# Patient Record
Sex: MALE | Race: BLACK OR AFRICAN AMERICAN | NOT HISPANIC OR LATINO | Employment: FULL TIME | URBAN - METROPOLITAN AREA
[De-identification: names, ages, dates, MRNs, and addresses within clinical notes are randomized per-mention and may not be internally consistent; named-entity substitution may affect disease eponyms.]

---

## 2017-01-30 RX ORDER — GLIPIZIDE 5 MG/1
TABLET ORAL
Qty: 30 TABLET | Refills: 6 | Status: SHIPPED | OUTPATIENT
Start: 2017-01-30 | End: 2017-02-06 | Stop reason: SDUPTHER

## 2017-02-03 ENCOUNTER — OFFICE VISIT (OUTPATIENT)
Dept: INTERNAL MEDICINE | Facility: CLINIC | Age: 47
End: 2017-02-03
Payer: COMMERCIAL

## 2017-02-03 VITALS
HEIGHT: 74 IN | BODY MASS INDEX: 32.25 KG/M2 | DIASTOLIC BLOOD PRESSURE: 76 MMHG | SYSTOLIC BLOOD PRESSURE: 120 MMHG | TEMPERATURE: 98 F | WEIGHT: 251.31 LBS | OXYGEN SATURATION: 97 % | HEART RATE: 92 BPM

## 2017-02-03 DIAGNOSIS — I83.812 VARICOSE VEINS OF LEFT LOWER EXTREMITY WITH PAIN: ICD-10-CM

## 2017-02-03 DIAGNOSIS — R80.9 DIABETES MELLITUS WITH MICROALBUMINURIA: ICD-10-CM

## 2017-02-03 DIAGNOSIS — L03.116 CELLULITIS OF LEFT LOWER EXTREMITY: Primary | ICD-10-CM

## 2017-02-03 DIAGNOSIS — E11.29 DIABETES MELLITUS WITH MICROALBUMINURIA: ICD-10-CM

## 2017-02-03 PROCEDURE — 99213 OFFICE O/P EST LOW 20 MIN: CPT | Mod: S$GLB,,, | Performed by: INTERNAL MEDICINE

## 2017-02-03 PROCEDURE — 99999 PR PBB SHADOW E&M-EST. PATIENT-LVL III: CPT | Mod: PBBFAC,,, | Performed by: INTERNAL MEDICINE

## 2017-02-03 PROCEDURE — 3044F HG A1C LEVEL LT 7.0%: CPT | Mod: S$GLB,,, | Performed by: INTERNAL MEDICINE

## 2017-02-03 PROCEDURE — 3078F DIAST BP <80 MM HG: CPT | Mod: S$GLB,,, | Performed by: INTERNAL MEDICINE

## 2017-02-03 PROCEDURE — 4010F ACE/ARB THERAPY RXD/TAKEN: CPT | Mod: S$GLB,,, | Performed by: INTERNAL MEDICINE

## 2017-02-03 PROCEDURE — 2022F DILAT RTA XM EVC RTNOPTHY: CPT | Mod: S$GLB,,, | Performed by: INTERNAL MEDICINE

## 2017-02-03 PROCEDURE — 3074F SYST BP LT 130 MM HG: CPT | Mod: S$GLB,,, | Performed by: INTERNAL MEDICINE

## 2017-02-03 RX ORDER — SULFAMETHOXAZOLE AND TRIMETHOPRIM 800; 160 MG/1; MG/1
1 TABLET ORAL 2 TIMES DAILY
Qty: 20 TABLET | Refills: 0 | Status: SHIPPED | OUTPATIENT
Start: 2017-02-03 | End: 2017-02-15

## 2017-02-03 NOTE — LETTER
February 3, 2017                 [facility]  Internal Medicine  [address] [place] [address]  Phone: [phone]  Fax: [phone]   February 3, 2017     Patient: Laron Kotahri Jr.   YOB: 1970   Date of Visit: 2/3/2017       To Whom it May Concern:    Laron Kothari was seen in my clinic on 2/3/2017. He may return to work on 2/10/2017.    If you have any questions or concerns, please don't hesitate to call.    Sincerely,         Kristen Adler, DO

## 2017-02-03 NOTE — MR AVS SNAPSHOT
[facility]  [address] [facility] [address]  Phone: [phone]  Fax: [phone]                  Laron Kothari Jr.   2/3/2017 3:00 PM   Office Visit    Description:  Male : 1970   Provider:  Kristen Adler DO   Department:  [facility]           Reason for Visit     Leg Pain           Diagnoses this Visit        Comments    Cellulitis of left lower extremity    -  Primary     Varicose veins of left lower extremity with pain                To Do List           Future Appointments        Provider Department Dept Phone    3/20/2017 9:50 AM LABORATORY, [address] [facility] [phone]    3/31/2017 9:40 AM Christine Arteaga DPM [facility] Podiatry [phone]    2017 8:00 AM LABORATORY, [address] [facility] [phone]    2017 9:40 AM Kristen Adler DO [facility] Internal Medicine [phone]      Goals (5 Years of Data)     None       These Medications        Disp Refills Start End    sulfamethoxazole-trimethoprim 800-160mg (BACTRIM DS) 800-160 mg Tab 20 tablet 0 2/3/2017     Take 1 tablet by mouth 2 (two) times daily. - Oral    Pharmacy: [facility] [address] [place] [address] Ph #: [phone]         [facility]     [facility] Care [facility] -  Assistance  Registered nurses in the [facility] provide clinical advisement, health education, appointment booking, and other advisory services.  Call for this free service at [phone].             Medications           Message regarding Medications     Verify the changes and/or additions to your medication regime listed below are the same as discussed with your clinician today.  If any of these changes or additions are incorrect, please notify your healthcare provider.        START taking these NEW medications        Refills    sulfamethoxazole-trimethoprim 800-160mg (BACTRIM DS) 800-160 mg Tab 0    Sig: Take  "1 tablet by mouth 2 (two) times daily.    Class: Normal    Route: Oral      STOP taking these medications     tavaborole 5 % [place] Apply 1 application topically once daily.    aspirin (ECOTRIN) 81 MG EC tablet Take 81 mg by mouth once daily.           Verify that the below list of medications is an accurate representation of the medications you are currently taking.  If none reported, the list may be blank. If incorrect, please contact your healthcare provider. Carry this list with you in case of emergency.           Current Medications     blood sugar diagnostic Strp Once daily glucose testing.    blood-glucose meter Misc Use as directed.    gabapentin (NEURONTIN) 300 MG capsule Take 1 capsule (300 mg total) by mouth 2 (two) times daily.    glipiZIDE (GLUCOTROL) 5 MG tablet TAKE ONE TABLET BY MOUTH ONCE DAILY WITH BREAKFAST    indomethacin (INDOCIN) 50 MG capsule TAKE ONE CAPSULE BY MOUTH THREE TIMES DAILY    lancets (LANCETS,THIN) Misc Once daily glucose testing.    lisinopril (PRINIVIL,ZESTRIL) 40 MG tablet Take 1 tablet (40 mg total) by mouth once daily.    metformin (GLUCOPHAGE) 1000 MG tablet Take 1 tablet (1,000 mg total) by mouth 2 (two) times daily with meals.    pravastatin (PRAVACHOL) 80 MG tablet Take 1 tablet (80 mg total) by mouth once daily.    sulfamethoxazole-trimethoprim 800-160mg (BACTRIM DS) 800-160 mg Tab Take 1 tablet by mouth 2 (two) times daily.           Clinical Reference Information           Your Vitals Were     BP Pulse Temp Height Weight SpO2    120/76 (BP Location: Left arm, Patient Position: Sitting) 92 97.5 °F (36.4 °C) (Tympanic) 6' 2" (1.88 m) 114 kg (251 lb 5.2 oz) 97%    BMI                32.27 kg/m2          Blood Pressure          Most Recent Value    BP  120/76      Allergies as of 2/3/2017     No Known Allergies      Immunizations Administered on Date of Encounter - 2/3/2017     None      Smoking Cessation     If you would like to quit smoking:   You may be eligible for free " services if you are a [place] resident and started smoking cigarettes before September 1, 1988.  Call the Smoking Cessation Trust (SCT) toll free at (716) 929-8518 or (473) 905-1169.   Call 1-800-QUIT-NOW if you do not meet the above criteria.            Language Assistance Services     ATTENTION: Language assistance services are available, free of charge. Please call [phone].      ATENCIÓN: Si habla modestoañol, tiene a aranda disposición servicios gratuitos de asistencia lingüística. Llame al [phone].     CHÚ Ý: N?u b?n nói Ti?ng Vi?t, có các d?ch v? h? tr? ngôn ng? mi?n phí dành cho b?n. G?i s? [phone].         [facility] complies with applicable Federal civil rights laws and does not discriminate on the basis of race, color, national origin, age, disability, or sex.

## 2017-02-06 RX ORDER — GLIPIZIDE 5 MG/1
TABLET ORAL
Qty: 30 TABLET | Refills: 6 | Status: SHIPPED | OUTPATIENT
Start: 2017-02-06 | End: 2017-05-26 | Stop reason: SDUPTHER

## 2017-02-06 NOTE — PROGRESS NOTES
Laron Kothari .  46 y.o.  Black or  male    Chief Complaint   Patient presents with    Leg Pain       HPI:   Presents to the clinic with his wife with complaint of pain in his left lower leg. He has had this for several weeks but it is getting worse. He noticed redness a couple days ago. The area is tender. He reports having a varicose vein that is now swollen and tender to touch. He denies trauma. He denies a fever.   He has a history of controlled diabetes with microalbuminuria. He needs a refill on glipizide.     PMH: Reviewed    MEDS: Reviewed med card    ALLERGIES: Reviewed allergy card    PE: Reviewed vitals  GENERAL: Alert and oriented, no acute distress  HEART: Regular rate  LUNGS: Unlabored respirations  LEG: Left leg below knee tender, swollen varicose vein with erythema lateral to it; no open lesions, no drainage      ASSESSMENT/PLAN:    Laron was seen today for leg pain.    Diagnoses and all orders for this visit:    Cellulitis of left lower extremity  -     sulfamethoxazole-trimethoprim 800-160mg (BACTRIM DS) 800-160 mg Tab; Take 1 tablet by mouth 2 (two) times daily.    Varicose veins of left lower extremity with pain  -     Discussed leg elevation and applying cool compresses     Diabetes mellitus with microalbuminuria  -     glipiZIDE (GLUCOTROL) 5 MG tablet; TAKE ONE TABLET BY MOUTH ONCE DAILY WITH BREAKFAST    RTC: As needed

## 2017-02-15 ENCOUNTER — OFFICE VISIT (OUTPATIENT)
Dept: INTERNAL MEDICINE | Facility: CLINIC | Age: 47
End: 2017-02-15
Payer: COMMERCIAL

## 2017-02-15 VITALS
BODY MASS INDEX: 32.71 KG/M2 | DIASTOLIC BLOOD PRESSURE: 78 MMHG | TEMPERATURE: 98 F | HEIGHT: 74 IN | SYSTOLIC BLOOD PRESSURE: 138 MMHG | OXYGEN SATURATION: 97 % | HEART RATE: 99 BPM | WEIGHT: 254.88 LBS

## 2017-02-15 DIAGNOSIS — E11.29 DIABETES MELLITUS WITH MICROALBUMINURIA: Chronic | ICD-10-CM

## 2017-02-15 DIAGNOSIS — I83.812 VARICOSE VEINS OF LEFT LOWER EXTREMITIES WITH PAIN: ICD-10-CM

## 2017-02-15 DIAGNOSIS — R80.9 DIABETES MELLITUS WITH MICROALBUMINURIA: Chronic | ICD-10-CM

## 2017-02-15 DIAGNOSIS — M79.89 LEFT LEG SWELLING: Primary | ICD-10-CM

## 2017-02-15 PROCEDURE — 3078F DIAST BP <80 MM HG: CPT | Mod: S$GLB,,, | Performed by: INTERNAL MEDICINE

## 2017-02-15 PROCEDURE — 99999 PR PBB SHADOW E&M-EST. PATIENT-LVL III: CPT | Mod: PBBFAC,,, | Performed by: INTERNAL MEDICINE

## 2017-02-15 PROCEDURE — 99213 OFFICE O/P EST LOW 20 MIN: CPT | Mod: S$GLB,,, | Performed by: INTERNAL MEDICINE

## 2017-02-15 PROCEDURE — 3044F HG A1C LEVEL LT 7.0%: CPT | Mod: S$GLB,,, | Performed by: INTERNAL MEDICINE

## 2017-02-15 PROCEDURE — 3075F SYST BP GE 130 - 139MM HG: CPT | Mod: S$GLB,,, | Performed by: INTERNAL MEDICINE

## 2017-02-15 PROCEDURE — 2022F DILAT RTA XM EVC RTNOPTHY: CPT | Mod: S$GLB,,, | Performed by: INTERNAL MEDICINE

## 2017-02-15 PROCEDURE — 4010F ACE/ARB THERAPY RXD/TAKEN: CPT | Mod: S$GLB,,, | Performed by: INTERNAL MEDICINE

## 2017-02-15 NOTE — MR AVS SNAPSHOT
[facility]  [address] [facility] [address]  Phone: [phone]  Fax: [phone]                  Laron Kothari Jr.   2/15/2017 2:40 PM   Office Visit    Description:  Male : 1970   Provider:  Kristen Adler DO   Department:  [facility]           Reason for Visit     Leg Pain           Diagnoses this Visit        Comments    Left leg swelling    -  Primary     Varicose veins of left lower extremities with pain                To Do List           Future Appointments        Provider Department Dept Phone    2017 11:00 AM [facility] [address] [phone]    3/20/2017 9:50 AM LABORATORY, [address] [facility] [phone]    3/31/2017 9:40 AM Christine Arteaga DPM [facility] Podiatry [phone]    2017 8:00 AM [facility] [address] [facility] [phone]    2017 9:40 AM Kristen Adler DO [facility] Internal Medicine [phone]      Goals (5 Years of Data)     None      [facility]     [facility] Nurse Care [facility] -  Assistance  Registered nurses in the [facility] provide clinical advisement, health education, appointment booking, and other advisory services.  Call for this free service at [phone].             Medications           Message regarding Medications     Verify the changes and/or additions to your medication regime listed below are the same as discussed with your clinician today.  If any of these changes or additions are incorrect, please notify your healthcare provider.        STOP taking these medications     sulfamethoxazole-trimethoprim 800-160mg (BACTRIM DS) 800-160 mg Tab Take 1 tablet by mouth 2 (two) times daily.           Verify that the below list of medications is an accurate representation of the medications you are currently taking.  If none reported, the list may be blank. If incorrect, please contact your healthcare provider.  "Carry this list with you in case of emergency.           Current Medications     blood sugar diagnostic Strp Once daily glucose testing.    blood-glucose meter Misc Use as directed.    gabapentin (NEURONTIN) 300 MG capsule Take 1 capsule (300 mg total) by mouth 2 (two) times daily.    glipiZIDE (GLUCOTROL) 5 MG tablet TAKE ONE TABLET BY MOUTH ONCE DAILY WITH BREAKFAST    indomethacin (INDOCIN) 50 MG capsule TAKE ONE CAPSULE BY MOUTH THREE TIMES DAILY    lancets (LANCETS,THIN) Misc Once daily glucose testing.    lisinopril (PRINIVIL,ZESTRIL) 40 MG tablet Take 1 tablet (40 mg total) by mouth once daily.    metformin (GLUCOPHAGE) 1000 MG tablet Take 1 tablet (1,000 mg total) by mouth 2 (two) times daily with meals.    pravastatin (PRAVACHOL) 80 MG tablet Take 1 tablet (80 mg total) by mouth once daily.           Clinical Reference Information           Your Vitals Were     BP Pulse Temp Height    138/78 (BP Location: Right arm, Patient Position: Sitting, BP Method: Manual) 99 97.6 °F (36.4 °C) (Tympanic) 6' 2" (1.88 m)    Weight SpO2 BMI    115.6 kg (254 lb 13.6 oz) 97% 32.72 kg/m2      Blood Pressure          Most Recent Value    BP  138/78      Allergies as of 2/15/2017     No Known Allergies      Immunizations Administered on Date of Encounter - 2/15/2017     None      Orders Placed During Today's Visit     Future Labs/Procedures Expected by Expires    US Lower Extremity Veins Left  2/15/2017 5/16/2017      Smoking Cessation     If you would like to quit smoking:   You may be eligible for free services if you are a [place] resident and started smoking cigarettes before September 1, 1988.  Call the Smoking Cessation Trust (SCT) toll free at (150) 057-6854 or (446) 243-8904.   Call 2-966-QUIT-NOW if you do not meet the above criteria.            Language Assistance Services     ATTENTION: Language assistance services are available, free of charge. Please call [phone].      ATENCIÓN: awilda Lincoln " a aranda disposición servicios gratuitos de asistencia lingüística. Llleandro al [phone].     LOAN Ý: N?u b?n nói Ti?ng Vi?t, có các d?ch v? h? tr? ngôn ng? mi?n phí dành cho b?n. G?i s? [phone].         [facility] complies with applicable Federal civil rights laws and does not discriminate on the basis of race, color, national origin, age, disability, or sex.

## 2017-02-16 ENCOUNTER — TELEPHONE (OUTPATIENT)
Dept: CARDIOLOGY | Facility: CLINIC | Age: 47
End: 2017-02-16

## 2017-02-16 ENCOUNTER — HOSPITAL ENCOUNTER (OUTPATIENT)
Dept: RADIOLOGY | Facility: HOSPITAL | Age: 47
Discharge: HOME | End: 2017-02-16
Attending: INTERNAL MEDICINE
Payer: COMMERCIAL

## 2017-02-16 DIAGNOSIS — I83.812 VARICOSE VEINS OF LEFT LOWER EXTREMITIES WITH PAIN: ICD-10-CM

## 2017-02-16 DIAGNOSIS — M79.89 LEFT LEG SWELLING: ICD-10-CM

## 2017-02-16 DIAGNOSIS — I82.812 ACUTE SUPERFICIAL VENOUS THROMBOSIS OF LOWER EXTREMITY, LEFT: Primary | ICD-10-CM

## 2017-02-16 PROBLEM — I82.402 LEG DVT (DEEP VENOUS THROMBOEMBOLISM), ACUTE, LEFT: Status: ACTIVE | Noted: 2017-02-16

## 2017-02-16 PROCEDURE — 93971 EXTREMITY STUDY: CPT | Mod: TC

## 2017-02-16 PROCEDURE — 93971 EXTREMITY STUDY: CPT | Mod: 26,,, | Performed by: RADIOLOGY

## 2017-02-16 RX ORDER — ENOXAPARIN SODIUM 100 MG/ML
1 INJECTION SUBCUTANEOUS 2 TIMES DAILY
Qty: 10 SYRINGE | Refills: 0 | Status: SHIPPED | OUTPATIENT
Start: 2017-02-16 | End: 2017-02-21 | Stop reason: SDUPTHER

## 2017-02-16 RX ORDER — WARFARIN SODIUM 5 MG/1
5 TABLET ORAL DAILY
Qty: 30 TABLET | Refills: 0 | Status: SHIPPED | OUTPATIENT
Start: 2017-02-16 | End: 2017-02-28 | Stop reason: DRUGHIGH

## 2017-02-16 NOTE — PROGRESS NOTES
Laron Kothari .  46 y.o.  Black or  male    Chief Complaint   Patient presents with    Leg Pain       HPI: Presents to the clinic with continued pain and swelling in his left leg. He has completed a course of antibiotics for presumed cellulitis. Now the swelling has extended to above his knee on the medial-posterior lower thigh. The area is very tender when he touches it. He denies trauma. He denies having a fever.     He has controlled diabetes with microalbuminuria.      PMH: Reviewed    MEDS: Reviewed med card    ALLERGIES: Reviewed allergy card    PE: Reviewed vitals  GENERAL: Alert and oriented, no acute distress  HEART: Regular rate  LUNGS: Unlabored respirations  EXTREMITIES: Left medial, posterior lower thigh erythema, warmth and tenderness; left anterior, below knee inflamed, tender varicose vein      ASSESSMENT/PLAN:    Laron was seen today for leg pain.    Diagnoses and all orders for this visit:    Left leg swelling  -     US Lower Extremity Veins Left; Future    Varicose veins of left lower extremities with pain  -     US Lower Extremity Veins Left; Future    Diabetes mellitus with microalbuminuria    Further recommendations following ultrasound.

## 2017-02-16 NOTE — PATIENT INSTRUCTIONS
Deep Vein Thrombosis (DVT)    Deep vein thrombosis (DVT) occurs when a blood clot (thrombus) forms in a deep vein. This happens most often in the leg. It can also happen in the arms or other parts of the body. A part of the clot called an embolus can break off and travel to the lungs. When this happens, its called a pulmonary embolism (PE). PE is a medical emergency. It can cut off blood flow and lead to death. Both DVT and PE are closely related. Together, they are often referred to by the term venous thromboembolism (VTE).  Risk factors for DVT  Anything that slows blood flow, injures the lining of a vein, or increases blood clotting can make you more prone to having DVT. This includes the following:  · Long periods without movement (such as when sitting for many hours at a time or when recovering from major surgery or illness)  · Estrogen (female hormone) therapy, such as hormone replacement therapy (HRT) or oral contraceptives  · Fractured hip or leg  · Major surgery or joint replacement  · Major trauma or spinal cord injury  · Cancer  · Family history  · Excess weight or obesity  · Smoking  · Older age  Symptoms  DVT does not always cause symptoms. When symptoms do occur, they may appear around the site of the DVT, such as in the leg. Possible symptoms include:  · Swelling  · Pain  · Warmth  · Redness  · Tenderness  Home care  · You were likely prescribed blood thinners (anticoagulants). They may be given as pills (oral) or shots (injections). Follow all instructions when using these medicines. Note: Do not take blood thinners with other medicines, herbal remedies, or supplements without talking to your provider first. Certain medicines or products can affect how blood thinners work.  · Follow your providers instructions about activity and rest.  · If support or compression stockings are prescribed, wear them as directed. These may help improve blood flow in the legs.  · When sitting or lying down, move  your ankles, toes and knees often. This may also help improve blood flow in the legs.  Follow-up care  Follow up with your healthcare provider, or as advised. If imaging tests were done, they may need further review by a doctor. You will be told of any new findings that may affect your care.  When to seek medical advice  Call your healthcare provider right away if any of these occur:  · New or increased swelling, pain, tenderness, warmth, or redness, in the leg, arm, or other area  · Blood in the urine  · Bleeding with bowel movements  Call 911  Call 911 right away if any of these occur:  · Bleeding from the nose, gums, a cut, or vagina  · Heavy or uncontrolled bleeding  · Trouble breathing  · Chest pain or discomfort that worsens with deep breathing or coughing  · Coughing (may cough up blood)  · Fast heartbeat  · Sweating  · Anxiety  · Lightheadedness, dizziness, or fainting  Date Last Reviewed: 9/21/2015  © 8719-3888 [employer]. [address] [place] [address], [place] [address]. All rights reserved. This information is not intended as a substitute for professional medical care. Always follow your healthcare professional's instructions.

## 2017-02-17 ENCOUNTER — TELEPHONE (OUTPATIENT)
Dept: INTERNAL MEDICINE | Facility: CLINIC | Age: 47
End: 2017-02-17

## 2017-02-17 NOTE — TELEPHONE ENCOUNTER
Pt was informed pt work is still pending completion and he will be notified upon completion pt verbalized understanding.

## 2017-02-17 NOTE — TELEPHONE ENCOUNTER
----- Message from Zeny Griffiths sent at 2/16/2017  4:43 PM CST -----  Contact: pt  Pt is requesting nurse give him a call regarding his leave of absence. Pls call pt back at [phone]

## 2017-02-21 ENCOUNTER — ANTI-COAG VISIT (OUTPATIENT)
Dept: CARDIOLOGY | Facility: CLINIC | Age: 47
End: 2017-02-21
Payer: COMMERCIAL

## 2017-02-21 ENCOUNTER — TELEPHONE (OUTPATIENT)
Dept: INTERNAL MEDICINE | Facility: CLINIC | Age: 47
End: 2017-02-21

## 2017-02-21 DIAGNOSIS — Z79.01 LONG TERM (CURRENT) USE OF ANTICOAGULANTS: Primary | ICD-10-CM

## 2017-02-21 DIAGNOSIS — I82.812 ACUTE SUPERFICIAL VENOUS THROMBOSIS OF LOWER EXTREMITY, LEFT: ICD-10-CM

## 2017-02-21 DIAGNOSIS — I82.402 LEG DVT (DEEP VENOUS THROMBOEMBOLISM), ACUTE, LEFT: ICD-10-CM

## 2017-02-21 LAB — INR PPP: 1 (ref 2–3)

## 2017-02-21 PROCEDURE — 85610 PROTHROMBIN TIME: CPT | Mod: QW,S$GLB,,

## 2017-02-21 PROCEDURE — 99211 OFF/OP EST MAY X REQ PHY/QHP: CPT | Mod: 25,S$GLB,,

## 2017-02-21 RX ORDER — ENOXAPARIN SODIUM 100 MG/ML
INJECTION SUBCUTANEOUS
Qty: 10 SYRINGE | Refills: 0 | Status: SHIPPED | OUTPATIENT
Start: 2017-02-21 | End: 2017-02-24 | Stop reason: SDUPTHER

## 2017-02-21 NOTE — MR AVS SNAPSHOT
[facility]  [address]  [place] [address]  Phone: [phone]  Fax: [phone]                  Laron Kothari Jr.   2017 11:30 AM   Anti-coag visit    Description:  Male : 1970   Provider:  Ilana Crow PharmD   Department:  [facility]           Diagnoses this Visit        Comments    Long term (current) use of anticoagulants    -  Primary     Leg DVT (deep venous thromboembolism), acute, left                To Do List           Future Appointments        Provider Department Dept Phone    2017 2:45 PM Ilana Crow, PharmD [facility] [phone]    3/8/2017 8:20 AM Eric Rios MD [facility] V[facility] Surgery [phone]    3/10/2017 9:20 AM Kristen Adler DO [facility] Internal Medicine [phone]    3/20/2017 9:50 AM [place], [address] [facility] [phone]    3/31/2017 9:40 AM Christine Arteaga DPM [facility] Podiatry [phone]      Goals (5 Years of Data)     None      [facility]     [facility] Nurse Care [facility] -  Assistance  Registered nurses in the [facility] provide clinical advisement, health education, appointment booking, and other advisory services.  Call for this free service at [phone].             Medications           Message regarding Medications     Verify the changes and/or additions to your medication regime listed below are the same as discussed with your clinician today.  If any of these changes or additions are incorrect, please notify your healthcare provider.             Verify that the below list of medications is an accurate representation of the medications you are currently taking.  If none reported, the list may be blank. If incorrect, please contact your healthcare provider. Carry this list with you in case of emergency.           Current Medications     blood sugar diagnostic Strp Once daily glucose testing.    blood-glucose meter Misc Use as  directed.    enoxaparin (LOVENOX) 100 mg/mL Syrg Inject 1.2 mLs (120 mg total) into the skin 2 (two) times daily.    gabapentin (NEURONTIN) 300 MG capsule Take 1 capsule (300 mg total) by mouth 2 (two) times daily.    glipiZIDE (GLUCOTROL) 5 MG tablet TAKE ONE TABLET BY MOUTH ONCE DAILY WITH BREAKFAST    indomethacin (INDOCIN) 50 MG capsule TAKE ONE CAPSULE BY MOUTH THREE TIMES DAILY    lancets (LANCETS,THIN) Misc Once daily glucose testing.    lisinopril (PRINIVIL,ZESTRIL) 40 MG tablet Take 1 tablet (40 mg total) by mouth once daily.    metformin (GLUCOPHAGE) 1000 MG tablet Take 1 tablet (1,000 mg total) by mouth 2 (two) times daily with meals.    pravastatin (PRAVACHOL) 80 MG tablet Take 1 tablet (80 mg total) by mouth once daily.    warfarin (COUMADIN) 5 MG tablet Take 1 tablet (5 mg total) by mouth Daily.           Clinical Reference Information           Allergies as of 2/21/2017     No Known Allergies      Immunizations Administered on Date of Encounter - 2/21/2017     None      Orders Placed During Today's Visit      Normal Orders This Visit    POCT INR          2/21/2017 11:56 AM - Zainab BarnettD      Component Results     Component Value Flag Ref Range Units Status    INR 1.0 (A) 2.0 - 3.0  Final      January 2017 Details    Sun Mon Tue Wed Thu Fri Sat     1               2               3               4               5               6               7                 8               9               10               11               12               13               14                 15               16               17               18               19               20               21                 22               23               24               25               26               27               28                 29               30               31                    Date Details   No additional details              February 2017 Details    Sun Mon Tue Wed Thu Fri Sat        1                2               3               4                 5               6               7               8               9               10               11                 12               13               14               15               16   1.0      See details      17               18                 19               20               21   1.0   10 mg   See details      22      10 mg         23      5 mg         24            25                 26               27               28                    Date Details   02/16 Last INR check   INR: 1.0   Coumadin Therapy: 2.0 - 3.0 for INR for all indicators except mechanical heart valves and antiphospholipid syndromes which should use 2.5 - 3.5.       02/21 This INR check   INR: 1.0       Date of next INR:  2/24/2017               How to take your warfarin dose     To take:  5 mg Take 1 of the 5 mg tablets.    To take:  10 mg Take 2 of the 5 mg tablets.           Anticoagulation Summary as of 2/21/2017     Maintenance plan 5 mg (5 mg x 1) every day    Full instructions 2/21: 10 mg; 2/22: 10 mg; Otherwise 5 mg every day    Next INR check 2/24/2017      Anticoagulation Episode Summary     Comments WARFARIN + LOVENOX beginning 2/16      Smoking Cessation     If you would like to quit smoking:   You may be eligible for free services if you are a [place] resident and started smoking cigarettes before September 1, 1988.  Call the Smoking Cessation Trust ([facility]) toll free at (556) [phone] or (560) 527-3563.   Call 1-800-QUIT-NOW if you do not meet the above criteria.            Language Assistance Services     ATTENTION: Language assistance services are available, free of charge. Please call [phone].      ATENCIÓN: Si habla español, tiene a aranda disposición servicios gratuitos de asistencia lingüística. Llame al [phone].     CHÚ Ý: N?u b?n nói Ti?ng Vi?t, có các d?ch v? h? tr? ngôn ng? mi?n phí dành cho b?n. G?i s? [phone].         [facility]  complies with applicable Federal civil rights laws and does not discriminate on the basis of race, color, national origin, age, disability, or sex.

## 2017-02-21 NOTE — PROGRESS NOTES
"45 yo M PMH: recent h/o DVT, DM, HTN, HLD. Patient is here with his wife. Warfarin 5mg began on 2/16 with lovenox bridge therapy after DVT diagnosis. INR today is 1.0. Patient confirms dose and compliance. He reports eating cabbage this week. Take 10mg tonight and tomorrow then resume 5mg daily. Continue lovenox as prescribed. Repeat INR on Friday. Patient has been Coumadin counseled. Counseling included indication for use, importance of strict monitoring with clinic, importance of compliance, what to do if any missed doses, side effects associated with warfarin, drug interactions and drug-food interactions. All parties verbalized understanding. All questions/concerns were addressed.         New Patient Questionnaire  1. Why are you on Coumadin (warfarin)? "blood clot"  2. What is your target INR range? blank  3. Which one of these medications is recommended if you are taking Coumadin (warfarin) and want relief from a headache or fever? (d)  4. Which of the following food items would interfere with your Coumadin (warfarin) medication if its not incorporated consistently? (a)  5. You just remembered that you forgot to take your evening Coumadin (warfarin) medication dose last night. You would-- (a)  6. The best time of the day for me to take my Coumadin (warfarin) is (d)  7. While on Coumadin (warfarin) you need to be routinely monitored for which of the following: (a)    "

## 2017-02-21 NOTE — TELEPHONE ENCOUNTER
Pt was informed additional paper work was sent from his employer which requires completion pt verbalized understanding and states paperwork requires completion and submission by 02/24/17 pt was informed Dr. Adler will be informed of deadline for additional paperwork pt verbalized understanding.

## 2017-02-21 NOTE — TELEPHONE ENCOUNTER
----- Message from Luana Ward sent at 2/21/2017 12:07 PM CST -----  Wants to talk to you about some forms that need to go to his job/he says he has 2 more days. Please give him a call this afternoon

## 2017-02-22 NOTE — TELEPHONE ENCOUNTER
Tova pt spouse states pt has been coughing up blood and is concerned  . Tova was informed pt should go to ER for evaluation and verbalized understanding.

## 2017-02-24 ENCOUNTER — ANTI-COAG VISIT (OUTPATIENT)
Dept: CARDIOLOGY | Facility: CLINIC | Age: 47
End: 2017-02-24
Payer: COMMERCIAL

## 2017-02-24 DIAGNOSIS — I82.402 LEG DVT (DEEP VENOUS THROMBOEMBOLISM), ACUTE, LEFT: ICD-10-CM

## 2017-02-24 DIAGNOSIS — Z79.01 LONG TERM (CURRENT) USE OF ANTICOAGULANTS: Primary | ICD-10-CM

## 2017-02-24 LAB — INR PPP: 1 (ref 2–3)

## 2017-02-24 PROCEDURE — 85610 PROTHROMBIN TIME: CPT | Mod: QW,S$GLB,,

## 2017-02-24 PROCEDURE — 99211 OFF/OP EST MAY X REQ PHY/QHP: CPT | Mod: 25,S$GLB,,

## 2017-02-24 RX ORDER — ENOXAPARIN SODIUM 100 MG/ML
INJECTION SUBCUTANEOUS
Qty: 10 SYRINGE | Refills: 1 | Status: SHIPPED | OUTPATIENT
Start: 2017-02-24 | End: 2017-03-10

## 2017-02-24 NOTE — PROGRESS NOTES
INR remains at 1.0. Patient confirms dose and compliance. Reports a small salad two days ago but no other changes in diet. Reports coughing and seeing blood in tissue two days ago. No other issues since and did not seek any medical attention. Knows to go to ER if any signs/symptoms of bleeding occur. Will begin 10mg daily. Continue lovenox as prescribed. Patient and wife verbalized understanding.

## 2017-02-24 NOTE — MR AVS SNAPSHOT
[facility]  [address] [facility]  [place] [address]  Phone: [phone]  Fax: [phone]                  Laron Kothari Jr.   2017 2:45 PM   Anti-coag visit    Description:  Male : 1970   Provider:  Ilana Crow PharmD   Department:  [facility]           Diagnoses this Visit        Comments    Long term (current) use of anticoagulants    -  Primary     Leg DVT (deep venous thromboembolism), acute, left                To Do List           Future Appointments        Provider Department Dept Phone    2017 3:15 PM Ilana Crow, PharmD [facility] [phone]    3/8/2017 8:20 AM Eric Rios MD [facility] Vascular Surgery [phone]    3/10/2017 9:20 AM Kristen Adler DO [facility] Internal Medicine [phone]    3/20/2017 9:50 AM AMANDA, [address] [facility] [phone]    3/31/2017 9:40 AM Christine Arteaga DPM [facility] Podiatry [phone]      Goals (5 Years of Data)     None      [facility]     [facility] -  Assistance  Registered nurses in the [facility] provide clinical advisement, health education, appointment booking, and other advisory services.  Call for this free service at [phone].             Medications           Message regarding Medications     Verify the changes and/or additions to your medication regime listed below are the same as discussed with your clinician today.  If any of these changes or additions are incorrect, please notify your healthcare provider.             Verify that the below list of medications is an accurate representation of the medications you are currently taking.  If none reported, the list may be blank. If incorrect, please contact your healthcare provider. Carry this list with you in case of emergency.           Current Medications     blood sugar diagnostic Strp Once daily glucose testing.    blood-glucose meter Misc Use as  directed.    enoxaparin (LOVENOX) 100 mg/mL Syrg Inject 120mg twice daily into skin as directed by the coumadin clinic.    gabapentin (NEURONTIN) 300 MG capsule Take 1 capsule (300 mg total) by mouth 2 (two) times daily.    glipiZIDE (GLUCOTROL) 5 MG tablet TAKE ONE TABLET BY MOUTH ONCE DAILY WITH BREAKFAST    indomethacin (INDOCIN) 50 MG capsule TAKE ONE CAPSULE BY MOUTH THREE TIMES DAILY    lancets (LANCETS,THIN) Misc Once daily glucose testing.    lisinopril (PRINIVIL,ZESTRIL) 40 MG tablet Take 1 tablet (40 mg total) by mouth once daily.    metformin (GLUCOPHAGE) 1000 MG tablet Take 1 tablet (1,000 mg total) by mouth 2 (two) times daily with meals.    pravastatin (PRAVACHOL) 80 MG tablet Take 1 tablet (80 mg total) by mouth once daily.    warfarin (COUMADIN) 5 MG tablet Take 1 tablet (5 mg total) by mouth Daily.           Clinical Reference Information           Allergies as of 2/24/2017     No Known Allergies      Immunizations Administered on Date of Encounter - 2/24/2017     None      Orders Placed During Today's Visit      Normal Orders This Visit    POCT INR          2/24/2017  3:29 PM - Zainab BarnettD      Component Results     Component Value Flag Ref Range Units Status    INR 1.0 (A) 2.0 - 3.0  Final      January 2017 Details    Sun Mon Tue Wed Thu Fri Sat     1               2               3               4               5               6               7                 8               9               10               11               12               13               14                 15               16               17               18               19               20               21                 22               23               24               25               26               27               28                 29               30               31                    Date Details   No additional details              February 2017 Details    Sun Mon Tue Wed Thu Fri Sat        1                2               3               4                 5               6               7               8               9               10               11                 12               13               14               15               16   1.0      See details      17               18                 19               20               21   1.0   10 mg   See details      22      10 mg         23      5 mg         24   1.0   10 mg   See details      25      10 mg           26      10 mg         27      10 mg         28                 Date Details   02/16 INR: 1.0   Coumadin Therapy: 2.0 - 3.0 for INR for all indicators except mechanical heart valves and antiphospholipid syndromes which should use 2.5 - 3.5.       02/21 Last INR check   INR: 1.0      02/24 This INR check   INR: 1.0       Date of next INR:  2/28/2017               How to take your warfarin dose     To take:  10 mg Take 2 of the 5 mg tablets.           Anticoagulation Summary as of 2/24/2017     Maintenance plan 10 mg (5 mg x 2) every day    Full instructions 10 mg every day    Next INR check 2/28/2017      Anticoagulation Episode Summary     Comments F/U Survey 5/2017      Smoking Cessation     If you would like to quit smoking:   You may be eligible for free services if you are a [place] resident and started smoking cigarettes before September 1, 1988.  Call the Smoking Cessation Trust ([facility]) toll free at ([phone] [phone] or ([phone] 030-9292.   Call 0-968-QUIT-NOW if you do not meet the above criteria.            Language Assistance Services     ATTENTION: Language assistance services are available, free of charge. Please call [phone].      ATENCIÓN: Si habla español, tiene a aranda disposición servicios gratuitos de asistencia lingüística. Llame al [phone].     CHÚ Ý: N?u b?n nói Ti?ng Vi?t, có các d?ch v? h? tr? ngôn ng? mi?n phí dành cho b?n. G?i s? [phone].         [facility] complies with applicable Federal civil  rights laws and does not discriminate on the basis of race, color, national origin, age, disability, or sex.

## 2017-02-28 ENCOUNTER — ANTI-COAG VISIT (OUTPATIENT)
Dept: CARDIOLOGY | Facility: CLINIC | Age: 47
End: 2017-02-28
Payer: COMMERCIAL

## 2017-02-28 DIAGNOSIS — N18.2 DIABETES MELLITUS WITH STAGE 2 CHRONIC KIDNEY DISEASE: Chronic | ICD-10-CM

## 2017-02-28 DIAGNOSIS — I82.402 LEG DVT (DEEP VENOUS THROMBOEMBOLISM), ACUTE, LEFT: ICD-10-CM

## 2017-02-28 DIAGNOSIS — E78.1 HYPERTRIGLYCERIDEMIA: ICD-10-CM

## 2017-02-28 DIAGNOSIS — E11.22 DIABETES MELLITUS WITH STAGE 2 CHRONIC KIDNEY DISEASE: Chronic | ICD-10-CM

## 2017-02-28 DIAGNOSIS — I10 ESSENTIAL HYPERTENSION: ICD-10-CM

## 2017-02-28 DIAGNOSIS — Z79.01 LONG TERM (CURRENT) USE OF ANTICOAGULANTS: Primary | ICD-10-CM

## 2017-02-28 DIAGNOSIS — E78.5 HYPERLIPIDEMIA: Chronic | ICD-10-CM

## 2017-02-28 LAB — INR PPP: 1 (ref 2–3)

## 2017-02-28 PROCEDURE — 85610 PROTHROMBIN TIME: CPT | Mod: QW,S$GLB,,

## 2017-02-28 PROCEDURE — 99211 OFF/OP EST MAY X REQ PHY/QHP: CPT | Mod: 25,S$GLB,,

## 2017-02-28 RX ORDER — WARFARIN 10 MG/1
TABLET ORAL
Qty: 56 TABLET | Refills: 3 | Status: SHIPPED | OUTPATIENT
Start: 2017-02-28 | End: 2017-03-31

## 2017-02-28 NOTE — PROGRESS NOTES
INR remains at 1.0. Patient and wife confirmed dose and compliance. Will begin 20mg daily, continue lovenox as prescribed. Repeat INR on Friday. Verbalized understanding.

## 2017-02-28 NOTE — MR AVS SNAPSHOT
[facility]  [address] [facility]  [place] [address]  Phone: [phone]  Fax: [phone]                  Laron Kothari Jr.   2017 3:15 PM   Anti-coag visit    Description:  Male : 1970   Provider:  Ilana Crow PharmD   Department:  [facility]           Diagnoses this Visit        Comments    Long term (current) use of anticoagulants    -  Primary     Leg DVT (deep venous thromboembolism), acute, left                To Do List           Future Appointments        Provider Department Dept Phone    3/3/2017 3:15 PM Ilana Crow, PharmD [facility] [phone]    3/8/2017 8:20 AM Eric Rios MD [facility] Vascular Surgery [phone]    3/10/2017 9:20 AM Kristen Adler DO [facility] Internal Medicine [phone]    3/20/2017 9:50 AM AMANDA, [address] [facility] [phone]    3/31/2017 9:40 AM Christine Arteaga DPM [facility] Podiatry [phone]      Goals (5 Years of Data)     None      [facility]     [facility] Care [facility] - 24/7 Assistance  Registered nurses in the [facility] provide clinical advisement, health education, appointment booking, and other advisory services.  Call for this free service at [phone].             Medications           Message regarding Medications     Verify the changes and/or additions to your medication regime listed below are the same as discussed with your clinician today.  If any of these changes or additions are incorrect, please notify your healthcare provider.             Verify that the below list of medications is an accurate representation of the medications you are currently taking.  If none reported, the list may be blank. If incorrect, please contact your healthcare provider. Carry this list with you in case of emergency.           Current Medications     blood sugar diagnostic Strp Once daily glucose testing.    blood-glucose meter Misc Use as  directed.    enoxaparin (LOVENOX) 100 mg/mL Syrg Inject 120mg twice daily into skin as directed by the coumadin clinic.    gabapentin (NEURONTIN) 300 MG capsule Take 1 capsule (300 mg total) by mouth 2 (two) times daily.    glipiZIDE (GLUCOTROL) 5 MG tablet TAKE ONE TABLET BY MOUTH ONCE DAILY WITH BREAKFAST    indomethacin (INDOCIN) 50 MG capsule TAKE ONE CAPSULE BY MOUTH THREE TIMES DAILY    lancets (LANCETS,THIN) Misc Once daily glucose testing.    lisinopril (PRINIVIL,ZESTRIL) 40 MG tablet Take 1 tablet (40 mg total) by mouth once daily.    metformin (GLUCOPHAGE) 1000 MG tablet Take 1 tablet (1,000 mg total) by mouth 2 (two) times daily with meals.    pravastatin (PRAVACHOL) 80 MG tablet Take 1 tablet (80 mg total) by mouth once daily.    warfarin (COUMADIN) 5 MG tablet Take 1 tablet (5 mg total) by mouth Daily.           Clinical Reference Information           Allergies as of 2/28/2017     No Known Allergies      Immunizations Administered on Date of Encounter - 2/28/2017     None      Orders Placed During Today's Visit      Normal Orders This Visit    POCT INR          2/28/2017  3:28 PM - Zainab BarnettD      Component Results     Component Value Flag Ref Range Units Status    INR 1.0 (A) 2.0 - 3.0  Final      January 2017 Details    Sun Mon Tue Wed Thu Fri Sat     1               2               3               4               5               6               7                 8               9               10               11               12               13               14                 15               16               17               18               19               20               21                 22               23               24               25               26               27               28                 29               30               31                    Date Details   No additional details              February 2017 Details    Sun Mon Tue Wed Thu Fri Sat        1                2               3               4                 5               6               7               8               9               10               11                 12               13               14               15               16   1.0      See details      17               18                 19               20               21   1.0   10 mg   See details      22      10 mg         23      5 mg         24   1.0   10 mg   See details      25      10 mg           26      10 mg         27      10 mg         28   1.0   20 mg   See details           Date Details   02/16 INR: 1.0   Coumadin Therapy: 2.0 - 3.0 for INR for all indicators except mechanical heart valves and antiphospholipid syndromes which should use 2.5 - 3.5.       02/21 INR: 1.0      02/24 Last INR check   INR: 1.0      02/28 This INR check   INR: 1.0                     How to take your warfarin dose     To take:  20 mg Take 2 of the 10 mg tablets.           March 2017 Details    Sun Mon Tue Wed Thu Fri Sat        1      20 mg         2      20 mg         3            4                 5               6               7               8               9               10               11                 12               13               14               15               16               17               18                 19               20               21               22               23               24               25                 26               27               28               29               30               31                 Date Details   No additional details    Date of next INR:  3/3/2017         How to take your warfarin dose     To take:  20 mg Take 2 of the 10 mg tablets.           Anticoagulation Summary as of 2/28/2017     Maintenance plan 20 mg (10 mg x 2) every day    Full instructions 20 mg every day    Next INR check 3/3/2017      Anticoagulation Episode Summary     Comments F/U Survey 5/2017      Smoking  Cessation     If you would like to quit smoking:   You may be eligible for free services if you are a [place] resident and started smoking cigarettes before September 1, 1988.  Call the Smoking Cessation Trust ([employer]) toll free at (739) 688-1741 or (240) 649-8735.   Call 5-228-QUIT-NOW if you do not meet the above criteria.            Language Assistance Services     ATTENTION: Language assistance services are available, free of charge. Please call [phone].      ATENCIÓN: Si habla lionel, tiene a aranda disposición servicios gratuitos de asistencia lingüística. Llame al [phone].     CHÚ Ý: N?u b?n nói Ti?ng Vi?t, có các d?ch v? h? tr? ngôn ng? mi?n phí dành cho b?n. G?i s? [phone].         [facility] complies with applicable Federal civil rights laws and does not discriminate on the basis of race, color, national origin, age, disability, or sex.

## 2017-03-02 RX ORDER — PRAVASTATIN SODIUM 80 MG/1
TABLET ORAL
Qty: 30 TABLET | Refills: 6 | Status: SHIPPED | OUTPATIENT
Start: 2017-03-02 | End: 2017-12-08 | Stop reason: SDUPTHER

## 2017-03-02 RX ORDER — LISINOPRIL 40 MG/1
40 TABLET ORAL DAILY
Qty: 30 TABLET | Refills: 6 | Status: SHIPPED | OUTPATIENT
Start: 2017-03-02 | End: 2017-12-08 | Stop reason: SDUPTHER

## 2017-03-03 ENCOUNTER — ANTI-COAG VISIT (OUTPATIENT)
Dept: CARDIOLOGY | Facility: CLINIC | Age: 47
End: 2017-03-03
Payer: COMMERCIAL

## 2017-03-03 DIAGNOSIS — Z79.01 LONG TERM (CURRENT) USE OF ANTICOAGULANTS: Primary | ICD-10-CM

## 2017-03-03 DIAGNOSIS — I82.402 LEG DVT (DEEP VENOUS THROMBOEMBOLISM), ACUTE, LEFT: ICD-10-CM

## 2017-03-03 LAB — INR PPP: 3.4 (ref 2–3)

## 2017-03-03 PROCEDURE — 85610 PROTHROMBIN TIME: CPT | Mod: QW,S$GLB,,

## 2017-03-03 PROCEDURE — 99211 OFF/OP EST MAY X REQ PHY/QHP: CPT | Mod: 25,S$GLB,,

## 2017-03-03 NOTE — MR AVS SNAPSHOT
[facility]  [address] [facility]  [place] [address]  Phone: [phone]  Fax: [phone]                  Laron Kothari Jr.   3/3/2017 3:15 PM   Anti-coag visit    Description:  Male : 1970   Provider:  Ilana Crow, PharmD   [facility]           Diagnoses this Visit        Comments    Long term (current) use of anticoagulants    -  Primary     Leg DVT (deep venous thromboembolism), acute, left                To Do List           Future Appointments        Provider Department Dept Phone    3/8/2017 8:20 AM Eric Rios MD [facility] Vascular Surgery [phone]    3/10/2017 9:20 AM Kristen Adler DO [facility] - Internal Medicine [phone]    3/10/2017 10:15 AM Ilana Crow PharmD [facility] Coumadin [phone]    3/20/2017 9:50 AM AMANDA, [address] [facility] [phone]    3/31/2017 9:40 AM Christine Arteaga DPM [facility] Podiatry [phone]      Goals (5 Years of Data)     None      [facility]     [facility] Care [facility] - 24/7 Assistance  Registered nurses in the [facility] provide clinical advisement, health education, appointment booking, and other advisory services.  Call for this free service at [phone].             Medications           Message regarding Medications     Verify the changes and/or additions to your medication regime listed below are the same as discussed with your clinician today.  If any of these changes or additions are incorrect, please notify your healthcare provider.             Verify that the below list of medications is an accurate representation of the medications you are currently taking.  If none reported, the list may be blank. If incorrect, please contact your healthcare provider. Carry this list with you in case of emergency.           Current Medications     blood sugar diagnostic Strp Once daily glucose testing.    blood-glucose meter Misc Use as  directed.    enoxaparin (LOVENOX) 100 mg/mL Syrg Inject 120mg twice daily into skin as directed by the coumadin clinic.    gabapentin (NEURONTIN) 300 MG capsule Take 1 capsule (300 mg total) by mouth 2 (two) times daily.    glipiZIDE (GLUCOTROL) 5 MG tablet TAKE ONE TABLET BY MOUTH ONCE DAILY WITH BREAKFAST    indomethacin (INDOCIN) 50 MG capsule TAKE ONE CAPSULE BY MOUTH THREE TIMES DAILY    lancets (LANCETS,THIN) Misc Once daily glucose testing.    lisinopril (PRINIVIL,ZESTRIL) 40 MG tablet Take 1 tablet (40 mg total) by mouth once daily.    metformin (GLUCOPHAGE) 1000 MG tablet Take 1 tablet (1,000 mg total) by mouth 2 (two) times daily with meals.    pravastatin (PRAVACHOL) 80 MG tablet TAKE ONE TABLET BY MOUTH ONCE DAILY    warfarin (COUMADIN) 10 MG tablet Take 2 tablets every evening as directed by the coumadin clinic. Discontinue 5mg tablet.           Clinical Reference Information           Allergies as of 3/3/2017     No Known Allergies      Immunizations Administered on Date of Encounter - 3/3/2017     None      Orders Placed During Today's Visit      Normal Orders This Visit    POCT INR          3/3/2017  3:42 PM - Ilana Crow, PharmD      Component Results     Component Value Flag Ref Range Units Status    INR 3.4 (A) 2.0 - 3.0  Final      February 2017 Details    Sun Mon Tue Wed Thu Fri Sat        1               2               3               4                 5               6               7               8               9               10               11                 12               13               14               15               16   1.0      See details      17               18                 19               20               21   1.0   10 mg   See details      22      10 mg         23      5 mg         24   1.0   10 mg   See details      25      10 mg           26      10 mg         27      10 mg         28   1.0   20 mg   See details           Date Details   02/16 INR: 1.0    Coumadin Therapy: 2.0 - 3.0 for INR for all indicators except mechanical heart valves and antiphospholipid syndromes which should use 2.5 - 3.5.       02/21 INR: 1.0      02/24 INR: 1.0      02/28 Last INR check   INR: 1.0                 March 2017 Details    Sun Mon Tue Wed Thu Fri Sat        1      20 mg         2      20 mg         3   3.4   10 mg   See details      4      10 mg           5      10 mg         6      20 mg         7      10 mg         8      20 mg         9      10 mg         10            11                 12               13               14               15               16               17               18                 19               20               21               22               23               24               25                 26               27               28               29               30               31                 Date Details   03/03 This INR check   INR: 3.4       Date of next INR:  3/10/2017               How to take your warfarin dose     To take:  10 mg Take 1 of the 10 mg tablets.    To take:  20 mg Take 2 of the 10 mg tablets.           Anticoagulation Summary as of 3/3/2017     Maintenance plan 20 mg (10 mg x 2) on Mon, Wed, Fri; 10 mg (10 mg x 1) all other days    Full instructions 3/3: 10 mg; Otherwise 20 mg on Mon, Wed, Fri; 10 mg all other days    Next INR check 3/10/2017      Anticoagulation Episode Summary     Comments F/U Survey 5/2017      Smoking Cessation     If you would like to quit smoking:   You may be eligible for free services if you are a [place] resident and started smoking cigarettes before September 1, 1988.  Call the Smoking Cessation Trust ([facility]) toll free at ([phone] 740-8849 or (586) 220-9593.   Call 2-835-QUIT-NOW if you do not meet the above criteria.            Language Assistance Services     ATTENTION: Language assistance services are available, free of charge. Please call [phone].      ATENCIÓN: Blas flowers  tiene a aranda disposición servicios gratuitos de asistencia lingüística. Fredo al [phone].     LOAN Ý: N?u b?n nói Ti?ng Vi?t, có các d?ch v? h? tr? ngôn ng? mi?n phí dành cho b?n. G?i s? [phone].         [facility] complies with applicable Federal civil rights laws and does not discriminate on the basis of race, color, national origin, age, disability, or sex.

## 2017-03-03 NOTE — PROGRESS NOTES
INR is now 3.4. Discontinue lovenox and begin warfarin 20mg on Mondays and Wednesdays and 10mg all other days. Repeat INR in 1 week. Patient and wife verbalized understanding.

## 2017-03-09 ENCOUNTER — TELEPHONE (OUTPATIENT)
Dept: INTERNAL MEDICINE | Facility: CLINIC | Age: 47
End: 2017-03-09

## 2017-03-09 NOTE — TELEPHONE ENCOUNTER
----- Message from Phillip Fields sent at 3/9/2017 12:08 PM CST -----  Contact: Patient  Pt needs a return call regarding a refill on his blood pressure medication; Lisinopril @ and pravastatin  Please call back @..[phone] (home)    ..  [facility] [address] [facility] [address] [address] [address]  Phone: [phone] Fax: [phone]          [facility] [address] [facility] [place] [facility] [address] [facility]  [address] [facility] [address]  Phone: [phone] Fax: [phone]

## 2017-03-09 NOTE — TELEPHONE ENCOUNTER
Pt was informed prescription was sent to [facility] at the [facility] location on 03/02/17 and [facility] and pt verbalized understanding.

## 2017-03-10 ENCOUNTER — ANTI-COAG VISIT (OUTPATIENT)
Dept: CARDIOLOGY | Facility: CLINIC | Age: 47
End: 2017-03-10

## 2017-03-10 ENCOUNTER — OFFICE VISIT (OUTPATIENT)
Dept: INTERNAL MEDICINE | Facility: CLINIC | Age: 47
End: 2017-03-10
Payer: COMMERCIAL

## 2017-03-10 VITALS
SYSTOLIC BLOOD PRESSURE: 120 MMHG | DIASTOLIC BLOOD PRESSURE: 80 MMHG | OXYGEN SATURATION: 97 % | HEIGHT: 74 IN | HEART RATE: 102 BPM | BODY MASS INDEX: 33.33 KG/M2 | TEMPERATURE: 98 F | WEIGHT: 259.69 LBS

## 2017-03-10 DIAGNOSIS — I82.402 LEG DVT (DEEP VENOUS THROMBOEMBOLISM), ACUTE, LEFT: ICD-10-CM

## 2017-03-10 DIAGNOSIS — I82.812 LEG VEIN THROMBOEMBOLISM, SUPERFICIAL, LEFT: Primary | ICD-10-CM

## 2017-03-10 DIAGNOSIS — I83.812 VARICOSE VEINS OF LEFT LOWER EXTREMITY WITH PAIN: ICD-10-CM

## 2017-03-10 PROCEDURE — 3074F SYST BP LT 130 MM HG: CPT | Mod: S$GLB,,, | Performed by: INTERNAL MEDICINE

## 2017-03-10 PROCEDURE — 99213 OFFICE O/P EST LOW 20 MIN: CPT | Mod: S$GLB,,, | Performed by: INTERNAL MEDICINE

## 2017-03-10 PROCEDURE — 1160F RVW MEDS BY RX/DR IN RCRD: CPT | Mod: S$GLB,,, | Performed by: INTERNAL MEDICINE

## 2017-03-10 PROCEDURE — 3079F DIAST BP 80-89 MM HG: CPT | Mod: S$GLB,,, | Performed by: INTERNAL MEDICINE

## 2017-03-10 PROCEDURE — 99999 PR PBB SHADOW E&M-EST. PATIENT-LVL III: CPT | Mod: PBBFAC,,, | Performed by: INTERNAL MEDICINE

## 2017-03-10 NOTE — MR AVS SNAPSHOT
[facility] Medicine  [address] [address]  [place] [address]  Phone: [phone]  Fax: [phone]                  Laron Kothari Jr.   3/10/2017 9:20 AM   Office Visit    Description:  Male : 1970   Provider:  Kristen Adler DO   Department:  [facility]           Reason for Visit     Follow-up           Diagnoses this Visit        Comments    Varicose veins of left lower extremity with pain    -  Primary     Leg vein thromboembolism, superficial, left                To Do List           Future Appointments        Provider Department Dept Phone    3/20/2017 9:50 AM LABORATORY, [address] [facility], [facility] [phone]    3/31/2017 9:40 AM Christine Arteaga DPM [facility] Podiatry [phone]    2017 8:00 AM LABORATORY, [address] [facility], [facility] [phone]    2017 9:40 AM Kristen Adler DO [facility] Internal Medicine [phone]      Goals (5 Years of Data)     None      [facility] On Call     [facility] On Call Nurse Care Line -  Assistance  Registered nurses in the [facility] On Call Center provide clinical advisement, health education, appointment booking, and other advisory services.  Call for this free service at [phone].             Medications           Message regarding Medications     Verify the changes and/or additions to your medication regime listed below are the same as discussed with your clinician today.  If any of these changes or additions are incorrect, please notify your healthcare provider.        STOP taking these medications     blood-glucose meter Misc Use as directed.    enoxaparin (LOVENOX) 100 mg/mL Syrg Inject 120mg twice daily into skin as directed by the coumadin clinic.           Verify that the below list of medications is an accurate representation of the medications you are currently taking.  If none reported, the list may be blank. If incorrect, please contact your healthcare provider. Carry this  "list with you in case of emergency.           Current Medications     blood sugar diagnostic Strp Once daily glucose testing.    gabapentin (NEURONTIN) 300 MG capsule Take 1 capsule (300 mg total) by mouth 2 (two) times daily.    glipiZIDE (GLUCOTROL) 5 MG tablet TAKE ONE TABLET BY MOUTH ONCE DAILY WITH BREAKFAST    indomethacin (INDOCIN) 50 MG capsule TAKE ONE CAPSULE BY MOUTH THREE TIMES DAILY    lancets (LANCETS,THIN) Misc Once daily glucose testing.    lisinopril (PRINIVIL,ZESTRIL) 40 MG tablet Take 1 tablet (40 mg total) by mouth once daily.    metformin (GLUCOPHAGE) 1000 MG tablet Take 1 tablet (1,000 mg total) by mouth 2 (two) times daily with meals.    pravastatin (PRAVACHOL) 80 MG tablet TAKE ONE TABLET BY MOUTH ONCE DAILY    warfarin (COUMADIN) 10 MG tablet Take 2 tablets every evening as directed by the coumadin clinic. Discontinue 5mg tablet.           Clinical Reference Information           Your Vitals Were     BP Pulse Temp Height Weight SpO2    120/80 (BP Location: Left arm, Patient Position: Sitting) 102 97.8 °F (36.6 °C) (Tympanic) 6' 2" (1.88 m) 117.8 kg (259 lb 11.2 oz) 97%    BMI                33.34 kg/m2          Blood Pressure          Most Recent Value    BP  120/80      Allergies as of 3/10/2017     No Known Allergies      Immunizations Administered on Date of Encounter - 3/10/2017     None      Smoking Cessation     If you would like to quit smoking:   You may be eligible for free services if you are a [place] resident and started smoking cigarettes before September 1, 1988.  Call the Smoking Cessation Trust ([facility]) toll free at ([phone] or (787) 292-4039.   Call 7-229-QUIT-NOW if you do not meet the above criteria.            Language Assistance Services     ATTENTION: Language assistance services are available, free of charge. Please call [phone].      ATENCIÓN: Si habla español, tiene a aranda disposición servicios gratuitos de asistencia lingüística. Llame al " [phone].     LOAN Ý: N?u b?n nói Ti?ng Vi?t, có các d?ch v? h? tr? ngôn ng? mi?n phí dành cho b?n. G?i s? [phone].         [facility] complies with applicable Federal civil rights laws and does not discriminate on the basis of race, color, national origin, age, disability, or sex.

## 2017-03-10 NOTE — PROGRESS NOTES
Patient reports Warfarin has been discontinued as of 3/8 by Dr. Rios. Message sent to Dr. Rios to confirm. No documentation present in chart to confirm medication discontinued.

## 2017-03-10 NOTE — LETTER
March 10, 2017               [facility]  Internal Medicine  [address] [place] [address]  Phone: [phone]  Fax: [phone]   March 10, 2017     Patient: Laron Kothari Jr.   YOB: 1970   Date of Visit: 3/10/2017       To Whom it May Concern:    Laron Kothari was seen in my clinic on 3/10/2017. He may return to work on 3/24/2017.    If you have any questions or concerns, please don't hesitate to call.    Sincerely,         Kristen Adler, DO

## 2017-03-13 NOTE — PROGRESS NOTES
Laron Kothari .  46 y.o.  Black or  male    Chief Complaint   Patient presents with    Follow-up       HPI:   Presents to the clinic for a 4 week f/u on left leg superficial clots. He has been doing better. He has mild to moderate pain in his left leg, where he has known varicosities. He recently saw vascular surgery. He was taken off anticoagulation therapy. He states he was advised to wear compression stockings and to return to clinic in 3 months for consideration for laser surgery.   He has not returned to work yet and feels he needs more time to recover due to the nature of his employment. He is an  and has to stand for prolonged periods of time (10-12 hours).     PMH: Reviewed    MEDS: Reviewed med card    ALLERGIES: Reviewed allergy card    PE: Reviewed vitals  GENERAL: Alert and oriented, no acute distress  HEART: Regular rate  LUNGS: Unlabored respirations  EXTREMITIES: No erythema on left leg; varicose veins noted on left leg      ASSESSMENT/PLAN:    Laron was seen today for follow-up.    Diagnoses and all orders for this visit:    Leg vein thromboembolism, superficial, left    Varicose veins of left lower extremity with pain    F/U with vascular surgery as recommended.     RTW in 2 weeks.     RTC as previously scheduled and as needed.

## 2017-03-14 DIAGNOSIS — N18.2 DIABETES MELLITUS WITH STAGE 2 CHRONIC KIDNEY DISEASE: Chronic | ICD-10-CM

## 2017-03-14 DIAGNOSIS — E11.22 DIABETES MELLITUS WITH STAGE 2 CHRONIC KIDNEY DISEASE: Chronic | ICD-10-CM

## 2017-03-14 DIAGNOSIS — I10 ESSENTIAL HYPERTENSION: ICD-10-CM

## 2017-03-14 NOTE — TELEPHONE ENCOUNTER
----- Message from Sarita Ruiz sent at 3/14/2017  3:35 PM CDT -----  He wants to know if you have completed his paperwork and faxed it back to his employer?   Call him at [phone].                                                             burrell

## 2017-03-14 NOTE — TELEPHONE ENCOUNTER
Pt was informed [facility]  Paperwork has been received and upon completion paperwork will be faxed and he will be notified pt verbalized understanding.

## 2017-03-16 ENCOUNTER — TELEPHONE (OUTPATIENT)
Dept: INTERNAL MEDICINE | Facility: CLINIC | Age: 47
End: 2017-03-16

## 2017-03-16 NOTE — TELEPHONE ENCOUNTER
----- Message from Solange Polanco sent at 3/16/2017  1:04 PM CDT -----  Call pt at [phone]-regarding papers work that need to be fax to my employer//kellie kilgore

## 2017-03-17 ENCOUNTER — TELEPHONE (OUTPATIENT)
Dept: INTERNAL MEDICINE | Facility: CLINIC | Age: 47
End: 2017-03-17

## 2017-03-17 NOTE — TELEPHONE ENCOUNTER
Pt was informed requested information has been sent to [employer] as requested pt verbalized understanding.

## 2017-03-17 NOTE — TELEPHONE ENCOUNTER
----- Message from Netta Lemons sent at 3/16/2017  4:35 PM CDT -----  Contact: patient  Patient called to speak with the nurse regarding some paperwork for his employer. He can be contacted at [phone].    Thanks,  Netta

## 2017-03-21 RX ORDER — LISINOPRIL 20 MG/1
TABLET ORAL
Qty: 30 TABLET | Refills: 6 | OUTPATIENT
Start: 2017-03-21

## 2017-03-30 ENCOUNTER — OFFICE VISIT (OUTPATIENT)
Dept: OPHTHALMOLOGY | Facility: CLINIC | Age: 47
End: 2017-03-30
Payer: COMMERCIAL

## 2017-03-30 DIAGNOSIS — E11.9 DIABETES MELLITUS TYPE 2 WITHOUT RETINOPATHY: Primary | ICD-10-CM

## 2017-03-30 DIAGNOSIS — H52.4 BILATERAL PRESBYOPIA: ICD-10-CM

## 2017-03-30 DIAGNOSIS — H52.03 HYPEROPIA, BILATERAL: ICD-10-CM

## 2017-03-30 PROCEDURE — 92014 COMPRE OPH EXAM EST PT 1/>: CPT | Mod: S$GLB,,, | Performed by: OPTOMETRIST

## 2017-03-30 PROCEDURE — 99999 PR PBB SHADOW E&M-EST. PATIENT-LVL I: CPT | Mod: PBBFAC,,, | Performed by: OPTOMETRIST

## 2017-03-30 PROCEDURE — 92015 DETERMINE REFRACTIVE STATE: CPT | Mod: S$GLB,,, | Performed by: OPTOMETRIST

## 2017-03-30 NOTE — PROGRESS NOTES
HPI     NIDDM exam. Decrease near visual acuity. Hard to focus in the morning x   2-3 months. Last eye exam 02/06/2015 TR[facility]. Update glasses RX.       Last edited by Therese Reyes on 3/30/2017  3:56 PM.         Assessment /Plan     For exam results, see Encounter Report.    Diabetes mellitus type 2 without retinopathy    Hyperopia, bilateral    Bilateral presbyopia      No Background Diabetic Retinopathy    Dispense Final Rx for glasses. Or +1.50 OTC  RTC 1 year

## 2017-03-31 ENCOUNTER — TELEPHONE (OUTPATIENT)
Dept: PODIATRY | Facility: CLINIC | Age: 47
End: 2017-03-31

## 2017-03-31 ENCOUNTER — OFFICE VISIT (OUTPATIENT)
Dept: PODIATRY | Facility: CLINIC | Age: 47
End: 2017-03-31
Payer: COMMERCIAL

## 2017-03-31 VITALS
HEIGHT: 74 IN | HEART RATE: 102 BPM | DIASTOLIC BLOOD PRESSURE: 91 MMHG | SYSTOLIC BLOOD PRESSURE: 154 MMHG | WEIGHT: 259.69 LBS | BODY MASS INDEX: 33.33 KG/M2

## 2017-03-31 DIAGNOSIS — E11.42 DM TYPE 2 WITH DIABETIC PERIPHERAL NEUROPATHY: ICD-10-CM

## 2017-03-31 DIAGNOSIS — B35.1 DERMATOPHYTOSIS OF NAIL: Primary | ICD-10-CM

## 2017-03-31 DIAGNOSIS — L84 CORN OR CALLUS: ICD-10-CM

## 2017-03-31 PROCEDURE — 11721 DEBRIDE NAIL 6 OR MORE: CPT | Mod: 59,S$GLB,, | Performed by: PODIATRIST

## 2017-03-31 PROCEDURE — 3077F SYST BP >= 140 MM HG: CPT | Mod: S$GLB,,, | Performed by: PODIATRIST

## 2017-03-31 PROCEDURE — 4010F ACE/ARB THERAPY RXD/TAKEN: CPT | Mod: S$GLB,,, | Performed by: PODIATRIST

## 2017-03-31 PROCEDURE — 3044F HG A1C LEVEL LT 7.0%: CPT | Mod: S$GLB,,, | Performed by: PODIATRIST

## 2017-03-31 PROCEDURE — 1160F RVW MEDS BY RX/DR IN RCRD: CPT | Mod: S$GLB,,, | Performed by: PODIATRIST

## 2017-03-31 PROCEDURE — 99999 PR PBB SHADOW E&M-EST. PATIENT-LVL III: CPT | Mod: PBBFAC,,, | Performed by: PODIATRIST

## 2017-03-31 PROCEDURE — 99213 OFFICE O/P EST LOW 20 MIN: CPT | Mod: 25,S$GLB,, | Performed by: PODIATRIST

## 2017-03-31 PROCEDURE — 11055 PARING/CUTG B9 HYPRKER LES 1: CPT | Mod: S$GLB,,, | Performed by: PODIATRIST

## 2017-03-31 PROCEDURE — 3080F DIAST BP >= 90 MM HG: CPT | Mod: S$GLB,,, | Performed by: PODIATRIST

## 2017-03-31 RX ORDER — CICLOPIROX 80 MG/ML
SOLUTION TOPICAL
Qty: 1 BOTTLE | Refills: 10 | Status: SHIPPED | OUTPATIENT
Start: 2017-03-31 | End: 2019-11-29

## 2017-03-31 NOTE — TELEPHONE ENCOUNTER
Spoke to patient and informed him on antifungal not being covered by insurance and that new prescription was sent to his preferred pharmacy for medication, with refills, that is covered by insurance. Verbalized understanding and ended call pleasantly.

## 2017-03-31 NOTE — TELEPHONE ENCOUNTER
Attempted to contact patient in reference to prescription for topical antifungal no longer being covered by insurance and to inform him of new prescription being sent to his preferred pharmacy. Left message to return call to clinic.

## 2017-03-31 NOTE — MR AVS SNAPSHOT
[facility]  [address]  [place] [address]  Phone: [phone]  Fax: [phone]                  Laron Kothari Jr.   3/31/2017 9:40 AM   Office Visit    Description:  Male : 1970   Provider:  Christine Arteaga DPM   Department:  [facility]           Reason for Visit     Routine Foot Care                To Do List           Future Appointments        Provider Department Dept Phone    2017 8:00 AM LABORATORY, [address] [facility] [phone]    2017 9:40 AM Kristen Adler DO [facility] [phone]    2017 9:40 AM Christine Arteaga DPM [facility] [phone]    2018 3:00 PM Pepe Hinson OD [facility] [phone]      Goals (5 Years of Data)     None      [facility]     [facility] Nurse Care Line -  Assistance  Unless otherwise directed by your provider, please contact [facility], our nurse care line that is available for  assistance.     Registered nurses in the [facility] Center provide: appointment scheduling, clinical advisement, health education, and other advisory services.  Call: [phone] (toll free)               Medications           Message regarding Medications     Verify the changes and/or additions to your medication regime listed below are the same as discussed with your clinician today.  If any of these changes or additions are incorrect, please notify your healthcare provider.        STOP taking these medications     warfarin (COUMADIN) 10 MG tablet Take 2 tablets every evening as directed by the coumadin clinic. Discontinue 5mg tablet.           Verify that the below list of medications is an accurate representation of the medications you are currently taking.  If none reported, the list may be blank. If incorrect, please contact your healthcare provider. Carry this list with you in case of emergency.           Current Medications     blood sugar  "diagnostic Strp Once daily glucose testing.    gabapentin (NEURONTIN) 300 MG capsule Take 1 capsule (300 mg total) by mouth 2 (two) times daily.    glipiZIDE (GLUCOTROL) 5 MG tablet TAKE ONE TABLET BY MOUTH ONCE DAILY WITH BREAKFAST    indomethacin (INDOCIN) 50 MG capsule TAKE ONE CAPSULE BY MOUTH THREE TIMES DAILY    lancets (LANCETS,THIN) Misc Once daily glucose testing.    lisinopril (PRINIVIL,ZESTRIL) 40 MG tablet Take 1 tablet (40 mg total) by mouth once daily.    metformin (GLUCOPHAGE) 1000 MG tablet Take 1 tablet (1,000 mg total) by mouth 2 (two) times daily with meals.    pravastatin (PRAVACHOL) 80 MG tablet TAKE ONE TABLET BY MOUTH ONCE DAILY           Clinical Reference Information           Your Vitals Were     BP Pulse Height Weight BMI    154/91 (BP Location: Right arm, Patient Position: Sitting, BP Method: Automatic) 102 6' 2" (1.88 m) 117.8 kg (259 lb 11.2 oz) 33.34 kg/m2      Blood Pressure          Most Recent Value    BP  (!)  154/91      Allergies as of 3/31/2017     No Known Allergies      Immunizations Administered on Date of Encounter - 3/31/2017     None      Smoking Cessation     If you would like to quit smoking:   You may be eligible for free services if you are a [place] resident and started smoking cigarettes before September 1, 1988.  Call the Smoking Cessation Trust ([facility]) toll free at (552) 358-3580 or (875) 270-0238.   Call 1-800-QUIT-NOW if you do not meet the above criteria.   Contact us via email: [email or web address]   View our website for more information: [email or web address]        Language Assistance Services     ATTENTION: Language assistance services are available, free of charge. Please call [phone].      ATENCIÓN: Si habla español, tiene a aranda disposición servicios gratuitos de asistencia lingüística. Llame al [phone].     CHÚ Ý: N?u b?n nói Ti?ng Vi?t, có các d?ch v? h? tr? ngôn ng? mi?n phí dành cho b?n. G?i s? [phone].         [facility] complies with applicable Federal civil rights laws and does not discriminate on the basis of race, color, national origin, age, disability, or sex.

## 2017-03-31 NOTE — PROGRESS NOTES
PODIATRY NOTE    CHIEF COMPLAINT  Chief Complaint   Patient presents with    Routine Foot Care     at risk foot/callous care PCP Dr. Adler 3/10/17         HPI  SUBJECTIVE: Laron Kothari Jr. is a 46 y.o. male who  has a past medical history of Diabetes mellitus, type 2 (2013); Gout; Hyperlipidemia; Hypertension; and Neuropathy. Laronpresents to clinic for high risk diabetic foot exam and care.  Laron admits numbness, burning, and/or tingling sensations in their feet. Patient admits to painful toenails and calluses aggravated by increased weight bearing, shoe gear, and pressure. States pain is relieved with routine debridements. Patient has no other pedal complaints at this time.    This patient has documented high risk feet requiring routine maintenance secondary to diabetes mellitis and those secondary complications of diabetes, as mentioned.      HgA1c:   Hemoglobin A1C   Date Value Ref Range Status   02/16/2017 6.0 4.5 - 6.2 % Final     Comment:     According to ADA guidelines, hemoglobin A1C <7.0% represents  optimal control in non-pregnant diabetic patients.  Different  metrics may apply to specific populations.   Standards of Medical Care in Diabetes - 2016.  For the purpose of screening for the presence of diabetes:  <5.7%     Consistent with the absence of diabetes  5.7-6.4%  Consistent with increasing risk for diabetes   (prediabetes)  >or=6.5%  Consistent with diabetes  Currently no consensus exists for use of hemoglobin A1C  for diagnosis of diabetes for children.     12/05/2016 5.9 4.5 - 6.2 % Final     Comment:     According to ADA guidelines, hemoglobin A1C <7.0% represents  optimal control in non-pregnant diabetic patients.  Different  metrics may apply to specific populations.   Standards of Medical Care in Diabetes - 2016.  For the purpose of screening for the presence of diabetes:  <5.7%     Consistent with the absence of diabetes  5.7-6.4%  Consistent with increasing risk for diabetes  "  (prediabetes)  >or=6.5%  Consistent with diabetes  Currently no consensus exists for use of hemoglobin A1C  for diagnosis of diabetes for children.     04/14/2016 7.3 (H) 4.5 - 6.2 % Final       REVIEW OF SYSTEMS  General: Denies any fever or chills  Chest: Denies shortness of breath, wheezing, coughing, or sputum production  Heart: Denies chest pain.  As noted above and per history of current illness above, otherwise negative in the remainder of the 14 systems.     PHYSICAL EXAM  Vitals:    03/31/17 0946   BP: (!) 154/91   Pulse: 102   Weight: 117.8 kg (259 lb 11.2 oz)   Height: 6' 2" (1.88 m)       GEN:  This patient is well-developed, well-nourished and appears stated age, well-oriented to person, place and time, and cooperative and pleasant on today's visit.      LOWER EXTREMITY  Vascular:   · DP pedal pulse 2/4 b/l, PT pedal pulse 2/4 b/l  · Skin temperature warm to warm from prox to distally  · CFT <5 secs b/l  · There is no  edema noted b/l.     Dermatologic:   · Thickened, dystrophic, elongated toenails with subungal debris 1-5 b/l.   · No open skin lesions noted  · No erythema or drainage noted b/l.  · Webspaces are C/D/I B/L.  · There is hyperkeratotic tissue noted sub 1st metatarsal head left foot  · Skin texture and turgor dry  · There is no pedal hair growth noted    Neurologic:  · Vibratory sensation diminished at level of Hallux IPJ b/l    · Protective sensation absent at 0/10 sites upon examination with Clymer Weinsten 5.07 g monofilament.   · Propioception intact at 1st MTPJ b/l.   · Achilles and patellar deep tendon reflexes intact  · Babinski reflex absent b/l. Light touch and sharp/dull sensation intact b/l.    Musculoskeletal/Orthopedic:  · No symptomatic structural abnormalities noted.   · Muscle strength is 5/5 for foot inverters, everters, plantarflexors, and dorsiflexors. Muscle tone is normal.  · Pain free range of motion in all four quadrants with stiffness and limitation " b/l      ASSESSMENT  1. Dermatophytosis of nail  2. DM2 with diabetic peripheral neuropathy   3. Callus     Plan:  -Initial patient evaluation with H&P was performed  -Discuss presenting problems, etiology, pathologic processes and management options with patient today.   -I counseled the patient on their conditions, their implications and medical management. An in depth discussion on diabetic management, risk prevention, amputation prevention verbally and provided educational literature in written format.  -With patient's permission, nails were aggressively reduced and debrided x 10 to their soft tissue attachment mechanically and with electric , removing all offending nail and debris. Patient relates relief following the procedure. Patient will continue to monitor the areas daily, inspect feet, wear protective shoe gear when ambulatory, moisturizer to maintain skin integrity.-sharp excisional debridement of hyperkeratotic lesions deep to epidermal layer x 1 was performed with a #15 blade without incident. Offloading pads dispensed  - Shoe inspection. Diabetic Foot Education. Patient reminded of the importance of good nutrition and blood sugar control to help prevent podiatric complications of diabetes. Patient instructed on proper foot hygeine. We discussed wearing proper shoe gear, daily foot inspections, never walking without protective shoe gear, never putting sharp instruments to feet, routine podiatric nail visits every 2-3 months.        Future Appointments  Date Time Provider Department Center   6/5/2017 8:00 AM LABORATORY, [facility]   6/9/2017 9:40 AM Kristen Adler DO [facility]   7/7/2017 9:40 AM Christine Arteaga DPM ON POD [facility]   4/5/2018 3:00 PM Pepe Hinson OD ON OPHTHAL [facility]         Report Electronically Signed By:  Christine Arteaga DPM   Podiatric Medicine & Surgery  [facility]  3/31/2017

## 2017-03-31 NOTE — TELEPHONE ENCOUNTER
----- Message from Zuri Baires sent at 3/31/2017 12:42 PM CDT -----  Contact: pt  States he's returning Debby's call. Please call pt at [phone]. Thank you

## 2017-04-23 ENCOUNTER — HOSPITAL ENCOUNTER (EMERGENCY)
Facility: HOSPITAL | Age: 47
Discharge: HOME | End: 2017-04-23
Attending: EMERGENCY MEDICINE
Payer: COMMERCIAL

## 2017-04-23 VITALS
BODY MASS INDEX: 33.37 KG/M2 | TEMPERATURE: 99 F | HEIGHT: 74 IN | RESPIRATION RATE: 18 BRPM | WEIGHT: 260 LBS | OXYGEN SATURATION: 98 % | HEART RATE: 112 BPM | SYSTOLIC BLOOD PRESSURE: 141 MMHG | DIASTOLIC BLOOD PRESSURE: 81 MMHG

## 2017-04-23 DIAGNOSIS — J01.00 ACUTE MAXILLARY SINUSITIS, RECURRENCE NOT SPECIFIED: Primary | ICD-10-CM

## 2017-04-23 PROCEDURE — 99283 EMERGENCY DEPT VISIT LOW MDM: CPT

## 2017-04-23 RX ORDER — PROMETHAZINE HYDROCHLORIDE AND DEXTROMETHORPHAN HYDROBROMIDE 6.25; 15 MG/5ML; MG/5ML
SYRUP ORAL
Qty: 180 ML | Refills: 0 | Status: SHIPPED | OUTPATIENT
Start: 2017-04-23 | End: 2018-04-18

## 2017-04-23 RX ORDER — LEVOFLOXACIN 500 MG/1
500 TABLET, FILM COATED ORAL DAILY
Qty: 10 TABLET | Refills: 0 | Status: SHIPPED | OUTPATIENT
Start: 2017-04-23 | End: 2017-05-03

## 2017-04-23 NOTE — ED PROVIDER NOTES
Encounter Date: 4/23/2017       History     Chief Complaint   Patient presents with    Cough     cough x2 weeks with body aches      Review of patient's allergies indicates:  No Known Allergies  HPI Comments: 46 year old male with complaint of cough and congestion X 2 weeks.  Reports moderate congestion and body aches.  No difficulty breathing.  No diarrhea.  Reports mild facial pain.  No worsening or alleviating factors.     Past Medical History:   Diagnosis Date    Diabetes mellitus, type 2 2013    BS didn't check today 03/30/2017    Gout     Hyperlipidemia     Hypertension     Neuropathy      Past Surgical History:   Procedure Laterality Date    APPENDECTOMY      TONSILLECTOMY, ADENOIDECTOMY       Family History   Problem Relation Age of Onset    Diabetes Father     Prostate cancer Father     Cataracts Father      Social History   Substance Use Topics    Smoking status: Current Every Day Smoker     Packs/day: 0.50    Smokeless tobacco: Never Used    Alcohol use 0.0 oz/week     0 Standard drinks or equivalent per week      Comment: socially     Review of Systems   Constitutional: Negative for fever.   HENT: Positive for congestion. Negative for sore throat.    Respiratory: Positive for cough. Negative for shortness of breath.    Cardiovascular: Negative for chest pain.   Gastrointestinal: Negative for nausea.   Genitourinary: Negative for dysuria.   Musculoskeletal: Negative for back pain.   Skin: Negative for rash.   Neurological: Negative for weakness.   Hematological: Does not bruise/bleed easily.       Physical Exam   Initial Vitals   BP Pulse Resp Temp SpO2   04/23/17 0829 04/23/17 0829 04/23/17 0829 04/23/17 0829 04/23/17 0829   141/81 112 18 99.2 °F (37.3 °C) 98 %     Physical Exam    Nursing note and vitals reviewed.  Constitutional: He appears well-developed and well-nourished.   HENT:   Head: Normocephalic and atraumatic.   + maxillary and frontal tenderness   Eyes: Conjunctivae are normal.  Pupils are equal, round, and reactive to light.   Neck: Normal range of motion. Neck supple.   Cardiovascular: Normal rate, regular rhythm, normal heart sounds and intact distal pulses.   Pulmonary/Chest: Breath sounds normal.   Abdominal: Soft. There is no rebound and no guarding.   Musculoskeletal: Normal range of motion.   Neurological: He is alert and oriented to person, place, and time.   Skin: Skin is warm and dry.   Psychiatric: He has a normal mood and affect. His behavior is normal. Thought content normal.         ED Course   Procedures  Labs Reviewed - No data to display                            ED Course     Clinical Impression:   The encounter diagnosis was Acute maxillary sinusitis, recurrence not specified.          Stephen Mann NP  04/23/17 0846

## 2017-04-23 NOTE — DISCHARGE INSTRUCTIONS
Acute Sinusitis    Acute sinusitis is irritation and swelling of the sinuses. It is usually caused by a viral infection after a common cold. Your doctor can help you find relief.  What is acute sinusitis?  Sinuses are air-filled spaces in the skull behind the face. They are kept moist and clean by a lining of mucosa. Things such as pollen, smoke, and chemical fumes can irritate the mucosa. It can then swell up. As a response to irritation, the mucosa makes more mucus and other fluids. Tiny hairlike cilia cover the mucosa. Cilia help carry mucus toward the opening of the sinus. Too much mucus may cause the cilia to stop working. This blocks the sinus opening. A buildup of fluid in the sinuses then causes pain and pressure. It can also encourage bacteria to grow in the sinuses.  Common symptoms of acute sinusitis  You may have:  · Facial soreness pain  · Headache  · Fever  · Fluid draining in the back of the throat (postnasal drip)  · Congestion  · Drainage that is thick and colored, instead of clear  · Cough  Diagnosing acute sinusitis  Your doctor will ask about your symptoms and health history. He or she will look at your ear, nose, and throat. You usually won't need to have X-rays taken.    The doctor may take a sample of mucus to check for bacteria. If you have sinusitis that keeps coming back, you may need imaging tests such as X-rays or CAT scans. This will help your doctor check for a structural problem that may be causing the infection.  Treating acute sinusitis  Treatment is aimed at unblocking the sinus opening and helping the cilia work again. You may need to take antihistamine and decongestant medicine. These can reduce inflammation and decrease the amount of fluid your sinuses make. If you have a bacterial infection, you will need to take antibiotic medicine for 10 to 14 days. Take this medicine until it is gone, even if you feel better.  Date Last Reviewed: 10/1/2016  © 8478-0401 [employer] [address] [place] [address]. All rights reserved. This information is not intended as a substitute for professional medical care. Always follow your healthcare professional's instructions.

## 2017-04-23 NOTE — ED NOTES
Pt examined by BANDAR Mann np without RN, educated on prescriptions, given discharge instructions and discharged to [facility].  See provider notes for exam

## 2017-05-22 DIAGNOSIS — R80.9 DIABETES MELLITUS WITH MICROALBUMINURIA: ICD-10-CM

## 2017-05-22 DIAGNOSIS — E11.29 DIABETES MELLITUS WITH MICROALBUMINURIA: ICD-10-CM

## 2017-05-22 RX ORDER — METFORMIN HYDROCHLORIDE 1000 MG/1
1000 TABLET ORAL 2 TIMES DAILY WITH MEALS
Qty: 60 TABLET | Refills: 0 | Status: SHIPPED | OUTPATIENT
Start: 2017-05-22 | End: 2017-05-26 | Stop reason: SDUPTHER

## 2017-05-26 RX ORDER — GLIPIZIDE 5 MG/1
TABLET ORAL
Qty: 30 TABLET | Refills: 6 | Status: SHIPPED | OUTPATIENT
Start: 2017-05-26 | End: 2017-12-08 | Stop reason: SDUPTHER

## 2017-05-26 RX ORDER — METFORMIN HYDROCHLORIDE 1000 MG/1
1000 TABLET ORAL 2 TIMES DAILY WITH MEALS
Qty: 60 TABLET | Refills: 6 | Status: SHIPPED | OUTPATIENT
Start: 2017-05-26 | End: 2017-12-08 | Stop reason: SDUPTHER

## 2017-12-08 ENCOUNTER — PATIENT MESSAGE (OUTPATIENT)
Dept: INTERNAL MEDICINE | Facility: CLINIC | Age: 47
End: 2017-12-08

## 2017-12-08 ENCOUNTER — OFFICE VISIT (OUTPATIENT)
Dept: INTERNAL MEDICINE | Facility: CLINIC | Age: 47
End: 2017-12-08
Payer: MEDICAID

## 2017-12-08 ENCOUNTER — LAB VISIT (OUTPATIENT)
Dept: LAB | Facility: HOSPITAL | Age: 47
End: 2017-12-08
Attending: INTERNAL MEDICINE
Payer: MEDICAID

## 2017-12-08 VITALS
DIASTOLIC BLOOD PRESSURE: 80 MMHG | SYSTOLIC BLOOD PRESSURE: 126 MMHG | HEIGHT: 74 IN | BODY MASS INDEX: 32.06 KG/M2 | OXYGEN SATURATION: 99 % | HEART RATE: 93 BPM | WEIGHT: 249.81 LBS | TEMPERATURE: 97 F

## 2017-12-08 DIAGNOSIS — E11.29 DIABETES MELLITUS WITH MICROALBUMINURIA: ICD-10-CM

## 2017-12-08 DIAGNOSIS — R80.9 DIABETES MELLITUS WITH MICROALBUMINURIA: ICD-10-CM

## 2017-12-08 DIAGNOSIS — E78.5 HYPERLIPIDEMIA LDL GOAL <100: ICD-10-CM

## 2017-12-08 DIAGNOSIS — I10 HYPERTENSION GOAL BP (BLOOD PRESSURE) < 130/80: ICD-10-CM

## 2017-12-08 DIAGNOSIS — E78.5 HYPERLIPIDEMIA LDL GOAL <70: ICD-10-CM

## 2017-12-08 DIAGNOSIS — M79.641 RIGHT HAND PAIN: ICD-10-CM

## 2017-12-08 DIAGNOSIS — E11.40 TYPE 2 DIABETES MELLITUS WITH DIABETIC NEUROPATHY, WITHOUT LONG-TERM CURRENT USE OF INSULIN: Primary | ICD-10-CM

## 2017-12-08 DIAGNOSIS — E11.40 TYPE 2 DIABETES MELLITUS WITH DIABETIC NEUROPATHY: ICD-10-CM

## 2017-12-08 LAB
ALBUMIN SERPL BCP-MCNC: 3.6 G/DL
ALP SERPL-CCNC: 87 U/L
ALT SERPL W/O P-5'-P-CCNC: 21 U/L
ANION GAP SERPL CALC-SCNC: 6 MMOL/L
AST SERPL-CCNC: 14 U/L
BILIRUB SERPL-MCNC: 0.8 MG/DL
BUN SERPL-MCNC: 10 MG/DL
CALCIUM SERPL-MCNC: 10 MG/DL
CHLORIDE SERPL-SCNC: 101 MMOL/L
CHOLEST SERPL-MCNC: 205 MG/DL
CHOLEST/HDLC SERPL: 6.8 {RATIO}
CO2 SERPL-SCNC: 29 MMOL/L
CREAT SERPL-MCNC: 1.4 MG/DL
CREAT UR-MCNC: 55 MG/DL
EST. GFR  (AFRICAN AMERICAN): >60 ML/MIN/1.73 M^2
EST. GFR  (NON AFRICAN AMERICAN): 59.8 ML/MIN/1.73 M^2
ESTIMATED AVG GLUCOSE: 214 MG/DL
GLUCOSE SERPL-MCNC: 332 MG/DL (ref 70–110)
GLUCOSE SERPL-MCNC: 417 MG/DL
HBA1C MFR BLD HPLC: 9.1 %
HDLC SERPL-MCNC: 30 MG/DL
HDLC SERPL: 14.6 %
LDLC SERPL CALC-MCNC: 129.8 MG/DL
MICROALBUMIN UR DL<=1MG/L-MCNC: 169 UG/ML
MICROALBUMIN/CREATININE RATIO: 307.3 UG/MG
NONHDLC SERPL-MCNC: 175 MG/DL
POTASSIUM SERPL-SCNC: 4.3 MMOL/L
PROT SERPL-MCNC: 8.1 G/DL
SODIUM SERPL-SCNC: 136 MMOL/L
TRIGL SERPL-MCNC: 226 MG/DL

## 2017-12-08 PROCEDURE — 99214 OFFICE O/P EST MOD 30 MIN: CPT | Mod: PBBFAC,25 | Performed by: INTERNAL MEDICINE

## 2017-12-08 PROCEDURE — 99214 OFFICE O/P EST MOD 30 MIN: CPT | Mod: S$PBB,,, | Performed by: INTERNAL MEDICINE

## 2017-12-08 PROCEDURE — 99999 PR PBB SHADOW E&M-EST. PATIENT-LVL IV: CPT | Mod: PBBFAC,,, | Performed by: INTERNAL MEDICINE

## 2017-12-08 PROCEDURE — 82948 REAGENT STRIP/BLOOD GLUCOSE: CPT | Mod: PBBFAC | Performed by: INTERNAL MEDICINE

## 2017-12-08 PROCEDURE — 82570 ASSAY OF URINE CREATININE: CPT

## 2017-12-08 PROCEDURE — 83036 HEMOGLOBIN GLYCOSYLATED A1C: CPT

## 2017-12-08 PROCEDURE — 80053 COMPREHEN METABOLIC PANEL: CPT

## 2017-12-08 PROCEDURE — 90686 IIV4 VACC NO PRSV 0.5 ML IM: CPT | Mod: PBBFAC

## 2017-12-08 PROCEDURE — 80061 LIPID PANEL: CPT

## 2017-12-08 PROCEDURE — 36415 COLL VENOUS BLD VENIPUNCTURE: CPT

## 2017-12-08 RX ORDER — GABAPENTIN 600 MG/1
600 TABLET ORAL 2 TIMES DAILY
Qty: 60 TABLET | Refills: 1 | Status: SHIPPED | OUTPATIENT
Start: 2017-12-08 | End: 2018-03-22

## 2017-12-08 RX ORDER — LISINOPRIL 40 MG/1
40 TABLET ORAL DAILY
Qty: 30 TABLET | Refills: 6 | Status: SHIPPED | OUTPATIENT
Start: 2017-12-08 | End: 2018-07-14 | Stop reason: SDUPTHER

## 2017-12-08 RX ORDER — METFORMIN HYDROCHLORIDE 1000 MG/1
1000 TABLET ORAL 2 TIMES DAILY WITH MEALS
Qty: 60 TABLET | Refills: 6 | Status: SHIPPED | OUTPATIENT
Start: 2017-12-08 | End: 2018-07-19 | Stop reason: SDUPTHER

## 2017-12-08 RX ORDER — GLIPIZIDE 5 MG/1
TABLET ORAL
Qty: 30 TABLET | Refills: 6 | Status: SHIPPED | OUTPATIENT
Start: 2017-12-08 | End: 2018-07-14 | Stop reason: SDUPTHER

## 2017-12-08 RX ORDER — PRAVASTATIN SODIUM 80 MG/1
80 TABLET ORAL DAILY
Qty: 30 TABLET | Refills: 6 | Status: SHIPPED | OUTPATIENT
Start: 2017-12-08 | End: 2018-03-12 | Stop reason: SDUPTHER

## 2017-12-08 NOTE — PROGRESS NOTES
Subjective:       Patient ID: Laron Kothari Jr. is a 47 y.o. male.    Chief Complaint: Follow-up    Laron Kothari Jr.  47 y.o. Black or  male    Patient presents with:  Follow-up    HPI: Here today to follow up on chronic conditions.  Diabetes--he has been out of medication for 3 months. He is having symptoms of hyperglycemia. He is fasting today for labs.   Neuropathy--gabapentin 300 mg BID has been ineffective.   Microalbuminuria--he denies taking NSAID's. He has been off lisinopril.   HTN--stable despite being off lisinopril.   HLD--he needs a refill on pravastatin.   He has been having right hand pain for the past few days. He denies trauma. His hand near his thumb is tender and slightly swollen. He has not taken any medication for his symptoms.     Past Medical History:  2013: Diabetes mellitus, type 2  Gout  Hyperlipidemia  Hypertension  Neuropathy    Current Outpatient Prescriptions on File Prior to Visit:  gabapentin (NEURONTIN) 300 MG capsule, Take 1 capsule (300 mg total) by mouth 2 (two) times daily., Disp: 60 capsule, Rfl: 6  glipiZIDE (GLUCOTROL) 5 MG tablet, TAKE ONE TABLET BY MOUTH ONCE DAILY WITH BREAKFAST, Disp: 30 tablet, Rfl: 6  lisinopril (PRINIVIL,ZESTRIL) 40 MG tablet, Take 1 tablet (40 mg total) by mouth once daily., Disp: 30 tablet, Rfl: 6  metformin (GLUCOPHAGE) 1000 MG tablet, Take 1 tablet (1,000 mg total) by mouth 2 (two) times daily with meals., Disp: 60 tablet, Rfl: 6  pravastatin (PRAVACHOL) 80 MG tablet, TAKE ONE TABLET BY MOUTH ONCE DAILY, Disp: 30 tablet, Rfl: 6  blood sugar diagnostic Strp, Once daily glucose testing., Disp: 50 each, Rfl: 6  ciclopirox (PENLAC) 8 % Soln, Apply to affected toenails at night time DAILY. On 7th day, file nails down, clean all nails with alcohol and restart application process., Disp: 1 Bottle, Rfl: 10  indomethacin (INDOCIN) 50 MG capsule, TAKE ONE CAPSULE BY MOUTH THREE TIMES DAILY, Disp: 40 capsule, Rfl: 0  lancets (LANCETS,THIN) Misc,  Once daily glucose testing., Disp: 50 each, Rfl: 6    Allergies:  Review of patient's allergies indicates:  No Known Allergies          Diabetes   He has type 2 diabetes mellitus. No MedicAlert identification noted. The initial diagnosis of diabetes was made 3 years ago. Hypoglycemia symptoms include headaches, hunger, mood changes, nervousness/anxiousness, sleepiness, sweats and tremors. Pertinent negatives for hypoglycemia include no confusion, dizziness, pallor, seizures or speech difficulty. Associated symptoms include blurred vision, chest pain, fatigue, foot paresthesias, polydipsia, polyphagia, polyuria, visual change, weakness and weight loss. Pertinent negatives for diabetes include no foot ulcerations. Pertinent negatives for hypoglycemia complications include no blackouts, no hospitalization, no nocturnal hypoglycemia, no required assistance and no required glucagon injection. Symptoms are stable. Diabetic complications include autonomic neuropathy and PVD. Pertinent negatives for diabetic complications include no CVA, heart disease, impotence, nephropathy, peripheral neuropathy or retinopathy. Risk factors for coronary artery disease include dyslipidemia, hypertension, obesity, stress, tobacco exposure and diabetes mellitus. Current diabetic treatment includes oral agent (dual therapy). He is compliant with treatment most of the time. He is currently taking insulin pre-lunch. Insulin injections are given by patient. His weight is fluctuating minimally. He is following a high fat/cholesterol diet. Meal planning includes avoidance of concentrated sweets. He has not had a previous visit with a dietitian. He participates in exercise intermittently. He monitors blood glucose at home 1-2 x per week. He monitors urine at home <1 x per month. Blood glucose monitoring compliance is inadequate. His home blood glucose trend is increasing steadily. He sees a podiatrist.Eye exam is not current.     Review of Systems    Constitutional: Positive for fatigue and weight loss. Negative for fever and unexpected weight change.   Eyes: Positive for blurred vision and visual disturbance.   Respiratory: Negative for shortness of breath.    Cardiovascular: Positive for chest pain.   Gastrointestinal: Negative for abdominal pain.   Endocrine: Positive for polydipsia, polyphagia and polyuria.   Genitourinary: Positive for frequency. Negative for difficulty urinating and impotence.   Musculoskeletal: Positive for arthralgias.   Skin: Negative for pallor.   Neurological: Positive for tremors, weakness and headaches. Negative for dizziness, seizures and speech difficulty.   Psychiatric/Behavioral: Negative for confusion. The patient is nervous/anxious.        Objective:      Physical Exam   Constitutional: He is oriented to person, place, and time. He appears well-developed and well-nourished. No distress.   Eyes: No scleral icterus.   Cardiovascular: Normal rate, regular rhythm, normal heart sounds and intact distal pulses.    Pulses:       Dorsalis pedis pulses are 2+ on the right side, and 2+ on the left side.   Pulmonary/Chest: Effort normal and breath sounds normal. No respiratory distress.   Abdominal: Soft. Bowel sounds are normal.   Musculoskeletal: He exhibits no edema.   Feet:   Right Foot:   Protective Sensation: 4 sites tested. 4 sites sensed.   Skin Integrity: Positive for callus. Negative for ulcer.   Left Foot:   Protective Sensation: 4 sites tested. 4 sites sensed.   Skin Integrity: Positive for callus. Negative for ulcer.   Neurological: He is alert and oriented to person, place, and time.   Skin: Skin is warm and dry.   Psychiatric: He has a normal mood and affect.   Vitals reviewed.      Assessment:       1. Type 2 diabetes mellitus with diabetic neuropathy, without long-term current use of insulin    2. Diabetes mellitus with microalbuminuria    3. Hypertension goal BP (blood pressure) < 130/80    4. Hyperlipidemia LDL goal  <70    5. Right hand pain        Plan:       Laron was seen today for follow-up.    Diagnoses and all orders for this visit:    Type 2 diabetes mellitus with diabetic neuropathy, without long-term current use of insulin  -     gabapentin (NEURONTIN) 600 MG tablet; Take 1 tablet (600 mg total) by mouth 2 (two) times daily.  -     lisinopril (PRINIVIL,ZESTRIL) 40 MG tablet; Take 1 tablet (40 mg total) by mouth once daily.  -     metFORMIN (GLUCOPHAGE) 1000 MG tablet; Take 1 tablet (1,000 mg total) by mouth 2 (two) times daily with meals.  -     POCT glucose    Diabetes mellitus with microalbuminuria  -     glipiZIDE (GLUCOTROL) 5 MG tablet; TAKE ONE TABLET BY MOUTH ONCE DAILY WITH BREAKFAST  -     metFORMIN (GLUCOPHAGE) 1000 MG tablet; Take 1 tablet (1,000 mg total) by mouth 2 (two) times daily with meals.  -     POCT glucose    Hypertension goal BP (blood pressure) < 130/80  -     lisinopril (PRINIVIL,ZESTRIL) 40 MG tablet; Take 1 tablet (40 mg total) by mouth once daily.    Hyperlipidemia LDL goal <70  -     pravastatin (PRAVACHOL) 80 MG tablet; Take 1 tablet (80 mg total) by mouth once daily.    Right hand pain  -     Recommend rest, ice and compression    Labs today.

## 2017-12-08 NOTE — LETTER
December 8, 2017      [facility]  [id number] [place] [address]  Phone: [phone]  Fax: [phone]       Patient: Laron Kothari   YOB: 1970  Date of Visit: 12/08/2017    To Whom It May Concern:    Manuel Kothari  was at [facility] on 12/08/2017.  He may return to work with no restrictions. If you have any questions or concerns, or if I can be of further assistance, please do not hesitate to contact me.    Sincerely,    Dr.Angela Adler,DO  Zeny Nicholson, JACOBO

## 2017-12-08 NOTE — PATIENT INSTRUCTIONS
Tendonitis  A tendon is the thick fibrous cord that joins muscle to bone and allows joints to move. When a tendon becomes inflamed, it is called tendonitis. This can occur from overuse, injury, or infection. This usually involves the shoulders, forearm, wrist, hands and foot. Symptoms include pain, swelling and tenderness to the touch. Moving the joint increases the pain.  It takes 4 to 6 weeks for tendonitis to heal. It is treated by preventing motion of the tendon with a splint or brace and the use of anti-inflammatory medicine.  Home care  · Some people find relief with ice packs. These can be crushed or cubed ice in a plastic bag or a bag of frozen vegetables wrapped in a thin towel. Other people get better relief with heat. This can include a hot shower, hot bath, or a moist towel warmed in a microwave. Try each and use the method that feels best, for 15 to 20 minutes several times a day.  · Rest the inflamed joint and protect it from movement.  · You may use over-the-counter ibuprofen or naproxen to treat pain and inflammation, unless another medicine was prescribed. If you can't take these medicines, acetaminophen may help with the pain, but does not treat inflammation. If you have chronic liver or kidney disease or ever had a stomach ulcer or gastrointestinal bleeding, talk with your doctor before using these medicines.  · As your symptoms improve, begin gradual motion at the involved joint.  Follow-up care  Follow up with your healthcare provider if you are not improving after 5 days of treatment.  When to seek medical advice  Call your healthcare provider right away if any of these occur:  · Redness over the painful area  · Increasing pain or swelling at the joint  · Fever (1 degree above your normal temperature) lasting 24 to 48 hours Or, whatever your healthcare provider told you to report based on your condition  Date Last Reviewed: 11/21/2015  © 3689-6038 [employer]. [address] [place] [address]  Road, [place] [address]. All rights reserved. This information is not intended as a substitute for professional medical care. Always follow your healthcare professional's instructions.

## 2017-12-19 ENCOUNTER — TELEPHONE (OUTPATIENT)
Dept: INTERNAL MEDICINE | Facility: CLINIC | Age: 47
End: 2017-12-19

## 2018-03-01 ENCOUNTER — TELEPHONE (OUTPATIENT)
Dept: INTERNAL MEDICINE | Facility: CLINIC | Age: 48
End: 2018-03-01

## 2018-03-01 NOTE — TELEPHONE ENCOUNTER
I called patient  Offered appt tomorrow with Jameson Little    Patient declined acute appt.  He said he would wait for his appt on 3.22. To see Dr. Adler.  Advised we can get him in same day with Care Team if he Changes his mind.    Dr. Head

## 2018-03-01 NOTE — TELEPHONE ENCOUNTER
----- Message from Sky Martinez sent at 3/1/2018 10:23 AM CST -----  Contact: Pt-wife   Pt-wife requested be worked in this week pt is complaining of severe stomach pains callback number is...[phone]

## 2018-03-09 DIAGNOSIS — E78.5 HYPERLIPIDEMIA: Chronic | ICD-10-CM

## 2018-03-09 DIAGNOSIS — E78.1 HYPERTRIGLYCERIDEMIA: ICD-10-CM

## 2018-03-12 RX ORDER — PRAVASTATIN SODIUM 80 MG/1
TABLET ORAL
Qty: 30 TABLET | Refills: 6 | Status: SHIPPED | OUTPATIENT
Start: 2018-03-12 | End: 2018-07-31 | Stop reason: SDUPTHER

## 2018-03-19 ENCOUNTER — LAB VISIT (OUTPATIENT)
Dept: LAB | Facility: HOSPITAL | Age: 48
End: 2018-03-19
Attending: INTERNAL MEDICINE
Payer: MEDICAID

## 2018-03-19 LAB
ALBUMIN SERPL BCP-MCNC: 3.8 G/DL
ALP SERPL-CCNC: 64 U/L
ALT SERPL W/O P-5'-P-CCNC: 21 U/L
ANION GAP SERPL CALC-SCNC: 10 MMOL/L
AST SERPL-CCNC: 19 U/L
BILIRUB SERPL-MCNC: 0.6 MG/DL
BUN SERPL-MCNC: 13 MG/DL
CALCIUM SERPL-MCNC: 10 MG/DL
CHLORIDE SERPL-SCNC: 103 MMOL/L
CHOLEST SERPL-MCNC: 153 MG/DL
CHOLEST/HDLC SERPL: 4.1 {RATIO}
CO2 SERPL-SCNC: 26 MMOL/L
CREAT SERPL-MCNC: 1 MG/DL
EST. GFR  (AFRICAN AMERICAN): >60 ML/MIN/1.73 M^2
EST. GFR  (NON AFRICAN AMERICAN): >60 ML/MIN/1.73 M^2
ESTIMATED AVG GLUCOSE: 117 MG/DL
GLUCOSE SERPL-MCNC: 162 MG/DL
HBA1C MFR BLD HPLC: 5.7 %
HDLC SERPL-MCNC: 37 MG/DL
HDLC SERPL: 24.2 %
LDLC SERPL CALC-MCNC: 90.4 MG/DL
NONHDLC SERPL-MCNC: 116 MG/DL
POTASSIUM SERPL-SCNC: 4.5 MMOL/L
PROT SERPL-MCNC: 8 G/DL
SODIUM SERPL-SCNC: 139 MMOL/L
TRIGL SERPL-MCNC: 128 MG/DL

## 2018-03-19 PROCEDURE — 36415 COLL VENOUS BLD VENIPUNCTURE: CPT

## 2018-03-19 PROCEDURE — 83036 HEMOGLOBIN GLYCOSYLATED A1C: CPT

## 2018-03-19 PROCEDURE — 80053 COMPREHEN METABOLIC PANEL: CPT

## 2018-03-19 PROCEDURE — 80061 LIPID PANEL: CPT

## 2018-03-22 ENCOUNTER — OFFICE VISIT (OUTPATIENT)
Dept: INTERNAL MEDICINE | Facility: CLINIC | Age: 48
End: 2018-03-22
Payer: MEDICAID

## 2018-03-22 VITALS
HEART RATE: 85 BPM | SYSTOLIC BLOOD PRESSURE: 120 MMHG | WEIGHT: 254.88 LBS | DIASTOLIC BLOOD PRESSURE: 82 MMHG | OXYGEN SATURATION: 97 % | HEIGHT: 74 IN | RESPIRATION RATE: 14 BRPM | BODY MASS INDEX: 32.71 KG/M2

## 2018-03-22 DIAGNOSIS — E11.59 HYPERTENSION ASSOCIATED WITH DIABETES: ICD-10-CM

## 2018-03-22 DIAGNOSIS — E11.40 TYPE 2 DIABETES, CONTROLLED, WITH NEUROPATHY: Primary | ICD-10-CM

## 2018-03-22 DIAGNOSIS — E11.29 DIABETES MELLITUS WITH MICROALBUMINURIA: ICD-10-CM

## 2018-03-22 DIAGNOSIS — E78.2 COMBINED HYPERLIPIDEMIA ASSOCIATED WITH TYPE 2 DIABETES MELLITUS: ICD-10-CM

## 2018-03-22 DIAGNOSIS — R80.9 DIABETES MELLITUS WITH MICROALBUMINURIA: ICD-10-CM

## 2018-03-22 DIAGNOSIS — K92.1 BLOOD IN STOOL: ICD-10-CM

## 2018-03-22 DIAGNOSIS — I15.2 HYPERTENSION ASSOCIATED WITH DIABETES: ICD-10-CM

## 2018-03-22 DIAGNOSIS — E11.69 COMBINED HYPERLIPIDEMIA ASSOCIATED WITH TYPE 2 DIABETES MELLITUS: ICD-10-CM

## 2018-03-22 PROCEDURE — 99214 OFFICE O/P EST MOD 30 MIN: CPT | Mod: S$PBB,,, | Performed by: INTERNAL MEDICINE

## 2018-03-22 PROCEDURE — 99213 OFFICE O/P EST LOW 20 MIN: CPT | Mod: PBBFAC | Performed by: INTERNAL MEDICINE

## 2018-03-22 PROCEDURE — 99999 PR PBB SHADOW E&M-EST. PATIENT-LVL III: CPT | Mod: PBBFAC,,, | Performed by: INTERNAL MEDICINE

## 2018-03-22 RX ORDER — SODIUM, POTASSIUM,MAG SULFATES 17.5-3.13G
SOLUTION, RECONSTITUTED, ORAL ORAL
Qty: 354 ML | Refills: 0 | Status: SHIPPED | OUTPATIENT
Start: 2018-03-22 | End: 2018-07-02 | Stop reason: SDUPTHER

## 2018-03-22 RX ORDER — PREGABALIN 100 MG/1
100 CAPSULE ORAL 2 TIMES DAILY
Qty: 60 CAPSULE | Refills: 6 | Status: SHIPPED | OUTPATIENT
Start: 2018-03-22 | End: 2018-04-13 | Stop reason: SDUPTHER

## 2018-03-22 NOTE — LETTER
March 22, 2018               [facility]  Internal Medicine  [address] [place] [address]  Phone: [phone]  Fax: [phone]   March 22, 2018     Patient: Laron Kothari Jr.   YOB: 1970   Date of Visit: 3/22/2018       To Whom it May Concern:    Laron Kothari was seen in my clinic on 3/22/2018. He may return to work on 3/22/2018.    If you have any questions or concerns, please don't hesitate to call.    Sincerely,         Kristen Adler, DO

## 2018-03-22 NOTE — PATIENT INSTRUCTIONS
Pregabalin capsules  What is this medicine?  PREGABALIN (pre SUE a maikel) is used to treat nerve pain from diabetes, shingles, spinal cord injury, and fibromyalgia. It is also used to control seizures in epilepsy.  How should I use this medicine?  Take this medicine by mouth with a glass of water. Follow the directions on the prescription label. You can take this medicine with or without food. Take your doses at regular intervals. Do not take your medicine more often than directed. Do not stop taking except on your doctor's advice.  A special MedGuide will be given to you by the pharmacist with each prescription and refill. Be sure to read this information carefully each time.  Talk to your pediatrician regarding the use of this medicine in children. Special care may be needed.  What side effects may I notice from receiving this medicine?  Side effects that you should report to your doctor or health care professional as soon as possible:  · allergic reactions like skin rash, itching or hives, swelling of the face, lips, or tongue  · breathing problems  · changes in vision  · chest pain  · confusion  · jerking or unusual movements of any part of your body  · loss of memory  · muscle pain, tenderness, or weakness  · suicidal thoughts or other mood changes  · swelling of the ankles, feet, hands  · unusual bruising or bleeding  Side effects that usually do not require medical attention (Report these to your doctor or health care professional if they continue or are bothersome.):  · dizziness  · drowsiness  · dry mouth  · headache  · nausea  · tremors  · trouble sleeping  · weight gain  What may interact with this medicine?  · alcohol  · certain medicines for blood pressure like captopril, enalapril, or lisinopril  · certain medicines for diabetes, like pioglitazone or rosiglitazone  · certain medicines for anxiety or sleep  · narcotic medicines for pain  What if I miss a dose?  If you miss a dose, take it as soon as you  can. If it is almost time for your next dose, take only that dose. Do not take double or extra doses.  Where should I keep my medicine?  Keep out of the reach of children. This medicine can be abused. Keep your medicine in a safe place to protect it from theft. Do not share this medicine with anyone. Selling or giving away this medicine is dangerous and against the law.  This medicine may cause accidental overdose and death if it taken by other adults, children, or pets. Mix any unused medicine with a substance like cat litter or coffee grounds. Then throw the medicine away in a sealed container like a sealed bag or a coffee can with a lid. Do not use the medicine after the expiration date.  Store at room temperature between 15 and 30 degrees C (59 and 86 degrees F).  What should I tell my health care provider before I take this medicine?  They need to know if you have any of these conditions:  · bleeding problems  · heart disease, including heart failure  · history of alcohol or drug abuse  · kidney disease  · suicidal thoughts, plans, or attempt; a previous suicide attempt by you or a family member  · an unusual or allergic reaction to pregabalin, gabapentin, other medicines, foods, dyes, or preservatives  · pregnant or trying to get pregnant or trying to conceive with your partner  · breast-feeding  What should I watch for while using this medicine?  Tell your doctor or healthcare professional if your symptoms do not start to get better or if they get worse. Visit your doctor or health care professional for regular checks on your progress. Do not stop taking except on your doctor's advice. You may develop a severe reaction. Your doctor will tell you how much medicine to take.  Wear a medical identification bracelet or chain if you are taking this medicine for seizures, and carry a card that describes your disease and details of your medicine and dosage times.  You may get drowsy or dizzy. Do not drive, use  machinery, or do anything that needs mental alertness until you know how this medicine affects you. Do not stand or sit up quickly, especially if you are an older patient. This reduces the risk of dizzy or fainting spells. Alcohol may interfere with the effect of this medicine. Avoid alcoholic drinks.  If you have a heart condition, like congestive heart failure, and notice that you are retaining water and have swelling in your hands or feet, contact your health care provider immediately.  The use of this medicine may increase the chance of suicidal thoughts or actions. Pay special attention to how you are responding while on this medicine. Any worsening of mood, or thoughts of suicide or dying should be reported to your health care professional right away.  This medicine has caused reduced sperm counts in some men. This may interfere with the ability to father a child. You should talk to your doctor or health care professional if you are concerned about your fertility.  Women who become pregnant while using this medicine for seizures may enroll in the [place] American Antiepileptic Drug Pregnancy Registry by calling 1-668.624.5907. This registry collects information about the safety of antiepileptic drug use during pregnancy.  NOTE:This sheet is a summary. It may not cover all possible information. If you have questions about this medicine, talk to your doctor, pharmacist, or health care provider. Copyright© 2017 [employer]

## 2018-03-22 NOTE — PROGRESS NOTES
Subjective:       Patient ID: Laron Kothari Jr. is a 47 y.o. male.    Chief Complaint: Follow-up    Laron Kothari Jr.  47 y.o. Black or  male    Patient presents with:  Follow-up    HPI: Here today to follow up on chronic conditions.  Diabetes--improved. Compliant with metformin and glipizide.                      HGBA1C                   5.7 (H)             03/19/2018            Neuropathy--not controlled on gabapentin. The dose has been adjusted without any change in symptoms. He has been on gabapentin for a while now. He has never been on anything else for neuropathy.    Microalbuminuria--improved. Compliant with lisinopril. He denies taking any NSAID's.   HTN--stable on lisinopril. He denies symptoms.   HLD--compliant with pravastatin. Lipids have improved.                     CHOL                     153                 03/19/2018                 HDL                      37 (L)              03/19/2018                 LDLCALC                  90.4                03/19/2018                 TRIG                     128                 03/19/2018            He has had two episodes of blood in his stool. Recently it lasted for several days. He did not have abdominal pain, rectal pain, constipation or diarrhea. His symptoms have resolved.        Past Medical History:  2013: Diabetes mellitus, type 2  Gout  Hyperlipidemia  Hypertension  Neuropathy    Current Outpatient Prescriptions on File Prior to Visit:  blood sugar diagnostic Strp, Once daily glucose testing., Disp: 50 each, Rfl: 6  ciclopirox (PENLAC) 8 % Soln, Apply to affected toenails at night time DAILY. On 7th day, file nails down, clean all nails with alcohol and restart application process., Disp: 1 Bottle, Rfl: 10  glipiZIDE (GLUCOTROL) 5 MG tablet, TAKE ONE TABLET BY MOUTH ONCE DAILY WITH BREAKFAST, Disp: 30 tablet, Rfl: 6  indomethacin (INDOCIN) 50 MG capsule, TAKE ONE CAPSULE BY MOUTH THREE TIMES DAILY, Disp: 40 capsule, Rfl: 0  lancets  (LANCETS,THIN) Misc, Once daily glucose testing., Disp: 50 each, Rfl: 6  lisinopril (PRINIVIL,ZESTRIL) 40 MG tablet, Take 1 tablet (40 mg total) by mouth once daily., Disp: 30 tablet, Rfl: 6  metFORMIN (GLUCOPHAGE) 1000 MG tablet, Take 1 tablet (1,000 mg total) by mouth 2 (two) times daily with meals., Disp: 60 tablet, Rfl: 6  pravastatin (PRAVACHOL) 80 MG tablet, TAKE ONE TABLET BY MOUTH ONCE DAILY, Disp: 30 tablet, Rfl: 6  gabapentin (NEURONTIN) 600 MG tablet, Take 1 tablet (600 mg total) by mouth 2 (two) times daily., Disp: 60 tablet, Rfl: 1  promethazine-dextromethorphan (PROMETHAZINE-DM) 6.25-15 mg/5 mL Syrp, 1-2 tsp PO every night prn cough, Disp: 180 mL, Rfl: 0    Allergies:  Review of patient's allergies indicates:  No Known Allergies          Review of Systems   Constitutional: Negative for fever and unexpected weight change.   Eyes: Positive for visual disturbance.   Respiratory: Negative for shortness of breath.    Cardiovascular: Negative for chest pain and leg swelling.   Gastrointestinal: Positive for blood in stool. Negative for abdominal pain, constipation, diarrhea and rectal pain.   Genitourinary: Positive for frequency. Negative for difficulty urinating.   Musculoskeletal: Negative for gait problem.   Neurological: Positive for numbness. Negative for dizziness and headaches.       Objective:      Physical Exam   Constitutional: He is oriented to person, place, and time. He appears well-developed and well-nourished. No distress.   Eyes: No scleral icterus.   Cardiovascular: Normal rate, regular rhythm and normal heart sounds.    Pulmonary/Chest: Effort normal and breath sounds normal. No respiratory distress.   Abdominal: Soft. Bowel sounds are normal.   Genitourinary:   Genitourinary Comments: No visible hemorrhoids.   Stool brown.    Musculoskeletal: He exhibits no edema.   Neurological: He is alert and oriented to person, place, and time.   Skin: Skin is warm and dry.   Psychiatric: He has a  normal mood and affect.   Vitals reviewed.      Assessment:       1. Type 2 diabetes, controlled, with neuropathy    2. Diabetes mellitus with microalbuminuria    3. Hypertension associated with diabetes    4. Combined hyperlipidemia associated with type 2 diabetes mellitus    5. Blood in stool        Plan:       Laron was seen today for follow-up.    Diagnoses and all orders for this visit:    Type 2 diabetes, controlled, with neuropathy  -     Change to pregabalin (LYRICA) 100 MG capsule; Take 1 capsule (100 mg total) by mouth 2 (two) times daily. Discussed medication risks and benefits.     Diabetes mellitus with microalbuminuria  -     Continue current management  -     Advised to avoid NSAID's    Hypertension associated with diabetes  -     Continue current management    Combined hyperlipidemia associated with type 2 diabetes mellitus  -     Continue current management    Blood in stool  -     Case request GI: COLONOSCOPY  -     sodium,potassium,mag sulfates (SUPREP BOWEL PREP KIT) 17.5-3.13-1.6 gram SolR; Take as directed.    Recommend diabetic eye exam.     F/U in 4 weeks for neuropathy.

## 2018-03-23 ENCOUNTER — DOCUMENTATION ONLY (OUTPATIENT)
Dept: ENDOSCOPY | Facility: HOSPITAL | Age: 48
End: 2018-03-23

## 2018-03-23 NOTE — PROGRESS NOTES
Left pt a message to call the office in regards to appt scheduled on 5/31/18. Also sent message via portal.

## 2018-03-26 ENCOUNTER — TELEPHONE (OUTPATIENT)
Dept: INTERNAL MEDICINE | Facility: CLINIC | Age: 48
End: 2018-03-26

## 2018-03-26 NOTE — TELEPHONE ENCOUNTER
Returned pt phone call. Pt states he is waiting for a PA for Lyrica. Advised pt I would call pharmacy for form. Pt vocalized understanding.

## 2018-03-26 NOTE — TELEPHONE ENCOUNTER
----- Message from Rona Head sent at 3/26/2018 11:30 AM CDT -----  Contact: Patient   Patient would like a call back at [phone], Regards to the status of Rx (he is not sure of the name of medication) but he said it was for his diabetic foot pain.    [facility] [address] [facility] [address] [id number] [facility] [address]  Phone: [phone] Fax: [phone]    Thanks  td

## 2018-04-04 ENCOUNTER — PATIENT OUTREACH (OUTPATIENT)
Dept: ADMINISTRATIVE | Facility: HOSPITAL | Age: 48
End: 2018-04-04

## 2018-04-09 ENCOUNTER — TELEPHONE (OUTPATIENT)
Dept: INTERNAL MEDICINE | Facility: CLINIC | Age: 48
End: 2018-04-09

## 2018-04-09 NOTE — TELEPHONE ENCOUNTER
----- Message from Melissa Helton sent at 4/6/2018  4:34 PM CDT -----  Contact: Pt  Pt called and stated he needed to speak to the nurse. He stated that his diabetic pain medication for his feet is not at his pharmacy.     [facility] [address] [place], [place] - [id number] [facility] [address]  Phone: [phone] Fax: [phone]    He can be reached at [phone].  Thanks,  TF

## 2018-04-10 ENCOUNTER — TELEPHONE (OUTPATIENT)
Dept: INTERNAL MEDICINE | Facility: CLINIC | Age: 48
End: 2018-04-10

## 2018-04-10 NOTE — TELEPHONE ENCOUNTER
----- Message from Netta Lemons sent at 4/10/2018  4:39 PM CDT -----  Contact: Patient  Patient called to speak with the nurse; he stated a prescription for his Diabetic Foot Pain was supposed to be called in a few weeks ago and nothing has been called in. He stated he has called but no one has called him back.     [facility] [address] [place] [address]  Phone: [phone] Fax: [phone]    He can be contacted at [phone].    Thanks,  Netta

## 2018-04-12 DIAGNOSIS — E11.40 TYPE 2 DIABETES, CONTROLLED, WITH NEUROPATHY: ICD-10-CM

## 2018-04-13 DIAGNOSIS — E11.40 TYPE 2 DIABETES, CONTROLLED, WITH NEUROPATHY: ICD-10-CM

## 2018-04-13 RX ORDER — PREGABALIN 100 MG/1
100 CAPSULE ORAL 2 TIMES DAILY
Qty: 60 CAPSULE | Refills: 6 | Status: SHIPPED | OUTPATIENT
Start: 2018-04-13 | End: 2018-04-18 | Stop reason: SDUPTHER

## 2018-04-13 RX ORDER — PREGABALIN 100 MG/1
100 CAPSULE ORAL 2 TIMES DAILY
Qty: 60 CAPSULE | Refills: 6 | Status: CANCELLED | OUTPATIENT
Start: 2018-04-13

## 2018-04-13 NOTE — TELEPHONE ENCOUNTER
----- Message from Zuri Baires sent at 4/13/2018  8:55 AM CDT -----  Contact: [facility] in [place]  States he's calling to get some clarification on lyrica. Please call [place] at [phone]. Thank you

## 2018-04-13 NOTE — TELEPHONE ENCOUNTER
Returned call to Beulah at [facility] in [place] to clarify Lyrica order, for PA per Dr. Adler request. Beulah stated that he needs an RX for the Lyrica. They do not have a current order on file.

## 2018-04-13 NOTE — TELEPHONE ENCOUNTER
Called Beulah at [facility] in [place], to initiate PA for pt to receive Lyrica. Faxed order to him. Waiting for response.

## 2018-04-18 ENCOUNTER — OFFICE VISIT (OUTPATIENT)
Dept: INTERNAL MEDICINE | Facility: CLINIC | Age: 48
End: 2018-04-18
Payer: MEDICAID

## 2018-04-18 ENCOUNTER — TELEPHONE (OUTPATIENT)
Dept: INTERNAL MEDICINE | Facility: CLINIC | Age: 48
End: 2018-04-18

## 2018-04-18 VITALS
BODY MASS INDEX: 33.07 KG/M2 | DIASTOLIC BLOOD PRESSURE: 80 MMHG | OXYGEN SATURATION: 97 % | HEART RATE: 90 BPM | SYSTOLIC BLOOD PRESSURE: 112 MMHG | TEMPERATURE: 97 F | WEIGHT: 257.69 LBS | HEIGHT: 74 IN

## 2018-04-18 DIAGNOSIS — E11.40 TYPE 2 DIABETES, CONTROLLED, WITH NEUROPATHY: ICD-10-CM

## 2018-04-18 PROCEDURE — 99213 OFFICE O/P EST LOW 20 MIN: CPT | Mod: S$PBB,,, | Performed by: INTERNAL MEDICINE

## 2018-04-18 PROCEDURE — 99999 PR PBB SHADOW E&M-EST. PATIENT-LVL III: CPT | Mod: PBBFAC,,, | Performed by: INTERNAL MEDICINE

## 2018-04-18 PROCEDURE — 99213 OFFICE O/P EST LOW 20 MIN: CPT | Mod: PBBFAC | Performed by: INTERNAL MEDICINE

## 2018-04-18 RX ORDER — PREGABALIN 100 MG/1
100 CAPSULE ORAL 2 TIMES DAILY
Qty: 60 CAPSULE | Refills: 5 | Status: SHIPPED | OUTPATIENT
Start: 2018-04-18 | End: 2019-04-09 | Stop reason: SDUPTHER

## 2018-04-18 NOTE — PATIENT INSTRUCTIONS
Pregabalin capsules  What is this medicine?  PREGABALIN (pre SUE a maikel) is used to treat nerve pain from diabetes, shingles, spinal cord injury, and fibromyalgia. It is also used to control seizures in epilepsy.  How should I use this medicine?  Take this medicine by mouth with a glass of water. Follow the directions on the prescription label. You can take this medicine with or without food. Take your doses at regular intervals. Do not take your medicine more often than directed. Do not stop taking except on your doctor's advice.  A special MedGuide will be given to you by the pharmacist with each prescription and refill. Be sure to read this information carefully each time.  Talk to your pediatrician regarding the use of this medicine in children. Special care may be needed.  What side effects may I notice from receiving this medicine?  Side effects that you should report to your doctor or health care professional as soon as possible:  · allergic reactions like skin rash, itching or hives, swelling of the face, lips, or tongue  · breathing problems  · changes in vision  · chest pain  · confusion  · jerking or unusual movements of any part of your body  · loss of memory  · muscle pain, tenderness, or weakness  · suicidal thoughts or other mood changes  · swelling of the ankles, feet, hands  · unusual bruising or bleeding  Side effects that usually do not require medical attention (Report these to your doctor or health care professional if they continue or are bothersome.):  · dizziness  · drowsiness  · dry mouth  · headache  · nausea  · tremors  · trouble sleeping  · weight gain  What may interact with this medicine?  · alcohol  · certain medicines for blood pressure like captopril, enalapril, or lisinopril  · certain medicines for diabetes, like pioglitazone or rosiglitazone  · certain medicines for anxiety or sleep  · narcotic medicines for pain  What if I miss a dose?  If you miss a dose, take it as soon as you  can. If it is almost time for your next dose, take only that dose. Do not take double or extra doses.  Where should I keep my medicine?  Keep out of the reach of children. This medicine can be abused. Keep your medicine in a safe place to protect it from theft. Do not share this medicine with anyone. Selling or giving away this medicine is dangerous and against the law.  This medicine may cause accidental overdose and death if it taken by other adults, children, or pets. Mix any unused medicine with a substance like cat litter or coffee grounds. Then throw the medicine away in a sealed container like a sealed bag or a coffee can with a lid. Do not use the medicine after the expiration date.  Store at room temperature between 15 and 30 degrees C (59 and 86 degrees F).  What should I tell my health care provider before I take this medicine?  They need to know if you have any of these conditions:  · bleeding problems  · heart disease, including heart failure  · history of alcohol or drug abuse  · kidney disease  · suicidal thoughts, plans, or attempt; a previous suicide attempt by you or a family member  · an unusual or allergic reaction to pregabalin, gabapentin, other medicines, foods, dyes, or preservatives  · pregnant or trying to get pregnant or trying to conceive with your partner  · breast-feeding  What should I watch for while using this medicine?  Tell your doctor or healthcare professional if your symptoms do not start to get better or if they get worse. Visit your doctor or health care professional for regular checks on your progress. Do not stop taking except on your doctor's advice. You may develop a severe reaction. Your doctor will tell you how much medicine to take.  Wear a medical identification bracelet or chain if you are taking this medicine for seizures, and carry a card that describes your disease and details of your medicine and dosage times.  You may get drowsy or dizzy. Do not drive, use  machinery, or do anything that needs mental alertness until you know how this medicine affects you. Do not stand or sit up quickly, especially if you are an older patient. This reduces the risk of dizzy or fainting spells. Alcohol may interfere with the effect of this medicine. Avoid alcoholic drinks.  If you have a heart condition, like congestive heart failure, and notice that you are retaining water and have swelling in your hands or feet, contact your health care provider immediately.  The use of this medicine may increase the chance of suicidal thoughts or actions. Pay special attention to how you are responding while on this medicine. Any worsening of mood, or thoughts of suicide or dying should be reported to your health care professional right away.  This medicine has caused reduced sperm counts in some men. This may interfere with the ability to father a child. You should talk to your doctor or health care professional if you are concerned about your fertility.  Women who become pregnant while using this medicine for seizures may enroll in the [place] [employer] Antiepileptic Drug Pregnancy Registry by calling 1-677.229.4779. This registry collects information about the safety of antiepileptic drug use during pregnancy.  NOTE:This sheet is a summary. It may not cover all possible information. If you have questions about this medicine, talk to your doctor, pharmacist, or health care provider. Copyright© 2017 [employer]

## 2018-04-18 NOTE — PROGRESS NOTES
Laron Kothari .  47 y.o.  Black or  male    Chief Complaint   Patient presents with    Follow-up       HPI:  Presents to the clinic for a 4 week f/u on diabetic neuropathy. Lyrica was prescribed but his insurance will not cover the medication. He has tried gabapentin, with doses adjusted, and no relief.     PMH: Reviewed    MEDS: Reviewed med card    ALLERGIES: Reviewed allergy card    PE: Reviewed vitals  GENERAL: Alert and oriented, no acute distress  HEART: Regular rate  LUNGS: Unlabored respirations     ASSESSMENT/PLAN:    Laron was seen today for follow-up.    Diagnoses and all orders for this visit:    Type 2 diabetes, controlled, with neuropathy  -     pregabalin (LYRICA) 100 MG capsule; Take 1 capsule (100 mg total) by mouth 2 (two) times daily. (will send prescription to [facility])    F/U in 4 weeks once medication started.         RTC: As needed

## 2018-04-27 ENCOUNTER — OFFICE VISIT (OUTPATIENT)
Dept: OPHTHALMOLOGY | Facility: CLINIC | Age: 48
End: 2018-04-27
Payer: MEDICAID

## 2018-04-27 DIAGNOSIS — E11.9 DIABETES MELLITUS TYPE 2 WITHOUT RETINOPATHY: Primary | ICD-10-CM

## 2018-04-27 DIAGNOSIS — H52.03 HYPEROPIA, BILATERAL: ICD-10-CM

## 2018-04-27 DIAGNOSIS — H52.4 BILATERAL PRESBYOPIA: ICD-10-CM

## 2018-04-27 PROCEDURE — 99212 OFFICE O/P EST SF 10 MIN: CPT | Mod: PBBFAC | Performed by: OPTOMETRIST

## 2018-04-27 PROCEDURE — 92015 DETERMINE REFRACTIVE STATE: CPT | Mod: ,,, | Performed by: OPTOMETRIST

## 2018-04-27 PROCEDURE — 99999 PR PBB SHADOW E&M-EST. PATIENT-LVL II: CPT | Mod: PBBFAC,,, | Performed by: OPTOMETRIST

## 2018-04-27 PROCEDURE — 92014 COMPRE OPH EXAM EST PT 1/>: CPT | Mod: S$PBB,,, | Performed by: OPTOMETRIST

## 2018-04-27 NOTE — PATIENT INSTRUCTIONS
Diabetes mellitus type 2 without retinopathy     Hyperopia, bilateral     Bilateral presbyopia        No Background Diabetic Retinopathy     No Rx for glasses or may use OTC glasses.  RTC 1 year  Discussed above and answered questions.

## 2018-04-27 NOTE — PROGRESS NOTES
HPI     Diabetic Eye Exam    Additional comments: Yearly           Comments   Last seen by TRF on 3/30/17 for yearly DM exam. Patient here today for   yearly DM exam.  1. DM  HPI    Any vision changes since last exam: Yes Hard to focus in the morning and   having trouble when reading  Eye pain: No  Other ocular symptoms: No  Lab Results       Component                Value               Date                       HGBA1C                   5.7 (H)             03/19/2018                   Last edited by Pepe Hinson, OD on 4/27/2018  9:19 AM. (History)            Assessment /Plan     For exam results, see Encounter Report.    Diabetes mellitus type 2 without retinopathy    Hyperopia, bilateral    Bilateral presbyopia      No Background Diabetic Retinopathy    No Rx for glasses or may use OTC glasses.  RTC 1 year  Discussed above and answered questions.

## 2018-07-02 ENCOUNTER — DOCUMENTATION ONLY (OUTPATIENT)
Dept: ENDOSCOPY | Facility: HOSPITAL | Age: 48
End: 2018-07-02

## 2018-07-02 ENCOUNTER — TELEPHONE (OUTPATIENT)
Dept: INTERNAL MEDICINE | Facility: CLINIC | Age: 48
End: 2018-07-02

## 2018-07-02 DIAGNOSIS — Z12.11 COLON CANCER SCREENING: Primary | ICD-10-CM

## 2018-07-02 RX ORDER — POLYETHYLENE GLYCOL 3350, SODIUM CHLORIDE, SODIUM BICARBONATE, POTASSIUM CHLORIDE 420; 11.2; 5.72; 1.48 G/4L; G/4L; G/4L; G/4L
POWDER, FOR SOLUTION ORAL
Qty: 4000 ML | Refills: 0 | Status: ON HOLD | OUTPATIENT
Start: 2018-07-02 | End: 2018-08-07 | Stop reason: HOSPADM

## 2018-07-02 RX ORDER — POLYETHYLENE GLYCOL 3350, SODIUM SULFATE ANHYDROUS, SODIUM BICARBONATE, SODIUM CHLORIDE, POTASSIUM CHLORIDE 236; 22.74; 6.74; 5.86; 2.97 G/4L; G/4L; G/4L; G/4L; G/4L
POWDER, FOR SOLUTION ORAL
Qty: 4000 ML | Refills: 0 | Status: ON HOLD | OUTPATIENT
Start: 2018-07-02 | End: 2018-08-07 | Stop reason: HOSPADM

## 2018-07-02 NOTE — TELEPHONE ENCOUNTER
I spoke with patient stated he spoke with a nurse at the office where he will get his colonoscopy and she said he needs another prescription sent over for the prep and his insurance won't cover it.

## 2018-07-02 NOTE — TELEPHONE ENCOUNTER
----- Message from Steven Robles sent at 7/2/2018  7:57 AM CDT -----  Contact: self [phone]  Would like to consult with nurse regarding prep for colonoscopy; insurance will not cover.  Please call back at [phone].  Md Dona

## 2018-07-02 NOTE — PROGRESS NOTES
Pt called in and rescheduled procedure to 8/7/18 due to the cost of suprep. Changed prep to nulytely.     New instructions mailed to the pt address on file.

## 2018-07-14 DIAGNOSIS — R80.9 DIABETES MELLITUS WITH MICROALBUMINURIA: ICD-10-CM

## 2018-07-14 DIAGNOSIS — E11.40 TYPE 2 DIABETES MELLITUS WITH DIABETIC NEUROPATHY, WITHOUT LONG-TERM CURRENT USE OF INSULIN: ICD-10-CM

## 2018-07-14 DIAGNOSIS — E11.29 DIABETES MELLITUS WITH MICROALBUMINURIA: ICD-10-CM

## 2018-07-14 DIAGNOSIS — I10 HYPERTENSION GOAL BP (BLOOD PRESSURE) < 130/80: ICD-10-CM

## 2018-07-16 RX ORDER — LISINOPRIL 40 MG/1
TABLET ORAL
Qty: 30 TABLET | Refills: 6 | Status: SHIPPED | OUTPATIENT
Start: 2018-07-16 | End: 2018-07-31 | Stop reason: SDUPTHER

## 2018-07-16 RX ORDER — GLIPIZIDE 5 MG/1
TABLET ORAL
Qty: 30 TABLET | Refills: 6 | Status: SHIPPED | OUTPATIENT
Start: 2018-07-16 | End: 2018-07-31 | Stop reason: SDUPTHER

## 2018-07-19 ENCOUNTER — TELEPHONE (OUTPATIENT)
Dept: INTERNAL MEDICINE | Facility: CLINIC | Age: 48
End: 2018-07-19

## 2018-07-19 DIAGNOSIS — R80.9 DIABETES MELLITUS WITH MICROALBUMINURIA: ICD-10-CM

## 2018-07-19 DIAGNOSIS — I10 HYPERTENSION GOAL BP (BLOOD PRESSURE) < 130/80: ICD-10-CM

## 2018-07-19 DIAGNOSIS — E11.29 DIABETES MELLITUS WITH MICROALBUMINURIA: ICD-10-CM

## 2018-07-19 DIAGNOSIS — E11.40 TYPE 2 DIABETES MELLITUS WITH DIABETIC NEUROPATHY, WITHOUT LONG-TERM CURRENT USE OF INSULIN: ICD-10-CM

## 2018-07-19 RX ORDER — GLIPIZIDE 5 MG/1
TABLET ORAL
Qty: 30 TABLET | Refills: 6 | Status: SHIPPED | OUTPATIENT
Start: 2018-07-19 | End: 2018-07-31 | Stop reason: SDUPTHER

## 2018-07-19 RX ORDER — METFORMIN HYDROCHLORIDE 1000 MG/1
TABLET ORAL
Qty: 60 TABLET | Refills: 6 | Status: SHIPPED | OUTPATIENT
Start: 2018-07-19 | End: 2018-07-31 | Stop reason: SDUPTHER

## 2018-07-19 RX ORDER — LISINOPRIL 40 MG/1
TABLET ORAL
Qty: 30 TABLET | Refills: 6 | Status: SHIPPED | OUTPATIENT
Start: 2018-07-19 | End: 2018-07-31 | Stop reason: SDUPTHER

## 2018-07-19 NOTE — TELEPHONE ENCOUNTER
----- Message from Sara Horta sent at 7/19/2018  8:01 AM CDT -----  Contact: Patient  Patient state she needs all his medications called in, blood pressure, diabetic meds, cholesterol-[facility] in [place]. Please call patient back if needed at [phone]. Thank ivonne

## 2018-07-19 NOTE — TELEPHONE ENCOUNTER
I spoke with [facility], I asked him can he fax over a refill list for the above so he can get his medications and he stated yes I will get that fax over to you now.

## 2018-07-30 ENCOUNTER — TELEPHONE (OUTPATIENT)
Dept: INTERNAL MEDICINE | Facility: CLINIC | Age: 48
End: 2018-07-30

## 2018-07-30 DIAGNOSIS — I10 HYPERTENSION GOAL BP (BLOOD PRESSURE) < 130/80: ICD-10-CM

## 2018-07-30 DIAGNOSIS — E11.29 DIABETES MELLITUS WITH MICROALBUMINURIA: ICD-10-CM

## 2018-07-30 DIAGNOSIS — R80.9 DIABETES MELLITUS WITH MICROALBUMINURIA: ICD-10-CM

## 2018-07-30 DIAGNOSIS — E78.1 HYPERTRIGLYCERIDEMIA: ICD-10-CM

## 2018-07-30 DIAGNOSIS — E11.40 TYPE 2 DIABETES MELLITUS WITH DIABETIC NEUROPATHY, WITHOUT LONG-TERM CURRENT USE OF INSULIN: ICD-10-CM

## 2018-07-30 NOTE — TELEPHONE ENCOUNTER
----- Message from Ave Reyes sent at 7/30/2018 10:54 AM CDT -----  Patient state he is calling to check the status of Rx refill for his blood pressure, cholesterol, and diabetes. Please adv/call [phone].    Pt use..  [facility] [address] [place] [address] [id number] [place] [id number]  Phone: [phone] Fax: [phone]

## 2018-07-31 RX ORDER — METFORMIN HYDROCHLORIDE 1000 MG/1
1000 TABLET ORAL 2 TIMES DAILY WITH MEALS
Qty: 60 TABLET | Refills: 6 | Status: SHIPPED | OUTPATIENT
Start: 2018-07-31 | End: 2019-02-01 | Stop reason: SDUPTHER

## 2018-07-31 RX ORDER — LISINOPRIL 40 MG/1
40 TABLET ORAL DAILY
Qty: 30 TABLET | Refills: 6 | Status: SHIPPED | OUTPATIENT
Start: 2018-07-31 | End: 2019-02-01 | Stop reason: SDUPTHER

## 2018-07-31 RX ORDER — PRAVASTATIN SODIUM 80 MG/1
80 TABLET ORAL DAILY
Qty: 30 TABLET | Refills: 6 | Status: SHIPPED | OUTPATIENT
Start: 2018-07-31 | End: 2019-04-09 | Stop reason: SDUPTHER

## 2018-07-31 RX ORDER — GLIPIZIDE 5 MG/1
TABLET ORAL
Qty: 30 TABLET | Refills: 6 | Status: SHIPPED | OUTPATIENT
Start: 2018-07-31 | End: 2019-02-01 | Stop reason: SDUPTHER

## 2018-08-07 ENCOUNTER — HOSPITAL ENCOUNTER (OUTPATIENT)
Facility: HOSPITAL | Age: 48
Discharge: HOME | End: 2018-08-07
Attending: FAMILY MEDICINE | Admitting: FAMILY MEDICINE
Payer: MEDICAID

## 2018-08-07 ENCOUNTER — ANESTHESIA EVENT (OUTPATIENT)
Dept: ENDOSCOPY | Facility: HOSPITAL | Age: 48
End: 2018-08-07
Payer: MEDICAID

## 2018-08-07 ENCOUNTER — ANESTHESIA (OUTPATIENT)
Dept: ENDOSCOPY | Facility: HOSPITAL | Age: 48
End: 2018-08-07
Payer: MEDICAID

## 2018-08-07 ENCOUNTER — SURGERY (OUTPATIENT)
Age: 48
End: 2018-08-07

## 2018-08-07 DIAGNOSIS — K63.5 POLYP OF COLON, UNSPECIFIED PART OF COLON, UNSPECIFIED TYPE: ICD-10-CM

## 2018-08-07 DIAGNOSIS — K92.1 BLOOD IN STOOL: Primary | ICD-10-CM

## 2018-08-07 LAB — POCT GLUCOSE: 115 MG/DL (ref 70–110)

## 2018-08-07 PROCEDURE — 37000008 HC ANESTHESIA 1ST 15 MINUTES: Performed by: FAMILY MEDICINE

## 2018-08-07 PROCEDURE — 27201089 HC SNARE, DISP (ANY): Performed by: FAMILY MEDICINE

## 2018-08-07 PROCEDURE — 63600175 PHARM REV CODE 636 W HCPCS: Performed by: NURSE ANESTHETIST, CERTIFIED REGISTERED

## 2018-08-07 PROCEDURE — 45385 COLONOSCOPY W/LESION REMOVAL: CPT | Mod: ,,, | Performed by: FAMILY MEDICINE

## 2018-08-07 PROCEDURE — 00811 ANES LWR INTST NDSC NOS: CPT | Performed by: FAMILY MEDICINE

## 2018-08-07 PROCEDURE — 45385 COLONOSCOPY W/LESION REMOVAL: CPT | Performed by: FAMILY MEDICINE

## 2018-08-07 PROCEDURE — 25000003 PHARM REV CODE 250: Performed by: NURSE ANESTHETIST, CERTIFIED REGISTERED

## 2018-08-07 PROCEDURE — 25000003 PHARM REV CODE 250: Performed by: FAMILY MEDICINE

## 2018-08-07 PROCEDURE — 88305 TISSUE EXAM BY PATHOLOGIST: CPT | Mod: 26,,, | Performed by: PATHOLOGY

## 2018-08-07 PROCEDURE — 88305 TISSUE EXAM BY PATHOLOGIST: CPT | Performed by: PATHOLOGY

## 2018-08-07 PROCEDURE — 37000009 HC ANESTHESIA EA ADD 15 MINS: Performed by: FAMILY MEDICINE

## 2018-08-07 PROCEDURE — 82962 GLUCOSE BLOOD TEST: CPT | Performed by: FAMILY MEDICINE

## 2018-08-07 RX ORDER — SODIUM CHLORIDE, SODIUM LACTATE, POTASSIUM CHLORIDE, CALCIUM CHLORIDE 600; 310; 30; 20 MG/100ML; MG/100ML; MG/100ML; MG/100ML
INJECTION, SOLUTION INTRAVENOUS CONTINUOUS
Status: DISCONTINUED | OUTPATIENT
Start: 2018-08-07 | End: 2018-08-07 | Stop reason: HOSPADM

## 2018-08-07 RX ORDER — SODIUM CHLORIDE 0.9 % (FLUSH) 0.9 %
3 SYRINGE (ML) INJECTION
Status: CANCELLED | OUTPATIENT
Start: 2018-08-07

## 2018-08-07 RX ORDER — LIDOCAINE HYDROCHLORIDE 10 MG/ML
INJECTION INFILTRATION; PERINEURAL
Status: DISCONTINUED | OUTPATIENT
Start: 2018-08-07 | End: 2018-08-07

## 2018-08-07 RX ORDER — PROPOFOL 10 MG/ML
VIAL (ML) INTRAVENOUS
Status: DISCONTINUED | OUTPATIENT
Start: 2018-08-07 | End: 2018-08-07

## 2018-08-07 RX ADMIN — PROPOFOL 25 MG: 10 INJECTION, EMULSION INTRAVENOUS at 03:08

## 2018-08-07 RX ADMIN — LIDOCAINE HYDROCHLORIDE 40 MG: 10 INJECTION, SOLUTION INFILTRATION; PERINEURAL at 03:08

## 2018-08-07 RX ADMIN — PROPOFOL 100 MG: 10 INJECTION, EMULSION INTRAVENOUS at 03:08

## 2018-08-07 RX ADMIN — PROPOFOL 50 MG: 10 INJECTION, EMULSION INTRAVENOUS at 03:08

## 2018-08-07 RX ADMIN — SODIUM CHLORIDE, SODIUM LACTATE, POTASSIUM CHLORIDE, AND CALCIUM CHLORIDE: .6; .31; .03; .02 INJECTION, SOLUTION INTRAVENOUS at 02:08

## 2018-08-07 NOTE — H&P
Short Stay Endoscopy History and Physical    PCP - Kristen Adler, DO    Procedure - Colonoscopy  ASA - 2  Mallampati - per anesthesia  History of Anesthesia problems - no  Family history Anesthesia problems -  no     HPI:  This is a 48 y.o. male here for evaluation of :   Active Hospital Problems    Diagnosis  POA    *Blood in stool [K92.1]  Unknown      Resolved Hospital Problems    Diagnosis Date Resolved POA   No resolved problems to display.         Health Maintenance       Date Due Completion Date    Influenza Vaccine 08/01/2018 12/8/2017    Override on 9/1/2014: Done    Hemoglobin A1c 09/19/2018 3/19/2018    Foot Exam 12/08/2018 12/8/2017    Override on 5/4/2015: Done    Lipid Panel 03/19/2019 3/19/2018    Urine Microalbumin 03/19/2019 3/19/2018    Eye Exam 04/27/2019 4/27/2018    Override on 4/27/2018: Done    Low Dose Statin 08/07/2019 8/7/2018    Pneumococcal PPSV23 (Medium Risk) (1) 01/29/2020 1/29/2015    TETANUS VACCINE 08/09/2026 8/9/2016          Screening - no  History of polyps - no  Diarrhea - no  Anemia - no  Blood in stools - yes  Abdominal pain - no  Other - no    ROS:  CONSTITUTIONAL: Denies weight change,  fatigue, fevers, chills, night sweats.  CARDIOVASCULAR: Denies chest pain, shortness of breath, orthopnea and edema.  RESPIRATORY: Denies cough, hemoptysis, dyspnea, and wheezing.  GI: See HPI.    Medical History:   Past Medical History:   Diagnosis Date    Blood in stool 8/7/2018    Diabetes mellitus, type 2 2013    Gout     Hyperlipidemia     Hypertension     Neuropathy        Surgical History:   Past Surgical History:   Procedure Laterality Date    APPENDECTOMY      TONSILLECTOMY, ADENOIDECTOMY         Family History:   Family History   Problem Relation Age of Onset    Diabetes Father     Prostate cancer Father     Cataracts Father        Social History:   Social History   Substance Use Topics    Smoking status: Current Every Day Smoker     Packs/day: 0.50    Smokeless  tobacco: Never Used    Alcohol use 0.0 oz/week      Comment: socially       Allergies:   Review of patient's allergies indicates:  No Known Allergies    Medications:   No current facility-administered medications on file prior to encounter.      Current Outpatient Prescriptions on File Prior to Encounter   Medication Sig Dispense Refill    blood sugar diagnostic Strp Once daily glucose testing. 50 each 6    indomethacin (INDOCIN) 50 MG capsule TAKE ONE CAPSULE BY MOUTH THREE TIMES DAILY 40 capsule 0    lancets (LANCETS,THIN) Misc Once daily glucose testing. 50 each 6    ciclopirox (PENLAC) 8 % Soln Apply to affected toenails at night time DAILY. On 7th day, file nails down, clean all nails with alcohol and restart application process. 1 Bottle 10       Physical Exam:  Vital Signs:   Vitals:    08/07/18 1407   BP: 124/82   Pulse: 84   Resp: 18   Temp: 97.7 °F (36.5 °C)     General Appearance: Well appearing in no acute distress  ENT: OP clear  Chest: CTA B  CV: RRR, no m/r/g  Abd: s/nt/nd/nabs  Ext: no edema    Labs:Reviewed    IMP:  Active Hospital Problems    Diagnosis  POA    *Blood in stool [K92.1]  Unknown      Resolved Hospital Problems    Diagnosis Date Resolved POA   No resolved problems to display.         Plan:   I have explained the risks and benefits of colonoscopy to the patient including but not limited to bleeding, perforation, infection, and death. The patient wishes to proceed.

## 2018-08-07 NOTE — DISCHARGE INSTRUCTIONS

## 2018-08-07 NOTE — DISCHARGE SUMMARY
Endoscopy Discharge Summary      Admit Date: 8/7/2018    Discharge Date and Time:  8/7/2018 3:39 PM    Attending Physician: Ten Valentine MD     Discharge Physician: Ten Valentine MD     Principal Admitting Diagnoses: Blood in stool         Discharge Diagnosis: The primary encounter diagnosis was Blood in stool. A diagnosis of Polyp of colon, unspecified part of colon, unspecified type was also pertinent to this visit.     Discharged Condition: Good    Indication for Admission: Blood in stool     Hospital Course: Patient was admitted for an inpatient procedure and tolerated the procedure well with no complications.    Significant Diagnostic Studies: Colonoscopy with hot snare polypectomy    Pathology (if any):  Specimen (12h ago through future)    Start     Ordered    08/07/18 1520  Specimen to Pathology - Surgery  Once     Comments:  1. 3mm rectal sigmoid polyps      08/07/18 1534          Estimated Blood Loss: 0 ml.    Discussed with: patient and family.    Disposition: Home.    Follow Up/Patient Instructions:   Current Discharge Medication List      CONTINUE these medications which have NOT CHANGED    Details   blood sugar diagnostic Strp Once daily glucose testing.  Qty: 50 each, Refills: 6    Associated Diagnoses: Type 2 diabetes mellitus with microalbuminuria, without long-term current use of insulin; Type 2 diabetes mellitus with diabetic neuropathy, without long-term current use of insulin      glipiZIDE (GLUCOTROL) 5 MG tablet TAKE ONE TABLET BY MOUTH ONCE DAILY WITH BREAKFAST  Qty: 30 tablet, Refills: 6    Comments: Please consider 90 day supplies to promote better adherence  Associated Diagnoses: Diabetes mellitus with microalbuminuria      indomethacin (INDOCIN) 50 MG capsule TAKE ONE CAPSULE BY MOUTH THREE TIMES DAILY  Qty: 40 capsule, Refills: 0      lancets (LANCETS,THIN) Misc Once daily glucose testing.  Qty: 50 each, Refills: 6    Associated Diagnoses: Type 2 diabetes mellitus with  microalbuminuria, without long-term current use of insulin; Type 2 diabetes mellitus with diabetic neuropathy, without long-term current use of insulin      lisinopril (PRINIVIL,ZESTRIL) 40 MG tablet Take 1 tablet (40 mg total) by mouth once daily.  Qty: 30 tablet, Refills: 6    Comments: Please consider 90 day supplies to promote better adherence  Associated Diagnoses: Hypertension goal BP (blood pressure) < 130/80; Type 2 diabetes mellitus with diabetic neuropathy, without long-term current use of insulin      metFORMIN (GLUCOPHAGE) 1000 MG tablet Take 1 tablet (1,000 mg total) by mouth 2 (two) times daily with meals.  Qty: 60 tablet, Refills: 6    Comments: Please consider 90 day supplies to promote better adherence  Associated Diagnoses: Diabetes mellitus with microalbuminuria; Type 2 diabetes mellitus with diabetic neuropathy, without long-term current use of insulin      pravastatin (PRAVACHOL) 80 MG tablet Take 1 tablet (80 mg total) by mouth once daily.  Qty: 30 tablet, Refills: 6    Comments: Please consider 90 day supplies to promote better adherence  Associated Diagnoses: Hypertriglyceridemia      pregabalin (LYRICA) 100 MG capsule Take 1 capsule (100 mg total) by mouth 2 (two) times daily.  Qty: 60 capsule, Refills: 5    Associated Diagnoses: Type 2 diabetes, controlled, with neuropathy      ciclopirox (PENLAC) 8 % Soln Apply to affected toenails at night time DAILY. On 7th day, file nails down, clean all nails with alcohol and restart application process.  Qty: 1 Bottle, Refills: 10         STOP taking these medications       peg-electrolyte soln 420 gram SolR Comments:   Reason for Stopping:         polyethylene glycol (GOLYTELY,NULYTELY) 236-22.74-6.74 -5.86 gram suspension Comments:   Reason for Stopping:                 Discharge Procedure Orders  Diet general     Call MD for:  temperature >100.4     Call MD for:  persistent nausea and vomiting     Call MD for:  severe uncontrolled pain     Call MD  for:  difficulty breathing, headache or visual disturbances     Call MD for:  redness, tenderness, or signs of infection (pain, swelling, redness, odor or green/yellow discharge around incision site)     Call MD for:  hives     Call MD for:  persistent dizziness or light-headedness     No dressing needed         Follow-up Information     Ten Valentine MD. Call in 1 week.    Specialty:  Family Medicine  Why:  To receive pathology results.  Contact information:  [address] [facility] [address] [phone]                   [email or web address]

## 2018-08-07 NOTE — TRANSFER OF CARE
"Anesthesia Transfer of Care Note    Patient: Laron Kothari Jr.    Procedure(s) Performed: Procedure(s) (LRB):  COLONOSCOPY (N/A)    Patient location: Other: GI PACU    Anesthesia Type: MAC    Transport from OR: Transported from OR on room air with adequate spontaneous ventilation    Post pain: adequate analgesia    Post assessment: no apparent anesthetic complications    Post vital signs: stable    Level of consciousness: awake    Nausea/Vomiting: no nausea/vomiting    Complications: none    Transfer of care protocol was followed      Last vitals:   Visit Vitals  /82 (BP Location: Left arm, Patient Position: Lying)   Pulse 84   Temp 36.5 °C (97.7 °F) (Oral)   Resp 18   Ht 6' 2" (1.88 m)   Wt 115.2 kg (254 lb)   SpO2 98%   BMI 32.61 kg/m²     "

## 2018-08-07 NOTE — [employer] PATIENT INSTRUCTIONS
Discharge Summary/Instructions after an Endoscopic Procedure  Patient Name: Laron Kothari  Patient MRN: [id number]  Patient YOB: 1970  Tuesday, August 07, 2018 Ten Valentine MD  RESTRICTIONS:  During your procedure today, you received medications for sedation.  These   medications may affect your judgment, balance and coordination.  Therefore,   for 24 hours, you have the following restrictions:   - DO NOT drive a car, operate machinery, make legal/financial decisions,   sign important papers or drink alcohol.    ACTIVITY:  Today: no heavy lifting, straining or running due to procedural   sedation/anesthesia.  The following day: return to full activity including work.  DIET:  Eat and drink normally unless instructed otherwise.     TREATMENT FOR COMMON SIDE EFFECTS:  - Mild abdominal pain, nausea, belching, bloating or excessive gas:  rest,   eat lightly and use a heating pad.  - Sore Throat: treat with throat lozenges and/or gargle with warm salt   water.  - Because air was used during the procedure, expelling large amounts of air   from your rectum or belching is normal.  - If a bowel prep was taken, you may not have a bowel movement for 1-3 days.    This is normal.  SYMPTOMS TO WATCH FOR AND REPORT TO YOUR PHYSICIAN:  1. Abdominal pain or bloating, other than gas cramps.  2. Chest pain.  3. Back pain.  4. Signs of infection such as: chills or fever occurring within 24 hours   after the procedure.  5. Rectal bleeding, which would show as bright red, maroon, or black stools.   (A tablespoon of blood from the rectum is not serious, especially if   hemorrhoids are present.)  6. Vomiting.  7. Weakness or dizziness.  GO DIRECTLY TO THE NEAREST EMERGENCY ROOM IF YOU HAVE ANY OF THE FOLLOWING:      Difficulty breathing              Chills and/or fever over 101 F   Persistent vomiting and/or vomiting blood   Severe abdominal pain   Severe chest pain   Black, tarry stools   Bleeding- more than one  tablespoon   Any other symptom or condition that you feel may need urgent attention  Your doctor recommends these additional instructions:  If any biopsies were taken, your doctors clinic will contact you in 1 to 2   weeks with any results.  - Patient has a contact number available for emergencies.  The signs and   symptoms of potential delayed complications were discussed with the   patient.  Return to normal activities tomorrow.  Written discharge   instructions were provided to the patient.   - Resume previous diet.   - Continue present medications.   - Await pathology results.   - Repeat colonoscopy in 3 years for surveillance.   - Telephone my office for pathology results in 1 week.   - Discharge patient to home (via wheelchair).  For questions, problems or results please call your physician Ten Valentine MD at Work:  [phone]  If you have any questions about the above instructions, call the GI   department at [phone] or call the endoscopy unit at [phone]   from 7am until 3 pm.  [facility], EMERGENCY ROOM PHONE NUMBER:   [phone]  IF A COMPLICATION OR EMERGENCY SITUATION ARISES AND YOU ARE UNABLE TO REACH   YOUR PHYSICIAN - GO DIRECTLY TO THE EMERGENCY ROOM.  I have read or have had read to me these discharge instructions for my   procedure and have received a written copy.  I understand these   instructions and will follow-up with my physician if I have any questions.     __________________________________       _____________________________________  Nurse Signature                                          Patient/Designated   Responsible Party Signature  Ten Valentine MD  8/7/2018 3:38:09 PM  This report has been verified and signed electronically.  [employer]

## 2018-08-07 NOTE — PLAN OF CARE
Dr Valentine came to bedside and discussed findings. NO N/V,  no abdominal pain, no GI bleeding, and vitals stable.  Pt discharged from unit.

## 2018-08-07 NOTE — ANESTHESIA PREPROCEDURE EVALUATION
08/07/2018  Laron Kothari Jr. is a 48 y.o., male.    Pre-op Assessment    I have reviewed the Patient Summary Reports.     I have reviewed the Nursing Notes.   I have reviewed the Medications.     Review of Systems  Anesthesia Hx:  No problems with previous Anesthesia  History of prior surgery of interest to airway management or planning: Denies Family Hx of Anesthesia complications.   Denies Personal Hx of Anesthesia complications.   Hematology/Oncology:     Oncology Normal     EENT/Dental:EENT/Dental Normal   Cardiovascular:   Hypertension    Pulmonary:  Pulmonary Normal    Renal/:  Renal/ Normal     Hepatic/GI:   Bowel Prep.    Endocrine:   Diabetes, type 2    Psych:  Psychiatric Normal           Physical Exam  General:  Well nourished    Airway/Jaw/Neck:  Airway Findings: Mouth Opening: Normal Tongue: Normal  General Airway Assessment: Adult  Mallampati: II  Improves to II with phonation.  TM Distance: Normal, at least 6 cm       Chest/Lungs:  Chest/Lungs Findings: Normal Respiratory Rate     Heart/Vascular:  Heart Findings: Rate: Normal        Mental Status:  Mental Status Findings:  Cooperative, Alert and Oriented         Anesthesia Plan  Type of Anesthesia, risks & benefits discussed:  Anesthesia Type:  MAC  Patient's Preference:   Intra-op Monitoring Plan: standard ASA monitors  Intra-op Monitoring Plan Comments:   Post Op Pain Control Plan:   Post Op Pain Control Plan Comments:   Induction:   IV  Beta Blocker:  Patient is not currently on a Beta-Blocker (No further documentation required).       Informed Consent: Patient understands risks and agrees with Anesthesia plan.  Questions answered. Anesthesia consent signed with patient.  ASA Score: 2     Day of Surgery Review of History & Physical:  There are no significant changes.

## 2018-08-07 NOTE — ANESTHESIA POSTPROCEDURE EVALUATION
"Anesthesia Post Evaluation    Patient: Laron Kothari Jr.    Procedure(s) Performed: Procedure(s) (LRB):  COLONOSCOPY (N/A)    Final Anesthesia Type: MAC  Patient location during evaluation: GI PACU  Patient participation: Yes- Able to Participate  Level of consciousness: awake and alert and oriented  Post-procedure vital signs: reviewed and stable  Pain management: adequate  Airway patency: patent  PONV status at discharge: No PONV  Anesthetic complications: no      Cardiovascular status: hemodynamically stable and blood pressure returned to baseline  Respiratory status: unassisted, spontaneous ventilation and room air  Hydration status: euvolemic  Follow-up not needed.        Visit Vitals  /82 (BP Location: Left arm, Patient Position: Lying)   Pulse 84   Temp 36.5 °C (97.7 °F) (Oral)   Resp 18   Ht 6' 2" (1.88 m)   Wt 115.2 kg (254 lb)   SpO2 98%   BMI 32.61 kg/m²       Pain/Ely Score: No Data Recorded      "

## 2018-08-07 NOTE — ANESTHESIA RELEASE NOTE
"Anesthesia Release from PACU Note    Patient: Laron Kothari Jr.    Procedure(s) Performed: Procedure(s) (LRB):  COLONOSCOPY (N/A)    Anesthesia type: MAC    Post pain: Adequate analgesia    Post assessment: no apparent anesthetic complications, tolerated procedure well and no evidence of recall    Last Vitals:   Visit Vitals  /82 (BP Location: Left arm, Patient Position: Lying)   Pulse 84   Temp 36.5 °C (97.7 °F) (Oral)   Resp 18   Ht 6' 2" (1.88 m)   Wt 115.2 kg (254 lb)   SpO2 98%   BMI 32.61 kg/m²       Post vital signs: stable    Level of consciousness: awake, alert  and oriented    Nausea/Vomiting: no nausea/no vomiting    Complications: none    Airway Patency: patent    Respiratory: unassisted, spontaneous ventilation, room air    Cardiovascular: stable and blood pressure at baseline    Hydration: euvolemic  "

## 2018-08-08 VITALS
HEIGHT: 74 IN | HEART RATE: 76 BPM | OXYGEN SATURATION: 99 % | DIASTOLIC BLOOD PRESSURE: 77 MMHG | WEIGHT: 254 LBS | TEMPERATURE: 99 F | SYSTOLIC BLOOD PRESSURE: 123 MMHG | BODY MASS INDEX: 32.6 KG/M2 | RESPIRATION RATE: 18 BRPM

## 2018-10-10 NOTE — TELEPHONE ENCOUNTER
Pharmacy tech states patient needs new prescriptions on the following medications: glipiZIDE 5 mg tablet, lisinopril 40 mg tablet, metFORMIN 1,000 MG tablet and pravastatin 80 mg tablet   Medical Necessity Information: It is in your best interest to select a reason for this procedure from the list below. All of these items fulfill various CMS LCD requirements except the new and changing color options. Render Post-Care Instructions In Note?: no Anesthesia Volume In Cc: 2 Medical Necessity Clause: This procedure was medically necessary, Consent: The patient's consent was obtained including but not limited to risks of crusting, scabbing, blistering, scarring, darker or lighter pigmentary change, recurrence, incomplete removal and infection. Detail Level: Simple Post-Care Instructions: I reviewed with the patient in detail post-care instructions. Patient is to wear sunprotection, and avoid picking at any of the treated lesions. Pt may apply Vaseline to crusted or scabbing areas.wash off in two hours Total Number Of Lesions Treated: 3

## 2019-02-01 DIAGNOSIS — R80.9 DIABETES MELLITUS WITH MICROALBUMINURIA: ICD-10-CM

## 2019-02-01 DIAGNOSIS — E11.29 DIABETES MELLITUS WITH MICROALBUMINURIA: ICD-10-CM

## 2019-02-01 DIAGNOSIS — E11.40 TYPE 2 DIABETES MELLITUS WITH DIABETIC NEUROPATHY, WITHOUT LONG-TERM CURRENT USE OF INSULIN: ICD-10-CM

## 2019-02-01 DIAGNOSIS — I10 HYPERTENSION GOAL BP (BLOOD PRESSURE) < 130/80: ICD-10-CM

## 2019-02-01 RX ORDER — METFORMIN HYDROCHLORIDE 1000 MG/1
1000 TABLET ORAL 2 TIMES DAILY WITH MEALS
Qty: 60 TABLET | Refills: 1 | Status: SHIPPED | OUTPATIENT
Start: 2019-02-01 | End: 2019-04-09 | Stop reason: SDUPTHER

## 2019-02-01 RX ORDER — GLIPIZIDE 5 MG/1
TABLET ORAL
Qty: 30 TABLET | Refills: 1 | Status: SHIPPED | OUTPATIENT
Start: 2019-02-01 | End: 2019-04-09 | Stop reason: SDUPTHER

## 2019-02-01 RX ORDER — LISINOPRIL 40 MG/1
40 TABLET ORAL DAILY
Qty: 30 TABLET | Refills: 1 | Status: SHIPPED | OUTPATIENT
Start: 2019-02-01 | End: 2019-04-09 | Stop reason: SDUPTHER

## 2019-02-01 NOTE — TELEPHONE ENCOUNTER
----- Message from Samia Bean sent at 2/1/2019 10:18 AM CST -----  1. What is the name of the medication you are requesting? Metformin  2. What is the dose? Not sure  3. How do you take the medication? Orally, topically, etc? orally  4. How often do you take this medication? Takes twice daily  5. Do you need a 30 day or 90 day supply? 30  6. How many refills are you requesting? 1  7. What is your preferred pharmacy and location of the pharmacy? [facility] in [place], [place]  8. Who can we contact with further questions? [phone]

## 2019-04-09 DIAGNOSIS — E11.40 TYPE 2 DIABETES, CONTROLLED, WITH NEUROPATHY: ICD-10-CM

## 2019-04-09 DIAGNOSIS — R80.9 TYPE 2 DIABETES MELLITUS WITH MICROALBUMINURIA, WITHOUT LONG-TERM CURRENT USE OF INSULIN: ICD-10-CM

## 2019-04-09 DIAGNOSIS — R80.9 DIABETES MELLITUS WITH MICROALBUMINURIA: ICD-10-CM

## 2019-04-09 DIAGNOSIS — E11.40 TYPE 2 DIABETES MELLITUS WITH DIABETIC NEUROPATHY, WITHOUT LONG-TERM CURRENT USE OF INSULIN: Chronic | ICD-10-CM

## 2019-04-09 DIAGNOSIS — I10 HYPERTENSION GOAL BP (BLOOD PRESSURE) < 130/80: ICD-10-CM

## 2019-04-09 DIAGNOSIS — E78.1 HYPERTRIGLYCERIDEMIA: ICD-10-CM

## 2019-04-09 DIAGNOSIS — E11.29 TYPE 2 DIABETES MELLITUS WITH MICROALBUMINURIA, WITHOUT LONG-TERM CURRENT USE OF INSULIN: ICD-10-CM

## 2019-04-09 DIAGNOSIS — E11.29 DIABETES MELLITUS WITH MICROALBUMINURIA: ICD-10-CM

## 2019-04-09 RX ORDER — PRAVASTATIN SODIUM 80 MG/1
80 TABLET ORAL DAILY
Qty: 30 TABLET | Refills: 0 | Status: SHIPPED | OUTPATIENT
Start: 2019-04-09 | End: 2019-11-22 | Stop reason: SDUPTHER

## 2019-04-09 RX ORDER — METFORMIN HYDROCHLORIDE 1000 MG/1
1000 TABLET ORAL 2 TIMES DAILY WITH MEALS
Qty: 60 TABLET | Refills: 0 | Status: SHIPPED | OUTPATIENT
Start: 2019-04-09 | End: 2019-11-22 | Stop reason: SDUPTHER

## 2019-04-09 RX ORDER — GLIPIZIDE 5 MG/1
TABLET ORAL
Qty: 30 TABLET | Refills: 0 | Status: SHIPPED | OUTPATIENT
Start: 2019-04-09 | End: 2019-11-22 | Stop reason: SDUPTHER

## 2019-04-09 RX ORDER — LISINOPRIL 40 MG/1
40 TABLET ORAL DAILY
Qty: 30 TABLET | Refills: 0 | Status: SHIPPED | OUTPATIENT
Start: 2019-04-09 | End: 2019-11-22 | Stop reason: SDUPTHER

## 2019-04-09 RX ORDER — PREGABALIN 100 MG/1
100 CAPSULE ORAL 2 TIMES DAILY
Qty: 60 CAPSULE | Refills: 0 | Status: SHIPPED | OUTPATIENT
Start: 2019-04-09 | End: 2019-11-29

## 2019-09-24 ENCOUNTER — PATIENT OUTREACH (OUTPATIENT)
Dept: ADMINISTRATIVE | Facility: HOSPITAL | Age: 49
End: 2019-09-24

## 2019-11-22 DIAGNOSIS — R80.9 DIABETES MELLITUS WITH MICROALBUMINURIA: ICD-10-CM

## 2019-11-22 DIAGNOSIS — E11.29 DIABETES MELLITUS WITH MICROALBUMINURIA: ICD-10-CM

## 2019-11-22 DIAGNOSIS — I10 HYPERTENSION GOAL BP (BLOOD PRESSURE) < 130/80: ICD-10-CM

## 2019-11-22 DIAGNOSIS — E11.40 TYPE 2 DIABETES MELLITUS WITH DIABETIC NEUROPATHY, WITHOUT LONG-TERM CURRENT USE OF INSULIN: Chronic | ICD-10-CM

## 2019-11-22 DIAGNOSIS — E78.1 HYPERTRIGLYCERIDEMIA: ICD-10-CM

## 2019-11-22 NOTE — TELEPHONE ENCOUNTER
----- Message from Jose Hurtado sent at 11/22/2019 10:41 AM CST -----  Contact: pt   .Type:  RX Refill Request    Who Called: pt   Refill or New Rx: refill   RX Name and Strength: glipiZIDE (GLUCOTROL) pravastatin (PRAVACHOL) 80 MG tablet,,,5 MG tablet,,,lisinopril (PRINIVIL,ZESTRIL) 40 MG tablet,,,metFORMIN (GLUCOPHAGE) 1000 MG tablet  How is the patient currently taking it? (ex. 1XDay): 1 x day   Is this a 30 day or 90 day RX: 30 day 60 day for metformin   [email or web address]   Local or Mail Order: local   Ordering Provider: jerrell   Would the patient rather a call back or a response via [facility]?  Callback   Best Call Back Number: . .[phone] (home)    Additional Information:          .  [facility] [address] [place] [id number]  Phone: [phone] Fax: [phone]

## 2019-11-25 RX ORDER — METFORMIN HYDROCHLORIDE 1000 MG/1
1000 TABLET ORAL 2 TIMES DAILY WITH MEALS
Qty: 60 TABLET | Refills: 0 | Status: SHIPPED | OUTPATIENT
Start: 2019-11-25 | End: 2020-02-07

## 2019-11-25 RX ORDER — PRAVASTATIN SODIUM 80 MG/1
80 TABLET ORAL DAILY
Qty: 30 TABLET | Refills: 0 | Status: SHIPPED | OUTPATIENT
Start: 2019-11-25 | End: 2020-02-03

## 2019-11-25 RX ORDER — GLIPIZIDE 5 MG/1
TABLET ORAL
Qty: 30 TABLET | Refills: 0 | Status: SHIPPED | OUTPATIENT
Start: 2019-11-25 | End: 2020-03-09

## 2019-11-25 RX ORDER — LISINOPRIL 40 MG/1
40 TABLET ORAL DAILY
Qty: 30 TABLET | Refills: 0 | Status: SHIPPED | OUTPATIENT
Start: 2019-11-25 | End: 2020-03-09

## 2019-11-29 ENCOUNTER — LAB VISIT (OUTPATIENT)
Dept: LAB | Facility: HOSPITAL | Age: 49
End: 2019-11-29
Attending: INTERNAL MEDICINE
Payer: COMMERCIAL

## 2019-11-29 ENCOUNTER — OFFICE VISIT (OUTPATIENT)
Dept: INTERNAL MEDICINE | Facility: CLINIC | Age: 49
End: 2019-11-29
Payer: COMMERCIAL

## 2019-11-29 VITALS
HEART RATE: 97 BPM | TEMPERATURE: 97 F | BODY MASS INDEX: 30.79 KG/M2 | SYSTOLIC BLOOD PRESSURE: 134 MMHG | DIASTOLIC BLOOD PRESSURE: 80 MMHG | WEIGHT: 239.88 LBS | OXYGEN SATURATION: 96 % | HEIGHT: 74 IN

## 2019-11-29 DIAGNOSIS — E11.40 TYPE 2 DIABETES MELLITUS WITH DIABETIC NEUROPATHY, WITHOUT LONG-TERM CURRENT USE OF INSULIN: Chronic | ICD-10-CM

## 2019-11-29 DIAGNOSIS — Z23 IMMUNIZATION DUE: ICD-10-CM

## 2019-11-29 DIAGNOSIS — I10 HYPERTENSION GOAL BP (BLOOD PRESSURE) < 130/80: Chronic | ICD-10-CM

## 2019-11-29 DIAGNOSIS — E78.5 HYPERLIPIDEMIA LDL GOAL <70: Chronic | ICD-10-CM

## 2019-11-29 DIAGNOSIS — F17.200 TOBACCO USE DISORDER: ICD-10-CM

## 2019-11-29 DIAGNOSIS — Z00.00 ANNUAL PHYSICAL EXAM: Primary | ICD-10-CM

## 2019-11-29 DIAGNOSIS — E11.29 DIABETES MELLITUS WITH MICROALBUMINURIA: Chronic | ICD-10-CM

## 2019-11-29 DIAGNOSIS — R80.9 DIABETES MELLITUS WITH MICROALBUMINURIA: Chronic | ICD-10-CM

## 2019-11-29 LAB
ALBUMIN SERPL BCP-MCNC: 3.6 G/DL (ref 3.5–5.2)
ALP SERPL-CCNC: 180 U/L (ref 55–135)
ALT SERPL W/O P-5'-P-CCNC: 127 U/L (ref 10–44)
ANION GAP SERPL CALC-SCNC: 8 MMOL/L (ref 8–16)
AST SERPL-CCNC: 131 U/L (ref 10–40)
BILIRUB SERPL-MCNC: 1.8 MG/DL (ref 0.1–1)
BUN SERPL-MCNC: 13 MG/DL (ref 6–20)
CALCIUM SERPL-MCNC: 10.4 MG/DL (ref 8.7–10.5)
CHLORIDE SERPL-SCNC: 103 MMOL/L (ref 95–110)
CHOLEST SERPL-MCNC: 226 MG/DL (ref 120–199)
CHOLEST/HDLC SERPL: 6.8 {RATIO} (ref 2–5)
CO2 SERPL-SCNC: 27 MMOL/L (ref 23–29)
CREAT SERPL-MCNC: 1.2 MG/DL (ref 0.5–1.4)
EST. GFR  (AFRICAN AMERICAN): >60 ML/MIN/1.73 M^2
EST. GFR  (NON AFRICAN AMERICAN): >60 ML/MIN/1.73 M^2
ESTIMATED AVG GLUCOSE: 212 MG/DL (ref 68–131)
GLUCOSE SERPL-MCNC: 202 MG/DL (ref 70–110)
HBA1C MFR BLD HPLC: 9 % (ref 4–5.6)
HDLC SERPL-MCNC: 33 MG/DL (ref 40–75)
HDLC SERPL: 14.6 % (ref 20–50)
LDLC SERPL CALC-MCNC: 121.8 MG/DL (ref 63–159)
NONHDLC SERPL-MCNC: 193 MG/DL
POTASSIUM SERPL-SCNC: 4.2 MMOL/L (ref 3.5–5.1)
PROT SERPL-MCNC: 7.7 G/DL (ref 6–8.4)
SODIUM SERPL-SCNC: 138 MMOL/L (ref 136–145)
TRIGL SERPL-MCNC: 356 MG/DL (ref 30–150)

## 2019-11-29 PROCEDURE — 99396 PR PREVENTIVE VISIT,EST,40-64: ICD-10-PCS | Mod: 25,S$GLB,, | Performed by: INTERNAL MEDICINE

## 2019-11-29 PROCEDURE — 90471 IMMUNIZATION ADMIN: CPT | Mod: S$GLB,,, | Performed by: INTERNAL MEDICINE

## 2019-11-29 PROCEDURE — 90686 IIV4 VACC NO PRSV 0.5 ML IM: CPT | Mod: S$GLB,,, | Performed by: INTERNAL MEDICINE

## 2019-11-29 PROCEDURE — 3079F DIAST BP 80-89 MM HG: CPT | Mod: CPTII,S$GLB,, | Performed by: INTERNAL MEDICINE

## 2019-11-29 PROCEDURE — 3075F PR MOST RECENT SYSTOLIC BLOOD PRESS GE 130-139MM HG: ICD-10-PCS | Mod: CPTII,S$GLB,, | Performed by: INTERNAL MEDICINE

## 2019-11-29 PROCEDURE — 80061 LIPID PANEL: CPT

## 2019-11-29 PROCEDURE — 99999 PR PBB SHADOW E&M-EST. PATIENT-LVL III: ICD-10-PCS | Mod: PBBFAC,,, | Performed by: INTERNAL MEDICINE

## 2019-11-29 PROCEDURE — 3079F PR MOST RECENT DIASTOLIC BLOOD PRESSURE 80-89 MM HG: ICD-10-PCS | Mod: CPTII,S$GLB,, | Performed by: INTERNAL MEDICINE

## 2019-11-29 PROCEDURE — 83036 HEMOGLOBIN GLYCOSYLATED A1C: CPT

## 2019-11-29 PROCEDURE — 90471 FLU VACCINE (QUAD) GREATER THAN OR EQUAL TO 3YO PRESERVATIVE FREE IM: ICD-10-PCS | Mod: S$GLB,,, | Performed by: INTERNAL MEDICINE

## 2019-11-29 PROCEDURE — 80053 COMPREHEN METABOLIC PANEL: CPT

## 2019-11-29 PROCEDURE — 90686 FLU VACCINE (QUAD) GREATER THAN OR EQUAL TO 3YO PRESERVATIVE FREE IM: ICD-10-PCS | Mod: S$GLB,,, | Performed by: INTERNAL MEDICINE

## 2019-11-29 PROCEDURE — 3075F SYST BP GE 130 - 139MM HG: CPT | Mod: CPTII,S$GLB,, | Performed by: INTERNAL MEDICINE

## 2019-11-29 PROCEDURE — 36415 COLL VENOUS BLD VENIPUNCTURE: CPT

## 2019-11-29 PROCEDURE — 99396 PREV VISIT EST AGE 40-64: CPT | Mod: 25,S$GLB,, | Performed by: INTERNAL MEDICINE

## 2019-11-29 PROCEDURE — 99999 PR PBB SHADOW E&M-EST. PATIENT-LVL III: CPT | Mod: PBBFAC,,, | Performed by: INTERNAL MEDICINE

## 2019-11-29 RX ORDER — AMITRIPTYLINE HYDROCHLORIDE 25 MG/1
25 TABLET, FILM COATED ORAL NIGHTLY PRN
Qty: 30 TABLET | Refills: 2 | Status: SHIPPED | OUTPATIENT
Start: 2019-11-29 | End: 2020-02-04

## 2019-11-29 NOTE — PROGRESS NOTES
Subjective:       Patient ID: Laron Kothari Jr. is a 49 y.o. male.    Chief Complaint: Annual Exam    Laron Kothari Jr.  49 y.o. Black or  male     Patient presents with:  Annual Exam    HPI: Here today for an annual physical.  HTN--he recently started back on his b/p medication after being out for a while. He denies symptoms.   HLD--compliant with pravastatin. He is due for labs.                     CHOL                     153                 03/19/2018                 HDL                      37 (L)              03/19/2018                 LDLCALC                  90.4                03/19/2018                 TRIG                     128                 03/19/2018            Diabetes--he does not check his glucoses. He is taking metformin and glipizide although he had been out for a while.                     HGBA1C                   5.7 (H)             03/19/2018            Microalbuminuria--compliant with lisinopril. He denies taking NSAID's.  Neuropathy--he has tried gabapentin and Lyrica without success. He has symptoms in his feet.   He continues to smoke. He is not ready to quit.     Hemoglobin A1c due on 09/19/2018  Foot Exam due on 12/08/2018  Lipid Panel due on 03/19/2019  Urine Microalbumin due on 03/19/2019  Eye Exam due on 04/27/2019  Low Dose Statin due on 11/29/2020  Colonoscopy due on 08/07/2023  TETANUS VACCINE due on 08/09/2026  Pneumococcal Vaccine (Medium Risk) Completed    Past Medical History:  8/7/2018: Blood in stool  2013: Diabetes mellitus, type 2  Gout  Hyperlipidemia  Hypertension  Neuropathy    Past Surgical History:  APPENDECTOMY  8/7/2018: COLONOSCOPY; N/A      Comment:  Procedure: COLONOSCOPY;  Surgeon: Ten Valentine MD;                 Location: [facility];  Service: Endoscopy;  Laterality:                N/A;  TONSILLECTOMY, ADENOIDECTOMY    Review of patient's family history indicates:  Problem: Diabetes      Relation: Father          Age of Onset: (Not  Specified)  Problem: Prostate cancer      Relation: Father          Age of Onset: (Not Specified)  Problem: Cataracts      Relation: Father          Age of Onset: (Not Specified)      Current Outpatient Medications on File Prior to Visit:  glipiZIDE (GLUCOTROL) 5 MG tablet, TAKE ONE TABLET BY MOUTH ONCE DAILY WITH BREAKFAST, Disp: 30 tablet, Rfl: 0  lisinopril (PRINIVIL,ZESTRIL) 40 MG tablet, Take 1 tablet (40 mg total) by mouth once daily., Disp: 30 tablet, Rfl: 0  metFORMIN (GLUCOPHAGE) 1000 MG tablet, Take 1 tablet (1,000 mg total) by mouth 2 (two) times daily with meals., Disp: 60 tablet, Rfl: 0  pravastatin (PRAVACHOL) 80 MG tablet, Take 1 tablet (80 mg total) by mouth once daily., Disp: 30 tablet, Rfl: 0  blood sugar diagnostic Strp, Once daily glucose testing. (Patient not taking: Reported on 11/29/2019), Disp: 50 each, Rfl: 0  lancets (LANCETS,THIN) Misc, Once daily glucose testing. (Patient not taking: Reported on 11/29/2019), Disp: 50 each, Rfl: 6    Allergies:  Review of patient's allergies indicates:  No Known Allergies              Review of Systems   Constitutional: Negative for fever and unexpected weight change.   Eyes: Positive for visual disturbance.   Respiratory: Negative for cough and shortness of breath.    Cardiovascular: Negative for chest pain and leg swelling.   Gastrointestinal: Negative for abdominal pain.   Genitourinary: Negative for difficulty urinating.   Musculoskeletal: Positive for back pain. Negative for gait problem.   Neurological: Positive for numbness. Negative for dizziness and headaches.   Psychiatric/Behavioral: Positive for sleep disturbance.       Objective:      Physical Exam   Constitutional: He is oriented to person, place, and time. He appears well-developed and well-nourished. No distress.   Eyes: No scleral icterus.   Cardiovascular: Normal rate, regular rhythm and normal heart sounds.   Pulses:       Dorsalis pedis pulses are 2+ on the right side, and 2+ on the  left side.   Pulmonary/Chest: Effort normal and breath sounds normal. No respiratory distress. He has no wheezes. He has no rales.   Abdominal: Soft. Bowel sounds are normal.   Musculoskeletal: He exhibits no edema.   Feet:   Right Foot:   Protective Sensation: 5 sites tested. 5 sites sensed.   Skin Integrity: Positive for callus. Negative for ulcer.   Left Foot:   Protective Sensation: 5 sites tested. 5 sites sensed.   Skin Integrity: Positive for callus. Negative for ulcer.   Neurological: He is alert and oriented to person, place, and time.   Skin: Skin is warm and dry.   Psychiatric: He has a normal mood and affect.   Vitals reviewed.      Assessment:       1. Annual physical exam    2. Hypertension goal BP (blood pressure) < 130/80    3. Hyperlipidemia LDL goal <70    4. Diabetes mellitus with microalbuminuria    5. Type 2 diabetes mellitus with diabetic neuropathy, without long-term current use of insulin    6. Tobacco use disorder    7. Immunization due        Plan:       Laron was seen today for annual exam.    Diagnoses and all orders for this visit:    Annual physical exam  -     Counseled regarding age appropriate screenings and immunizations  -     Counseled regarding lifestyle modifications    Hypertension goal BP (blood pressure) < 130/80  -     Continue lisinopril    Hyperlipidemia LDL goal <70  -     Check lipid panel    Diabetes mellitus with microalbuminuria  -     CMP, A1C and Microalbumin/creatinine urine ratio; Standing    Type 2 diabetes mellitus with diabetic neuropathy, without long-term current use of insulin  -     Check CMP, A1C and lipid panel  -     amitriptyline (ELAVIL) 25 MG tablet; Take 1 tablet (25 mg total) by mouth nightly as needed for Pain. Discussed medication risks and benefits.     Tobacco use disorder  -     Recommend cessation    Immunization due  -     Influenza - Quadrivalent (PF)    Labs today.

## 2019-12-06 ENCOUNTER — TELEPHONE (OUTPATIENT)
Dept: INTERNAL MEDICINE | Facility: CLINIC | Age: 49
End: 2019-12-06

## 2019-12-06 DIAGNOSIS — R79.89 ELEVATED LFTS: Primary | ICD-10-CM

## 2019-12-19 ENCOUNTER — TELEPHONE (OUTPATIENT)
Dept: RADIOLOGY | Facility: HOSPITAL | Age: 49
End: 2019-12-19

## 2019-12-19 ENCOUNTER — PATIENT OUTREACH (OUTPATIENT)
Dept: ADMINISTRATIVE | Facility: HOSPITAL | Age: 49
End: 2019-12-19

## 2019-12-19 NOTE — PROGRESS NOTES
Diabetes uncontrolled report. Pt compliant with labs and dr visit. Seen end of Nov. A1C uncontrolled.

## 2019-12-20 ENCOUNTER — HOSPITAL ENCOUNTER (OUTPATIENT)
Dept: RADIOLOGY | Facility: HOSPITAL | Age: 49
Discharge: HOME | End: 2019-12-20
Attending: INTERNAL MEDICINE
Payer: COMMERCIAL

## 2019-12-20 DIAGNOSIS — R79.89 ELEVATED LFTS: ICD-10-CM

## 2019-12-20 PROCEDURE — 76700 US EXAM ABDOM COMPLETE: CPT | Mod: 26,,, | Performed by: RADIOLOGY

## 2019-12-20 PROCEDURE — 76700 US EXAM ABDOM COMPLETE: CPT | Mod: TC

## 2019-12-20 PROCEDURE — 76700 US ABDOMEN COMPLETE: ICD-10-PCS | Mod: 26,,, | Performed by: RADIOLOGY

## 2019-12-24 ENCOUNTER — PATIENT MESSAGE (OUTPATIENT)
Dept: INTERNAL MEDICINE | Facility: CLINIC | Age: 49
End: 2019-12-24

## 2019-12-27 ENCOUNTER — TELEPHONE (OUTPATIENT)
Dept: INTERNAL MEDICINE | Facility: CLINIC | Age: 49
End: 2019-12-27

## 2019-12-27 DIAGNOSIS — R93.5 ABNORMAL ABDOMINAL ULTRASOUND: ICD-10-CM

## 2019-12-27 DIAGNOSIS — R79.89 ELEVATED LFTS: Primary | ICD-10-CM

## 2019-12-31 ENCOUNTER — TELEPHONE (OUTPATIENT)
Dept: INTERNAL MEDICINE | Facility: CLINIC | Age: 49
End: 2019-12-31

## 2019-12-31 NOTE — TELEPHONE ENCOUNTER
----- Message from Nena Mendez sent at 12/31/2019  8:42 AM CST -----  Contact: Self  Patient requesting a call back regarding appointment with GI. Patient is unaware of what's going on. Please call patient back at [phone]

## 2020-01-03 ENCOUNTER — OFFICE VISIT (OUTPATIENT)
Dept: OPHTHALMOLOGY | Facility: CLINIC | Age: 50
End: 2020-01-03
Payer: COMMERCIAL

## 2020-01-03 DIAGNOSIS — E11.9 DIABETES MELLITUS TYPE 2 WITHOUT RETINOPATHY: Primary | ICD-10-CM

## 2020-01-03 DIAGNOSIS — I10 HYPERTENSION GOAL BP (BLOOD PRESSURE) < 130/80: Chronic | ICD-10-CM

## 2020-01-03 DIAGNOSIS — H52.4 BILATERAL PRESBYOPIA: ICD-10-CM

## 2020-01-03 PROCEDURE — 99999 PR PBB SHADOW E&M-EST. PATIENT-LVL I: ICD-10-PCS | Mod: PBBFAC,,, | Performed by: OPTOMETRIST

## 2020-01-03 PROCEDURE — 92015 DETERMINE REFRACTIVE STATE: CPT | Mod: S$GLB,,, | Performed by: OPTOMETRIST

## 2020-01-03 PROCEDURE — 92014 COMPRE OPH EXAM EST PT 1/>: CPT | Mod: S$GLB,,, | Performed by: OPTOMETRIST

## 2020-01-03 PROCEDURE — 92015 PR REFRACTION: ICD-10-PCS | Mod: S$GLB,,, | Performed by: OPTOMETRIST

## 2020-01-03 PROCEDURE — 92014 PR EYE EXAM, EST PATIENT,COMPREHESV: ICD-10-PCS | Mod: S$GLB,,, | Performed by: OPTOMETRIST

## 2020-01-03 PROCEDURE — 99999 PR PBB SHADOW E&M-EST. PATIENT-LVL I: CPT | Mod: PBBFAC,,, | Performed by: OPTOMETRIST

## 2020-01-03 NOTE — PROGRESS NOTES
HPI     NIDDM exam.  Decrease night visual acuity.  Hard to read small print. Patient wears OTC readers.  Last eye exam 04/247/2018 TRF.  Last A1c 9.0 11/29/2019.    Last edited by Therese Reyes on 1/3/2020  3:45 PM. (History)            Assessment /Plan     For exam results, see Encounter Report.    Diabetes mellitus type 2 without retinopathy    Hypertension goal BP (blood pressure) < 130/80    Bilateral presbyopia      No Background Diabetic Retinopathy    No HTN Retinopathy    Dispense Final Rx for glasses or may use OTC glasses.  RTC 1 year  Discussed above and answered questions.

## 2020-01-06 ENCOUNTER — TELEPHONE (OUTPATIENT)
Dept: INTERNAL MEDICINE | Facility: CLINIC | Age: 50
End: 2020-01-06

## 2020-01-06 ENCOUNTER — PATIENT MESSAGE (OUTPATIENT)
Dept: GASTROENTEROLOGY | Facility: CLINIC | Age: 50
End: 2020-01-06

## 2020-01-06 ENCOUNTER — OFFICE VISIT (OUTPATIENT)
Dept: GASTROENTEROLOGY | Facility: CLINIC | Age: 50
End: 2020-01-06
Payer: COMMERCIAL

## 2020-01-06 ENCOUNTER — LAB VISIT (OUTPATIENT)
Dept: LAB | Facility: HOSPITAL | Age: 50
End: 2020-01-06
Attending: INTERNAL MEDICINE
Payer: COMMERCIAL

## 2020-01-06 VITALS
BODY MASS INDEX: 31.66 KG/M2 | DIASTOLIC BLOOD PRESSURE: 90 MMHG | HEIGHT: 74 IN | HEART RATE: 104 BPM | SYSTOLIC BLOOD PRESSURE: 158 MMHG | WEIGHT: 246.69 LBS

## 2020-01-06 DIAGNOSIS — R79.89 LIVER FUNCTION TEST ABNORMALITY: ICD-10-CM

## 2020-01-06 DIAGNOSIS — R74.01 NONSPECIFIC ELEVATION OF LEVELS OF TRANSAMINASE OR LACTIC ACID DEHYDROGENASE (LDH): ICD-10-CM

## 2020-01-06 DIAGNOSIS — K75.81 NASH (NONALCOHOLIC STEATOHEPATITIS): Primary | ICD-10-CM

## 2020-01-06 DIAGNOSIS — R74.02 NONSPECIFIC ELEVATION OF LEVELS OF TRANSAMINASE OR LACTIC ACID DEHYDROGENASE (LDH): ICD-10-CM

## 2020-01-06 DIAGNOSIS — I99.8 POORLY CONTROLLED BLOOD PRESSURE: ICD-10-CM

## 2020-01-06 DIAGNOSIS — K81.9 CHOLECYSTITIS: ICD-10-CM

## 2020-01-06 DIAGNOSIS — K70.9 ALCOHOLIC LIVER DAMAGE: ICD-10-CM

## 2020-01-06 DIAGNOSIS — I10 HYPERTENSION GOAL BP (BLOOD PRESSURE) < 130/80: Primary | ICD-10-CM

## 2020-01-06 DIAGNOSIS — K75.81 NASH (NONALCOHOLIC STEATOHEPATITIS): ICD-10-CM

## 2020-01-06 DIAGNOSIS — R10.11 RIGHT UPPER QUADRANT ABDOMINAL PAIN: ICD-10-CM

## 2020-01-06 LAB
ALBUMIN SERPL BCP-MCNC: 3.7 G/DL (ref 3.5–5.2)
ALP SERPL-CCNC: 85 U/L (ref 55–135)
ALT SERPL W/O P-5'-P-CCNC: 25 U/L (ref 10–44)
ANION GAP SERPL CALC-SCNC: 9 MMOL/L (ref 8–16)
AST SERPL-CCNC: 17 U/L (ref 10–40)
BASOPHILS # BLD AUTO: 0.11 K/UL (ref 0–0.2)
BASOPHILS NFR BLD: 0.9 % (ref 0–1.9)
BILIRUB DIRECT SERPL-MCNC: 0.3 MG/DL (ref 0.1–0.3)
BILIRUB SERPL-MCNC: 0.5 MG/DL (ref 0.1–1)
BUN SERPL-MCNC: 12 MG/DL (ref 6–20)
CALCIUM SERPL-MCNC: 10 MG/DL (ref 8.7–10.5)
CHLORIDE SERPL-SCNC: 104 MMOL/L (ref 95–110)
CO2 SERPL-SCNC: 26 MMOL/L (ref 23–29)
CREAT SERPL-MCNC: 1 MG/DL (ref 0.5–1.4)
DIFFERENTIAL METHOD: ABNORMAL
EOSINOPHIL # BLD AUTO: 0.7 K/UL (ref 0–0.5)
EOSINOPHIL NFR BLD: 5.4 % (ref 0–8)
ERYTHROCYTE [DISTWIDTH] IN BLOOD BY AUTOMATED COUNT: 12.5 % (ref 11.5–14.5)
EST. GFR  (AFRICAN AMERICAN): >60 ML/MIN/1.73 M^2
EST. GFR  (NON AFRICAN AMERICAN): >60 ML/MIN/1.73 M^2
GLUCOSE SERPL-MCNC: 257 MG/DL (ref 70–110)
HCT VFR BLD AUTO: 48.4 % (ref 40–54)
HGB BLD-MCNC: 15.7 G/DL (ref 14–18)
IMM GRANULOCYTES # BLD AUTO: 0.09 K/UL (ref 0–0.04)
IMM GRANULOCYTES NFR BLD AUTO: 0.7 % (ref 0–0.5)
INR PPP: 0.9 (ref 0.8–1.2)
LYMPHOCYTES # BLD AUTO: 4.4 K/UL (ref 1–4.8)
LYMPHOCYTES NFR BLD: 34.7 % (ref 18–48)
MCH RBC QN AUTO: 32.4 PG (ref 27–31)
MCHC RBC AUTO-ENTMCNC: 32.4 G/DL (ref 32–36)
MCV RBC AUTO: 100 FL (ref 82–98)
MONOCYTES # BLD AUTO: 0.7 K/UL (ref 0.3–1)
MONOCYTES NFR BLD: 5.3 % (ref 4–15)
NEUTROPHILS # BLD AUTO: 6.8 K/UL (ref 1.8–7.7)
NEUTROPHILS NFR BLD: 53 % (ref 38–73)
NRBC BLD-RTO: 0 /100 WBC
PLATELET # BLD AUTO: 273 K/UL (ref 150–350)
PMV BLD AUTO: 11.2 FL (ref 9.2–12.9)
POTASSIUM SERPL-SCNC: 4.3 MMOL/L (ref 3.5–5.1)
PROT SERPL-MCNC: 7.9 G/DL (ref 6–8.4)
PROTHROMBIN TIME: 9.7 SEC (ref 9–12.5)
RBC # BLD AUTO: 4.84 M/UL (ref 4.6–6.2)
SODIUM SERPL-SCNC: 139 MMOL/L (ref 136–145)
WBC # BLD AUTO: 12.8 K/UL (ref 3.9–12.7)

## 2020-01-06 PROCEDURE — 99244 PR OFFICE CONSULTATION,LEVEL IV: ICD-10-PCS | Mod: S$GLB,,, | Performed by: INTERNAL MEDICINE

## 2020-01-06 PROCEDURE — 99999 PR PBB SHADOW E&M-EST. PATIENT-LVL III: ICD-10-PCS | Mod: PBBFAC,,, | Performed by: INTERNAL MEDICINE

## 2020-01-06 PROCEDURE — 99244 OFF/OP CNSLTJ NEW/EST MOD 40: CPT | Mod: S$GLB,,, | Performed by: INTERNAL MEDICINE

## 2020-01-06 PROCEDURE — 86256 FLUORESCENT ANTIBODY TITER: CPT

## 2020-01-06 PROCEDURE — 85025 COMPLETE CBC W/AUTO DIFF WBC: CPT

## 2020-01-06 PROCEDURE — 36415 COLL VENOUS BLD VENIPUNCTURE: CPT

## 2020-01-06 PROCEDURE — 85610 PROTHROMBIN TIME: CPT

## 2020-01-06 PROCEDURE — 80048 BASIC METABOLIC PNL TOTAL CA: CPT

## 2020-01-06 PROCEDURE — 99999 PR PBB SHADOW E&M-EST. PATIENT-LVL III: CPT | Mod: PBBFAC,,, | Performed by: INTERNAL MEDICINE

## 2020-01-06 PROCEDURE — 86235 NUCLEAR ANTIGEN ANTIBODY: CPT | Mod: 91

## 2020-01-06 PROCEDURE — 80076 HEPATIC FUNCTION PANEL: CPT

## 2020-01-06 NOTE — PROGRESS NOTES
[facility]  Gastroenterology Note    Patient referred at the request of:  Kristen Adler,   [address] [place] [address]    Subjective:       Patient ID: Laron Kothari Jr. is a 49 y.o. male.    Chief Complaint: Elevated Hepatic Enzymes (abnormal US)    Liver Dysfunction: Patient complains of an abnormal AST and ALT that has been associated with no obvious evidence for illness. The problem was first noted  several months ago. A positive history was noted for: diabetes and hyperlipidemia, and exposure to ethanol. Patient denies blood transfusion, use of illicit drugs and unprotected  sex, alpha 1 antitrypsin deficiency, autoimmune liver disease, CMV, congenital disorders, cystic fibrosis, EBV (J Carlos Barr Virus), hemolytic anemia, hepatitis A, hepatitis B, hepatitis C,  jaundice, Loki's disease, or exposure to carbamazepine, phenytoin, valproate, street drugs.    Lab Results       Component                Value               Date                       ALT                      85 (H)              2019                 AST                      34                  2019                 ALKPHOS                  177 (H)             2019                 BILITOT                  1.1 (H)             2019                  Alcohol Problem   Pertinent negatives include no agitation or confusion. This is a chronic problem. The current episode started more than 1 year ago. Suspected agents include alcohol. Pertinent negatives include no bladder incontinence, bowel incontinence, fever, injury, nausea or vomiting. Past treatments include nothing. There is no history of addiction treatment, a chronic illness, a mental illness, a recent illness, a recent infection or a withdrawal syndrome.   Abdominal Pain   This is a chronic problem. The current episode started more than 1 month ago. The onset quality is gradual. The problem occurs intermittently. The problem has been waxing  and waning. The pain is located in the RUQ. The pain is at a severity of 6/10. The pain is moderate. The quality of the pain is aching and dull. The abdominal pain does not radiate. Pertinent negatives include no anorexia, arthralgias, belching, constipation, diarrhea, dysuria, fever, flatus, frequency, headaches, hematochezia, hematuria, melena, myalgias, nausea, vomiting or weight loss. The pain is aggravated by eating. The pain is relieved by nothing. He has tried nothing for the symptoms. Prior diagnostic workup includes ultrasound. There is no history of colon cancer or irritable bowel syndrome.     Past Medical History:   Diagnosis Date    Blood in stool 8/7/2018    Diabetes mellitus, type 2 2013    DM (diabetes mellitus) 2013    BS didn't check 01/03/2020    Gout     Hyperlipidemia     Hypertension     Neuropathy      Past Surgical History:   Procedure Laterality Date    APPENDECTOMY      COLONOSCOPY N/A 8/7/2018    Procedure: COLONOSCOPY;  Surgeon: Ten Valentine MD;  Location: [facility];  Service: Endoscopy;  Laterality: N/A;    TONSILLECTOMY, ADENOIDECTOMY       Current Outpatient Medications on File Prior to Visit   Medication Sig Dispense Refill    amitriptyline (ELAVIL) 25 MG tablet Take 1 tablet (25 mg total) by mouth nightly as needed for Pain. 30 tablet 2    glipiZIDE (GLUCOTROL) 5 MG tablet TAKE ONE TABLET BY MOUTH ONCE DAILY WITH BREAKFAST 30 tablet 0    lisinopril (PRINIVIL,ZESTRIL) 40 MG tablet Take 1 tablet (40 mg total) by mouth once daily. 30 tablet 0    metFORMIN (GLUCOPHAGE) 1000 MG tablet Take 1 tablet (1,000 mg total) by mouth 2 (two) times daily with meals. 60 tablet 0    pravastatin (PRAVACHOL) 80 MG tablet Take 1 tablet (80 mg total) by mouth once daily. 30 tablet 0    blood sugar diagnostic Strp Once daily glucose testing. (Patient not taking: Reported on 11/29/2019) 50 each 0    lancets (LANCETS,THIN) Misc Once daily glucose testing. (Patient not taking: Reported on  11/29/2019) 50 each 6     No current facility-administered medications on file prior to visit.      Review of patient's allergies indicates:  No Known Allergies  Social History     Socioeconomic History    Marital status:      Spouse name: Not on file    Number of children: Not on file    Years of education: Not on file    Highest education level: Not on file   Occupational History    Not on file   Social Needs    Financial resource strain: Not on file    Food insecurity:     Worry: Not on file     Inability: Not on file    Transportation needs:     Medical: Not on file     Non-medical: Not on file   Tobacco Use    Smoking status: Current Every Day Smoker     Packs/day: 0.50    Smokeless tobacco: Never Used   Substance and Sexual Activity    Alcohol use: Yes     Alcohol/week: 24.0 standard drinks     Types: 24 Cans of beer per week     Comment: daily    Drug use: No    Sexual activity: Yes     Partners: Female   Lifestyle    Physical activity:     Days per week: Not on file     Minutes per session: Not on file    Stress: Not on file   Relationships    Social connections:     Talks on phone: Not on file     Gets together: Not on file     Attends Mosque service: Not on file     Active member of club or organization: Not on file     Attends meetings of clubs or organizations: Not on file     Relationship status: Not on file   Other Topics Concern    Not on file   Social History Narrative    Not on file     Family History   Problem Relation Age of Onset    Diabetes Father     Prostate cancer Father     Cataracts Father     Hypertension Mother     Hypertension Brother        Review of Systems   Constitutional: Negative for activity change, appetite change, fatigue, fever, unexpected weight change and weight loss.   HENT: Negative for congestion, ear pain, hearing loss, mouth sores, trouble swallowing and voice change.    Eyes: Negative for pain, redness and itching.   Respiratory:  Negative for apnea, cough, choking, chest tightness, shortness of breath and wheezing.    Cardiovascular: Negative for chest pain, palpitations and leg swelling.   Gastrointestinal: Positive for abdominal pain. Negative for abdominal distention, anal bleeding, anorexia, blood in stool, bowel incontinence, constipation, diarrhea, flatus, hematochezia, melena, nausea and vomiting.   Endocrine: Negative for cold intolerance, heat intolerance and polyphagia.   Genitourinary: Negative for bladder incontinence, dysuria, frequency, hematuria and urgency.   Musculoskeletal: Negative for arthralgias, joint swelling, myalgias and neck pain.   Skin: Negative for pallor and rash.   Allergic/Immunologic: Negative for environmental allergies and food allergies.   Neurological: Negative for dizziness, facial asymmetry, light-headedness and headaches.   Hematological: Negative for adenopathy. Does not bruise/bleed easily.   Psychiatric/Behavioral: Negative for agitation, behavioral problems, confusion and decreased concentration. The patient is nervous/anxious.        Objective:      Physical Exam   Constitutional: He is oriented to person, place, and time. He appears well-developed and well-nourished.   HENT:   Head: Normocephalic and atraumatic.   Eyes: Pupils are equal, round, and reactive to light. Conjunctivae are normal.   Neck: Normal range of motion. Neck supple. No tracheal deviation present. No thyromegaly present.   Cardiovascular: Normal rate and regular rhythm.   Pulmonary/Chest: Effort normal and breath sounds normal. He has no wheezes. He has no rales. He exhibits no tenderness.   Abdominal: Soft. Bowel sounds are normal. He exhibits no distension and no mass. There is no tenderness. There is no guarding.   Musculoskeletal: Normal range of motion.   Lymphadenopathy:     He has no cervical adenopathy.   Neurological: He is alert and oriented to person, place, and time. No cranial nerve deficit.   Skin: Skin is warm  and dry.   Psychiatric: He has a normal mood and affect. His behavior is normal.   Nursing note and vitals reviewed.      Assessment:       1. CALVO (nonalcoholic steatohepatitis)    2. Nonspecific elevation of levels of transaminase or lactic acid dehydrogenase (LDH)    3. Cholecystitis    4. Liver function test abnormality    5. Right upper quadrant abdominal pain    6. Alcoholic liver damage    7. Poorly controlled blood pressure        Plan:       CALVO (nonalcoholic steatohepatitis)  -     Protime-INR; Future; Expected date: 01/06/2020  -     Basic metabolic panel; Future; Expected date: 01/06/2020  -     Hepatic function panel; Future; Expected date: 01/06/2020  -     CBC auto differential; Future; Expected date: 01/06/2020    Nonspecific elevation of levels of transaminase or lactic acid dehydrogenase (LDH)  -     Anti-smooth muscle antibody; Future; Expected date: 01/06/2020  -     Antimitochondrial antibody; Future; Expected date: 01/06/2020  -     MRI MRCP Without Contrast; Future; Expected date: 01/06/2020    Cholecystitis  -     Ambulatory consult to General Surgery  -     MRI MRCP Without Contrast; Future; Expected date: 01/06/2020    Liver function test abnormality    Right upper quadrant abdominal pain    Alcoholic liver damage    Poorly controlled blood pressure -- Patient will benefit from digital hypertension program.    Patients with Fatty Liver disease - NAFLD - are at increased risk for cardiovascular disease and often have multiple cardiovascular disease risk factors. Management of patients with NAFLD includes optimization of blood glucose control in patients with diabetes and treatment of hyperlipidemia. Statin therapy has been shown to be safe in patients with NAFLD.  In general, we do not suggest using pharmacologic agents, like Vitamin E or Pioglitazone, solely for the treatment of NAFLD. However, we do suggest vitamin E at a dose of 400 international units/day for the subset of patients with  advanced fibrosis on biopsy who do not have diabetes or coronary artery disease.    The management of Fatty Liver consists of:  -- Gradual Weight loss. A 20% weight reduction translates to improved symptoms.  -- Exercise 4 times a week for 30 minutes will mobilize some of the fat away from your liver and make your insulin more effective.  -- A low carb high protein diet, rich in vegetables and increased water consumption, staying away from carbonated, sugary drinks.   -- Avoid processed foods.   These are the only effective measures to manage fatty liver. If after a year of adhering to these measures and effectively reducing your Body Mass Index plus decreasing the insulin resistance, you continue to have symptoms, we may need to start medications to treat Fatty liver. These medications are used to treat diabetes and work well for insulin resistance. Unfortunately, these medications can have side effects.     We had a lengthy discussion about quitting alcohol and he understands he needs to stop completely. We offered help but he wants to try on his own.     Thanks for letting us participate in the care of your patient.      Stanley Wright M.D.

## 2020-01-06 NOTE — LETTER
January 6, 2020      Kristen Adler DO  [address] [place] [address] [address] [place] [id number]           [facility]  [address] [place] [address] [address] [place] [address]  Phone: [phone]  Fax: [phone]          Patient: Laron Kothari Jr.   MR Number: [id number]   YOB: 1970   Date of Visit: 1/6/2020       Dear Dr. Kristen Adler:    Thank you for referring Laron Kothari to me for evaluation. Attached you will find relevant portions of my assessment and plan of care.    If you have questions, please do not hesitate to call me. I look forward to following Laron Kothari along with you.    Sincerely,    Stanley Wright MD    Enclosure  CC:  No Recipients    If you would like to receive this communication electronically, please contact [email or web address] or [phone] to request more information on [employer] access.    For providers and/or their staff who would like to refer a patient to [facility], please contact us through our one-stop-shop provider referral line, [facility], at [phone].    If you feel you have received this communication in error or would no longer like to receive these types of communications, please e-mail [email or web address]

## 2020-01-06 NOTE — PATIENT INSTRUCTIONS
CALVO (nonalcoholic steatohepatitis)    Nonspecific elevation of levels of transaminase or lactic acid dehydrogenase (LDH)  -     Anti-smooth muscle antibody; Future; Expected date: 01/06/2020  -     Antimitochondrial antibody; Future; Expected date: 01/06/2020  -     MRI MRCP Without Contrast; Future; Expected date: 01/06/2020    Cholecystitis  -     Ambulatory consult to General Surgery  -     MRI MRCP Without Contrast; Future; Expected date: 01/06/2020    Liver function test abnormality    Right upper quadrant abdominal pain    Alcoholic liver damage    Poorly controlled blood pressure      Thanks for trusting us with your healthcare needs and using [facility]. If you want to ask us a question, you can do so by replying to this message or by calling [phone].    Sincerely,    Stanley Wright M.D.      To rate your experience with Dr. Wright please click on the link below:    [email or web address]          Cholecystitis (Presumed)    Your abdominal pain may be due to an inflammation and possible infection in the gallbladder. This is called cholecystitis. The gallbladder is a small sac under the liver, which stores and releases bile. Bile is a fluid that aids in the digestion of fat. Eating fatty food stimulates the gallbladder to contract, and release the bile. A gallstone may form in this sac. Although most people do not have symptoms, when the stone moves and blocks the passage of bile out of the bladder, it can cause pain and even an infection. Bile sludge without a stone can also cause cholecystitis.  To be more certain of the diagnosis, you may need to have an ultrasound, CT-scan or other special test.  A number of things increase the risk of developing gallstones:  · Women are more likely to have the problem  · Obesity  · Increasing age  · Losing or gaining weight quickly  · High calorie diet  · Pregnancy  · Hormone therapy  · Diabetes  The most common symptoms  are:  · Abdominal pain, cramping, aching  · Nausea, vomiting  · Fever  Many illnesses can cause these symptoms. This pain usually starts in the upper right side of your abdomen. Sometimes it can radiate to your right shoulder, back and arm. It usually starts suddenly, becomes more intense quickly, and then gradually decreases and disappears over a couple of hours. Elderly people and diabetics may have difficulty showing where the pain is exactly.  Home care  · Rest in bed and follow a clear liquid diet until the pain, nausea and vomiting go away.  · Antibiotics and other medicine may be prescribed. Take these exactly as directed.  · You can take acetaminophen or ibuprofen for pain, unless you were given a different pain medicine to use. Note: If you have chronic liver or kidney disease or ever had a stomach ulcer or GI bleeding or are taking blood thinner medications, talk with your doctor before using these medicines.  · Fat in your diet makes the gallbladder contract and may cause increased pain. Therefore, avoid fat in your diet over the next two days and follow a low-fat diet thereafter. If you are overweight, a low fat diet will help you lose weight.  Follow-up care  An infection in the gallbladder is a serious problem and must be watched carefully. Keep any appointments made for you to have further testing. See your doctor for another exam in the next 1-2 days, or as directed by our staff. Once cholecystitis has occurred, removal of the gallbladder is usually required to prevent a recurrence. You can discuss this at your follow-up visit.  When to seek medical advice  Call your healthcare provider if any of the following occur:  · Repeated vomiting  · Swelling of the abdomen  · Pain lasts over 6 hours  · Fever of 100.4°F (38ºC) or higher, or as directed by your healthcare provider  · Weakness, dizziness or fainting  · Dark urine or light colored stools  · Yellow color of the skin or eyes  · Chest, arm, back,  neck or jaw pain  Date Last Reviewed: 8/23/2015  © 1908-0703 [employer], [place]. [address] [place] [address], [place] [address]. All rights reserved. This information is not intended as a substitute for professional medical care. Always follow your healthcare professional's instructions.        Nonalcoholic Fatty Liver Disease (NAFLD)  Nonalcoholic fatty liver disease (NAFLD) is a common disease of the liver. It occurs when you have too much fat in the liver. If NAFLD is severe, it can cause liver damage that seems like the damage caused by drinking too much alcohol. But NAFLD is not caused by drinking alcohol. This sheet tells you more about NAFLD and how it can be managed.    How the liver works   The liver is an organ in the upper right side of the belly (abdomen). It has many important jobs. These include:  · Breaking down (metabolizing) proteins, carbohydrates, and fats  · Making a substance called bile that helps break down fats  · Storing and releasing sugar (glucose) into the blood to give the body energy  · Removing toxins from the blood  · Helping with blood clotting  Understanding NAFLD  A healthy liver may contain some fat. But if too much fat builds up in the liver, this causes NAFLD. NAFLD can be mild, causing fatty liver. Or it can be more severe and show inflammation, as well as the fat. This can cause non-alcoholic steatohepatitis (CALVO).  · Fatty liver. With fatty liver, the liver simply has more fat than normal. This extra fat usually does not harm the liver.  · CALVO. With CALVO, the fatty liver becomes inflamed over time. CALVO is serious because it can lead to scarring of the liver (fibrosis). Over time, the scarring may lead to cirrhosis of the liver. This can eventually cause liver failure or liver cancer.  Causes and risk factors of NAFLD  Doctors don't know what causes NAFLD. But certain things make the problem more likely to happen. These include:  · Obesity  · Prediabetes or  diabetes  · High levels of fat found in the blood (cholesterol and triglycerides)  · Being exposed to certain medicines   Symptoms of NAFLD  Most people with NAFLD have no symptoms. If symptoms do occur, they can include:  · Tiredness  · Weakness  · Weight loss  · Loss of appetite  · Nausea and vomiting  · Belly pain and cramping  · Yellowing of the skin and eyes (jaundice), as well as dark urine, or light-colored stools  · Swelling in the belly or legs  Diagnosing NAFLD  Your healthcare provider may think you have NAFLD if routine blood tests show high levels of liver enzymes. This may mean you have a liver problem. You may need one or more imaging tests, such as an ultrasound, CT, or MRI. You may need more blood tests to look for other causes of liver disease. You may also need a liver biopsy. During this test, a hollow needle is used to remove a tiny tissue sample from your liver. This tissue is then checked in a lab. This test can find signs of damage to liver tissue. It can also help figure out the cause of the damage and tell the difference between fatty liver and CALVO.  Treating NAFLD  Treatment for NAFLD varies for each person. The best early treatment is to treat any underlying conditions causing metabolic syndrome. This is the name for a group of conditions that includes:  · High blood pressure  · High levels of cholesterol and triglycerides  · Being overweight or obese  · Diabetes  Your healthcare provider will monitor your health and treat any symptoms or underlying health problems you have. Your provider will also work with you to control your risk factors. This will make liver damage less likely. In fact, treating those underlying conditions can often improve liver disease. You may need to take certain medicines, but no medicine will cure NAFLD. This is why treating the underlying conditions is most important. Your plan may include:  · Losing extra weight  · Getting regular exercise  · Controlling  diabetes and high cholesterol or triglyceride levels  · Taking medicines and vitamins as prescribed by your provider  · Quitting smoking  · Not drinking alcohol  · Eating a healthy and balanced diet  Living with NAFLD  If NAFLD is caught early, it can be managed with treatment. Your healthcare provider will discuss further treatment choices with you as needed.  Be sure to ask your provider about recommended vaccines. These include vaccines for viruses that can cause liver disease.  Date Last Reviewed: 12/1/2016  © 9791-2662 [employer], [place]. [address] [place] [address], [place] [address]. All rights reserved. This information is not intended as a substitute for professional medical care. Always follow your healthcare professional's instructions.

## 2020-01-07 LAB
MITOCHONDRIA AB TITR SER IF: NORMAL {TITER}
SMOOTH MUSCLE AB TITR SER IF: ABNORMAL {TITER}

## 2020-01-07 NOTE — PROGRESS NOTES
Labs have been reviewed. Results are normal/stable. Please let the patient know.    MELD-Na score: 6 at 1/6/2020 10:19 AM  MELD score: 6 at 1/6/2020 10:19 AM  Calculated from:  Serum Creatinine: 1.0 mg/dL at 1/6/2020 10:19 AM  Serum Sodium: 139 mmol/L (Rounded to 137 mmol/L) at 1/6/2020 10:19 AM  Total Bilirubin: 0.5 mg/dL (Rounded to 1 mg/dL) at 1/6/2020 10:19 AM  INR(ratio): 0.9 (Rounded to 1) at 1/6/2020 10:19 AM  Age: 49 years      Stanley Wright M.D.

## 2020-01-07 NOTE — PROGRESS NOTES
Labs have been reviewed. Results are normal/stable. Please let the patient know    Stanley Wright M.D.

## 2020-01-08 ENCOUNTER — PATIENT MESSAGE (OUTPATIENT)
Dept: GASTROENTEROLOGY | Facility: CLINIC | Age: 50
End: 2020-01-08

## 2020-01-08 ENCOUNTER — PATIENT MESSAGE (OUTPATIENT)
Dept: ADMINISTRATIVE | Facility: OTHER | Age: 50
End: 2020-01-08

## 2020-01-10 ENCOUNTER — HOSPITAL ENCOUNTER (OUTPATIENT)
Dept: RADIOLOGY | Facility: HOSPITAL | Age: 50
Discharge: HOME | End: 2020-01-10
Attending: INTERNAL MEDICINE
Payer: COMMERCIAL

## 2020-01-10 DIAGNOSIS — R74.01 NONSPECIFIC ELEVATION OF LEVELS OF TRANSAMINASE OR LACTIC ACID DEHYDROGENASE (LDH): ICD-10-CM

## 2020-01-10 DIAGNOSIS — R74.02 NONSPECIFIC ELEVATION OF LEVELS OF TRANSAMINASE OR LACTIC ACID DEHYDROGENASE (LDH): ICD-10-CM

## 2020-01-10 DIAGNOSIS — K81.9 CHOLECYSTITIS: ICD-10-CM

## 2020-01-10 PROCEDURE — 74181 MRI ABDOMEN W/O CONTRAST: CPT | Mod: TC

## 2020-01-10 PROCEDURE — 76376 3D RENDER W/INTRP POSTPROCES: CPT | Mod: TC

## 2020-01-13 ENCOUNTER — CLINICAL SUPPORT (OUTPATIENT)
Dept: CARDIOLOGY | Facility: CLINIC | Age: 50
End: 2020-01-13
Payer: COMMERCIAL

## 2020-01-13 ENCOUNTER — OFFICE VISIT (OUTPATIENT)
Dept: SURGERY | Facility: CLINIC | Age: 50
End: 2020-01-13
Payer: COMMERCIAL

## 2020-01-13 VITALS
DIASTOLIC BLOOD PRESSURE: 94 MMHG | TEMPERATURE: 99 F | WEIGHT: 249.13 LBS | BODY MASS INDEX: 31.97 KG/M2 | HEIGHT: 74 IN | SYSTOLIC BLOOD PRESSURE: 142 MMHG | HEART RATE: 93 BPM

## 2020-01-13 DIAGNOSIS — F10.10 ALCOHOL ABUSE: ICD-10-CM

## 2020-01-13 DIAGNOSIS — F17.200 TOBACCO USE DISORDER: ICD-10-CM

## 2020-01-13 DIAGNOSIS — I10 ESSENTIAL HYPERTENSION: ICD-10-CM

## 2020-01-13 DIAGNOSIS — I10 ESSENTIAL HYPERTENSION: Primary | ICD-10-CM

## 2020-01-13 DIAGNOSIS — K80.21 CALCULUS OF GALLBLADDER WITH BILIARY OBSTRUCTION BUT WITHOUT CHOLECYSTITIS: ICD-10-CM

## 2020-01-13 PROCEDURE — 93010 EKG 12-LEAD: ICD-10-PCS | Mod: S$GLB,,, | Performed by: INTERNAL MEDICINE

## 2020-01-13 PROCEDURE — 99244 PR OFFICE CONSULTATION,LEVEL IV: ICD-10-PCS | Mod: S$GLB,,, | Performed by: SURGERY

## 2020-01-13 PROCEDURE — 93010 ELECTROCARDIOGRAM REPORT: CPT | Mod: S$GLB,,, | Performed by: INTERNAL MEDICINE

## 2020-01-13 PROCEDURE — 99999 PR PBB SHADOW E&M-EST. PATIENT-LVL IV: CPT | Mod: PBBFAC,,, | Performed by: SURGERY

## 2020-01-13 PROCEDURE — 99244 OFF/OP CNSLTJ NEW/EST MOD 40: CPT | Mod: S$GLB,,, | Performed by: SURGERY

## 2020-01-13 PROCEDURE — 99999 PR PBB SHADOW E&M-EST. PATIENT-LVL IV: ICD-10-PCS | Mod: PBBFAC,,, | Performed by: SURGERY

## 2020-01-13 PROCEDURE — 93005 ELECTROCARDIOGRAM TRACING: CPT | Mod: S$GLB,,, | Performed by: SURGERY

## 2020-01-13 PROCEDURE — 93005 EKG 12-LEAD: ICD-10-PCS | Mod: S$GLB,,, | Performed by: SURGERY

## 2020-01-13 RX ORDER — INDOCYANINE GREEN AND WATER 25 MG
2.5 KIT INJECTION
Status: CANCELLED | OUTPATIENT
Start: 2020-01-21

## 2020-01-13 RX ORDER — LIDOCAINE HYDROCHLORIDE 10 MG/ML
1 INJECTION, SOLUTION EPIDURAL; INFILTRATION; INTRACAUDAL; PERINEURAL ONCE
Status: DISCONTINUED | OUTPATIENT
Start: 2020-01-13 | End: 2020-01-21

## 2020-01-13 NOTE — H&P (VIEW-ONLY)
Patient ID: Laron Kothari Jr. is a 49 y.o. male.      Symptomatic cholelithiasis    Chief Complaint: Consult      HPI:  49-year-old male who was seen by Gastroenterology for abdominal pain.  The abdominal pain it is across the upper abdomen on the right side.  It radiates somewhat to the flank.  He can be a cramping to sharp pain.  It can occur after eating.  A concur spontaneously.  It has woken him from his sleep.    The patient denies any darkening of his urine or changes of his stool.    He underwent a ultrasound that showed cholelithiasis and dilated bowel ducts.  An MRCP did not show any choledocholithiasis.    The patient drinks the equivalent of 48 cans of beer a week.  We discussed the fact that the alcohol in the be areas toxic to his liver in the amounts that he drinks it and that the carbohydrates in the beer are making his diabetes worse.  The patient states he drinks make a Mikal[employer]  ultra light.  I explained to him that the amount of beer that he drinks outweighs the benefits of drinking a lower carbohydrate beer.  He drinks 24 oz cans or bottles of beer as opposed to 12 oz bottles      Review of Systems   Constitutional: Positive for unexpected weight change. Negative for activity change.   HENT: Negative for hearing loss, rhinorrhea and trouble swallowing.    Eyes: Positive for visual disturbance. Negative for discharge.   Respiratory: Positive for chest tightness. Negative for wheezing.    Cardiovascular: Positive for chest pain and palpitations.   Gastrointestinal: Positive for blood in stool and diarrhea. Negative for constipation and vomiting.   Endocrine: Positive for polydipsia and polyuria.   Genitourinary: Positive for urgency. Negative for difficulty urinating and hematuria.   Musculoskeletal: Positive for arthralgias. Negative for joint swelling and neck pain.   Neurological: Positive for weakness and headaches.   Psychiatric/Behavioral: Positive for dysphoric mood. Negative for confusion.        Current Outpatient Medications   Medication Sig Dispense Refill    amitriptyline (ELAVIL) 25 MG tablet Take 1 tablet (25 mg total) by mouth nightly as needed for Pain. 30 tablet 2    blood sugar diagnostic Strp Once daily glucose testing. 50 each 0    glipiZIDE (GLUCOTROL) 5 MG tablet TAKE ONE TABLET BY MOUTH ONCE DAILY WITH BREAKFAST 30 tablet 0    lancets (LANCETS,THIN) Misc Once daily glucose testing. 50 each 6    lisinopril (PRINIVIL,ZESTRIL) 40 MG tablet Take 1 tablet (40 mg total) by mouth once daily. 30 tablet 0    metFORMIN (GLUCOPHAGE) 1000 MG tablet Take 1 tablet (1,000 mg total) by mouth 2 (two) times daily with meals. 60 tablet 0    pravastatin (PRAVACHOL) 80 MG tablet Take 1 tablet (80 mg total) by mouth once daily. 30 tablet 0     Current Facility-Administered Medications   Medication Dose Route Frequency Provider Last Rate Last Dose    lidocaine (PF) 10 mg/ml (1%) injection 10 mg  1 mL Intradermal Once Sebastien Rivera MD           Review of patient's allergies indicates:  No Known Allergies    Past Medical History:   Diagnosis Date    Blood in stool 8/7/2018    Diabetes mellitus, type 2 2013    DM (diabetes mellitus) 2013    BS didn't check 01/03/2020    Gout     Hyperlipidemia     Hypertension     Neuropathy        Past Surgical History:   Procedure Laterality Date    APPENDECTOMY      COLONOSCOPY N/A 8/7/2018    Procedure: COLONOSCOPY;  Surgeon: Ten Valentine MD;  Location: [facility];  Service: Endoscopy;  Laterality: N/A;    TONSILLECTOMY, ADENOIDECTOMY         Family History   Problem Relation Age of Onset    Diabetes Father     Prostate cancer Father     Cataracts Father     Hypertension Mother     Hypertension Brother        Social History     Socioeconomic History    Marital status:      Spouse name: Not on file    Number of children: Not on file    Years of education: Not on file    Highest education level: Not on file   Occupational History    Not  on file   Social Needs    Financial resource strain: Not on file    Food insecurity:     Worry: Not on file     Inability: Not on file    Transportation needs:     Medical: Not on file     Non-medical: Not on file   Tobacco Use    Smoking status: Current Every Day Smoker     Packs/day: 0.50    Smokeless tobacco: Never Used   Substance and Sexual Activity    Alcohol use: Yes     Alcohol/week: 46.0 standard drinks     Types: 46 Cans of beer per week     Frequency: 4 or more times a week     Drinks per session: 1 or 2     Comment: daily    Drug use: No    Sexual activity: Yes     Partners: Female   Lifestyle    Physical activity:     Days per week: Not on file     Minutes per session: Not on file    Stress: Not on file   Relationships    Social connections:     Talks on phone: Not on file     Gets together: Not on file     Attends Religion service: Not on file     Active member of club or organization: Not on file     Attends meetings of clubs or organizations: Not on file     Relationship status: Not on file   Other Topics Concern    Not on file   Social History Narrative    Not on file       Vitals:    01/13/20 1117   BP: (!) 142/94   Pulse: 93   Temp: 98.7 °F (37.1 °C)       Physical Exam   Constitutional: He is oriented to person, place, and time. He appears well-developed and well-nourished. No distress.   HENT:   Head: Normocephalic and atraumatic.   Eyes: Pupils are equal, round, and reactive to light. No scleral icterus.   Neck: Normal range of motion. Neck supple. No JVD present. No tracheal deviation present. No thyromegaly present.   Cardiovascular: Normal rate, regular rhythm and normal heart sounds.   Pulmonary/Chest: Effort normal and breath sounds normal.   Abdominal: Soft. Bowel sounds are normal. He exhibits no distension and no mass. There is tenderness (Mild right upper quadrant.  No Carson sign). There is no rebound and no guarding.   Well-healed appendectomy scar   Musculoskeletal:  Normal range of motion.   Lymphadenopathy:     He has no cervical adenopathy.   Neurological: He is alert and oriented to person, place, and time.   Skin: Skin is warm and dry.   Psychiatric: He has a normal mood and affect. His behavior is normal. Judgment and thought content normal.     CMP was reviewed  Hepatic function was reviewed  CBC was reviewed  Ultrasound was reviewed  MR BAER was reviewed    Assessment & Plan:        Essential hypertension  -     EKG 12-lead; Future  If his EKG shows any abnormality a Cardiology consult will be obtained    Calculus of gallbladder with biliary obstruction but without cholecystitis  -     Case Request Operating Room: XI ROBOTIC CHOLECYSTECTOMY  -     Insert peripheral IV; Standing  -     Full code; Standing    Robotic assisted cholecystectomy    The risks, benefits and complications of robotic/laparoscopic cholecystectomy were discussed.  These included infection, bleeding, abscess and bile leak.  The need for open conversion was dicussed.  The rare possibility of a common bile duct injury and the need for additional procedures and/or surgery was reviewed.  All question were answered.  The patient has agreed to proceed.  Consent was obtained.    Diabetes  We will hold the metformin and glyburide the day before surgery and use an insulin sliding scale in the postoperative time.        Tobacco use disorder    Patient's overall health condition would be benefitted by stopping tobacco however I feel he needs to stop drinking alcohol 1st    Alcohol abuse  The patient drinks the equivalent of 48 12 oz beer a week.  I explained that the alcohol in this amount of beer is toxic to his liver and that he needs to either stop drinking alcohol completely are significantly cut down.  I explained that drinking a 6 pack of 12 oz bottles a week is a much more reasonable goal to obtain at this point.    The patient did express an understanding.  His willingness to cut down on alcohol needs to  be monitored.      Other orders  -     Progressive Mobility Protocol (mobilize patient to their highest level of functioning at least twice daily); Standing  -     lidocaine (PF) 10 mg/ml (1%) injection 10 mg

## 2020-01-13 NOTE — PATIENT INSTRUCTIONS
"Your surgery scheduled for January 21st at approximately 11:00 a.m..  The hospital call you with the time of arrival the day before.    No solid food after midnight on the day before surgery    Please do not take your metformin our clips are I had on the morning of surgery.    You may have clear liquids up to 3 hr before your arrival time.    If your EKG shows any abnormalities we will let you know      The most important thing you can do fever overall health is to stop drinking alcohol or cut back significantly.  I would recommend no more than 6 12 oz bottles of beer a week.  I would also recommend drinking no more than 1 bottle of beer a day    The alcohol in the beers is toxic to your liver.  And the carbohydrates in the beer can make you're diabetes worse.        I highly recommend asking her primary care doctor for assistance for cutting back on your alcohol use      [facility]  Instructions for Patients and Families    You are invited to share your experience with me and my staff.  If you receive a survey in the mail, please return it at your convenience, or complete a brief survey on [employer].  We value your opinion!        Did you know that [facility] can be used to make appointments?  Just select "Schedule Appointment" under the "Visits" menu.    Notify you if any of your preop laboratories show abnormalities.  I    Before surgery:  The pre-op nurse from the hospital will call you before the day of your surgery to confirm your arrival time.  The time of your surgery may change due to emergencies or other unforeseen events.  Do not eat or drink anything after midnight the night before your procedure, except for clear liquids up to three hours before your surgery time, and sips of water with medication.  If you are not diabetic, it is recommended that you drink a glass of clear fruit juice (apple, grape, cranberry, not orange) three hours before your surgery time, but nothing " after that.  If you are diabetic, you may have water or sugar-free clear liquids such as Crystal Light up to three hours before your surgery time.    Day of Surgery:  · You will go to a pre-op area where an IV will be started and you will speak to the anesthesiologist and surgeon.  · Your family will be updated throughout the operation.  After surgery, your family member may be taken to a private room for consultation with the surgeon.  This is for the privacy of your medical information and does not necessarily mean there is anything wrong.    If your incisions have:  · Glue:  You may take a bath or shower immediately and wash your skin as you normally do.  The glue will eventually crumble or peel off. Do not let your incisions soak under water.  · Strips: Leave them on, but it is OK if they fall off on their own. It is OK to get them wet 48 hours after surgery.  · Bandage: You may remove it 2 days after your surgery, and then you may leave the incision open and take a shower or bath, unless otherwise instructed. If your bandage has clear plastic, you may shower with it on and then remove it 2 days after surgery.    Activities  · Walking is recommended after surgery; bed rest is not recommended unless specifically ordered.  · If you have had abdominal surgery, do not lift over 20 pounds for 4 weeks after surgery.  · If you have had hernia surgery, do not lift over 20 pounds for 6 weeks after surgery.  · You may drive when you are off your post-operative pain medication.  · Do not smoke after surgery, it decreases your ability to heal and increases the risk of infection and pneumonia.    Diet:  Drink lots of fluids after surgery.  You might not have much of an appetite at first, you may eat regular food when you feel ready, unless you are given special diet instructions.    Post-operative symptoms and medications  · It is safe to take over-the-counter medications for constipation, heartburn, sleep, or itching if  needed.  Prescription pain medication may contain acetaminophen (Tylenol), so you should not take additional acetaminophen (Tylenol) at the same time as your pain medication.  · You may experience nausea, low fever/chills, and clear drainage from your incision, sometimes up to a month after surgery.  Notify our office if you have fever over 101 degrees, worsening redness around your incision, thick cloudy drainage, or inability to drink any liquids.  · You will experience some level of pain after surgery.  Your pain medication should help with the pain, but may not be able to eliminate it entirely.  Pain will decrease with time, and most pain will be gone by 4 to 6 weeks after surgery.  · We are not able to call in prescriptions for pain medication after hours or on weekends.  If your pain medication is ineffective or you will run out soon and need a refill, please call our office at [phone].  We are not able to replace pain medication that has been lost or stolen.    After surgery, you will either be discharged home or admitted to the hospital.  If you are admitted to the hospital, one of the surgeons or a physician assistant will see you once a day.  Due to scheduled surgery, we may see you in the afternoon or at night; however, your nurse is able to page us at any time.  If you feel there is a situation that is not being addressed properly, please dial [phone] from the phone in your room.    Follow-up appointment  · You will see your surgeon or a physician assistant in clinic for a follow-up appointment at either our [facility] (off [facility]) or [facility] (off [facility]) locations, usually between one and four weeks after your surgery.  · The hospital nurses can make your follow up appointment, or you can make it online at [email or web address] or call [phone].  · If you have a smartphone with the [employer] franklyn, please let us know if you would like to do a phone visit instead of a post-op office  "visit.    If you are signed up for [facility], install the "[employer]" franklyn to access your test results, send messages to your doctors, and schedule appointments from your smartphone!            Alcohol Addiction  Are you addicted to alcohol?    You may be addicted to alcohol if your drinking harms yourself or others or leads to other problems with your daily life.  The medical term for this is alcohol use disorder. Your healthcare provider may diagnose you with this disorder if you have at least two of the following problems within the span of a year:  · You drink alcohol in larger amounts or for a longer period than you intended.  · You frequently want to cut down or control alcohol use, or have frequently failed efforts to do so.  · You spend a lot of time getting alcohol, using alcohol, or recovering from alcohol use.  · You crave or have a strong desire or urge to drink alcohol.  · Ongoing alcohol use makes it hard for you to be responsible at work, school, or home.  · You continue to use alcohol even though you have had problems in relationships or social settings because of it.  · You give up or miss important social, work, or recreational activities because of alcohol use.  · You drink alcohol in situations in which it is physically unsafe, such as drinking then driving while intoxicated.  · You continue to drink alcohol despite knowing it has caused physical or emotional problems.  · You need more and more alcohol to get the same effects.  · You hide how much alcohol you drink from family and friends.  · You have withdrawal symptoms or use alcohol to avoid withdrawal symptoms.  Date Last Reviewed: 2/1/2017  © 3550-6538 [employer]. [address] [place] [address], [place] [address]. All rights reserved. This information is not intended as a substitute for professional medical care. Always follow your healthcare professional's instructions.        Signs of Addiction: Social Use  Not everyone who takes a " drink or tries a drug has a substance abuse problem. But for some people, what starts as social use can lead to problem use (abuse) and then addiction. Some people can have a drink or use another substance and then stop. They dont think about drinking or using again for a while. For some this will never lead to heavier use. For others, it may.    You can take it or leave it  · You have a drink at a party or a glass of wine with dinner.  · You tried a street drug a few times, but have no interest in using it repeatedly.   · You take medicines only when needed and only as directed.  · You dont think about using or plan the next time youll use.  Using is still a choice  A social user can choose to say yes or no any time. This person is in control of his or her substance use. Think about these statements:  · I can put down a drink without finishing it.  · I can enjoy myself at a social event without using.  · I can go without using or thinking about using.  If this describes you, youre most likely a social user. But ask yourself, can you really take it or leave it? You might try for a few weeks or even a month. If you find it hard or you just cant do it, maybe you need to think about your use again.  Date Last Reviewed: 2/1/2017  © 2382-8639 [employer]. [address] [place] [address]. All rights reserved. This information is not intended as a substitute for professional medical care. Always follow your healthcare professional's instructions.        Addiction: Ask Yourself These Questions  Ask yourself the following questions. The answers can help you see where you might have problems caused by substance abuse. Then you can decide whether youre ready to do something about your use.  Yes No    ? ? Do you ever have trouble remembering things or concentrating on what youre doing because youre thinking about using?   ? ? Have you ever broken promises because of your substance use?   ? ? Have  you ever done things when you were using that you wouldnt normally do?   ? ? Have you ever lost a job or had money troubles because of your use?   ? ? Do you ever make excuses for your use or lie to hide it?   ? ? Does someone make excuses for you when youre hung over?   ? ? Do others ever seem embarrassed about your use?   ? ? Have your children ever asked you to stop using or been afraid of you when you are using?   ? ? Has your spouse or a girlfriend or boyfriend ever left you because of your use?   ? ? Have you ever had sexual problems that might be caused by your use?   ? ? Have you ever had severe shaking, heard voices, or thought you were seeing things after youve been using?   ? ? Have you noticed that youve gotten sick more often as you use more?   ? ? Have you tried to quit but found you just couldnt?   If you answer Yes to one or more of these questions, you may need to change or stop your substance use.   Date Last Reviewed: 6/1/2016  © 0243-5146 [employer]. [address] [place] [address]. All rights reserved. This information is not intended as a substitute for professional medical care. Always follow your healthcare professional's instructions.        Alcohol Withdrawal: What to Expect  What is withdrawal?  Withdrawal is what happens to your body if youre a heavy drinker and stop drinking alcohol. Withdrawal symptoms can be mild to severe. How severe they are depends on the amount of alcohol you drink, how long youve been abusing alcohol, and whether you have organ damage.  Withdrawal can start 6 to 24 hours after your last drink and usually eases after a few days. Although withdrawal is uncomfortable and unpleasant, most people dont have serious or life-threatening problems. Long-standing heavy drinkers however, can have severe withdrawal symptoms. This can lead to seizures and may be fatal if not aggressively treated. These people need to be under medical  supervision during withdrawal.  What symptoms will I have?    Withdrawal symptoms can start if you stop drinking or cut back a lot on your drinking. Most people have mild symptoms.  Mild symptoms may include:  · Trouble sleeping  · Vivid dreams  · Irritability  · Anxiety  · Mild stomach problems  · Tremors or the shakes  · Sweating  · Heart palpitations  · Increased blood pressure  More severe symptoms may include:  · Fever  · Hallucinations  · Delusions  · Confusion  · Agitation  · Seizures  Can I do this at home?  You may be able to stay at home while you go through withdrawal, but you need professional input before making this decision. The first step is to work closely with an addiction specialist who has a medical background or with your private doctor. Based on your drinking history and previous withdrawal symptoms, medical professionals may be able to predict how severe your withdrawal symptoms will be.  In some cases, if you are predicted to have mild withdrawal, you may be able stay at home. But youll need a caregiver to help you and daily visits and telephone calls from a healthcare provider such as a drug and alcohol nurse.  Your doctor may prescribe a tranquilizing type of medicine progressively smaller daily doses to help keep withdrawal symptoms in check. Your doctor may give you other medicines to help with headaches or nausea.  What happens if I am in a hospital or rehabilitation center?  You may need to stay in the hospital or at the treatment center if your doctor thinks you may have more severe withdrawal symptoms or you have another illness that can complicate withdrawal. Medical staff can more closely watch you when you are an inpatient and tranquilizing medicines can be given in higher and more frequent dosing.  Date Last Reviewed: 2/1/2017 © 2000-2017 [employer]. [address] [place] [address], [place] [address]. All rights reserved. This information is not intended as a substitute  "for professional medical care. Always follow your healthcare professional's instructions.        Addiction: Getting Help     "It was hard to admit that I had a problem. But when I did, I had to get help."   Admitting that you have a substance abuse problem isnt easy. It takes courage and honesty to admit youre abusing alcohol or other drugs. But once youre ready to look at your use, youve taken a big step toward overcoming the problem. When you face your problem, you also accept that youre accountable for your actions and for changing them. There are many programs and people who can help you overcome your problem. And remember, its OK to get help. Its also the first step to getting your life back together.  Getting help and support  Recovery doesnt happen overnight. Its a lifelong process with many steps along the way. During those steps, youll work on changing the things that were part of your substance abuse. A counselor or other health care provider can give you support. So can a , , or rabbi who is trained in substance abuse counseling. Friends and family may also help once you are connected with professionals. Together you can decide on lifestyle changes necessary to success, enabling you to have a positive and rewarding life.  Date Last Reviewed: 2/1/2017  © 4425-0778 [employer]. [address] [place] [address], [place] [address]. All rights reserved. This information is not intended as a substitute for professional medical care. Always follow your healthcare professional's instructions.        "

## 2020-01-13 NOTE — LETTER
January 13, 2020      Stanley Wright MD  [address]  [place] [address]           [facility]  [address] [place] [id number]  Phone: [phone]  Fax: [phone]          Patient: Laron Kothari Jr.   MR Number: [id number]   YOB: 1970   Date of Visit: 1/13/2020       Dear Dr. Stanley Wright:    Thank you for referring Laron Kothari to me for evaluation. Attached you will find relevant portions of my assessment and plan of care.    If you have questions, please do not hesitate to call me. I look forward to following Laron Kothari along with you.    Sincerely,    Sebastien Rivera MD    Enclosure  CC:  No Recipients    If you would like to receive this communication electronically, please contact [email or web address] or [phone] to request more information on [employer] access.    For providers and/or their staff who would like to refer a patient to [facility], please contact us through our one-stop-shop provider referral line, [facility] , at [phone].    If you feel you have received this communication in error or would no longer like to receive these types of communications, please e-mail [email or web address]

## 2020-01-13 NOTE — PROGRESS NOTES
Patient ID: Laron Kothari Jr. is a 49 y.o. male.      Symptomatic cholelithiasis    Chief Complaint: Consult      HPI:  49-year-old male who was seen by Gastroenterology for abdominal pain.  The abdominal pain it is across the upper abdomen on the right side.  It radiates somewhat to the flank.  He can be a cramping to sharp pain.  It can occur after eating.  A concur spontaneously.  It has woken him from his sleep.    The patient denies any darkening of his urine or changes of his stool.    He underwent a ultrasound that showed cholelithiasis and dilated bowel ducts.  An MRCP did not show any choledocholithiasis.    The patient drinks the equivalent of 48 cans of beer a week.  We discussed the fact that the alcohol in the be areas toxic to his liver in the amounts that he drinks it and that the carbohydrates in the beer are making his diabetes worse.  The patient states he drinks make a Mikalob  ultra light.  I explained to him that the amount of beer that he drinks outweighs the benefits of drinking a lower carbohydrate beer.  He drinks 24 oz cans or bottles of beer as opposed to 12 oz bottles      Review of Systems   Constitutional: Positive for unexpected weight change. Negative for activity change.   HENT: Negative for hearing loss, rhinorrhea and trouble swallowing.    Eyes: Positive for visual disturbance. Negative for discharge.   Respiratory: Positive for chest tightness. Negative for wheezing.    Cardiovascular: Positive for chest pain and palpitations.   Gastrointestinal: Positive for blood in stool and diarrhea. Negative for constipation and vomiting.   Endocrine: Positive for polydipsia and polyuria.   Genitourinary: Positive for urgency. Negative for difficulty urinating and hematuria.   Musculoskeletal: Positive for arthralgias. Negative for joint swelling and neck pain.   Neurological: Positive for weakness and headaches.   Psychiatric/Behavioral: Positive for dysphoric mood. Negative for confusion.        Current Outpatient Medications   Medication Sig Dispense Refill    amitriptyline (ELAVIL) 25 MG tablet Take 1 tablet (25 mg total) by mouth nightly as needed for Pain. 30 tablet 2    blood sugar diagnostic Strp Once daily glucose testing. 50 each 0    glipiZIDE (GLUCOTROL) 5 MG tablet TAKE ONE TABLET BY MOUTH ONCE DAILY WITH BREAKFAST 30 tablet 0    lancets (LANCETS,THIN) Misc Once daily glucose testing. 50 each 6    lisinopril (PRINIVIL,ZESTRIL) 40 MG tablet Take 1 tablet (40 mg total) by mouth once daily. 30 tablet 0    metFORMIN (GLUCOPHAGE) 1000 MG tablet Take 1 tablet (1,000 mg total) by mouth 2 (two) times daily with meals. 60 tablet 0    pravastatin (PRAVACHOL) 80 MG tablet Take 1 tablet (80 mg total) by mouth once daily. 30 tablet 0     Current Facility-Administered Medications   Medication Dose Route Frequency Provider Last Rate Last Dose    lidocaine (PF) 10 mg/ml (1%) injection 10 mg  1 mL Intradermal Once Sebastien Rivera MD           Review of patient's allergies indicates:  No Known Allergies    Past Medical History:   Diagnosis Date    Blood in stool 8/7/2018    Diabetes mellitus, type 2 2013    DM (diabetes mellitus) 2013    BS didn't check 01/03/2020    Gout     Hyperlipidemia     Hypertension     Neuropathy        Past Surgical History:   Procedure Laterality Date    APPENDECTOMY      COLONOSCOPY N/A 8/7/2018    Procedure: COLONOSCOPY;  Surgeon: Ten Valentine MD;  Location: [facility];  Service: Endoscopy;  Laterality: N/A;    TONSILLECTOMY, ADENOIDECTOMY         Family History   Problem Relation Age of Onset    Diabetes Father     Prostate cancer Father     Cataracts Father     Hypertension Mother     Hypertension Brother        Social History     Socioeconomic History    Marital status:      Spouse name: Not on file    Number of children: Not on file    Years of education: Not on file    Highest education level: Not on file   Occupational History    Not  on file   Social Needs    Financial resource strain: Not on file    Food insecurity:     Worry: Not on file     Inability: Not on file    Transportation needs:     Medical: Not on file     Non-medical: Not on file   Tobacco Use    Smoking status: Current Every Day Smoker     Packs/day: 0.50    Smokeless tobacco: Never Used   Substance and Sexual Activity    Alcohol use: Yes     Alcohol/week: 46.0 standard drinks     Types: 46 Cans of beer per week     Frequency: 4 or more times a week     Drinks per session: 1 or 2     Comment: daily    Drug use: No    Sexual activity: Yes     Partners: Female   Lifestyle    Physical activity:     Days per week: Not on file     Minutes per session: Not on file    Stress: Not on file   Relationships    Social connections:     Talks on phone: Not on file     Gets together: Not on file     Attends Caodaism service: Not on file     Active member of club or organization: Not on file     Attends meetings of clubs or organizations: Not on file     Relationship status: Not on file   Other Topics Concern    Not on file   Social History Narrative    Not on file       Vitals:    01/13/20 1117   BP: (!) 142/94   Pulse: 93   Temp: 98.7 °F (37.1 °C)       Physical Exam   Constitutional: He is oriented to person, place, and time. He appears well-developed and well-nourished. No distress.   HENT:   Head: Normocephalic and atraumatic.   Eyes: Pupils are equal, round, and reactive to light. No scleral icterus.   Neck: Normal range of motion. Neck supple. No JVD present. No tracheal deviation present. No thyromegaly present.   Cardiovascular: Normal rate, regular rhythm and normal heart sounds.   Pulmonary/Chest: Effort normal and breath sounds normal.   Abdominal: Soft. Bowel sounds are normal. He exhibits no distension and no mass. There is tenderness (Mild right upper quadrant.  No Carson sign). There is no rebound and no guarding.   Well-healed appendectomy scar   Musculoskeletal:  Normal range of motion.   Lymphadenopathy:     He has no cervical adenopathy.   Neurological: He is alert and oriented to person, place, and time.   Skin: Skin is warm and dry.   Psychiatric: He has a normal mood and affect. His behavior is normal. Judgment and thought content normal.     CMP was reviewed  Hepatic function was reviewed  CBC was reviewed  Ultrasound was reviewed  MR BAER was reviewed    Assessment & Plan:        Essential hypertension  -     EKG 12-lead; Future  If his EKG shows any abnormality a Cardiology consult will be obtained    Calculus of gallbladder with biliary obstruction but without cholecystitis  -     Case Request Operating Room: XI ROBOTIC CHOLECYSTECTOMY  -     Insert peripheral IV; Standing  -     Full code; Standing    Robotic assisted cholecystectomy    The risks, benefits and complications of robotic/laparoscopic cholecystectomy were discussed.  These included infection, bleeding, abscess and bile leak.  The need for open conversion was dicussed.  The rare possibility of a common bile duct injury and the need for additional procedures and/or surgery was reviewed.  All question were answered.  The patient has agreed to proceed.  Consent was obtained.    Diabetes  We will hold the metformin and glyburide the day before surgery and use an insulin sliding scale in the postoperative time.        Tobacco use disorder    Patient's overall health condition would be benefitted by stopping tobacco however I feel he needs to stop drinking alcohol 1st    Alcohol abuse  The patient drinks the equivalent of 48 12 oz beer a week.  I explained that the alcohol in this amount of beer is toxic to his liver and that he needs to either stop drinking alcohol completely are significantly cut down.  I explained that drinking a 6 pack of 12 oz bottles a week is a much more reasonable goal to obtain at this point.    The patient did express an understanding.  His willingness to cut down on alcohol needs to  be monitored.      Other orders  -     Progressive Mobility Protocol (mobilize patient to their highest level of functioning at least twice daily); Standing  -     lidocaine (PF) 10 mg/ml (1%) injection 10 mg

## 2020-01-20 NOTE — PRE ADMISSION SCREENING
Pre op instructions reviewed with patient per phone:    To confirm, Your surgeon has instructed you:  Surgery is scheduled 01/21/20 at 1145.      Please report to [facility] 1st floor main lobby by 1015.  Pre admit office to call later today only if arrival time changes.      INSTRUCTIONS IMPORTANT!!!  ¨ No smoking after 12 midnight, the night before surgery.  ¨ No solid food after 12 midnight, but you may have clear liquids up until 3 hours prior to surgery.  This includes: grape, cranberry, and apple juice (not orange, and no coffee.)   ¨ OK to brush teeth, but no gum, candy or mints!    ¨ Take only these medicines with a small swallow of water-morning of surgery.  Pravastatin        ____  Do not wear makeup, including mascara.  ____  No powder, lotions or creams to surgical area.  ____  Please remove all jewelry, including piercings and leave at home.  ____  No money or valuables needed. Please leave at home.  ____  Please bring identification and insurance information to hospital.  ____  If going home the same day, arrange for a ride home. You will not be able to   drive if Anesthesia was used.  ____  Children, under 12 years old, must remain in the waiting room with an adult.  They are not allowed in patient areas.  ____  Wear loose fitting clothing. Allow for dressings, bandages.  ____  Stop Aspirin, Ibuprofen, Motrin and Aleve at least 5-7 days before surgery, unless otherwise instructed by your doctor, or the nurse.   You MAY use Tylenol/acetaminophen until day of surgery.  ____  If you take diabetic medication, do not take am of surgery unless instructed by   Doctor.  ____ Stop taking any Fish Oil supplement or any Vitamins that contain Vitamin E at least 5 days prior to surgery.          Bathing Instructions-- The night before surgery and the morning prior to coming to the hospital:   -Do not shave the surgical area.   -Shower and wash your hair and body as usual with your regular  soap and shampoo.   -Rinse your hair and body completely.   -Use one packet of hibiclens to wash the surgical site (using your hand) gently for 5 minutes.  Do not scrub you skin too hard.   -Do not use hibiclens on your head, face, or genitals.   -Do not wash with regular soap after you use the hibiclens.   -Rinse your body thoroughly.   -Dry with clean, soft towel.  Do not use lotion, cream, deodorant, or powders on   the surgical site.    Use antibacterial soap in place of hibiclens if your surgery is on the head, face or genitals.         Surgical Site Infection    Prevention of surgical site infections:     -Keep incisions clean and dry.   -Do not soak/submerge incisions in water until completely healed.   -Do not apply lotions, powders, creams, or deodorants to site.   -Always make sure hands are cleaned with antibacterial soap/ alcohol-based   prior to touching the surgical site.  (This includes doctors, nurses, staff, and yourself.)    Signs and symptoms:   -Redness and pain around the area where you had surgery   -Drainage of cloudy fluid from your surgical wound   -Fever over 100.4  I have read or had read and explained to me, and understand the above information.

## 2020-01-21 ENCOUNTER — HOSPITAL ENCOUNTER (OUTPATIENT)
Facility: HOSPITAL | Age: 50
Discharge: HOME | End: 2020-01-22
Attending: SURGERY | Admitting: SURGERY
Payer: COMMERCIAL

## 2020-01-21 ENCOUNTER — ANESTHESIA EVENT (OUTPATIENT)
Dept: SURGERY | Facility: HOSPITAL | Age: 50
End: 2020-01-21
Payer: COMMERCIAL

## 2020-01-21 ENCOUNTER — ANESTHESIA (OUTPATIENT)
Dept: SURGERY | Facility: HOSPITAL | Age: 50
End: 2020-01-21
Payer: COMMERCIAL

## 2020-01-21 DIAGNOSIS — F17.200 TOBACCO USE DISORDER: ICD-10-CM

## 2020-01-21 DIAGNOSIS — I10 HYPERTENSION GOAL BP (BLOOD PRESSURE) < 130/80: Chronic | ICD-10-CM

## 2020-01-21 DIAGNOSIS — E78.5 HYPERLIPIDEMIA LDL GOAL <70: Primary | Chronic | ICD-10-CM

## 2020-01-21 DIAGNOSIS — K80.20 CHOLELITHIASES: ICD-10-CM

## 2020-01-21 DIAGNOSIS — K80.21 CALCULUS OF GALLBLADDER WITH BILIARY OBSTRUCTION BUT WITHOUT CHOLECYSTITIS: ICD-10-CM

## 2020-01-21 LAB
POCT GLUCOSE: 164 MG/DL (ref 70–110)
POCT GLUCOSE: 223 MG/DL (ref 70–110)

## 2020-01-21 PROCEDURE — 71000039 HC RECOVERY, EACH ADD'L HOUR: Performed by: SURGERY

## 2020-01-21 PROCEDURE — 25000003 PHARM REV CODE 250: Performed by: NURSE ANESTHETIST, CERTIFIED REGISTERED

## 2020-01-21 PROCEDURE — 25000003 PHARM REV CODE 250: Performed by: SURGERY

## 2020-01-21 PROCEDURE — 88304 TISSUE EXAM BY PATHOLOGIST: CPT | Performed by: PATHOLOGY

## 2020-01-21 PROCEDURE — 71000033 HC RECOVERY, INTIAL HOUR: Performed by: SURGERY

## 2020-01-21 PROCEDURE — 37000008 HC ANESTHESIA 1ST 15 MINUTES: Performed by: SURGERY

## 2020-01-21 PROCEDURE — 36000711: Performed by: SURGERY

## 2020-01-21 PROCEDURE — 37000009 HC ANESTHESIA EA ADD 15 MINS: Performed by: SURGERY

## 2020-01-21 PROCEDURE — 63600175 PHARM REV CODE 636 W HCPCS: Performed by: NURSE ANESTHETIST, CERTIFIED REGISTERED

## 2020-01-21 PROCEDURE — 63600175 PHARM REV CODE 636 W HCPCS: Performed by: SURGERY

## 2020-01-21 PROCEDURE — 47562 LAPAROSCOPIC CHOLECYSTECTOMY: CPT | Mod: ,,, | Performed by: SURGERY

## 2020-01-21 PROCEDURE — 63600175 PHARM REV CODE 636 W HCPCS: Performed by: ANESTHESIOLOGY

## 2020-01-21 PROCEDURE — 36000710: Performed by: SURGERY

## 2020-01-21 PROCEDURE — 88304 PR  SURG PATH,LEVEL III: ICD-10-PCS | Mod: 26,,, | Performed by: PATHOLOGY

## 2020-01-21 PROCEDURE — 88304 TISSUE EXAM BY PATHOLOGIST: CPT | Mod: 26,,, | Performed by: PATHOLOGY

## 2020-01-21 PROCEDURE — 47562 PR LAP,CHOLECYSTECTOMY: ICD-10-PCS | Mod: ,,, | Performed by: SURGERY

## 2020-01-21 PROCEDURE — 27201423 OPTIME MED/SURG SUP & DEVICES STERILE SUPPLY: Performed by: SURGERY

## 2020-01-21 RX ORDER — HYDROCODONE BITARTRATE AND ACETAMINOPHEN 7.5; 325 MG/1; MG/1
1 TABLET ORAL EVERY 4 HOURS PRN
Status: DISCONTINUED | OUTPATIENT
Start: 2020-01-21 | End: 2020-01-22 | Stop reason: HOSPADM

## 2020-01-21 RX ORDER — PROPOFOL 10 MG/ML
VIAL (ML) INTRAVENOUS
Status: DISCONTINUED | OUTPATIENT
Start: 2020-01-21 | End: 2020-01-21

## 2020-01-21 RX ORDER — GLUCAGON 1 MG
1 KIT INJECTION
Status: DISCONTINUED | OUTPATIENT
Start: 2020-01-21 | End: 2020-01-22 | Stop reason: HOSPADM

## 2020-01-21 RX ORDER — HYDROCODONE BITARTRATE AND ACETAMINOPHEN 10; 325 MG/1; MG/1
1 TABLET ORAL EVERY 6 HOURS PRN
Status: DISCONTINUED | OUTPATIENT
Start: 2020-01-21 | End: 2020-01-22 | Stop reason: HOSPADM

## 2020-01-21 RX ORDER — DEXAMETHASONE SODIUM PHOSPHATE 4 MG/ML
INJECTION, SOLUTION INTRA-ARTICULAR; INTRALESIONAL; INTRAMUSCULAR; INTRAVENOUS; SOFT TISSUE
Status: DISCONTINUED | OUTPATIENT
Start: 2020-01-21 | End: 2020-01-21

## 2020-01-21 RX ORDER — SUCCINYLCHOLINE CHLORIDE 20 MG/ML
INJECTION INTRAMUSCULAR; INTRAVENOUS
Status: DISCONTINUED | OUTPATIENT
Start: 2020-01-21 | End: 2020-01-21

## 2020-01-21 RX ORDER — MIDAZOLAM HYDROCHLORIDE 1 MG/ML
INJECTION, SOLUTION INTRAMUSCULAR; INTRAVENOUS
Status: DISCONTINUED | OUTPATIENT
Start: 2020-01-21 | End: 2020-01-21

## 2020-01-21 RX ORDER — ROCURONIUM BROMIDE 10 MG/ML
INJECTION, SOLUTION INTRAVENOUS
Status: DISCONTINUED | OUTPATIENT
Start: 2020-01-21 | End: 2020-01-21

## 2020-01-21 RX ORDER — FENTANYL CITRATE 50 UG/ML
INJECTION, SOLUTION INTRAMUSCULAR; INTRAVENOUS
Status: DISCONTINUED | OUTPATIENT
Start: 2020-01-21 | End: 2020-01-21

## 2020-01-21 RX ORDER — LIDOCAINE HYDROCHLORIDE 10 MG/ML
INJECTION, SOLUTION EPIDURAL; INFILTRATION; INTRACAUDAL; PERINEURAL
Status: DISCONTINUED | OUTPATIENT
Start: 2020-01-21 | End: 2020-01-21

## 2020-01-21 RX ORDER — NEOSTIGMINE METHYLSULFATE 1 MG/ML
INJECTION, SOLUTION INTRAVENOUS
Status: DISCONTINUED | OUTPATIENT
Start: 2020-01-21 | End: 2020-01-21

## 2020-01-21 RX ORDER — ONDANSETRON 2 MG/ML
4 INJECTION INTRAMUSCULAR; INTRAVENOUS DAILY PRN
Status: DISCONTINUED | OUTPATIENT
Start: 2020-01-21 | End: 2020-01-21 | Stop reason: HOSPADM

## 2020-01-21 RX ORDER — CEFAZOLIN SODIUM 2 G/50ML
2 SOLUTION INTRAVENOUS
Status: COMPLETED | OUTPATIENT
Start: 2020-01-21 | End: 2020-01-21

## 2020-01-21 RX ORDER — SODIUM CHLORIDE, SODIUM LACTATE, POTASSIUM CHLORIDE, CALCIUM CHLORIDE 600; 310; 30; 20 MG/100ML; MG/100ML; MG/100ML; MG/100ML
INJECTION, SOLUTION INTRAVENOUS CONTINUOUS
Status: DISCONTINUED | OUTPATIENT
Start: 2020-01-21 | End: 2020-01-22 | Stop reason: HOSPADM

## 2020-01-21 RX ORDER — GLYCOPYRROLATE 0.2 MG/ML
INJECTION INTRAMUSCULAR; INTRAVENOUS
Status: DISCONTINUED | OUTPATIENT
Start: 2020-01-21 | End: 2020-01-21

## 2020-01-21 RX ORDER — PRAVASTATIN SODIUM 20 MG/1
80 TABLET ORAL DAILY
Status: DISCONTINUED | OUTPATIENT
Start: 2020-01-22 | End: 2020-01-22 | Stop reason: HOSPADM

## 2020-01-21 RX ORDER — FENTANYL CITRATE 50 UG/ML
25 INJECTION, SOLUTION INTRAMUSCULAR; INTRAVENOUS EVERY 5 MIN PRN
Status: COMPLETED | OUTPATIENT
Start: 2020-01-21 | End: 2020-01-21

## 2020-01-21 RX ORDER — IBUPROFEN 200 MG
16 TABLET ORAL
Status: DISCONTINUED | OUTPATIENT
Start: 2020-01-21 | End: 2020-01-22 | Stop reason: HOSPADM

## 2020-01-21 RX ORDER — ONDANSETRON 2 MG/ML
4 INJECTION INTRAMUSCULAR; INTRAVENOUS EVERY 12 HOURS PRN
Status: DISCONTINUED | OUTPATIENT
Start: 2020-01-21 | End: 2020-01-22 | Stop reason: HOSPADM

## 2020-01-21 RX ORDER — PHENYLEPHRINE HYDROCHLORIDE 10 MG/ML
INJECTION INTRAVENOUS
Status: DISCONTINUED | OUTPATIENT
Start: 2020-01-21 | End: 2020-01-21

## 2020-01-21 RX ORDER — DIPHENHYDRAMINE HCL 25 MG
25 CAPSULE ORAL EVERY 6 HOURS PRN
Status: DISCONTINUED | OUTPATIENT
Start: 2020-01-21 | End: 2020-01-22 | Stop reason: HOSPADM

## 2020-01-21 RX ORDER — HYDROMORPHONE HYDROCHLORIDE 2 MG/ML
0.2 INJECTION, SOLUTION INTRAMUSCULAR; INTRAVENOUS; SUBCUTANEOUS EVERY 5 MIN PRN
Status: DISCONTINUED | OUTPATIENT
Start: 2020-01-21 | End: 2020-01-21 | Stop reason: HOSPADM

## 2020-01-21 RX ORDER — ONDANSETRON 2 MG/ML
INJECTION INTRAMUSCULAR; INTRAVENOUS
Status: DISCONTINUED | OUTPATIENT
Start: 2020-01-21 | End: 2020-01-21

## 2020-01-21 RX ORDER — BUPIVACAINE HYDROCHLORIDE 2.5 MG/ML
INJECTION, SOLUTION EPIDURAL; INFILTRATION; INTRACAUDAL
Status: DISCONTINUED | OUTPATIENT
Start: 2020-01-21 | End: 2020-01-21 | Stop reason: HOSPADM

## 2020-01-21 RX ORDER — ENOXAPARIN SODIUM 100 MG/ML
40 INJECTION SUBCUTANEOUS EVERY 24 HOURS
Status: DISCONTINUED | OUTPATIENT
Start: 2020-01-22 | End: 2020-01-22 | Stop reason: HOSPADM

## 2020-01-21 RX ORDER — SODIUM CHLORIDE, SODIUM LACTATE, POTASSIUM CHLORIDE, CALCIUM CHLORIDE 600; 310; 30; 20 MG/100ML; MG/100ML; MG/100ML; MG/100ML
INJECTION, SOLUTION INTRAVENOUS CONTINUOUS PRN
Status: DISCONTINUED | OUTPATIENT
Start: 2020-01-21 | End: 2020-01-21

## 2020-01-21 RX ORDER — IBUPROFEN 200 MG
24 TABLET ORAL
Status: DISCONTINUED | OUTPATIENT
Start: 2020-01-21 | End: 2020-01-22 | Stop reason: HOSPADM

## 2020-01-21 RX ORDER — AMITRIPTYLINE HYDROCHLORIDE 25 MG/1
25 TABLET, FILM COATED ORAL NIGHTLY PRN
Status: DISCONTINUED | OUTPATIENT
Start: 2020-01-21 | End: 2020-01-22 | Stop reason: HOSPADM

## 2020-01-21 RX ORDER — INSULIN ASPART 100 [IU]/ML
1-10 INJECTION, SOLUTION INTRAVENOUS; SUBCUTANEOUS
Status: DISCONTINUED | OUTPATIENT
Start: 2020-01-21 | End: 2020-01-22 | Stop reason: HOSPADM

## 2020-01-21 RX ORDER — INDOCYANINE GREEN AND WATER 25 MG
2.5 KIT INJECTION
Status: COMPLETED | OUTPATIENT
Start: 2020-01-21 | End: 2020-01-21

## 2020-01-21 RX ORDER — HYDROMORPHONE HYDROCHLORIDE 1 MG/ML
1 INJECTION, SOLUTION INTRAMUSCULAR; INTRAVENOUS; SUBCUTANEOUS EVERY 4 HOURS PRN
Status: DISCONTINUED | OUTPATIENT
Start: 2020-01-21 | End: 2020-01-22 | Stop reason: HOSPADM

## 2020-01-21 RX ADMIN — FENTANYL CITRATE 25 MCG: 50 INJECTION, SOLUTION INTRAMUSCULAR; INTRAVENOUS at 02:01

## 2020-01-21 RX ADMIN — ROCURONIUM BROMIDE 35 MG: 10 INJECTION, SOLUTION INTRAVENOUS at 11:01

## 2020-01-21 RX ADMIN — HYDROMORPHONE HYDROCHLORIDE 0.2 MG: 2 INJECTION INTRAMUSCULAR; INTRAVENOUS; SUBCUTANEOUS at 03:01

## 2020-01-21 RX ADMIN — CEFAZOLIN SODIUM 2 G: 2 SOLUTION INTRAVENOUS at 11:01

## 2020-01-21 RX ADMIN — FENTANYL CITRATE 25 MCG: 50 INJECTION, SOLUTION INTRAMUSCULAR; INTRAVENOUS at 01:01

## 2020-01-21 RX ADMIN — ROCURONIUM BROMIDE 5 MG: 10 INJECTION, SOLUTION INTRAVENOUS at 11:01

## 2020-01-21 RX ADMIN — LIDOCAINE HYDROCHLORIDE 50 MG: 10 INJECTION, SOLUTION EPIDURAL; INFILTRATION; INTRACAUDAL; PERINEURAL at 11:01

## 2020-01-21 RX ADMIN — ONDANSETRON 4 MG: 2 INJECTION, SOLUTION INTRAMUSCULAR; INTRAVENOUS at 11:01

## 2020-01-21 RX ADMIN — PROPOFOL 200 MG: 10 INJECTION, EMULSION INTRAVENOUS at 11:01

## 2020-01-21 RX ADMIN — ROCURONIUM BROMIDE 10 MG: 10 INJECTION, SOLUTION INTRAVENOUS at 12:01

## 2020-01-21 RX ADMIN — FENTANYL CITRATE 50 MCG: 50 INJECTION, SOLUTION INTRAMUSCULAR; INTRAVENOUS at 11:01

## 2020-01-21 RX ADMIN — DEXAMETHASONE SODIUM PHOSPHATE 4 MG: 4 INJECTION, SOLUTION INTRA-ARTICULAR; INTRALESIONAL; INTRAMUSCULAR; INTRAVENOUS; SOFT TISSUE at 11:01

## 2020-01-21 RX ADMIN — HYDROMORPHONE HYDROCHLORIDE 0.2 MG: 2 INJECTION INTRAMUSCULAR; INTRAVENOUS; SUBCUTANEOUS at 02:01

## 2020-01-21 RX ADMIN — ROBINUL 0.8 MG: 0.2 INJECTION INTRAMUSCULAR; INTRAVENOUS at 01:01

## 2020-01-21 RX ADMIN — FENTANYL CITRATE 50 MCG: 50 INJECTION, SOLUTION INTRAMUSCULAR; INTRAVENOUS at 12:01

## 2020-01-21 RX ADMIN — SODIUM CHLORIDE, SODIUM LACTATE, POTASSIUM CHLORIDE, AND CALCIUM CHLORIDE: .6; .31; .03; .02 INJECTION, SOLUTION INTRAVENOUS at 05:01

## 2020-01-21 RX ADMIN — HYDROMORPHONE HYDROCHLORIDE 1 MG: 1 INJECTION, SOLUTION INTRAMUSCULAR; INTRAVENOUS; SUBCUTANEOUS at 04:01

## 2020-01-21 RX ADMIN — PHENYLEPHRINE HYDROCHLORIDE 100 MCG: 10 INJECTION INTRAVENOUS at 12:01

## 2020-01-21 RX ADMIN — SODIUM CHLORIDE, SODIUM LACTATE, POTASSIUM CHLORIDE, AND CALCIUM CHLORIDE: 600; 310; 30; 20 INJECTION, SOLUTION INTRAVENOUS at 01:01

## 2020-01-21 RX ADMIN — INDOCYANINE GREEN 2.5 MG: KIT INTRAVENOUS at 11:01

## 2020-01-21 RX ADMIN — HYDROCODONE BITARTRATE AND ACETAMINOPHEN 1 TABLET: 10; 325 TABLET ORAL at 09:01

## 2020-01-21 RX ADMIN — SODIUM CHLORIDE, SODIUM LACTATE, POTASSIUM CHLORIDE, AND CALCIUM CHLORIDE: 600; 310; 30; 20 INJECTION, SOLUTION INTRAVENOUS at 11:01

## 2020-01-21 RX ADMIN — INSULIN ASPART 3 UNITS: 100 INJECTION, SOLUTION INTRAVENOUS; SUBCUTANEOUS at 09:01

## 2020-01-21 RX ADMIN — SUCCINYLCHOLINE CHLORIDE 120 MG: 20 INJECTION, SOLUTION INTRAMUSCULAR; INTRAVENOUS at 11:01

## 2020-01-21 RX ADMIN — NEOSTIGMINE METHYLSULFATE 5 MG: 1 INJECTION INTRAVENOUS at 01:01

## 2020-01-21 RX ADMIN — MIDAZOLAM 2 MG: 1 INJECTION INTRAMUSCULAR; INTRAVENOUS at 11:01

## 2020-01-21 NOTE — ANESTHESIA PREPROCEDURE EVALUATION
01/21/2020  Laron Kothari Jr. is a 49 y.o., male.    Anesthesia Evaluation    I have reviewed the Patient Summary Reports.    I have reviewed the Nursing Notes.   I have reviewed the Medications.     Review of Systems  Anesthesia Hx:  No problems with previous Anesthesia Denies Hx of Anesthetic complications  Denies Family Hx of Anesthesia complications.   Denies Personal Hx of Anesthesia complications.   Social:  Smoker, Alcohol Use  Alcohol Use:   Alcohol Abuse:   Cardiovascular:   Hypertension ECG has been reviewed.    Pulmonary:  Pulmonary Normal    Renal/:  Renal/ Normal     Hepatic/GI:  Hepatic/GI Normal    Neurological:  Neurology Normal    Endocrine:   Diabetes, poorly controlled, type 2    Psych:   Psychiatric History        Patient Active Problem List   Diagnosis    Hyperlipidemia LDL goal <70    Tobacco use disorder    Hypertension goal BP (blood pressure) < 130/80    Diabetes mellitus with microalbuminuria    Type 2 diabetes mellitus with diabetic neuropathy    Leg DVT (deep venous thromboembolism), acute, left    Blood in stool    Colon polyps    Alcohol abuse    Cholelithiases         Physical Exam  General:  Well nourished    Airway/Jaw/Neck:  Airway Findings: Mouth Opening: Normal Tongue: Normal  General Airway Assessment: Adult  Mallampati: II  TM Distance: 4 - 6 cm  Jaw/Neck Findings:  Neck ROM: Normal ROM      Dental:  Dental Findings: In tact, Upper front caps, Periodontal disease, Severe   Chest/Lungs:  Chest/Lungs Findings: Clear to auscultation, Normal Respiratory Rate     Heart/Vascular:  Heart Findings: Rate: Normal  Rhythm: Regular Rhythm  Sounds: Normal        Mental Status:  Mental Status Findings:  Cooperative, Alert and Oriented       Lab Results   Component Value Date    WBC 12.80 (H) 01/06/2020    HGB 15.7 01/06/2020    HCT 48.4 01/06/2020     (H) 01/06/2020      01/06/2020         Chemistry        Component Value Date/Time     01/06/2020 1019    K 4.3 01/06/2020 1019     01/06/2020 1019    CO2 26 01/06/2020 1019    BUN 12 01/06/2020 1019    CREATININE 1.0 01/06/2020 1019     (H) 01/06/2020 1019        Component Value Date/Time    CALCIUM 10.0 01/06/2020 1019    ALKPHOS 85 01/06/2020 1019    AST 17 01/06/2020 1019    ALT 25 01/06/2020 1019    BILITOT 0.5 01/06/2020 1019    ESTGFRAFRICA >60.0 01/06/2020 1019    EGFRNONAA >60.0 01/06/2020 1019            Anesthesia Plan  Type of Anesthesia, risks & benefits discussed:  Anesthesia Type:  general  Patient's Preference:   Intra-op Monitoring Plan: standard ASA monitors  Intra-op Monitoring Plan Comments:   Post Op Pain Control Plan: per primary service following discharge from PACU  Post Op Pain Control Plan Comments:   Induction:   IV  Beta Blocker:  Patient is not currently on a Beta-Blocker (No further documentation required).       Informed Consent: Patient understands risks and agrees with Anesthesia plan.  Questions answered. Anesthesia consent signed with patient.  ASA Score: 2     Day of Surgery Review of History & Physical: I have interviewed and examined the patient. I have reviewed the patient's H&P dated:  There are no significant changes.  H&P update referred to the surgeon.         Ready For Surgery From Anesthesia Perspective.

## 2020-01-21 NOTE — PLAN OF CARE
Pt resting on stretcher s/p xander hercules performed under general anesthesia by Dr. Rivera. Respirations even and unlabored on 98% face tent with O2 sats of 99%. Abd sites remain c/d/i. VSS. Will cont to monitor. See flow sheet for detailed assessment.

## 2020-01-21 NOTE — OR NURSING
A few minutes after extubation pt coughed out front right tooth. Extubation was complete and tooth was intact. Pt stable and sent to PACU.

## 2020-01-21 NOTE — OR NURSING
Pt top, front, right tooth placed in a specimen bag with patient label. Was handed off to PACU nurse Azul during report.

## 2020-01-21 NOTE — ANESTHESIA RELEASE NOTE
"Anesthesia Release from PACU Note    Patient: Laron Kothari Jr.    Procedure(s) Performed: Procedure(s) (LRB):  XI ROBOTIC CHOLECYSTECTOMY (N/A)    Anesthesia type: general    Post pain: Adequate analgesia    Post assessment: no apparent anesthetic complications, tolerated procedure well and no evidence of recall    Last Vitals:   Visit Vitals  BP (!) 164/92   Pulse 106   Temp 36.7 °C (98.1 °F) (Temporal)   Resp (!) 28   Ht 6' 2" (1.88 m)   Wt 110 kg (242 lb 8.1 oz)   SpO2 (!) 94%   BMI 31.14 kg/m²       Post vital signs: stable    Level of consciousness: responds to stimulation    Nausea/Vomiting: no nausea/no vomiting    Complications: none    Airway Patency: patent    Respiratory: unassisted    Cardiovascular: stable and blood pressure at baseline    Hydration: euvolemic  "

## 2020-01-21 NOTE — TRANSFER OF CARE
"Anesthesia Transfer of Care Note    Patient: Laron Kothari Jr.    Procedure(s) Performed: Procedure(s) (LRB):  XI ROBOTIC CHOLECYSTECTOMY (N/A)    Patient location: PACU    Anesthesia Type: general    Transport from OR: Transported from OR on room air with adequate spontaneous ventilation    Post pain: adequate analgesia    Post assessment: no apparent anesthetic complications    Post vital signs: stable    Level of consciousness: responds to stimulation    Nausea/Vomiting: no nausea/vomiting    Complications: none    Transfer of care protocol was followed      Last vitals:   Visit Vitals  BP (!) 164/92   Pulse 106   Temp 36.7 °C (98.1 °F) (Temporal)   Resp (!) 28   Ht 6' 2" (1.88 m)   Wt 110 kg (242 lb 8.1 oz)   SpO2 (!) 94%   BMI 31.14 kg/m²     "

## 2020-01-21 NOTE — PLAN OF CARE
AAO x 4. Ambulatory. Clear liquid diet. Pain controlled. Free from injury. Aseptic technique maintained. Glucose control. Call light in reach. Chart check completed. Family at bedside.

## 2020-01-21 NOTE — INTERVAL H&P NOTE
The patient has been examined and the H&P has been reviewed:    I concur with the findings and no changes have occurred since H&P was written.    Anesthesia/Surgery risks, benefits and alternative options discussed and understood by patient/family.          Active Hospital Problems    Diagnosis  POA    Cholelithiases [K80.20]  Yes      Resolved Hospital Problems   No resolved problems to display.

## 2020-01-21 NOTE — OP NOTE
[facility]  Surgery Department  Operative Note    SUMMARY     Date of Procedure: 1/21/2020     Procedure: Procedure(s) (LRB):  XI ROBOTIC CHOLECYSTECTOMY (N/A)     Surgeon(s) and Role:     * Sebastien Rivera MD - Primary    Assisting Surgeon: None    Pre-Operative Diagnosis: Calculus of gallbladder with biliary obstruction but without cholecystitis [K80.21]    Post-Operative Diagnosis: Post-Op Diagnosis Codes:     * Calculus of gallbladder with biliary obstruction but without cholecystitis [K80.21]    Anesthesia: General    Technical Procedures Used:     Findings:  A distended mildly inflamed gallbladder    Description of the Findings of the Procedure:     The patient was brought into the operating room and placed on the operating table in the supine position.  General endotracheal anesthesia was induced.  IV antibiotics were administered.  Pneumatic compression devices were placed in the lower extremities.  Arms were tucked at the side.  The abdomen was clipped, prepped and draped in the standard manner.   Indocyanine green was administered    A timeout was performed.    A small transverse incision was made just above the umbilicus.  The fascia was identified and elevated with Barnard clamps.  A varies needle was inserted and its position confirmed by saline drop test.  Pneumoperitoneum was established to 15 mmHg.  A 8 mm robotic trocar was inserted.  The laparoscope was inserted.  There was no evidence of a vascular or enteric injury.    Additional trochars were placed as follows an 8 mm robotic trocar was placed in the midclavicular line in the left midabdomen.  An 8 mm trocar was placed in the midclavicular line of the right midabdomen.  An 8 mm trocar was placed in the anterior axillary line of the right midabdomen.  The patient was placed in reverse Trendelenburg position and rolled to the left.  The patient was docked to the da Stan robot.    The fundus of the gallbladder was retracted  cephalad.  The gallbladder was noted to be very elongated and dilated.  To facilitate retraction visualization a small opening was made in the gallbladder and the suction  was used to remove the bile from the gallbladder.  No stones were spilled.    The gallbladder was then elevated above the liver edge to expose the infundibulum.  The infundibulum was retracted laterally.  The fire fly technology of the robot was activated in the course of the common bile duct was elucidated.      Peritoneum over the neck of the gallbladder was taken down with the hook cautery and the cystic duct was dissected circumferentially.  The cystic artery was then dissected circumferentially and the neck of the gallbladder was then dissected off the gallbladder bed.  This cleared Clark triangle of all loose areolar tissue and the critical view was obtained.    Firefly technology was used during this process and the proximal cystic duct could be seen but no dye was seen entering the gallbladder.  The course of the common bile duct was visualized    The cystic duct was clipped with 2 clips proximally 1 clip distally and transected.  The cystic artery was clipped with 2 clips proximally 1 clip distally and transected.  The hook cautery was then used to remove the gallbladder from the gallbladder bed.  A posterior cystic artery was clipped proximally and divided with electrocautery distally.    The gallbladder was nearly excised from the gallbladder bed, the gallbladder bed was inspected and hemostasis was ensured using electrocautery.  Removal of the gallbladder was then completed    The gallbladder was initially placed into a small 8 mm bag left however did not fit.  The 8 mm trocar was removed.  A small skin incision was made. A 12 mm trocar was placed and an extra-large bag was placed.  The gallbladder was placed into this and extracted through the left lower quadrant port site.    The right lateral operating arm of the robot was  then removed and an Endo Catch bag was inserted.  The gallbladder was placed in Endo Catch bag.  The patient was then undocked from the da Stan operating robot.    Once the gallbladder was extracted the perineum was closed with a suture Passer.  The fascia was closed with 0 Vicryl suture and a figure-of-eight interrupted fashion.     .  The trochars were removed under direct vision and no bleeding was noted.  The abdomen was then desufflated.  Marcaine was infiltrated.  All incisions were closed with 4-0 Monocryl in a subcuticular manner.  Dermabond was placed      Significant Surgical Tasks Conducted by the Assistant(s), if Applicable:  None    Complications: No    Estimated Blood Loss (EBL):  20 mL  Marcaine 0.25% 30 mL  IV fluids 1500 mL           Implants: * No implants in log *    Specimens:   Specimen (12h ago, onward)    None                  Condition: Stable    Disposition: PACU - hemodynamically stable.    Attestation: I performed the procedure.

## 2020-01-22 VITALS
WEIGHT: 251.31 LBS | HEART RATE: 79 BPM | DIASTOLIC BLOOD PRESSURE: 94 MMHG | SYSTOLIC BLOOD PRESSURE: 138 MMHG | OXYGEN SATURATION: 94 % | TEMPERATURE: 97 F | BODY MASS INDEX: 32.25 KG/M2 | RESPIRATION RATE: 16 BRPM | HEIGHT: 74 IN

## 2020-01-22 LAB
POCT GLUCOSE: 141 MG/DL (ref 70–110)
POCT GLUCOSE: 276 MG/DL (ref 70–110)

## 2020-01-22 PROCEDURE — 25000003 PHARM REV CODE 250: Performed by: SURGERY

## 2020-01-22 PROCEDURE — 94760 N-INVAS EAR/PLS OXIMETRY 1: CPT

## 2020-01-22 PROCEDURE — 63600175 PHARM REV CODE 636 W HCPCS: Performed by: SURGERY

## 2020-01-22 RX ORDER — BISACODYL 5 MG
10 TABLET, DELAYED RELEASE (ENTERIC COATED) ORAL ONCE
Status: COMPLETED | OUTPATIENT
Start: 2020-01-22 | End: 2020-01-22

## 2020-01-22 RX ORDER — HYDROCODONE BITARTRATE AND ACETAMINOPHEN 7.5; 325 MG/1; MG/1
1 TABLET ORAL EVERY 6 HOURS PRN
Qty: 20 TABLET | Refills: 0 | Status: SHIPPED | OUTPATIENT
Start: 2020-01-22 | End: 2020-09-09

## 2020-01-22 RX ADMIN — HYDROMORPHONE HYDROCHLORIDE 1 MG: 1 INJECTION, SOLUTION INTRAMUSCULAR; INTRAVENOUS; SUBCUTANEOUS at 10:01

## 2020-01-22 RX ADMIN — HYDROCODONE BITARTRATE AND ACETAMINOPHEN 1 TABLET: 10; 325 TABLET ORAL at 07:01

## 2020-01-22 RX ADMIN — BISACODYL 10 MG: 5 TABLET, COATED ORAL at 10:01

## 2020-01-22 RX ADMIN — PRAVASTATIN SODIUM 80 MG: 20 TABLET ORAL at 07:01

## 2020-01-22 RX ADMIN — AMITRIPTYLINE HYDROCHLORIDE 25 MG: 25 TABLET, FILM COATED ORAL at 12:01

## 2020-01-22 RX ADMIN — ENOXAPARIN SODIUM 40 MG: 100 INJECTION SUBCUTANEOUS at 10:01

## 2020-01-22 NOTE — HOSPITAL COURSE
01/22/2020.  Pain is mostly in the right lateral incision.  Tolerated wound diet.  No reported nausea vomiting

## 2020-01-22 NOTE — PLAN OF CARE
Patient remained free from injury. PRN pain meds managed pain. Blood glucose monitored. No s/s of acute distress. 12hr chart check complete.

## 2020-01-22 NOTE — PROGRESS NOTES
[facility] -   General Surgery  Progress Note    Subjective:     History of Present Illness:  49-year-old male who was seen by Gastroenterology for abdominal pain.  The abdominal pain it is across the upper abdomen on the right side.  It radiates somewhat to the flank.  He can be a cramping to sharp pain.  It can occur after eating.  A concur spontaneously.  It has woken him from his sleep.     The patient denies any darkening of his urine or changes of his stool.     He underwent a ultrasound that showed cholelithiasis and dilated bowel ducts.  An MRCP did not show any choledocholithiasis.     The patient drinks the equivalent of 48 cans of beer a week.  We discussed the fact that the alcohol in the be areas toxic to his liver in the amounts that he drinks it and that the carbohydrates in the beer are making his diabetes worse.  The patient states he drinks make a Mikalob  ultra light.  I explained to him that the amount of beer that he drinks outweighs the benefits of drinking a lower carbohydrate beer.  He drinks 24 oz cans or bottles of beer as opposed to 12 oz bottles          Post-Op Info:  Procedure(s) (LRB):  XI ROBOTIC CHOLECYSTECTOMY (N/A)   1 Day Post-Op     Interval History:  Patient is tolerated a clear liquid diet.  Who room for regular diet.  His diet is tolerated removed discharge home    Medications:  Continuous Infusions:   lactated ringers 100 mL/hr at 01/21/20 1700     Scheduled Meds:   bisacodyl  10 mg Oral Once    enoxaparin  40 mg Subcutaneous Daily    nozaseptin   Each Nostril BID    pravastatin  80 mg Oral Daily     PRN Meds:amitriptyline, Dextrose 10% Bolus, Dextrose 10% Bolus, diphenhydrAMINE, glucagon (human recombinant), glucose, glucose, HYDROcodone-acetaminophen, HYDROcodone-acetaminophen, HYDROmorphone, insulin aspart U-100, ondansetron     Review of patient's allergies indicates:  No Known Allergies  Objective:     Vital Signs (Most Recent):  Temp: 97.4 °F (36.3  °C) (01/22/20 0730)  Pulse: 79 (01/22/20 0730)  Resp: 16 (01/22/20 0730)  BP: (!) 138/94 (01/22/20 0730)  SpO2: (!) 94 % (01/22/20 0803) Vital Signs (24h Range):  Temp:  [97.4 °F (36.3 °C)-98.6 °F (37 °C)] 97.4 °F (36.3 °C)  Pulse:  [] 79  Resp:  [11-31] 16  SpO2:  [93 %-99 %] 94 %  BP: (122-164)/(73-94) 138/94     Weight: 114 kg (251 lb 5.2 oz)  Body mass index is 32.27 kg/m².    Intake/Output - Last 3 Shifts       01/20 0700 - 01/21 0659 01/21 0700 - 01/22 0659 01/22 0700 - 01/23 0659    P.O.   360    I.V. (mL/kg)  1550 (13.6)     Total Intake(mL/kg)  1550 (13.6) 360 (3.2)    Net  +1550 +360                 Physical Exam   Constitutional: He is oriented to person, place, and time. He appears well-developed and well-nourished.   HENT:   Head: Normocephalic and atraumatic.   Eyes: No scleral icterus.   Neck: Normal range of motion. Neck supple.   Cardiovascular: Normal rate.   Pulmonary/Chest: Effort normal and breath sounds normal.   Abdominal: Soft. Bowel sounds are normal.   Musculoskeletal: He exhibits no edema.   Neurological: He is alert and oriented to person, place, and time.   Skin: Skin is warm and dry. Capillary refill takes less than 2 seconds.   Psychiatric: He has a normal mood and affect. His behavior is normal. Judgment and thought content normal.       Significant Labs:  CBC: No results for input(s): WBC, RBC, HGB, HCT, PLT, MCV, MCH, MCHC in the last 168 hours.  BMP: No results for input(s): GLU, NA, K, CL, CO2, BUN, CREATININE, CALCIUM, MG in the last 168 hours.  CMP: No results for input(s): GLU, CALCIUM, ALBUMIN, PROT, NA, K, CO2, CL, BUN, CREATININE, ALKPHOS, ALT, AST, BILITOT in the last 168 hours.    Significant Diagnostics:  none    Assessment/Plan:     * Calculus of gallbladder with biliary obstruction but without cholecystitis  Postop day 1 from a robotic cholecystectomy, doing well  Dulcolax  Regular diet, diabetic  Discharge home a diet is tolerated    Type 2 diabetes mellitus with  diabetic neuropathy  Insulin sliding scale    Diabetes mellitus with microalbuminuria  Insulin sliding scale    Hypertension goal BP (blood pressure) < 130/80  Patient will resume his home medication    Tobacco use disorder  Patient should discuss the smoking habit with his primary care doctor    Hyperlipidemia LDL goal <70  Patient will resume his home medication        Sebastien Rivera MD  General Surgery  [facility]

## 2020-01-22 NOTE — NURSING
Patient assessed for diabetes educational needs following chart review  He reports was diagnosed over 6 years ago and has attended diabetes educational class in the past  He has a home glucose monitor and was checking once weekly.  Have recommended he test daily for he next few weeks due to possible elevated glucose related to surgery.  He is consistent with taking his med's  He does not identify any diabetes educational needs at this time

## 2020-01-22 NOTE — HPI
49-year-old male who was seen by Gastroenterology for abdominal pain.  The abdominal pain it is across the upper abdomen on the right side.  It radiates somewhat to the flank.  He can be a cramping to sharp pain.  It can occur after eating.  A concur spontaneously.  It has woken him from his sleep.     The patient denies any darkening of his urine or changes of his stool.     He underwent a ultrasound that showed cholelithiasis and dilated bowel ducts.  An MRCP did not show any choledocholithiasis.     The patient drinks the equivalent of 48 cans of beer a week.  We discussed the fact that the alcohol in the be areas toxic to his liver in the amounts that he drinks it and that the carbohydrates in the beer are making his diabetes worse.  The patient states he drinks make a Mikalob  ultra light.  I explained to him that the amount of beer that he drinks outweighs the benefits of drinking a lower carbohydrate beer.  He drinks 24 oz cans or bottles of beer as opposed to 12 oz bottles

## 2020-01-22 NOTE — SUBJECTIVE & OBJECTIVE
Interval History:  Patient is tolerated a clear liquid diet.  Who room for regular diet.  His diet is tolerated removed discharge home    Medications:  Continuous Infusions:   lactated ringers 100 mL/hr at 01/21/20 1700     Scheduled Meds:   bisacodyl  10 mg Oral Once    enoxaparin  40 mg Subcutaneous Daily    nozaseptin   Each Nostril BID    pravastatin  80 mg Oral Daily     PRN Meds:amitriptyline, Dextrose 10% Bolus, Dextrose 10% Bolus, diphenhydrAMINE, glucagon (human recombinant), glucose, glucose, HYDROcodone-acetaminophen, HYDROcodone-acetaminophen, HYDROmorphone, insulin aspart U-100, ondansetron     Review of patient's allergies indicates:  No Known Allergies  Objective:     Vital Signs (Most Recent):  Temp: 97.4 °F (36.3 °C) (01/22/20 0730)  Pulse: 79 (01/22/20 0730)  Resp: 16 (01/22/20 0730)  BP: (!) 138/94 (01/22/20 0730)  SpO2: (!) 94 % (01/22/20 0803) Vital Signs (24h Range):  Temp:  [97.4 °F (36.3 °C)-98.6 °F (37 °C)] 97.4 °F (36.3 °C)  Pulse:  [] 79  Resp:  [11-31] 16  SpO2:  [93 %-99 %] 94 %  BP: (122-164)/(73-94) 138/94     Weight: 114 kg (251 lb 5.2 oz)  Body mass index is 32.27 kg/m².    Intake/Output - Last 3 Shifts       01/20 0700 - 01/21 0659 01/21 0700 - 01/22 0659 01/22 0700 - 01/23 0659    P.O.   360    I.V. (mL/kg)  1550 (13.6)     Total Intake(mL/kg)  1550 (13.6) 360 (3.2)    Net  +1550 +360                 Physical Exam   Constitutional: He is oriented to person, place, and time. He appears well-developed and well-nourished.   HENT:   Head: Normocephalic and atraumatic.   Eyes: No scleral icterus.   Neck: Normal range of motion. Neck supple.   Cardiovascular: Normal rate.   Pulmonary/Chest: Effort normal and breath sounds normal.   Abdominal: Soft. Bowel sounds are normal.   Musculoskeletal: He exhibits no edema.   Neurological: He is alert and oriented to person, place, and time.   Skin: Skin is warm and dry. Capillary refill takes less than 2 seconds.   Psychiatric: He has a  normal mood and affect. His behavior is normal. Judgment and thought content normal.       Significant Labs:  CBC: No results for input(s): WBC, RBC, HGB, HCT, PLT, MCV, MCH, MCHC in the last 168 hours.  BMP: No results for input(s): GLU, NA, K, CL, CO2, BUN, CREATININE, CALCIUM, MG in the last 168 hours.  CMP: No results for input(s): GLU, CALCIUM, ALBUMIN, PROT, NA, K, CO2, CL, BUN, CREATININE, ALKPHOS, ALT, AST, BILITOT in the last 168 hours.    Significant Diagnostics:  none

## 2020-01-22 NOTE — DISCHARGE SUMMARY
[facility] -   General Surgery  Discharge Summary      Patient Name: Laron Kothari Jr.  MRN: [id number]  Admission Date: 1/21/2020  Hospital Length of Stay: 0 days  Discharge Date and Time:  01/22/2020 2:48 PM  Attending Physician: No att. providers found   Discharging Provider: Sebastien Rivera MD  Primary Care Provider: Kristen Adler DO    HPI:   49-year-old male who was seen by Gastroenterology for abdominal pain.  The abdominal pain it is across the upper abdomen on the right side.  It radiates somewhat to the flank.  He can be a cramping to sharp pain.  It can occur after eating.  A concur spontaneously.  It has woken him from his sleep.     The patient denies any darkening of his urine or changes of his stool.     He underwent a ultrasound that showed cholelithiasis and dilated bowel ducts.  An MRCP did not show any choledocholithiasis.     The patient drinks the equivalent of 48 cans of beer a week.  We discussed the fact that the alcohol in the be areas toxic to his liver in the amounts that he drinks it and that the carbohydrates in the beer are making his diabetes worse.  The patient states he drinks make a Mikalob  ultra light.  I explained to him that the amount of beer that he drinks outweighs the benefits of drinking a lower carbohydrate beer.  He drinks 24 oz cans or bottles of beer as opposed to 12 oz bottles          Procedure(s) (LRB):  XI ROBOTIC CHOLECYSTECTOMY (N/A)      Indwelling Lines/Drains at time of discharge:   Lines/Drains/Airways     None               Hospital Course: 01/22/2020.  Pain is mostly in the right lateral incision.  Tolerated wound diet.  No reported nausea vomiting    Consults:     Significant Diagnostic Studies: Labs: BMP: No results for input(s): GLU, NA, K, CL, CO2, BUN, CREATININE, CALCIUM, MG in the last 48 hours. and CBC No results for input(s): WBC, HGB, HCT, PLT in the last 48 hours.    Pending Diagnostic Studies:     Procedure Component Value Units  Date/Time    Specimen to Pathology, Surgery General Surgery [[id number]] Collected:  01/21/20 1309    Order Status:  Sent Lab Status:  In process Updated:  01/21/20 1445        Final Active Diagnoses:    Diagnosis Date Noted POA    PRINCIPAL PROBLEM:  Calculus of gallbladder with biliary obstruction but without cholecystitis [K80.21] 01/21/2020 Yes    Cholelithiases [K80.20] 01/21/2020 Yes    Hypertension goal BP (blood pressure) < 130/80 [I10] 04/14/2016 Yes     Chronic    Diabetes mellitus with microalbuminuria [E11.29, R80.9] 04/14/2016 Yes     Chronic    Type 2 diabetes mellitus with diabetic neuropathy [E11.40] 04/14/2016 Yes     Chronic    Tobacco use disorder [F17.200] 01/20/2015 Yes    Hyperlipidemia LDL goal <70 [E78.5]  Yes     Chronic      Problems Resolved During this Admission:      Discharged Condition: stable    Disposition: Home or Self Care    Follow Up:  Follow-up Information     Sebastien Rivera MD.    Specialty:  General Surgery  Why:  post op appt, or as scheduled  Contact information:  [id number]  [place] [address]  [phone]                 Patient Instructions:      Diet diabetic     Lifting restrictions   Order Comments: No lifting more than 30 pounds for 2 weeks     Notify your health care provider if you experience any of the following:  temperature >100.4     Notify your health care provider if you experience any of the following:  persistent nausea and vomiting or diarrhea     Notify your health care provider if you experience any of the following:  severe uncontrolled pain     Notify your health care provider if you experience any of the following:  redness, tenderness, or signs of infection (pain, swelling, redness, odor or green/yellow discharge around incision site)     No dressing needed     Medications:  Reconciled Home Medications:      Medication List      START taking these medications    HYDROcodone-acetaminophen 7.5-325 mg per tablet  Commonly known as:   NORCO  Take 1 tablet by mouth every 6 (six) hours as needed.     nozaseptin  nasal   Commonly known as:  NOZIN  1 each by Each Nostril route 2 (two) times daily. for 10 days        CHANGE how you take these medications    glipiZIDE 5 MG tablet  Commonly known as:  GLUCOTROL  TAKE ONE TABLET BY MOUTH ONCE DAILY WITH BREAKFAST  What changed:    · how much to take  · how to take this  · when to take this        CONTINUE taking these medications    amitriptyline 25 MG tablet  Commonly known as:  ELAVIL  Take 1 tablet (25 mg total) by mouth nightly as needed for Pain.     blood sugar diagnostic Strp  Once daily glucose testing.     lancets Misc  Commonly known as:  Lancets,Thin  Once daily glucose testing.     lisinopril 40 MG tablet  Commonly known as:  PRINIVIL,ZESTRIL  Take 1 tablet (40 mg total) by mouth once daily.     metFORMIN 1000 MG tablet  Commonly known as:  GLUCOPHAGE  Take 1 tablet (1,000 mg total) by mouth 2 (two) times daily with meals.     pravastatin 80 MG tablet  Commonly known as:  PRAVACHOL  Take 1 tablet (80 mg total) by mouth once daily.          Time spent on the discharge of patient: 14 minutes    Sebastien Rivera MD  General Surgery  [facility]

## 2020-01-22 NOTE — ASSESSMENT & PLAN NOTE
Postop day 1 from a robotic cholecystectomy, doing well  Dulcolax  Regular diet, diabetic  Discharge home a diet is tolerated

## 2020-01-22 NOTE — DISCHARGE INSTRUCTIONS
Please call for any fever, increase in pain, nausea or vomiting or redness or drainage from incision(s).    No lifting more than 30 pounds for 2 weeks    May shower     No driving if taking the pain medicine on a regular basis      If you become constipated from the pain medication you can use over the counter laxatives,  Miralax or Glycolax, or Magnesium Citrate for severe constipation.

## 2020-01-22 NOTE — PLAN OF CARE
Patient aaox3, amb, contx2, family at bedside, pain med around the clock, educated to use IS, discharge home  soon will print pankaj soon.

## 2020-01-23 LAB
FINAL PATHOLOGIC DIAGNOSIS: NORMAL
GROSS: NORMAL

## 2020-01-23 NOTE — ANESTHESIA POSTPROCEDURE EVALUATION
Anesthesia Post Evaluation    Patient: Laron Kothari Jr.    Procedure(s) Performed: Procedure(s) (LRB):  XI ROBOTIC CHOLECYSTECTOMY (N/A)    Final Anesthesia Type: general    Patient location during evaluation: PACU  Patient participation: Yes- Able to Participate  Level of consciousness: awake and alert  Post-procedure vital signs: reviewed and stable  Pain management: adequate  Airway patency: patent    PONV status at discharge: No PONV  Anesthetic complications: no      Cardiovascular status: hemodynamically stable  Respiratory status: spontaneous ventilation  Hydration status: euvolemic  Follow-up not needed.          Vitals Value Taken Time   /94 1/22/2020  7:30 AM   Temp 36.3 °C (97.4 °F) 1/22/2020  7:30 AM   Pulse 79 1/22/2020  7:30 AM   Resp 16 1/22/2020  7:30 AM   SpO2 94 % 1/22/2020  8:03 AM         Event Time     Out of Recovery 15:48:47          Pain/Ely Score: Pain Rating Prior to Med Admin: 8 (1/22/2020 10:20 AM)  Pain Rating Post Med Admin: 6 (1/22/2020 10:50 AM)  Ely Score: 10 (1/22/2020  7:01 AM)

## 2020-01-24 ENCOUNTER — PATIENT MESSAGE (OUTPATIENT)
Dept: SURGERY | Facility: CLINIC | Age: 50
End: 2020-01-24

## 2020-01-31 ENCOUNTER — TELEPHONE (OUTPATIENT)
Dept: INTERNAL MEDICINE | Facility: CLINIC | Age: 50
End: 2020-01-31

## 2020-01-31 NOTE — TELEPHONE ENCOUNTER
----- Message from Amber Logan sent at 1/31/2020  3:07 PM CST -----  Contact: SELF  WOULD LIKE CALL BACK REGARDING BEING WORKED IN CLOSER TO 1:40 APPT..[phone] (home)

## 2020-02-01 DIAGNOSIS — E78.1 HYPERTRIGLYCERIDEMIA: ICD-10-CM

## 2020-02-03 ENCOUNTER — CLINICAL SUPPORT (OUTPATIENT)
Dept: INTERNAL MEDICINE | Facility: CLINIC | Age: 50
End: 2020-02-03
Payer: COMMERCIAL

## 2020-02-03 ENCOUNTER — OFFICE VISIT (OUTPATIENT)
Dept: SURGERY | Facility: CLINIC | Age: 50
End: 2020-02-03
Payer: COMMERCIAL

## 2020-02-03 VITALS
HEART RATE: 97 BPM | TEMPERATURE: 99 F | BODY MASS INDEX: 31.25 KG/M2 | DIASTOLIC BLOOD PRESSURE: 77 MMHG | SYSTOLIC BLOOD PRESSURE: 120 MMHG | WEIGHT: 243.38 LBS

## 2020-02-03 VITALS — SYSTOLIC BLOOD PRESSURE: 112 MMHG | OXYGEN SATURATION: 98 % | DIASTOLIC BLOOD PRESSURE: 78 MMHG | HEART RATE: 103 BPM

## 2020-02-03 DIAGNOSIS — E11.40 TYPE 2 DIABETES MELLITUS WITH DIABETIC NEUROPATHY, WITHOUT LONG-TERM CURRENT USE OF INSULIN: Chronic | ICD-10-CM

## 2020-02-03 DIAGNOSIS — E78.5 HYPERLIPIDEMIA LDL GOAL <70: ICD-10-CM

## 2020-02-03 DIAGNOSIS — I10 HYPERTENSION, ESSENTIAL: Primary | ICD-10-CM

## 2020-02-03 DIAGNOSIS — Z90.49 STATUS POST LAPAROSCOPIC CHOLECYSTECTOMY: Primary | ICD-10-CM

## 2020-02-03 PROBLEM — K80.20 CHOLELITHIASES: Status: RESOLVED | Noted: 2020-01-21 | Resolved: 2020-02-03

## 2020-02-03 PROCEDURE — 99024 POSTOP FOLLOW-UP VISIT: CPT | Mod: S$GLB,,, | Performed by: SURGERY

## 2020-02-03 PROCEDURE — 99024 PR POST-OP FOLLOW-UP VISIT: ICD-10-PCS | Mod: S$GLB,,, | Performed by: SURGERY

## 2020-02-03 PROCEDURE — 99999 PR PBB SHADOW E&M-EST. PATIENT-LVL III: CPT | Mod: PBBFAC,,, | Performed by: SURGERY

## 2020-02-03 PROCEDURE — 99999 PR PBB SHADOW E&M-EST. PATIENT-LVL III: ICD-10-PCS | Mod: PBBFAC,,, | Performed by: SURGERY

## 2020-02-03 PROCEDURE — 99999 PR PBB SHADOW E&M-EST. PATIENT-LVL II: ICD-10-PCS | Mod: PBBFAC,,,

## 2020-02-03 PROCEDURE — 99999 PR PBB SHADOW E&M-EST. PATIENT-LVL II: CPT | Mod: PBBFAC,,,

## 2020-02-03 RX ORDER — PRAVASTATIN SODIUM 80 MG/1
TABLET ORAL
Qty: 30 TABLET | Refills: 6 | Status: SHIPPED | OUTPATIENT
Start: 2020-02-03 | End: 2020-02-04 | Stop reason: SDUPTHER

## 2020-02-03 NOTE — TELEPHONE ENCOUNTER
Patient came into the office for blood pressure check. Patient states that the Elavil 25 mg is not helping him with the neuropathy pain, he taking up to 2 at night with no relief. He would like to know if there is anything else for the neuropathy pain?    Patient would like to know when should he follow up?

## 2020-02-03 NOTE — PROGRESS NOTES
Patient came in for a blood pressure check. Patient denies any headaches, dizziness or other issues. Blood pressure was taken in patients right arm after sitting for about 3 min. Patient advised of the reading and was discharged.

## 2020-02-04 RX ORDER — AMITRIPTYLINE HYDROCHLORIDE 75 MG/1
75 TABLET ORAL NIGHTLY PRN
Qty: 30 TABLET | Refills: 0 | Status: SHIPPED | OUTPATIENT
Start: 2020-02-04 | End: 2020-04-06 | Stop reason: SDUPTHER

## 2020-02-04 RX ORDER — PRAVASTATIN SODIUM 80 MG/1
80 TABLET ORAL DAILY
Qty: 30 TABLET | Refills: 3 | Status: SHIPPED | OUTPATIENT
Start: 2020-02-04 | End: 2020-04-01 | Stop reason: SDUPTHER

## 2020-02-05 NOTE — PROGRESS NOTES
Subjective:       Patient ID: Laron Kothari Jr. is a 49 y.o. male.    Patient returns after cholecystectomy.      Chief Complaint: Post-op Evaluation    Patient is doing well after his cholecystectomy.  No abdominal pain no nausea or vomiting.  No loose stool    Review of Systems   Gastrointestinal: Negative for abdominal distention and abdominal pain.       Objective:      Physical Exam   Constitutional: He appears well-developed and well-nourished. No distress.   Eyes: No scleral icterus.   Cardiovascular: Normal rate, regular rhythm and normal heart sounds.   Pulmonary/Chest: Effort normal and breath sounds normal.   Abdominal: Soft. Bowel sounds are normal.   All incisions are healing well   Vitals reviewed.      Patient pathology was reviewed.  Final Pathologic Diagnosis Gallbladder (cholecystectomy):   - Chronic cholecystitis   - Cholelithiasis       Assessment:         doing well after robotic assisted cholecystectomy  Plan:       Activity as tolerated.    I once again did discuss with the patient his wife that he should limit his alcohol consumption    Follow up with surgery as needed

## 2020-02-06 DIAGNOSIS — R80.9 DIABETES MELLITUS WITH MICROALBUMINURIA: ICD-10-CM

## 2020-02-06 DIAGNOSIS — E11.40 TYPE 2 DIABETES MELLITUS WITH DIABETIC NEUROPATHY, WITHOUT LONG-TERM CURRENT USE OF INSULIN: Chronic | ICD-10-CM

## 2020-02-06 DIAGNOSIS — E11.29 DIABETES MELLITUS WITH MICROALBUMINURIA: ICD-10-CM

## 2020-02-07 RX ORDER — METFORMIN HYDROCHLORIDE 1000 MG/1
TABLET ORAL
Qty: 60 TABLET | Refills: 6 | Status: SHIPPED | OUTPATIENT
Start: 2020-02-07 | End: 2020-09-09 | Stop reason: SDUPTHER

## 2020-02-28 ENCOUNTER — PATIENT OUTREACH (OUTPATIENT)
Dept: ADMINISTRATIVE | Facility: HOSPITAL | Age: 50
End: 2020-02-28

## 2020-02-28 DIAGNOSIS — E11.40 TYPE 2 DIABETES MELLITUS WITH DIABETIC NEUROPATHY, WITHOUT LONG-TERM CURRENT USE OF INSULIN: Primary | Chronic | ICD-10-CM

## 2020-03-07 DIAGNOSIS — I10 HYPERTENSION GOAL BP (BLOOD PRESSURE) < 130/80: ICD-10-CM

## 2020-03-07 DIAGNOSIS — E11.40 TYPE 2 DIABETES MELLITUS WITH DIABETIC NEUROPATHY, WITHOUT LONG-TERM CURRENT USE OF INSULIN: Chronic | ICD-10-CM

## 2020-03-07 DIAGNOSIS — R80.9 DIABETES MELLITUS WITH MICROALBUMINURIA: ICD-10-CM

## 2020-03-07 DIAGNOSIS — E11.29 DIABETES MELLITUS WITH MICROALBUMINURIA: ICD-10-CM

## 2020-03-09 RX ORDER — LISINOPRIL 40 MG/1
TABLET ORAL
Qty: 30 TABLET | Refills: 3 | Status: SHIPPED | OUTPATIENT
Start: 2020-03-09 | End: 2020-07-23

## 2020-03-09 RX ORDER — GLIPIZIDE 5 MG/1
TABLET ORAL
Qty: 30 TABLET | Refills: 3 | Status: SHIPPED | OUTPATIENT
Start: 2020-03-09 | End: 2020-07-23

## 2020-03-31 ENCOUNTER — NURSE TRIAGE (OUTPATIENT)
Dept: ADMINISTRATIVE | Facility: CLINIC | Age: 50
End: 2020-03-31

## 2020-03-31 NOTE — TELEPHONE ENCOUNTER
Spoke with pt:s/s today. Went to work with no problems yesterday. Had sweating last night and woke with malaise. Was sent home from work. Works at [employer]; Hx DM.HTN DVT     S/s: Coughing up clear or yellow sputum. Intermittent mild short lived SOB. 3 days ago had diarrhea- 1 day and resolved.   anywhere care info given and pt verbalizes understanding. Unsure if to use- will do mychart first.    Message sent to PES agent to make mychart appointment eval of s/s including intermittent mild SOB. Started new job.    protocol instructions given, s/s to look for and to check temperature and blood glucose at least 2 times daily. Pt verbalizes understanding.         Reason for Disposition   MILD difficulty breathing (e.g., minimal/no SOB at rest, SOB with walking, pulse <100)   HIGH RISK patient (e.g., age > 64 years, diabetes, heart or lung disease, weak immune system)    Additional Information   Negative: SEVERE difficulty breathing (e.g., struggling for each breath, speaks in single words)   Negative: Difficult to awaken or acting confused (e.g., disoriented, slurred speech)   Negative: Bluish (or gray) lips or face now   Negative: Shock suspected (e.g., cold/pale/clammy skin, too weak to stand, low BP, rapid pulse)   Negative: Sounds like a life-threatening emergency to the triager   Negative: SEVERE or constant chest pain (Exception: mild central chest pain, present only when coughing)   Negative: MODERATE difficulty breathing (e.g., speaks in phrases, SOB even at rest, pulse 100-120)   Negative: Patient sounds very sick or weak to the triager   Negative: Chest pain   Negative: Fever > 103 F (39.4 C)   Negative: [1] Fever > 101 F (38.3 C) AND [2] age > 60   Negative: [1] Fever > 100.0 F (37.8 C) AND [2] bedridden (e.g., nursing home patient, CVA, chronic illness, recovering from surgery)    Protocols used: CORONAVIRUS (COVID-19) DIAGNOSED OR [id number]

## 2020-04-01 ENCOUNTER — PATIENT MESSAGE (OUTPATIENT)
Dept: INTERNAL MEDICINE | Facility: CLINIC | Age: 50
End: 2020-04-01

## 2020-04-01 ENCOUNTER — OFFICE VISIT (OUTPATIENT)
Dept: INTERNAL MEDICINE | Facility: CLINIC | Age: 50
End: 2020-04-01

## 2020-04-01 DIAGNOSIS — L81.9 DISCOLORATION OF SKIN OF TOE: ICD-10-CM

## 2020-04-01 DIAGNOSIS — I10 HYPERTENSION GOAL BP (BLOOD PRESSURE) < 130/80: ICD-10-CM

## 2020-04-01 DIAGNOSIS — J06.9 VIRAL URI WITH COUGH: Primary | ICD-10-CM

## 2020-04-01 DIAGNOSIS — E78.5 HYPERLIPIDEMIA LDL GOAL <70: ICD-10-CM

## 2020-04-01 PROCEDURE — 99214 OFFICE O/P EST MOD 30 MIN: CPT | Mod: 95,,, | Performed by: INTERNAL MEDICINE

## 2020-04-01 PROCEDURE — 99214 PR OFFICE/OUTPT VISIT, EST, LEVL IV, 30-39 MIN: ICD-10-PCS | Mod: 95,,, | Performed by: INTERNAL MEDICINE

## 2020-04-01 RX ORDER — PRAVASTATIN SODIUM 80 MG/1
80 TABLET ORAL DAILY
Qty: 30 TABLET | Refills: 3 | Status: SHIPPED | OUTPATIENT
Start: 2020-04-01 | End: 2020-09-09 | Stop reason: SDUPTHER

## 2020-04-01 NOTE — PROGRESS NOTES
Subjective:       Patient ID: Laron Kohtari Jr. is a 49 y.o. male.    Chief Complaint: Cough    The patient location is: home  The chief complaint leading to consultation is: cough  Visit type: Virtual visit with synchronous audio and video  Total time spent with patient: [id number]  Each patient to whom he or she provides medical services by telemedicine is:  (1) informed of the relationship between the physician and patient and the respective role of any other health care provider with respect to management of the patient; and (2) notified that he or she may decline to receive medical services by telemedicine and may withdraw from such care at any time.    Cough   This is a new problem. The current episode started yesterday. The problem has been unchanged. The cough is productive of sputum. Associated symptoms include rhinorrhea and a sore throat. Pertinent negatives include no chest pain, fever, myalgias or shortness of breath. Nothing aggravates the symptoms. He has tried nothing for the symptoms. There is no history of asthma or COPD.     He is also concerned about his 4th and 5th toes on the right foot being dark. He states it comes and goes. Pain comes and goes. He denies trauma.   He has uncontrolled diabetes. He has not been checking his glucoses but reports compliance with his medication.   Lab Results   Component Value Date    HGBA1C 9.0 (H) 11/29/2019     He has hypertension and hyperlipidemia. He needs a refill on pravastatin.   He is overdue for labs.     Review of Systems   Constitutional: Negative for fever.   HENT: Positive for rhinorrhea and sore throat.    Respiratory: Positive for cough. Negative for shortness of breath.    Cardiovascular: Negative for chest pain.   Musculoskeletal: Negative for gait problem and myalgias.   Skin: Positive for color change (right 4th and 5th toes).       Objective:      Physical Exam   Constitutional: He is oriented to person, place, and time. He appears  well-developed and well-nourished. No distress.   Cardiovascular:   Instructed patient on checking his right DP pulse and monitored for accuracy. He felt his pulse and states his foot temp is the same as his leg (warm to touch).    Pulmonary/Chest: Effort normal. No respiratory distress.   Musculoskeletal: He exhibits no edema.   Neurological: He is alert and oriented to person, place, and time.   Skin: No erythema.   Mild hyperpigmentation of 4th and 5th toes on the right. No erythema or visible open lesions.    Psychiatric: He has a normal mood and affect. His behavior is normal. Judgment and thought content normal.       Assessment:       1. Viral URI with cough    2. Discoloration of skin of toe    3. Uncontrolled type 2 diabetes mellitus with microalbuminuria, without long-term current use of insulin    4. Hypertension goal BP (blood pressure) < 130/80    5. Hyperlipidemia LDL goal <70        Plan:       Laron was seen today for cough.    Diagnoses and all orders for this visit:    Viral URI with cough  -     Covid 19 testing not recommended--discussed with patient  -     Recommend Coricidin HBP due to hypertension  -     Recommend increased fluids and rest  -     Handout provided     Discoloration of skin of toe  -     No sign of infection or vascular compromise  -     Monitor    Uncontrolled type 2 diabetes mellitus with microalbuminuria, without long-term current use of insulin  -     Comprehensive metabolic panel; Standing  -     Lipid panel; Standing    Hypertension goal BP (blood pressure) < 130/80  -     Comprehensive metabolic panel; Standing  -     Lipid panel; Standing    Hyperlipidemia LDL goal <70  -     pravastatin (PRAVACHOL) 80 MG tablet; Take 1 tablet (80 mg total) by mouth once daily.  -     Comprehensive metabolic panel; Standing  -     Lipid panel; Standing    Labs and f/u in 2 months and as needed.

## 2020-04-01 NOTE — LETTER
[address] ? [place], [address] ? Phone [phone] ? Fax [phone] ? [email or web address]          Return to Work/School    Patient: Laron Kothari Jr.  YOB: 1970   Date: 04/01/2020      To Whom It May Concern:     Laron Kothari Jr. was in contact with/seen in my office on 04/01/2020. COVID-19 is present in our communities across the state. There is limited testing for COVID at this time, so not all patients can be tested. In this situation, your employee meets the following criteria:     Laron Kothari Jr. has expressed a desire/need to be tested for COVID-19 virus and does have some symptoms (upper respiratory) but does not meet all the criteria for testing as defined by the Centers for Disease Control/Office of Public Health at this time. The employee can return to work once they are asymptomatic for 72 hours without the use of fever reducing medications (Tylenol, Motrin, etc) and 7 days from the initial onset of symptoms. Infection control policies of the employer should be followed, as well as good hand hygiene.     If you have any questions or concerns, or if I can be of further assistance, please do not hesitate to contact me.     Sincerely,        Kristen Adler, DO

## 2020-04-01 NOTE — PATIENT INSTRUCTIONS
Viral Upper Respiratory Illness (Adult)  You have a viral upper respiratory illness (URI), which is another term for the common cold. This illness is contagious during the first few days. It is spread through the air by coughing and sneezing. It may also be spread by direct contact (touching the sick person and then touching your own eyes, nose, or mouth). Frequent handwashing will decrease risk of spread. Most viral illnesses go away within 7 to 10 days with rest and simple home remedies. Sometimes the illness may last for several weeks. Antibiotics will not kill a virus, and they are generally not prescribed for this condition.    Home care  · If symptoms are severe, rest at home for the first 2 to 3 days. When you resume activity, don't let yourself get too tired.  · Avoid being exposed to cigarette smoke (yours or others).  · You may use acetaminophen or ibuprofen to control pain and fever, unless another medicine was prescribed. (Note: If you have chronic liver or kidney disease, have ever had a stomach ulcer or gastrointestinal bleeding, or are taking blood-thinning medicines, talk with your healthcare provider before using these medicines.) Aspirin should never be given to anyone under 18 years of age who is ill with a viral infection or fever. It may cause severe liver or brain damage.  · Your appetite may be poor, so a light diet is fine. Avoid dehydration by drinking 6 to 8 glasses of fluids per day (water, soft drinks, juices, tea, or soup). Extra fluids will help loosen secretions in the nose and lungs.  · Over-the-counter cold medicines will not shorten the length of time youre sick, but they may be helpful for the following symptoms: cough, sore throat, and nasal and sinus congestion. (Note: Do not use decongestants if you have high blood pressure.)  Follow-up care  Follow up with your healthcare provider, or as advised.  When to seek medical advice  Call your healthcare provider right away if any  of these occur:  · Cough with lots of colored sputum (mucus)  · Severe headache; face, neck, or ear pain  · Difficulty swallowing due to throat pain  · Fever of 100.4°F (38°C)  Call 911, or get immediate medical care  Call emergency services right away if any of these occur:  · Chest pain, shortness of breath, wheezing, or difficulty breathing  · Coughing up blood  · Inability to swallow due to throat pain  Date Last Reviewed: 9/13/2015  © 2588-0251 [employer]. [address] [place] [address]. All rights reserved. This information is not intended as a substitute for professional medical care. Always follow your healthcare professional's instructions.

## 2020-04-06 ENCOUNTER — TELEPHONE (OUTPATIENT)
Dept: INTERNAL MEDICINE | Facility: CLINIC | Age: 50
End: 2020-04-06

## 2020-04-06 DIAGNOSIS — E11.40 TYPE 2 DIABETES MELLITUS WITH DIABETIC NEUROPATHY, WITHOUT LONG-TERM CURRENT USE OF INSULIN: Chronic | ICD-10-CM

## 2020-04-06 DIAGNOSIS — R05.9 COUGH: Primary | ICD-10-CM

## 2020-04-06 RX ORDER — AMITRIPTYLINE HYDROCHLORIDE 75 MG/1
75 TABLET ORAL NIGHTLY PRN
Qty: 30 TABLET | Refills: 0 | Status: SHIPPED | OUTPATIENT
Start: 2020-04-06 | End: 2020-09-09 | Stop reason: SDUPTHER

## 2020-04-06 NOTE — TELEPHONE ENCOUNTER
----- Message from Sanjiv Dhaliwal sent at 4/6/2020  1:15 PM CDT -----  ..Type:  Needs Medical Advice    Who Called:  Pt   Symptoms (please be specific):  Cough   How long has patient had these symptoms:   7 days   Pharmacy name and phone #: .  [id number] - [place], [place] - [address] [address]  [address] [facility] [address]  Phone: [phone] Fax: [phone]    [facility] [address] [facility]  [address] [facility] [address]  Phone: [phone] Fax: [phone]    [facility] [address] [facility] [address] [facility]  [address] [facility] [address]  Phone: [phone] Fax: [phone]    Would the patient rather a call back or a response via [facility]? Call back   Best Call Back Number: [phone]  Additional Information: Pt is requesting a call pt get a prescription for a cough

## 2020-04-06 NOTE — TELEPHONE ENCOUNTER
Patient states he continues to have a cough with chest congestion and a stuffy nose since last week. Patient states he has tried OTC Theraflu and Vitamins and this is not helping. Patient denies fever due to not having a thermometer. Please advise.

## 2020-04-07 RX ORDER — PROMETHAZINE HYDROCHLORIDE AND DEXTROMETHORPHAN HYDROBROMIDE 6.25; 15 MG/5ML; MG/5ML
5 SYRUP ORAL EVERY 8 HOURS PRN
Qty: 118 ML | Refills: 0 | Status: SHIPPED | OUTPATIENT
Start: 2020-04-07 | End: 2020-04-17

## 2020-04-07 NOTE — TELEPHONE ENCOUNTER
Patient notified of prescription that was sent to the pharmacy. Advised to call the office as needed, patient verbalized understanding.

## 2020-09-03 ENCOUNTER — PATIENT OUTREACH (OUTPATIENT)
Dept: ADMINISTRATIVE | Facility: HOSPITAL | Age: 50
End: 2020-09-03

## 2020-09-03 NOTE — PROGRESS NOTES
Working uncontrolled HGA1C report.  attempted to call pt to schedule labs. Someone answered and hung up. Sent portal message.

## 2020-09-09 ENCOUNTER — OFFICE VISIT (OUTPATIENT)
Dept: INTERNAL MEDICINE | Facility: CLINIC | Age: 50
End: 2020-09-09
Payer: COMMERCIAL

## 2020-09-09 ENCOUNTER — HOSPITAL ENCOUNTER (OUTPATIENT)
Dept: RADIOLOGY | Facility: HOSPITAL | Age: 50
Discharge: HOME | End: 2020-09-09
Attending: INTERNAL MEDICINE
Payer: COMMERCIAL

## 2020-09-09 VITALS
WEIGHT: 242.06 LBS | HEIGHT: 74 IN | OXYGEN SATURATION: 95 % | TEMPERATURE: 98 F | DIASTOLIC BLOOD PRESSURE: 88 MMHG | SYSTOLIC BLOOD PRESSURE: 132 MMHG | HEART RATE: 88 BPM | BODY MASS INDEX: 31.07 KG/M2

## 2020-09-09 DIAGNOSIS — E78.5 HYPERLIPIDEMIA LDL GOAL <70: ICD-10-CM

## 2020-09-09 DIAGNOSIS — M25.561 ACUTE PAIN OF RIGHT KNEE: ICD-10-CM

## 2020-09-09 DIAGNOSIS — M25.561 ACUTE PAIN OF RIGHT KNEE: Primary | ICD-10-CM

## 2020-09-09 DIAGNOSIS — I10 HYPERTENSION GOAL BP (BLOOD PRESSURE) < 130/80: ICD-10-CM

## 2020-09-09 PROCEDURE — 99999 PR PBB SHADOW E&M-EST. PATIENT-LVL IV: CPT | Mod: PBBFAC,,, | Performed by: INTERNAL MEDICINE

## 2020-09-09 PROCEDURE — 99999 PR PBB SHADOW E&M-EST. PATIENT-LVL IV: ICD-10-PCS | Mod: PBBFAC,,, | Performed by: INTERNAL MEDICINE

## 2020-09-09 PROCEDURE — 99214 PR OFFICE/OUTPT VISIT, EST, LEVL IV, 30-39 MIN: ICD-10-PCS | Mod: S$PBB,,, | Performed by: INTERNAL MEDICINE

## 2020-09-09 PROCEDURE — 73560 X-RAY EXAM OF KNEE 1 OR 2: CPT | Mod: TC,LT

## 2020-09-09 PROCEDURE — 99214 OFFICE O/P EST MOD 30 MIN: CPT | Mod: PBBFAC,25 | Performed by: INTERNAL MEDICINE

## 2020-09-09 PROCEDURE — 73560 X-RAY EXAM OF KNEE 1 OR 2: CPT | Mod: 26,59,LT, | Performed by: RADIOLOGY

## 2020-09-09 PROCEDURE — 99214 OFFICE O/P EST MOD 30 MIN: CPT | Mod: S$PBB,,, | Performed by: INTERNAL MEDICINE

## 2020-09-09 PROCEDURE — 73562 X-RAY EXAM OF KNEE 3: CPT | Mod: TC,RT

## 2020-09-09 PROCEDURE — 73562 XR KNEE ORTHO RIGHT: ICD-10-PCS | Mod: 26,RT,, | Performed by: RADIOLOGY

## 2020-09-09 PROCEDURE — 73560 XR KNEE ORTHO RIGHT: ICD-10-PCS | Mod: 26,59,LT, | Performed by: RADIOLOGY

## 2020-09-09 PROCEDURE — 73562 X-RAY EXAM OF KNEE 3: CPT | Mod: 26,RT,, | Performed by: RADIOLOGY

## 2020-09-09 RX ORDER — AMITRIPTYLINE HYDROCHLORIDE 75 MG/1
75 TABLET ORAL NIGHTLY PRN
Qty: 30 TABLET | Refills: 3 | Status: SHIPPED | OUTPATIENT
Start: 2020-09-09 | End: 2022-01-18

## 2020-09-09 RX ORDER — LISINOPRIL 40 MG/1
40 TABLET ORAL DAILY
Qty: 30 TABLET | Refills: 6 | Status: SHIPPED | OUTPATIENT
Start: 2020-09-09 | End: 2021-08-03

## 2020-09-09 RX ORDER — PRAVASTATIN SODIUM 80 MG/1
80 TABLET ORAL DAILY
Qty: 30 TABLET | Refills: 6 | Status: SHIPPED | OUTPATIENT
Start: 2020-09-09 | End: 2021-03-11 | Stop reason: SDUPTHER

## 2020-09-09 RX ORDER — GLIPIZIDE 5 MG/1
5 TABLET ORAL DAILY
Qty: 30 TABLET | Refills: 6 | Status: SHIPPED | OUTPATIENT
Start: 2020-09-09 | End: 2021-08-03

## 2020-09-09 RX ORDER — METFORMIN HYDROCHLORIDE 1000 MG/1
1000 TABLET ORAL 2 TIMES DAILY WITH MEALS
Qty: 60 TABLET | Refills: 6 | Status: SHIPPED | OUTPATIENT
Start: 2020-09-09 | End: 2021-09-07 | Stop reason: SDUPTHER

## 2020-09-09 NOTE — PROGRESS NOTES
Subjective:       Patient ID: Laron Kothari Jr. is a 50 y.o. male.    Chief Complaint: Knee Pain    Knee Pain   There was no injury mechanism. The pain is present in the right knee. The quality of the pain is described as aching. The pain is moderate. The pain has been intermittent since onset. Pertinent negatives include no inability to bear weight, loss of motion or loss of sensation. He reports no foreign bodies present. The symptoms are aggravated by movement and palpation. He has tried nothing for the symptoms.     He is due for labs and needs refills.     Diabetes--uncontrolled. He is compliant with metformin and glipizide.     Neuropathy--stable on amitriptyline.     Microalbuminuria--compliant with lisinopril. He denies taking NSAID's.     HTN--stable on lisinopril.     HLD--compliant with pravastatin.     Review of Systems   Constitutional: Negative for fever.   Respiratory: Negative for shortness of breath.    Cardiovascular: Negative for chest pain.   Musculoskeletal: Positive for arthralgias and joint swelling.   Psychiatric/Behavioral: Negative for sleep disturbance.         Objective:      Physical Exam  Vitals signs reviewed.   Constitutional:       General: He is not in acute distress.     Appearance: He is well-developed. He is not ill-appearing.   Cardiovascular:      Rate and Rhythm: Normal rate.   Pulmonary:      Effort: Pulmonary effort is normal. No respiratory distress.   Musculoskeletal:      Right knee: He exhibits decreased range of motion and swelling. He exhibits no effusion, no ecchymosis, no deformity, no laceration, no erythema and no bony tenderness. Tenderness found. Lateral joint line tenderness noted.   Neurological:      Mental Status: He is alert and oriented to person, place, and time.   Psychiatric:         Behavior: Behavior normal.         Thought Content: Thought content normal.         Judgment: Judgment normal.         Assessment:       1. Acute pain of right knee    2.  Uncontrolled type 2 diabetes mellitus with diabetic neuropathy, without long-term current use of insulin    3. Uncontrolled type 2 diabetes mellitus with microalbuminuria, without long-term current use of insulin    4. Hypertension goal BP (blood pressure) < 130/80    5. Hyperlipidemia LDL goal <70        Plan:       Laron was seen today for knee pain.    Diagnoses and all orders for this visit:    Acute pain of right knee  -     X-ray Knee Ortho Right; Future  -     Sedimentation rate; Future  -     CBC auto differential; Future    Uncontrolled type 2 diabetes mellitus with diabetic neuropathy, without long-term current use of insulin  -     Hemoglobin A1C; Standing  -     Microalbumin/creatinine urine ratio; Future  -     amitriptyline (ELAVIL) 75 MG tablet; Take 1 tablet (75 mg total) by mouth nightly as needed for Pain.  -     metFORMIN (GLUCOPHAGE) 1000 MG tablet; Take 1 tablet (1,000 mg total) by mouth 2 (two) times daily with meals.  -     glipiZIDE (GLUCOTROL) 5 MG tablet; Take 1 tablet (5 mg total) by mouth once daily.    Uncontrolled type 2 diabetes mellitus with microalbuminuria, without long-term current use of insulin  -     Hemoglobin A1C; Standing  -     Microalbumin/creatinine urine ratio; Future  -     metFORMIN (GLUCOPHAGE) 1000 MG tablet; Take 1 tablet (1,000 mg total) by mouth 2 (two) times daily with meals.  -     lisinopriL (PRINIVIL,ZESTRIL) 40 MG tablet; Take 1 tablet (40 mg total) by mouth once daily.  -     glipiZIDE (GLUCOTROL) 5 MG tablet; Take 1 tablet (5 mg total) by mouth once daily.  -     Microalbumin/creatinine urine ratio    Hypertension goal BP (blood pressure) < 130/80  -     lisinopriL (PRINIVIL,ZESTRIL) 40 MG tablet; Take 1 tablet (40 mg total) by mouth once daily.    Hyperlipidemia LDL goal <70  -     pravastatin (PRAVACHOL) 80 MG tablet; Take 1 tablet (80 mg total) by mouth once daily.    Labs and x-ray today.

## 2020-09-09 NOTE — PATIENT INSTRUCTIONS
Knee Pain  Knee pain is very common. Its especially common in active people who put a lot of pressure on their knees, like runners. It affects women more often than men.  Your kneecap (patella) is a thick, round bone. It covers and protects the front portion of your knee joint. It moves along a groove in your thighbone (femur) as part of the patellofemoral joint. A layer of cartilage surrounds the underside of your kneecap. This layer protects it from grinding against your femur.  When this cartilage softens and breaks down, it can cause knee pain. This is partly because of repetitive stress. The stress irritates the lining of the joint. This causes pain in the underlying bone.  What causes knee pain?  Many things can cause knee pain. You may have more than one cause. Some of these include:  · Overuse of the knee joint  · The kneecap doesnt line up with the tissue around it  · Damage to small nerves in the area  · Damage to the ligament-like structure that holds the kneecap in place (retinaculum)  · Breakdown of the bone under the cartilage  · Swelling in the soft tissues around the kneecap  · Injury  You might be more likely to have knee pain if you:  · Exercise a lot  · Recently increased the intensity of your workouts  · Have a body mass index (BMI) greater than 25  · Have poor alignment of your kneecap  · Walk with your feet turned overly outward or inward  · Have weakness in surrounding muscle groups (inner quad or hip adductor muscles)  · Have too much tightness in surrounding muscle groups (hamstrings or iliotibial band)  · Have a recent history of injury to the area  · Are female  Symptoms of knee pain  This type of knee pain is a dull, aching pain in the front of the knee in the area under and around the kneecap. This pain may start quickly or slowly. Your pain might be worse when you squat, run, or sit for a long time. You might also sometimes feel like your knee is giving out. You may have symptoms in  one or both of your knees.  Diagnosing knee pain  Your healthcare provider will ask about your medical history and your symptoms. Be sure to describe any activities that make your knee pain worse. He or she will look at your knee. This will include tests of your range of motion, strength, and areas of pain of your knee. Your knee alignment will be checked.  Your healthcare provider will need to rule out other causes of your knee pain, such as arthritis. You may need an imaging test, such as an X-ray or MRI.  Treatment for knee pain  Treatments that can help ease your symptoms may include:  · Avoiding activities for a while that make your pain worse, returning to activity over time  · Icing the outside of your knee when it causes you pain  · Taking over-the-counter pain medicine  · Wearing a knee brace or taping your knee to support it  · Wearing special shoe inserts to help keep your feet in the proper alignment  · Doing special exercises to stretch and strengthen the muscles around your hip and your knee  These steps help most people manage knee pain. But some cases of knee pain need to be treated with surgery. You may need surgery right away. Or you may need it later if other treatments dont work. Your healthcare provider may refer you to an orthopedic surgeon. He or she will talk with you about your choices.  Preventing knee pain  Losing weight and correcting excess muscle tightness or muscle weakness may help lower your risk.  In some cases, you can prevent knee pain. To help prevent a flare-up of knee pain, you do these things:  · Regularly do all the exercises your doctor or physical therapist advises  · Support your knee as advised by your doctor or physical therapist  · Increase training gradually, and ease up on training when needed  · Have an expert check your gait for running or other sporting activities  · Stretch properly before and after exercise  · Replace your running shoes regularly  · Lose excess  weight     When to call your healthcare provider  Call your healthcare provider right away if:  · Your symptoms dont get better after a few weeks of treatment  · You have any new symptoms   Date Last Reviewed: 4/1/2017  © 2303-4543 [employer]. [address] [place] [address], [place] [address]. All rights reserved. This information is not intended as a substitute for professional medical care. Always follow your healthcare professional's instructions.

## 2020-09-10 ENCOUNTER — TELEPHONE (OUTPATIENT)
Dept: INTERNAL MEDICINE | Facility: CLINIC | Age: 50
End: 2020-09-10

## 2020-09-10 RX ORDER — PREDNISONE 20 MG/1
TABLET ORAL
Qty: 11 TABLET | Refills: 0 | Status: SHIPPED | OUTPATIENT
Start: 2020-09-10 | End: 2020-10-20 | Stop reason: ALTCHOICE

## 2020-09-17 ENCOUNTER — OFFICE VISIT (OUTPATIENT)
Dept: INTERNAL MEDICINE | Facility: CLINIC | Age: 50
End: 2020-09-17
Payer: COMMERCIAL

## 2020-09-17 VITALS
OXYGEN SATURATION: 97 % | HEIGHT: 74 IN | TEMPERATURE: 98 F | BODY MASS INDEX: 30.92 KG/M2 | SYSTOLIC BLOOD PRESSURE: 100 MMHG | WEIGHT: 240.94 LBS | DIASTOLIC BLOOD PRESSURE: 74 MMHG | HEART RATE: 93 BPM

## 2020-09-17 DIAGNOSIS — E11.29 DIABETES MELLITUS WITH MICROALBUMINURIA: ICD-10-CM

## 2020-09-17 DIAGNOSIS — M25.561 ACUTE PAIN OF RIGHT KNEE: Primary | ICD-10-CM

## 2020-09-17 DIAGNOSIS — R80.9 DIABETES MELLITUS WITH MICROALBUMINURIA: ICD-10-CM

## 2020-09-17 PROCEDURE — 3044F HG A1C LEVEL LT 7.0%: CPT | Mod: CPTII,S$GLB,, | Performed by: INTERNAL MEDICINE

## 2020-09-17 PROCEDURE — 3008F BODY MASS INDEX DOCD: CPT | Mod: CPTII,S$GLB,, | Performed by: INTERNAL MEDICINE

## 2020-09-17 PROCEDURE — 3008F PR BODY MASS INDEX (BMI) DOCUMENTED: ICD-10-PCS | Mod: CPTII,S$GLB,, | Performed by: INTERNAL MEDICINE

## 2020-09-17 PROCEDURE — 99213 PR OFFICE/OUTPT VISIT, EST, LEVL III, 20-29 MIN: ICD-10-PCS | Mod: S$GLB,,, | Performed by: INTERNAL MEDICINE

## 2020-09-17 PROCEDURE — 3074F PR MOST RECENT SYSTOLIC BLOOD PRESSURE < 130 MM HG: ICD-10-PCS | Mod: CPTII,S$GLB,, | Performed by: INTERNAL MEDICINE

## 2020-09-17 PROCEDURE — 3078F PR MOST RECENT DIASTOLIC BLOOD PRESSURE < 80 MM HG: ICD-10-PCS | Mod: CPTII,S$GLB,, | Performed by: INTERNAL MEDICINE

## 2020-09-17 PROCEDURE — 99999 PR PBB SHADOW E&M-EST. PATIENT-LVL IV: CPT | Mod: PBBFAC,,, | Performed by: INTERNAL MEDICINE

## 2020-09-17 PROCEDURE — 3078F DIAST BP <80 MM HG: CPT | Mod: CPTII,S$GLB,, | Performed by: INTERNAL MEDICINE

## 2020-09-17 PROCEDURE — 99999 PR PBB SHADOW E&M-EST. PATIENT-LVL IV: ICD-10-PCS | Mod: PBBFAC,,, | Performed by: INTERNAL MEDICINE

## 2020-09-17 PROCEDURE — 3044F PR MOST RECENT HEMOGLOBIN A1C LEVEL <7.0%: ICD-10-PCS | Mod: CPTII,S$GLB,, | Performed by: INTERNAL MEDICINE

## 2020-09-17 PROCEDURE — 99213 OFFICE O/P EST LOW 20 MIN: CPT | Mod: S$GLB,,, | Performed by: INTERNAL MEDICINE

## 2020-09-17 PROCEDURE — 3074F SYST BP LT 130 MM HG: CPT | Mod: CPTII,S$GLB,, | Performed by: INTERNAL MEDICINE

## 2020-09-17 RX ORDER — NAPROXEN 500 MG/1
500 TABLET ORAL 2 TIMES DAILY PRN
COMMUNITY
Start: 2020-09-16 | End: 2020-09-17 | Stop reason: ALTCHOICE

## 2020-09-17 RX ORDER — ACETAMINOPHEN AND CODEINE PHOSPHATE 300; 30 MG/1; MG/1
1 TABLET ORAL EVERY 6 HOURS PRN
COMMUNITY
Start: 2020-09-16 | End: 2020-10-20 | Stop reason: SDUPTHER

## 2020-09-17 NOTE — PROGRESS NOTES
Subjective:       Patient ID: Laron Kothari Jr. is a 50 y.o. male.    Chief Complaint: Knee Pain    Laron Kothari Jr.  50 y.o. Black or  male    Patient presents with:  Knee Pain    HPI: Presents to the clinic with complaint of worsening right knee pain. He was seen a week ago and prescribed prednisone. He is nearly finished and feel it has not helped. He went to urgent care yesterday and had another knee x-ray. It did not show any acute findings. He was prescribed naproxen and Tylenol #3. He has not taken either yet.   He has diabetes with microalbuminuria.     Past Medical History:  8/7/2018: Blood in stool  2017: Deep vein thrombosis (DVT)      Comment:  Left leg  2013: Diabetes mellitus, type 2  Gout  Hyperlipidemia  Hypertension  Neuropathy    Current Outpatient Medications on File Prior to Visit:  acetaminophen-codeine 300-30mg (TYLENOL #3) 300-30 mg Tab, Take 1 tablet by mouth every 6 (six) hours as needed., Disp: , Rfl:   amitriptyline (ELAVIL) 75 MG tablet, Take 1 tablet (75 mg total) by mouth nightly as needed for Pain., Disp: 30 tablet, Rfl: 3  glipiZIDE (GLUCOTROL) 5 MG tablet, Take 1 tablet (5 mg total) by mouth once daily., Disp: 30 tablet, Rfl: 6  lisinopriL (PRINIVIL,ZESTRIL) 40 MG tablet, Take 1 tablet (40 mg total) by mouth once daily., Disp: 30 tablet, Rfl: 6  metFORMIN (GLUCOPHAGE) 1000 MG tablet, Take 1 tablet (1,000 mg total) by mouth 2 (two) times daily with meals., Disp: 60 tablet, Rfl: 6  pravastatin (PRAVACHOL) 80 MG tablet, Take 1 tablet (80 mg total) by mouth once daily., Disp: 30 tablet, Rfl: 6  predniSONE (DELTASONE) 20 MG tablet, One tablet BID x 3 days, one tablet daily x 3 days then 1/2 tablet daily x 4 days., Disp: 11 tablet, Rfl: 0  naproxen (NAPROSYN) 500 MG tablet, Take 500 mg by mouth 2 (two) times daily as needed., Disp: , Rfl:     Allergies:  Review of patient's allergies indicates:  No Known Allergies        Review of Systems   Constitutional: Negative for  fever.   Musculoskeletal: Positive for arthralgias and joint swelling.         Objective:      Physical Exam  Constitutional:       General: He is not in acute distress.     Appearance: He is well-developed.   Pulmonary:      Effort: Pulmonary effort is normal. No respiratory distress.   Musculoskeletal:      Right knee: He exhibits no swelling, no ecchymosis, no laceration and no erythema.   Neurological:      Mental Status: He is alert and oriented to person, place, and time.   Psychiatric:         Behavior: Behavior normal.         Thought Content: Thought content normal.         Judgment: Judgment normal.         Assessment:       1. Acute pain of right knee    2. Diabetes mellitus with microalbuminuria        Plan:       Laron was seen today for knee pain.    Diagnoses and all orders for this visit:    Acute pain of right knee  -     Continue Tylenol # 3  -     Recommend ice and compression   -     Ambulatory referral/consult to Orthopedics; Future    Diabetes mellitus with microalbuminuria  -     Advised to avoid NSAID's    F/U as needed.

## 2020-09-18 ENCOUNTER — HOSPITAL ENCOUNTER (OUTPATIENT)
Dept: RADIOLOGY | Facility: HOSPITAL | Age: 50
Discharge: HOME | End: 2020-09-18
Attending: PHYSICIAN ASSISTANT
Payer: COMMERCIAL

## 2020-09-18 ENCOUNTER — OFFICE VISIT (OUTPATIENT)
Dept: ORTHOPEDICS | Facility: CLINIC | Age: 50
End: 2020-09-18
Payer: COMMERCIAL

## 2020-09-18 ENCOUNTER — PATIENT OUTREACH (OUTPATIENT)
Dept: ADMINISTRATIVE | Facility: OTHER | Age: 50
End: 2020-09-18

## 2020-09-18 ENCOUNTER — PATIENT MESSAGE (OUTPATIENT)
Dept: ORTHOPEDICS | Facility: CLINIC | Age: 50
End: 2020-09-18

## 2020-09-18 VITALS
HEART RATE: 93 BPM | WEIGHT: 240.94 LBS | HEIGHT: 74 IN | SYSTOLIC BLOOD PRESSURE: 133 MMHG | DIASTOLIC BLOOD PRESSURE: 85 MMHG | BODY MASS INDEX: 30.92 KG/M2

## 2020-09-18 DIAGNOSIS — M25.561 RIGHT KNEE PAIN, UNSPECIFIED CHRONICITY: ICD-10-CM

## 2020-09-18 DIAGNOSIS — M25.561 RIGHT KNEE PAIN, UNSPECIFIED CHRONICITY: Primary | ICD-10-CM

## 2020-09-18 DIAGNOSIS — M25.461 EFFUSION OF RIGHT KNEE: Primary | ICD-10-CM

## 2020-09-18 DIAGNOSIS — M25.561 ACUTE PAIN OF RIGHT KNEE: ICD-10-CM

## 2020-09-18 PROCEDURE — 3075F PR MOST RECENT SYSTOLIC BLOOD PRESS GE 130-139MM HG: ICD-10-PCS | Mod: CPTII,S$GLB,, | Performed by: PHYSICIAN ASSISTANT

## 2020-09-18 PROCEDURE — 73565 X-RAY EXAM OF KNEES: CPT | Mod: 26,,, | Performed by: RADIOLOGY

## 2020-09-18 PROCEDURE — 3008F BODY MASS INDEX DOCD: CPT | Mod: CPTII,S$GLB,, | Performed by: PHYSICIAN ASSISTANT

## 2020-09-18 PROCEDURE — 73565 XR KNEE AP STANDING BILATERAL: ICD-10-PCS | Mod: 26,,, | Performed by: RADIOLOGY

## 2020-09-18 PROCEDURE — 99204 PR OFFICE/OUTPT VISIT, NEW, LEVL IV, 45-59 MIN: ICD-10-PCS | Mod: 25,S$GLB,, | Performed by: PHYSICIAN ASSISTANT

## 2020-09-18 PROCEDURE — 3079F PR MOST RECENT DIASTOLIC BLOOD PRESSURE 80-89 MM HG: ICD-10-PCS | Mod: CPTII,S$GLB,, | Performed by: PHYSICIAN ASSISTANT

## 2020-09-18 PROCEDURE — 99204 OFFICE O/P NEW MOD 45 MIN: CPT | Mod: 25,S$GLB,, | Performed by: PHYSICIAN ASSISTANT

## 2020-09-18 PROCEDURE — 3075F SYST BP GE 130 - 139MM HG: CPT | Mod: CPTII,S$GLB,, | Performed by: PHYSICIAN ASSISTANT

## 2020-09-18 PROCEDURE — 3079F DIAST BP 80-89 MM HG: CPT | Mod: CPTII,S$GLB,, | Performed by: PHYSICIAN ASSISTANT

## 2020-09-18 PROCEDURE — 73565 X-RAY EXAM OF KNEES: CPT | Mod: TC

## 2020-09-18 PROCEDURE — 3008F PR BODY MASS INDEX (BMI) DOCUMENTED: ICD-10-PCS | Mod: CPTII,S$GLB,, | Performed by: PHYSICIAN ASSISTANT

## 2020-09-18 PROCEDURE — 99999 PR PBB SHADOW E&M-EST. PATIENT-LVL IV: CPT | Mod: PBBFAC,,, | Performed by: PHYSICIAN ASSISTANT

## 2020-09-18 PROCEDURE — 20610 DRAIN/INJ JOINT/BURSA W/O US: CPT | Mod: RT,S$GLB,, | Performed by: PHYSICIAN ASSISTANT

## 2020-09-18 PROCEDURE — 99999 PR PBB SHADOW E&M-EST. PATIENT-LVL IV: ICD-10-PCS | Mod: PBBFAC,,, | Performed by: PHYSICIAN ASSISTANT

## 2020-09-18 PROCEDURE — 20610 LARGE JOINT ASPIRATION/INJECTION: R KNEE: ICD-10-PCS | Mod: RT,S$GLB,, | Performed by: PHYSICIAN ASSISTANT

## 2020-09-18 RX ORDER — MELOXICAM 15 MG/1
15 TABLET ORAL DAILY
Qty: 30 TABLET | Refills: 1 | Status: SHIPPED | OUTPATIENT
Start: 2020-09-18 | End: 2021-03-11

## 2020-09-18 RX ORDER — METHYLPREDNISOLONE ACETATE 80 MG/ML
80 INJECTION, SUSPENSION INTRA-ARTICULAR; INTRALESIONAL; INTRAMUSCULAR; SOFT TISSUE
Status: DISCONTINUED | OUTPATIENT
Start: 2020-09-18 | End: 2020-09-18 | Stop reason: HOSPADM

## 2020-09-18 RX ADMIN — METHYLPREDNISOLONE ACETATE 80 MG: 80 INJECTION, SUSPENSION INTRA-ARTICULAR; INTRALESIONAL; INTRAMUSCULAR; SOFT TISSUE at 10:09

## 2020-09-18 NOTE — TELEPHONE ENCOUNTER
[facility].  The x-ray you took today is relatively stable to prior x-ray.  You do have minimal arthritis noted on the inside of the right knee.  Certainly, it is not bad.  Left plan to follow up in 1 month as scheduled.  Hopefully that injection will really help over the weekend.  Blanca Nix PA-C  [facility]  [facility]

## 2020-09-18 NOTE — PROGRESS NOTES
Health Maintenance Due   Topic Date Due    HIV Screening  06/10/1985    Influenza Vaccine (1) 08/01/2020     Updates were requested from care everywhere.  Chart was reviewed for overdue Proactive [facility] Encounters (YVETTE) topics (CRS, Breast Cancer Screening, Eye exam)  Health Maintenance has been updated.  LINKS immunization registry triggered.  Immunizations were reconciled.

## 2020-09-18 NOTE — PROGRESS NOTES
Subjective:      Patient ID: Laron Kothari Jr. is a 50 y.o. male.    Chief Complaint: Pain of the Right Knee      HPI: Laron Kothari Jr.  is a 50 y.o. male who c/o Pain of the Right Knee   for duration of 3 weeks.  He denies any inciting injury.  Pain level is 8/10.  He complains of associated swelling and stiffness.  He feels like the knee wants to give out on him as well.  Quality is aching and sharp.  He has pain from the foot radiating up the leg to his right buttock.  No history of gout to his knowledge.  Improved at rest.  Worsened with weight-bearing and flexion.  Also worsened with pivoting and twisting.    Past Medical History:   Diagnosis Date    Blood in stool 8/7/2018    Deep vein thrombosis (DVT) 2017    Left leg    Diabetes mellitus, type 2 2013    DM (diabetes mellitus) 2013    BS didn't check 01/03/2020    Gout     Hyperlipidemia     Hypertension     Neuropathy      Past Surgical History:   Procedure Laterality Date    APPENDECTOMY      CHOLECYSTECTOMY  01/2020    Gal bladder removal    COLONOSCOPY N/A 8/7/2018    Procedure: COLONOSCOPY;  Surgeon: Ten Valentine MD;  Location: [facility] ENDO;  Service: Endoscopy;  Laterality: N/A;    ROBOT-ASSISTED CHOLECYSTECTOMY USING DA TRIPP XI N/A 1/21/2020    Procedure: XI ROBOTIC CHOLECYSTECTOMY;  Surgeon: Sebastien Rivera MD;  Location: [facility] OR;  Service: General;  Laterality: N/A;    TONSILLECTOMY, ADENOIDECTOMY       Family History   Problem Relation Age of Onset    Diabetes Father     Prostate cancer Father     Cataracts Father     Hypertension Mother     Hypertension Brother      Social History     Socioeconomic History    Marital status:      Spouse name: Not on file    Number of children: Not on file    Years of education: Not on file    Highest education level: Not on file   Occupational History    Not on file   Social Needs    Financial resource strain: Not on file    Food insecurity     Worry: Not on file     Inability:  Not on file    Transportation needs     Medical: Not on file     Non-medical: Not on file   Tobacco Use    Smoking status: Current Every Day Smoker     Packs/day: 0.50    Smokeless tobacco: Never Used    Tobacco comment: no smoking after m.n prior to sx   Substance and Sexual Activity    Alcohol use: Yes     Alcohol/week: 46.0 standard drinks     Types: 46 Cans of beer per week     Frequency: 4 or more times a week     Drinks per session: 1 or 2     Comment: daily  No alcohol prior to surgery    Drug use: No    Sexual activity: Yes     Partners: Female   Lifestyle    Physical activity     Days per week: Not on file     Minutes per session: Not on file    Stress: Not at all   Relationships    Social connections     Talks on phone: Not on file     Gets together: Not on file     Attends Spiritism service: Not on file     Active member of club or organization: Not on file     Attends meetings of clubs or organizations: Not on file     Relationship status: Not on file   Other Topics Concern    Not on file   Social History Narrative    Not on file     Medication List with Changes/Refills   New Medications    MELOXICAM (MOBIC) 15 MG TABLET    Take 1 tablet (15 mg total) by mouth once daily. Take with food.  Discontinue if you develop GI side effects.   Current Medications    ACETAMINOPHEN-CODEINE 300-30MG (TYLENOL #3) 300-30 MG TAB    Take 1 tablet by mouth every 6 (six) hours as needed.    AMITRIPTYLINE (ELAVIL) 75 MG TABLET    Take 1 tablet (75 mg total) by mouth nightly as needed for Pain.    GLIPIZIDE (GLUCOTROL) 5 MG TABLET    Take 1 tablet (5 mg total) by mouth once daily.    LISINOPRIL (PRINIVIL,ZESTRIL) 40 MG TABLET    Take 1 tablet (40 mg total) by mouth once daily.    METFORMIN (GLUCOPHAGE) 1000 MG TABLET    Take 1 tablet (1,000 mg total) by mouth 2 (two) times daily with meals.    PRAVASTATIN (PRAVACHOL) 80 MG TABLET    Take 1 tablet (80 mg total) by mouth once daily.    PREDNISONE (DELTASONE) 20 MG  TABLET    One tablet BID x 3 days, one tablet daily x 3 days then 1/2 tablet daily x 4 days.     Review of patient's allergies indicates:  No Known Allergies    Review of Systems   Constitution: Negative for fever.   Cardiovascular: Negative for chest pain.   Respiratory: Negative for cough and shortness of breath.    Skin: Negative for rash.   Musculoskeletal: Positive for joint pain, joint swelling and stiffness. Negative for falls.   Gastrointestinal: Negative for heartburn.   Neurological: Negative for headaches and numbness.         Objective:        General    Nursing note and vitals reviewed.  Constitutional: He is oriented to person, place, and time. He appears well-developed and well-nourished.   HENT:   Head: Normocephalic and atraumatic.   Eyes: EOM are normal.   Cardiovascular: Normal rate and regular rhythm.    Pulmonary/Chest: Effort normal.   Abdominal: Soft.   Neurological: He is alert and oriented to person, place, and time.   Psychiatric: He has a normal mood and affect. His behavior is normal.     General Musculoskeletal Exam   Gait: antalgic       Right Knee Exam     Inspection   Erythema: absent  Swelling: absent  Effusion: present (3+)  Deformity: absent  Bruising: absent    Range of Motion   Extension: normal   Flexion:  100 abnormal     Tests   Meniscus   Jeannette:  Medial - positive Lateral - positive  Ligament Examination Lachman: normal (-1 to 2mm) PCL-Posterior Drawer: normal (0 to 2mm)     MCL - Valgus: normal (0 to 2mm)  LCL - Varus: normal  Patella   Patellar apprehension: negative  Patellar Tracking: normal  Patellar Grind: negative    Other   Sensation: normal    Comments:  Comp soft, cap refill < 2 sec.  Diffuse TTP  Guarding makes ligamentous exam difficulty    Left Knee Exam     Range of Motion   Extension: normal   Flexion: normal     Tests   Stability Lachman: normal (-1 to 2mm) PCL-Posterior Drawer: normal (0 to 2mm)  MCL - Valgus: normal (0 to 2mm)  LCL - Varus: normal (0 to  2mm)    Other   Sensation: normal    Muscle Strength   Right Lower Extremity   Quadriceps:  5/5   Hamstrin/5   Left Lower Extremity   Quadriceps:  5/5   Hamstrin/5     Vascular Exam       Edema  Right Lower Leg: absent  Left Lower Leg: absent              Xray images and report were reviewed today.  I agree with the radiologist's interpretation.    X-ray Knee Ortho Right  Narrative: EXAMINATION:  XR KNEE ORTHO RIGHT    CLINICAL HISTORY:  Pain in right knee    TECHNIQUE:  AP standing of both knees, Merchant views of both knees as well as a lateral view of the right knee were performed.    COMPARISON:  None    FINDINGS:  Joint effusion.  No high-grade joint space narrowing.  No fracture or dislocation or patellar tilt.  Impression: As above    Electronically signed by: Juan Jose Song MD  Date:    2020  Time:    10:54    Labs 20  Hgb A1c 6.3% on 20 indicates good glycemic control.  This is much improved over his hemoglobin A1c of 9% last year  WBC 10.35 WNL  ESR 6 WNL     Assessment:       Encounter Diagnoses   Name Primary?    Acute pain of right knee     Effusion of right knee Yes          Plan:       Laron was seen today for pain.    Diagnoses and all orders for this visit:    Effusion of right knee  -     meloxicam (MOBIC) 15 MG tablet; Take 1 tablet (15 mg total) by mouth once daily. Take with food.  Discontinue if you develop GI side effects.  -     Large Joint Aspiration/Injection: R knee    Acute pain of right knee  -     Ambulatory referral/consult to Orthopedics  -     meloxicam (MOBIC) 15 MG tablet; Take 1 tablet (15 mg total) by mouth once daily. Take with food.  Discontinue if you develop GI side effects.  -     Large Joint Aspiration/Injection: R knee        Laron Kothari  is a new pt who comes in today for the above problems.  He has a large effusion which is causing him a lot of pain in the right knee.  We have discussed risks and benefits of aspiration and injection.  He  wishes to proceed.  He also understands that if the knee aspirate is cloudy, I would want to submitted to the lab for further evaluation prior to injection.  He has no history of gout.  I have put him on a prescription for meloxicam.  I would like him to get a standing PA flexion view on his way out today.  I will notify him via [facility] of those results.  We will get him into a neoprene hinged knee brace as well as put him on a home exercise program.  I would like to see him back in the office in 1 month.  If he is not doing any better, he may need further diagnostic evaluation.  If the shooting leg pain has not improved, he may need further workup of his back.  He verbalized understanding and agreed.    Given that the WBC and ESR are within normal limits and the knee aspirate was clear serous fluid, have low suspicion for infection at this time.  I do want to see him back in the office in a month to make sure he is still improving.    Follow up in about 1 month (around 10/18/2020) for no xray.          The patient understands, chooses and consents to this plan and accepts all   the risks which include but are not limited to the risks mentioned above.     Disclaimer: This note was prepared using a voice recognition system and is likely to have sound alike errors within the text.

## 2020-09-18 NOTE — LETTER
September 18, 2020      Kristen Adler DO  [address] [place] [address] [address] [place] [id number]           [facility]  [address] [place] [address] [address] [place] [address]  Phone: [phone]  Fax: [phone]          Patient: Laron Kothari Jr.   MR Number: [id number]   YOB: 1970   Date of Visit: 9/18/2020       Dear Dr. Kristen Adler:    Thank you for referring Laron Kothari to me for evaluation. Attached you will find relevant portions of my assessment and plan of care.    If you have questions, please do not hesitate to call me. I look forward to following Laron Kothari along with you.    Sincerely,    Blanca Nix PA-C    Enclosure  CC:  No Recipients    If you would like to receive this communication electronically, please contact [email or web address] or [phone] to request more information on [employer] Link access.    For providers and/or their staff who would like to refer a patient to [facility], please contact us through our one-stop-shop provider referral line, [facility], at [phone].    If you feel you have received this communication in error or would no longer like to receive these types of communications, please e-mail [email or web address]

## 2020-09-18 NOTE — PROCEDURES
Large Joint Aspiration/Injection: R knee    Date/Time: 9/18/2020 10:00 AM  Performed by: Blanca Nix PA-C  Authorized by: Blanca Nix PA-C     Consent Done?:  Yes (Verbal)  Indications:  Pain  Site marked: the procedure site was marked    Timeout: prior to procedure the correct patient, procedure, and site was verified    Prep: patient was prepped and draped in usual sterile fashion      Local anesthesia used?: Yes    Local anesthetic:  Lidocaine 1% without epinephrine  Anesthetic total (ml):  2      Details:  Needle Size:  22 G and 18 G  Ultrasonic Guidance for needle placement?: No    Approach: Superolateral.  Location:  Knee  Site:  R knee  Medications:  80 mg methylPREDNISolone acetate 80 mg/mL  Aspirate amount (mL):  60  Aspirate:  Clear  Patient tolerance:  Patient tolerated the procedure well with no immediate complications     Right knee aspiration and injection report  Treatment options were discussed.  After verbal consent was obtained and the sight was identified the right knee was prepped in sterile fashion. Under sterile conditions the right knee was injected with 20 mg of lidocaine plain utilizing the superolateral approach. The right knee was reprepped sterilely and aspirated using the same anterolateral portal with return of 60 cc's of clear serous fluid.  The patient was then given an injection of 80 mg depomedrol.  After injection a sterile Band-Aid was applied.  The patient tolerated the procedure well and was sent home in stable condition.  The patient was instructed to apply an ice pack for approximately 10 minutes once arriving at home and not to do anything strenuous for the next 48 hours.  The patient was instructed to call if there were any problems at the aspiration sight.

## 2020-10-20 ENCOUNTER — HOSPITAL ENCOUNTER (OUTPATIENT)
Dept: RADIOLOGY | Facility: HOSPITAL | Age: 50
Discharge: HOME | End: 2020-10-20
Attending: PHYSICIAN ASSISTANT
Payer: COMMERCIAL

## 2020-10-20 ENCOUNTER — OFFICE VISIT (OUTPATIENT)
Dept: ORTHOPEDICS | Facility: CLINIC | Age: 50
End: 2020-10-20
Payer: COMMERCIAL

## 2020-10-20 ENCOUNTER — TELEPHONE (OUTPATIENT)
Dept: ORTHOPEDICS | Facility: CLINIC | Age: 50
End: 2020-10-20

## 2020-10-20 VITALS
SYSTOLIC BLOOD PRESSURE: 133 MMHG | DIASTOLIC BLOOD PRESSURE: 82 MMHG | WEIGHT: 240 LBS | HEIGHT: 74 IN | BODY MASS INDEX: 30.8 KG/M2 | HEART RATE: 94 BPM

## 2020-10-20 DIAGNOSIS — M25.461 EFFUSION OF RIGHT KNEE: Primary | ICD-10-CM

## 2020-10-20 DIAGNOSIS — M23.91 INTERNAL DERANGEMENT OF MULTIPLE SITES OF RIGHT KNEE: ICD-10-CM

## 2020-10-20 DIAGNOSIS — M25.561 CHRONIC PAIN OF RIGHT KNEE: ICD-10-CM

## 2020-10-20 DIAGNOSIS — M25.561 RIGHT KNEE PAIN, UNSPECIFIED CHRONICITY: Primary | ICD-10-CM

## 2020-10-20 DIAGNOSIS — G89.29 CHRONIC PAIN OF RIGHT KNEE: ICD-10-CM

## 2020-10-20 DIAGNOSIS — M25.561 RIGHT KNEE PAIN, UNSPECIFIED CHRONICITY: ICD-10-CM

## 2020-10-20 PROCEDURE — 3008F BODY MASS INDEX DOCD: CPT | Mod: CPTII,S$GLB,, | Performed by: PHYSICIAN ASSISTANT

## 2020-10-20 PROCEDURE — 73562 XR KNEE ORTHO RIGHT WITH FLEXION: ICD-10-PCS | Mod: 26,LT,, | Performed by: RADIOLOGY

## 2020-10-20 PROCEDURE — 99999 PR PBB SHADOW E&M-EST. PATIENT-LVL III: CPT | Mod: PBBFAC,,, | Performed by: PHYSICIAN ASSISTANT

## 2020-10-20 PROCEDURE — 73564 X-RAY EXAM KNEE 4 OR MORE: CPT | Mod: 26,RT,, | Performed by: RADIOLOGY

## 2020-10-20 PROCEDURE — 3075F SYST BP GE 130 - 139MM HG: CPT | Mod: CPTII,S$GLB,, | Performed by: PHYSICIAN ASSISTANT

## 2020-10-20 PROCEDURE — 73562 X-RAY EXAM OF KNEE 3: CPT | Mod: 26,LT,, | Performed by: RADIOLOGY

## 2020-10-20 PROCEDURE — 99213 OFFICE O/P EST LOW 20 MIN: CPT | Mod: S$GLB,,, | Performed by: PHYSICIAN ASSISTANT

## 2020-10-20 PROCEDURE — 3008F PR BODY MASS INDEX (BMI) DOCUMENTED: ICD-10-PCS | Mod: CPTII,S$GLB,, | Performed by: PHYSICIAN ASSISTANT

## 2020-10-20 PROCEDURE — 73564 XR KNEE ORTHO RIGHT WITH FLEXION: ICD-10-PCS | Mod: 26,RT,, | Performed by: RADIOLOGY

## 2020-10-20 PROCEDURE — 99999 PR PBB SHADOW E&M-EST. PATIENT-LVL III: ICD-10-PCS | Mod: PBBFAC,,, | Performed by: PHYSICIAN ASSISTANT

## 2020-10-20 PROCEDURE — 73562 X-RAY EXAM OF KNEE 3: CPT | Mod: TC,59,LT

## 2020-10-20 PROCEDURE — 3075F PR MOST RECENT SYSTOLIC BLOOD PRESS GE 130-139MM HG: ICD-10-PCS | Mod: CPTII,S$GLB,, | Performed by: PHYSICIAN ASSISTANT

## 2020-10-20 PROCEDURE — 99213 PR OFFICE/OUTPT VISIT, EST, LEVL III, 20-29 MIN: ICD-10-PCS | Mod: S$GLB,,, | Performed by: PHYSICIAN ASSISTANT

## 2020-10-20 PROCEDURE — 3079F PR MOST RECENT DIASTOLIC BLOOD PRESSURE 80-89 MM HG: ICD-10-PCS | Mod: CPTII,S$GLB,, | Performed by: PHYSICIAN ASSISTANT

## 2020-10-20 PROCEDURE — 3079F DIAST BP 80-89 MM HG: CPT | Mod: CPTII,S$GLB,, | Performed by: PHYSICIAN ASSISTANT

## 2020-10-20 RX ORDER — ACETAMINOPHEN AND CODEINE PHOSPHATE 300; 30 MG/1; MG/1
1 TABLET ORAL NIGHTLY PRN
Qty: 10 TABLET | Refills: 0 | Status: SHIPPED | OUTPATIENT
Start: 2020-10-20 | End: 2022-01-18

## 2020-10-20 NOTE — TELEPHONE ENCOUNTER
----- Message from Dayana Sanchez sent at 10/20/2020  8:39 AM CDT -----  Pt is running 10-15 min late//KD

## 2020-10-20 NOTE — PROGRESS NOTES
Patient ID: Laron Kothari Jr. is a 50 y.o. male.    Chief Complaint: Pain and Swelling of the Right Knee      HPI: Laron Kothari Jr.  is a 50 y.o. male who c/o Pain and Swelling of the Right Knee   for duration of about 2 months now.  I did an aspiration injection for him 1 month ago.  He says the swelling improved up until 3 days ago.  He began swelling in the right knee about 3 days ago.  He denies a new inciting injury.  He complains of sharp pain medially as well as aching and throbbing pain.  He complains of associated stiffness due to significant swelling in the knee.  Pain level is 9/10.  He has been on meloxicam as well as using a neoprene hinged knee brace.  Alleviating factors include nothing.  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  He is a  for [employer].    Past Medical History:   Diagnosis Date    Blood in stool 8/7/2018    Deep vein thrombosis (DVT) 2017    Left leg    Diabetes mellitus, type 2 2013    DM (diabetes mellitus) 2013    BS didn't check 01/03/2020    Gout     Hyperlipidemia     Hypertension     Neuropathy      Past Surgical History:   Procedure Laterality Date    APPENDECTOMY      CHOLECYSTECTOMY  01/2020    Gal bladder removal    COLONOSCOPY N/A 8/7/2018    Procedure: COLONOSCOPY;  Surgeon: Ten Valentine MD;  Location: [facility] ENDO;  Service: Endoscopy;  Laterality: N/A;    ROBOT-ASSISTED CHOLECYSTECTOMY USING DA TRIPP XI N/A 1/21/2020    Procedure: XI ROBOTIC CHOLECYSTECTOMY;  Surgeon: Sebastien Rivera MD;  Location: [facility] OR;  Service: General;  Laterality: N/A;    TONSILLECTOMY, ADENOIDECTOMY       Family History   Problem Relation Age of Onset    Diabetes Father     Prostate cancer Father     Cataracts Father     Hypertension Mother     Hypertension Brother      Social History     Socioeconomic History    Marital status:      Spouse name: Not on file    Number of children: Not on file    Years of  education: Not on file    Highest education level: Not on file   Occupational History    Not on file   Social Needs    Financial resource strain: Not on file    Food insecurity     Worry: Not on file     Inability: Not on file    Transportation needs     Medical: Not on file     Non-medical: Not on file   Tobacco Use    Smoking status: Current Every Day Smoker     Packs/day: 0.50    Smokeless tobacco: Never Used    Tobacco comment: no smoking after m.n prior to sx   Substance and Sexual Activity    Alcohol use: Yes     Alcohol/week: 46.0 standard drinks     Types: 46 Cans of beer per week     Frequency: 4 or more times a week     Drinks per session: 1 or 2     Comment: daily  No alcohol prior to surgery    Drug use: No    Sexual activity: Yes     Partners: Female   Lifestyle    Physical activity     Days per week: Not on file     Minutes per session: Not on file    Stress: Not at all   Relationships    Social connections     Talks on phone: Not on file     Gets together: Not on file     Attends Hoahaoism service: Not on file     Active member of club or organization: Not on file     Attends meetings of clubs or organizations: Not on file     Relationship status: Not on file   Other Topics Concern    Not on file   Social History Narrative    Not on file     Medication List with Changes/Refills   Current Medications    AMITRIPTYLINE (ELAVIL) 75 MG TABLET    Take 1 tablet (75 mg total) by mouth nightly as needed for Pain.    GLIPIZIDE (GLUCOTROL) 5 MG TABLET    Take 1 tablet (5 mg total) by mouth once daily.    LISINOPRIL (PRINIVIL,ZESTRIL) 40 MG TABLET    Take 1 tablet (40 mg total) by mouth once daily.    MELOXICAM (MOBIC) 15 MG TABLET    Take 1 tablet (15 mg total) by mouth once daily. Take with food.  Discontinue if you develop GI side effects.    METFORMIN (GLUCOPHAGE) 1000 MG TABLET    Take 1 tablet (1,000 mg total) by mouth 2 (two) times daily with meals.    PRAVASTATIN (PRAVACHOL) 80 MG TABLET     Take 1 tablet (80 mg total) by mouth once daily.   Changed and/or Refilled Medications    Modified Medication Previous Medication    ACETAMINOPHEN-CODEINE 300-30MG (TYLENOL #3) 300-30 MG TAB acetaminophen-codeine 300-30mg (TYLENOL #3) 300-30 mg Tab       Take 1 tablet by mouth nightly as needed.    Take 1 tablet by mouth every 6 (six) hours as needed.   Discontinued Medications    PREDNISONE (DELTASONE) 20 MG TABLET    One tablet BID x 3 days, one tablet daily x 3 days then 1/2 tablet daily x 4 days.     Review of patient's allergies indicates:  No Known Allergies    Objective:        General    Nursing note and vitals reviewed.  Constitutional: He is oriented to person, place, and time. He appears well-developed and well-nourished.   HENT:   Head: Normocephalic and atraumatic.   Eyes: EOM are normal.   Cardiovascular: Normal rate and regular rhythm.    Pulmonary/Chest: Effort normal.   Abdominal: Soft.   Neurological: He is alert and oriented to person, place, and time.   Psychiatric: He has a normal mood and affect. His behavior is normal.     General Musculoskeletal Exam   Gait: antalgic       Right Knee Exam     Inspection   Erythema: absent  Swelling: absent  Effusion: present (3+)  Deformity: absent  Bruising: absent    Tenderness   The patient is tender to palpation of the medial joint line.    Range of Motion   Extension: normal   Flexion:  100 abnormal     Tests   Meniscus   Jeannette:  Medial - positive Lateral - positive  Ligament Examination Lachman: normal (-1 to 2mm) PCL-Posterior Drawer: normal (0 to 2mm)     MCL - Valgus: normal (0 to 2mm)  LCL - Varus: normal  Patella   Patellar apprehension: negative  Patellar Tracking: normal  Patellar Grind: negative    Other   Sensation: normal    Comments:  Comp soft, cap refill < 2 sec.  Diffuse TTP  Guarding makes ligamentous exam difficulty    Left Knee Exam     Range of Motion   Extension: normal   Flexion: normal     Tests   Stability Lachman: normal (-1  to 2mm) PCL-Posterior Drawer: normal (0 to 2mm)  MCL - Valgus: normal (0 to 2mm)  LCL - Varus: normal (0 to 2mm)    Other   Sensation: normal    Muscle Strength   Right Lower Extremity   Quadriceps:  5/5   Hamstrin/5   Left Lower Extremity   Quadriceps:  5/5   Hamstrin/5     Vascular Exam       Edema  Right Lower Leg: absent  Left Lower Leg: absent      Assessment:       Encounter Diagnoses   Name Primary?    Chronic pain of right knee     Effusion of right knee Yes    Internal derangement of multiple sites of right knee           Plan:       Laron was seen today for pain and swelling.    Diagnoses and all orders for this visit:    Effusion of right knee  -     MRI Knee Without Contrast Right; Future  -     acetaminophen-codeine 300-30mg (TYLENOL #3) 300-30 mg Tab; Take 1 tablet by mouth nightly as needed.    Chronic pain of right knee  -     MRI Knee Without Contrast Right; Future  -     acetaminophen-codeine 300-30mg (TYLENOL #3) 300-30 mg Tab; Take 1 tablet by mouth nightly as needed.    Internal derangement of multiple sites of right knee  -     MRI Knee Without Contrast Right; Future  -     acetaminophen-codeine 300-30mg (TYLENOL #3) 300-30 mg Tab; Take 1 tablet by mouth nightly as needed.        Laron Kothari JrLuigi is an established pt here for follow-up on the above.  He has failed conservative management in the form of Mobic, corticosteroid injection after aspiration, as well as neoprene hinged knee brace.  At this point, I am concerned for medial meniscus tear.  He has relatively well-maintained joint spaces on x-ray.  He is experiencing medial-sided joint pain with positive meniscal for symptoms.  I would like to get an MRI to evaluate internal derangement.  I will plan to do a virtual visit following his MRI.  He verbalizes understanding and agrees.    Follow up for VV for MRI results.    The patient understands, chooses and consents to this plan and accepts all   the risks which include but  are not limited to the risks mentioned above.     Disclaimer: This note was prepared using a voice recognition system and is likely to have sound alike errors within the text.

## 2020-10-26 ENCOUNTER — HOSPITAL ENCOUNTER (OUTPATIENT)
Dept: RADIOLOGY | Facility: HOSPITAL | Age: 50
Discharge: HOME | End: 2020-10-26
Attending: PHYSICIAN ASSISTANT
Payer: COMMERCIAL

## 2020-10-26 DIAGNOSIS — M25.461 EFFUSION OF RIGHT KNEE: ICD-10-CM

## 2020-10-26 DIAGNOSIS — M23.91 INTERNAL DERANGEMENT OF MULTIPLE SITES OF RIGHT KNEE: ICD-10-CM

## 2020-10-26 DIAGNOSIS — G89.29 CHRONIC PAIN OF RIGHT KNEE: ICD-10-CM

## 2020-10-26 DIAGNOSIS — M25.561 CHRONIC PAIN OF RIGHT KNEE: ICD-10-CM

## 2020-10-26 PROCEDURE — 73721 MRI JNT OF LWR EXTRE W/O DYE: CPT | Mod: 26,RT,, | Performed by: RADIOLOGY

## 2020-10-26 PROCEDURE — 73721 MRI KNEE WITHOUT CONTRAST RIGHT: ICD-10-PCS | Mod: 26,RT,, | Performed by: RADIOLOGY

## 2020-10-26 PROCEDURE — 73721 MRI JNT OF LWR EXTRE W/O DYE: CPT | Mod: TC,RT

## 2020-10-27 ENCOUNTER — PATIENT OUTREACH (OUTPATIENT)
Dept: ADMINISTRATIVE | Facility: OTHER | Age: 50
End: 2020-10-27

## 2020-10-28 ENCOUNTER — PATIENT MESSAGE (OUTPATIENT)
Dept: ORTHOPEDICS | Facility: CLINIC | Age: 50
End: 2020-10-28

## 2020-10-28 ENCOUNTER — OFFICE VISIT (OUTPATIENT)
Dept: ORTHOPEDICS | Facility: CLINIC | Age: 50
End: 2020-10-28
Payer: COMMERCIAL

## 2020-10-28 DIAGNOSIS — M25.461 EFFUSION OF RIGHT KNEE: Primary | ICD-10-CM

## 2020-10-28 DIAGNOSIS — M25.561 ACUTE PAIN OF RIGHT KNEE: ICD-10-CM

## 2020-10-28 DIAGNOSIS — S83.241A TEAR OF MEDIAL MENISCUS OF RIGHT KNEE, CURRENT, UNSPECIFIED TEAR TYPE, INITIAL ENCOUNTER: ICD-10-CM

## 2020-10-28 PROCEDURE — 99213 OFFICE O/P EST LOW 20 MIN: CPT | Mod: 95,,, | Performed by: PHYSICIAN ASSISTANT

## 2020-10-28 PROCEDURE — 99213 PR OFFICE/OUTPT VISIT, EST, LEVL III, 20-29 MIN: ICD-10-PCS | Mod: 95,,, | Performed by: PHYSICIAN ASSISTANT

## 2020-10-28 NOTE — PROGRESS NOTES
Health Maintenance Due   Topic Date Due    HIV Screening  06/10/1985    Influenza Vaccine (1) 08/01/2020    Foot Exam  11/29/2020     Updates were requested from care everywhere.  Chart was reviewed for overdue Proactive [facility] Encounters ([facility]ETTE) topics (CRS, Breast Cancer Screening, Eye exam)  Health Maintenance has been updated.  LINKS immunization registry triggered.  Immunizations were reconciled.

## 2020-10-28 NOTE — PROGRESS NOTES
The patient location is: home  The chief complaint leading to consultation is: right knee    Visit type: audiovisual    Face to Face time with patient: 6min  10 minutes of total time spent on the encounter, which includes face to face time and non-face to face time preparing to see the patient (eg, review of tests), Obtaining and/or reviewing separately obtained history, Documenting clinical information in the electronic or other health record, Independently interpreting results (not separately reported) and communicating results to the patient/family/caregiver, or Care coordination (not separately reported).       Each patient to whom he or she provides medical services by telemedicine is:  (1) informed of the relationship between the physician and patient and the respective role of any other health care provider with respect to management of the patient; and (2) notified that he or she may decline to receive medical services by telemedicine and may withdraw from such care at any time.     Patient ID: Laron Kothari Jr. is a 50 y.o. male.    Chief Complaint: right knee    HPI: Laron Kothari Jr.  is a 50 y.o. male who c/o No chief complaint on file.   for duration of 3months.  There is no inciting injury.  I aspirated and injected the knee for him back in mid September.  Unfortunately, symptoms recurred about a week ago.  Went ahead and ordered an MRI at his last office visit.  We have a virtual visit today to discuss those results.  Pain is 8/10.  It is sharp and aching in quality.  He points medially is where the majority of pain is located.  He is having difficulty working as result of the pain.  He still complaining of associated swelling.  Worsened with pivoting, twisting, deep squatting, at nighttime and with will prolonged weight-bearing.  Occasionally than even wants to give out on him.  It is improved with nothing.    Past Medical History:   Diagnosis Date    Blood in stool 8/7/2018    Deep vein thrombosis  (DVT) 2017    Left leg    Diabetes mellitus, type 2 2013    DM (diabetes mellitus) 2013    BS didn't check 01/03/2020    Gout     Hyperlipidemia     Hypertension     Neuropathy      Past Surgical History:   Procedure Laterality Date    APPENDECTOMY      CHOLECYSTECTOMY  01/2020    Gal bladder removal    COLONOSCOPY N/A 8/7/2018    Procedure: COLONOSCOPY;  Surgeon: Ten Valentine MD;  Location: [facility] ENDO;  Service: Endoscopy;  Laterality: N/A;    ROBOT-ASSISTED CHOLECYSTECTOMY USING DA TRIPP XI N/A 1/21/2020    Procedure: XI ROBOTIC CHOLECYSTECTOMY;  Surgeon: Sebastien Rivera MD;  Location: [facility] OR;  Service: General;  Laterality: N/A;    TONSILLECTOMY, ADENOIDECTOMY       Family History   Problem Relation Age of Onset    Diabetes Father     Prostate cancer Father     Cataracts Father     Hypertension Mother     Hypertension Brother      Social History     Socioeconomic History    Marital status:      Spouse name: Not on file    Number of children: Not on file    Years of education: Not on file    Highest education level: Not on file   Occupational History    Not on file   Social Needs    Financial resource strain: Not on file    Food insecurity     Worry: Not on file     Inability: Not on file    Transportation needs     Medical: Not on file     Non-medical: Not on file   Tobacco Use    Smoking status: Current Every Day Smoker     Packs/day: 0.50    Smokeless tobacco: Never Used    Tobacco comment: no smoking after m.n prior to sx   Substance and Sexual Activity    Alcohol use: Yes     Alcohol/week: 46.0 standard drinks     Types: 46 Cans of beer per week     Frequency: 4 or more times a week     Drinks per session: 1 or 2     Comment: daily  No alcohol prior to surgery    Drug use: No    Sexual activity: Yes     Partners: Female   Lifestyle    Physical activity     Days per week: Not on file     Minutes per session: Not on file    Stress: Not at all   Relationships     Social connections     Talks on phone: Not on file     Gets together: Not on file     Attends [employer] service: Not on file     Active member of club or organization: Not on file     Attends meetings of clubs or organizations: Not on file     Relationship status: Not on file   Other Topics Concern    Not on file   Social History Narrative    Not on file     Medication List with Changes/Refills   Current Medications    ACETAMINOPHEN-CODEINE 300-30MG (TYLENOL #3) 300-30 MG TAB    Take 1 tablet by mouth nightly as needed.    AMITRIPTYLINE (ELAVIL) 75 MG TABLET    Take 1 tablet (75 mg total) by mouth nightly as needed for Pain.    GLIPIZIDE (GLUCOTROL) 5 MG TABLET    Take 1 tablet (5 mg total) by mouth once daily.    LISINOPRIL (PRINIVIL,ZESTRIL) 40 MG TABLET    Take 1 tablet (40 mg total) by mouth once daily.    MELOXICAM (MOBIC) 15 MG TABLET    Take 1 tablet (15 mg total) by mouth once daily. Take with food.  Discontinue if you develop GI side effects.    METFORMIN (GLUCOPHAGE) 1000 MG TABLET    Take 1 tablet (1,000 mg total) by mouth 2 (two) times daily with meals.    PRAVASTATIN (PRAVACHOL) 80 MG TABLET    Take 1 tablet (80 mg total) by mouth once daily.     Review of patient's allergies indicates:  No Known Allergies    Objective:        General    Constitutional: He is oriented to person, place, and time. He appears well-developed and well-nourished.   HENT:   Head: Normocephalic and atraumatic.   Eyes: EOM are normal.   Pulmonary/Chest: Effort normal.   Neurological: He is alert and oriented to person, place, and time.   Psychiatric: He has a normal mood and affect. His behavior is normal.           Right Knee Exam     Comments:  Exam from 10/20/20  KAREN  ROM 0-100  Effusion  TTP MJL  (+) [facility]        MRI Images and report were reviewed today.  I agree with the radiologist's interpretation.    MRI Knee Without Contrast Right  Narrative: EXAMINATION:  MRI KNEE WITHOUT CONTRAST RIGHT    CLINICAL HISTORY:  Knee  pain, positive xray;Pain in right knee    TECHNIQUE:  Multiplanar, multisequence images were preformed of the right knee.    COMPARISON:  None    FINDINGS:  Medial meniscus: Partial extrusion of the meniscal body.  Intrasubstance mucoid degeneration of the posterior horn.  Complex macerative tearing of the posterior horn free edge.    Lateral meniscus: No meniscal tear.    Ligaments:  ACL, PCL, MCL, and LCL complex are intact.    Tendons:  Extensor mechanism is maintained.    Cartilage:    Patellofemoral: Thinning of the medial facet retropatellar cartilage.  Heterogeneity and slight surface irregularity of the lateral facet cartilage.    Medial tibiofemoral: Diffuse chondral thinning and irregularity along the weight-bearing surface of the medial tibiofemoral joint.    Lateral tibiofemoral: Mild chondral surface irregularity central weight-bearing surface of the lateral tibiofemoral joint.    Bone: Mild marrow edema in the medial femoral condyle and beneath the medial tibial plateau.  No fracture or marrow replacing process.    Miscellaneous: Anterior subcutaneous swelling and edema.  Moderate size suprapatellar joint effusion.  Frondlike synovial thickening versus intra-articular debris in the suprapatellar space.  Impression: 1. Posterior horn medial meniscus tear.  2. Tricompartmental chondromalacia.  3. Moderate knee joint effusion.    Electronically signed by: LUZ Kee MD  Date:    10/27/2020  Time:    08:30        Assessment:       Encounter Diagnoses   Name Primary?    Effusion of right knee Yes    Acute pain of right knee     Tear of medial meniscus of right knee, current, unspecified tear type, initial encounter           Plan:       Diagnoses and all orders for this visit:    Effusion of right knee    Acute pain of right knee    Tear of medial meniscus of right knee, current, unspecified tear type, initial encounter        Laron Kothari Jr. is an established pt who was seen through virtual  visit to discuss MRI results of the right knee.  He has medial meniscus tear.  He has degenerative changes along the articular surfaces of all 3 compartments.  However on review of x-rays he shows relatively well-maintained medial and lateral joint spaces.  His main complaint is sharp pain associated with intermittent swelling.  He is having difficulty working.  I would like to refer him over to Dr. Rose for consideration of further treatment options.  I last after reach out to him to get that appointment scheduled.  He will follow up with me p.r.n..  Follow up for Consutl with Dr. Rose.    The patient understands, chooses and consents to this plan and accepts all   the risks which include but are not limited to the risks mentioned above.     Disclaimer: This note was prepared using a voice recognition system and is likely to have sound alike errors within the text.

## 2020-10-30 ENCOUNTER — TELEPHONE (OUTPATIENT)
Dept: ORTHOPEDICS | Facility: CLINIC | Age: 50
End: 2020-10-30

## 2020-10-30 NOTE — TELEPHONE ENCOUNTER
----- Message from Layla Pack sent at 10/30/2020 12:00 PM CDT -----  Contact: Pt  .Type:  Sooner Apoointment Request    Caller is requesting a sooner appointment.  Caller declined first available appointment listed below.  Caller will not accept being placed on the waitlist and is requesting a message be sent to doctor.  Name of Caller: Laron  When is the first available appointment? 12/07/2020  Symptoms: knee pain  Would the patient rather a call back or a response via [facility]? Call back  Best Call Back Number:  [phone] (home)       Additional Information:

## 2020-11-03 ENCOUNTER — TELEPHONE (OUTPATIENT)
Dept: ORTHOPEDICS | Facility: CLINIC | Age: 50
End: 2020-11-03

## 2020-11-03 NOTE — TELEPHONE ENCOUNTER
Called and spoke with patient - appt set up for 11/06 - patient verbalized understanding and thankful for call

## 2020-11-03 NOTE — TELEPHONE ENCOUNTER
----- Message from Rona Head sent at 11/3/2020  3:23 PM CST -----  Contact: Laron Larry called regarding a call he received on 10/30 about getting an earlier appointment can you please give him a call back at [phone].    Thanks  KT

## 2020-11-06 ENCOUNTER — OFFICE VISIT (OUTPATIENT)
Dept: ORTHOPEDICS | Facility: CLINIC | Age: 50
End: 2020-11-06
Payer: COMMERCIAL

## 2020-11-06 ENCOUNTER — LAB VISIT (OUTPATIENT)
Dept: LAB | Facility: HOSPITAL | Age: 50
End: 2020-11-06
Attending: ORTHOPAEDIC SURGERY
Payer: COMMERCIAL

## 2020-11-06 VITALS
HEART RATE: 87 BPM | DIASTOLIC BLOOD PRESSURE: 93 MMHG | WEIGHT: 240 LBS | BODY MASS INDEX: 30.8 KG/M2 | HEIGHT: 74 IN | SYSTOLIC BLOOD PRESSURE: 161 MMHG

## 2020-11-06 DIAGNOSIS — Z01.818 PREOP TESTING: ICD-10-CM

## 2020-11-06 DIAGNOSIS — M25.461 EFFUSION OF RIGHT KNEE: ICD-10-CM

## 2020-11-06 DIAGNOSIS — S83.241A TEAR OF MEDIAL MENISCUS OF RIGHT KNEE, CURRENT, UNSPECIFIED TEAR TYPE, INITIAL ENCOUNTER: Primary | ICD-10-CM

## 2020-11-06 DIAGNOSIS — M94.261 CHONDROMALACIA, RIGHT KNEE: ICD-10-CM

## 2020-11-06 LAB
ALBUMIN SERPL BCP-MCNC: 3.6 G/DL (ref 3.5–5.2)
ALP SERPL-CCNC: 78 U/L (ref 55–135)
ALT SERPL W/O P-5'-P-CCNC: 19 U/L (ref 10–44)
ANION GAP SERPL CALC-SCNC: 9 MMOL/L (ref 8–16)
AST SERPL-CCNC: 18 U/L (ref 10–40)
BASOPHILS # BLD AUTO: 0.1 K/UL (ref 0–0.2)
BASOPHILS NFR BLD: 0.9 % (ref 0–1.9)
BILIRUB SERPL-MCNC: 0.3 MG/DL (ref 0.1–1)
BUN SERPL-MCNC: 11 MG/DL (ref 6–20)
CALCIUM SERPL-MCNC: 9.2 MG/DL (ref 8.7–10.5)
CHLORIDE SERPL-SCNC: 104 MMOL/L (ref 95–110)
CO2 SERPL-SCNC: 27 MMOL/L (ref 23–29)
CREAT SERPL-MCNC: 1 MG/DL (ref 0.5–1.4)
DIFFERENTIAL METHOD: ABNORMAL
EOSINOPHIL # BLD AUTO: 0.6 K/UL (ref 0–0.5)
EOSINOPHIL NFR BLD: 4.7 % (ref 0–8)
ERYTHROCYTE [DISTWIDTH] IN BLOOD BY AUTOMATED COUNT: 12.5 % (ref 11.5–14.5)
EST. GFR  (AFRICAN AMERICAN): >60 ML/MIN/1.73 M^2
EST. GFR  (NON AFRICAN AMERICAN): >60 ML/MIN/1.73 M^2
GLUCOSE SERPL-MCNC: 206 MG/DL (ref 70–110)
HCT VFR BLD AUTO: 46.4 % (ref 40–54)
HGB BLD-MCNC: 14.3 G/DL (ref 14–18)
IMM GRANULOCYTES # BLD AUTO: 0.03 K/UL (ref 0–0.04)
IMM GRANULOCYTES NFR BLD AUTO: 0.3 % (ref 0–0.5)
LYMPHOCYTES # BLD AUTO: 4.2 K/UL (ref 1–4.8)
LYMPHOCYTES NFR BLD: 36.1 % (ref 18–48)
MCH RBC QN AUTO: 31.6 PG (ref 27–31)
MCHC RBC AUTO-ENTMCNC: 30.8 G/DL (ref 32–36)
MCV RBC AUTO: 102 FL (ref 82–98)
MONOCYTES # BLD AUTO: 0.6 K/UL (ref 0.3–1)
MONOCYTES NFR BLD: 5.4 % (ref 4–15)
NEUTROPHILS # BLD AUTO: 6.2 K/UL (ref 1.8–7.7)
NEUTROPHILS NFR BLD: 52.6 % (ref 38–73)
NRBC BLD-RTO: 0 /100 WBC
PLATELET # BLD AUTO: 283 K/UL (ref 150–350)
PMV BLD AUTO: 10.5 FL (ref 9.2–12.9)
POTASSIUM SERPL-SCNC: 4.3 MMOL/L (ref 3.5–5.1)
PROT SERPL-MCNC: 7.3 G/DL (ref 6–8.4)
RBC # BLD AUTO: 4.53 M/UL (ref 4.6–6.2)
SODIUM SERPL-SCNC: 140 MMOL/L (ref 136–145)
WBC # BLD AUTO: 11.75 K/UL (ref 3.9–12.7)

## 2020-11-06 PROCEDURE — 80053 COMPREHEN METABOLIC PANEL: CPT

## 2020-11-06 PROCEDURE — 3080F DIAST BP >= 90 MM HG: CPT | Mod: CPTII,S$GLB,, | Performed by: ORTHOPAEDIC SURGERY

## 2020-11-06 PROCEDURE — 99214 PR OFFICE/OUTPT VISIT, EST, LEVL IV, 30-39 MIN: ICD-10-PCS | Mod: S$GLB,,, | Performed by: ORTHOPAEDIC SURGERY

## 2020-11-06 PROCEDURE — 36415 COLL VENOUS BLD VENIPUNCTURE: CPT

## 2020-11-06 PROCEDURE — 3077F SYST BP >= 140 MM HG: CPT | Mod: CPTII,S$GLB,, | Performed by: ORTHOPAEDIC SURGERY

## 2020-11-06 PROCEDURE — 3008F BODY MASS INDEX DOCD: CPT | Mod: CPTII,S$GLB,, | Performed by: ORTHOPAEDIC SURGERY

## 2020-11-06 PROCEDURE — 99999 PR PBB SHADOW E&M-EST. PATIENT-LVL III: ICD-10-PCS | Mod: PBBFAC,,, | Performed by: ORTHOPAEDIC SURGERY

## 2020-11-06 PROCEDURE — 99214 OFFICE O/P EST MOD 30 MIN: CPT | Mod: S$GLB,,, | Performed by: ORTHOPAEDIC SURGERY

## 2020-11-06 PROCEDURE — 99999 PR PBB SHADOW E&M-EST. PATIENT-LVL III: CPT | Mod: PBBFAC,,, | Performed by: ORTHOPAEDIC SURGERY

## 2020-11-06 PROCEDURE — 3077F PR MOST RECENT SYSTOLIC BLOOD PRESSURE >= 140 MM HG: ICD-10-PCS | Mod: CPTII,S$GLB,, | Performed by: ORTHOPAEDIC SURGERY

## 2020-11-06 PROCEDURE — 3080F PR MOST RECENT DIASTOLIC BLOOD PRESSURE >= 90 MM HG: ICD-10-PCS | Mod: CPTII,S$GLB,, | Performed by: ORTHOPAEDIC SURGERY

## 2020-11-06 PROCEDURE — 3008F PR BODY MASS INDEX (BMI) DOCUMENTED: ICD-10-PCS | Mod: CPTII,S$GLB,, | Performed by: ORTHOPAEDIC SURGERY

## 2020-11-06 PROCEDURE — 85025 COMPLETE CBC W/AUTO DIFF WBC: CPT

## 2020-11-06 NOTE — LETTER
November 6, 2020      Blanca Nix PA-C  [address]  [place] [address]           [facility]  [address] [place] [id number]  Phone: [phone]  Fax: [phone]          Patient: Laron Kothari Jr.   MR Number: [id number]   YOB: 1970   Date of Visit: 11/6/2020       Dear Blanca Nix:    Thank you for referring Laron Kothari to me for evaluation. Attached you will find relevant portions of my assessment and plan of care.    If you have questions, please do not hesitate to call me. I look forward to following Laron Kothari along with you.    Sincerely,    Nahum Rose MD    Enclosure  CC:  No Recipients    If you would like to receive this communication electronically, please contact [email or web address] or [phone] to request more information on [facility] access.    For providers and/or their staff who would like to refer a patient to [facility], please contact us through our one-stop-shop provider referral line, [facility], at [phone].    If you feel you have received this communication in error or would no longer like to receive these types of communications, please e-mail [email or web address]

## 2020-11-06 NOTE — PROGRESS NOTES
Subjective:     Patient ID: Laron Kothari Jr. is a 50 y.o. male.    Chief Complaint: Pain of the Right Knee    HPI:  11/06/2020  50-year-old  was been having pain in his knee for 3 months.  Does not recall any injury.  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  Treatment included meloxicam exercise program Tylenol and recently had an MRI and is here for evaluation.  He has popping locking catching feeling he said even sleeping at night is very painful he tosses around.  Cannot squat getting in and out of the truck is a little bit difficult.  He feels he lost motion.  Pain is 7/10 that is constant aggravated with ambulation and squatting or climbing stairs.  Denies fever or chills or shortness of breath difficulty with chewing or swallowing loss of bowel bladder control blurry vision double vision loss of sense of smell or taste    Past Medical History:   Diagnosis Date    Blood in stool 8/7/2018    Deep vein thrombosis (DVT) 2017    Left leg    Diabetes mellitus, type 2 2013    DM (diabetes mellitus) 2013    BS didn't check 01/03/2020    Gout     Hyperlipidemia     Hypertension     Neuropathy      Past Surgical History:   Procedure Laterality Date    APPENDECTOMY      CHOLECYSTECTOMY  01/2020    Gal bladder removal    COLONOSCOPY N/A 8/7/2018    Procedure: COLONOSCOPY;  Surgeon: Ten Valentine MD;  Location: [facility] ENDO;  Service: Endoscopy;  Laterality: N/A;    ROBOT-ASSISTED CHOLECYSTECTOMY USING DA TRIPP XI N/A 1/21/2020    Procedure: XI ROBOTIC CHOLECYSTECTOMY;  Surgeon: Sebastien Rivera MD;  Location: [facility] OR;  Service: General;  Laterality: N/A;    TONSILLECTOMY, ADENOIDECTOMY       Family History   Problem Relation Age of Onset    Diabetes Father     Prostate cancer Father     Cataracts Father     Hypertension Mother     Hypertension Brother      Social History     Socioeconomic History    Marital status:      Spouse name: Not on file     Number of children: Not on file    Years of education: Not on file    Highest education level: Not on file   Occupational History    Not on file   Social Needs    Financial resource strain: Not on file    Food insecurity     Worry: Not on file     Inability: Not on file    Transportation needs     Medical: Not on file     Non-medical: Not on file   Tobacco Use    Smoking status: Current Every Day Smoker     Packs/day: 0.50    Smokeless tobacco: Never Used    Tobacco comment: no smoking after m.n prior to sx   Substance and Sexual Activity    Alcohol use: Yes     Alcohol/week: 46.0 standard drinks     Types: 46 Cans of beer per week     Frequency: 4 or more times a week     Drinks per session: 1 or 2     Comment: daily  No alcohol prior to surgery    Drug use: No    Sexual activity: Yes     Partners: Female   Lifestyle    Physical activity     Days per week: Not on file     Minutes per session: Not on file    Stress: Not at all   Relationships    Social connections     Talks on phone: Not on file     Gets together: Not on file     Attends Congregation service: Not on file     Active member of club or organization: Not on file     Attends meetings of clubs or organizations: Not on file     Relationship status: Not on file   Other Topics Concern    Not on file   Social History Narrative    Not on file     Medication List with Changes/Refills   Current Medications    ACETAMINOPHEN-CODEINE 300-30MG (TYLENOL #3) 300-30 MG TAB    Take 1 tablet by mouth nightly as needed.    AMITRIPTYLINE (ELAVIL) 75 MG TABLET    Take 1 tablet (75 mg total) by mouth nightly as needed for Pain.    GLIPIZIDE (GLUCOTROL) 5 MG TABLET    Take 1 tablet (5 mg total) by mouth once daily.    LISINOPRIL (PRINIVIL,ZESTRIL) 40 MG TABLET    Take 1 tablet (40 mg total) by mouth once daily.    MELOXICAM (MOBIC) 15 MG TABLET    Take 1 tablet (15 mg total) by mouth once daily. Take with food.  Discontinue if you develop GI side effects.     METFORMIN (GLUCOPHAGE) 1000 MG TABLET    Take 1 tablet (1,000 mg total) by mouth 2 (two) times daily with meals.    PRAVASTATIN (PRAVACHOL) 80 MG TABLET    Take 1 tablet (80 mg total) by mouth once daily.     Review of patient's allergies indicates:  No Known Allergies  Review of Systems   Constitution: Negative for decreased appetite.   HENT: Negative for tinnitus.    Eyes: Negative for double vision.   Cardiovascular: Negative for chest pain.   Respiratory: Negative for wheezing.    Hematologic/Lymphatic: Negative for bleeding problem.   Skin: Negative for dry skin.   Musculoskeletal: Positive for joint pain, joint swelling and stiffness. Negative for arthritis, back pain, gout and neck pain.   Gastrointestinal: Negative for abdominal pain.   Genitourinary: Negative for bladder incontinence.   Neurological: Negative for numbness, paresthesias and sensory change.   Psychiatric/Behavioral: Negative for altered mental status.       Objective:   Body mass index is 30.81 kg/m².  Vitals:    11/06/20 0752   BP: (!) 161/93   Pulse: 87          General    Constitutional: He is oriented to person, place, and time. He appears well-developed.   HENT:   Head: Atraumatic.   Eyes: EOM are normal.   Cardiovascular: Normal rate.    Pulmonary/Chest: Effort normal.   Neurological: He is alert and oriented to person, place, and time.   Psychiatric: Judgment normal.            Cervical rotation is very functional  Bilateral upper extremity neurovascularly intact.  Radial and ulnar pulses 2+.  Strength is 5/5 throughout motor groups.  Very muscular.  Lumbar without tenderness  Pelvis is level  Bilateral straight leg raising is negative  Bilateral hips full motion no pain  Strength hip flexors, abductors, adductors, quads, hamstrings, ankle extensors and flexors are 5/5 in even bilaterally  Left knee with full motion no pain collaterals and cruciate stable.  Negative Jeannette sign.  Mild crepitus to compression on the patella but not  tender.  Negative Jeannette sign  Right knee with moderate swelling.  Range of motion 10-80 degrees with pain.  Positive Jeannette sign.  Quads, patella tendons intact without defect.  Severe medial joint pain mild crepitus to compression on the patella.  Collaterals are stable.  Could not assess ACL or PCL  Calves are soft nontender slight varicosities  No pitting edema  Ankle motion intact  EHL 5/5  PT 1+  Skin is warm to touch no obvious lesions    Relevant imaging results reviewed and interpreted by me, discussed with the patient and / or family today   X-ray and electronic record of his knee showing good joint space.  No fracture seen  MRI in electronic record showing medial meniscus tear of the right knee, effusion, chondromalacia    left  MRI Knee Without Contrast Right  Narrative: EXAMINATION:  MRI KNEE WITHOUT CONTRAST RIGHT    CLINICAL HISTORY:  Knee pain, positive xray;Pain in right knee    TECHNIQUE:  Multiplanar, multisequence images were preformed of the right knee.    COMPARISON:  None    FINDINGS:  Medial meniscus: Partial extrusion of the meniscal body.  Intrasubstance mucoid degeneration of the posterior horn.  Complex macerative tearing of the posterior horn free edge.    Lateral meniscus: No meniscal tear.    Ligaments:  ACL, PCL, MCL, and LCL complex are intact.    Tendons:  Extensor mechanism is maintained.    Cartilage:    Patellofemoral: Thinning of the medial facet retropatellar cartilage.  Heterogeneity and slight surface irregularity of the lateral facet cartilage.    Medial tibiofemoral: Diffuse chondral thinning and irregularity along the weight-bearing surface of the medial tibiofemoral joint.    Lateral tibiofemoral: Mild chondral surface irregularity central weight-bearing surface of the lateral tibiofemoral joint.    Bone: Mild marrow edema in the medial femoral condyle and beneath the medial tibial plateau.  No fracture or marrow replacing process.    Miscellaneous: Anterior  subcutaneous swelling and edema.  Moderate size suprapatellar joint effusion.  Frondlike synovial thickening versus intra-articular debris in the suprapatellar space.  Impression: 1. Posterior horn medial meniscus tear.  2. Tricompartmental chondromalacia.  3. Moderate knee joint effusion.    Electronically signed by: LUZ Kee MD  Date:    10/27/2020  Time:    08:30      Assessment:     Encounter Diagnoses   Name Primary?    Tear of medial meniscus of right knee, current, unspecified tear type, initial encounter Yes    Effusion of right knee     Chondromalacia, right knee         Plan:   Tear of medial meniscus of right knee, current, unspecified tear type, initial encounter    Effusion of right knee    Chondromalacia, right knee         Patient Instructions   RIGHT KNEE MEDIAL MENISCUS TEAR WITH SWELLING AND LIMITED MOTION.  TRIED MELOXICAM AND TYLENOL WITHOUT THE HELP.  HIS JOB REQUIRES DRIVING AND DELIVERY AND LIFTING.  VERY DIFFICULT WITH HIS JOB.  PAIN AT NIGHT.  POSITIVE CATCHING LOCKING FEELING.    Recommendations  Right knee arthroscopic surgery.  MRI positive for complex macerated medial meniscus tear.  This type does not heal.  Arthroscopic surgery is approximately 20 min done under general anesthesia.  Will make cool cut sometimes 3 we trimmed the torn cartilage/meniscus out.  It is outpatient surgery go home right after surgery.  You will be asleep for the surgery.  You will be allowed to weight bear as tolerated without crutches even though you might limp.  He will keep the dressing dry for 3 days then may take the dressing off get in the shower and let the wound wet.  No soaking in the tub allowed for 2 weeks or swimming.  He will be out 6-8 weeks  Most of the time no need for physical therapy you will be doing her own exercise.  Slight risk of nerve damage vascular damage infection blood clot fat clot.  We will give you antibiotics around surgery time to decrease risk of infection.  You  will be given stockings to wear for a couple weeks.  You may want try to take a baby aspirin afterwards to prevent blood clots          Disclaimer: This note was prepared using a voice recognition system and is likely to have sound alike errors within the text.

## 2020-11-06 NOTE — PATIENT INSTRUCTIONS
RIGHT KNEE MEDIAL MENISCUS TEAR WITH SWELLING AND LIMITED MOTION.  TRIED MELOXICAM AND TYLENOL WITHOUT THE HELP.  HIS JOB REQUIRES DRIVING AND DELIVERY AND LIFTING.  VERY DIFFICULT WITH HIS JOB.  PAIN AT NIGHT.  POSITIVE CATCHING LOCKING FEELING.    Recommendations  Right knee arthroscopic surgery.  MRI positive for complex macerated medial meniscus tear.  This type does not heal.  Arthroscopic surgery is approximately 20 min done under general anesthesia.  Will make cool cut sometimes 3 we trimmed the torn cartilage/meniscus out.  It is outpatient surgery go home right after surgery.  You will be asleep for the surgery.  You will be allowed to weight bear as tolerated without crutches even though you might limp.  He will keep the dressing dry for 3 days then may take the dressing off get in the shower and let the wound wet.  No soaking in the tub allowed for 2 weeks or swimming.  He will be out 6-8 weeks  Most of the time no need for physical therapy you will be doing her own exercise.  Slight risk of nerve damage vascular damage infection blood clot fat clot.  We will give you antibiotics around surgery time to decrease risk of infection.  You will be given stockings to wear for a couple weeks.  You may want try to take a baby aspirin afterwards to prevent blood clots

## 2020-11-06 NOTE — H&P (VIEW-ONLY)
Subjective:     Patient ID: Laron Kothari Jr. is a 50 y.o. male.    Chief Complaint: Pain of the Right Knee    HPI:  11/06/2020  50-year-old  was been having pain in his knee for 3 months.  Does not recall any injury.  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  Treatment included meloxicam exercise program Tylenol and recently had an MRI and is here for evaluation.  He has popping locking catching feeling he said even sleeping at night is very painful he tosses around.  Cannot squat getting in and out of the truck is a little bit difficult.  He feels he lost motion.  Pain is 7/10 that is constant aggravated with ambulation and squatting or climbing stairs.  Denies fever or chills or shortness of breath difficulty with chewing or swallowing loss of bowel bladder control blurry vision double vision loss of sense of smell or taste    Past Medical History:   Diagnosis Date    Blood in stool 8/7/2018    Deep vein thrombosis (DVT) 2017    Left leg    Diabetes mellitus, type 2 2013    DM (diabetes mellitus) 2013    BS didn't check 01/03/2020    Gout     Hyperlipidemia     Hypertension     Neuropathy      Past Surgical History:   Procedure Laterality Date    APPENDECTOMY      CHOLECYSTECTOMY  01/2020    Gal bladder removal    COLONOSCOPY N/A 8/7/2018    Procedure: COLONOSCOPY;  Surgeon: Ten Valentine MD;  Location: [facility] ENDO;  Service: Endoscopy;  Laterality: N/A;    ROBOT-ASSISTED CHOLECYSTECTOMY USING DA TRIPP XI N/A 1/21/2020    Procedure: XI ROBOTIC CHOLECYSTECTOMY;  Surgeon: Sebastien Rivera MD;  Location: [facility] OR;  Service: General;  Laterality: N/A;    TONSILLECTOMY, ADENOIDECTOMY       Family History   Problem Relation Age of Onset    Diabetes Father     Prostate cancer Father     Cataracts Father     Hypertension Mother     Hypertension Brother      Social History     Socioeconomic History    Marital status:      Spouse name: Not on file     Number of children: Not on file    Years of education: Not on file    Highest education level: Not on file   Occupational History    Not on file   Social Needs    Financial resource strain: Not on file    Food insecurity     Worry: Not on file     Inability: Not on file    Transportation needs     Medical: Not on file     Non-medical: Not on file   Tobacco Use    Smoking status: Current Every Day Smoker     Packs/day: 0.50    Smokeless tobacco: Never Used    Tobacco comment: no smoking after m.n prior to sx   Substance and Sexual Activity    Alcohol use: Yes     Alcohol/week: 46.0 standard drinks     Types: 46 Cans of beer per week     Frequency: 4 or more times a week     Drinks per session: 1 or 2     Comment: daily  No alcohol prior to surgery    Drug use: No    Sexual activity: Yes     Partners: Female   Lifestyle    Physical activity     Days per week: Not on file     Minutes per session: Not on file    Stress: Not at all   Relationships    Social connections     Talks on phone: Not on file     Gets together: Not on file     Attends [employer] service: Not on file     Active member of club or organization: Not on file     Attends meetings of clubs or organizations: Not on file     Relationship status: Not on file   Other Topics Concern    Not on file   Social History Narrative    Not on file     Medication List with Changes/Refills   Current Medications    ACETAMINOPHEN-CODEINE 300-30MG (TYLENOL #3) 300-30 MG TAB    Take 1 tablet by mouth nightly as needed.    AMITRIPTYLINE (ELAVIL) 75 MG TABLET    Take 1 tablet (75 mg total) by mouth nightly as needed for Pain.    GLIPIZIDE (GLUCOTROL) 5 MG TABLET    Take 1 tablet (5 mg total) by mouth once daily.    LISINOPRIL (PRINIVIL,ZESTRIL) 40 MG TABLET    Take 1 tablet (40 mg total) by mouth once daily.    MELOXICAM (MOBIC) 15 MG TABLET    Take 1 tablet (15 mg total) by mouth once daily. Take with food.  Discontinue if you develop GI side effects.     METFORMIN (GLUCOPHAGE) 1000 MG TABLET    Take 1 tablet (1,000 mg total) by mouth 2 (two) times daily with meals.    PRAVASTATIN (PRAVACHOL) 80 MG TABLET    Take 1 tablet (80 mg total) by mouth once daily.     Review of patient's allergies indicates:  No Known Allergies  Review of Systems   Constitution: Negative for decreased appetite.   HENT: Negative for tinnitus.    Eyes: Negative for double vision.   Cardiovascular: Negative for chest pain.   Respiratory: Negative for wheezing.    Hematologic/Lymphatic: Negative for bleeding problem.   Skin: Negative for dry skin.   Musculoskeletal: Positive for joint pain, joint swelling and stiffness. Negative for arthritis, back pain, gout and neck pain.   Gastrointestinal: Negative for abdominal pain.   Genitourinary: Negative for bladder incontinence.   Neurological: Negative for numbness, paresthesias and sensory change.   Psychiatric/Behavioral: Negative for altered mental status.       Objective:   Body mass index is 30.81 kg/m².  Vitals:    11/06/20 0752   BP: (!) 161/93   Pulse: 87          General    Constitutional: He is oriented to person, place, and time. He appears well-developed.   HENT:   Head: Atraumatic.   Eyes: EOM are normal.   Cardiovascular: Normal rate.    Pulmonary/Chest: Effort normal.   Neurological: He is alert and oriented to person, place, and time.   Psychiatric: Judgment normal.            Cervical rotation is very functional  Bilateral upper extremity neurovascularly intact.  Radial and ulnar pulses 2+.  Strength is 5/5 throughout motor groups.  Very muscular.  Lumbar without tenderness  Pelvis is level  Bilateral straight leg raising is negative  Bilateral hips full motion no pain  Strength hip flexors, abductors, adductors, quads, hamstrings, ankle extensors and flexors are 5/5 in even bilaterally  Left knee with full motion no pain collaterals and cruciate stable.  Negative Jeannette sign.  Mild crepitus to compression on the patella but not  tender.  Negative Jeannette sign  Right knee with moderate swelling.  Range of motion 10-80 degrees with pain.  Positive Jeannette sign.  Quads, patella tendons intact without defect.  Severe medial joint pain mild crepitus to compression on the patella.  Collaterals are stable.  Could not assess ACL or PCL  Calves are soft nontender slight varicosities  No pitting edema  Ankle motion intact  EHL 5/5  PT 1+  Skin is warm to touch no obvious lesions    Relevant imaging results reviewed and interpreted by me, discussed with the patient and / or family today   X-ray and electronic record of his knee showing good joint space.  No fracture seen  MRI in electronic record showing medial meniscus tear of the right knee, effusion, chondromalacia    left  MRI Knee Without Contrast Right  Narrative: EXAMINATION:  MRI KNEE WITHOUT CONTRAST RIGHT    CLINICAL HISTORY:  Knee pain, positive xray;Pain in right knee    TECHNIQUE:  Multiplanar, multisequence images were preformed of the right knee.    COMPARISON:  None    FINDINGS:  Medial meniscus: Partial extrusion of the meniscal body.  Intrasubstance mucoid degeneration of the posterior horn.  Complex macerative tearing of the posterior horn free edge.    Lateral meniscus: No meniscal tear.    Ligaments:  ACL, PCL, MCL, and LCL complex are intact.    Tendons:  Extensor mechanism is maintained.    Cartilage:    Patellofemoral: Thinning of the medial facet retropatellar cartilage.  Heterogeneity and slight surface irregularity of the lateral facet cartilage.    Medial tibiofemoral: Diffuse chondral thinning and irregularity along the weight-bearing surface of the medial tibiofemoral joint.    Lateral tibiofemoral: Mild chondral surface irregularity central weight-bearing surface of the lateral tibiofemoral joint.    Bone: Mild marrow edema in the medial femoral condyle and beneath the medial tibial plateau.  No fracture or marrow replacing process.    Miscellaneous: Anterior  subcutaneous swelling and edema.  Moderate size suprapatellar joint effusion.  Frondlike synovial thickening versus intra-articular debris in the suprapatellar space.  Impression: 1. Posterior horn medial meniscus tear.  2. Tricompartmental chondromalacia.  3. Moderate knee joint effusion.    Electronically signed by: LUZ Kee MD  Date:    10/27/2020  Time:    08:30      Assessment:     Encounter Diagnoses   Name Primary?    Tear of medial meniscus of right knee, current, unspecified tear type, initial encounter Yes    Effusion of right knee     Chondromalacia, right knee         Plan:   Tear of medial meniscus of right knee, current, unspecified tear type, initial encounter    Effusion of right knee    Chondromalacia, right knee         Patient Instructions   RIGHT KNEE MEDIAL MENISCUS TEAR WITH SWELLING AND LIMITED MOTION.  TRIED MELOXICAM AND TYLENOL WITHOUT THE HELP.  HIS JOB REQUIRES DRIVING AND DELIVERY AND LIFTING.  VERY DIFFICULT WITH HIS JOB.  PAIN AT NIGHT.  POSITIVE CATCHING LOCKING FEELING.    Recommendations  Right knee arthroscopic surgery.  MRI positive for complex macerated medial meniscus tear.  This type does not heal.  Arthroscopic surgery is approximately 20 min done under general anesthesia.  Will make cool cut sometimes 3 we trimmed the torn cartilage/meniscus out.  It is outpatient surgery go home right after surgery.  You will be asleep for the surgery.  You will be allowed to weight bear as tolerated without crutches even though you might limp.  He will keep the dressing dry for 3 days then may take the dressing off get in the shower and let the wound wet.  No soaking in the tub allowed for 2 weeks or swimming.  He will be out 6-8 weeks  Most of the time no need for physical therapy you will be doing her own exercise.  Slight risk of nerve damage vascular damage infection blood clot fat clot.  We will give you antibiotics around surgery time to decrease risk of infection.  You  will be given stockings to wear for a couple weeks.  You may want try to take a baby aspirin afterwards to prevent blood clots          Disclaimer: This note was prepared using a voice recognition system and is likely to have sound alike errors within the text.

## 2020-11-11 ENCOUNTER — HOSPITAL ENCOUNTER (OUTPATIENT)
Dept: CARDIOLOGY | Facility: HOSPITAL | Age: 50
Discharge: HOME | End: 2020-11-11
Attending: ORTHOPAEDIC SURGERY
Payer: COMMERCIAL

## 2020-11-11 ENCOUNTER — HOSPITAL ENCOUNTER (OUTPATIENT)
Dept: RADIOLOGY | Facility: HOSPITAL | Age: 50
Discharge: HOME | End: 2020-11-11
Attending: ORTHOPAEDIC SURGERY
Payer: COMMERCIAL

## 2020-11-11 DIAGNOSIS — Z01.818 PREOP TESTING: ICD-10-CM

## 2020-11-11 PROCEDURE — 93010 ELECTROCARDIOGRAM REPORT: CPT | Mod: ,,, | Performed by: INTERNAL MEDICINE

## 2020-11-11 PROCEDURE — 93010 EKG 12-LEAD: ICD-10-PCS | Mod: ,,, | Performed by: INTERNAL MEDICINE

## 2020-11-11 PROCEDURE — 71046 X-RAY EXAM CHEST 2 VIEWS: CPT | Mod: 26,,, | Performed by: RADIOLOGY

## 2020-11-11 PROCEDURE — 93005 ELECTROCARDIOGRAM TRACING: CPT

## 2020-11-11 PROCEDURE — 71046 X-RAY EXAM CHEST 2 VIEWS: CPT | Mod: TC

## 2020-11-11 PROCEDURE — 71046 XR CHEST PA AND LATERAL PRE-OP: ICD-10-PCS | Mod: 26,,, | Performed by: RADIOLOGY

## 2020-11-20 NOTE — PRE ADMISSION SCREENING
Pre op instructions reviewed with patient per phone:    To confirm, Your surgeon has instructed you:  Surgery is scheduled 11/24/20 at 0755.      Please report to [facility] 1st floor main lobby by 0630.   Pre admit office to call afternoon prior to surgery with final arrival time    Covid 19 testing is scheduled for 11/21/20 at [facility]  @ 0906.  Please self quarantine after Covid testing, prior to surgery      INSTRUCTIONS IMPORTANT!!!  ¨ Do not eat, drink, or smoke after 12 midnight prior to surgery, including water. OK to brush teeth, no gum, candy or mints!    ¨ Take only these medicines with a small swallow of water-morning of surgery.  N/A          ____   Due to COVID 19 concerns, 1 visitor will be allowed in the pre operative area, and must adhere to social distancing guidelines.  One visitor/family member is currently allowed to visit in-patient rooms from 08:00 a.m - 6:00 p.m    ____   Family/caregivers will be updated re pt status via text/cell phone      ____  Do not wear makeup, including mascara.  ____  No powder, lotions or creams to surgical area.  ____  Please remove all jewelry, including piercings and leave at home.  ____  No money or valuables needed. Please leave at home.  ____  Please bring identification and insurance information to hospital.  ____  If going home the same day, arrange for a ride home. You will not be able to   drive if Anesthesia was used.  ____  Children, under 12 years old, must remain in the waiting room with an adult.  They are not allowed in patient areas.  ____  Wear loose fitting clothing. Allow for dressings, bandages.  ____  Stop Aspirin, Ibuprofen, Motrin and Aleve at least 5-7 days before surgery, unless otherwise instructed by your doctor, or the nurse.   You MAY use Tylenol/acetaminophen until day of surgery.  ____  If you take diabetic medication, do not take am of surgery unless instructed by   Doctor.  ____ Stop taking any Fish Oil  supplement or any Vitamins that contain Vitamin E at least 5 days prior to surgery.          Bathing Instructions-- The night before surgery and the morning prior to coming to the hospital:   -Do not shave the surgical area.   -Shower and wash your hair and body as usual with anti-bacterial  soap and shampoo.   -Rinse your hair and body completely.   -Use one packet of hibiclens to wash the surgical site (using your hand) gently for 5 minutes.  Do not scrub you skin too hard.   -Do not use hibiclens on your head, face, or genitals.   -Do not wash with anti-bacterial soap after you use the hibiclens.   -Rinse your body thoroughly.   -Dry with clean, soft towel.  Do not use lotion, cream, deodorant, or powders on   the surgical site.    Use antibacterial soap in place of hibiclens if your surgery is on the head, face or genitals.         Surgical Site Infection    Prevention of surgical site infections:     -Keep incisions clean and dry.   -Do not soak/submerge incisions in water until completely healed.   -Do not apply lotions, powders, creams, or deodorants to site.   -Always make sure hands are cleaned with antibacterial soap/ alcohol-based   prior to touching the surgical site.  (This includes doctors, nurses, staff, and yourself.)    Signs and symptoms:   -Redness and pain around the area where you had surgery   -Drainage of cloudy fluid from your surgical wound   -Fever over 100.4  I have read or had read and explained to me, and understand the above information.

## 2020-11-21 ENCOUNTER — LAB VISIT (OUTPATIENT)
Dept: URGENT CARE | Facility: CLINIC | Age: 50
End: 2020-11-21
Payer: COMMERCIAL

## 2020-11-21 VITALS — OXYGEN SATURATION: 98 % | HEART RATE: 95 BPM | TEMPERATURE: 98 F

## 2020-11-21 DIAGNOSIS — Z01.818 PREOP TESTING: ICD-10-CM

## 2020-11-21 PROCEDURE — U0003 INFECTIOUS AGENT DETECTION BY NUCLEIC ACID (DNA OR RNA); SEVERE ACUTE RESPIRATORY SYNDROME CORONAVIRUS 2 (SARS-COV-2) (CORONAVIRUS DISEASE [COVID-19]), AMPLIFIED PROBE TECHNIQUE, MAKING USE OF HIGH THROUGHPUT TECHNOLOGIES AS DESCRIBED BY CMS-2020-01-R: HCPCS

## 2020-11-22 LAB — SARS-COV-2 RNA RESP QL NAA+PROBE: NOT DETECTED

## 2020-11-23 ENCOUNTER — OFFICE VISIT (OUTPATIENT)
Dept: CARDIOLOGY | Facility: CLINIC | Age: 50
End: 2020-11-23
Payer: COMMERCIAL

## 2020-11-23 ENCOUNTER — ANESTHESIA EVENT (OUTPATIENT)
Dept: SURGERY | Facility: HOSPITAL | Age: 50
End: 2020-11-23
Payer: COMMERCIAL

## 2020-11-23 ENCOUNTER — TELEPHONE (OUTPATIENT)
Dept: CARDIOLOGY | Facility: CLINIC | Age: 50
End: 2020-11-23

## 2020-11-23 ENCOUNTER — TELEPHONE (OUTPATIENT)
Dept: PREADMISSION TESTING | Facility: HOSPITAL | Age: 50
End: 2020-11-23

## 2020-11-23 VITALS
HEART RATE: 87 BPM | OXYGEN SATURATION: 97 % | SYSTOLIC BLOOD PRESSURE: 122 MMHG | BODY MASS INDEX: 31.14 KG/M2 | DIASTOLIC BLOOD PRESSURE: 90 MMHG | WEIGHT: 242.5 LBS

## 2020-11-23 DIAGNOSIS — Z01.818 PREOP TESTING: Primary | ICD-10-CM

## 2020-11-23 DIAGNOSIS — E78.5 HYPERLIPIDEMIA, UNSPECIFIED HYPERLIPIDEMIA TYPE: ICD-10-CM

## 2020-11-23 DIAGNOSIS — I10 ESSENTIAL HYPERTENSION: ICD-10-CM

## 2020-11-23 PROCEDURE — 3074F SYST BP LT 130 MM HG: CPT | Mod: CPTII,S$GLB,, | Performed by: INTERNAL MEDICINE

## 2020-11-23 PROCEDURE — 3008F PR BODY MASS INDEX (BMI) DOCUMENTED: ICD-10-PCS | Mod: CPTII,S$GLB,, | Performed by: INTERNAL MEDICINE

## 2020-11-23 PROCEDURE — 3080F PR MOST RECENT DIASTOLIC BLOOD PRESSURE >= 90 MM HG: ICD-10-PCS | Mod: CPTII,S$GLB,, | Performed by: INTERNAL MEDICINE

## 2020-11-23 PROCEDURE — 1125F PR PAIN SEVERITY QUANTIFIED, PAIN PRESENT: ICD-10-PCS | Mod: S$GLB,,, | Performed by: INTERNAL MEDICINE

## 2020-11-23 PROCEDURE — 3080F DIAST BP >= 90 MM HG: CPT | Mod: CPTII,S$GLB,, | Performed by: INTERNAL MEDICINE

## 2020-11-23 PROCEDURE — 99204 PR OFFICE/OUTPT VISIT, NEW, LEVL IV, 45-59 MIN: ICD-10-PCS | Mod: S$GLB,,, | Performed by: INTERNAL MEDICINE

## 2020-11-23 PROCEDURE — 1125F AMNT PAIN NOTED PAIN PRSNT: CPT | Mod: S$GLB,,, | Performed by: INTERNAL MEDICINE

## 2020-11-23 PROCEDURE — 99999 PR PBB SHADOW E&M-EST. PATIENT-LVL III: CPT | Mod: PBBFAC,,, | Performed by: INTERNAL MEDICINE

## 2020-11-23 PROCEDURE — 99999 PR PBB SHADOW E&M-EST. PATIENT-LVL III: ICD-10-PCS | Mod: PBBFAC,,, | Performed by: INTERNAL MEDICINE

## 2020-11-23 PROCEDURE — 3074F PR MOST RECENT SYSTOLIC BLOOD PRESSURE < 130 MM HG: ICD-10-PCS | Mod: CPTII,S$GLB,, | Performed by: INTERNAL MEDICINE

## 2020-11-23 PROCEDURE — 3008F BODY MASS INDEX DOCD: CPT | Mod: CPTII,S$GLB,, | Performed by: INTERNAL MEDICINE

## 2020-11-23 PROCEDURE — 99204 OFFICE O/P NEW MOD 45 MIN: CPT | Mod: S$GLB,,, | Performed by: INTERNAL MEDICINE

## 2020-11-23 NOTE — TELEPHONE ENCOUNTER
11/23/2020 at 1230    Reviewed the patient's EKG result from 11/11/2020, which showed:    Normal sinus rhythm   Possible Left atrial enlargement   Septal infarct ,age undetermined   Abnormal ECG   When compared with ECG of 13-JAN-2020 12:35,   T wave amplitude has increased in Anterior leads   Confirmed by MD HUGO, SIRIA ([id number]) on 11/12/2020 10:19:42 PM     Contacted Joanie Hyde NP with cardiology to discuss EKG findings. She recommended that the patient be seen by cardiology today for cardiac clearance prior to the patient's surgery (right knee arthroscopy) with Dr. Rose on 11/24/2020.   Appointment made for the patient today, 11/23/2020 at 1400.  Will follow-up with cardiology assessment today.

## 2020-11-23 NOTE — PROGRESS NOTES
Subjective:   Patient ID:  Laron Kothari Jr. is a 50 y.o. male who presents for evaluation of No chief complaint on file.      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   ekg normal sinus rhythm   He had a cholecystectomy in 1/2020 without issues       Past Medical History:   Diagnosis Date    Blood in stool 8/7/2018    Deep vein thrombosis (DVT) 2017    Left leg    Diabetes mellitus, type 2 2013    DM (diabetes mellitus) 2013    BS didn't check 01/03/2020    Gout     Hyperlipidemia     Hypertension     Neuropathy        Past Surgical History:   Procedure Laterality Date    APPENDECTOMY      COLONOSCOPY N/A 8/7/2018    Procedure: COLONOSCOPY;  Surgeon: Ten Valentine MD;  Location: [facility] ENDO;  Service: Endoscopy;  Laterality: N/A;    ROBOT-ASSISTED CHOLECYSTECTOMY USING DA TRIPP XI N/A 1/21/2020    Procedure: XI ROBOTIC CHOLECYSTECTOMY;  Surgeon: Sebastien Rivera MD;  Location: [facility] OR;  Service: General;  Laterality: N/A;    TONSILLECTOMY, ADENOIDECTOMY         Social History     Tobacco Use    Smoking status: Current Every Day Smoker     Packs/day: 0.50    Smokeless tobacco: Never Used    Tobacco comment: no smoking after m.n prior to sx   Substance Use Topics    Alcohol use: Yes     Alcohol/week: 46.0 standard drinks     Types: 46 Cans of beer per week     Frequency: 4 or more times a week     Drinks per session: 1 or 2     Comment: daily  No alcohol today prior to surgery    Drug use: No       Family History   Problem Relation Age of Onset    Diabetes Father     Prostate cancer Father     Cataracts Father     Hypertension Mother     Hypertension Brother         Review of Systems   Constitution: Negative for fever and malaise/fatigue.   HENT: Negative for sore throat.    Eyes: Negative for blurred vision.   Cardiovascular: Negative for chest pain, claudication, cyanosis, dyspnea on exertion, irregular heartbeat, leg swelling, near-syncope, orthopnea, palpitations, paroxysmal nocturnal dyspnea and syncope.   Respiratory: Negative for cough, hemoptysis and shortness of breath.    Hematologic/Lymphatic: Negative for bleeding problem.   Skin: Negative for poor wound healing and rash.   Musculoskeletal: Positive for arthritis. Negative for back pain and falls.   Gastrointestinal: Negative for abdominal pain.   Genitourinary: Negative for nocturia.   Neurological: Negative for dizziness, focal weakness, headaches, light-headedness and loss of balance.   Psychiatric/Behavioral: Negative for altered mental status and substance abuse.       Current Outpatient Medications on File Prior to Visit   Medication Sig    amitriptyline (ELAVIL) 75 MG tablet Take 1 tablet (75 mg total) by mouth nightly as needed for Pain.    glipiZIDE (GLUCOTROL) 5 MG tablet Take 1 tablet (5 mg total) by mouth once daily.    lisinopriL (PRINIVIL,ZESTRIL) 40 MG tablet Take 1 tablet (40 mg total) by mouth once daily.    metFORMIN (GLUCOPHAGE) 1000 MG tablet Take 1 tablet (1,000 mg total) by mouth 2 (two) times daily with meals.    pravastatin (PRAVACHOL) 80 MG tablet Take 1 tablet (80 mg total) by mouth once daily. (Patient taking differently: Take 80 mg by mouth every evening. )    acetaminophen-codeine 300-30mg (TYLENOL #3) 300-30 mg Tab Take 1 tablet by mouth nightly as needed. (Patient not taking: Reported on 11/23/2020)    meloxicam (MOBIC) 15 MG tablet Take 1 tablet (15 mg total) by mouth once daily. Take with food.  Discontinue if you develop GI side effects. (Patient not taking: Reported on 11/23/2020)     No current facility-administered medications on file prior to visit.         Objective:   Objective:  Wt Readings from Last 3 Encounters:   11/23/20 110 kg (242 lb 8.1 oz)   11/06/20 108.9 kg (240 lb)   10/20/20 108.9 kg (240 lb)     Temp Readings from Last 3 Encounters:   11/21/20 98.2 °F (36.8 °C) (Temporal)   09/17/20 97.7 °F (36.5 °C) (Tympanic)   09/09/20 97.8 °F (36.6 °C) (Tympanic)     BP Readings from Last 3 Encounters:   11/23/20 (!) 122/90   11/06/20 (!) 161/93   10/20/20 133/82     Pulse Readings from Last 3 Encounters:   11/23/20 87   11/21/20 95   11/06/20 87       Physical Exam   Constitutional: He is oriented to person, place, and time. He appears well-developed and well-nourished.   HENT:   Head: Normocephalic and atraumatic.   Eyes: Conjunctivae are normal. No scleral icterus.   Neck: Normal range of motion. Neck supple.   Cardiovascular: Normal rate, regular rhythm, normal heart sounds and intact distal pulses.   No murmur heard.  Pulmonary/Chest: No respiratory distress. He has no wheezes. He has no rales. He exhibits no tenderness.   Abdominal: Soft. Bowel sounds are normal. He exhibits no distension. There is no guarding.   Musculoskeletal: Normal range of motion.   Neurological: He is alert and oriented to person, place, and time.   Skin: Skin is warm.   Psychiatric: He has a normal mood and affect.   Vitals reviewed.      Lab Results   Component Value Date    CHOL 137 09/09/2020    CHOL 226 (H) 11/29/2019    CHOL 153 03/19/2018     Lab Results   Component Value Date    HDL 40 09/09/2020    HDL 33 (L) 11/29/2019    HDL 37 (L) 03/19/2018     Lab Results   Component Value Date    LDLCALC 81.8 09/09/2020    LDLCALC 121.8 11/29/2019    LDLCALC 90.4 03/19/2018     Lab Results   Component Value Date    TRIG 76 09/09/2020    TRIG 356 (H) 11/29/2019    TRIG 128 03/19/2018     Lab Results   Component Value Date    CHOLHDL 29.2 09/09/2020    CHOLHDL 14.6 (L) 11/29/2019    CHOLHDL 24.2 03/19/2018       Chemistry        Component Value Date/Time     11/06/2020 0903    K  4.3 11/06/2020 0903     11/06/2020 0903    CO2 27 11/06/2020 0903    BUN 11 11/06/2020 0903    CREATININE 1.0 11/06/2020 0903     (H) 11/06/2020 0903        Component Value Date/Time    CALCIUM 9.2 11/06/2020 0903    ALKPHOS 78 11/06/2020 0903    AST 18 11/06/2020 0903    ALT 19 11/06/2020 0903    BILITOT 0.3 11/06/2020 0903    ESTGFRAFRICA >60.0 11/06/2020 0903    EGFRNONAA >60.0 11/06/2020 0903          No results found for: TSH  Lab Results   Component Value Date    INR 0.9 01/06/2020    INR 3.4 (A) 03/03/2017    INR 1.0 (A) 02/28/2017     Lab Results   Component Value Date    WBC 11.75 11/06/2020    HGB 14.3 11/06/2020    HCT 46.4 11/06/2020     (H) 11/06/2020     11/06/2020     BNP  @LABRCNTIP(BNP,BNPTRIAGEBLO)@  CrCl cannot be calculated (Patient's most recent lab result is older than the maximum 7 days allowed.).     Imaging:  ======  No results found for this or any previous visit.  No results found for this or any previous visit.  No results found for this or any previous visit.  No results found for this or any previous visit.  No results found for this or any previous visit.  No procedure found.    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 no further cardiac testing is necessary, ok to proceed with knee arthroplasty from cardiac standpoint   Low risk for intermediate risk surgery     Hyperlipidemia, unspecified  hyperlipidemia type  Comments:  cw pravachol.  LDL 80 , weight loss and heart healthy diet    Essential hypertension  Comments:  did not take his pill yet today and anxious about his appt   Low salt diet, weight loss        Follow up in 1 year

## 2020-11-23 NOTE — LETTER
November 23, 2020      Joanie Hyde, [facility]  [address]  [facility] [address]           [facility]  [address] [facility] [address]  Phone: [phone]  Fax: [phone]          Patient: Laron Kothari Jr.   MR Number: [id number]   YOB: 1970   Date of Visit: 11/23/2020       Dear Joanie Hyde:    Thank you for referring Laron Kothari to me for evaluation. Attached you will find relevant portions of my assessment and plan of care.    If you have questions, please do not hesitate to call me. I look forward to following Laron Kothari along with you.    Sincerely,    Yuki Sher MD    Enclosure  CC:  No Recipients    If you would like to receive this communication electronically, please contact [email or web address] or [phone] to request more information on [facility] access.    For providers and/or their staff who would like to refer a patient to [facility], please contact us through our one-stop-shop provider referral line, [facility], at [phone].    If you feel you have received this communication in error or would no longer like to receive these types of communications, please e-mail [email or web address]

## 2020-11-23 NOTE — TELEPHONE ENCOUNTER
----- Message from MIRA Alvarado-NADEEN sent at 11/23/2020 12:37 PM CST -----  Patient needs preop clearance appt today prior to surgery tomorrow    Please arrange ASAP    Thanks

## 2020-11-24 ENCOUNTER — ANESTHESIA (OUTPATIENT)
Dept: SURGERY | Facility: HOSPITAL | Age: 50
End: 2020-11-24
Payer: COMMERCIAL

## 2020-11-24 ENCOUNTER — HOSPITAL ENCOUNTER (OUTPATIENT)
Facility: HOSPITAL | Age: 50
Discharge: HOME | End: 2020-11-24
Attending: ORTHOPAEDIC SURGERY | Admitting: ORTHOPAEDIC SURGERY
Payer: COMMERCIAL

## 2020-11-24 DIAGNOSIS — S83.241D ACUTE MEDIAL MENISCUS TEAR OF RIGHT KNEE, SUBSEQUENT ENCOUNTER: Primary | ICD-10-CM

## 2020-11-24 PROBLEM — S83.241A ACUTE MEDIAL MENISCUS TEAR OF RIGHT KNEE: Status: ACTIVE | Noted: 2020-11-24

## 2020-11-24 LAB
POCT GLUCOSE: 228 MG/DL (ref 70–110)
POCT GLUCOSE: 251 MG/DL (ref 70–110)
POCT GLUCOSE: 266 MG/DL (ref 70–110)

## 2020-11-24 PROCEDURE — 25000003 PHARM REV CODE 250: Performed by: ANESTHESIOLOGY

## 2020-11-24 PROCEDURE — 29880 ARTHRS KNE SRG MNISECTMY M&L: CPT | Mod: RT,,, | Performed by: ORTHOPAEDIC SURGERY

## 2020-11-24 PROCEDURE — 63600175 PHARM REV CODE 636 W HCPCS: Performed by: ANESTHESIOLOGY

## 2020-11-24 PROCEDURE — 82962 GLUCOSE BLOOD TEST: CPT | Performed by: ORTHOPAEDIC SURGERY

## 2020-11-24 PROCEDURE — 63600175 PHARM REV CODE 636 W HCPCS: Performed by: NURSE ANESTHETIST, CERTIFIED REGISTERED

## 2020-11-24 PROCEDURE — 63600175 PHARM REV CODE 636 W HCPCS: Performed by: ORTHOPAEDIC SURGERY

## 2020-11-24 PROCEDURE — 37000009 HC ANESTHESIA EA ADD 15 MINS: Performed by: ORTHOPAEDIC SURGERY

## 2020-11-24 PROCEDURE — 71000015 HC POSTOP RECOV 1ST HR: Performed by: ORTHOPAEDIC SURGERY

## 2020-11-24 PROCEDURE — 36000711: Performed by: ORTHOPAEDIC SURGERY

## 2020-11-24 PROCEDURE — 36000710: Performed by: ORTHOPAEDIC SURGERY

## 2020-11-24 PROCEDURE — 29880 PR KNEE SCOPE MED/LAT MENISCECTOMY: ICD-10-PCS | Mod: RT,,, | Performed by: ORTHOPAEDIC SURGERY

## 2020-11-24 PROCEDURE — 71000033 HC RECOVERY, INTIAL HOUR: Performed by: ORTHOPAEDIC SURGERY

## 2020-11-24 PROCEDURE — 25000003 PHARM REV CODE 250: Performed by: NURSE ANESTHETIST, CERTIFIED REGISTERED

## 2020-11-24 PROCEDURE — 25000003 PHARM REV CODE 250: Performed by: ORTHOPAEDIC SURGERY

## 2020-11-24 PROCEDURE — 27201423 OPTIME MED/SURG SUP & DEVICES STERILE SUPPLY: Performed by: ORTHOPAEDIC SURGERY

## 2020-11-24 PROCEDURE — 37000008 HC ANESTHESIA 1ST 15 MINUTES: Performed by: ORTHOPAEDIC SURGERY

## 2020-11-24 RX ORDER — SODIUM CHLORIDE 9 MG/ML
INJECTION, SOLUTION INTRAVENOUS CONTINUOUS
Status: DISCONTINUED | OUTPATIENT
Start: 2020-11-24 | End: 2023-03-03

## 2020-11-24 RX ORDER — SODIUM CHLORIDE, SODIUM LACTATE, POTASSIUM CHLORIDE, CALCIUM CHLORIDE 600; 310; 30; 20 MG/100ML; MG/100ML; MG/100ML; MG/100ML
INJECTION, SOLUTION INTRAVENOUS CONTINUOUS PRN
Status: DISCONTINUED | OUTPATIENT
Start: 2020-11-24 | End: 2020-11-24

## 2020-11-24 RX ORDER — HYDROMORPHONE HYDROCHLORIDE 2 MG/ML
0.2 INJECTION, SOLUTION INTRAMUSCULAR; INTRAVENOUS; SUBCUTANEOUS EVERY 5 MIN PRN
Status: DISCONTINUED | OUTPATIENT
Start: 2020-11-24 | End: 2020-11-24 | Stop reason: HOSPADM

## 2020-11-24 RX ORDER — ONDANSETRON 2 MG/ML
4 INJECTION INTRAMUSCULAR; INTRAVENOUS DAILY PRN
Status: DISCONTINUED | OUTPATIENT
Start: 2020-11-24 | End: 2020-11-24 | Stop reason: HOSPADM

## 2020-11-24 RX ORDER — HYDROCODONE BITARTRATE AND ACETAMINOPHEN 5; 325 MG/1; MG/1
1 TABLET ORAL EVERY 8 HOURS PRN
Qty: 21 TABLET | Refills: 0 | Status: SHIPPED | OUTPATIENT
Start: 2020-11-24 | End: 2020-12-10 | Stop reason: SDUPTHER

## 2020-11-24 RX ORDER — OXYCODONE AND ACETAMINOPHEN 5; 325 MG/1; MG/1
1 TABLET ORAL
Status: DISCONTINUED | OUTPATIENT
Start: 2020-11-24 | End: 2020-11-24 | Stop reason: HOSPADM

## 2020-11-24 RX ORDER — PHENYLEPHRINE HYDROCHLORIDE 10 MG/ML
INJECTION INTRAVENOUS
Status: DISCONTINUED | OUTPATIENT
Start: 2020-11-24 | End: 2020-11-24

## 2020-11-24 RX ORDER — ONDANSETRON 2 MG/ML
INJECTION INTRAMUSCULAR; INTRAVENOUS
Status: DISCONTINUED | OUTPATIENT
Start: 2020-11-24 | End: 2020-11-24

## 2020-11-24 RX ORDER — FENTANYL CITRATE 50 UG/ML
INJECTION, SOLUTION INTRAMUSCULAR; INTRAVENOUS
Status: DISCONTINUED | OUTPATIENT
Start: 2020-11-24 | End: 2020-11-24

## 2020-11-24 RX ORDER — HYDROCODONE BITARTRATE AND ACETAMINOPHEN 5; 325 MG/1; MG/1
1 TABLET ORAL EVERY 4 HOURS PRN
Status: DISCONTINUED | OUTPATIENT
Start: 2020-11-24 | End: 2020-11-24 | Stop reason: HOSPADM

## 2020-11-24 RX ORDER — CEFAZOLIN SODIUM 2 G/50ML
2 SOLUTION INTRAVENOUS
Status: COMPLETED | OUTPATIENT
Start: 2020-11-24 | End: 2020-11-24

## 2020-11-24 RX ORDER — MIDAZOLAM HYDROCHLORIDE 1 MG/ML
INJECTION INTRAMUSCULAR; INTRAVENOUS
Status: DISCONTINUED | OUTPATIENT
Start: 2020-11-24 | End: 2020-11-24

## 2020-11-24 RX ORDER — PROPOFOL 10 MG/ML
VIAL (ML) INTRAVENOUS
Status: DISCONTINUED | OUTPATIENT
Start: 2020-11-24 | End: 2020-11-24

## 2020-11-24 RX ORDER — KETOROLAC TROMETHAMINE 30 MG/ML
15 INJECTION, SOLUTION INTRAMUSCULAR; INTRAVENOUS EVERY 8 HOURS PRN
Status: DISCONTINUED | OUTPATIENT
Start: 2020-11-24 | End: 2020-11-24 | Stop reason: HOSPADM

## 2020-11-24 RX ORDER — CHLORHEXIDINE GLUCONATE ORAL RINSE 1.2 MG/ML
10 SOLUTION DENTAL
Status: DISCONTINUED | OUTPATIENT
Start: 2020-11-24 | End: 2023-03-03

## 2020-11-24 RX ORDER — LIDOCAINE HYDROCHLORIDE 10 MG/ML
INJECTION, SOLUTION EPIDURAL; INFILTRATION; INTRACAUDAL; PERINEURAL
Status: DISCONTINUED | OUTPATIENT
Start: 2020-11-24 | End: 2020-11-24

## 2020-11-24 RX ORDER — BUPIVACAINE HYDROCHLORIDE 2.5 MG/ML
INJECTION, SOLUTION EPIDURAL; INFILTRATION; INTRACAUDAL
Status: DISCONTINUED | OUTPATIENT
Start: 2020-11-24 | End: 2020-11-24 | Stop reason: HOSPADM

## 2020-11-24 RX ORDER — DEXAMETHASONE SODIUM PHOSPHATE 4 MG/ML
INJECTION, SOLUTION INTRA-ARTICULAR; INTRALESIONAL; INTRAMUSCULAR; INTRAVENOUS; SOFT TISSUE
Status: DISCONTINUED | OUTPATIENT
Start: 2020-11-24 | End: 2020-11-24

## 2020-11-24 RX ORDER — SUCCINYLCHOLINE CHLORIDE 20 MG/ML
INJECTION INTRAMUSCULAR; INTRAVENOUS
Status: DISCONTINUED | OUTPATIENT
Start: 2020-11-24 | End: 2020-11-24

## 2020-11-24 RX ORDER — ROCURONIUM BROMIDE 10 MG/ML
INJECTION, SOLUTION INTRAVENOUS
Status: DISCONTINUED | OUTPATIENT
Start: 2020-11-24 | End: 2020-11-24

## 2020-11-24 RX ORDER — ONDANSETRON 2 MG/ML
4 INJECTION INTRAMUSCULAR; INTRAVENOUS EVERY 12 HOURS PRN
Status: DISCONTINUED | OUTPATIENT
Start: 2020-11-24 | End: 2020-11-24 | Stop reason: HOSPADM

## 2020-11-24 RX ADMIN — ROCURONIUM BROMIDE 5 MG: 10 INJECTION, SOLUTION INTRAVENOUS at 08:11

## 2020-11-24 RX ADMIN — HYDROMORPHONE HYDROCHLORIDE 0.2 MG: 2 INJECTION INTRAMUSCULAR; INTRAVENOUS; SUBCUTANEOUS at 09:11

## 2020-11-24 RX ADMIN — PHENYLEPHRINE HYDROCHLORIDE 100 MCG: 10 INJECTION INTRAVENOUS at 08:11

## 2020-11-24 RX ADMIN — OXYCODONE HYDROCHLORIDE AND ACETAMINOPHEN 1 TABLET: 5; 325 TABLET ORAL at 09:11

## 2020-11-24 RX ADMIN — LIDOCAINE HYDROCHLORIDE 80 MG: 10 INJECTION, SOLUTION EPIDURAL; INFILTRATION; INTRACAUDAL; PERINEURAL at 08:11

## 2020-11-24 RX ADMIN — SODIUM CHLORIDE, SODIUM LACTATE, POTASSIUM CHLORIDE, AND CALCIUM CHLORIDE: 600; 310; 30; 20 INJECTION, SOLUTION INTRAVENOUS at 08:11

## 2020-11-24 RX ADMIN — ONDANSETRON 4 MG: 2 INJECTION, SOLUTION INTRAMUSCULAR; INTRAVENOUS at 08:11

## 2020-11-24 RX ADMIN — PROPOFOL 200 MG: 10 INJECTION, EMULSION INTRAVENOUS at 08:11

## 2020-11-24 RX ADMIN — MIDAZOLAM HYDROCHLORIDE 2 MG: 1 INJECTION, SOLUTION INTRAMUSCULAR; INTRAVENOUS at 08:11

## 2020-11-24 RX ADMIN — LIDOCAINE HYDROCHLORIDE 30 MG: 10 INJECTION, SOLUTION EPIDURAL; INFILTRATION; INTRACAUDAL; PERINEURAL at 09:11

## 2020-11-24 RX ADMIN — INSULIN HUMAN 4 UNITS: 100 INJECTION, SOLUTION PARENTERAL at 09:11

## 2020-11-24 RX ADMIN — DEXAMETHASONE SODIUM PHOSPHATE 4 MG: 4 INJECTION, SOLUTION INTRAMUSCULAR; INTRAVENOUS at 08:11

## 2020-11-24 RX ADMIN — PHENYLEPHRINE HYDROCHLORIDE 200 MCG: 10 INJECTION INTRAVENOUS at 08:11

## 2020-11-24 RX ADMIN — CHLORHEXIDINE GLUCONATE 0.12% ORAL RINSE 10 ML: 1.2 LIQUID ORAL at 07:11

## 2020-11-24 RX ADMIN — SUCCINYLCHOLINE CHLORIDE 160 MG: 20 INJECTION, SOLUTION INTRAMUSCULAR; INTRAVENOUS at 08:11

## 2020-11-24 RX ADMIN — CEFAZOLIN SODIUM 2 G: 2 SOLUTION INTRAVENOUS at 08:11

## 2020-11-24 RX ADMIN — KETOROLAC TROMETHAMINE 15 MG: 30 INJECTION, SOLUTION INTRAMUSCULAR; INTRAVENOUS at 09:11

## 2020-11-24 RX ADMIN — INSULIN HUMAN 4 UNITS: 100 INJECTION, SOLUTION PARENTERAL at 07:11

## 2020-11-24 RX ADMIN — PROPOFOL 70 MG: 10 INJECTION, EMULSION INTRAVENOUS at 08:11

## 2020-11-24 RX ADMIN — FENTANYL CITRATE 100 MCG: 50 INJECTION, SOLUTION INTRAMUSCULAR; INTRAVENOUS at 08:11

## 2020-11-24 NOTE — DISCHARGE SUMMARY
[facility]  Discharge Note  Short Stay    Procedure(s) (LRB):  ARTHROSCOPY, KNEE, WITH MENISCECTOMY (Right)    OUTCOME: Patient tolerated treatment/procedure well without complication and is now ready for discharge.    DISPOSITION: Home or Self Care    FINAL DIAGNOSIS:  <principal problem not specified>    FOLLOWUP: In clinic    DISCHARGE INSTRUCTIONS:  No discharge procedures on file.

## 2020-11-24 NOTE — ANESTHESIA PREPROCEDURE EVALUATION
11/24/2020  Laron Kothari Jr. is a 50 y.o., male.    Patient Active Problem List   Diagnosis    Hyperlipidemia LDL goal <70    Tobacco use disorder    Hypertension goal BP (blood pressure) < 130/80    Diabetes mellitus with microalbuminuria    Type 2 diabetes mellitus with diabetic neuropathy    Leg DVT (deep venous thromboembolism), acute, left    Blood in stool    Colon polyps    Alcohol abuse    Acute medial meniscus tear of right knee       Past Surgical History:   Procedure Laterality Date    APPENDECTOMY      COLONOSCOPY N/A 8/7/2018    Procedure: COLONOSCOPY;  Surgeon: Ten Valentine MD;  Location: [facility] ENDO;  Service: Endoscopy;  Laterality: N/A;    ROBOT-ASSISTED CHOLECYSTECTOMY USING DA TRIPP XI N/A 1/21/2020    Procedure: XI ROBOTIC CHOLECYSTECTOMY;  Surgeon: Sebastien Rivera MD;  Location: [facility] OR;  Service: General;  Laterality: N/A;    TONSILLECTOMY, ADENOIDECTOMY           Pre-op Assessment    I have reviewed the Patient Summary Reports.     I have reviewed the Nursing Notes.    I have reviewed the Medications.     Review of Systems  Anesthesia Hx:  No problems with previous Anesthesia Denies Hx of Anesthetic complications  Denies Family Hx of Anesthesia complications.   Denies Personal Hx of Anesthesia complications.   Social:  Smoker, Alcohol Use  Alcohol Use:   Alcohol Abuse:   Hematology/Oncology:        Hematology Comments: Hx of Leg DVT   Cardiovascular:   Hypertension ECG has been reviewed.    Pulmonary:  Pulmonary Normal    Renal/:  Renal/ Normal     Hepatic/GI:  Hepatic/GI Normal    Neurological:  Neurology Normal    Endocrine:   Diabetes, poorly controlled, type 2    Psych:   Psychiatric History        Patient Active Problem List   Diagnosis    Hyperlipidemia LDL goal <70    Tobacco use disorder    Hypertension goal BP (blood pressure) < 130/80    Diabetes  mellitus with microalbuminuria    Type 2 diabetes mellitus with diabetic neuropathy    Leg DVT (deep venous thromboembolism), acute, left    Blood in stool    Colon polyps    Alcohol abuse    Acute medial meniscus tear of right knee         Physical Exam  General:  Well nourished    Airway/Jaw/Neck:  Airway Findings: Mouth Opening: Normal Tongue: Normal  General Airway Assessment: Adult  Mallampati: II  TM Distance: 4 - 6 cm  Jaw/Neck Findings:  Neck ROM: Normal ROM      Dental:  Dental Findings: In tact, Upper front caps, Periodontal disease, SevereMultiple chipped and broken top teeth. Mostly missing, top   Chest/Lungs:  Chest/Lungs Findings: Clear to auscultation, Normal Respiratory Rate     Heart/Vascular:  Heart Findings: Rate: Normal  Rhythm: Regular Rhythm  Sounds: Normal        Mental Status:  Mental Status Findings:  Cooperative, Alert and Oriented       Lab Results   Component Value Date    WBC 11.75 11/06/2020    HGB 14.3 11/06/2020    HCT 46.4 11/06/2020     (H) 11/06/2020     11/06/2020             Chemistry        Component Value Date/Time     11/06/2020 0903    K 4.3 11/06/2020 0903     11/06/2020 0903    CO2 27 11/06/2020 0903    BUN 11 11/06/2020 0903    CREATININE 1.0 11/06/2020 0903     (H) 11/06/2020 0903        Component Value Date/Time    CALCIUM 9.2 11/06/2020 0903    ALKPHOS 78 11/06/2020 0903    AST 18 11/06/2020 0903    ALT 19 11/06/2020 0903    BILITOT 0.3 11/06/2020 0903    ESTGFRAFRICA >60.0 11/06/2020 0903    EGFRNONAA >60.0 11/06/2020 0903            Anesthesia Plan  Type of Anesthesia, risks & benefits discussed:  Anesthesia Type:  general  Patient's Preference:   Intra-op Monitoring Plan: standard ASA monitors  Intra-op Monitoring Plan Comments:   Post Op Pain Control Plan: per primary service following discharge from PACU  Post Op Pain Control Plan Comments:   Induction:   IV  Beta Blocker:  Patient is not currently on a Beta-Blocker (No  further documentation required).       Informed Consent: Patient understands risks and agrees with Anesthesia plan.  Questions answered. Anesthesia consent signed with patient.  ASA Score: 2     Day of Surgery Review of History & Physical: I have interviewed and examined the patient. I have reviewed the patient's H&P dated:  There are no significant changes.  H&P update referred to the surgeon.         Ready For Surgery From Anesthesia Perspective.

## 2020-11-24 NOTE — TRANSFER OF CARE
"Anesthesia Transfer of Care Note    Patient: Laron Kothari Jr.    Procedure(s) Performed: Procedure(s) (LRB):  ARTHROSCOPY, KNEE, WITH MENISCECTOMY (Right)    Patient location: PACU    Anesthesia Type: general    Transport from OR: Transported from OR on room air with adequate spontaneous ventilation    Post pain: adequate analgesia    Post assessment: no apparent anesthetic complications    Post vital signs: stable    Level of consciousness: awake    Nausea/Vomiting: no nausea/vomiting    Complications: none    Transfer of care protocol was followed      Last vitals:   Visit Vitals  BP (!) 145/92 (BP Location: Right arm, Patient Position: Sitting)   Pulse 93   Temp 36.6 °C (97.9 °F) (Temporal)   Resp 20   Ht 6' 2" (1.88 m)   Wt 109.2 kg (240 lb 11.9 oz)   SpO2 98%   BMI 30.91 kg/m²     "

## 2020-11-24 NOTE — OP NOTE
[facility] -   Orthopedic Surgery  Operative Note    SUMMARY     Date of Procedure: 11/24/2020     Procedure: Procedure(s) (LRB):  ARTHROSCOPY, KNEE, WITH MENISCECTOMY (Right)     Partial medial and lateral meniscectomy, chondroplasty medial condyle and anteriorly  Surgeon(s) and Role:     * Nahum Rose MD - Primary    Assisting Roxane Urban    Pre-Operative Diagnosis: Tear of medial meniscus of right knee, current, unspecified tear type, initial encounter [S83.241A]  Effusion of right knee [M25.461]    Post-Operative Diagnosis: Post-Op Diagnosis Codes:     * Tear of medial meniscus of right knee, current, unspecified tear type, initial encounter [S83.241A]     * Effusion of right knee [M25.461]  Medial complex meniscus tear, degenerative tear lateral meniscus, 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    Attestation: I performed the procedure.  Tourniquet time 16 min  Description:   Patient has positive medial meniscus tear by MRI and chondromalacia.  He is having catching locking feeling.  Discussed arthroscopic surgery with the patient.  Pros and cons discussed except the findings.  I did tell him that arthroscopic surgery might given temporary relief from arthritis.  What if meniscus is torn it will give him a little bit more relief and decreases the risk of catching.  After administering general anesthesia patient right leg was washed with soaking alcohol ChloraPrep draped usual sterile fashion.  Esmarch used thigh tourniquet inflated to 300 mm of mercury.  Enter 0 lateral portal was identified small stab incision was made and patient had a large effusion after we inserted a blunt  trocar.  We inspected the knee joint had chondromalacia type 3 of the anterior condyle type 2 of the patella also had complex medial meniscus tear posteriorly and had chondromalacia type 3 with chondral flap coming down of the medial femoral condyle.  Went to the lateral compartment and has degenerative tear of the lateral meniscus.  Also had hypertrophy of the synovium.  We identified the anteromedial portal with the spinal needles.  Small stab incision made blunt trocar used to enter the joint.  Proceeded with the shaver to do partial synovectomy and removal of the hypertrophied synovial lining that was impinging on the meniscus anteriorly.  And then with a biter the partial posterior medial meniscectomy followed by a shaver to balance the meniscus.  There was a large fragment of chondral lesion on the medial femoral condyle a was peeling off which was debrided with the shaver.     Entered the lateral compartment and did partial lateral menisectomy .drained the knee and closed portals with nylon. Injected 15cc of marcaine solit 5 cc in each portal and 5 cc in the knee joint. Sterile dressing applied. Tourniquet deflated

## 2020-11-25 ENCOUNTER — PATIENT MESSAGE (OUTPATIENT)
Dept: ORTHOPEDICS | Facility: CLINIC | Age: 50
End: 2020-11-25

## 2020-11-27 ENCOUNTER — PATIENT MESSAGE (OUTPATIENT)
Dept: ORTHOPEDICS | Facility: CLINIC | Age: 50
End: 2020-11-27

## 2020-11-30 ENCOUNTER — PATIENT MESSAGE (OUTPATIENT)
Dept: ORTHOPEDICS | Facility: CLINIC | Age: 50
End: 2020-11-30

## 2020-12-03 VITALS
TEMPERATURE: 98 F | OXYGEN SATURATION: 94 % | RESPIRATION RATE: 12 BRPM | WEIGHT: 240.75 LBS | BODY MASS INDEX: 30.9 KG/M2 | HEIGHT: 74 IN | DIASTOLIC BLOOD PRESSURE: 93 MMHG | HEART RATE: 77 BPM | SYSTOLIC BLOOD PRESSURE: 155 MMHG

## 2020-12-04 ENCOUNTER — PATIENT MESSAGE (OUTPATIENT)
Dept: ORTHOPEDICS | Facility: CLINIC | Age: 50
End: 2020-12-04

## 2020-12-07 ENCOUNTER — PATIENT MESSAGE (OUTPATIENT)
Dept: ORTHOPEDICS | Facility: CLINIC | Age: 50
End: 2020-12-07

## 2020-12-08 ENCOUNTER — TELEPHONE (OUTPATIENT)
Dept: ORTHOPEDICS | Facility: CLINIC | Age: 50
End: 2020-12-08

## 2020-12-08 ENCOUNTER — PATIENT MESSAGE (OUTPATIENT)
Dept: ORTHOPEDICS | Facility: CLINIC | Age: 50
End: 2020-12-08

## 2020-12-08 NOTE — TELEPHONE ENCOUNTER
Returned the patient's phone call. Patient states that he needs his op note faxed to his insurance. Informed the patient that their op note has been faxed to [phone]. Patient verbalized understanding.FP                ----- Message from Jennyfer Waters sent at 12/8/2020  2:20 PM CST -----  Regarding: surgery  Contact: pt  Requesting a karine back to discuss surgery paperwork, need confirmation of surgery to insurance, Please call back at [phone].

## 2020-12-10 ENCOUNTER — PATIENT MESSAGE (OUTPATIENT)
Dept: ORTHOPEDICS | Facility: CLINIC | Age: 50
End: 2020-12-10

## 2020-12-10 ENCOUNTER — PATIENT MESSAGE (OUTPATIENT)
Dept: CARDIOLOGY | Facility: CLINIC | Age: 50
End: 2020-12-10

## 2020-12-11 ENCOUNTER — PATIENT MESSAGE (OUTPATIENT)
Dept: OTHER | Facility: OTHER | Age: 50
End: 2020-12-11

## 2020-12-11 ENCOUNTER — OFFICE VISIT (OUTPATIENT)
Dept: ORTHOPEDICS | Facility: CLINIC | Age: 50
End: 2020-12-11
Payer: COMMERCIAL

## 2020-12-11 VITALS
HEIGHT: 74 IN | BODY MASS INDEX: 30.8 KG/M2 | WEIGHT: 240 LBS | HEART RATE: 137 BPM | DIASTOLIC BLOOD PRESSURE: 85 MMHG | SYSTOLIC BLOOD PRESSURE: 133 MMHG

## 2020-12-11 DIAGNOSIS — M25.561 ACUTE PAIN OF RIGHT KNEE: ICD-10-CM

## 2020-12-11 DIAGNOSIS — S83.241A TEAR OF MEDIAL MENISCUS OF RIGHT KNEE, CURRENT, UNSPECIFIED TEAR TYPE, INITIAL ENCOUNTER: Primary | ICD-10-CM

## 2020-12-11 DIAGNOSIS — M25.461 EFFUSION OF RIGHT KNEE: ICD-10-CM

## 2020-12-11 DIAGNOSIS — M94.261 CHONDROMALACIA, RIGHT KNEE: ICD-10-CM

## 2020-12-11 PROCEDURE — 3008F PR BODY MASS INDEX (BMI) DOCUMENTED: ICD-10-PCS | Mod: CPTII,S$GLB,, | Performed by: PHYSICIAN ASSISTANT

## 2020-12-11 PROCEDURE — 99999 PR PBB SHADOW E&M-EST. PATIENT-LVL IV: CPT | Mod: PBBFAC,,, | Performed by: PHYSICIAN ASSISTANT

## 2020-12-11 PROCEDURE — 99999 PR PBB SHADOW E&M-EST. PATIENT-LVL IV: ICD-10-PCS | Mod: PBBFAC,,, | Performed by: PHYSICIAN ASSISTANT

## 2020-12-11 PROCEDURE — 3008F BODY MASS INDEX DOCD: CPT | Mod: CPTII,S$GLB,, | Performed by: PHYSICIAN ASSISTANT

## 2020-12-11 PROCEDURE — 99024 PR POST-OP FOLLOW-UP VISIT: ICD-10-PCS | Mod: S$GLB,,, | Performed by: PHYSICIAN ASSISTANT

## 2020-12-11 PROCEDURE — 99024 POSTOP FOLLOW-UP VISIT: CPT | Mod: S$GLB,,, | Performed by: PHYSICIAN ASSISTANT

## 2020-12-11 PROCEDURE — 1125F PR PAIN SEVERITY QUANTIFIED, PAIN PRESENT: ICD-10-PCS | Mod: S$GLB,,, | Performed by: PHYSICIAN ASSISTANT

## 2020-12-11 PROCEDURE — 1125F AMNT PAIN NOTED PAIN PRSNT: CPT | Mod: S$GLB,,, | Performed by: PHYSICIAN ASSISTANT

## 2020-12-11 RX ORDER — HYDROCODONE BITARTRATE AND ACETAMINOPHEN 5; 325 MG/1; MG/1
1 TABLET ORAL EVERY 8 HOURS PRN
Qty: 21 TABLET | Refills: 0 | Status: SHIPPED | OUTPATIENT
Start: 2020-12-11 | End: 2020-12-11 | Stop reason: SDUPTHER

## 2020-12-11 RX ORDER — HYDROCODONE BITARTRATE AND ACETAMINOPHEN 5; 325 MG/1; MG/1
1 TABLET ORAL EVERY 8 HOURS PRN
Qty: 28 TABLET | Refills: 0 | Status: SHIPPED | OUTPATIENT
Start: 2020-12-11 | End: 2020-12-28 | Stop reason: SDUPTHER

## 2020-12-11 NOTE — PROGRESS NOTES
Patient ID: Laron Kothari Jr. is a 50 y.o. male.    Chief Complaint: Pain of the Right Knee      HPI: Laron Kothari Jr.  is a 50 y.o. male who c/o Pain of the Right Knee   patient is roughly 2 weeks status post right knee scope  Patient notes over the past 2 days increased swelling to the point of needing crutches as well as the pain increase currently at worst a level 10/10.  He notes no real relief noted with current regimen.  As well as loss of ROM. Patient states he was doing fine had increased activity as well as increased quality of life and activity of daily living until 2 days ago.  He denies any trauma.  Pain is described as an overall ache to the top of the anterior thigh as well as hypersensitivity, patient is guarding area medially greater than laterally.    Patient is presently denying any shortness of breath, chest pain, fever/chills, nausea/vomiting, loss of taste or smell, numbness/tingling or sensation changes, loss of bladder or bowel function, loss of taste/smell.       Surgery: Right knee scope    Surgery Date:  11/24/2020    Past Medical History:   Diagnosis Date    Blood in stool 8/7/2018    Deep vein thrombosis (DVT) 2017    Left leg    Diabetes mellitus, type 2 2013    DM (diabetes mellitus) 2013    BS didn't check 01/03/2020    Gout     Hyperlipidemia     Hypertension     Neuropathy      Past Surgical History:   Procedure Laterality Date    APPENDECTOMY      ARTHROSCOPIC CHONDROPLASTY OF KNEE JOINT Right 11/24/2020    Procedure: ARTHROSCOPY, KNEE, WITH CHONDROPLASTY;  Surgeon: Nahum Rose MD;  Location: [facility] OR;  Service: Orthopedics;  Laterality: Right;  chondroplasty medial condyle and anteriorly    COLONOSCOPY N/A 8/7/2018    Procedure: COLONOSCOPY;  Surgeon: Ten Valentine MD;  Location: [facility] ENDO;  Service: Endoscopy;  Laterality: N/A;    KNEE ARTHROSCOPY W/ MENISCECTOMY Right 11/24/2020    Procedure: ARTHROSCOPY, KNEE, WITH MENISCECTOMY;  Surgeon: Nahum BEARDEN  MD Rose;  Location: [facility] OR;  Service: Orthopedics;  Laterality: Right;  Partial medial and lateral meniscectomy    ROBOT-ASSISTED CHOLECYSTECTOMY USING DA TRIPP XI N/A 1/21/2020    Procedure: XI ROBOTIC CHOLECYSTECTOMY;  Surgeon: Sebastien Rivera MD;  Location: [facility] OR;  Service: General;  Laterality: N/A;    TONSILLECTOMY, ADENOIDECTOMY       Family History   Problem Relation Age of Onset    Diabetes Father     Prostate cancer Father     Cataracts Father     Hypertension Mother     Hypertension Brother      Social History     Socioeconomic History    Marital status:      Spouse name: Not on file    Number of children: Not on file    Years of education: Not on file    Highest education level: Not on file   Occupational History    Not on file   Social Needs    Financial resource strain: Not on file    Food insecurity     Worry: Not on file     Inability: Not on file    Transportation needs     Medical: Not on file     Non-medical: Not on file   Tobacco Use    Smoking status: Current Every Day Smoker     Packs/day: 0.50    Smokeless tobacco: Never Used    Tobacco comment: no smoking after m.n prior to sx   Substance and Sexual Activity    Alcohol use: Yes     Alcohol/week: 46.0 standard drinks     Types: 46 Cans of beer per week     Frequency: 4 or more times a week     Drinks per session: 1 or 2     Comment: daily  No alcohol today prior to surgery    Drug use: No    Sexual activity: Yes     Partners: Female   Lifestyle    Physical activity     Days per week: Not on file     Minutes per session: Not on file    Stress: Not at all   Relationships    Social connections     Talks on phone: Not on file     Gets together: Not on file     Attends [employer] service: Not on file     Active member of club or organization: Not on file     Attends meetings of clubs or organizations: Not on file     Relationship status: Not on file   Other Topics Concern    Not on file   Social History  Narrative    Not on file     Medication List with Changes/Refills   Current Medications    ACETAMINOPHEN-CODEINE 300-30MG (TYLENOL #3) 300-30 MG TAB    Take 1 tablet by mouth nightly as needed.    AMITRIPTYLINE (ELAVIL) 75 MG TABLET    Take 1 tablet (75 mg total) by mouth nightly as needed for Pain.    GLIPIZIDE (GLUCOTROL) 5 MG TABLET    Take 1 tablet (5 mg total) by mouth once daily.    LISINOPRIL (PRINIVIL,ZESTRIL) 40 MG TABLET    Take 1 tablet (40 mg total) by mouth once daily.    MELOXICAM (MOBIC) 15 MG TABLET    Take 1 tablet (15 mg total) by mouth once daily. Take with food.  Discontinue if you develop GI side effects.    METFORMIN (GLUCOPHAGE) 1000 MG TABLET    Take 1 tablet (1,000 mg total) by mouth 2 (two) times daily with meals.    PRAVASTATIN (PRAVACHOL) 80 MG TABLET    Take 1 tablet (80 mg total) by mouth once daily.   Changed and/or Refilled Medications    Modified Medication Previous Medication    HYDROCODONE-ACETAMINOPHEN (NORCO) 5-325 MG PER TABLET HYDROcodone-acetaminophen (NORCO) 5-325 mg per tablet       Take 1 tablet by mouth every 8 (eight) hours as needed for Pain.    Take 1 tablet by mouth every 8 (eight) hours as needed for Pain.   Discontinued Medications    HYDROCODONE-ACETAMINOPHEN (NORCO) 5-325 MG PER TABLET    Take 1 tablet by mouth every 8 (eight) hours as needed for Pain.     Review of patient's allergies indicates:  No Known Allergies    Objective:     Right Lower Extremity  NVI  WWP foot  Comp soft  Cap refill < 2 sec  Calf NT  (-) Mellissa sign  KAREN  ROM patient is hesitant on passive range of motion X specially for extension.  Muscle tone is noted and patient is able to lift extremity off the ground to obtain full extension.  This is done with pain elicited as well as body manipulation.  Patient is more eager to flex extremity which he does and is able to obtain roughly 75° on passive range of motion.  Wiggles toes  DF/PF intact  Sensation intact  Inc C/D/I  No SOI, there is no  warmth erythema or drainage noted  There is moderate swelling to the anterior thigh that is hypersensitive to palpation, patient noted to be guarding this area medially greater than laterally    Assessment:       Encounter Diagnoses   Name Primary?    Tear of medial meniscus of right knee, current, unspecified tear type, initial encounter Yes    Effusion of right knee     Chondromalacia, right knee     Acute pain of right knee           Plan:       Laron was seen today for pain.    Diagnoses and all orders for this visit:    Tear of medial meniscus of right knee, current, unspecified tear type, initial encounter  -     HYDROcodone-acetaminophen (NORCO) 5-325 mg per tablet; Take 1 tablet by mouth every 8 (eight) hours as needed for Pain.    Effusion of right knee  -     HYDROcodone-acetaminophen (NORCO) 5-325 mg per tablet; Take 1 tablet by mouth every 8 (eight) hours as needed for Pain.    Chondromalacia, right knee  -     HYDROcodone-acetaminophen (NORCO) 5-325 mg per tablet; Take 1 tablet by mouth every 8 (eight) hours as needed for Pain.    Acute pain of right knee  -     HYDROcodone-acetaminophen (NORCO) 5-325 mg per tablet; Take 1 tablet by mouth every 8 (eight) hours as needed for Pain.        Laron Kothari  is an established pt here for postop follow-up after right knee scope by Dr. Rose.  Pain medication was refilled appropriately.   The incision was cleaned with hydrogen peroxide.  All staples were removed, and suture strips were applied across the incision.  There is no warmth erythema or drainage noted upon expression and removal. They should remain in place until they fall off in approximately 2 weeks. The patient was instructed not to soak the incision in standing water but may clean the incision with clean running water and antibacterial soap.  Patient should notify the office of any signs or symptoms of infection including fevers, erythema, purulent drainage, increasing pain.  Patient will  continue with aspirin 81 mg bid for DVT prophylaxis.  Patient was placed into a knee immobilizer for which he would use with increased activity.  follow-up with Dr. Rose in 1 week for further evaluation s.  Patient verbalized understanding of all instructions and agreed with the above plan.    No follow-ups on file.    The patient understands, chooses and consents to this plan and accepts all   the risks which include but are not limited to the risks mentioned above.     Disclaimer: This note was prepared using a voice recognition system and is likely to have sound alike errors within the text.

## 2020-12-14 ENCOUNTER — LAB VISIT (OUTPATIENT)
Dept: LAB | Facility: HOSPITAL | Age: 50
End: 2020-12-14
Attending: INTERNAL MEDICINE
Payer: COMMERCIAL

## 2020-12-14 DIAGNOSIS — E11.29 DIABETES MELLITUS WITH MICROALBUMINURIA: Chronic | ICD-10-CM

## 2020-12-14 DIAGNOSIS — R80.9 DIABETES MELLITUS WITH MICROALBUMINURIA: Chronic | ICD-10-CM

## 2020-12-14 LAB
ALBUMIN/CREAT UR: 55.2 UG/MG (ref 0–30)
CREAT UR-MCNC: 344 MG/DL (ref 23–375)
MICROALBUMIN UR DL<=1MG/L-MCNC: 190 UG/ML

## 2020-12-14 PROCEDURE — 82043 UR ALBUMIN QUANTITATIVE: CPT

## 2020-12-18 ENCOUNTER — HOSPITAL ENCOUNTER (OUTPATIENT)
Dept: RADIOLOGY | Facility: HOSPITAL | Age: 50
Discharge: HOME | End: 2020-12-18
Attending: PHYSICIAN ASSISTANT
Payer: COMMERCIAL

## 2020-12-18 ENCOUNTER — HOSPITAL ENCOUNTER (OUTPATIENT)
Dept: RADIOLOGY | Facility: HOSPITAL | Age: 50
Discharge: HOME | End: 2020-12-18
Attending: ORTHOPAEDIC SURGERY
Payer: COMMERCIAL

## 2020-12-18 ENCOUNTER — OFFICE VISIT (OUTPATIENT)
Dept: ORTHOPEDICS | Facility: CLINIC | Age: 50
End: 2020-12-18
Payer: COMMERCIAL

## 2020-12-18 VITALS — WEIGHT: 240 LBS | BODY MASS INDEX: 30.8 KG/M2 | HEIGHT: 74 IN

## 2020-12-18 DIAGNOSIS — M25.461 EFFUSION OF RIGHT KNEE: ICD-10-CM

## 2020-12-18 DIAGNOSIS — M25.561 RIGHT KNEE PAIN, UNSPECIFIED CHRONICITY: Primary | ICD-10-CM

## 2020-12-18 DIAGNOSIS — M25.461 EFFUSION OF RIGHT KNEE: Primary | ICD-10-CM

## 2020-12-18 DIAGNOSIS — M25.561 RIGHT KNEE PAIN, UNSPECIFIED CHRONICITY: ICD-10-CM

## 2020-12-18 PROCEDURE — 99999 PR PBB SHADOW E&M-EST. PATIENT-LVL III: ICD-10-PCS | Mod: PBBFAC,,, | Performed by: PHYSICIAN ASSISTANT

## 2020-12-18 PROCEDURE — 73564 X-RAY EXAM KNEE 4 OR MORE: CPT | Mod: 26,RT,, | Performed by: RADIOLOGY

## 2020-12-18 PROCEDURE — 99024 POSTOP FOLLOW-UP VISIT: CPT | Mod: S$GLB,,, | Performed by: PHYSICIAN ASSISTANT

## 2020-12-18 PROCEDURE — 73564 XR KNEE ORTHO RIGHT WITH FLEXION: ICD-10-PCS | Mod: 26,RT,, | Performed by: RADIOLOGY

## 2020-12-18 PROCEDURE — 3008F PR BODY MASS INDEX (BMI) DOCUMENTED: ICD-10-PCS | Mod: CPTII,S$GLB,, | Performed by: PHYSICIAN ASSISTANT

## 2020-12-18 PROCEDURE — 1125F AMNT PAIN NOTED PAIN PRSNT: CPT | Mod: S$GLB,,, | Performed by: PHYSICIAN ASSISTANT

## 2020-12-18 PROCEDURE — 3008F BODY MASS INDEX DOCD: CPT | Mod: CPTII,S$GLB,, | Performed by: PHYSICIAN ASSISTANT

## 2020-12-18 PROCEDURE — 1125F PR PAIN SEVERITY QUANTIFIED, PAIN PRESENT: ICD-10-PCS | Mod: S$GLB,,, | Performed by: PHYSICIAN ASSISTANT

## 2020-12-18 PROCEDURE — 93971 EXTREMITY STUDY: CPT | Mod: TC,RT

## 2020-12-18 PROCEDURE — 73562 XR KNEE ORTHO RIGHT WITH FLEXION: ICD-10-PCS | Mod: 26,LT,, | Performed by: RADIOLOGY

## 2020-12-18 PROCEDURE — 73562 X-RAY EXAM OF KNEE 3: CPT | Mod: 26,LT,, | Performed by: RADIOLOGY

## 2020-12-18 PROCEDURE — 99999 PR PBB SHADOW E&M-EST. PATIENT-LVL III: CPT | Mod: PBBFAC,,, | Performed by: PHYSICIAN ASSISTANT

## 2020-12-18 PROCEDURE — 73562 X-RAY EXAM OF KNEE 3: CPT | Mod: TC,LT

## 2020-12-18 PROCEDURE — 99024 PR POST-OP FOLLOW-UP VISIT: ICD-10-PCS | Mod: S$GLB,,, | Performed by: PHYSICIAN ASSISTANT

## 2020-12-18 RX ORDER — PREDNISONE 10 MG/1
10 TABLET ORAL DAILY
Qty: 30 TABLET | Refills: 0 | Status: SHIPPED | OUTPATIENT
Start: 2020-12-18 | End: 2021-03-11

## 2020-12-18 NOTE — PROGRESS NOTES
Patient ID: Laron Kothari Jr. is a 50 y.o. male.    Chief Complaint: Post-op Evaluation of the Right Knee      HPI: Laron Kothari Jr.  is a 50 y.o. male who c/o Post-op Evaluation of the Right Knee   Status post right knee scope  Patient notes that he has had minimal improvement since his previous visit at worst pain is 7/10.  Relieved with over-the-counter pain medication as well as narcotic pain medication  Increased with activity as well as decreasing his quality of life.  Patient is using crutches he was noncompliant with the knee immobilizer using it as he felt needed has not used it for 2 days  Patient states he has a decreased range of motion as well as ability to passively lift the leg progressive for the past roughly 10 days  He is distraught and discourage hoping that this arthroscopy would improve quality of life and activity of daily living requesting more answered    Patient is presently denying any shortness of breath, chest pain, fever/chills, nausea/vomiting, loss of taste or smell, numbness/tingling or sensation changes, loss of bladder or bowel function, loss of taste/smell.       Surgery: Right knee scope    Surgery Date:  11/24/2020    Past Medical History:   Diagnosis Date    Blood in stool 8/7/2018    Deep vein thrombosis (DVT) 2017    Left leg    Diabetes mellitus, type 2 2013    DM (diabetes mellitus) 2013    BS didn't check 01/03/2020    Gout     Hyperlipidemia     Hypertension     Neuropathy      Past Surgical History:   Procedure Laterality Date    APPENDECTOMY      ARTHROSCOPIC CHONDROPLASTY OF KNEE JOINT Right 11/24/2020    Procedure: ARTHROSCOPY, KNEE, WITH CHONDROPLASTY;  Surgeon: Nahum Rose MD;  Location: [facility] OR;  Service: Orthopedics;  Laterality: Right;  chondroplasty medial condyle and anteriorly    COLONOSCOPY N/A 8/7/2018    Procedure: COLONOSCOPY;  Surgeon: Ten Valentine MD;  Location: [facility] ENDO;  Service: Endoscopy;  Laterality: N/A;    KNEE ARTHROSCOPY  W/ MENISCECTOMY Right 11/24/2020    Procedure: ARTHROSCOPY, KNEE, WITH MENISCECTOMY;  Surgeon: Nahum Rose MD;  Location: [facility] OR;  Service: Orthopedics;  Laterality: Right;  Partial medial and lateral meniscectomy    ROBOT-ASSISTED CHOLECYSTECTOMY USING DA TRIPP XI N/A 1/21/2020    Procedure: XI ROBOTIC CHOLECYSTECTOMY;  Surgeon: Sebastien Rivera MD;  Location: [facility] OR;  Service: General;  Laterality: N/A;    TONSILLECTOMY, ADENOIDECTOMY       Family History   Problem Relation Age of Onset    Diabetes Father     Prostate cancer Father     Cataracts Father     Hypertension Mother     Hypertension Brother      Social History     Socioeconomic History    Marital status:      Spouse name: Not on file    Number of children: Not on file    Years of education: Not on file    Highest education level: Not on file   Occupational History    Not on file   Social Needs    Financial resource strain: Not on file    Food insecurity     Worry: Not on file     Inability: Not on file    Transportation needs     Medical: Not on file     Non-medical: Not on file   Tobacco Use    Smoking status: Current Every Day Smoker     Packs/day: 0.50    Smokeless tobacco: Never Used    Tobacco comment: no smoking after m.n prior to sx   Substance and Sexual Activity    Alcohol use: Yes     Alcohol/week: 46.0 standard drinks     Types: 46 Cans of beer per week     Frequency: 4 or more times a week     Drinks per session: 1 or 2     Comment: daily  No alcohol today prior to surgery    Drug use: No    Sexual activity: Yes     Partners: Female   Lifestyle    Physical activity     Days per week: Not on file     Minutes per session: Not on file    Stress: Not at all   Relationships    Social connections     Talks on phone: Not on file     Gets together: Not on file     Attends [employer] service: Not on file     Active member of club or organization: Not on file     Attends meetings of clubs or organizations: Not  on file     Relationship status: Not on file   Other Topics Concern    Not on file   Social History Narrative    Not on file     Medication List with Changes/Refills   New Medications    PREDNISONE (DELTASONE) 10 MG TABLET    Take 1 tablet (10 mg total) by mouth once daily.   Current Medications    ACETAMINOPHEN-CODEINE 300-30MG (TYLENOL #3) 300-30 MG TAB    Take 1 tablet by mouth nightly as needed.    AMITRIPTYLINE (ELAVIL) 75 MG TABLET    Take 1 tablet (75 mg total) by mouth nightly as needed for Pain.    GLIPIZIDE (GLUCOTROL) 5 MG TABLET    Take 1 tablet (5 mg total) by mouth once daily.    HYDROCODONE-ACETAMINOPHEN (NORCO) 5-325 MG PER TABLET    Take 1 tablet by mouth every 8 (eight) hours as needed for Pain.    LISINOPRIL (PRINIVIL,ZESTRIL) 40 MG TABLET    Take 1 tablet (40 mg total) by mouth once daily.    MELOXICAM (MOBIC) 15 MG TABLET    Take 1 tablet (15 mg total) by mouth once daily. Take with food.  Discontinue if you develop GI side effects.    METFORMIN (GLUCOPHAGE) 1000 MG TABLET    Take 1 tablet (1,000 mg total) by mouth 2 (two) times daily with meals.    PRAVASTATIN (PRAVACHOL) 80 MG TABLET    Take 1 tablet (80 mg total) by mouth once daily.     Review of patient's allergies indicates:  No Known Allergies    Objective:     Right Lower Extremity  NVI  WWP foot  Comp soft  Cap refill < 2 sec  Calf NT  (-) Mellissa sign  KAREN  ROM :  On passive range of motion patient is able to exhibit Delacruz and plantar flexion although he is unable to lift the leg against gravity.  On active range of motion patient allows antigravity although he requires body manipulation and pain is elicited on movement  Muscle contraction is noted with induced pain on movement  Wiggles toes  DF/PF intact  Sensation intact  Inc C/D/I  No SOI  Patient exhibits hypersensitivity to the anterior thigh although there is no bogginess or demarcated fluid collection appreciated, this is more so medially than laterally  He is noted to have  mild to moderate swelling, this is diffuse    Ultrasound of right lower extremity obtained today is negative for DVT    X-ray obtained today does show infusion which is increased in size from previous film.  This was discussed in detail with Dr. Rose as well as patient's presentation and chart review.  Recs follow below    Assessment:       Encounter Diagnosis   Name Primary?    Effusion of right knee Yes          Plan:       Laron was seen today for post-op evaluation.    Diagnoses and all orders for this visit:    Effusion of right knee  -     Ambulatory referral/consult to Physical/Occupational Therapy; Future    Other orders  -     predniSONE (DELTASONE) 10 MG tablet; Take 1 tablet (10 mg total) by mouth once daily.        Laron Kothari JrLuigi is an established pt here for postop follow-up after right knee scope by Dr. Rose.  At length discussion with patient on Dr. luis fernando Celaya as well as my own input.  Patient will be started on steroids and therapy.  He is to refrain from using the knee immobilizer and continue using crutches as needed. Patient should notify the office of any signs or symptoms of infection including fevers, erythema, purulent drainage, increasing pain.  Patient will continue with aspirin 81 mg bid for DVT prophylaxis.  Follow up with Dr. Rose as scheduled  Patient verbalized understanding of all instructions and agreed with the above plan.    No follow-ups on file.    The patient understands, chooses and consents to this plan and accepts all   the risks which include but are not limited to the risks mentioned above.     Disclaimer: This note was prepared using a voice recognition system and is likely to have sound alike errors within the text.

## 2020-12-28 ENCOUNTER — OFFICE VISIT (OUTPATIENT)
Dept: ORTHOPEDICS | Facility: CLINIC | Age: 50
End: 2020-12-28
Payer: COMMERCIAL

## 2020-12-28 VITALS
BODY MASS INDEX: 30.8 KG/M2 | DIASTOLIC BLOOD PRESSURE: 91 MMHG | WEIGHT: 240 LBS | HEART RATE: 103 BPM | HEIGHT: 74 IN | SYSTOLIC BLOOD PRESSURE: 149 MMHG

## 2020-12-28 DIAGNOSIS — Z98.890 S/P RIGHT KNEE ARTHROSCOPY: ICD-10-CM

## 2020-12-28 DIAGNOSIS — S83.241D ACUTE MEDIAL MENISCUS TEAR OF RIGHT KNEE, SUBSEQUENT ENCOUNTER: ICD-10-CM

## 2020-12-28 DIAGNOSIS — M94.261 CHONDROMALACIA, RIGHT KNEE: Primary | ICD-10-CM

## 2020-12-28 DIAGNOSIS — M25.461 EFFUSION OF RIGHT KNEE: ICD-10-CM

## 2020-12-28 DIAGNOSIS — S83.281D ACUTE LATERAL MENISCUS TEAR OF RIGHT KNEE, SUBSEQUENT ENCOUNTER: ICD-10-CM

## 2020-12-28 PROCEDURE — 99024 POSTOP FOLLOW-UP VISIT: CPT | Mod: S$GLB,,, | Performed by: ORTHOPAEDIC SURGERY

## 2020-12-28 PROCEDURE — 3008F BODY MASS INDEX DOCD: CPT | Mod: CPTII,S$GLB,, | Performed by: ORTHOPAEDIC SURGERY

## 2020-12-28 PROCEDURE — 99024 PR POST-OP FOLLOW-UP VISIT: ICD-10-PCS | Mod: S$GLB,,, | Performed by: ORTHOPAEDIC SURGERY

## 2020-12-28 PROCEDURE — 3008F PR BODY MASS INDEX (BMI) DOCUMENTED: ICD-10-PCS | Mod: CPTII,S$GLB,, | Performed by: ORTHOPAEDIC SURGERY

## 2020-12-28 PROCEDURE — 99999 PR PBB SHADOW E&M-EST. PATIENT-LVL III: ICD-10-PCS | Mod: PBBFAC,,, | Performed by: ORTHOPAEDIC SURGERY

## 2020-12-28 PROCEDURE — 1125F AMNT PAIN NOTED PAIN PRSNT: CPT | Mod: S$GLB,,, | Performed by: ORTHOPAEDIC SURGERY

## 2020-12-28 PROCEDURE — 99999 PR PBB SHADOW E&M-EST. PATIENT-LVL III: CPT | Mod: PBBFAC,,, | Performed by: ORTHOPAEDIC SURGERY

## 2020-12-28 PROCEDURE — 1125F PR PAIN SEVERITY QUANTIFIED, PAIN PRESENT: ICD-10-PCS | Mod: S$GLB,,, | Performed by: ORTHOPAEDIC SURGERY

## 2020-12-28 RX ORDER — HYDROCODONE BITARTRATE AND ACETAMINOPHEN 5; 325 MG/1; MG/1
1 TABLET ORAL EVERY 8 HOURS PRN
Qty: 21 TABLET | Refills: 0 | Status: SHIPPED | OUTPATIENT
Start: 2020-12-28 | End: 2021-03-11

## 2020-12-28 NOTE — PROCEDURES
Large Joint Aspiration/Injection    Date/Time: 12/28/2020 8:00 AM  Performed by: Nahum Rose MD  Authorized by: Nahum Rose MD     Consent Done?:  Yes (Verbal)  Site marked: the procedure site was marked    Timeout: prior to procedure the correct patient, procedure, and site was verified      Details:  Needle Size:  18 G  Ultrasonic Guidance for needle placement?: No    Approach:  Superior  Aspirate amount (mL):  10  Aspirate:  Bloody  Lab: fluid sent for laboratory analysis    Patient tolerance:  Patient tolerated the procedure well with no immediate complications

## 2020-12-28 NOTE — PATIENT INSTRUCTIONS
Right knee large effusion  Attempt aspiration of only 10 cc of blood of the right knee  Plan  To start physical therapy this week  To apply heat on the knee and start range of motion and ice after the exercise  Return in 2 weeks to see me  No work for 4 weeks until further evaluation  Intraoperative findings of torn meniscus on the inside and the outside of the knee as well as large chondral flap on the medial side weight-bearing area  Continue with the aspirin 81 mg daily  Do not take meloxicam due to protein in the urine

## 2020-12-28 NOTE — PROGRESS NOTES
Subjective:     Patient ID: Laron Kothari Jr. is a 50 y.o. male.    Chief Complaint: Pain of the Right Knee    HPI:  11/06/2020  50-year-old  was been having pain in his knee for 3 months.  Does not recall any injury.  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  Treatment included meloxicam exercise program Tylenol and recently had an MRI and is here for evaluation.  He has popping locking catching feeling he said even sleeping at night is very painful he tosses around.  Cannot squat getting in and out of the truck is a little bit difficult.  He feels he lost motion.  Pain is 7/10 that is constant aggravated with ambulation and squatting or climbing stairs.  Denies fever or chills or shortness of breath difficulty with chewing or swallowing loss of bowel bladder control blurry vision double vision loss of sense of smell or taste    12/28/20     date of surgery 11/24/2020 right knee arthroscopy partial medial and lateral meniscectomy also finding of large chondral lesion on the medial femoral/flap condyle and large effusion.  Last week extensive swelling in his knee ultrasound was negative for DVT.  He was referred to physical therapy which he will start today.  States he still taking aspirin Norco as needed for pain.  He does use crutch to get around on as-needed basis pain is 7/10  Denies any fever or chills or shortness of breath or difficulty with chewing or swallowing loss of bowel bladder control blurry vision double vision loss sense smell or taste    Past Medical History:   Diagnosis Date    Blood in stool 8/7/2018    Deep vein thrombosis (DVT) 2017    Left leg    Diabetes mellitus, type 2 2013    DM (diabetes mellitus) 2013    BS didn't check 01/03/2020    Gout     Hyperlipidemia     Hypertension     Neuropathy      Past Surgical History:   Procedure Laterality Date    APPENDECTOMY      ARTHROSCOPIC CHONDROPLASTY OF KNEE JOINT Right 11/24/2020     Procedure: ARTHROSCOPY, KNEE, WITH CHONDROPLASTY;  Surgeon: Nahum Rose MD;  Location: [facility] OR;  Service: Orthopedics;  Laterality: Right;  chondroplasty medial condyle and anteriorly    COLONOSCOPY N/A 8/7/2018    Procedure: COLONOSCOPY;  Surgeon: Ten Valentine MD;  Location: [facility] ENDO;  Service: Endoscopy;  Laterality: N/A;    KNEE ARTHROSCOPY W/ MENISCECTOMY Right 11/24/2020    Procedure: ARTHROSCOPY, KNEE, WITH MENISCECTOMY;  Surgeon: Nahum Rose MD;  Location: [facility] OR;  Service: Orthopedics;  Laterality: Right;  Partial medial and lateral meniscectomy    ROBOT-ASSISTED CHOLECYSTECTOMY USING DA TRIPP XI N/A 1/21/2020    Procedure: XI ROBOTIC CHOLECYSTECTOMY;  Surgeon: Sebastien Rivera MD;  Location: [facility] OR;  Service: General;  Laterality: N/A;    TONSILLECTOMY, ADENOIDECTOMY       Family History   Problem Relation Age of Onset    Diabetes Father     Prostate cancer Father     Cataracts Father     Hypertension Mother     Hypertension Brother      Social History     Socioeconomic History    Marital status:      Spouse name: Not on file    Number of children: Not on file    Years of education: Not on file    Highest education level: Not on file   Occupational History    Not on file   Social Needs    Financial resource strain: Not on file    Food insecurity     Worry: Not on file     Inability: Not on file    Transportation needs     Medical: Not on file     Non-medical: Not on file   Tobacco Use    Smoking status: Current Every Day Smoker     Packs/day: 0.50    Smokeless tobacco: Never Used    Tobacco comment: no smoking after m.n prior to sx   Substance and Sexual Activity    Alcohol use: Yes     Alcohol/week: 46.0 standard drinks     Types: 46 Cans of beer per week     Frequency: 4 or more times a week     Drinks per session: 1 or 2     Comment: daily  No alcohol today prior to surgery    Drug use: No    Sexual activity: Yes     Partners: Female   Lifestyle     Physical activity     Days per week: Not on file     Minutes per session: Not on file    Stress: Not at all   Relationships    Social connections     Talks on phone: Not on file     Gets together: Not on file     Attends Nondenominational service: Not on file     Active member of club or organization: Not on file     Attends meetings of clubs or organizations: Not on file     Relationship status: Not on file   Other Topics Concern    Not on file   Social History Narrative    Not on file     Medication List with Changes/Refills   Current Medications    ACETAMINOPHEN-CODEINE 300-30MG (TYLENOL #3) 300-30 MG TAB    Take 1 tablet by mouth nightly as needed.    AMITRIPTYLINE (ELAVIL) 75 MG TABLET    Take 1 tablet (75 mg total) by mouth nightly as needed for Pain.    GLIPIZIDE (GLUCOTROL) 5 MG TABLET    Take 1 tablet (5 mg total) by mouth once daily.    LISINOPRIL (PRINIVIL,ZESTRIL) 40 MG TABLET    Take 1 tablet (40 mg total) by mouth once daily.    MELOXICAM (MOBIC) 15 MG TABLET    Take 1 tablet (15 mg total) by mouth once daily. Take with food.  Discontinue if you develop GI side effects.    METFORMIN (GLUCOPHAGE) 1000 MG TABLET    Take 1 tablet (1,000 mg total) by mouth 2 (two) times daily with meals.    PRAVASTATIN (PRAVACHOL) 80 MG TABLET    Take 1 tablet (80 mg total) by mouth once daily.    PREDNISONE (DELTASONE) 10 MG TABLET    Take 1 tablet (10 mg total) by mouth once daily.   Changed and/or Refilled Medications    Modified Medication Previous Medication    HYDROCODONE-ACETAMINOPHEN (NORCO) 5-325 MG PER TABLET HYDROcodone-acetaminophen (NORCO) 5-325 mg per tablet       Take 1 tablet by mouth every 8 (eight) hours as needed for Pain.    Take 1 tablet by mouth every 8 (eight) hours as needed for Pain.     Review of patient's allergies indicates:  No Known Allergies  Review of Systems   Constitution: Negative for decreased appetite.   HENT: Negative for tinnitus.    Eyes: Negative for double vision.   Cardiovascular:  Negative for chest pain.   Respiratory: Negative for wheezing.    Hematologic/Lymphatic: Negative for bleeding problem.   Skin: Negative for dry skin.   Musculoskeletal: Positive for joint pain, joint swelling and stiffness. Negative for arthritis, back pain, gout and neck pain.   Gastrointestinal: Negative for abdominal pain.   Genitourinary: Negative for bladder incontinence.   Neurological: Negative for numbness, paresthesias and sensory change.   Psychiatric/Behavioral: Negative for altered mental status.       Objective:   Body mass index is 30.81 kg/m².  Vitals:    12/28/20 0823   BP: (!) 149/91   Pulse: 103          General    Constitutional: He is oriented to person, place, and time. He appears well-developed.   HENT:   Head: Atraumatic.   Eyes: EOM are normal.   Cardiovascular: Normal rate.    Pulmonary/Chest: Effort normal.   Neurological: He is alert and oriented to person, place, and time.   Psychiatric: Judgment normal.            Cervical rotation is very functional  Bilateral upper extremity neurovascularly intact.  Radial and ulnar pulses 2+.  Strength is 5/5 throughout motor groups.  Very muscular.  Lumbar without tenderness  Pelvis is level  Bilateral straight leg raising is negative  Bilateral hips full motion no pain  Strength hip flexors, abductors, adductors, quads, hamstrings, ankle extensors and flexors are 5/5 in even bilaterally  Left knee with full motion no pain collaterals and cruciate stable.  Negative Jeannette sign.  Mild crepitus to compression on the patella but not tender.  Negative Jeannette sign  Right knee with severe swelling/large effusion.  Range of motion 10-80 degrees with pain/preop.  Postop range of motion 10-60°.  .  Quads, patella tendons intact without defect.  Severe medial joint pain mild crepitus to compression on the patella.  Collaterals are stable.  Could not assess ACL or PCL  Calves are soft nontender slight varicosities  No pitting edema  Ankle motion  intact  EHL 5/5  PT 1+  Skin is warm to touch no obvious lesions    Relevant imaging results reviewed and interpreted by me, discussed with the patient and / or family today     X-ray reviewed no evidence of fracture increased effusion right knee  Ultrasound negative for DVT right leg  X-ray and electronic record of his knee showing good joint space.  No fracture seen  MRI in electronic record showing medial meniscus tear of the right knee, effusion, chondromalacia    left  US Lower Extremity Veins Right  Narrative: EXAMINATION:  US LOWER EXTREMITY VEINS RIGHT    CLINICAL HISTORY:  saphenous - rule out dvt - whole extremity; Effusion, right knee    TECHNIQUE:  Duplex and color flow Doppler evaluation and graded compression of the right lower extremity veins was performed.    COMPARISON:  Lower extremity venous ultrasound 02/16/2017    FINDINGS:  Right thigh veins: The common femoral, femoral, popliteal, upper greater saphenous, and deep femoral veins are patent and free of thrombus. The veins are normally compressible and have normal phasic flow and augmentation response.    Right calf veins: The visualized calf veins are patent.    Contralateral CFV: The contralateral (left) common femoral vein is patent and free of thrombus.    Miscellaneous: None  Impression: No evidence of deep venous thrombosis in the right lower extremity.    Electronically signed by: Eric Romeo  Date:    12/18/2020  Time:    12:51  X-ray Knee Ortho Right with Flexion  Narrative: EXAMINATION:  XR KNEE ORTHO RIGHT WITH FLEXION    CLINICAL HISTORY:  Pain in right knee    TECHNIQUE:  Standing AP, standing PA flexion, and Merchant views were obtained of the bilateral knees.  Standing lateral view of the right knee was obtained.    COMPARISON:  10/20/2020    FINDINGS:  There is a large joint effusion present on the right which appears increased in size from prior.  No acute fractures or dislocations visualized.  There is mild tricompartmental  osteoarthritis present on the right with joint space loss most pronounced in the medial compartment.Mild joint space loss also noted in the left medial compartment.  Impression: As Above    Electronically signed by: Octavio Burger MD  Date:    12/18/2020  Time:    12:09      Assessment:     Encounter Diagnoses   Name Primary?    Chondromalacia, right knee Yes    Acute medial meniscus tear of right knee, subsequent encounter     Acute lateral meniscus tear of right knee, subsequent encounter     S/P right knee arthroscopy     Effusion of right knee         Plan:   Chondromalacia, right knee    Acute medial meniscus tear of right knee, subsequent encounter    Acute lateral meniscus tear of right knee, subsequent encounter    S/P right knee arthroscopy  -     HYDROcodone-acetaminophen (NORCO) 5-325 mg per tablet; Take 1 tablet by mouth every 8 (eight) hours as needed for Pain.  Dispense: 21 tablet; Refill: 0  -     Large Joint Aspiration/Injection    Effusion of right knee         Patient Instructions   Right knee large effusion  Attempt aspiration of only 10 cc of blood of the right knee  Plan  To start physical therapy this week  To apply heat on the knee and start range of motion and ice after the exercise  Return in 2 weeks to see me  No work for 4 weeks until further evaluation  Intraoperative findings of torn meniscus on the inside and the outside of the knee as well as large chondral flap on the medial side weight-bearing area  Continue with the aspirin 81 mg daily  Do not take meloxicam due to protein in the urine            Disclaimer: This note was prepared using a voice recognition system and is likely to have sound alike errors within the text.

## 2020-12-30 ENCOUNTER — PATIENT MESSAGE (OUTPATIENT)
Dept: ORTHOPEDICS | Facility: CLINIC | Age: 50
End: 2020-12-30

## 2021-01-12 ENCOUNTER — TELEPHONE (OUTPATIENT)
Dept: ORTHOPEDICS | Facility: CLINIC | Age: 51
End: 2021-01-12

## 2021-01-15 ENCOUNTER — OFFICE VISIT (OUTPATIENT)
Dept: ORTHOPEDICS | Facility: CLINIC | Age: 51
End: 2021-01-15
Payer: COMMERCIAL

## 2021-01-15 VITALS
DIASTOLIC BLOOD PRESSURE: 86 MMHG | HEART RATE: 106 BPM | SYSTOLIC BLOOD PRESSURE: 121 MMHG | BODY MASS INDEX: 30.8 KG/M2 | HEIGHT: 74 IN | WEIGHT: 240 LBS

## 2021-01-15 DIAGNOSIS — Z98.890 S/P RIGHT KNEE ARTHROSCOPY: ICD-10-CM

## 2021-01-15 DIAGNOSIS — M25.461 EFFUSION OF RIGHT KNEE: Primary | ICD-10-CM

## 2021-01-15 PROCEDURE — 1125F AMNT PAIN NOTED PAIN PRSNT: CPT | Mod: S$GLB,,, | Performed by: PHYSICIAN ASSISTANT

## 2021-01-15 PROCEDURE — 99024 PR POST-OP FOLLOW-UP VISIT: ICD-10-PCS | Mod: S$GLB,,, | Performed by: PHYSICIAN ASSISTANT

## 2021-01-15 PROCEDURE — 3008F BODY MASS INDEX DOCD: CPT | Mod: CPTII,S$GLB,, | Performed by: PHYSICIAN ASSISTANT

## 2021-01-15 PROCEDURE — 99999 PR PBB SHADOW E&M-EST. PATIENT-LVL IV: ICD-10-PCS | Mod: PBBFAC,,, | Performed by: PHYSICIAN ASSISTANT

## 2021-01-15 PROCEDURE — 3008F PR BODY MASS INDEX (BMI) DOCUMENTED: ICD-10-PCS | Mod: CPTII,S$GLB,, | Performed by: PHYSICIAN ASSISTANT

## 2021-01-15 PROCEDURE — 99999 PR PBB SHADOW E&M-EST. PATIENT-LVL IV: CPT | Mod: PBBFAC,,, | Performed by: PHYSICIAN ASSISTANT

## 2021-01-15 PROCEDURE — 99024 POSTOP FOLLOW-UP VISIT: CPT | Mod: S$GLB,,, | Performed by: PHYSICIAN ASSISTANT

## 2021-01-15 PROCEDURE — 1125F PR PAIN SEVERITY QUANTIFIED, PAIN PRESENT: ICD-10-PCS | Mod: S$GLB,,, | Performed by: PHYSICIAN ASSISTANT

## 2021-01-19 ENCOUNTER — PATIENT MESSAGE (OUTPATIENT)
Dept: ORTHOPEDICS | Facility: CLINIC | Age: 51
End: 2021-01-19

## 2021-01-19 ENCOUNTER — PATIENT MESSAGE (OUTPATIENT)
Dept: CARDIOLOGY | Facility: CLINIC | Age: 51
End: 2021-01-19

## 2021-01-19 ENCOUNTER — TELEPHONE (OUTPATIENT)
Dept: ORTHOPEDICS | Facility: CLINIC | Age: 51
End: 2021-01-19

## 2021-01-25 ENCOUNTER — PATIENT MESSAGE (OUTPATIENT)
Dept: CARDIOLOGY | Facility: CLINIC | Age: 51
End: 2021-01-25

## 2021-01-25 ENCOUNTER — PATIENT MESSAGE (OUTPATIENT)
Dept: ORTHOPEDICS | Facility: CLINIC | Age: 51
End: 2021-01-25

## 2021-01-26 ENCOUNTER — TELEPHONE (OUTPATIENT)
Dept: ORTHOPEDICS | Facility: CLINIC | Age: 51
End: 2021-01-26

## 2021-01-27 ENCOUNTER — PATIENT MESSAGE (OUTPATIENT)
Dept: ORTHOPEDICS | Facility: CLINIC | Age: 51
End: 2021-01-27

## 2021-01-27 ENCOUNTER — TELEPHONE (OUTPATIENT)
Dept: ORTHOPEDICS | Facility: CLINIC | Age: 51
End: 2021-01-27

## 2021-01-28 ENCOUNTER — PATIENT MESSAGE (OUTPATIENT)
Dept: ORTHOPEDICS | Facility: CLINIC | Age: 51
End: 2021-01-28

## 2021-01-29 ENCOUNTER — TELEPHONE (OUTPATIENT)
Dept: ORTHOPEDICS | Facility: CLINIC | Age: 51
End: 2021-01-29

## 2021-02-01 ENCOUNTER — PATIENT MESSAGE (OUTPATIENT)
Dept: ORTHOPEDICS | Facility: CLINIC | Age: 51
End: 2021-02-01

## 2021-02-04 ENCOUNTER — PATIENT MESSAGE (OUTPATIENT)
Dept: ORTHOPEDICS | Facility: CLINIC | Age: 51
End: 2021-02-04

## 2021-02-08 ENCOUNTER — OFFICE VISIT (OUTPATIENT)
Dept: ORTHOPEDICS | Facility: CLINIC | Age: 51
End: 2021-02-08
Payer: COMMERCIAL

## 2021-02-08 VITALS
HEART RATE: 109 BPM | WEIGHT: 226.63 LBS | SYSTOLIC BLOOD PRESSURE: 130 MMHG | BODY MASS INDEX: 29.09 KG/M2 | DIASTOLIC BLOOD PRESSURE: 86 MMHG | HEIGHT: 74 IN

## 2021-02-08 DIAGNOSIS — Z98.890 S/P RIGHT KNEE ARTHROSCOPY: ICD-10-CM

## 2021-02-08 DIAGNOSIS — S83.241D ACUTE MEDIAL MENISCUS TEAR OF RIGHT KNEE, SUBSEQUENT ENCOUNTER: ICD-10-CM

## 2021-02-08 DIAGNOSIS — M17.11 PRIMARY OSTEOARTHRITIS OF RIGHT KNEE: Primary | ICD-10-CM

## 2021-02-08 DIAGNOSIS — S83.281D ACUTE LATERAL MENISCUS TEAR OF RIGHT KNEE, SUBSEQUENT ENCOUNTER: ICD-10-CM

## 2021-02-08 DIAGNOSIS — M94.261 CHONDROMALACIA, RIGHT KNEE: Primary | ICD-10-CM

## 2021-02-08 DIAGNOSIS — M25.461 EFFUSION OF RIGHT KNEE: ICD-10-CM

## 2021-02-08 PROCEDURE — 3008F PR BODY MASS INDEX (BMI) DOCUMENTED: ICD-10-PCS | Mod: CPTII,S$GLB,, | Performed by: ORTHOPAEDIC SURGERY

## 2021-02-08 PROCEDURE — 3008F BODY MASS INDEX DOCD: CPT | Mod: CPTII,S$GLB,, | Performed by: ORTHOPAEDIC SURGERY

## 2021-02-08 PROCEDURE — 1125F PR PAIN SEVERITY QUANTIFIED, PAIN PRESENT: ICD-10-PCS | Mod: S$GLB,,, | Performed by: ORTHOPAEDIC SURGERY

## 2021-02-08 PROCEDURE — 1125F AMNT PAIN NOTED PAIN PRSNT: CPT | Mod: S$GLB,,, | Performed by: ORTHOPAEDIC SURGERY

## 2021-02-08 PROCEDURE — 99999 PR PBB SHADOW E&M-EST. PATIENT-LVL III: CPT | Mod: PBBFAC,,, | Performed by: ORTHOPAEDIC SURGERY

## 2021-02-08 PROCEDURE — 99024 POSTOP FOLLOW-UP VISIT: CPT | Mod: S$GLB,,, | Performed by: ORTHOPAEDIC SURGERY

## 2021-02-08 PROCEDURE — 99999 PR PBB SHADOW E&M-EST. PATIENT-LVL III: ICD-10-PCS | Mod: PBBFAC,,, | Performed by: ORTHOPAEDIC SURGERY

## 2021-02-08 PROCEDURE — 99024 PR POST-OP FOLLOW-UP VISIT: ICD-10-PCS | Mod: S$GLB,,, | Performed by: ORTHOPAEDIC SURGERY

## 2021-02-09 ENCOUNTER — PATIENT MESSAGE (OUTPATIENT)
Dept: ORTHOPEDICS | Facility: CLINIC | Age: 51
End: 2021-02-09

## 2021-03-02 ENCOUNTER — TELEPHONE (OUTPATIENT)
Dept: INTERNAL MEDICINE | Facility: CLINIC | Age: 51
End: 2021-03-02

## 2021-03-04 ENCOUNTER — LAB VISIT (OUTPATIENT)
Dept: LAB | Facility: HOSPITAL | Age: 51
End: 2021-03-04
Attending: INTERNAL MEDICINE
Payer: COMMERCIAL

## 2021-03-04 DIAGNOSIS — E78.5 HYPERLIPIDEMIA LDL GOAL <70: ICD-10-CM

## 2021-03-04 DIAGNOSIS — I10 HYPERTENSION GOAL BP (BLOOD PRESSURE) < 130/80: ICD-10-CM

## 2021-03-04 LAB
ALBUMIN SERPL BCP-MCNC: 3.4 G/DL (ref 3.5–5.2)
ALP SERPL-CCNC: 100 U/L (ref 55–135)
ALT SERPL W/O P-5'-P-CCNC: 12 U/L (ref 10–44)
ANION GAP SERPL CALC-SCNC: 8 MMOL/L (ref 8–16)
AST SERPL-CCNC: 14 U/L (ref 10–40)
BILIRUB SERPL-MCNC: 0.7 MG/DL (ref 0.1–1)
BUN SERPL-MCNC: 10 MG/DL (ref 6–20)
CALCIUM SERPL-MCNC: 9.7 MG/DL (ref 8.7–10.5)
CHLORIDE SERPL-SCNC: 99 MMOL/L (ref 95–110)
CHOLEST SERPL-MCNC: 210 MG/DL (ref 120–199)
CHOLEST/HDLC SERPL: 7.5 {RATIO} (ref 2–5)
CO2 SERPL-SCNC: 27 MMOL/L (ref 23–29)
CREAT SERPL-MCNC: 1.1 MG/DL (ref 0.5–1.4)
EST. GFR  (AFRICAN AMERICAN): >60 ML/MIN/1.73 M^2
EST. GFR  (NON AFRICAN AMERICAN): >60 ML/MIN/1.73 M^2
ESTIMATED AVG GLUCOSE: ABNORMAL MG/DL (ref 68–131)
GLUCOSE SERPL-MCNC: 422 MG/DL (ref 70–110)
HBA1C MFR BLD: >14 % (ref 4–5.6)
HDLC SERPL-MCNC: 28 MG/DL (ref 40–75)
HDLC SERPL: 13.3 % (ref 20–50)
LDLC SERPL CALC-MCNC: ABNORMAL MG/DL (ref 63–159)
NONHDLC SERPL-MCNC: 182 MG/DL
POTASSIUM SERPL-SCNC: 4.3 MMOL/L (ref 3.5–5.1)
PROT SERPL-MCNC: 7.4 G/DL (ref 6–8.4)
SODIUM SERPL-SCNC: 134 MMOL/L (ref 136–145)
TRIGL SERPL-MCNC: 616 MG/DL (ref 30–150)

## 2021-03-04 PROCEDURE — 83036 HEMOGLOBIN GLYCOSYLATED A1C: CPT | Performed by: INTERNAL MEDICINE

## 2021-03-04 PROCEDURE — 36415 COLL VENOUS BLD VENIPUNCTURE: CPT | Performed by: INTERNAL MEDICINE

## 2021-03-04 PROCEDURE — 80053 COMPREHEN METABOLIC PANEL: CPT | Performed by: INTERNAL MEDICINE

## 2021-03-04 PROCEDURE — 80061 LIPID PANEL: CPT | Performed by: INTERNAL MEDICINE

## 2021-03-08 ENCOUNTER — PATIENT OUTREACH (OUTPATIENT)
Dept: ADMINISTRATIVE | Facility: OTHER | Age: 51
End: 2021-03-08

## 2021-03-08 ENCOUNTER — HOSPITAL ENCOUNTER (OUTPATIENT)
Dept: RADIOLOGY | Facility: HOSPITAL | Age: 51
Discharge: HOME | End: 2021-03-08
Attending: ORTHOPAEDIC SURGERY
Payer: COMMERCIAL

## 2021-03-08 ENCOUNTER — OFFICE VISIT (OUTPATIENT)
Dept: ORTHOPEDICS | Facility: CLINIC | Age: 51
End: 2021-03-08
Payer: COMMERCIAL

## 2021-03-08 VITALS
BODY MASS INDEX: 29 KG/M2 | HEIGHT: 74 IN | HEART RATE: 103 BPM | WEIGHT: 226 LBS | SYSTOLIC BLOOD PRESSURE: 138 MMHG | DIASTOLIC BLOOD PRESSURE: 86 MMHG

## 2021-03-08 DIAGNOSIS — Z98.890 S/P RIGHT KNEE ARTHROSCOPY: Primary | ICD-10-CM

## 2021-03-08 DIAGNOSIS — M24.661 ARTHROFIBROSIS OF KNEE JOINT, RIGHT: ICD-10-CM

## 2021-03-08 DIAGNOSIS — M17.11 ARTHRITIS OF KNEE, RIGHT: Primary | ICD-10-CM

## 2021-03-08 DIAGNOSIS — S83.241D ACUTE MEDIAL MENISCUS TEAR OF RIGHT KNEE, SUBSEQUENT ENCOUNTER: ICD-10-CM

## 2021-03-08 DIAGNOSIS — S83.281D ACUTE LATERAL MENISCUS TEAR OF RIGHT KNEE, SUBSEQUENT ENCOUNTER: ICD-10-CM

## 2021-03-08 DIAGNOSIS — Z98.890 S/P RIGHT KNEE ARTHROSCOPY: ICD-10-CM

## 2021-03-08 PROCEDURE — 3079F DIAST BP 80-89 MM HG: CPT | Mod: CPTII,S$GLB,, | Performed by: ORTHOPAEDIC SURGERY

## 2021-03-08 PROCEDURE — 73564 XR KNEE ORTHO RIGHT WITH FLEXION: ICD-10-PCS | Mod: 26,RT,, | Performed by: RADIOLOGY

## 2021-03-08 PROCEDURE — 1125F PR PAIN SEVERITY QUANTIFIED, PAIN PRESENT: ICD-10-PCS | Mod: S$GLB,,, | Performed by: ORTHOPAEDIC SURGERY

## 2021-03-08 PROCEDURE — 3075F PR MOST RECENT SYSTOLIC BLOOD PRESS GE 130-139MM HG: ICD-10-PCS | Mod: CPTII,S$GLB,, | Performed by: ORTHOPAEDIC SURGERY

## 2021-03-08 PROCEDURE — 99999 PR PBB SHADOW E&M-EST. PATIENT-LVL III: CPT | Mod: PBBFAC,,, | Performed by: ORTHOPAEDIC SURGERY

## 2021-03-08 PROCEDURE — 3075F SYST BP GE 130 - 139MM HG: CPT | Mod: CPTII,S$GLB,, | Performed by: ORTHOPAEDIC SURGERY

## 2021-03-08 PROCEDURE — 1125F AMNT PAIN NOTED PAIN PRSNT: CPT | Mod: S$GLB,,, | Performed by: ORTHOPAEDIC SURGERY

## 2021-03-08 PROCEDURE — 99999 PR PBB SHADOW E&M-EST. PATIENT-LVL III: ICD-10-PCS | Mod: PBBFAC,,, | Performed by: ORTHOPAEDIC SURGERY

## 2021-03-08 PROCEDURE — 3008F PR BODY MASS INDEX (BMI) DOCUMENTED: ICD-10-PCS | Mod: CPTII,S$GLB,, | Performed by: ORTHOPAEDIC SURGERY

## 2021-03-08 PROCEDURE — 73562 X-RAY EXAM OF KNEE 3: CPT | Mod: 26,LT,, | Performed by: RADIOLOGY

## 2021-03-08 PROCEDURE — 3079F PR MOST RECENT DIASTOLIC BLOOD PRESSURE 80-89 MM HG: ICD-10-PCS | Mod: CPTII,S$GLB,, | Performed by: ORTHOPAEDIC SURGERY

## 2021-03-08 PROCEDURE — 99213 OFFICE O/P EST LOW 20 MIN: CPT | Mod: S$GLB,,, | Performed by: ORTHOPAEDIC SURGERY

## 2021-03-08 PROCEDURE — 73564 X-RAY EXAM KNEE 4 OR MORE: CPT | Mod: TC,RT

## 2021-03-08 PROCEDURE — 73564 X-RAY EXAM KNEE 4 OR MORE: CPT | Mod: 26,RT,, | Performed by: RADIOLOGY

## 2021-03-08 PROCEDURE — 99213 PR OFFICE/OUTPT VISIT, EST, LEVL III, 20-29 MIN: ICD-10-PCS | Mod: S$GLB,,, | Performed by: ORTHOPAEDIC SURGERY

## 2021-03-08 PROCEDURE — 3008F BODY MASS INDEX DOCD: CPT | Mod: CPTII,S$GLB,, | Performed by: ORTHOPAEDIC SURGERY

## 2021-03-08 PROCEDURE — 73562 XR KNEE ORTHO RIGHT WITH FLEXION: ICD-10-PCS | Mod: 26,LT,, | Performed by: RADIOLOGY

## 2021-03-08 RX ORDER — TRAMADOL HYDROCHLORIDE 50 MG/1
50 TABLET ORAL EVERY 6 HOURS PRN
Qty: 28 TABLET | Refills: 0 | Status: SHIPPED | OUTPATIENT
Start: 2021-03-08 | End: 2021-04-01 | Stop reason: SDUPTHER

## 2021-03-09 ENCOUNTER — PATIENT MESSAGE (OUTPATIENT)
Dept: ORTHOPEDICS | Facility: CLINIC | Age: 51
End: 2021-03-09

## 2021-03-11 ENCOUNTER — OFFICE VISIT (OUTPATIENT)
Dept: INTERNAL MEDICINE | Facility: CLINIC | Age: 51
End: 2021-03-11
Payer: COMMERCIAL

## 2021-03-11 ENCOUNTER — LAB VISIT (OUTPATIENT)
Dept: LAB | Facility: HOSPITAL | Age: 51
End: 2021-03-11
Attending: INTERNAL MEDICINE
Payer: COMMERCIAL

## 2021-03-11 VITALS
HEIGHT: 74 IN | WEIGHT: 229.5 LBS | HEART RATE: 110 BPM | TEMPERATURE: 98 F | DIASTOLIC BLOOD PRESSURE: 74 MMHG | BODY MASS INDEX: 29.45 KG/M2 | SYSTOLIC BLOOD PRESSURE: 102 MMHG | OXYGEN SATURATION: 97 %

## 2021-03-11 DIAGNOSIS — N52.9 ERECTILE DYSFUNCTION, UNSPECIFIED ERECTILE DYSFUNCTION TYPE: ICD-10-CM

## 2021-03-11 DIAGNOSIS — I10 HYPERTENSION GOAL BP (BLOOD PRESSURE) < 130/80: Chronic | ICD-10-CM

## 2021-03-11 DIAGNOSIS — E78.5 HYPERLIPIDEMIA LDL GOAL <70: Chronic | ICD-10-CM

## 2021-03-11 DIAGNOSIS — E11.65 UNCONTROLLED TYPE 2 DIABETES MELLITUS WITH HYPERGLYCEMIA, WITHOUT LONG-TERM CURRENT USE OF INSULIN: Primary | ICD-10-CM

## 2021-03-11 PROCEDURE — 3078F PR MOST RECENT DIASTOLIC BLOOD PRESSURE < 80 MM HG: ICD-10-PCS | Mod: CPTII,S$GLB,, | Performed by: INTERNAL MEDICINE

## 2021-03-11 PROCEDURE — 3044F PR MOST RECENT HEMOGLOBIN A1C LEVEL <7.0%: ICD-10-PCS | Mod: CPTII,S$GLB,, | Performed by: INTERNAL MEDICINE

## 2021-03-11 PROCEDURE — 3078F DIAST BP <80 MM HG: CPT | Mod: CPTII,S$GLB,, | Performed by: INTERNAL MEDICINE

## 2021-03-11 PROCEDURE — 1125F PR PAIN SEVERITY QUANTIFIED, PAIN PRESENT: ICD-10-PCS | Mod: S$GLB,,, | Performed by: INTERNAL MEDICINE

## 2021-03-11 PROCEDURE — 99999 PR PBB SHADOW E&M-EST. PATIENT-LVL III: ICD-10-PCS | Mod: PBBFAC,,, | Performed by: INTERNAL MEDICINE

## 2021-03-11 PROCEDURE — 82043 UR ALBUMIN QUANTITATIVE: CPT | Performed by: INTERNAL MEDICINE

## 2021-03-11 PROCEDURE — 3074F PR MOST RECENT SYSTOLIC BLOOD PRESSURE < 130 MM HG: ICD-10-PCS | Mod: CPTII,S$GLB,, | Performed by: INTERNAL MEDICINE

## 2021-03-11 PROCEDURE — 99214 PR OFFICE/OUTPT VISIT, EST, LEVL IV, 30-39 MIN: ICD-10-PCS | Mod: S$GLB,,, | Performed by: INTERNAL MEDICINE

## 2021-03-11 PROCEDURE — 3008F BODY MASS INDEX DOCD: CPT | Mod: CPTII,S$GLB,, | Performed by: INTERNAL MEDICINE

## 2021-03-11 PROCEDURE — 84403 ASSAY OF TOTAL TESTOSTERONE: CPT | Performed by: INTERNAL MEDICINE

## 2021-03-11 PROCEDURE — 99999 PR PBB SHADOW E&M-EST. PATIENT-LVL III: CPT | Mod: PBBFAC,,, | Performed by: INTERNAL MEDICINE

## 2021-03-11 PROCEDURE — 1125F AMNT PAIN NOTED PAIN PRSNT: CPT | Mod: S$GLB,,, | Performed by: INTERNAL MEDICINE

## 2021-03-11 PROCEDURE — 3074F SYST BP LT 130 MM HG: CPT | Mod: CPTII,S$GLB,, | Performed by: INTERNAL MEDICINE

## 2021-03-11 PROCEDURE — 99214 OFFICE O/P EST MOD 30 MIN: CPT | Mod: S$GLB,,, | Performed by: INTERNAL MEDICINE

## 2021-03-11 PROCEDURE — 3044F HG A1C LEVEL LT 7.0%: CPT | Mod: CPTII,S$GLB,, | Performed by: INTERNAL MEDICINE

## 2021-03-11 PROCEDURE — 3008F PR BODY MASS INDEX (BMI) DOCUMENTED: ICD-10-PCS | Mod: CPTII,S$GLB,, | Performed by: INTERNAL MEDICINE

## 2021-03-11 PROCEDURE — 82570 ASSAY OF URINE CREATININE: CPT | Performed by: INTERNAL MEDICINE

## 2021-03-11 PROCEDURE — 36415 COLL VENOUS BLD VENIPUNCTURE: CPT | Performed by: INTERNAL MEDICINE

## 2021-03-11 RX ORDER — DEXTROSE 4 G
TABLET,CHEWABLE ORAL
Qty: 1 EACH | Refills: 0 | Status: SHIPPED | OUTPATIENT
Start: 2021-03-11 | End: 2022-01-19 | Stop reason: SDUPTHER

## 2021-03-11 RX ORDER — LANCETS
EACH MISCELLANEOUS
Qty: 50 EACH | Refills: 6 | Status: SHIPPED | OUTPATIENT
Start: 2021-03-11

## 2021-03-11 RX ORDER — PEN NEEDLE, DIABETIC 30 GX3/16"
NEEDLE, DISPOSABLE MISCELLANEOUS
Qty: 50 EACH | Refills: 3 | Status: SHIPPED | OUTPATIENT
Start: 2021-03-11

## 2021-03-11 RX ORDER — SILDENAFIL 50 MG/1
50 TABLET, FILM COATED ORAL DAILY PRN
Qty: 10 TABLET | Refills: 3 | Status: SHIPPED | OUTPATIENT
Start: 2021-03-11 | End: 2021-06-11

## 2021-03-11 RX ORDER — PRAVASTATIN SODIUM 80 MG/1
80 TABLET ORAL NIGHTLY
Qty: 30 TABLET | Refills: 6 | Status: SHIPPED | OUTPATIENT
Start: 2021-03-11 | End: 2021-10-02 | Stop reason: SDUPTHER

## 2021-03-11 RX ORDER — INSULIN GLARGINE 100 [IU]/ML
20 INJECTION, SOLUTION SUBCUTANEOUS DAILY
Qty: 1 BOX | Refills: 0 | Status: SHIPPED | OUTPATIENT
Start: 2021-03-11 | End: 2021-04-15

## 2021-03-12 ENCOUNTER — TELEPHONE (OUTPATIENT)
Dept: INTERNAL MEDICINE | Facility: CLINIC | Age: 51
End: 2021-03-12

## 2021-03-12 LAB
ALBUMIN/CREAT UR: 53.8 UG/MG (ref 0–30)
CREAT UR-MCNC: 39 MG/DL (ref 23–375)
MICROALBUMIN UR DL<=1MG/L-MCNC: 21 UG/ML

## 2021-03-15 DIAGNOSIS — E11.65 UNCONTROLLED TYPE 2 DIABETES MELLITUS WITH HYPERGLYCEMIA, WITHOUT LONG-TERM CURRENT USE OF INSULIN: ICD-10-CM

## 2021-03-17 LAB
ALBUMIN SERPL-MCNC: 4.1 G/DL (ref 3.6–5.1)
SHBG SERPL-SCNC: 17 NMOL/L (ref 10–50)
TESTOST FREE SERPL-MCNC: 54.9 PG/ML (ref 46–224)
TESTOST SERPL-MCNC: 260 NG/DL (ref 250–1100)
TESTOSTERONE.FREE+WB SERPL-MCNC: 103.4 NG/DL (ref 110–575)

## 2021-03-18 RX ORDER — LANCETS
EACH MISCELLANEOUS
Qty: 50 EACH | Refills: 6 | OUTPATIENT
Start: 2021-03-18

## 2021-03-18 RX ORDER — DEXTROSE 4 G
TABLET,CHEWABLE ORAL
Qty: 1 EACH | Refills: 0 | OUTPATIENT
Start: 2021-03-18

## 2021-03-25 ENCOUNTER — OFFICE VISIT (OUTPATIENT)
Dept: INTERNAL MEDICINE | Facility: CLINIC | Age: 51
End: 2021-03-25
Payer: COMMERCIAL

## 2021-03-25 VITALS
DIASTOLIC BLOOD PRESSURE: 78 MMHG | TEMPERATURE: 96 F | WEIGHT: 230.63 LBS | HEIGHT: 74 IN | BODY MASS INDEX: 29.6 KG/M2 | SYSTOLIC BLOOD PRESSURE: 128 MMHG | HEART RATE: 100 BPM | OXYGEN SATURATION: 96 %

## 2021-03-25 DIAGNOSIS — Z79.4 TYPE 2 DIABETES MELLITUS WITH DIABETIC NEUROPATHY, WITH LONG-TERM CURRENT USE OF INSULIN: Primary | Chronic | ICD-10-CM

## 2021-03-25 DIAGNOSIS — E11.40 TYPE 2 DIABETES MELLITUS WITH DIABETIC NEUROPATHY, WITH LONG-TERM CURRENT USE OF INSULIN: Primary | Chronic | ICD-10-CM

## 2021-03-25 DIAGNOSIS — I10 HYPERTENSION GOAL BP (BLOOD PRESSURE) < 130/80: Chronic | ICD-10-CM

## 2021-03-25 PROCEDURE — 99999 PR PBB SHADOW E&M-EST. PATIENT-LVL V: CPT | Mod: PBBFAC,,, | Performed by: PHYSICIAN ASSISTANT

## 2021-03-25 PROCEDURE — 99999 PR PBB SHADOW E&M-EST. PATIENT-LVL V: ICD-10-PCS | Mod: PBBFAC,,, | Performed by: PHYSICIAN ASSISTANT

## 2021-03-25 PROCEDURE — 1126F PR PAIN SEVERITY QUANTIFIED, NO PAIN PRESENT: ICD-10-PCS | Mod: S$GLB,,, | Performed by: PHYSICIAN ASSISTANT

## 2021-03-25 PROCEDURE — 3074F SYST BP LT 130 MM HG: CPT | Mod: CPTII,S$GLB,, | Performed by: PHYSICIAN ASSISTANT

## 2021-03-25 PROCEDURE — 3074F PR MOST RECENT SYSTOLIC BLOOD PRESSURE < 130 MM HG: ICD-10-PCS | Mod: CPTII,S$GLB,, | Performed by: PHYSICIAN ASSISTANT

## 2021-03-25 PROCEDURE — 3078F PR MOST RECENT DIASTOLIC BLOOD PRESSURE < 80 MM HG: ICD-10-PCS | Mod: CPTII,S$GLB,, | Performed by: PHYSICIAN ASSISTANT

## 2021-03-25 PROCEDURE — 99214 OFFICE O/P EST MOD 30 MIN: CPT | Mod: S$GLB,,, | Performed by: PHYSICIAN ASSISTANT

## 2021-03-25 PROCEDURE — 1126F AMNT PAIN NOTED NONE PRSNT: CPT | Mod: S$GLB,,, | Performed by: PHYSICIAN ASSISTANT

## 2021-03-25 PROCEDURE — 99214 PR OFFICE/OUTPT VISIT, EST, LEVL IV, 30-39 MIN: ICD-10-PCS | Mod: S$GLB,,, | Performed by: PHYSICIAN ASSISTANT

## 2021-03-25 PROCEDURE — 3044F HG A1C LEVEL LT 7.0%: CPT | Mod: CPTII,S$GLB,, | Performed by: PHYSICIAN ASSISTANT

## 2021-03-25 PROCEDURE — 3008F PR BODY MASS INDEX (BMI) DOCUMENTED: ICD-10-PCS | Mod: CPTII,S$GLB,, | Performed by: PHYSICIAN ASSISTANT

## 2021-03-25 PROCEDURE — 3008F BODY MASS INDEX DOCD: CPT | Mod: CPTII,S$GLB,, | Performed by: PHYSICIAN ASSISTANT

## 2021-03-25 PROCEDURE — 3044F PR MOST RECENT HEMOGLOBIN A1C LEVEL <7.0%: ICD-10-PCS | Mod: CPTII,S$GLB,, | Performed by: PHYSICIAN ASSISTANT

## 2021-03-25 PROCEDURE — 3078F DIAST BP <80 MM HG: CPT | Mod: CPTII,S$GLB,, | Performed by: PHYSICIAN ASSISTANT

## 2021-03-26 ENCOUNTER — OFFICE VISIT (OUTPATIENT)
Dept: OPHTHALMOLOGY | Facility: CLINIC | Age: 51
End: 2021-03-26
Payer: COMMERCIAL

## 2021-03-26 DIAGNOSIS — I10 HYPERTENSION GOAL BP (BLOOD PRESSURE) < 130/80: Chronic | ICD-10-CM

## 2021-03-26 DIAGNOSIS — E11.9 DIABETES MELLITUS TYPE 2 WITHOUT RETINOPATHY: Primary | ICD-10-CM

## 2021-03-26 DIAGNOSIS — H52.4 BILATERAL PRESBYOPIA: ICD-10-CM

## 2021-03-26 PROCEDURE — 92014 PR EYE EXAM, EST PATIENT,COMPREHESV: ICD-10-PCS | Mod: S$GLB,,, | Performed by: OPTOMETRIST

## 2021-03-26 PROCEDURE — 2023F PR DILATED RETINAL EXAM W/O EVID OF RETINOPATHY: ICD-10-PCS | Mod: S$GLB,,, | Performed by: OPTOMETRIST

## 2021-03-26 PROCEDURE — 99999 PR PBB SHADOW E&M-EST. PATIENT-LVL II: CPT | Mod: PBBFAC,,, | Performed by: OPTOMETRIST

## 2021-03-26 PROCEDURE — 92015 DETERMINE REFRACTIVE STATE: CPT | Mod: S$GLB,,, | Performed by: OPTOMETRIST

## 2021-03-26 PROCEDURE — 99999 PR PBB SHADOW E&M-EST. PATIENT-LVL II: ICD-10-PCS | Mod: PBBFAC,,, | Performed by: OPTOMETRIST

## 2021-03-26 PROCEDURE — 92015 PR REFRACTION: ICD-10-PCS | Mod: S$GLB,,, | Performed by: OPTOMETRIST

## 2021-03-26 PROCEDURE — 92014 COMPRE OPH EXAM EST PT 1/>: CPT | Mod: S$GLB,,, | Performed by: OPTOMETRIST

## 2021-03-26 PROCEDURE — 2023F DILAT RTA XM W/O RTNOPTHY: CPT | Mod: S$GLB,,, | Performed by: OPTOMETRIST

## 2021-03-29 ENCOUNTER — TELEPHONE (OUTPATIENT)
Dept: ORTHOPEDICS | Facility: CLINIC | Age: 51
End: 2021-03-29

## 2021-03-30 ENCOUNTER — PATIENT MESSAGE (OUTPATIENT)
Dept: ORTHOPEDICS | Facility: CLINIC | Age: 51
End: 2021-03-30

## 2021-04-01 ENCOUNTER — OFFICE VISIT (OUTPATIENT)
Dept: ORTHOPEDICS | Facility: CLINIC | Age: 51
End: 2021-04-01
Payer: COMMERCIAL

## 2021-04-01 VITALS
DIASTOLIC BLOOD PRESSURE: 86 MMHG | HEART RATE: 88 BPM | SYSTOLIC BLOOD PRESSURE: 146 MMHG | WEIGHT: 230 LBS | HEIGHT: 74 IN | BODY MASS INDEX: 29.52 KG/M2

## 2021-04-01 DIAGNOSIS — M24.661 ARTHROFIBROSIS OF KNEE JOINT, RIGHT: ICD-10-CM

## 2021-04-01 DIAGNOSIS — S83.281D ACUTE LATERAL MENISCUS TEAR OF RIGHT KNEE, SUBSEQUENT ENCOUNTER: ICD-10-CM

## 2021-04-01 DIAGNOSIS — S83.241D ACUTE MEDIAL MENISCUS TEAR OF RIGHT KNEE, SUBSEQUENT ENCOUNTER: ICD-10-CM

## 2021-04-01 DIAGNOSIS — M17.11 ARTHRITIS OF KNEE, RIGHT: Primary | ICD-10-CM

## 2021-04-01 DIAGNOSIS — Z98.890 S/P RIGHT KNEE ARTHROSCOPY: ICD-10-CM

## 2021-04-01 PROCEDURE — 1125F AMNT PAIN NOTED PAIN PRSNT: CPT | Mod: S$GLB,,, | Performed by: ORTHOPAEDIC SURGERY

## 2021-04-01 PROCEDURE — 99213 PR OFFICE/OUTPT VISIT, EST, LEVL III, 20-29 MIN: ICD-10-PCS | Mod: S$GLB,,, | Performed by: ORTHOPAEDIC SURGERY

## 2021-04-01 PROCEDURE — 99213 OFFICE O/P EST LOW 20 MIN: CPT | Mod: S$GLB,,, | Performed by: ORTHOPAEDIC SURGERY

## 2021-04-01 PROCEDURE — 3077F PR MOST RECENT SYSTOLIC BLOOD PRESSURE >= 140 MM HG: ICD-10-PCS | Mod: CPTII,S$GLB,, | Performed by: ORTHOPAEDIC SURGERY

## 2021-04-01 PROCEDURE — 3008F PR BODY MASS INDEX (BMI) DOCUMENTED: ICD-10-PCS | Mod: CPTII,S$GLB,, | Performed by: ORTHOPAEDIC SURGERY

## 2021-04-01 PROCEDURE — 99999 PR PBB SHADOW E&M-EST. PATIENT-LVL IV: ICD-10-PCS | Mod: PBBFAC,,, | Performed by: ORTHOPAEDIC SURGERY

## 2021-04-01 PROCEDURE — 3077F SYST BP >= 140 MM HG: CPT | Mod: CPTII,S$GLB,, | Performed by: ORTHOPAEDIC SURGERY

## 2021-04-01 PROCEDURE — 3079F PR MOST RECENT DIASTOLIC BLOOD PRESSURE 80-89 MM HG: ICD-10-PCS | Mod: CPTII,S$GLB,, | Performed by: ORTHOPAEDIC SURGERY

## 2021-04-01 PROCEDURE — 99999 PR PBB SHADOW E&M-EST. PATIENT-LVL IV: CPT | Mod: PBBFAC,,, | Performed by: ORTHOPAEDIC SURGERY

## 2021-04-01 PROCEDURE — 3008F BODY MASS INDEX DOCD: CPT | Mod: CPTII,S$GLB,, | Performed by: ORTHOPAEDIC SURGERY

## 2021-04-01 PROCEDURE — 1125F PR PAIN SEVERITY QUANTIFIED, PAIN PRESENT: ICD-10-PCS | Mod: S$GLB,,, | Performed by: ORTHOPAEDIC SURGERY

## 2021-04-01 PROCEDURE — 3079F DIAST BP 80-89 MM HG: CPT | Mod: CPTII,S$GLB,, | Performed by: ORTHOPAEDIC SURGERY

## 2021-04-01 RX ORDER — TRAMADOL HYDROCHLORIDE 50 MG/1
50 TABLET ORAL EVERY 6 HOURS PRN
Qty: 28 TABLET | Refills: 0 | Status: SHIPPED | OUTPATIENT
Start: 2021-04-01 | End: 2022-01-18

## 2021-04-02 ENCOUNTER — PATIENT MESSAGE (OUTPATIENT)
Dept: ORTHOPEDICS | Facility: CLINIC | Age: 51
End: 2021-04-02

## 2021-04-08 ENCOUNTER — PATIENT MESSAGE (OUTPATIENT)
Dept: ORTHOPEDICS | Facility: CLINIC | Age: 51
End: 2021-04-08

## 2021-04-12 ENCOUNTER — PATIENT MESSAGE (OUTPATIENT)
Dept: INTERNAL MEDICINE | Facility: CLINIC | Age: 51
End: 2021-04-12

## 2021-04-12 ENCOUNTER — TELEPHONE (OUTPATIENT)
Dept: INTERNAL MEDICINE | Facility: CLINIC | Age: 51
End: 2021-04-12

## 2021-04-13 ENCOUNTER — PATIENT MESSAGE (OUTPATIENT)
Dept: ORTHOPEDICS | Facility: CLINIC | Age: 51
End: 2021-04-13

## 2021-04-15 ENCOUNTER — OFFICE VISIT (OUTPATIENT)
Dept: INTERNAL MEDICINE | Facility: CLINIC | Age: 51
End: 2021-04-15
Payer: COMMERCIAL

## 2021-04-15 VITALS
HEART RATE: 107 BPM | WEIGHT: 239.88 LBS | SYSTOLIC BLOOD PRESSURE: 140 MMHG | OXYGEN SATURATION: 98 % | HEIGHT: 74 IN | TEMPERATURE: 99 F | DIASTOLIC BLOOD PRESSURE: 88 MMHG | BODY MASS INDEX: 30.79 KG/M2

## 2021-04-15 DIAGNOSIS — E11.65 UNCONTROLLED TYPE 2 DIABETES MELLITUS WITH HYPERGLYCEMIA, WITHOUT LONG-TERM CURRENT USE OF INSULIN: Primary | ICD-10-CM

## 2021-04-15 DIAGNOSIS — I10 HYPERTENSION GOAL BP (BLOOD PRESSURE) < 130/80: ICD-10-CM

## 2021-04-15 PROCEDURE — 99214 OFFICE O/P EST MOD 30 MIN: CPT | Mod: S$GLB,,, | Performed by: INTERNAL MEDICINE

## 2021-04-15 PROCEDURE — 3008F BODY MASS INDEX DOCD: CPT | Mod: CPTII,S$GLB,, | Performed by: INTERNAL MEDICINE

## 2021-04-15 PROCEDURE — 3046F HEMOGLOBIN A1C LEVEL >9.0%: CPT | Mod: CPTII,S$GLB,, | Performed by: INTERNAL MEDICINE

## 2021-04-15 PROCEDURE — 3046F PR MOST RECENT HEMOGLOBIN A1C LEVEL > 9.0%: ICD-10-PCS | Mod: CPTII,S$GLB,, | Performed by: INTERNAL MEDICINE

## 2021-04-15 PROCEDURE — 3008F PR BODY MASS INDEX (BMI) DOCUMENTED: ICD-10-PCS | Mod: CPTII,S$GLB,, | Performed by: INTERNAL MEDICINE

## 2021-04-15 PROCEDURE — 1125F AMNT PAIN NOTED PAIN PRSNT: CPT | Mod: S$GLB,,, | Performed by: INTERNAL MEDICINE

## 2021-04-15 PROCEDURE — 99999 PR PBB SHADOW E&M-EST. PATIENT-LVL IV: ICD-10-PCS | Mod: PBBFAC,,, | Performed by: INTERNAL MEDICINE

## 2021-04-15 PROCEDURE — 1125F PR PAIN SEVERITY QUANTIFIED, PAIN PRESENT: ICD-10-PCS | Mod: S$GLB,,, | Performed by: INTERNAL MEDICINE

## 2021-04-15 PROCEDURE — 99214 PR OFFICE/OUTPT VISIT, EST, LEVL IV, 30-39 MIN: ICD-10-PCS | Mod: S$GLB,,, | Performed by: INTERNAL MEDICINE

## 2021-04-15 PROCEDURE — 99999 PR PBB SHADOW E&M-EST. PATIENT-LVL IV: CPT | Mod: PBBFAC,,, | Performed by: INTERNAL MEDICINE

## 2021-04-15 RX ORDER — INSULIN GLARGINE 100 [IU]/ML
24 INJECTION, SOLUTION SUBCUTANEOUS DAILY
Qty: 1 BOX | Refills: 0
Start: 2021-04-15 | End: 2021-06-01

## 2021-04-21 ENCOUNTER — PATIENT OUTREACH (OUTPATIENT)
Dept: ADMINISTRATIVE | Facility: HOSPITAL | Age: 51
End: 2021-04-21

## 2021-05-07 ENCOUNTER — TELEPHONE (OUTPATIENT)
Dept: ADMINISTRATIVE | Facility: HOSPITAL | Age: 51
End: 2021-05-07

## 2021-05-24 ENCOUNTER — PATIENT OUTREACH (OUTPATIENT)
Dept: ADMINISTRATIVE | Facility: HOSPITAL | Age: 51
End: 2021-05-24

## 2021-05-27 DIAGNOSIS — E11.65 UNCONTROLLED TYPE 2 DIABETES MELLITUS WITH HYPERGLYCEMIA, WITHOUT LONG-TERM CURRENT USE OF INSULIN: ICD-10-CM

## 2021-05-28 ENCOUNTER — OFFICE VISIT (OUTPATIENT)
Dept: ORTHOPEDICS | Facility: CLINIC | Age: 51
End: 2021-05-28
Payer: COMMERCIAL

## 2021-05-28 VITALS
WEIGHT: 239 LBS | SYSTOLIC BLOOD PRESSURE: 149 MMHG | HEIGHT: 74 IN | HEART RATE: 93 BPM | DIASTOLIC BLOOD PRESSURE: 92 MMHG | BODY MASS INDEX: 30.67 KG/M2

## 2021-05-28 DIAGNOSIS — M24.661 ARTHROFIBROSIS OF KNEE JOINT, RIGHT: ICD-10-CM

## 2021-05-28 DIAGNOSIS — M24.661 ARTHROFIBROSIS OF KNEE JOINT, RIGHT: Primary | ICD-10-CM

## 2021-05-28 DIAGNOSIS — M17.11 ARTHRITIS OF KNEE, RIGHT: Primary | ICD-10-CM

## 2021-05-28 DIAGNOSIS — S83.241D ACUTE MEDIAL MENISCUS TEAR OF RIGHT KNEE, SUBSEQUENT ENCOUNTER: ICD-10-CM

## 2021-05-28 DIAGNOSIS — S83.281D ACUTE LATERAL MENISCUS TEAR OF RIGHT KNEE, SUBSEQUENT ENCOUNTER: ICD-10-CM

## 2021-05-28 DIAGNOSIS — M25.461 EFFUSION OF RIGHT KNEE: ICD-10-CM

## 2021-05-28 DIAGNOSIS — Z98.890 S/P RIGHT KNEE ARTHROSCOPY: ICD-10-CM

## 2021-05-28 PROCEDURE — 3008F PR BODY MASS INDEX (BMI) DOCUMENTED: ICD-10-PCS | Mod: CPTII,S$GLB,, | Performed by: ORTHOPAEDIC SURGERY

## 2021-05-28 PROCEDURE — 99999 PR PBB SHADOW E&M-EST. PATIENT-LVL IV: CPT | Mod: PBBFAC,,, | Performed by: ORTHOPAEDIC SURGERY

## 2021-05-28 PROCEDURE — 1125F AMNT PAIN NOTED PAIN PRSNT: CPT | Mod: S$GLB,,, | Performed by: ORTHOPAEDIC SURGERY

## 2021-05-28 PROCEDURE — 99213 OFFICE O/P EST LOW 20 MIN: CPT | Mod: S$GLB,,, | Performed by: ORTHOPAEDIC SURGERY

## 2021-05-28 PROCEDURE — 1125F PR PAIN SEVERITY QUANTIFIED, PAIN PRESENT: ICD-10-PCS | Mod: S$GLB,,, | Performed by: ORTHOPAEDIC SURGERY

## 2021-05-28 PROCEDURE — 99213 PR OFFICE/OUTPT VISIT, EST, LEVL III, 20-29 MIN: ICD-10-PCS | Mod: S$GLB,,, | Performed by: ORTHOPAEDIC SURGERY

## 2021-05-28 PROCEDURE — 99999 PR PBB SHADOW E&M-EST. PATIENT-LVL IV: ICD-10-PCS | Mod: PBBFAC,,, | Performed by: ORTHOPAEDIC SURGERY

## 2021-05-28 PROCEDURE — 3008F BODY MASS INDEX DOCD: CPT | Mod: CPTII,S$GLB,, | Performed by: ORTHOPAEDIC SURGERY

## 2021-05-28 RX ORDER — MELOXICAM 15 MG/1
15 TABLET ORAL DAILY
Qty: 90 TABLET | Refills: 0 | Status: ON HOLD | OUTPATIENT
Start: 2021-05-28 | End: 2023-04-13 | Stop reason: HOSPADM

## 2021-06-01 RX ORDER — INSULIN GLARGINE 100 [IU]/ML
24 INJECTION, SOLUTION SUBCUTANEOUS DAILY
Qty: 30 ML | Refills: 0 | Status: SHIPPED | OUTPATIENT
Start: 2021-06-01 | End: 2022-01-18 | Stop reason: SDUPTHER

## 2021-06-04 ENCOUNTER — TELEPHONE (OUTPATIENT)
Dept: ADMINISTRATIVE | Facility: HOSPITAL | Age: 51
End: 2021-06-04

## 2021-06-10 DIAGNOSIS — N52.9 ERECTILE DYSFUNCTION, UNSPECIFIED ERECTILE DYSFUNCTION TYPE: ICD-10-CM

## 2021-06-14 DIAGNOSIS — N52.9 ERECTILE DYSFUNCTION, UNSPECIFIED ERECTILE DYSFUNCTION TYPE: ICD-10-CM

## 2021-06-14 RX ORDER — SILDENAFIL 50 MG/1
TABLET, FILM COATED ORAL
Qty: 10 TABLET | Refills: 6 | Status: SHIPPED | OUTPATIENT
Start: 2021-06-14 | End: 2021-10-02 | Stop reason: SDUPTHER

## 2021-06-15 RX ORDER — SILDENAFIL 50 MG/1
50 TABLET, FILM COATED ORAL DAILY PRN
Qty: 10 TABLET | Refills: 3 | OUTPATIENT
Start: 2021-06-15

## 2021-06-17 ENCOUNTER — PATIENT OUTREACH (OUTPATIENT)
Dept: ADMINISTRATIVE | Facility: HOSPITAL | Age: 51
End: 2021-06-17

## 2021-06-22 ENCOUNTER — LAB VISIT (OUTPATIENT)
Dept: LAB | Facility: HOSPITAL | Age: 51
End: 2021-06-22
Attending: INTERNAL MEDICINE
Payer: COMMERCIAL

## 2021-06-22 ENCOUNTER — OFFICE VISIT (OUTPATIENT)
Dept: INTERNAL MEDICINE | Facility: CLINIC | Age: 51
End: 2021-06-22
Payer: COMMERCIAL

## 2021-06-22 VITALS
OXYGEN SATURATION: 97 % | SYSTOLIC BLOOD PRESSURE: 122 MMHG | HEART RATE: 87 BPM | BODY MASS INDEX: 30.33 KG/M2 | TEMPERATURE: 97 F | DIASTOLIC BLOOD PRESSURE: 68 MMHG | WEIGHT: 236.31 LBS | RESPIRATION RATE: 16 BRPM | HEIGHT: 74 IN

## 2021-06-22 DIAGNOSIS — I10 HYPERTENSION GOAL BP (BLOOD PRESSURE) < 130/80: ICD-10-CM

## 2021-06-22 DIAGNOSIS — E78.5 HYPERLIPIDEMIA LDL GOAL <70: ICD-10-CM

## 2021-06-22 LAB
ALBUMIN SERPL BCP-MCNC: 3.8 G/DL (ref 3.5–5.2)
ALP SERPL-CCNC: 61 U/L (ref 55–135)
ALT SERPL W/O P-5'-P-CCNC: 22 U/L (ref 10–44)
ANION GAP SERPL CALC-SCNC: 9 MMOL/L (ref 8–16)
AST SERPL-CCNC: 23 U/L (ref 10–40)
BILIRUB SERPL-MCNC: 0.6 MG/DL (ref 0.1–1)
BUN SERPL-MCNC: 13 MG/DL (ref 6–20)
CALCIUM SERPL-MCNC: 9.4 MG/DL (ref 8.7–10.5)
CHLORIDE SERPL-SCNC: 104 MMOL/L (ref 95–110)
CHOLEST SERPL-MCNC: 138 MG/DL (ref 120–199)
CHOLEST/HDLC SERPL: 3.5 {RATIO} (ref 2–5)
CO2 SERPL-SCNC: 25 MMOL/L (ref 23–29)
CREAT SERPL-MCNC: 0.9 MG/DL (ref 0.5–1.4)
EST. GFR  (AFRICAN AMERICAN): >60 ML/MIN/1.73 M^2
EST. GFR  (NON AFRICAN AMERICAN): >60 ML/MIN/1.73 M^2
ESTIMATED AVG GLUCOSE: 123 MG/DL (ref 68–131)
GLUCOSE SERPL-MCNC: 146 MG/DL (ref 70–110)
HBA1C MFR BLD: 5.9 % (ref 4–5.6)
HDLC SERPL-MCNC: 39 MG/DL (ref 40–75)
HDLC SERPL: 28.3 % (ref 20–50)
LDLC SERPL CALC-MCNC: 80.4 MG/DL (ref 63–159)
NONHDLC SERPL-MCNC: 99 MG/DL
POTASSIUM SERPL-SCNC: 4.4 MMOL/L (ref 3.5–5.1)
PROT SERPL-MCNC: 7.4 G/DL (ref 6–8.4)
SODIUM SERPL-SCNC: 138 MMOL/L (ref 136–145)
TRIGL SERPL-MCNC: 93 MG/DL (ref 30–150)

## 2021-06-22 PROCEDURE — 80061 LIPID PANEL: CPT | Performed by: INTERNAL MEDICINE

## 2021-06-22 PROCEDURE — 1125F AMNT PAIN NOTED PAIN PRSNT: CPT | Mod: S$GLB,,, | Performed by: INTERNAL MEDICINE

## 2021-06-22 PROCEDURE — 3078F DIAST BP <80 MM HG: CPT | Mod: CPTII,S$GLB,, | Performed by: INTERNAL MEDICINE

## 2021-06-22 PROCEDURE — 3046F PR MOST RECENT HEMOGLOBIN A1C LEVEL > 9.0%: ICD-10-PCS | Mod: CPTII,S$GLB,, | Performed by: INTERNAL MEDICINE

## 2021-06-22 PROCEDURE — 3008F PR BODY MASS INDEX (BMI) DOCUMENTED: ICD-10-PCS | Mod: CPTII,S$GLB,, | Performed by: INTERNAL MEDICINE

## 2021-06-22 PROCEDURE — 1125F PR PAIN SEVERITY QUANTIFIED, PAIN PRESENT: ICD-10-PCS | Mod: S$GLB,,, | Performed by: INTERNAL MEDICINE

## 2021-06-22 PROCEDURE — 3008F BODY MASS INDEX DOCD: CPT | Mod: CPTII,S$GLB,, | Performed by: INTERNAL MEDICINE

## 2021-06-22 PROCEDURE — 99214 PR OFFICE/OUTPT VISIT, EST, LEVL IV, 30-39 MIN: ICD-10-PCS | Mod: S$GLB,,, | Performed by: INTERNAL MEDICINE

## 2021-06-22 PROCEDURE — 82570 ASSAY OF URINE CREATININE: CPT | Performed by: INTERNAL MEDICINE

## 2021-06-22 PROCEDURE — 3074F PR MOST RECENT SYSTOLIC BLOOD PRESSURE < 130 MM HG: ICD-10-PCS | Mod: CPTII,S$GLB,, | Performed by: INTERNAL MEDICINE

## 2021-06-22 PROCEDURE — 82043 UR ALBUMIN QUANTITATIVE: CPT | Performed by: INTERNAL MEDICINE

## 2021-06-22 PROCEDURE — 3078F PR MOST RECENT DIASTOLIC BLOOD PRESSURE < 80 MM HG: ICD-10-PCS | Mod: CPTII,S$GLB,, | Performed by: INTERNAL MEDICINE

## 2021-06-22 PROCEDURE — 3074F SYST BP LT 130 MM HG: CPT | Mod: CPTII,S$GLB,, | Performed by: INTERNAL MEDICINE

## 2021-06-22 PROCEDURE — 83036 HEMOGLOBIN GLYCOSYLATED A1C: CPT | Performed by: INTERNAL MEDICINE

## 2021-06-22 PROCEDURE — 99999 PR PBB SHADOW E&M-EST. PATIENT-LVL IV: ICD-10-PCS | Mod: PBBFAC,,, | Performed by: INTERNAL MEDICINE

## 2021-06-22 PROCEDURE — 99214 OFFICE O/P EST MOD 30 MIN: CPT | Mod: S$GLB,,, | Performed by: INTERNAL MEDICINE

## 2021-06-22 PROCEDURE — 36415 COLL VENOUS BLD VENIPUNCTURE: CPT | Performed by: INTERNAL MEDICINE

## 2021-06-22 PROCEDURE — 99999 PR PBB SHADOW E&M-EST. PATIENT-LVL IV: CPT | Mod: PBBFAC,,, | Performed by: INTERNAL MEDICINE

## 2021-06-22 PROCEDURE — 3046F HEMOGLOBIN A1C LEVEL >9.0%: CPT | Mod: CPTII,S$GLB,, | Performed by: INTERNAL MEDICINE

## 2021-06-22 PROCEDURE — 80053 COMPREHEN METABOLIC PANEL: CPT | Performed by: INTERNAL MEDICINE

## 2021-06-23 LAB
ALBUMIN/CREAT UR: 34 UG/MG (ref 0–30)
CREAT UR-MCNC: 106 MG/DL (ref 23–375)
MICROALBUMIN UR DL<=1MG/L-MCNC: 36 UG/ML

## 2021-08-01 DIAGNOSIS — I10 HYPERTENSION GOAL BP (BLOOD PRESSURE) < 130/80: ICD-10-CM

## 2021-08-03 RX ORDER — GLIPIZIDE 5 MG/1
TABLET ORAL
Qty: 90 TABLET | Refills: 1 | Status: SHIPPED | OUTPATIENT
Start: 2021-08-03 | End: 2022-01-18 | Stop reason: SDUPTHER

## 2021-08-03 RX ORDER — LISINOPRIL 40 MG/1
TABLET ORAL
Qty: 90 TABLET | Refills: 3 | Status: SHIPPED | OUTPATIENT
Start: 2021-08-03 | End: 2022-01-18 | Stop reason: SDUPTHER

## 2021-08-04 ENCOUNTER — PATIENT MESSAGE (OUTPATIENT)
Dept: INTERNAL MEDICINE | Facility: CLINIC | Age: 51
End: 2021-08-04

## 2021-08-04 ENCOUNTER — PATIENT OUTREACH (OUTPATIENT)
Dept: ADMINISTRATIVE | Facility: OTHER | Age: 51
End: 2021-08-04

## 2021-08-05 RX ORDER — PROMETHAZINE HYDROCHLORIDE AND DEXTROMETHORPHAN HYDROBROMIDE 6.25; 15 MG/5ML; MG/5ML
5 SYRUP ORAL EVERY 8 HOURS PRN
Qty: 118 ML | Refills: 0 | Status: SHIPPED | OUTPATIENT
Start: 2021-08-05 | End: 2021-08-15

## 2021-09-09 RX ORDER — METFORMIN HYDROCHLORIDE 1000 MG/1
1000 TABLET ORAL 2 TIMES DAILY WITH MEALS
Qty: 180 TABLET | Refills: 1 | Status: SHIPPED | OUTPATIENT
Start: 2021-09-09 | End: 2022-01-18 | Stop reason: SDUPTHER

## 2021-12-07 ENCOUNTER — PATIENT MESSAGE (OUTPATIENT)
Dept: INTERNAL MEDICINE | Facility: CLINIC | Age: 51
End: 2021-12-07
Payer: COMMERCIAL

## 2021-12-08 ENCOUNTER — PATIENT MESSAGE (OUTPATIENT)
Dept: INTERNAL MEDICINE | Facility: CLINIC | Age: 51
End: 2021-12-08

## 2021-12-08 ENCOUNTER — OFFICE VISIT (OUTPATIENT)
Dept: INTERNAL MEDICINE | Facility: CLINIC | Age: 51
End: 2021-12-08
Payer: COMMERCIAL

## 2021-12-08 ENCOUNTER — LAB VISIT (OUTPATIENT)
Dept: LAB | Facility: HOSPITAL | Age: 51
End: 2021-12-08
Attending: PHYSICIAN ASSISTANT
Payer: COMMERCIAL

## 2021-12-08 VITALS
HEART RATE: 97 BPM | WEIGHT: 243.63 LBS | RESPIRATION RATE: 19 BRPM | TEMPERATURE: 97 F | SYSTOLIC BLOOD PRESSURE: 152 MMHG | DIASTOLIC BLOOD PRESSURE: 94 MMHG | BODY MASS INDEX: 31.28 KG/M2 | OXYGEN SATURATION: 96 %

## 2021-12-08 DIAGNOSIS — E78.5 HYPERLIPIDEMIA LDL GOAL <70: ICD-10-CM

## 2021-12-08 DIAGNOSIS — I10 HYPERTENSION GOAL BP (BLOOD PRESSURE) < 130/80: Chronic | ICD-10-CM

## 2021-12-08 DIAGNOSIS — I10 HYPERTENSION GOAL BP (BLOOD PRESSURE) < 130/80: ICD-10-CM

## 2021-12-08 DIAGNOSIS — J32.9 BACTERIAL SINUSITIS: Primary | ICD-10-CM

## 2021-12-08 DIAGNOSIS — B96.89 BACTERIAL SINUSITIS: Primary | ICD-10-CM

## 2021-12-08 DIAGNOSIS — D72.829 LEUKOCYTOSIS, UNSPECIFIED TYPE: Primary | ICD-10-CM

## 2021-12-08 DIAGNOSIS — R05.9 COUGH: ICD-10-CM

## 2021-12-08 LAB
ALBUMIN SERPL BCP-MCNC: 3.7 G/DL (ref 3.5–5.2)
ALP SERPL-CCNC: 71 U/L (ref 55–135)
ALT SERPL W/O P-5'-P-CCNC: 19 U/L (ref 10–44)
ANION GAP SERPL CALC-SCNC: 9 MMOL/L (ref 8–16)
AST SERPL-CCNC: 18 U/L (ref 10–40)
BASOPHILS # BLD AUTO: 0.08 K/UL (ref 0–0.2)
BASOPHILS NFR BLD: 0.6 % (ref 0–1.9)
BILIRUB SERPL-MCNC: 0.5 MG/DL (ref 0.1–1)
BUN SERPL-MCNC: 7 MG/DL (ref 6–20)
CALCIUM SERPL-MCNC: 9.1 MG/DL (ref 8.7–10.5)
CHLORIDE SERPL-SCNC: 105 MMOL/L (ref 95–110)
CHOLEST SERPL-MCNC: 165 MG/DL (ref 120–199)
CHOLEST/HDLC SERPL: 4.3 {RATIO} (ref 2–5)
CO2 SERPL-SCNC: 26 MMOL/L (ref 23–29)
CREAT SERPL-MCNC: 1 MG/DL (ref 0.5–1.4)
CTP QC/QA: YES
DIFFERENTIAL METHOD: ABNORMAL
EOSINOPHIL # BLD AUTO: 0.5 K/UL (ref 0–0.5)
EOSINOPHIL NFR BLD: 4 % (ref 0–8)
ERYTHROCYTE [DISTWIDTH] IN BLOOD BY AUTOMATED COUNT: 12.2 % (ref 11.5–14.5)
EST. GFR  (AFRICAN AMERICAN): >60 ML/MIN/1.73 M^2
EST. GFR  (NON AFRICAN AMERICAN): >60 ML/MIN/1.73 M^2
ESTIMATED AVG GLUCOSE: 123 MG/DL (ref 68–131)
GLUCOSE SERPL-MCNC: 146 MG/DL (ref 70–110)
HBA1C MFR BLD: 5.9 % (ref 4–5.6)
HCT VFR BLD AUTO: 42.1 % (ref 40–54)
HDLC SERPL-MCNC: 38 MG/DL (ref 40–75)
HDLC SERPL: 23 % (ref 20–50)
HGB BLD-MCNC: 13.6 G/DL (ref 14–18)
IMM GRANULOCYTES # BLD AUTO: 0.06 K/UL (ref 0–0.04)
IMM GRANULOCYTES NFR BLD AUTO: 0.4 % (ref 0–0.5)
LDLC SERPL CALC-MCNC: 109 MG/DL (ref 63–159)
LYMPHOCYTES # BLD AUTO: 3.5 K/UL (ref 1–4.8)
LYMPHOCYTES NFR BLD: 25.9 % (ref 18–48)
MCH RBC QN AUTO: 31.7 PG (ref 27–31)
MCHC RBC AUTO-ENTMCNC: 32.3 G/DL (ref 32–36)
MCV RBC AUTO: 98 FL (ref 82–98)
MONOCYTES # BLD AUTO: 0.8 K/UL (ref 0.3–1)
MONOCYTES NFR BLD: 6.1 % (ref 4–15)
NEUTROPHILS # BLD AUTO: 8.4 K/UL (ref 1.8–7.7)
NEUTROPHILS NFR BLD: 63 % (ref 38–73)
NONHDLC SERPL-MCNC: 127 MG/DL
NRBC BLD-RTO: 0 /100 WBC
PLATELET # BLD AUTO: 332 K/UL (ref 150–450)
PMV BLD AUTO: 9.4 FL (ref 9.2–12.9)
POTASSIUM SERPL-SCNC: 4.3 MMOL/L (ref 3.5–5.1)
PROT SERPL-MCNC: 7.1 G/DL (ref 6–8.4)
RBC # BLD AUTO: 4.29 M/UL (ref 4.6–6.2)
SARS-COV-2 RDRP RESP QL NAA+PROBE: NEGATIVE
SODIUM SERPL-SCNC: 140 MMOL/L (ref 136–145)
TRIGL SERPL-MCNC: 90 MG/DL (ref 30–150)
WBC # BLD AUTO: 13.34 K/UL (ref 3.9–12.7)

## 2021-12-08 PROCEDURE — 3066F PR DOCUMENTATION OF TREATMENT FOR NEPHROPATHY: ICD-10-PCS | Mod: CPTII,S$GLB,, | Performed by: PHYSICIAN ASSISTANT

## 2021-12-08 PROCEDURE — 3066F NEPHROPATHY DOC TX: CPT | Mod: CPTII,S$GLB,, | Performed by: PHYSICIAN ASSISTANT

## 2021-12-08 PROCEDURE — 99999 PR PBB SHADOW E&M-EST. PATIENT-LVL IV: ICD-10-PCS | Mod: PBBFAC,,, | Performed by: PHYSICIAN ASSISTANT

## 2021-12-08 PROCEDURE — 3060F PR POS MICROALBUMINURIA RESULT DOCUMENTED/REVIEW: ICD-10-PCS | Mod: CPTII,S$GLB,, | Performed by: PHYSICIAN ASSISTANT

## 2021-12-08 PROCEDURE — U0002: ICD-10-PCS | Mod: QW,S$GLB,, | Performed by: PHYSICIAN ASSISTANT

## 2021-12-08 PROCEDURE — 83036 HEMOGLOBIN GLYCOSYLATED A1C: CPT | Performed by: INTERNAL MEDICINE

## 2021-12-08 PROCEDURE — 80053 COMPREHEN METABOLIC PANEL: CPT | Performed by: INTERNAL MEDICINE

## 2021-12-08 PROCEDURE — 4010F PR ACE/ARB THEARPY RXD/TAKEN: ICD-10-PCS | Mod: CPTII,S$GLB,, | Performed by: PHYSICIAN ASSISTANT

## 2021-12-08 PROCEDURE — 85025 COMPLETE CBC W/AUTO DIFF WBC: CPT | Performed by: PHYSICIAN ASSISTANT

## 2021-12-08 PROCEDURE — 99214 PR OFFICE/OUTPT VISIT, EST, LEVL IV, 30-39 MIN: ICD-10-PCS | Mod: S$GLB,,, | Performed by: PHYSICIAN ASSISTANT

## 2021-12-08 PROCEDURE — 80061 LIPID PANEL: CPT | Performed by: INTERNAL MEDICINE

## 2021-12-08 PROCEDURE — 4010F ACE/ARB THERAPY RXD/TAKEN: CPT | Mod: CPTII,S$GLB,, | Performed by: PHYSICIAN ASSISTANT

## 2021-12-08 PROCEDURE — U0002 COVID-19 LAB TEST NON-CDC: HCPCS | Mod: QW,S$GLB,, | Performed by: PHYSICIAN ASSISTANT

## 2021-12-08 PROCEDURE — 99214 OFFICE O/P EST MOD 30 MIN: CPT | Mod: S$GLB,,, | Performed by: PHYSICIAN ASSISTANT

## 2021-12-08 PROCEDURE — 36415 COLL VENOUS BLD VENIPUNCTURE: CPT | Performed by: INTERNAL MEDICINE

## 2021-12-08 PROCEDURE — 99999 PR PBB SHADOW E&M-EST. PATIENT-LVL IV: CPT | Mod: PBBFAC,,, | Performed by: PHYSICIAN ASSISTANT

## 2021-12-08 PROCEDURE — 3060F POS MICROALBUMINURIA REV: CPT | Mod: CPTII,S$GLB,, | Performed by: PHYSICIAN ASSISTANT

## 2021-12-08 RX ORDER — DOXYCYCLINE HYCLATE 100 MG
100 TABLET ORAL EVERY 12 HOURS
Qty: 14 TABLET | Refills: 0 | Status: SHIPPED | OUTPATIENT
Start: 2021-12-08 | End: 2021-12-15

## 2021-12-08 RX ORDER — BENZONATATE 200 MG/1
200 CAPSULE ORAL 3 TIMES DAILY PRN
Qty: 30 CAPSULE | Refills: 0 | Status: SHIPPED | OUTPATIENT
Start: 2021-12-08 | End: 2021-12-18

## 2021-12-15 ENCOUNTER — PATIENT MESSAGE (OUTPATIENT)
Dept: INTERNAL MEDICINE | Facility: CLINIC | Age: 51
End: 2021-12-15
Payer: COMMERCIAL

## 2021-12-21 ENCOUNTER — TELEPHONE (OUTPATIENT)
Dept: INTERNAL MEDICINE | Facility: CLINIC | Age: 51
End: 2021-12-21
Payer: COMMERCIAL

## 2021-12-21 ENCOUNTER — LAB VISIT (OUTPATIENT)
Dept: LAB | Facility: HOSPITAL | Age: 51
End: 2021-12-21
Attending: PHYSICIAN ASSISTANT
Payer: COMMERCIAL

## 2021-12-21 DIAGNOSIS — D72.829 LEUKOCYTOSIS, UNSPECIFIED TYPE: ICD-10-CM

## 2021-12-21 LAB
BASOPHILS NFR BLD: 0 % (ref 0–1.9)
DIFFERENTIAL METHOD: ABNORMAL
EOSINOPHIL NFR BLD: 3 % (ref 0–8)
ERYTHROCYTE [DISTWIDTH] IN BLOOD BY AUTOMATED COUNT: 11.9 % (ref 11.5–14.5)
HCT VFR BLD AUTO: 42.2 % (ref 40–54)
HGB BLD-MCNC: 13.7 G/DL (ref 14–18)
IMM GRANULOCYTES # BLD AUTO: ABNORMAL K/UL (ref 0–0.04)
IMM GRANULOCYTES NFR BLD AUTO: ABNORMAL % (ref 0–0.5)
LYMPHOCYTES NFR BLD: 39 % (ref 18–48)
MCH RBC QN AUTO: 31.7 PG (ref 27–31)
MCHC RBC AUTO-ENTMCNC: 32.5 G/DL (ref 32–36)
MCV RBC AUTO: 98 FL (ref 82–98)
MONOCYTES NFR BLD: 4 % (ref 4–15)
NEUTROPHILS NFR BLD: 54 % (ref 38–73)
NRBC BLD-RTO: 0 /100 WBC
PLATELET # BLD AUTO: 312 K/UL (ref 150–450)
PMV BLD AUTO: 9.9 FL (ref 9.2–12.9)
RBC # BLD AUTO: 4.32 M/UL (ref 4.6–6.2)
WBC # BLD AUTO: 11.42 K/UL (ref 3.9–12.7)

## 2021-12-21 PROCEDURE — 36415 COLL VENOUS BLD VENIPUNCTURE: CPT | Performed by: PHYSICIAN ASSISTANT

## 2021-12-21 PROCEDURE — 85025 COMPLETE CBC W/AUTO DIFF WBC: CPT | Performed by: PHYSICIAN ASSISTANT

## 2021-12-27 DIAGNOSIS — N52.9 ERECTILE DYSFUNCTION, UNSPECIFIED ERECTILE DYSFUNCTION TYPE: ICD-10-CM

## 2021-12-27 RX ORDER — SILDENAFIL 50 MG/1
50 TABLET, FILM COATED ORAL DAILY PRN
Qty: 10 TABLET | Refills: 6 | Status: CANCELLED | OUTPATIENT
Start: 2021-12-27

## 2022-01-13 DIAGNOSIS — N52.9 ERECTILE DYSFUNCTION, UNSPECIFIED ERECTILE DYSFUNCTION TYPE: ICD-10-CM

## 2022-01-13 NOTE — TELEPHONE ENCOUNTER
No new care gaps identified.  Powered by [employer]. Reference number: [id number].   1/13/2022 1:54:00 PM CST

## 2022-01-14 RX ORDER — SILDENAFIL 50 MG/1
50 TABLET, FILM COATED ORAL DAILY PRN
Qty: 10 TABLET | Refills: 0 | Status: SHIPPED | OUTPATIENT
Start: 2022-01-14 | End: 2022-01-27 | Stop reason: SDUPTHER

## 2022-01-18 ENCOUNTER — OFFICE VISIT (OUTPATIENT)
Dept: INTERNAL MEDICINE | Facility: CLINIC | Age: 52
End: 2022-01-18
Payer: COMMERCIAL

## 2022-01-18 ENCOUNTER — PATIENT MESSAGE (OUTPATIENT)
Dept: ENDOSCOPY | Facility: HOSPITAL | Age: 52
End: 2022-01-18
Payer: COMMERCIAL

## 2022-01-18 ENCOUNTER — HOSPITAL ENCOUNTER (OUTPATIENT)
Dept: RADIOLOGY | Facility: HOSPITAL | Age: 52
Discharge: HOME | End: 2022-01-18
Attending: INTERNAL MEDICINE
Payer: COMMERCIAL

## 2022-01-18 VITALS
SYSTOLIC BLOOD PRESSURE: 138 MMHG | HEART RATE: 93 BPM | HEIGHT: 74 IN | WEIGHT: 237.88 LBS | DIASTOLIC BLOOD PRESSURE: 86 MMHG | OXYGEN SATURATION: 97 % | RESPIRATION RATE: 18 BRPM | TEMPERATURE: 98 F | BODY MASS INDEX: 30.53 KG/M2

## 2022-01-18 DIAGNOSIS — R05.9 COUGH: ICD-10-CM

## 2022-01-18 DIAGNOSIS — R80.9 CONTROLLED TYPE 2 DIABETES MELLITUS WITH MICROALBUMINURIA, WITH LONG-TERM CURRENT USE OF INSULIN: ICD-10-CM

## 2022-01-18 DIAGNOSIS — I10 HYPERTENSION GOAL BP (BLOOD PRESSURE) < 130/80: ICD-10-CM

## 2022-01-18 DIAGNOSIS — Z23 IMMUNIZATION DUE: ICD-10-CM

## 2022-01-18 DIAGNOSIS — Z79.4 CONTROLLED TYPE 2 DIABETES MELLITUS WITH MICROALBUMINURIA, WITH LONG-TERM CURRENT USE OF INSULIN: ICD-10-CM

## 2022-01-18 DIAGNOSIS — Z12.11 COLON CANCER SCREENING: ICD-10-CM

## 2022-01-18 DIAGNOSIS — Z00.00 ANNUAL PHYSICAL EXAM: Primary | ICD-10-CM

## 2022-01-18 DIAGNOSIS — E78.5 HYPERLIPIDEMIA LDL GOAL <70: ICD-10-CM

## 2022-01-18 DIAGNOSIS — E11.29 CONTROLLED TYPE 2 DIABETES MELLITUS WITH MICROALBUMINURIA, WITH LONG-TERM CURRENT USE OF INSULIN: ICD-10-CM

## 2022-01-18 PROCEDURE — 1159F PR MEDICATION LIST DOCUMENTED IN MEDICAL RECORD: ICD-10-PCS | Mod: CPTII,S$GLB,, | Performed by: INTERNAL MEDICINE

## 2022-01-18 PROCEDURE — 3079F PR MOST RECENT DIASTOLIC BLOOD PRESSURE 80-89 MM HG: ICD-10-PCS | Mod: CPTII,S$GLB,, | Performed by: INTERNAL MEDICINE

## 2022-01-18 PROCEDURE — 1160F PR REVIEW ALL MEDS BY PRESCRIBER/CLIN PHARMACIST DOCUMENTED: ICD-10-PCS | Mod: CPTII,S$GLB,, | Performed by: INTERNAL MEDICINE

## 2022-01-18 PROCEDURE — 71046 X-RAY EXAM CHEST 2 VIEWS: CPT | Mod: TC

## 2022-01-18 PROCEDURE — 99213 PR OFFICE/OUTPT VISIT, EST, LEVL III, 20-29 MIN: ICD-10-PCS | Mod: 25,S$GLB,, | Performed by: INTERNAL MEDICINE

## 2022-01-18 PROCEDURE — 99396 PR PREVENTIVE VISIT,EST,40-64: ICD-10-PCS | Mod: 25,S$GLB,, | Performed by: INTERNAL MEDICINE

## 2022-01-18 PROCEDURE — 90471 IMMUNIZATION ADMIN: CPT | Mod: S$GLB,,, | Performed by: INTERNAL MEDICINE

## 2022-01-18 PROCEDURE — 3008F BODY MASS INDEX DOCD: CPT | Mod: CPTII,S$GLB,, | Performed by: INTERNAL MEDICINE

## 2022-01-18 PROCEDURE — 3079F DIAST BP 80-89 MM HG: CPT | Mod: CPTII,S$GLB,, | Performed by: INTERNAL MEDICINE

## 2022-01-18 PROCEDURE — 90686 IIV4 VACC NO PRSV 0.5 ML IM: CPT | Mod: S$GLB,,, | Performed by: INTERNAL MEDICINE

## 2022-01-18 PROCEDURE — 3075F PR MOST RECENT SYSTOLIC BLOOD PRESS GE 130-139MM HG: ICD-10-PCS | Mod: CPTII,S$GLB,, | Performed by: INTERNAL MEDICINE

## 2022-01-18 PROCEDURE — 3072F PR LOW RISK FOR RETINOPATHY: ICD-10-PCS | Mod: CPTII,S$GLB,, | Performed by: INTERNAL MEDICINE

## 2022-01-18 PROCEDURE — 1160F RVW MEDS BY RX/DR IN RCRD: CPT | Mod: CPTII,S$GLB,, | Performed by: INTERNAL MEDICINE

## 2022-01-18 PROCEDURE — 1159F MED LIST DOCD IN RCRD: CPT | Mod: CPTII,S$GLB,, | Performed by: INTERNAL MEDICINE

## 2022-01-18 PROCEDURE — 90686 FLU VACCINE (QUAD) GREATER THAN OR EQUAL TO 3YO PRESERVATIVE FREE IM: ICD-10-PCS | Mod: S$GLB,,, | Performed by: INTERNAL MEDICINE

## 2022-01-18 PROCEDURE — 99999 PR PBB SHADOW E&M-EST. PATIENT-LVL IV: ICD-10-PCS | Mod: PBBFAC,,, | Performed by: INTERNAL MEDICINE

## 2022-01-18 PROCEDURE — 3008F PR BODY MASS INDEX (BMI) DOCUMENTED: ICD-10-PCS | Mod: CPTII,S$GLB,, | Performed by: INTERNAL MEDICINE

## 2022-01-18 PROCEDURE — 71046 XR CHEST PA AND LATERAL: ICD-10-PCS | Mod: 26,,, | Performed by: RADIOLOGY

## 2022-01-18 PROCEDURE — 99396 PREV VISIT EST AGE 40-64: CPT | Mod: 25,S$GLB,, | Performed by: INTERNAL MEDICINE

## 2022-01-18 PROCEDURE — 99999 PR PBB SHADOW E&M-EST. PATIENT-LVL IV: CPT | Mod: PBBFAC,,, | Performed by: INTERNAL MEDICINE

## 2022-01-18 PROCEDURE — 3072F LOW RISK FOR RETINOPATHY: CPT | Mod: CPTII,S$GLB,, | Performed by: INTERNAL MEDICINE

## 2022-01-18 PROCEDURE — 90471 FLU VACCINE (QUAD) GREATER THAN OR EQUAL TO 3YO PRESERVATIVE FREE IM: ICD-10-PCS | Mod: S$GLB,,, | Performed by: INTERNAL MEDICINE

## 2022-01-18 PROCEDURE — 99213 OFFICE O/P EST LOW 20 MIN: CPT | Mod: 25,S$GLB,, | Performed by: INTERNAL MEDICINE

## 2022-01-18 PROCEDURE — 71046 X-RAY EXAM CHEST 2 VIEWS: CPT | Mod: 26,,, | Performed by: RADIOLOGY

## 2022-01-18 PROCEDURE — 3075F SYST BP GE 130 - 139MM HG: CPT | Mod: CPTII,S$GLB,, | Performed by: INTERNAL MEDICINE

## 2022-01-18 RX ORDER — INSULIN GLARGINE 100 [IU]/ML
24 INJECTION, SOLUTION SUBCUTANEOUS DAILY
Qty: 30 ML | Refills: 0 | Status: SHIPPED | OUTPATIENT
Start: 2022-01-18 | End: 2022-10-06

## 2022-01-18 RX ORDER — METFORMIN HYDROCHLORIDE 1000 MG/1
1000 TABLET ORAL 2 TIMES DAILY WITH MEALS
Qty: 180 TABLET | Refills: 1 | Status: SHIPPED | OUTPATIENT
Start: 2022-01-18 | End: 2022-08-19

## 2022-01-18 RX ORDER — LISINOPRIL 40 MG/1
40 TABLET ORAL DAILY
Qty: 90 TABLET | Refills: 1 | Status: SHIPPED | OUTPATIENT
Start: 2022-01-18 | End: 2022-02-07

## 2022-01-18 RX ORDER — PROMETHAZINE HYDROCHLORIDE AND DEXTROMETHORPHAN HYDROBROMIDE 6.25; 15 MG/5ML; MG/5ML
5 SYRUP ORAL EVERY 8 HOURS PRN
Qty: 118 ML | Refills: 0 | Status: SHIPPED | OUTPATIENT
Start: 2022-01-18 | End: 2022-01-28

## 2022-01-18 RX ORDER — GLIPIZIDE 5 MG/1
5 TABLET ORAL DAILY
Qty: 90 TABLET | Refills: 1 | Status: SHIPPED | OUTPATIENT
Start: 2022-01-18 | End: 2022-02-07

## 2022-01-18 RX ORDER — PRAVASTATIN SODIUM 80 MG/1
80 TABLET ORAL NIGHTLY
Qty: 30 TABLET | Refills: 6 | Status: SHIPPED | OUTPATIENT
Start: 2022-01-18 | End: 2022-04-14

## 2022-01-18 NOTE — PROGRESS NOTES
Laron Kothari .  51 y.o. Black or  male  [id number]    Chief Complaint:  Chief Complaint   Patient presents with    Annual Exam       History of Present Illness:  Presents to the clinic for an annual exam.   HTN--stable on lisinopril.   HLD--compliant with pravastatin.   DM--stable on glipizide and metformin. He has not taken Lantus due to it causing his glucose to drop low at times.   Microalbuminuria--compliant with lisinopril. He denies symptoms.   He is concerned because he has had a cough for the past 2-3 months. He has been prescribed antibiotics and cough medication. He states his cough is productive of a yellowish sputum. He occasionally wheezes. He denies a fever, chest pain or shortness of breath.      Laboratory Reviewed: (Yes)  Lab Results   Component Value Date    WBC 11.42 12/21/2021    HGB 13.7 (L) 12/21/2021    HCT 42.2 12/21/2021     12/21/2021    CHOL 165 12/08/2021    TRIG 90 12/08/2021    HDL 38 (L) 12/08/2021    ALT 19 12/08/2021    AST 18 12/08/2021     12/08/2021    K 4.3 12/08/2021     12/08/2021    CREATININE 1.0 12/08/2021    BUN 7 12/08/2021    CO2 26 12/08/2021    PSA 0.74 07/20/2011    INR 0.9 01/06/2020    HGBA1C 5.9 (H) 12/08/2021     Review of Systems   Constitutional: Negative for fever and weight loss.   Eyes: Negative for blurred vision.   Respiratory: Positive for cough, sputum production and wheezing. Negative for shortness of breath.    Cardiovascular: Negative for chest pain and leg swelling.   Gastrointestinal: Negative for heartburn.   Genitourinary: Negative for dysuria.   Neurological: Negative for dizziness and headaches.   Endo/Heme/Allergies: Negative for environmental allergies.       The following were reviewed: Active problem list, medication list, allergies, family history, social history, and Health Maintenance.     History:  Past Medical History:   Diagnosis Date    Blood in stool 8/7/2018    Deep vein thrombosis (DVT) 2017     Left leg    Diabetes mellitus, type 2 2013    Gout     Hyperlipidemia     Hypertension     Neuropathy      Past Surgical History:   Procedure Laterality Date    APPENDECTOMY      ARTHROSCOPIC CHONDROPLASTY OF KNEE JOINT Right 11/24/2020    Procedure: ARTHROSCOPY, KNEE, WITH CHONDROPLASTY;  Surgeon: Nahum Rose MD;  Location: [facility] OR;  Service: Orthopedics;  Laterality: Right;  chondroplasty medial condyle and anteriorly    COLONOSCOPY N/A 8/7/2018    Procedure: COLONOSCOPY;  Surgeon: Ten Valentine MD;  Location: [facility] ENDO;  Service: Endoscopy;  Laterality: N/A;    KNEE ARTHROSCOPY W/ MENISCECTOMY Right 11/24/2020    Procedure: ARTHROSCOPY, KNEE, WITH MENISCECTOMY;  Surgeon: Nahum Rose MD;  Location: [facility] OR;  Service: Orthopedics;  Laterality: Right;  Partial medial and lateral meniscectomy    ROBOT-ASSISTED CHOLECYSTECTOMY USING DA TRIPP XI N/A 1/21/2020    Procedure: XI ROBOTIC CHOLECYSTECTOMY;  Surgeon: Sebastien Rivera MD;  Location: [facility] OR;  Service: General;  Laterality: N/A;    TONSILLECTOMY, ADENOIDECTOMY       Family History   Problem Relation Age of Onset    Diabetes Father     Prostate cancer Father     Cataracts Father     Hypertension Mother     Hypertension Brother      Social History     Socioeconomic History    Marital status:    Tobacco Use    Smoking status: Current Every Day Smoker     Packs/day: 0.50    Smokeless tobacco: Never Used    Tobacco comment: no smoking after m.n prior to sx   Substance and Sexual Activity    Alcohol use: Yes     Alcohol/week: 46.0 standard drinks     Types: 46 Cans of beer per week     Comment: daily  No alcohol today prior to surgery    Drug use: No    Sexual activity: Yes     Partners: Female     Patient Active Problem List   Diagnosis    Hyperlipidemia LDL goal <70    Tobacco use disorder    Hypertension goal BP (blood pressure) < 130/80    Diabetes mellitus with microalbuminuria    Type 2 diabetes mellitus  "with diabetic neuropathy    Leg DVT (deep venous thromboembolism), acute, left    Blood in stool    Colon polyps    Alcohol abuse    Acute medial meniscus tear of right knee     Review of patient's allergies indicates:  No Known Allergies    Health Maintenance  Health Maintenance Topics with due status: Not Due       Topic Last Completion Date    Pneumococcal Vaccines (Age 0-64) 01/29/2015    TETANUS VACCINE 08/09/2016    Eye Exam 03/26/2021    Diabetes Urine Screening 06/22/2021    Lipid Panel 12/08/2021    Hemoglobin A1c 12/08/2021    Low Dose Statin 01/18/2022     Health Maintenance Due   Topic Date Due    Shingles Vaccine (1 of 2) Never done    Colorectal Cancer Screening  08/07/2021    COVID-19 Vaccine (2 - Pfizer 3-dose series) 01/20/2022    Foot Exam  03/11/2022       Current Outpatient Medications:     blood sugar diagnostic Strp, Once daily glucose testing., Disp: 50 strip, Rfl: 6    blood-glucose meter Misc, Use as directed., Disp: 1 each, Rfl: 0    lancets (LANCETS,THIN) Misc, Once daily glucose testing., Disp: 50 each, Rfl: 6    meloxicam (MOBIC) 15 MG tablet, Take 1 tablet (15 mg total) by mouth once daily. Take with food, Disp: 90 tablet, Rfl: 0    pen needle, diabetic (PEN NEEDLE) 31 gauge x 5/16" Ndle, Use once daily with insulin injection., Disp: 50 each, Rfl: 3    sildenafiL (VIAGRA) 50 MG tablet, Take 1 tablet (50 mg total) by mouth daily as needed for Erectile Dysfunction., Disp: 10 tablet, Rfl: 0    glipiZIDE (GLUCOTROL) 5 MG tablet, Take 1 tablet (5 mg total) by mouth once daily., Disp: 90 tablet, Rfl: 1    insulin (LANTUS SOLOSTAR U-100 INSULIN) glargine 100 units/mL (3mL) SubQ pen, Inject 24 Units into the skin once daily., Disp: 30 mL, Rfl: 0    lisinopriL (PRINIVIL,ZESTRIL) 40 MG tablet, Take 1 tablet (40 mg total) by mouth once daily., Disp: 90 tablet, Rfl: 1    metFORMIN (GLUCOPHAGE) 1000 MG tablet, Take 1 tablet (1,000 mg total) by mouth 2 (two) times daily with " meals., Disp: 180 tablet, Rfl: 1    pravastatin (PRAVACHOL) 80 MG tablet, Take 1 tablet (80 mg total) by mouth every evening., Disp: 30 tablet, Rfl: 6    Medications have been reviewed and reconciled with patient at visit today.    Exam:  Vitals:    01/18/22 1159   BP: 138/86   Pulse: 93   Resp: 18   Temp: 98.2 °F (36.8 °C)     Weight: 107.9 kg (237 lb 14 oz)   Body mass index is 30.54 kg/m².      Physical Exam  Vitals reviewed.   Constitutional:       General: He is not in acute distress.     Appearance: He is well-developed and well-nourished. He is not ill-appearing.   Eyes:      General: No scleral icterus.  Cardiovascular:      Rate and Rhythm: Normal rate and regular rhythm.      Heart sounds: Normal heart sounds.   Pulmonary:      Effort: Pulmonary effort is normal. No respiratory distress.      Breath sounds: Normal breath sounds. No wheezing or rales.   Abdominal:      General: Bowel sounds are normal.      Palpations: Abdomen is soft.   Musculoskeletal:      Right lower leg: No edema.      Left lower leg: No edema.   Skin:     General: Skin is warm and dry.   Neurological:      Mental Status: He is alert and oriented to person, place, and time.   Psychiatric:         Mood and Affect: Mood and affect normal.         Behavior: Behavior normal.       Assessment:  The primary encounter diagnosis was Annual physical exam. Diagnoses of Hypertension goal BP (blood pressure) < 130/80, Hyperlipidemia LDL goal <70, Controlled type 2 diabetes mellitus with microalbuminuria, with long-term current use of insulin, Cough, Colon cancer screening, and Immunization due were also pertinent to this visit.    Plan:  Annual physical exam  -     Counseled regarding age appropriate screenings and immunizations  -     Counseled regarding lifestyle modifications    Hypertension goal BP (blood pressure) < 130/80  -     lisinopriL (PRINIVIL,ZESTRIL) 40 MG tablet; Take 1 tablet (40 mg total) by mouth once daily.  Dispense: 90  tablet; Refill: 1    Hyperlipidemia LDL goal <70  -     pravastatin (PRAVACHOL) 80 MG tablet; Take 1 tablet (80 mg total) by mouth every evening.  Dispense: 30 tablet; Refill: 6    Controlled type 2 diabetes mellitus with microalbuminuria, with long-term current use of insulin  -     glipiZIDE (GLUCOTROL) 5 MG tablet; Take 1 tablet (5 mg total) by mouth once daily.  Dispense: 90 tablet; Refill: 1  -     insulin (LANTUS SOLOSTAR U-100 INSULIN) glargine 100 units/mL (3mL) SubQ pen; Inject 24 Units into the skin once daily.  Dispense: 30 mL; Refill: 0  -     lisinopriL (PRINIVIL,ZESTRIL) 40 MG tablet; Take 1 tablet (40 mg total) by mouth once daily.  Dispense: 90 tablet; Refill: 1  -     metFORMIN (GLUCOPHAGE) 1000 MG tablet; Take 1 tablet (1,000 mg total) by mouth 2 (two) times daily with meals.  Dispense: 180 tablet; Refill: 1    Cough  -     X-Ray Chest PA And Lateral; Future; Expected date: 01/18/2022  -     promethazine-dextromethorphan (PROMETHAZINE-DM) 6.25-15 mg/5 mL Syrp; Take 5 mLs by mouth every 8 (eight) hours as needed.  Dispense: 118 mL; Refill: 0    Colon cancer screening  -     Case Request Endoscopy: COLONOSCOPY    Immunization due  -     Influenza - Quadrivalent (PF)    Labs and Follow up in about 6 months (around 7/18/2022).

## 2022-01-19 ENCOUNTER — TELEPHONE (OUTPATIENT)
Dept: INTERNAL MEDICINE | Facility: CLINIC | Age: 52
End: 2022-01-19
Payer: COMMERCIAL

## 2022-01-19 DIAGNOSIS — Z79.4 CONTROLLED TYPE 2 DIABETES MELLITUS WITH MICROALBUMINURIA, WITH LONG-TERM CURRENT USE OF INSULIN: Primary | ICD-10-CM

## 2022-01-19 DIAGNOSIS — E11.29 CONTROLLED TYPE 2 DIABETES MELLITUS WITH MICROALBUMINURIA, WITH LONG-TERM CURRENT USE OF INSULIN: Primary | ICD-10-CM

## 2022-01-19 DIAGNOSIS — R80.9 CONTROLLED TYPE 2 DIABETES MELLITUS WITH MICROALBUMINURIA, WITH LONG-TERM CURRENT USE OF INSULIN: Primary | ICD-10-CM

## 2022-01-19 RX ORDER — SODIUM, POTASSIUM,MAG SULFATES 17.5-3.13G
1 SOLUTION, RECONSTITUTED, ORAL ORAL DAILY
Qty: 1 KIT | Refills: 0 | Status: SHIPPED | OUTPATIENT
Start: 2022-01-19 | End: 2022-01-21

## 2022-01-21 DIAGNOSIS — Z79.4 CONTROLLED TYPE 2 DIABETES MELLITUS WITH MICROALBUMINURIA, WITH LONG-TERM CURRENT USE OF INSULIN: ICD-10-CM

## 2022-01-21 DIAGNOSIS — E11.29 CONTROLLED TYPE 2 DIABETES MELLITUS WITH MICROALBUMINURIA, WITH LONG-TERM CURRENT USE OF INSULIN: ICD-10-CM

## 2022-01-21 DIAGNOSIS — R80.9 CONTROLLED TYPE 2 DIABETES MELLITUS WITH MICROALBUMINURIA, WITH LONG-TERM CURRENT USE OF INSULIN: ICD-10-CM

## 2022-01-21 NOTE — TELEPHONE ENCOUNTER
No new care gaps identified.  Powered by [employer]. Reference number: [id number].   1/21/2022 4:10:53 PM CST

## 2022-01-25 RX ORDER — METFORMIN HYDROCHLORIDE 1000 MG/1
TABLET ORAL
Qty: 60 TABLET | Refills: 0 | OUTPATIENT
Start: 2022-01-25

## 2022-01-26 ENCOUNTER — PATIENT MESSAGE (OUTPATIENT)
Dept: PHARMACY | Facility: CLINIC | Age: 52
End: 2022-01-26
Payer: COMMERCIAL

## 2022-01-26 NOTE — TELEPHONE ENCOUNTER
Quick DC. Request already responded to by other means (e.g. phone or fax)   Refill Authorization Note   Laron Kothari  is requesting a refill authorization.  Brief Assessment and Rationale for Refill:  Quick Discontinue  Medication Therapy Plan:       Medication Reconciliation Completed:  No      Comments:   Pended Medication(s)       Requested Prescriptions     Refused Prescriptions Disp Refills    metFORMIN (GLUCOPHAGE) 1000 MG tablet [Pharmacy Med Name: metFORMIN HCl 1000 MG Oral Tablet] 60 tablet 0     Sig: TAKE 1 TABLET BY MOUTH TWICE DAILY WITH MEALS     Refused By: LORI MITTAL     Reason for Refusal: Request already responded to by other means (e.g. phone or fax)        Duplicate Pended Encounter(s)/ Last Prescribed Details: (includes pharmacy & prescriber details)   Ordering Encounter Report    Associated Reports   View Encounter                Note composed:7:46 PM 01/25/2022

## 2022-01-29 ENCOUNTER — PATIENT MESSAGE (OUTPATIENT)
Dept: ENDOSCOPY | Facility: HOSPITAL | Age: 52
End: 2022-01-29
Payer: COMMERCIAL

## 2022-01-29 DIAGNOSIS — N52.9 ERECTILE DYSFUNCTION, UNSPECIFIED ERECTILE DYSFUNCTION TYPE: ICD-10-CM

## 2022-01-29 RX ORDER — SILDENAFIL 50 MG/1
50 TABLET, FILM COATED ORAL DAILY PRN
Qty: 10 TABLET | Refills: 0 | Status: CANCELLED | OUTPATIENT
Start: 2022-01-29

## 2022-01-29 NOTE — TELEPHONE ENCOUNTER
No new care gaps identified.  Powered by [employer]. Reference number: [id number].   1/29/2022 2:54:46 PM CST

## 2022-01-30 RX ORDER — POLYETHYLENE GLYCOL 3350, SODIUM SULFATE ANHYDROUS, SODIUM BICARBONATE, SODIUM CHLORIDE, POTASSIUM CHLORIDE 236; 22.74; 6.74; 5.86; 2.97 G/4L; G/4L; G/4L; G/4L; G/4L
4 POWDER, FOR SOLUTION ORAL ONCE
Qty: 4000 ML | Refills: 0 | Status: SHIPPED | OUTPATIENT
Start: 2022-01-30 | End: 2022-01-30

## 2022-02-23 ENCOUNTER — PATIENT OUTREACH (OUTPATIENT)
Dept: ADMINISTRATIVE | Facility: OTHER | Age: 52
End: 2022-02-23
Payer: COMMERCIAL

## 2022-02-23 ENCOUNTER — ANESTHESIA EVENT (OUTPATIENT)
Dept: ENDOSCOPY | Facility: HOSPITAL | Age: 52
End: 2022-02-23
Payer: COMMERCIAL

## 2022-02-23 ENCOUNTER — HOSPITAL ENCOUNTER (OUTPATIENT)
Facility: HOSPITAL | Age: 52
Discharge: HOME | End: 2022-02-23
Attending: COLON & RECTAL SURGERY | Admitting: COLON & RECTAL SURGERY
Payer: COMMERCIAL

## 2022-02-23 ENCOUNTER — ANESTHESIA (OUTPATIENT)
Dept: ENDOSCOPY | Facility: HOSPITAL | Age: 52
End: 2022-02-23
Payer: COMMERCIAL

## 2022-02-23 DIAGNOSIS — Z12.11 SCREENING FOR COLON CANCER: ICD-10-CM

## 2022-02-23 LAB
CTP QC/QA: YES
GLUCOSE SERPL-MCNC: 162 MG/DL (ref 70–110)
SARS-COV-2 RDRP RESP QL NAA+PROBE: NEGATIVE

## 2022-02-23 PROCEDURE — 88305 TISSUE EXAM BY PATHOLOGIST: CPT | Performed by: STUDENT IN AN ORGANIZED HEALTH CARE EDUCATION/TRAINING PROGRAM

## 2022-02-23 PROCEDURE — 45380 COLONOSCOPY AND BIOPSY: CPT | Mod: 59,,, | Performed by: COLON & RECTAL SURGERY

## 2022-02-23 PROCEDURE — 45385 COLONOSCOPY W/LESION REMOVAL: CPT | Mod: 33,,, | Performed by: COLON & RECTAL SURGERY

## 2022-02-23 PROCEDURE — U0002 COVID-19 LAB TEST NON-CDC: HCPCS | Performed by: COLON & RECTAL SURGERY

## 2022-02-23 PROCEDURE — 45380 COLONOSCOPY AND BIOPSY: CPT | Performed by: COLON & RECTAL SURGERY

## 2022-02-23 PROCEDURE — 88305 TISSUE EXAM BY PATHOLOGIST: CPT | Mod: 26,,, | Performed by: STUDENT IN AN ORGANIZED HEALTH CARE EDUCATION/TRAINING PROGRAM

## 2022-02-23 PROCEDURE — 63600175 PHARM REV CODE 636 W HCPCS: Performed by: NURSE ANESTHETIST, CERTIFIED REGISTERED

## 2022-02-23 PROCEDURE — 45380 PR COLONOSCOPY,BIOPSY: ICD-10-PCS | Mod: 59,,, | Performed by: COLON & RECTAL SURGERY

## 2022-02-23 PROCEDURE — 37000009 HC ANESTHESIA EA ADD 15 MINS: Performed by: COLON & RECTAL SURGERY

## 2022-02-23 PROCEDURE — 27201012 HC FORCEPS, HOT/COLD, DISP: Performed by: COLON & RECTAL SURGERY

## 2022-02-23 PROCEDURE — 45385 COLONOSCOPY W/LESION REMOVAL: CPT | Performed by: COLON & RECTAL SURGERY

## 2022-02-23 PROCEDURE — 25000003 PHARM REV CODE 250: Performed by: NURSE ANESTHETIST, CERTIFIED REGISTERED

## 2022-02-23 PROCEDURE — 27200997: Performed by: COLON & RECTAL SURGERY

## 2022-02-23 PROCEDURE — 45385 PR COLONOSCOPY,REMV LESN,SNARE: ICD-10-PCS | Mod: 33,,, | Performed by: COLON & RECTAL SURGERY

## 2022-02-23 PROCEDURE — 37000008 HC ANESTHESIA 1ST 15 MINUTES: Performed by: COLON & RECTAL SURGERY

## 2022-02-23 PROCEDURE — 27201089 HC SNARE, DISP (ANY): Performed by: COLON & RECTAL SURGERY

## 2022-02-23 PROCEDURE — 88305 TISSUE EXAM BY PATHOLOGIST: ICD-10-PCS | Mod: 26,,, | Performed by: STUDENT IN AN ORGANIZED HEALTH CARE EDUCATION/TRAINING PROGRAM

## 2022-02-23 PROCEDURE — 63600175 PHARM REV CODE 636 W HCPCS: Performed by: COLON & RECTAL SURGERY

## 2022-02-23 RX ORDER — LIDOCAINE HYDROCHLORIDE 10 MG/ML
INJECTION, SOLUTION EPIDURAL; INFILTRATION; INTRACAUDAL; PERINEURAL
Status: DISCONTINUED | OUTPATIENT
Start: 2022-02-23 | End: 2022-02-23

## 2022-02-23 RX ORDER — SODIUM CHLORIDE, SODIUM LACTATE, POTASSIUM CHLORIDE, CALCIUM CHLORIDE 600; 310; 30; 20 MG/100ML; MG/100ML; MG/100ML; MG/100ML
INJECTION, SOLUTION INTRAVENOUS CONTINUOUS
Status: DISCONTINUED | OUTPATIENT
Start: 2022-02-23 | End: 2022-02-23 | Stop reason: HOSPADM

## 2022-02-23 RX ORDER — MIDAZOLAM HYDROCHLORIDE 1 MG/ML
INJECTION, SOLUTION INTRAMUSCULAR; INTRAVENOUS
Status: DISCONTINUED | OUTPATIENT
Start: 2022-02-23 | End: 2022-02-23

## 2022-02-23 RX ORDER — PROPOFOL 10 MG/ML
VIAL (ML) INTRAVENOUS
Status: DISCONTINUED | OUTPATIENT
Start: 2022-02-23 | End: 2022-02-23

## 2022-02-23 RX ADMIN — PROPOFOL 40 MG: 10 INJECTION, EMULSION INTRAVENOUS at 11:02

## 2022-02-23 RX ADMIN — MIDAZOLAM HYDROCHLORIDE 2 MG: 1 INJECTION, SOLUTION INTRAMUSCULAR; INTRAVENOUS at 10:02

## 2022-02-23 RX ADMIN — PROPOFOL 30 MG: 10 INJECTION, EMULSION INTRAVENOUS at 11:02

## 2022-02-23 RX ADMIN — PROPOFOL 100 MG: 10 INJECTION, EMULSION INTRAVENOUS at 10:02

## 2022-02-23 RX ADMIN — SODIUM CHLORIDE, SODIUM LACTATE, POTASSIUM CHLORIDE, AND CALCIUM CHLORIDE: 600; 310; 30; 20 INJECTION, SOLUTION INTRAVENOUS at 10:02

## 2022-02-23 RX ADMIN — LIDOCAINE HYDROCHLORIDE 50 MG: 10 INJECTION, SOLUTION EPIDURAL; INFILTRATION; INTRACAUDAL; PERINEURAL at 10:02

## 2022-02-23 RX ADMIN — PROPOFOL 50 MG: 10 INJECTION, EMULSION INTRAVENOUS at 11:02

## 2022-02-23 RX ADMIN — PROPOFOL 50 MG: 10 INJECTION, EMULSION INTRAVENOUS at 10:02

## 2022-02-23 NOTE — PROGRESS NOTES
Health Maintenance Due   Topic Date Due    Shingles Vaccine (1 of 2) Never done    COVID-19 Vaccine (2 - Pfizer 3-dose series) 02/15/2022    Foot Exam  03/11/2022     Updates were requested from care everywhere.  Chart was reviewed for overdue Proactive [facility] Encounters (YVETTE) topics (CRS, Breast Cancer Screening, Eye exam)  Health Maintenance has been updated.  LINKS immunization registry triggered.  Immunizations were reconciled.

## 2022-02-23 NOTE — BRIEF OP NOTE
[facility])  Brief Operative Note     SUMMARY     Surgery Date: 2/23/2022     Surgeon(s) and Role:     * Octavio Craig MD - Primary    Assisting Surgeon: None    Pre-op Diagnosis:  Screening [Z13.9]    Post-op Diagnosis:  Post-Op Diagnosis Codes:     * Screening [Z13.9]    Procedure(s) (LRB):  COLONOSCOPY (N/A)    Anesthesia: Choice    Description of the findings of the procedure: Hyperplastic polyposis syndrome from splenic flexure to rectum, large number of polyps; diverticulum and hemorrhoids    Estimated Blood Loss: * No values recorded between 2/23/2022 12:00 AM and 2/23/2022 11:48 AM *         Specimens:   Specimen (24h ago, onward)             Start     Ordered    02/23/22 1055  Specimen to Pathology, Surgery Gastrointestinal tract  Once        Comments: Pre-op Diagnosis: Screening [Z13.9]    Procedure(s):  COLONOSCOPY     Number of specimens: 5  Name of specimens:   #1 transverse polypectomy  #2 splenic flexure polypectomies  #3 descending polypectomies  #4 sigmoid polypectomies  #5 rectal polypectomies   References:    Click here for ordering Quick Tip   Question Answer Comment   Procedure Type: Gastrointestinal tract    Release to patient Immediate        02/23/22 1149                Discharge Note    SUMMARY     Admit Date: 2/23/2022    Discharge Date and Time: 2/23/2022 12:03 PM    Hospital Course Patient was seen in the preoperative area by both myself and anesthesia. All consents were verified and all questions appropriately answered. All risks, benefits and alternatives explained to patient. Patient proceeded to endoscopy suite for colonoscopy and was discharged home postoperative once cleared by anesthesia.    Final Diagnosis: Post-Op Diagnosis Codes:     * Screening [Z13.9]    Disposition: Home or Self Care    Follow Up/Patient Instructions: See Provation report    Medications:  Reconciled Home Medications:      Medication List      CONTINUE taking these medications   "  BASAGLAR KWIKPEN U-100 INSULIN glargine 100 units/mL (3mL) SubQ pen  Generic drug: insulin  Inject 24 Units into the skin once daily.     blood sugar diagnostic Strp  Once daily glucose testing.     blood-glucose meter Misc  Use as directed.     glipiZIDE 5 MG tablet  Commonly known as: GLUCOTROL  Take 1 tablet by mouth once daily     lancets Misc  Commonly known as: LANCETS,THIN  Once daily glucose testing.     lisinopriL 40 MG tablet  Commonly known as: PRINIVIL,ZESTRIL  Take 1 tablet by mouth once daily     meloxicam 15 MG tablet  Commonly known as: MOBIC  Take 1 tablet (15 mg total) by mouth once daily. Take with food     metFORMIN 1000 MG tablet  Commonly known as: GLUCOPHAGE  Take 1 tablet (1,000 mg total) by mouth 2 (two) times daily with meals.     pen needle, diabetic 31 gauge x 5/16" Ndle  Commonly known as: PEN NEEDLE  Use once daily with insulin injection.     pravastatin 80 MG tablet  Commonly known as: PRAVACHOL  Take 1 tablet (80 mg total) by mouth every evening.     sildenafiL 50 MG tablet  Commonly known as: VIAGRA  Take 1 tablet (50 mg total) by mouth daily as needed for Erectile Dysfunction.          Discharge Procedure Orders   Diet general     Call MD for:  temperature >100.4     Call MD for:  persistent nausea and vomiting     Call MD for:  severe uncontrolled pain     Call MD for:  difficulty breathing, headache or visual disturbances     Call MD for:  redness, tenderness, or signs of infection (pain, swelling, redness, odor or green/yellow discharge around incision site)     Call MD for:  hives     Call MD for:  persistent dizziness or light-headedness     Call MD for:  extreme fatigue     Activity as tolerated      Follow-up Information     Kristen Adler, DO Follow up.    Specialty: Internal Medicine  Why: As needed  Contact information:  [address] [place] [address] [address] Hallie CASTREJON [address] [phone]                       "

## 2022-02-23 NOTE — H&P
"[facility] ([facility]  Colon and Rectal Surgery  History & Physical    Patient Name: Laron Kothari Jr.  MRN: [id number]  Admission Date: 2/23/2022  Attending Physician: Octavio Craig MD  Primary Care Provider: Kristen Adler DO    Patient information was obtained from patient and medical records.    Subjective:     Chief Complaint/Reason for Admission: Here for Colonoscopy    History of Present Illness:  Patient is a 51 y.o. male presents for colonoscopy. Last cscope in 2018 with large amount of hyperplastic polyps. No current hematochezia, melena or change in bowel habits. No personal or fam hx of CRC or IBD.    Current Facility-Administered Medications on File Prior to Encounter   Medication    0.9%  NaCl infusion    chlorhexidine 0.12 % solution 10 mL    nozaseptin (NOZIN) nasal      Current Outpatient Medications on File Prior to Encounter   Medication Sig    insulin (BASAGLAR KWIKPEN U-100 INSULIN) glargine 100 units/mL (3mL) SubQ pen Inject 24 Units into the skin once daily.    meloxicam (MOBIC) 15 MG tablet Take 1 tablet (15 mg total) by mouth once daily. Take with food    metFORMIN (GLUCOPHAGE) 1000 MG tablet Take 1 tablet (1,000 mg total) by mouth 2 (two) times daily with meals.    pravastatin (PRAVACHOL) 80 MG tablet Take 1 tablet (80 mg total) by mouth every evening.    blood sugar diagnostic Strp Once daily glucose testing.    lancets (LANCETS,THIN) Misc Once daily glucose testing.    pen needle, diabetic (PEN NEEDLE) 31 gauge x 5/16" Ndle Use once daily with insulin injection.       Review of patient's allergies indicates:  No Known Allergies    Past Medical History:   Diagnosis Date    Blood in stool 8/7/2018    Deep vein thrombosis (DVT) 2017    Left leg    Diabetes mellitus, type 2 2013    Gout     Hyperlipidemia     Hypertension     Neuropathy      Past Surgical History:   Procedure Laterality Date    APPENDECTOMY      ARTHROSCOPIC CHONDROPLASTY OF KNEE JOINT Right " 11/24/2020    Procedure: ARTHROSCOPY, KNEE, WITH CHONDROPLASTY;  Surgeon: Nahum Rose MD;  Location: [facility] OR;  Service: Orthopedics;  Laterality: Right;  chondroplasty medial condyle and anteriorly    COLONOSCOPY N/A 8/7/2018    Procedure: COLONOSCOPY;  Surgeon: Ten Valentine MD;  Location: [facility] ENDO;  Service: Endoscopy;  Laterality: N/A;    KNEE ARTHROSCOPY W/ MENISCECTOMY Right 11/24/2020    Procedure: ARTHROSCOPY, KNEE, WITH MENISCECTOMY;  Surgeon: Nahum Rose MD;  Location: [facility] OR;  Service: Orthopedics;  Laterality: Right;  Partial medial and lateral meniscectomy    ROBOT-ASSISTED CHOLECYSTECTOMY USING DA TRIPP XI N/A 1/21/2020    Procedure: XI ROBOTIC CHOLECYSTECTOMY;  Surgeon: Sebastien Rivera MD;  Location: [facility] OR;  Service: General;  Laterality: N/A;    TONSILLECTOMY, ADENOIDECTOMY       Family History     Problem Relation (Age of Onset)    Cataracts Father    Diabetes Father    Hypertension Mother, Brother    Prostate cancer Father        Tobacco Use    Smoking status: Current Every Day Smoker     Packs/day: 0.50    Smokeless tobacco: Never Used    Tobacco comment: no smoking after m.n prior to sx   Substance and Sexual Activity    Alcohol use: Yes     Alcohol/week: 46.0 standard drinks     Types: 46 Cans of beer per week     Comment: daily    Drug use: No    Sexual activity: Yes     Partners: Female     Review of Systems   Constitutional: Negative for activity change, appetite change, chills, fatigue, fever and unexpected weight change.   HENT: Negative for congestion, ear pain, sore throat and trouble swallowing.    Eyes: Negative for pain, redness and itching.   Respiratory: Negative for cough, shortness of breath and wheezing.    Cardiovascular: Negative for chest pain, palpitations and leg swelling.   Gastrointestinal: Negative for abdominal distention, abdominal pain, anal bleeding, blood in stool, constipation, diarrhea, nausea, rectal pain and vomiting.    Endocrine: Negative for cold intolerance, heat intolerance and polyuria.   Genitourinary: Negative for dysuria, flank pain, frequency and hematuria.   Musculoskeletal: Negative for gait problem, joint swelling and neck pain.   Skin: Negative for color change, rash and wound.   Allergic/Immunologic: Negative for environmental allergies and immunocompromised state.   Neurological: Negative for dizziness, speech difficulty, weakness and numbness.   Psychiatric/Behavioral: Negative for agitation, confusion and hallucinations.     Objective:     Vital Signs (Most Recent):  Temp: 97.9 °F (36.6 °C) (02/23/22 0958)  Pulse: 96 (02/23/22 0958)  Resp: 20 (02/23/22 0958)  BP: (!) 148/92 (02/23/22 0958)  SpO2: 98 % (02/23/22 0958) Vital Signs (24h Range):  Temp:  [97.9 °F (36.6 °C)-98.6 °F (37 °C)] 97.9 °F (36.6 °C)  Pulse:  [96] 96  Resp:  [20] 20  SpO2:  [98 %] 98 %  BP: (148)/(92) 148/92     Weight: 111.6 kg (246 lb)  Body mass index is 31.58 kg/m².    Physical Exam  Constitutional:       Appearance: He is well-developed.   HENT:      Head: Normocephalic and atraumatic.   Eyes:      Conjunctiva/sclera: Conjunctivae normal.   Neck:      Thyroid: No thyromegaly.   Cardiovascular:      Rate and Rhythm: Normal rate and regular rhythm.   Pulmonary:      Effort: Pulmonary effort is normal. No respiratory distress.   Abdominal:      General: There is no distension.      Palpations: Abdomen is soft. There is no mass.      Tenderness: There is no abdominal tenderness.   Musculoskeletal:         General: No tenderness. Normal range of motion.      Cervical back: Normal range of motion.   Skin:     General: Skin is warm and dry.      Capillary Refill: Capillary refill takes less than 2 seconds.      Findings: No rash.   Neurological:      Mental Status: He is alert and oriented to person, place, and time.           Assessment/Plan:     Patient is a 51 y.o. male who presents for colonoscopy     - Ok to proceed to endoscopy suite for  colonoscopy  - Consent obtained. All risks, benefits and alternatives fully explained to patient, including but not limited to bleeding, infection, perforation, and missed polyps. All questions appropriately answered to patient's satisfaction. Consent signed and placed on chart.    There are no hospital problems to display for this patient.    VTE Risk Mitigation (From admission, onward)    None          Octavio Craig MD  Colon and Rectal Surgery  [facility] ([facility])

## 2022-02-23 NOTE — PROVATION PATIENT INSTRUCTIONS
Discharge Summary/Instructions after an Endoscopic Procedure  Patient Name: Laron Kothari  Patient MRN: [id number]  Patient YOB: 1970  Wednesday, February 23, 2022 Octavio Craig MD  Dear patient,  As a result of recent federal legislation (The Federal Cures Act), you may   receive lab or pathology results from your procedure in your [facility]   account before your physician is able to contact you. Your physician or   their representative will relay the results to you with their   recommendations at their soonest availability.  Thank you,  RESTRICTIONS:  During your procedure today, you received medications for sedation.  These   medications may affect your judgment, balance and coordination.  Therefore,   for 24 hours, you have the following restrictions:   - DO NOT drive a car, operate machinery, make legal/financial decisions,   sign important papers or drink alcohol.    ACTIVITY:  Today: no heavy lifting, straining or running due to procedural   sedation/anesthesia.  The following day: return to full activity including work.  DIET:  Eat and drink normally unless instructed otherwise.     TREATMENT FOR COMMON SIDE EFFECTS:  - Mild abdominal pain, nausea, belching, bloating or excessive gas:  rest,   eat lightly and use a heating pad.  - Sore Throat: treat with throat lozenges and/or gargle with warm salt   water.  - Because air was used during the procedure, expelling large amounts of air   from your rectum or belching is normal.  - If a bowel prep was taken, you may not have a bowel movement for 1-3 days.    This is normal.  SYMPTOMS TO WATCH FOR AND REPORT TO YOUR PHYSICIAN:  1. Abdominal pain or bloating, other than gas cramps.  2. Chest pain.  3. Back pain.  4. Signs of infection such as: chills or fever occurring within 24 hours   after the procedure.  5. Rectal bleeding, which would show as bright red, maroon, or black stools.   (A tablespoon of blood from the rectum is not serious, especially  if   hemorrhoids are present.)  6. Vomiting.  7. Weakness or dizziness.  GO DIRECTLY TO THE NEAREST EMERGENCY ROOM IF YOU HAVE ANY OF THE FOLLOWING:      Difficulty breathing              Chills and/or fever over 101 F   Persistent vomiting and/or vomiting blood   Severe abdominal pain   Severe chest pain   Black, tarry stools   Bleeding- more than one tablespoon   Any other symptom or condition that you feel may need urgent attention  Your doctor recommends these additional instructions:  If any biopsies were taken, your doctors clinic will contact you in 1 to 2   weeks with any results.  - Discharge patient to home.   - High fiber diet.   - Continue present medications.   - Await pathology results.   - Repeat colonoscopy in 3 years for surveillance.   - Return to primary care physician PRN.  For questions, problems or results please call your physician Octavio Craig MD at Work:  [phone]  If you have any questions about the above instructions, call the GI   department at [phone] or call the endoscopy unit at [phone]   from 7am until 3 pm.  [facility] [place], EMERGENCY ROOM PHONE NUMBER:   [phone]  IF A COMPLICATION OR EMERGENCY SITUATION ARISES AND YOU ARE UNABLE TO REACH   YOUR PHYSICIAN - GO DIRECTLY TO THE EMERGENCY ROOM.  I have read or have had read to me these discharge instructions for my   procedure and have received a written copy.  I understand these   instructions and will follow-up with my physician if I have any questions.     __________________________________       _____________________________________  Nurse Signature                                          Patient/Designated   Responsible Party Signature  MD Octavio Lance MD  2/23/2022 11:59:46 AM  This report has been verified and signed electronically.  Dear patient,  As a result of recent federal legislation (The Federal Cures Act), you may   receive lab or pathology  results from your procedure in your [facility]   account before your physician is able to contact you. Your physician or   their representative will relay the results to you with their   recommendations at their soonest availability.  Thank you,  PROVATION

## 2022-02-23 NOTE — ANESTHESIA PREPROCEDURE EVALUATION
02/23/2022  Laron Kothari Jr. is a 51 y.o., male.      Pre-op Assessment    I have reviewed the Patient Summary Reports.     I have reviewed the Nursing Notes. I have reviewed the NPO Status.   I have reviewed the Medications.     Review of Systems  Anesthesia Hx:  No problems with previous Anesthesia Denies Hx of Anesthetic complications  Denies Family Hx of Anesthesia complications.   Denies Personal Hx of Anesthesia complications.   Social:  Smoker, Alcohol Use  Alcohol Use:   Alcohol Abuse:   Cardiovascular:   Hypertension hyperlipidemia ECG has been reviewed.  Deep Venous Thrombosis (DVT)    Pulmonary:  Pulmonary Normal    Renal/:  Renal/ Normal     Hepatic/GI:  Hepatic/GI Normal    Neurological:  Neurology Normal    Endocrine:   Diabetes, poorly controlled, type 2    Psych:   Psychiatric History          Physical Exam  General: Well nourished    Airway:  Mallampati: III   Mouth Opening: Normal  Tongue: Normal  Neck ROM: Normal ROM    Dental:  Intact        Anesthesia Plan  Type of Anesthesia, risks & benefits discussed:    Anesthesia Type: MAC  Intra-op Monitoring Plan: Standard ASA Monitors  Post Op Pain Control Plan: multimodal analgesia  Induction:  IV  Informed Consent: Informed consent signed with the Patient and all parties understand the risks and agree with anesthesia plan.  All questions answered.   ASA Score: 3  Day of Surgery Review of History & Physical: H&P Update referred to the surgeon/provider.I have interviewed and examined the patient. I have reviewed the patient's H&P dated:     Ready For Surgery From Anesthesia Perspective.     .

## 2022-02-23 NOTE — ANESTHESIA POSTPROCEDURE EVALUATION
Anesthesia Post Evaluation    Patient: Laron Kothari Jr.    Procedure(s) Performed: Procedure(s) (LRB):  COLONOSCOPY (N/A)    Final Anesthesia Type: MAC      Patient location during evaluation: GI PACU  Patient participation: Yes- Able to Participate  Level of consciousness: awake and alert  Post-procedure vital signs: reviewed and stable  Pain management: adequate  Airway patency: patent    PONV status at discharge: No PONV  Anesthetic complications: no      Cardiovascular status: blood pressure returned to baseline and hemodynamically stable  Respiratory status: unassisted, spontaneous ventilation and room air  Hydration status: euvolemic  Follow-up not needed.          Vitals Value Taken Time   /74 02/23/22 1203   Temp 36.7 °C (98 °F) 02/23/22 1154   Pulse 78 02/23/22 1203   Resp 18 02/23/22 1203   SpO2 98 % 02/23/22 1203         Event Time   Out of Recovery 12:05:52         Pain/Ely Score: Ely Score: 10 (2/23/2022 12:04 PM)

## 2022-02-23 NOTE — TRANSFER OF CARE
"Anesthesia Transfer of Care Note    Patient: Laron Kothari Jr.    Procedure(s) Performed: Procedure(s) (LRB):  COLONOSCOPY (N/A)    Patient location: GI    Anesthesia Type: MAC    Transport from OR: Transported from OR on room air with adequate spontaneous ventilation    Post pain: adequate analgesia    Post assessment: no apparent anesthetic complications    Post vital signs: stable    Level of consciousness: awake    Nausea/Vomiting: no nausea/vomiting    Complications: none    Transfer of care protocol was followed      Last vitals:   Visit Vitals  BP (!) 148/92 (BP Location: Left arm, Patient Position: Sitting)   Pulse 96   Temp 36.6 °C (97.9 °F) (Skin)   Resp 20   Ht 6' 2" (1.88 m)   Wt 111.6 kg (246 lb)   SpO2 98%   BMI 31.58 kg/m²     "

## 2022-02-24 ENCOUNTER — OFFICE VISIT (OUTPATIENT)
Dept: ORTHOPEDICS | Facility: CLINIC | Age: 52
End: 2022-02-24
Payer: COMMERCIAL

## 2022-02-24 ENCOUNTER — LAB VISIT (OUTPATIENT)
Dept: LAB | Facility: HOSPITAL | Age: 52
End: 2022-02-24
Attending: ORTHOPAEDIC SURGERY
Payer: COMMERCIAL

## 2022-02-24 VITALS
HEIGHT: 74 IN | DIASTOLIC BLOOD PRESSURE: 74 MMHG | BODY MASS INDEX: 31.57 KG/M2 | TEMPERATURE: 98 F | HEART RATE: 78 BPM | OXYGEN SATURATION: 98 % | RESPIRATION RATE: 18 BRPM | WEIGHT: 246 LBS | SYSTOLIC BLOOD PRESSURE: 128 MMHG

## 2022-02-24 VITALS — WEIGHT: 246 LBS | BODY MASS INDEX: 31.57 KG/M2 | HEIGHT: 74 IN

## 2022-02-24 DIAGNOSIS — S83.282A ACUTE LATERAL MENISCUS TEAR OF LEFT KNEE, INITIAL ENCOUNTER: Primary | ICD-10-CM

## 2022-02-24 DIAGNOSIS — Z98.890 S/P RIGHT KNEE ARTHROSCOPY: ICD-10-CM

## 2022-02-24 DIAGNOSIS — M25.462 EFFUSION OF LEFT KNEE: ICD-10-CM

## 2022-02-24 DIAGNOSIS — M25.461 EFFUSION OF RIGHT KNEE: ICD-10-CM

## 2022-02-24 DIAGNOSIS — M24.661 ARTHROFIBROSIS OF KNEE JOINT, RIGHT: ICD-10-CM

## 2022-02-24 DIAGNOSIS — N52.9 ERECTILE DYSFUNCTION, UNSPECIFIED ERECTILE DYSFUNCTION TYPE: ICD-10-CM

## 2022-02-24 DIAGNOSIS — S83.242A ACUTE MEDIAL MENISCUS TEAR OF LEFT KNEE, INITIAL ENCOUNTER: Primary | ICD-10-CM

## 2022-02-24 DIAGNOSIS — M17.11 ARTHRITIS OF KNEE, RIGHT: ICD-10-CM

## 2022-02-24 DIAGNOSIS — M25.562 ACUTE PAIN OF LEFT KNEE: ICD-10-CM

## 2022-02-24 LAB
ALBUMIN SERPL BCP-MCNC: 3.7 G/DL (ref 3.5–5.2)
ALP SERPL-CCNC: 65 U/L (ref 55–135)
ALT SERPL W/O P-5'-P-CCNC: 20 U/L (ref 10–44)
AST SERPL-CCNC: 19 U/L (ref 10–40)
BILIRUB DIRECT SERPL-MCNC: 0.2 MG/DL (ref 0.1–0.3)
BILIRUB SERPL-MCNC: 0.6 MG/DL (ref 0.1–1)
CREAT SERPL-MCNC: 0.8 MG/DL (ref 0.5–1.4)
EST. GFR  (AFRICAN AMERICAN): >60 ML/MIN/1.73 M^2
EST. GFR  (NON AFRICAN AMERICAN): >60 ML/MIN/1.73 M^2
PROT SERPL-MCNC: 7.7 G/DL (ref 6–8.4)

## 2022-02-24 PROCEDURE — 36415 COLL VENOUS BLD VENIPUNCTURE: CPT | Performed by: ORTHOPAEDIC SURGERY

## 2022-02-24 PROCEDURE — 99213 PR OFFICE/OUTPT VISIT, EST, LEVL III, 20-29 MIN: ICD-10-PCS | Mod: 25,S$GLB,, | Performed by: ORTHOPAEDIC SURGERY

## 2022-02-24 PROCEDURE — 1159F PR MEDICATION LIST DOCUMENTED IN MEDICAL RECORD: ICD-10-PCS | Mod: CPTII,S$GLB,, | Performed by: ORTHOPAEDIC SURGERY

## 2022-02-24 PROCEDURE — 82565 ASSAY OF CREATININE: CPT | Performed by: ORTHOPAEDIC SURGERY

## 2022-02-24 PROCEDURE — 3072F LOW RISK FOR RETINOPATHY: CPT | Mod: CPTII,S$GLB,, | Performed by: ORTHOPAEDIC SURGERY

## 2022-02-24 PROCEDURE — 1159F MED LIST DOCD IN RCRD: CPT | Mod: CPTII,S$GLB,, | Performed by: ORTHOPAEDIC SURGERY

## 2022-02-24 PROCEDURE — 20610 LARGE JOINT ASPIRATION/INJECTION: L KNEE: ICD-10-PCS | Mod: LT,S$GLB,, | Performed by: ORTHOPAEDIC SURGERY

## 2022-02-24 PROCEDURE — 4010F ACE/ARB THERAPY RXD/TAKEN: CPT | Mod: CPTII,S$GLB,, | Performed by: ORTHOPAEDIC SURGERY

## 2022-02-24 PROCEDURE — 3008F PR BODY MASS INDEX (BMI) DOCUMENTED: ICD-10-PCS | Mod: CPTII,S$GLB,, | Performed by: ORTHOPAEDIC SURGERY

## 2022-02-24 PROCEDURE — 80076 HEPATIC FUNCTION PANEL: CPT | Performed by: ORTHOPAEDIC SURGERY

## 2022-02-24 PROCEDURE — 4010F PR ACE/ARB THEARPY RXD/TAKEN: ICD-10-PCS | Mod: CPTII,S$GLB,, | Performed by: ORTHOPAEDIC SURGERY

## 2022-02-24 PROCEDURE — 99999 PR PBB SHADOW E&M-EST. PATIENT-LVL III: CPT | Mod: PBBFAC,,, | Performed by: ORTHOPAEDIC SURGERY

## 2022-02-24 PROCEDURE — 3072F PR LOW RISK FOR RETINOPATHY: ICD-10-PCS | Mod: CPTII,S$GLB,, | Performed by: ORTHOPAEDIC SURGERY

## 2022-02-24 PROCEDURE — 99999 PR PBB SHADOW E&M-EST. PATIENT-LVL III: ICD-10-PCS | Mod: PBBFAC,,, | Performed by: ORTHOPAEDIC SURGERY

## 2022-02-24 PROCEDURE — 99213 OFFICE O/P EST LOW 20 MIN: CPT | Mod: 25,S$GLB,, | Performed by: ORTHOPAEDIC SURGERY

## 2022-02-24 PROCEDURE — 20610 DRAIN/INJ JOINT/BURSA W/O US: CPT | Mod: LT,S$GLB,, | Performed by: ORTHOPAEDIC SURGERY

## 2022-02-24 PROCEDURE — 3008F BODY MASS INDEX DOCD: CPT | Mod: CPTII,S$GLB,, | Performed by: ORTHOPAEDIC SURGERY

## 2022-02-24 RX ORDER — GABAPENTIN 300 MG/1
300 CAPSULE ORAL 3 TIMES DAILY
Qty: 90 CAPSULE | Refills: 11 | Status: SHIPPED | OUTPATIENT
Start: 2022-02-24 | End: 2024-02-08

## 2022-02-24 RX ORDER — MELOXICAM 15 MG/1
15 TABLET ORAL DAILY
Qty: 30 TABLET | Refills: 3 | Status: ON HOLD | OUTPATIENT
Start: 2022-02-24 | End: 2023-04-13 | Stop reason: HOSPADM

## 2022-02-24 RX ORDER — METHYLPREDNISOLONE ACETATE 80 MG/ML
80 INJECTION, SUSPENSION INTRA-ARTICULAR; INTRALESIONAL; INTRAMUSCULAR; SOFT TISSUE
Status: DISCONTINUED | OUTPATIENT
Start: 2022-02-24 | End: 2022-02-24 | Stop reason: HOSPADM

## 2022-02-24 RX ADMIN — METHYLPREDNISOLONE ACETATE 80 MG: 80 INJECTION, SUSPENSION INTRA-ARTICULAR; INTRALESIONAL; INTRAMUSCULAR; SOFT TISSUE at 04:02

## 2022-02-24 NOTE — LETTER
February 24, 2022      [facility]  [address] [place] [id number]  Phone: [phone]  Fax: [phone]       Patient: Laron Kothari   YOB: 1970  Date of Visit: 02/24/2022    To Whom It May Concern:    Manuel Kothari  was at [facility] on 02/24/2022. The patient may return to work on 03/03/2022.  If you have any questions or concerns, or if I can be of further assistance, please do not hesitate to contact me.    Sincerely,    Nahum Rose MD

## 2022-02-24 NOTE — TELEPHONE ENCOUNTER
No new care gaps identified.  Powered by [employer]. Reference number: [id number].   2/24/2022 2:14:19 PM CST

## 2022-02-24 NOTE — PROGRESS NOTES
Subjective:     Patient ID: Laron Kothari Jr. is a 51 y.o. male.    Chief Complaint: Pain of the Left Knee    HPI:  02/24/2022  Patient stated he has been hurting for 3 months.  He works driving trucks and loading unloading.  He does not recall specific injury.  The knee is really hurting him a lot a pain of 6/10.  There is catching locking buckling feeling.  We did scope his right knee a while back which he had chondromalacia as well as medial lateral meniscus tear.  Both of his knees are stiff but now the left knee is stiffer than before.  Used to take meloxicam and has not taking it in a long time.  The pain is severe that he is not able to return to work.  He is up pacing in the examining room.  Denies any pain in the hips any back pain any numbness and tingling in the legs.  Denies loss of bowel bladder control difficulty chewing swallowing.  Pain is 6/10    Past Medical History:   Diagnosis Date    Blood in stool 8/7/2018    Deep vein thrombosis (DVT) 2017    Left leg    Diabetes mellitus, type 2 2013    Gout     Hyperlipidemia     Hypertension     Neuropathy      Past Surgical History:   Procedure Laterality Date    APPENDECTOMY      ARTHROSCOPIC CHONDROPLASTY OF KNEE JOINT Right 11/24/2020    Procedure: ARTHROSCOPY, KNEE, WITH CHONDROPLASTY;  Surgeon: Nahum Rose MD;  Location: [facility] OR;  Service: Orthopedics;  Laterality: Right;  chondroplasty medial condyle and anteriorly    COLONOSCOPY N/A 8/7/2018    Procedure: COLONOSCOPY;  Surgeon: Ten Valentine MD;  Location: [facility] ENDO;  Service: Endoscopy;  Laterality: N/A;    COLONOSCOPY N/A 2/23/2022    Procedure: COLONOSCOPY;  Surgeon: Octavio Craig MD;  Location: [facility] ENDO;  Service: Endoscopy;  Laterality: N/A;    KNEE ARTHROSCOPY W/ MENISCECTOMY Right 11/24/2020    Procedure: ARTHROSCOPY, KNEE, WITH MENISCECTOMY;  Surgeon: Nahum Rose MD;  Location: [facility] OR;  Service: Orthopedics;  Laterality: Right;  Partial medial and  "lateral meniscectomy    ROBOT-ASSISTED CHOLECYSTECTOMY USING DA TRIPP XI N/A 1/21/2020    Procedure: XI ROBOTIC CHOLECYSTECTOMY;  Surgeon: Sebastien Rivera MD;  Location: [facility];  Service: General;  Laterality: N/A;    TONSILLECTOMY, ADENOIDECTOMY       Family History   Problem Relation Age of Onset    Diabetes Father     Prostate cancer Father     Cataracts Father     Hypertension Mother     Hypertension Brother      Social History     Socioeconomic History    Marital status:    Tobacco Use    Smoking status: Current Every Day Smoker     Packs/day: 0.50    Smokeless tobacco: Never Used    Tobacco comment: no smoking after m.n prior to sx   Substance and Sexual Activity    Alcohol use: Yes     Alcohol/week: 46.0 standard drinks     Types: 46 Cans of beer per week     Comment: daily    Drug use: No    Sexual activity: Yes     Partners: Female     Medication List with Changes/Refills   New Medications    GABAPENTIN (NEURONTIN) 300 MG CAPSULE    Take 1 capsule (300 mg total) by mouth 3 (three) times daily.    MELOXICAM (MOBIC) 15 MG TABLET    Take 1 tablet (15 mg total) by mouth once daily. Take with food   Current Medications    BLOOD SUGAR DIAGNOSTIC STRP    Once daily glucose testing.    BLOOD-GLUCOSE METER MISC    Use as directed.    GLIPIZIDE (GLUCOTROL) 5 MG TABLET    Take 1 tablet by mouth once daily    INSULIN (BASAGLAR KWIKPEN U-100 INSULIN) GLARGINE 100 UNITS/ML (3ML) SUBQ PEN    Inject 24 Units into the skin once daily.    LANCETS (LANCETS,THIN) MISC    Once daily glucose testing.    LISINOPRIL (PRINIVIL,ZESTRIL) 40 MG TABLET    Take 1 tablet by mouth once daily    MELOXICAM (MOBIC) 15 MG TABLET    Take 1 tablet (15 mg total) by mouth once daily. Take with food    METFORMIN (GLUCOPHAGE) 1000 MG TABLET    Take 1 tablet (1,000 mg total) by mouth 2 (two) times daily with meals.    PEN NEEDLE, DIABETIC (PEN NEEDLE) 31 GAUGE X 5/16" NDLE    Use once daily with insulin injection.    " PRAVASTATIN (PRAVACHOL) 80 MG TABLET    Take 1 tablet (80 mg total) by mouth every evening.    SILDENAFIL (VIAGRA) 50 MG TABLET    Take 1 tablet (50 mg total) by mouth daily as needed for Erectile Dysfunction.     Review of patient's allergies indicates:  No Known Allergies  Review of Systems   Constitutional: Negative for decreased appetite.   HENT: Negative for tinnitus.    Eyes: Negative for double vision.   Cardiovascular: Negative for chest pain.   Respiratory: Negative for wheezing.    Hematologic/Lymphatic: Negative for bleeding problem.   Skin: Negative for dry skin.   Musculoskeletal: Positive for joint pain, joint swelling and stiffness. Negative for arthritis, back pain, gout and neck pain.   Gastrointestinal: Negative for abdominal pain.   Genitourinary: Negative for bladder incontinence.   Neurological: Negative for numbness, paresthesias and sensory change.   Psychiatric/Behavioral: Negative for altered mental status.       Objective:   Body mass index is 31.58 kg/m².  There were no vitals filed for this visit.       General    Constitutional: He is oriented to person, place, and time. He appears well-developed.   HENT:   Head: Atraumatic.   Eyes: EOM are normal.   Pulmonary/Chest: Effort normal.   Neurological: He is alert and oriented to person, place, and time.   Psychiatric: Judgment normal.           Ambulating without any assistive devices  Pelvis is level  Bilateral hips passive motion without pain in the groin or pain over the greater trochanters  Hip flexors, abductors, adductors, quads, hamstrings, ankle extensors and flexors were 5/5  Right knee surgical scars from arthroscopy healed well.  His range of motion 0-95 degrees.  No pain.  Slight crepitus to compression on the patella.  Collaterals and cruciates are stable.  No defect in the patella tendon or quadriceps tendon  Left knee no defect in the quadriceps or patella tendon.  Stiffness and moderate swelling.  Medial joint tenderness.   Range of motion 0-80 degrees.  Medial joint tenderness.  Positive Jeannette sign.  Collaterals and cruciates are stable to maneuvering  Calves are soft nontender with multiple varicosities  Ankle motion intact  Capillary refill less than 2 seconds    Relevant imaging results reviewed and interpreted by me, discussed with the patient and / or family today   X-ray and electronic records from March 2021 of both knees showing left knee with good joint space.  No marginal osteophyte.  Right knee with very mild medial joint narrowing but no osteophytes  Assessment:     Encounter Diagnoses   Name Primary?    Arthritis of knee, right     Arthrofibrosis of knee joint, right     S/P right knee arthroscopy     Effusion of left knee     Acute medial meniscus tear of left knee, initial encounter Yes        Plan:   Acute medial meniscus tear of left knee, initial encounter    Arthritis of knee, right    Arthrofibrosis of knee joint, right    S/P right knee arthroscopy    Effusion of left knee    Other orders  -     Large Joint Aspiration/Injection: L knee  -     meloxicam (MOBIC) 15 MG tablet; Take 1 tablet (15 mg total) by mouth once daily. Take with food  Dispense: 30 tablet; Refill: 3  -     gabapentin (NEURONTIN) 300 MG capsule; Take 1 capsule (300 mg total) by mouth 3 (three) times daily.  Dispense: 90 capsule; Refill: 11         Patient Instructions   Severe left knee pain with catching locking feeling and giving way over the last 3 months  No history of trauma  Plan  Restart meloxicam 15 mg once a day  Will start gabapentin 300 mg at night.  In might make you a little sleepy.  In might take a while for the gabapentin to work eventually will increase the does to make it 3 times a day if needed  I would like to obtain MRI of your left knee  We know the right knee had medial and lateral meniscus stairs and you might have torn her meniscus on the left knee.  I will inject the left knee today with 80 mg Depo-Medrol mixed  with 5 cc 1% lidocaine  Ice the knee next several days  I would like to see you after the MRI  May return to work next Thursday March 3, 2022          Disclaimer: This note was prepared using a voice recognition system and is likely to have sound alike errors within the text.

## 2022-02-24 NOTE — PATIENT INSTRUCTIONS
Severe left knee pain with catching locking feeling and giving way over the last 3 months  No history of trauma  Plan  Restart meloxicam 15 mg once a day  Will start gabapentin 300 mg at night.  In might make you a little sleepy.  In might take a while for the gabapentin to work eventually will increase the does to make it 3 times a day if needed  I would like to obtain MRI of your left knee  We know the right knee had medial and lateral meniscus stairs and you might have torn her meniscus on the left knee.  I will inject the left knee today with 80 mg Depo-Medrol mixed with 5 cc 1% lidocaine  Ice the knee next several days  I would like to see you after the MRI  May return to work next Thursday March 3, 2022

## 2022-02-24 NOTE — PROCEDURES
Large Joint Aspiration/Injection: L knee    Date/Time: 2/24/2022 4:00 PM  Performed by: Nahum Rose MD  Authorized by: Nahum Rose MD     Consent Done?:  Yes (Verbal)  Site marked: the procedure site was marked    Timeout: prior to procedure the correct patient, procedure, and site was verified      Local anesthesia used?: Yes    Local anesthetic:  Lidocaine 1% without epinephrine    Details:  Needle Size:  22 G  Ultrasonic Guidance for needle placement?: No    Approach:  Anterolateral  Location:  Knee  Site:  L knee  Medications:  80 mg methylPREDNISolone acetate 80 mg/mL  Patient tolerance:  Patient tolerated the procedure well with no immediate complications

## 2022-02-25 RX ORDER — SILDENAFIL 50 MG/1
50 TABLET, FILM COATED ORAL DAILY PRN
Qty: 10 TABLET | Refills: 2 | Status: SHIPPED | OUTPATIENT
Start: 2022-02-25 | End: 2022-03-03 | Stop reason: SDUPTHER

## 2022-02-28 ENCOUNTER — PATIENT MESSAGE (OUTPATIENT)
Dept: ORTHOPEDICS | Facility: CLINIC | Age: 52
End: 2022-02-28
Payer: COMMERCIAL

## 2022-03-03 DIAGNOSIS — N52.9 ERECTILE DYSFUNCTION, UNSPECIFIED ERECTILE DYSFUNCTION TYPE: ICD-10-CM

## 2022-03-03 LAB
FINAL PATHOLOGIC DIAGNOSIS: NORMAL
GROSS: NORMAL
Lab: NORMAL

## 2022-03-03 RX ORDER — SILDENAFIL 50 MG/1
50 TABLET, FILM COATED ORAL DAILY PRN
Qty: 10 TABLET | Refills: 2 | Status: SHIPPED | OUTPATIENT
Start: 2022-03-03 | End: 2022-04-01 | Stop reason: SDUPTHER

## 2022-03-03 NOTE — TELEPHONE ENCOUNTER
No new care gaps identified.  Powered by [employer]. Reference number: [id number].   3/03/2022 10:32:38 AM CST

## 2022-03-04 ENCOUNTER — HOSPITAL ENCOUNTER (OUTPATIENT)
Dept: RADIOLOGY | Facility: HOSPITAL | Age: 52
Discharge: HOME | End: 2022-03-04
Attending: ORTHOPAEDIC SURGERY
Payer: COMMERCIAL

## 2022-03-04 DIAGNOSIS — M25.562 ACUTE PAIN OF LEFT KNEE: ICD-10-CM

## 2022-03-04 DIAGNOSIS — S83.282A ACUTE LATERAL MENISCUS TEAR OF LEFT KNEE, INITIAL ENCOUNTER: ICD-10-CM

## 2022-03-04 PROCEDURE — 73721 MRI JNT OF LWR EXTRE W/O DYE: CPT | Mod: TC,LT

## 2022-03-08 ENCOUNTER — PATIENT MESSAGE (OUTPATIENT)
Dept: ORTHOPEDICS | Facility: CLINIC | Age: 52
End: 2022-03-08
Payer: COMMERCIAL

## 2022-03-14 ENCOUNTER — OFFICE VISIT (OUTPATIENT)
Dept: ORTHOPEDICS | Facility: CLINIC | Age: 52
End: 2022-03-14
Payer: COMMERCIAL

## 2022-03-14 VITALS — HEIGHT: 74 IN | WEIGHT: 246 LBS | BODY MASS INDEX: 31.57 KG/M2

## 2022-03-14 DIAGNOSIS — M24.661 ARTHROFIBROSIS OF KNEE JOINT, RIGHT: ICD-10-CM

## 2022-03-14 DIAGNOSIS — S83.242D ACUTE MEDIAL MENISCUS TEAR OF LEFT KNEE, SUBSEQUENT ENCOUNTER: ICD-10-CM

## 2022-03-14 DIAGNOSIS — M94.262 CHONDROMALACIA, LEFT KNEE: ICD-10-CM

## 2022-03-14 DIAGNOSIS — S83.282D ACUTE LATERAL MENISCUS TEAR OF LEFT KNEE, SUBSEQUENT ENCOUNTER: Primary | ICD-10-CM

## 2022-03-14 DIAGNOSIS — M17.11 ARTHRITIS OF KNEE, RIGHT: ICD-10-CM

## 2022-03-14 PROCEDURE — 3008F PR BODY MASS INDEX (BMI) DOCUMENTED: ICD-10-PCS | Mod: CPTII,S$GLB,, | Performed by: ORTHOPAEDIC SURGERY

## 2022-03-14 PROCEDURE — 99999 PR PBB SHADOW E&M-EST. PATIENT-LVL III: ICD-10-PCS | Mod: PBBFAC,,, | Performed by: ORTHOPAEDIC SURGERY

## 2022-03-14 PROCEDURE — 99214 OFFICE O/P EST MOD 30 MIN: CPT | Mod: S$GLB,,, | Performed by: ORTHOPAEDIC SURGERY

## 2022-03-14 PROCEDURE — 1159F MED LIST DOCD IN RCRD: CPT | Mod: CPTII,S$GLB,, | Performed by: ORTHOPAEDIC SURGERY

## 2022-03-14 PROCEDURE — 4010F ACE/ARB THERAPY RXD/TAKEN: CPT | Mod: CPTII,S$GLB,, | Performed by: ORTHOPAEDIC SURGERY

## 2022-03-14 PROCEDURE — 99214 PR OFFICE/OUTPT VISIT, EST, LEVL IV, 30-39 MIN: ICD-10-PCS | Mod: S$GLB,,, | Performed by: ORTHOPAEDIC SURGERY

## 2022-03-14 PROCEDURE — 3072F LOW RISK FOR RETINOPATHY: CPT | Mod: CPTII,S$GLB,, | Performed by: ORTHOPAEDIC SURGERY

## 2022-03-14 PROCEDURE — 99999 PR PBB SHADOW E&M-EST. PATIENT-LVL III: CPT | Mod: PBBFAC,,, | Performed by: ORTHOPAEDIC SURGERY

## 2022-03-14 PROCEDURE — 3008F BODY MASS INDEX DOCD: CPT | Mod: CPTII,S$GLB,, | Performed by: ORTHOPAEDIC SURGERY

## 2022-03-14 PROCEDURE — 3072F PR LOW RISK FOR RETINOPATHY: ICD-10-PCS | Mod: CPTII,S$GLB,, | Performed by: ORTHOPAEDIC SURGERY

## 2022-03-14 PROCEDURE — 4010F PR ACE/ARB THEARPY RXD/TAKEN: ICD-10-PCS | Mod: CPTII,S$GLB,, | Performed by: ORTHOPAEDIC SURGERY

## 2022-03-14 PROCEDURE — 1159F PR MEDICATION LIST DOCUMENTED IN MEDICAL RECORD: ICD-10-PCS | Mod: CPTII,S$GLB,, | Performed by: ORTHOPAEDIC SURGERY

## 2022-03-14 NOTE — PATIENT INSTRUCTIONS
The injection of steroid last visit into the left knee helped maybe for 3 days at the most  The gabapentin 300 mg once at night is not helping  The meloxicam 15 mg once a day and still having pain  MRI showing horizontal tear of the posterior and body of the lateral meniscus with also arthritic changes on the lateral aspect of the knee.  Also you have some mild arthritic changes on the inside of your knee with a questionable medial meniscus tear  Also showing some arthritic changes underneath the kneecap  Your x-ray is basically the joint spaces very well maintained so at this point her not a candidate for total knee replacement  I recommend arthroscopic surgery to the left knee  We will trim the meniscus that is torn in the hopes that that might relieve some of her pains inside the knee.  Arthroscopic surgery does not do much for the arthritis itself  We going to increase the gabapentin to taking 600 mg at night which is 2 tablets  I want you to continue with the meloxicam 15 mg once a day with food  I would like you to take Tylenol 650 mg twice a day on top of the meloxicam  We will get you scheduled to proceed with left knee arthroscopy.  It is considered outpatient surgery like we did on the other side done under general anesthetic.  You will be going home the same day.  You will be allowed to walk on it right away.  You will keep dressing dry for 3 days and after that you may remove it and get in the shower.  Will take the stitches out 2 weeks after surgery.  It will take 6-8 weeks for it to heal.  Slight risk of nerve damage vascular damage infection blood clot fat clot

## 2022-03-14 NOTE — PROGRESS NOTES
Subjective:     Patient ID: Laron Kothari Jr. is a 51 y.o. male.    Chief Complaint: Pain of the Left Knee    HPI:  02/24/2022  Patient stated he has been hurting for 3 months.  He works driving trucks and loading unloading.  He does not recall specific injury.  The knee is really hurting him a lot a pain of 6/10.  There is catching locking buckling feeling.  We did scope his right knee a while back which he had chondromalacia as well as medial lateral meniscus tear.  Both of his knees are stiff but now the left knee is stiffer than before.  Used to take meloxicam and has not taking it in a long time.  The pain is severe that he is not able to return to work.  He is up pacing in the examining room.  Denies any pain in the hips any back pain any numbness and tingling in the legs.  Denies loss of bowel bladder control difficulty chewing swallowing.  Pain is 6/10      03/14/2022  Severe left knee pain.  Last visit we injected him with steroid and that helped for 3 days only.  We placed him on gabapentin 300 at night and given meloxicam.  It is not helping.  He undergone MRI of his left knee.  His job requires driving trucks and in an out as well as loading and unloading.  We are going over his MRI today and device thing a plan.  His pain is 6/10 still ambulating without any assistive devices.  He is having catching locking feeling in the knee.  Not having much of any back pain or numbness and tingling in the legs or loss of bowel bladder control difficulty with chewing or swallowing loss    Past Medical History:   Diagnosis Date    Blood in stool 8/7/2018    Deep vein thrombosis (DVT) 2017    Left leg    Diabetes mellitus, type 2 2013    Gout     Hyperlipidemia     Hypertension     Neuropathy      Past Surgical History:   Procedure Laterality Date    APPENDECTOMY      ARTHROSCOPIC CHONDROPLASTY OF KNEE JOINT Right 11/24/2020    Procedure: ARTHROSCOPY, KNEE, WITH CHONDROPLASTY;  Surgeon: Nahum Rose MD;   Location: [facility] OR;  Service: Orthopedics;  Laterality: Right;  chondroplasty medial condyle and anteriorly    COLONOSCOPY N/A 8/7/2018    Procedure: COLONOSCOPY;  Surgeon: Ten Vlaentine MD;  Location: [facility] ENDO;  Service: Endoscopy;  Laterality: N/A;    COLONOSCOPY N/A 2/23/2022    Procedure: COLONOSCOPY;  Surgeon: Octavio Craig MD;  Location: [facility] ENDO;  Service: Endoscopy;  Laterality: N/A;    KNEE ARTHROSCOPY W/ MENISCECTOMY Right 11/24/2020    Procedure: ARTHROSCOPY, KNEE, WITH MENISCECTOMY;  Surgeon: Nahum Rose MD;  Location: [facility] OR;  Service: Orthopedics;  Laterality: Right;  Partial medial and lateral meniscectomy    ROBOT-ASSISTED CHOLECYSTECTOMY USING DA TRIPP XI N/A 1/21/2020    Procedure: XI ROBOTIC CHOLECYSTECTOMY;  Surgeon: Sebastien Rivera MD;  Location: [facility] OR;  Service: General;  Laterality: N/A;    TONSILLECTOMY, ADENOIDECTOMY       Family History   Problem Relation Age of Onset    Diabetes Father     Prostate cancer Father     Cataracts Father     Hypertension Mother     Hypertension Brother      Social History     Socioeconomic History    Marital status:    Tobacco Use    Smoking status: Current Every Day Smoker     Packs/day: 0.50    Smokeless tobacco: Never Used    Tobacco comment: no smoking after m.n prior to sx   Substance and Sexual Activity    Alcohol use: Yes     Alcohol/week: 46.0 standard drinks     Types: 46 Cans of beer per week     Comment: daily    Drug use: No    Sexual activity: Yes     Partners: Female     Medication List with Changes/Refills   Current Medications    BLOOD SUGAR DIAGNOSTIC STRP    Once daily glucose testing.    BLOOD-GLUCOSE METER MISC    Use as directed.    GABAPENTIN (NEURONTIN) 300 MG CAPSULE    Take 1 capsule (300 mg total) by mouth 3 (three) times daily.    GLIPIZIDE (GLUCOTROL) 5 MG TABLET    Take 1 tablet by mouth once daily    INSULIN (BASAGLAR KWIKPEN U-100 INSULIN) GLARGINE 100 UNITS/ML (3ML) SUBQ PEN     "Inject 24 Units into the skin once daily.    LANCETS (LANCETS,THIN) MISC    Once daily glucose testing.    LISINOPRIL (PRINIVIL,ZESTRIL) 40 MG TABLET    Take 1 tablet by mouth once daily    MELOXICAM (MOBIC) 15 MG TABLET    Take 1 tablet (15 mg total) by mouth once daily. Take with food    MELOXICAM (MOBIC) 15 MG TABLET    Take 1 tablet (15 mg total) by mouth once daily. Take with food    METFORMIN (GLUCOPHAGE) 1000 MG TABLET    Take 1 tablet (1,000 mg total) by mouth 2 (two) times daily with meals.    PEN NEEDLE, DIABETIC (PEN NEEDLE) 31 GAUGE X 5/16" NDLE    Use once daily with insulin injection.    PRAVASTATIN (PRAVACHOL) 80 MG TABLET    Take 1 tablet (80 mg total) by mouth every evening.    SILDENAFIL (VIAGRA) 50 MG TABLET    Take 1 tablet (50 mg total) by mouth daily as needed for Erectile Dysfunction.     Review of patient's allergies indicates:  No Known Allergies  Review of Systems   Constitutional: Negative for decreased appetite.   HENT: Negative for tinnitus.    Eyes: Negative for double vision.   Cardiovascular: Negative for chest pain.   Respiratory: Negative for wheezing.    Hematologic/Lymphatic: Negative for bleeding problem.   Skin: Negative for dry skin.   Musculoskeletal: Positive for joint pain, joint swelling and stiffness. Negative for arthritis, back pain, gout and neck pain.   Gastrointestinal: Negative for abdominal pain.   Genitourinary: Negative for bladder incontinence.   Neurological: Negative for numbness, paresthesias and sensory change.   Psychiatric/Behavioral: Negative for altered mental status.       Objective:   Body mass index is 31.58 kg/m².  There were no vitals filed for this visit.       General    Constitutional: He is oriented to person, place, and time. He appears well-developed.   HENT:   Head: Atraumatic.   Eyes: EOM are normal.   Pulmonary/Chest: Effort normal.   Neurological: He is alert and oriented to person, place, and time.   Psychiatric: Judgment normal. "           Ambulating without any assistive devices  Pelvis is level  Bilateral hips passive motion without pain in the groin or pain over the greater trochanters  Hip flexors, abductors, adductors, quads, hamstrings, ankle extensors and flexors were 5/5  Right knee surgical scars from arthroscopy healed well.  His range of motion 0-95 degrees.  No pain.  Slight crepitus to compression on the patella.  Collaterals and cruciates are stable.  No defect in the patella tendon or quadriceps tendon  Left knee no defect in the quadriceps or patella tendon.  Stiffness and moderate swelling.  Medial joint tenderness.  Range of motion 0-80 degrees.  Medial joint tenderness.  Positive Jeannette sign.  Collaterals and cruciates are stable to maneuvering  Calves are soft nontender with multiple varicosities  Ankle motion intact  Capillary refill less than 2 seconds    Relevant imaging results reviewed and interpreted by me, discussed with the patient and / or family today     MRI 03/07/2022 left knee showing degenerative signal in the medial meniscus with a 10 mm full thickness chondral erosion of the weight-bearing surface medial femoral condyle.  There is full-thickness chondral erosion and posterior weight-bearing surface of the lateral compartment.  There is oblique horizontal tearing of the posterior and body of the lateral meniscus.  There is some mild patellofemoral arthritic changes and a small Baker cyst  X-ray and electronic records from March 2021 of both knees showing left knee with good joint space.  No marginal osteophyte.  Right knee with very mild medial joint narrowing but no osteophytes  Assessment:     Encounter Diagnoses   Name Primary?    Acute lateral meniscus tear of left knee, subsequent encounter Yes    Acute medial meniscus tear of left knee, subsequent encounter     Chondromalacia, left knee     Arthritis of knee, right     Arthrofibrosis of knee joint, right         Plan:   Acute lateral meniscus  tear of left knee, subsequent encounter    Acute medial meniscus tear of left knee, subsequent encounter    Chondromalacia, left knee    Arthritis of knee, right    Arthrofibrosis of knee joint, right         Patient Instructions   The injection of steroid last visit into the left knee helped maybe for 3 days at the most  The gabapentin 300 mg once at night is not helping  The meloxicam 15 mg once a day and still having pain  MRI showing horizontal tear of the posterior and body of the lateral meniscus with also arthritic changes on the lateral aspect of the knee.  Also you have some mild arthritic changes on the inside of your knee with a questionable medial meniscus tear  Also showing some arthritic changes underneath the kneecap  Your x-ray is basically the joint spaces very well maintained so at this point her not a candidate for total knee replacement  I recommend arthroscopic surgery to the left knee  We will trim the meniscus that is torn in the hopes that that might relieve some of her pains inside the knee.  Arthroscopic surgery does not do much for the arthritis itself  We going to increase the gabapentin to taking 600 mg at night which is 2 tablets  I want you to continue with the meloxicam 15 mg once a day with food  I would like you to take Tylenol 650 mg twice a day on top of the meloxicam  We will get you scheduled to proceed with left knee arthroscopy.  It is considered outpatient surgery like we did on the other side done under general anesthetic.  You will be going home the same day.  You will be allowed to walk on it right away.  You will keep dressing dry for 3 days and after that you may remove it and get in the shower.  Will take the stitches out 2 weeks after surgery.  It will take 6-8 weeks for it to heal.  Slight risk of nerve damage vascular damage infection blood clot fat clot          Disclaimer: This note was prepared using a voice recognition system and is likely to have sound alike  errors within the text.

## 2022-03-15 DIAGNOSIS — S83.282D ACUTE LATERAL MENISCUS TEAR OF LEFT KNEE, SUBSEQUENT ENCOUNTER: Primary | ICD-10-CM

## 2022-03-15 DIAGNOSIS — Z01.818 PREOP TESTING: ICD-10-CM

## 2022-03-29 LAB — POCT GLUCOSE: 162 MG/DL (ref 70–110)

## 2022-04-07 ENCOUNTER — TELEPHONE (OUTPATIENT)
Dept: SURGERY | Facility: CLINIC | Age: 52
End: 2022-04-07
Payer: COMMERCIAL

## 2022-04-07 NOTE — TELEPHONE ENCOUNTER
Spoke to and scheduled patient's appt with Dr Craig for Monday 5/16 [facility] @ 1420 - Patient correctly repeated back all appt information    ----- Message from Octavio Craig MD sent at 4/7/2022  9:06 AM CDT -----  Can we get this chadd an appointment to see me in the office to discuss colonoscopy findings please? Next few weeks is fine

## 2022-04-11 ENCOUNTER — PATIENT OUTREACH (OUTPATIENT)
Dept: ADMINISTRATIVE | Facility: OTHER | Age: 52
End: 2022-04-11
Payer: COMMERCIAL

## 2022-04-11 NOTE — PROGRESS NOTES
Health Maintenance Due   Topic Date Due    Pneumococcal Vaccines (Age 0-64) (2 - PCV) 01/29/2016    Shingles Vaccine (1 of 2) Never done    COVID-19 Vaccine (2 - Pfizer 3-dose series) 02/15/2022    Foot Exam  03/11/2022    Eye Exam  03/26/2022     Updates were requested from care everywhere.  Chart was reviewed for overdue Proactive [facility] Encounters ([facility]) topics (CRS, Breast Cancer Screening, Eye exam)  Health Maintenance has been updated.  LIN[place] immunization registry triggered.  Immunizations were reconciled.

## 2022-04-12 ENCOUNTER — HOSPITAL ENCOUNTER (OUTPATIENT)
Dept: CARDIOLOGY | Facility: HOSPITAL | Age: 52
Discharge: HOME | End: 2022-04-12
Attending: ORTHOPAEDIC SURGERY
Payer: COMMERCIAL

## 2022-04-12 ENCOUNTER — HOSPITAL ENCOUNTER (OUTPATIENT)
Dept: RADIOLOGY | Facility: HOSPITAL | Age: 52
Discharge: HOME | End: 2022-04-12
Attending: ORTHOPAEDIC SURGERY
Payer: COMMERCIAL

## 2022-04-12 ENCOUNTER — OFFICE VISIT (OUTPATIENT)
Dept: OPHTHALMOLOGY | Facility: CLINIC | Age: 52
End: 2022-04-12
Payer: COMMERCIAL

## 2022-04-12 DIAGNOSIS — H52.4 BILATERAL PRESBYOPIA: ICD-10-CM

## 2022-04-12 DIAGNOSIS — Z01.818 PREOP TESTING: ICD-10-CM

## 2022-04-12 DIAGNOSIS — E11.9 DIABETES MELLITUS TYPE 2 WITHOUT RETINOPATHY: Primary | ICD-10-CM

## 2022-04-12 DIAGNOSIS — I10 HYPERTENSION GOAL BP (BLOOD PRESSURE) < 130/80: ICD-10-CM

## 2022-04-12 PROCEDURE — 99999 PR PBB SHADOW E&M-EST. PATIENT-LVL III: ICD-10-PCS | Mod: PBBFAC,,, | Performed by: OPTOMETRIST

## 2022-04-12 PROCEDURE — 92014 COMPRE OPH EXAM EST PT 1/>: CPT | Mod: S$GLB,,, | Performed by: OPTOMETRIST

## 2022-04-12 PROCEDURE — 93010 EKG 12-LEAD: ICD-10-PCS | Mod: ,,, | Performed by: INTERNAL MEDICINE

## 2022-04-12 PROCEDURE — 4010F PR ACE/ARB THEARPY RXD/TAKEN: ICD-10-PCS | Mod: CPTII,S$GLB,, | Performed by: OPTOMETRIST

## 2022-04-12 PROCEDURE — 93005 ELECTROCARDIOGRAM TRACING: CPT

## 2022-04-12 PROCEDURE — 71046 X-RAY EXAM CHEST 2 VIEWS: CPT | Mod: 26,,, | Performed by: RADIOLOGY

## 2022-04-12 PROCEDURE — 92015 DETERMINE REFRACTIVE STATE: CPT | Mod: S$GLB,,, | Performed by: OPTOMETRIST

## 2022-04-12 PROCEDURE — 92015 PR REFRACTION: ICD-10-PCS | Mod: S$GLB,,, | Performed by: OPTOMETRIST

## 2022-04-12 PROCEDURE — 4010F ACE/ARB THERAPY RXD/TAKEN: CPT | Mod: CPTII,S$GLB,, | Performed by: OPTOMETRIST

## 2022-04-12 PROCEDURE — 2023F DILAT RTA XM W/O RTNOPTHY: CPT | Mod: CPTII,S$GLB,, | Performed by: OPTOMETRIST

## 2022-04-12 PROCEDURE — 92014 PR EYE EXAM, EST PATIENT,COMPREHESV: ICD-10-PCS | Mod: S$GLB,,, | Performed by: OPTOMETRIST

## 2022-04-12 PROCEDURE — 99999 PR PBB SHADOW E&M-EST. PATIENT-LVL III: CPT | Mod: PBBFAC,,, | Performed by: OPTOMETRIST

## 2022-04-12 PROCEDURE — 71046 X-RAY EXAM CHEST 2 VIEWS: CPT | Mod: TC

## 2022-04-12 PROCEDURE — 71046 XR CHEST PA AND LATERAL PRE-OP: ICD-10-PCS | Mod: 26,,, | Performed by: RADIOLOGY

## 2022-04-12 PROCEDURE — 1159F MED LIST DOCD IN RCRD: CPT | Mod: CPTII,S$GLB,, | Performed by: OPTOMETRIST

## 2022-04-12 PROCEDURE — 2023F PR DILATED RETINAL EXAM W/O EVID OF RETINOPATHY: ICD-10-PCS | Mod: CPTII,S$GLB,, | Performed by: OPTOMETRIST

## 2022-04-12 PROCEDURE — 1159F PR MEDICATION LIST DOCUMENTED IN MEDICAL RECORD: ICD-10-PCS | Mod: CPTII,S$GLB,, | Performed by: OPTOMETRIST

## 2022-04-12 PROCEDURE — 93010 ELECTROCARDIOGRAM REPORT: CPT | Mod: ,,, | Performed by: INTERNAL MEDICINE

## 2022-04-12 NOTE — PROGRESS NOTES
HPI     Diabetic Eye Exam     Comments: Diagnosed with diabetes 7-8 years ago  Lab Results       Component                Value               Date                       HGBA1C                   5.9 (H)             12/08/2021            Vision changes since last eye exam?: VA is stable, Stars on car lights at   night   Any eye pain today: no  Other ocular symptoms: no          Last edited by Dora Hawkins MA on 4/12/2022  9:38 AM. (History)            Assessment /Plan     For exam results, see Encounter Report.    Diabetes mellitus type 2 without retinopathy    Hypertension goal BP (blood pressure) < 130/80    Bilateral presbyopia      No Background Diabetic Retinopathy    No HTN Retinopathy    Dispense Final Rx for glasses or may use OTC glasses.  RTC 1 year  Discussed above and answered questions.

## 2022-04-13 ENCOUNTER — PATIENT MESSAGE (OUTPATIENT)
Dept: OPHTHALMOLOGY | Facility: CLINIC | Age: 52
End: 2022-04-13
Payer: COMMERCIAL

## 2022-04-13 DIAGNOSIS — E78.5 HYPERLIPIDEMIA LDL GOAL <70: ICD-10-CM

## 2022-04-14 RX ORDER — PRAVASTATIN SODIUM 80 MG/1
TABLET ORAL
Qty: 90 TABLET | Refills: 1 | Status: SHIPPED | OUTPATIENT
Start: 2022-04-14 | End: 2022-10-06 | Stop reason: SDUPTHER

## 2022-04-14 NOTE — TELEPHONE ENCOUNTER
No new care gaps identified.  Powered by [employer]. Reference number: [id number].   4/13/2022 7:57:27 PM CDT

## 2022-04-14 NOTE — TELEPHONE ENCOUNTER
Refill Routing Note   Medication(s) are not appropriate for processing by [facility] for the following reason(s):      - Patient has been seen in the ED/Hospital since the last PCP visit    [facility] action(s):  Defer          Medication reconciliation completed: No     Appointments  past 12m or future 3m with PCP    Date Provider   Last Visit   1/18/2022 Kristen Adler, DO   Next Visit   Visit date not found Kristen Adler DO   ED visits in past 90 days: 0        Note composed:8:07 PM 04/13/2022

## 2022-04-28 DIAGNOSIS — N52.9 ERECTILE DYSFUNCTION, UNSPECIFIED ERECTILE DYSFUNCTION TYPE: ICD-10-CM

## 2022-04-28 RX ORDER — SILDENAFIL 50 MG/1
50 TABLET, FILM COATED ORAL DAILY PRN
Qty: 10 TABLET | Refills: 2 | Status: CANCELLED | OUTPATIENT
Start: 2022-04-28

## 2022-04-29 ENCOUNTER — PATIENT MESSAGE (OUTPATIENT)
Dept: INTERNAL MEDICINE | Facility: CLINIC | Age: 52
End: 2022-04-29
Payer: COMMERCIAL

## 2022-04-29 DIAGNOSIS — N52.9 ERECTILE DYSFUNCTION, UNSPECIFIED ERECTILE DYSFUNCTION TYPE: ICD-10-CM

## 2022-04-29 RX ORDER — SILDENAFIL 50 MG/1
50 TABLET, FILM COATED ORAL DAILY PRN
Qty: 10 TABLET | Refills: 2 | Status: SHIPPED | OUTPATIENT
Start: 2022-04-29 | End: 2022-06-01 | Stop reason: SDUPTHER

## 2022-04-29 NOTE — TELEPHONE ENCOUNTER
No new care gaps identified.  Powered by [employer] by "[employer] Reference number: [id number].   4/29/2022 10:25:20 AM CDT

## 2022-04-29 NOTE — TELEPHONE ENCOUNTER
No new care gaps identified.  Powered by [employer]. Reference number: [id number].   4/28/2022 7:10:35 PM CDT

## 2022-05-06 ENCOUNTER — PATIENT MESSAGE (OUTPATIENT)
Dept: SURGERY | Facility: HOSPITAL | Age: 52
End: 2022-05-06
Payer: COMMERCIAL

## 2022-05-16 ENCOUNTER — TELEPHONE (OUTPATIENT)
Dept: SURGERY | Facility: CLINIC | Age: 52
End: 2022-05-16
Payer: COMMERCIAL

## 2022-05-19 ENCOUNTER — PATIENT MESSAGE (OUTPATIENT)
Dept: SURGERY | Facility: CLINIC | Age: 52
End: 2022-05-19
Payer: COMMERCIAL

## 2022-06-01 ENCOUNTER — PATIENT MESSAGE (OUTPATIENT)
Dept: SURGERY | Facility: CLINIC | Age: 52
End: 2022-06-01
Payer: COMMERCIAL

## 2022-06-12 DIAGNOSIS — N52.9 ERECTILE DYSFUNCTION, UNSPECIFIED ERECTILE DYSFUNCTION TYPE: ICD-10-CM

## 2022-06-12 NOTE — TELEPHONE ENCOUNTER
No new care gaps identified.  [facility] Embedded Care Gaps. Reference number: [id number]. 6/12/2022   6:56:18 PM CDT

## 2022-06-13 RX ORDER — SILDENAFIL 50 MG/1
TABLET, FILM COATED ORAL
Qty: 10 TABLET | Refills: 0 | OUTPATIENT
Start: 2022-06-13

## 2022-06-21 ENCOUNTER — HOSPITAL ENCOUNTER (EMERGENCY)
Facility: HOSPITAL | Age: 52
Discharge: HOME | End: 2022-06-21
Attending: EMERGENCY MEDICINE
Payer: COMMERCIAL

## 2022-06-21 VITALS
DIASTOLIC BLOOD PRESSURE: 96 MMHG | RESPIRATION RATE: 16 BRPM | SYSTOLIC BLOOD PRESSURE: 164 MMHG | BODY MASS INDEX: 29.54 KG/M2 | OXYGEN SATURATION: 99 % | TEMPERATURE: 99 F | HEIGHT: 74 IN | WEIGHT: 230.19 LBS | HEART RATE: 61 BPM

## 2022-06-21 DIAGNOSIS — I49.3 PVCS (PREMATURE VENTRICULAR CONTRACTIONS): Primary | ICD-10-CM

## 2022-06-21 DIAGNOSIS — R07.9 CHEST PAIN: ICD-10-CM

## 2022-06-21 LAB
ALBUMIN SERPL BCP-MCNC: 3.7 G/DL (ref 3.5–5.2)
ALP SERPL-CCNC: 63 U/L (ref 55–135)
ALT SERPL W/O P-5'-P-CCNC: 23 U/L (ref 10–44)
ANION GAP SERPL CALC-SCNC: 8 MMOL/L (ref 8–16)
ANISOCYTOSIS BLD QL SMEAR: SLIGHT
AST SERPL-CCNC: 27 U/L (ref 10–40)
BASOPHILS # BLD AUTO: 0.1 K/UL (ref 0–0.2)
BASOPHILS NFR BLD: 0.9 % (ref 0–1.9)
BILIRUB SERPL-MCNC: 0.6 MG/DL (ref 0.1–1)
BILIRUB UR QL STRIP: NEGATIVE
BNP SERPL-MCNC: <10 PG/ML (ref 0–99)
BUN SERPL-MCNC: 15 MG/DL (ref 6–20)
CALCIUM SERPL-MCNC: 9.8 MG/DL (ref 8.7–10.5)
CHLORIDE SERPL-SCNC: 107 MMOL/L (ref 95–110)
CLARITY UR: CLEAR
CO2 SERPL-SCNC: 27 MMOL/L (ref 23–29)
COLOR UR: YELLOW
CREAT SERPL-MCNC: 0.9 MG/DL (ref 0.5–1.4)
CTP QC/QA: YES
DIFFERENTIAL METHOD: ABNORMAL
EOSINOPHIL # BLD AUTO: 0.7 K/UL (ref 0–0.5)
EOSINOPHIL NFR BLD: 6.4 % (ref 0–8)
ERYTHROCYTE [DISTWIDTH] IN BLOOD BY AUTOMATED COUNT: 12.2 % (ref 11.5–14.5)
EST. GFR  (AFRICAN AMERICAN): >60 ML/MIN/1.73 M^2
EST. GFR  (NON AFRICAN AMERICAN): >60 ML/MIN/1.73 M^2
GLUCOSE SERPL-MCNC: 182 MG/DL (ref 70–110)
GLUCOSE UR QL STRIP: NEGATIVE
HCT VFR BLD AUTO: 41.8 % (ref 40–54)
HGB BLD-MCNC: 13.7 G/DL (ref 14–18)
HGB UR QL STRIP: NEGATIVE
IMM GRANULOCYTES # BLD AUTO: 0.03 K/UL (ref 0–0.04)
IMM GRANULOCYTES NFR BLD AUTO: 0.3 % (ref 0–0.5)
KETONES UR QL STRIP: NEGATIVE
LEUKOCYTE ESTERASE UR QL STRIP: NEGATIVE
LYMPHOCYTES # BLD AUTO: 4.6 K/UL (ref 1–4.8)
LYMPHOCYTES NFR BLD: 39.9 % (ref 18–48)
MCH RBC QN AUTO: 31.4 PG (ref 27–31)
MCHC RBC AUTO-ENTMCNC: 32.8 G/DL (ref 32–36)
MCV RBC AUTO: 96 FL (ref 82–98)
MONOCYTES # BLD AUTO: 0.7 K/UL (ref 0.3–1)
MONOCYTES NFR BLD: 6.4 % (ref 4–15)
NEUTROPHILS # BLD AUTO: 5.3 K/UL (ref 1.8–7.7)
NEUTROPHILS NFR BLD: 46.1 % (ref 38–73)
NITRITE UR QL STRIP: NEGATIVE
NRBC BLD-RTO: 0 /100 WBC
OVALOCYTES BLD QL SMEAR: ABNORMAL
PH UR STRIP: 7 [PH] (ref 5–8)
PLATELET # BLD AUTO: 290 K/UL (ref 150–450)
PLATELET BLD QL SMEAR: ABNORMAL
PMV BLD AUTO: 10.4 FL (ref 9.2–12.9)
POTASSIUM SERPL-SCNC: 3.8 MMOL/L (ref 3.5–5.1)
PROT SERPL-MCNC: 7.5 G/DL (ref 6–8.4)
PROT UR QL STRIP: NEGATIVE
RBC # BLD AUTO: 4.37 M/UL (ref 4.6–6.2)
SARS-COV-2 RDRP RESP QL NAA+PROBE: NEGATIVE
SODIUM SERPL-SCNC: 142 MMOL/L (ref 136–145)
SP GR UR STRIP: 1.02 (ref 1–1.03)
TROPONIN I SERPL DL<=0.01 NG/ML-MCNC: <0.006 NG/ML (ref 0–0.03)
URN SPEC COLLECT METH UR: ABNORMAL
UROBILINOGEN UR STRIP-ACNC: ABNORMAL EU/DL
WBC # BLD AUTO: 11.48 K/UL (ref 3.9–12.7)

## 2022-06-21 PROCEDURE — 85025 COMPLETE CBC W/AUTO DIFF WBC: CPT | Performed by: EMERGENCY MEDICINE

## 2022-06-21 PROCEDURE — 81003 URINALYSIS AUTO W/O SCOPE: CPT | Performed by: EMERGENCY MEDICINE

## 2022-06-21 PROCEDURE — 83880 ASSAY OF NATRIURETIC PEPTIDE: CPT | Performed by: EMERGENCY MEDICINE

## 2022-06-21 PROCEDURE — 80053 COMPREHEN METABOLIC PANEL: CPT | Performed by: EMERGENCY MEDICINE

## 2022-06-21 PROCEDURE — 99285 EMERGENCY DEPT VISIT HI MDM: CPT | Mod: 25

## 2022-06-21 PROCEDURE — 93005 ELECTROCARDIOGRAM TRACING: CPT

## 2022-06-21 PROCEDURE — 93010 ELECTROCARDIOGRAM REPORT: CPT | Mod: ,,, | Performed by: INTERNAL MEDICINE

## 2022-06-21 PROCEDURE — 93010 EKG 12-LEAD: ICD-10-PCS | Mod: ,,, | Performed by: INTERNAL MEDICINE

## 2022-06-21 PROCEDURE — 84484 ASSAY OF TROPONIN QUANT: CPT | Performed by: EMERGENCY MEDICINE

## 2022-06-21 PROCEDURE — U0002 COVID-19 LAB TEST NON-CDC: HCPCS | Performed by: EMERGENCY MEDICINE

## 2022-06-22 NOTE — ED PROVIDER NOTES
SCRIBE #1 NOTE: I, Buddy Sanders, am scribing for, and in the presence of, Van Hernández Jr., MD. I have scribed the entire note.       History     Chief Complaint   Patient presents with    Palpitations     Pt complaining of chest palpitations and shortness of breath x 1 day     Review of patient's allergies indicates:  No Known Allergies      History of Present Illness     HPI    6/21/2022, 8:43 PM  History obtained from the patient      History of Present Illness: Laron Kothari Jr. is a 52 y.o. male patient with a PMHx of DM II, HTN and DVT who presents to the Emergency Department for evaluation of chest palpations in the AM. Pt states that on his way to work he felt his heart fluttering, usually it stops but it has been episodic today about every 20 mins around 1-2 beats. Pt admits he had a monster enegry drink. Symptoms are moderate in severity. No mitigating or exacerbating factors reported. No Associated sxs include. Patient denies any chest pain, N/V/D, fever, SOB, HA and all other sxs at this time. No further complaints or concerns at this time.       Arrival mode: Personal vehicle      PCP: Kristen Adler DO        Past Medical History:  Past Medical History:   Diagnosis Date    Blood in stool 8/7/2018    Deep vein thrombosis (DVT) 2017    Left leg    Diabetes mellitus, type 2 2013    Gout     Hyperlipidemia     Hypertension     Neuropathy        Past Surgical History:  Past Surgical History:   Procedure Laterality Date    APPENDECTOMY      ARTHROSCOPIC CHONDROPLASTY OF KNEE JOINT Right 11/24/2020    Procedure: ARTHROSCOPY, KNEE, WITH CHONDROPLASTY;  Surgeon: Nahum Rose MD;  Location: [facility] OR;  Service: Orthopedics;  Laterality: Right;  chondroplasty medial condyle and anteriorly    COLONOSCOPY N/A 8/7/2018    Procedure: COLONOSCOPY;  Surgeon: Ten Valentine MD;  Location: [facility] ENDO;  Service: Endoscopy;  Laterality: N/A;    COLONOSCOPY N/A 2/23/2022    Procedure: COLONOSCOPY;   Surgeon: Octavio Craig MD;  Location: [facility];  Service: Endoscopy;  Laterality: N/A;    KNEE ARTHROSCOPY W/ MENISCECTOMY Right 11/24/2020    Procedure: ARTHROSCOPY, KNEE, WITH MENISCECTOMY;  Surgeon: Nahum Rose MD;  Location: [facility] OR;  Service: Orthopedics;  Laterality: Right;  Partial medial and lateral meniscectomy    ROBOT-ASSISTED CHOLECYSTECTOMY USING DA TRIPP XI N/A 1/21/2020    Procedure: XI ROBOTIC CHOLECYSTECTOMY;  Surgeon: Sebastien Rivera MD;  Location: [facility] OR;  Service: General;  Laterality: N/A;    TONSILLECTOMY, ADENOIDECTOMY           Family History:  Family History   Problem Relation Age of Onset    Diabetes Father     Prostate cancer Father     Cataracts Father     Hypertension Mother     Hypertension Brother        Social History:  Social History     Tobacco Use    Smoking status: Current Every Day Smoker     Packs/day: 0.50    Smokeless tobacco: Never Used    Tobacco comment: no smoking after m.n prior to sx   Substance and Sexual Activity    Alcohol use: Yes     Alcohol/week: 46.0 standard drinks     Types: 46 Cans of beer per week     Comment: daily    Drug use: No    Sexual activity: Yes     Partners: Female        Review of Systems     Review of Systems   Constitutional: Negative for fever.   HENT: Negative for sore throat.    Respiratory: Negative for shortness of breath.    Cardiovascular: Positive for palpitations. Negative for chest pain.   Gastrointestinal: Negative for diarrhea, nausea and vomiting.   Genitourinary: Negative for dysuria.   Musculoskeletal: Negative for back pain.   Skin: Negative for rash.   Neurological: Negative for weakness.   Hematological: Does not bruise/bleed easily.   All other systems reviewed and are negative.     Physical Exam     Initial Vitals [06/21/22 1659]   BP Pulse Resp Temp SpO2   (!) 157/98 95 18 98.8 °F (37.1 °C) 100 %      MAP       --          Physical Exam  Nursing Notes and Vital Signs Reviewed.  Constitutional:  "Patient is in no acute distress. Well-developed and well-nourished.  Head: Atraumatic. Normocephalic.  Eyes:  EOM intact.  No scleral icterus.  ENT: Mucous membranes are moist.  Nares clear   Neck:  Full ROM. No JVD.  Cardiovascular: Regular rate. Regular rhythm No murmurs, rubs, or gallops. Distal pulses are 2+ and symmetric  Pulmonary/Chest: No respiratory distress. Clear to auscultation bilaterally. No wheezing or rales.  Equal chest wall rise bilaterally  Abdominal: Soft and non-distended.  There is no tenderness.  No rebound, guarding, or rigidity. Good bowel sounds.  Genitourinary: No CVA tenderness.  No suprapubic tenderness  Musculoskeletal: Moves all extremities. No obvious deformities.  5 x 5 strength in all extremities   Skin: Warm and dry.  Neurological:  Alert, awake, and appropriate.  Normal speech.  No acute focal neurological deficits are appreciated.  Two through 12 intact bilaterally.  Psychiatric: Normal affect. Good eye contact. Appropriate in content.     ED Course   Procedures  ED Vital Signs:  Vitals:    06/21/22 1659 06/21/22 2002 06/21/22 2006 06/21/22 2133   BP: (!) 157/98 (!) 154/89  (!) 164/96   Pulse: 95 74 74 61   Resp: 18 16  16   Temp: 98.8 °F (37.1 °C)   98.5 °F (36.9 °C)   TempSrc: Oral   Oral   SpO2: 100% 99%  99%   Weight: 104.4 kg (230 lb 2.6 oz)      Height: 6' 2" (1.88 m)          Abnormal Lab Results:  Labs Reviewed   CBC W/ AUTO DIFFERENTIAL - Abnormal; Notable for the following components:       Result Value    RBC 4.37 (*)     Hemoglobin 13.7 (*)     MCH 31.4 (*)     Eos # 0.7 (*)     All other components within normal limits   COMPREHENSIVE METABOLIC PANEL - Abnormal; Notable for the following components:    Glucose 182 (*)     All other components within normal limits   URINALYSIS, REFLEX TO URINE CULTURE - Abnormal; Notable for the following components:    Urobilinogen, UA 4.0-6.0 (*)     All other components within normal limits    Narrative:     Specimen Source->Urine "   TROPONIN I   B-TYPE NATRIURETIC PEPTIDE   SARS-COV-2 RDRP GENE    Narrative:     This test utilizes isothermal nucleic acid amplification   technology to detect the SARS-CoV-2 RdRp nucleic acid segment.   The analytical sensitivity (limit of detection) is 125 genome   equivalents/mL.   A POSITIVE result implies infection with the SARS-CoV-2 virus;   the patient is presumed to be contagious.     A NEGATIVE result means that SARS-CoV-2 nucleic acids are not   present above the limit of detection. A NEGATIVE result should be   treated as presumptive. It does not rule out the possibility of   COVID-19 and should not be the sole basis for treatment decisions.   If COVID-19 is strongly suspected based on clinical and exposure   history, re-testing using an alternate molecular assay should be   considered.   This test is only for use under the Food and Drug   Administration s Emergency Use Authorization (EUA).   Commercial kits are provided by [employer].   Performance characteristics of the EUA have been independently   verified by [facility] Department of   Pathology and Laboratory Medicine.   _________________________________________________________________   The authorized Fact Sheet for Healthcare Providers and the authorized Fact   Sheet for Patients of the ID NOW COVID-19 are available on the FDA   website:     https://www.fda.gov/media/902114/download  https://www.fda.gov/media/942318/download                All Lab Results:  Results for orders placed or performed during the hospital encounter of 06/21/22   CBC auto differential   Result Value Ref Range    WBC 11.48 3.90 - 12.70 K/uL    RBC 4.37 (L) 4.60 - 6.20 M/uL    Hemoglobin 13.7 (L) 14.0 - 18.0 g/dL    Hematocrit 41.8 40.0 - 54.0 %    MCV 96 82 - 98 fL    MCH 31.4 (H) 27.0 - 31.0 pg    MCHC 32.8 32.0 - 36.0 g/dL    RDW 12.2 11.5 - 14.5 %    Platelets 290 150 - 450 K/uL    MPV 10.4 9.2 - 12.9 fL    Immature Granulocytes 0.3 0.0 - 0.5 %     Gran # (ANC) 5.3 1.8 - 7.7 K/uL    Immature Grans (Abs) 0.03 0.00 - 0.04 K/uL    Lymph # 4.6 1.0 - 4.8 K/uL    Mono # 0.7 0.3 - 1.0 K/uL    Eos # 0.7 (H) 0.0 - 0.5 K/uL    Baso # 0.10 0.00 - 0.20 K/uL    nRBC 0 0 /100 WBC    Gran % 46.1 38.0 - 73.0 %    Lymph % 39.9 18.0 - 48.0 %    Mono % 6.4 4.0 - 15.0 %    Eosinophil % 6.4 0.0 - 8.0 %    Basophil % 0.9 0.0 - 1.9 %    Platelet Estimate Appears normal     Aniso Slight     Ovalocytes Occasional     Differential Method Automated    Comprehensive metabolic panel   Result Value Ref Range    Sodium 142 136 - 145 mmol/L    Potassium 3.8 3.5 - 5.1 mmol/L    Chloride 107 95 - 110 mmol/L    CO2 27 23 - 29 mmol/L    Glucose 182 (H) 70 - 110 mg/dL    BUN 15 6 - 20 mg/dL    Creatinine 0.9 0.5 - 1.4 mg/dL    Calcium 9.8 8.7 - 10.5 mg/dL    Total Protein 7.5 6.0 - 8.4 g/dL    Albumin 3.7 3.5 - 5.2 g/dL    Total Bilirubin 0.6 0.1 - 1.0 mg/dL    Alkaline Phosphatase 63 55 - 135 U/L    AST 27 10 - 40 U/L    ALT 23 10 - 44 U/L    Anion Gap 8 8 - 16 mmol/L    eGFR if African American >60 >60 mL/min/1.73 m^2    eGFR if non African American >60 >60 mL/min/1.73 m^2   Urinalysis, Reflex to Urine Culture Urine, Clean Catch    Specimen: Urine   Result Value Ref Range    Specimen UA Urine, Clean Catch     Color, UA Yellow Yellow, Straw, Becki    Appearance, UA Clear Clear    pH, UA 7.0 5.0 - 8.0    Specific Gravity, UA 1.025 1.005 - 1.030    Protein, UA Negative Negative    Glucose, UA Negative Negative    Ketones, UA Negative Negative    Bilirubin (UA) Negative Negative    Occult Blood UA Negative Negative    Nitrite, UA Negative Negative    Urobilinogen, UA 4.0-6.0 (A) <2.0 EU/dL    Leukocytes, UA Negative Negative   Troponin I   Result Value Ref Range    Troponin I <0.006 0.000 - 0.026 ng/mL   Brain natriuretic peptide   Result Value Ref Range    BNP <10 0 - 99 pg/mL   POCT COVID-19 Rapid Screening   Result Value Ref Range    POC Rapid COVID Negative Negative      Acceptable Yes          Imaging Results:  Imaging Results          X-Ray Chest AP Portable (Final result)  Result time 06/21/22 21:13:34    Final result by Alessio Bhatia MD (06/21/22 21:13:34)                 Impression:      No acute process seen.      Electronically signed by: Alessio Bhatia MD  Date:    06/21/2022  Time:    21:13             Narrative:    EXAMINATION:  XR CHEST AP PORTABLE    CLINICAL HISTORY:  SOB;    FINDINGS:  Single view of the chest.  Comparison 4/12/22    Cardiac silhouette is normal.  The lungs demonstrate no evidence of active disease.  No evidence of pleural effusion or pneumothorax.  Bones appear intact.                                 The EKG was ordered, reviewed, and independently interpreted by the ED provider.  Interpretation time: 16:56  Rate: 1000 BPM  Rhythm: normal sinus rhythm  Interpretation: Possible Left atrial enlargement  Septal infarct. No STEMI.           The Emergency Provider reviewed the vital signs and test results, which are outlined above.     ED Discussion       9:10 PM: Reassessed pt at this time. Discussed with pt all pertinent ED information and results. Discussed pt dx and plan of tx. Gave pt all f/u and return to the ED instructions. All questions and concerns were addressed at this time. Pt expresses understanding of information and instructions, and is comfortable with plan to discharge. Pt is stable for discharge.    I discussed with patient and/or family/caretaker that evaluation in the ED does not suggest any emergent or life threatening medical conditions requiring immediate intervention beyond what was provided in the ED, and I believe patient is safe for discharge.  Regardless, an unremarkable evaluation in the ED does not preclude the development or presence of a serious of life threatening condition. As such, patient was instructed to return immediately for any worsening or change in current symptoms.    10:22 PM  Patient is stable nontoxic.   Patient is single beat of palpitation occurring every hour so.  Is not painful.  No shortness of breath.  Does smoke and have energy drinks.  Patient's workup is benign.  He is stable safe for discharge my opinion.  Patient clinically is having PVCs and is safe for discharge in my opinion.  I have advised close follow-up with primary care provider and advised return if symptoms worsen in any way.  Seems very reliable verbalized agreement understanding with all instructions.     Medical Decision Making:   Clinical Tests:   Lab Tests: Ordered and Reviewed  Radiological Study: Ordered and Reviewed  Medical Tests: Ordered and Reviewed           ED Medication(s):  Medications - No data to display    Discharge Medication List as of 6/21/2022  9:08 PM           Follow-up Information     Kristen Adler DO.    Specialty: Internal Medicine  Contact information:  [address] [place] [address] DR Hallie CASTREJON [id number] [phone]                             Scribe Attestation:   Scribe #1: I performed the above scribed service and the documentation accurately describes the services I performed. I attest to the accuracy of the note.     Attending:   Physician Attestation Statement for Scribe #1: I, Van Hernández Jr., MD, personally performed the services described in this documentation, as scribed by Buddy Sanders, in my presence, and it is both accurate and complete.           Clinical Impression       ICD-10-CM ICD-9-CM   1. PVCs (premature ventricular contractions)  I49.3 427.69   2. Chest pain  R07.9 786.50       Disposition:   Disposition: Discharged  Condition: Stable         Van Hernández Jr., MD  06/21/22 0672

## 2022-06-22 NOTE — DISCHARGE INSTRUCTIONS
Continue all of her home medications.  Avoid stimulants and tobacco.  Follow up with her doctor 1-2 days for re-evaluation.  Return as needed for any worsening symptoms, problems, questions or concerns.

## 2022-07-06 ENCOUNTER — PATIENT OUTREACH (OUTPATIENT)
Dept: ADMINISTRATIVE | Facility: HOSPITAL | Age: 52
End: 2022-07-06
Payer: COMMERCIAL

## 2022-07-06 DIAGNOSIS — E11.40 TYPE 2 DIABETES MELLITUS WITH DIABETIC NEUROPATHY, WITH LONG-TERM CURRENT USE OF INSULIN: Primary | ICD-10-CM

## 2022-07-06 DIAGNOSIS — Z79.4 TYPE 2 DIABETES MELLITUS WITH DIABETIC NEUROPATHY, WITH LONG-TERM CURRENT USE OF INSULIN: Primary | ICD-10-CM

## 2022-07-06 DIAGNOSIS — R80.9 DIABETES MELLITUS WITH MICROALBUMINURIA: ICD-10-CM

## 2022-07-06 DIAGNOSIS — E11.29 DIABETES MELLITUS WITH MICROALBUMINURIA: ICD-10-CM

## 2022-07-06 NOTE — PROGRESS NOTES
Working Diabetes Report.    Pt has lab appt scheduled, 7/12/22 and follow up on 7/19/22.  Micro albumin urine ordered and linked to appt.

## 2022-07-16 NOTE — TELEPHONE ENCOUNTER
[facility] is working on a PA to get this medication approved. Called pt to let him know this and we will be calling him as soon as we know it is approved. Pt voiced understanding   stated

## 2022-07-18 ENCOUNTER — PATIENT MESSAGE (OUTPATIENT)
Dept: INTERNAL MEDICINE | Facility: CLINIC | Age: 52
End: 2022-07-18
Payer: COMMERCIAL

## 2022-07-18 DIAGNOSIS — N52.9 ERECTILE DYSFUNCTION, UNSPECIFIED ERECTILE DYSFUNCTION TYPE: ICD-10-CM

## 2022-07-18 NOTE — TELEPHONE ENCOUNTER
No new care gaps identified.  [facility] Embedded Care Gaps. Reference number: [id number]. 7/18/2022   1:40:51 PM CDT

## 2022-07-19 RX ORDER — SILDENAFIL 50 MG/1
50 TABLET, FILM COATED ORAL DAILY PRN
Qty: 10 TABLET | Refills: 0 | OUTPATIENT
Start: 2022-07-19

## 2022-08-03 DIAGNOSIS — N52.9 ERECTILE DYSFUNCTION, UNSPECIFIED ERECTILE DYSFUNCTION TYPE: ICD-10-CM

## 2022-08-03 NOTE — TELEPHONE ENCOUNTER
Care Due:                  Date            Visit Type   Department     Provider  --------------------------------------------------------------------------------                                EP -                              PRIMARY      [facility] INTER[facility]  Last Visit: 01-      CARE ([facility])   MEDICINE       Kristen Adler  Next Visit: None Scheduled  None         None Found                                                            Last  Test          Frequency    Reason                     Performed    Due Date  --------------------------------------------------------------------------------    HBA1C.......  6 months...  glipiZIDE, insulin,        12- 06-                             metFORMIN................    [employer] Embedded Care Gaps. Reference number: [id number]. 8/03/2022   4:47:01 PM CDT

## 2022-08-05 RX ORDER — SILDENAFIL 50 MG/1
TABLET, FILM COATED ORAL
Qty: 10 TABLET | Refills: 3 | Status: SHIPPED | OUTPATIENT
Start: 2022-08-05 | End: 2022-10-06 | Stop reason: SDUPTHER

## 2022-10-06 ENCOUNTER — OFFICE VISIT (OUTPATIENT)
Dept: INTERNAL MEDICINE | Facility: CLINIC | Age: 52
End: 2022-10-06
Payer: COMMERCIAL

## 2022-10-06 ENCOUNTER — PATIENT MESSAGE (OUTPATIENT)
Dept: ADMINISTRATIVE | Facility: HOSPITAL | Age: 52
End: 2022-10-06
Payer: COMMERCIAL

## 2022-10-06 ENCOUNTER — LAB VISIT (OUTPATIENT)
Dept: LAB | Facility: HOSPITAL | Age: 52
End: 2022-10-06
Attending: INTERNAL MEDICINE
Payer: COMMERCIAL

## 2022-10-06 VITALS
SYSTOLIC BLOOD PRESSURE: 122 MMHG | WEIGHT: 237.19 LBS | HEART RATE: 85 BPM | DIASTOLIC BLOOD PRESSURE: 72 MMHG | BODY MASS INDEX: 30.44 KG/M2 | OXYGEN SATURATION: 97 % | TEMPERATURE: 98 F | HEIGHT: 74 IN | RESPIRATION RATE: 18 BRPM

## 2022-10-06 DIAGNOSIS — Z23 IMMUNIZATION DUE: ICD-10-CM

## 2022-10-06 DIAGNOSIS — Z79.4 CONTROLLED TYPE 2 DIABETES MELLITUS WITH MICROALBUMINURIA, WITH LONG-TERM CURRENT USE OF INSULIN: ICD-10-CM

## 2022-10-06 DIAGNOSIS — E78.5 HYPERLIPIDEMIA LDL GOAL <70: ICD-10-CM

## 2022-10-06 DIAGNOSIS — R80.9 CONTROLLED TYPE 2 DIABETES MELLITUS WITH MICROALBUMINURIA, WITH LONG-TERM CURRENT USE OF INSULIN: ICD-10-CM

## 2022-10-06 DIAGNOSIS — J06.9 VIRAL URI WITH COUGH: ICD-10-CM

## 2022-10-06 DIAGNOSIS — F17.210 CIGARETTE NICOTINE DEPENDENCE WITHOUT COMPLICATION: ICD-10-CM

## 2022-10-06 DIAGNOSIS — E11.29 CONTROLLED TYPE 2 DIABETES MELLITUS WITH MICROALBUMINURIA, WITH LONG-TERM CURRENT USE OF INSULIN: ICD-10-CM

## 2022-10-06 DIAGNOSIS — I10 HYPERTENSION GOAL BP (BLOOD PRESSURE) < 130/80: Primary | Chronic | ICD-10-CM

## 2022-10-06 DIAGNOSIS — N52.9 ERECTILE DYSFUNCTION, UNSPECIFIED ERECTILE DYSFUNCTION TYPE: ICD-10-CM

## 2022-10-06 LAB
ALBUMIN SERPL BCP-MCNC: 3.9 G/DL (ref 3.5–5.2)
ALBUMIN/CREAT UR: 90.3 UG/MG (ref 0–30)
ALP SERPL-CCNC: 61 U/L (ref 55–135)
ALT SERPL W/O P-5'-P-CCNC: 27 U/L (ref 10–44)
ANION GAP SERPL CALC-SCNC: 10 MMOL/L (ref 8–16)
AST SERPL-CCNC: 23 U/L (ref 10–40)
BILIRUB SERPL-MCNC: 0.6 MG/DL (ref 0.1–1)
BUN SERPL-MCNC: 13 MG/DL (ref 6–20)
CALCIUM SERPL-MCNC: 9.7 MG/DL (ref 8.7–10.5)
CHLORIDE SERPL-SCNC: 104 MMOL/L (ref 95–110)
CHOLEST SERPL-MCNC: 167 MG/DL (ref 120–199)
CHOLEST/HDLC SERPL: 4.1 {RATIO} (ref 2–5)
CO2 SERPL-SCNC: 25 MMOL/L (ref 23–29)
CREAT SERPL-MCNC: 0.8 MG/DL (ref 0.5–1.4)
CREAT UR-MCNC: 62 MG/DL (ref 23–375)
EST. GFR  (NO RACE VARIABLE): >60 ML/MIN/1.73 M^2
ESTIMATED AVG GLUCOSE: 126 MG/DL (ref 68–131)
GLUCOSE SERPL-MCNC: 186 MG/DL (ref 70–110)
HBA1C MFR BLD: 6 % (ref 4–5.6)
HDLC SERPL-MCNC: 41 MG/DL (ref 40–75)
HDLC SERPL: 24.6 % (ref 20–50)
LDLC SERPL CALC-MCNC: 109.4 MG/DL (ref 63–159)
MICROALBUMIN UR DL<=1MG/L-MCNC: 56 UG/ML
NONHDLC SERPL-MCNC: 126 MG/DL
POTASSIUM SERPL-SCNC: 4.6 MMOL/L (ref 3.5–5.1)
PROT SERPL-MCNC: 7.1 G/DL (ref 6–8.4)
SODIUM SERPL-SCNC: 139 MMOL/L (ref 136–145)
TRIGL SERPL-MCNC: 83 MG/DL (ref 30–150)

## 2022-10-06 PROCEDURE — 99214 OFFICE O/P EST MOD 30 MIN: CPT | Mod: 25,S$GLB,, | Performed by: INTERNAL MEDICINE

## 2022-10-06 PROCEDURE — 90471 IMMUNIZATION ADMIN: CPT | Mod: S$GLB,,, | Performed by: INTERNAL MEDICINE

## 2022-10-06 PROCEDURE — 3074F PR MOST RECENT SYSTOLIC BLOOD PRESSURE < 130 MM HG: ICD-10-PCS | Mod: CPTII,S$GLB,, | Performed by: INTERNAL MEDICINE

## 2022-10-06 PROCEDURE — 3078F DIAST BP <80 MM HG: CPT | Mod: CPTII,S$GLB,, | Performed by: INTERNAL MEDICINE

## 2022-10-06 PROCEDURE — 90472 IMMUNIZATION ADMIN EACH ADD: CPT | Mod: S$GLB,,, | Performed by: INTERNAL MEDICINE

## 2022-10-06 PROCEDURE — 82043 UR ALBUMIN QUANTITATIVE: CPT | Performed by: INTERNAL MEDICINE

## 2022-10-06 PROCEDURE — 99214 PR OFFICE/OUTPT VISIT, EST, LEVL IV, 30-39 MIN: ICD-10-PCS | Mod: 25,S$GLB,, | Performed by: INTERNAL MEDICINE

## 2022-10-06 PROCEDURE — 90686 FLU VACCINE (QUAD) GREATER THAN OR EQUAL TO 3YO PRESERVATIVE FREE IM: ICD-10-PCS | Mod: S$GLB,,, | Performed by: INTERNAL MEDICINE

## 2022-10-06 PROCEDURE — 82570 ASSAY OF URINE CREATININE: CPT | Performed by: INTERNAL MEDICINE

## 2022-10-06 PROCEDURE — 3074F SYST BP LT 130 MM HG: CPT | Mod: CPTII,S$GLB,, | Performed by: INTERNAL MEDICINE

## 2022-10-06 PROCEDURE — 1159F MED LIST DOCD IN RCRD: CPT | Mod: CPTII,S$GLB,, | Performed by: INTERNAL MEDICINE

## 2022-10-06 PROCEDURE — 4010F PR ACE/ARB THEARPY RXD/TAKEN: ICD-10-PCS | Mod: CPTII,S$GLB,, | Performed by: INTERNAL MEDICINE

## 2022-10-06 PROCEDURE — 3008F PR BODY MASS INDEX (BMI) DOCUMENTED: ICD-10-PCS | Mod: CPTII,S$GLB,, | Performed by: INTERNAL MEDICINE

## 2022-10-06 PROCEDURE — 90750 HZV VACC RECOMBINANT IM: CPT | Mod: S$GLB,,, | Performed by: INTERNAL MEDICINE

## 2022-10-06 PROCEDURE — 3078F PR MOST RECENT DIASTOLIC BLOOD PRESSURE < 80 MM HG: ICD-10-PCS | Mod: CPTII,S$GLB,, | Performed by: INTERNAL MEDICINE

## 2022-10-06 PROCEDURE — 3072F LOW RISK FOR RETINOPATHY: CPT | Mod: CPTII,S$GLB,, | Performed by: INTERNAL MEDICINE

## 2022-10-06 PROCEDURE — 90677 PCV20 VACCINE IM: CPT | Mod: S$GLB,,, | Performed by: INTERNAL MEDICINE

## 2022-10-06 PROCEDURE — 1159F PR MEDICATION LIST DOCUMENTED IN MEDICAL RECORD: ICD-10-PCS | Mod: CPTII,S$GLB,, | Performed by: INTERNAL MEDICINE

## 2022-10-06 PROCEDURE — 90750 ZOSTER RECOMBINANT VACCINE: ICD-10-PCS | Mod: S$GLB,,, | Performed by: INTERNAL MEDICINE

## 2022-10-06 PROCEDURE — 99999 PR PBB SHADOW E&M-EST. PATIENT-LVL V: CPT | Mod: PBBFAC,,, | Performed by: INTERNAL MEDICINE

## 2022-10-06 PROCEDURE — 80053 COMPREHEN METABOLIC PANEL: CPT | Performed by: INTERNAL MEDICINE

## 2022-10-06 PROCEDURE — 3072F PR LOW RISK FOR RETINOPATHY: ICD-10-PCS | Mod: CPTII,S$GLB,, | Performed by: INTERNAL MEDICINE

## 2022-10-06 PROCEDURE — 1160F PR REVIEW ALL MEDS BY PRESCRIBER/CLIN PHARMACIST DOCUMENTED: ICD-10-PCS | Mod: CPTII,S$GLB,, | Performed by: INTERNAL MEDICINE

## 2022-10-06 PROCEDURE — 80061 LIPID PANEL: CPT | Performed by: INTERNAL MEDICINE

## 2022-10-06 PROCEDURE — 3008F BODY MASS INDEX DOCD: CPT | Mod: CPTII,S$GLB,, | Performed by: INTERNAL MEDICINE

## 2022-10-06 PROCEDURE — 99999 PR PBB SHADOW E&M-EST. PATIENT-LVL V: ICD-10-PCS | Mod: PBBFAC,,, | Performed by: INTERNAL MEDICINE

## 2022-10-06 PROCEDURE — 36415 COLL VENOUS BLD VENIPUNCTURE: CPT | Performed by: INTERNAL MEDICINE

## 2022-10-06 PROCEDURE — 90686 IIV4 VACC NO PRSV 0.5 ML IM: CPT | Mod: S$GLB,,, | Performed by: INTERNAL MEDICINE

## 2022-10-06 PROCEDURE — 83036 HEMOGLOBIN GLYCOSYLATED A1C: CPT | Performed by: INTERNAL MEDICINE

## 2022-10-06 PROCEDURE — 90472 PNEUMOCOCCAL CONJUGATE VACCINE 20-VALENT: ICD-10-PCS | Mod: S$GLB,,, | Performed by: INTERNAL MEDICINE

## 2022-10-06 PROCEDURE — 90677 PNEUMOCOCCAL CONJUGATE VACCINE 20-VALENT: ICD-10-PCS | Mod: S$GLB,,, | Performed by: INTERNAL MEDICINE

## 2022-10-06 PROCEDURE — 4010F ACE/ARB THERAPY RXD/TAKEN: CPT | Mod: CPTII,S$GLB,, | Performed by: INTERNAL MEDICINE

## 2022-10-06 PROCEDURE — 90471 FLU VACCINE (QUAD) GREATER THAN OR EQUAL TO 3YO PRESERVATIVE FREE IM: ICD-10-PCS | Mod: S$GLB,,, | Performed by: INTERNAL MEDICINE

## 2022-10-06 PROCEDURE — 1160F RVW MEDS BY RX/DR IN RCRD: CPT | Mod: CPTII,S$GLB,, | Performed by: INTERNAL MEDICINE

## 2022-10-06 RX ORDER — PRAVASTATIN SODIUM 80 MG/1
80 TABLET ORAL NIGHTLY
Qty: 90 TABLET | Refills: 1 | Status: SHIPPED | OUTPATIENT
Start: 2022-10-06 | End: 2023-06-08

## 2022-10-06 RX ORDER — SILDENAFIL 50 MG/1
50 TABLET, FILM COATED ORAL DAILY PRN
Qty: 10 TABLET | Refills: 5 | Status: SHIPPED | OUTPATIENT
Start: 2022-10-06 | End: 2023-01-02 | Stop reason: SDUPTHER

## 2022-10-06 RX ORDER — LISINOPRIL 40 MG/1
40 TABLET ORAL DAILY
Qty: 90 TABLET | Refills: 1 | Status: SHIPPED | OUTPATIENT
Start: 2022-10-06 | End: 2023-03-23

## 2022-10-06 RX ORDER — PROMETHAZINE HYDROCHLORIDE AND DEXTROMETHORPHAN HYDROBROMIDE 6.25; 15 MG/5ML; MG/5ML
5 SYRUP ORAL EVERY 8 HOURS PRN
Qty: 118 ML | Refills: 0 | Status: SHIPPED | OUTPATIENT
Start: 2022-10-06 | End: 2022-10-16

## 2022-10-06 RX ORDER — GLIPIZIDE 5 MG/1
5 TABLET ORAL DAILY
Qty: 90 TABLET | Refills: 1 | Status: SHIPPED | OUTPATIENT
Start: 2022-10-06 | End: 2023-03-23

## 2022-10-06 NOTE — PROGRESS NOTES
Laron Kothari .  52 y.o. Black or  male  [id number]    Chief Complaint:  Chief Complaint   Patient presents with    Follow-up       History of Present Illness:  HTN--stable.  HLD--compliant with pravastatin.   DM--due for labs. Reports compliance with metformin and glipizide. He no longer takes insulin. He states he stopped it nearly a year ago due to low blood sugars.   ED--needs refills on sildenafil.   He has been having a cough and congestion for the past week. He denies  having a fever, chest pain or shortness of breath. He continues to smoke.    Laboratory:  Lab Results   Component Value Date    WBC 11.48 06/21/2022    HGB 13.7 (L) 06/21/2022    HCT 41.8 06/21/2022     06/21/2022    CHOL 165 12/08/2021    TRIG 90 12/08/2021    HDL 38 (L) 12/08/2021    ALT 23 06/21/2022    AST 27 06/21/2022     06/21/2022    K 3.8 06/21/2022     06/21/2022    CREATININE 0.9 06/21/2022    BUN 15 06/21/2022    CO2 27 06/21/2022    PSA 0.74 07/20/2011    INR 0.9 01/06/2020    HGBA1C 5.9 (H) 12/08/2021       History:  Past Medical History:   Diagnosis Date    Blood in stool 8/7/2018    Deep vein thrombosis (DVT) 2017    Left leg    Diabetes mellitus, type 2 2013    Gout     Hyperlipidemia     Hypertension     Neuropathy        Current Outpatient Medications:     blood sugar diagnostic Strp, Once daily glucose testing., Disp: 50 strip, Rfl: 6    blood-glucose meter Misc, Use as directed., Disp: 1 each, Rfl: 0    gabapentin (NEURONTIN) 300 MG capsule, Take 1 capsule (300 mg total) by mouth 3 (three) times daily., Disp: 90 capsule, Rfl: 11    lancets (LANCETS,THIN) Misc, Once daily glucose testing., Disp: 50 each, Rfl: 6    meloxicam (MOBIC) 15 MG tablet, Take 1 tablet (15 mg total) by mouth once daily. Take with food, Disp: 90 tablet, Rfl: 0    meloxicam (MOBIC) 15 MG tablet, Take 1 tablet (15 mg total) by mouth once daily. Take with food, Disp: 30 tablet, Rfl: 3    metFORMIN (GLUCOPHAGE) 1000 MG  "tablet, TAKE 1 TABLET BY MOUTH TWICE DAILY WITH  MEALS, Disp: 180 tablet, Rfl: 0    pen needle, diabetic (PEN NEEDLE) 31 gauge x 5/16" Ndle, Use once daily with insulin injection., Disp: 50 each, Rfl: 3    glipiZIDE (GLUCOTROL) 5 MG tablet, Take 1 tablet (5 mg total) by mouth once daily., Disp: 90 tablet, Rfl: 1    lisinopriL (PRINIVIL,ZESTRIL) 40 MG tablet, Take 1 tablet (40 mg total) by mouth once daily., Disp: 90 tablet, Rfl: 1    pravastatin (PRAVACHOL) 80 MG tablet, Take 1 tablet (80 mg total) by mouth every evening., Disp: 90 tablet, Rfl: 1    sildenafiL (VIAGRA) 50 MG tablet, Take 1 tablet (50 mg total) by mouth daily as needed for Erectile Dysfunction., Disp: 10 tablet, Rfl: 5    Allergies:  Review of patient's allergies indicates:  No Known Allergies    Review of Systems   Constitutional:  Negative for fever.   HENT:  Positive for congestion.    Respiratory:  Positive for cough. Negative for sputum production and shortness of breath.    Cardiovascular:  Negative for chest pain and leg swelling.   Neurological:  Negative for dizziness and headaches.     Exam:  Vitals:    10/06/22 0900   BP: 122/72   Pulse:    Resp:    Temp:      Weight: 107.6 kg (237 lb 3.4 oz)   Body mass index is 30.46 kg/m².      Physical Exam  Vitals reviewed.   Constitutional:       General: He is not in acute distress.     Appearance: He is well-developed. He is not ill-appearing.   Eyes:      General: No scleral icterus.  Cardiovascular:      Rate and Rhythm: Normal rate and regular rhythm.      Pulses:           Dorsalis pedis pulses are 2+ on the right side and 2+ on the left side.      Heart sounds: Normal heart sounds.   Pulmonary:      Effort: Pulmonary effort is normal. No respiratory distress.      Breath sounds: Normal breath sounds.   Musculoskeletal:      Right lower leg: No edema.      Left lower leg: No edema.   Feet:      Right foot:      Protective Sensation: 5 sites tested.  5 sites sensed.      Skin integrity: Callus " present. No ulcer.      Left foot:      Protective Sensation: 5 sites tested.  5 sites sensed.      Skin integrity: Callus present. No ulcer.   Skin:     General: Skin is warm and dry.   Neurological:      Mental Status: He is alert and oriented to person, place, and time.       Assessment:  The primary encounter diagnosis was Hypertension goal BP (blood pressure) < 130/80. Diagnoses of Hyperlipidemia LDL goal <70, Controlled type 2 diabetes mellitus with microalbuminuria, with long-term current use of insulin, Erectile dysfunction, unspecified erectile dysfunction type, Viral URI with cough, Cigarette nicotine dependence without complication, and Immunization due were also pertinent to this visit.    Plan:  Hypertension goal BP (blood pressure) < 130/80  -     refill lisinopriL (PRINIVIL,ZESTRIL) 40 MG tablet; Take 1 tablet (40 mg total) by mouth once daily.  Dispense: 90 tablet; Refill: 1    Hyperlipidemia LDL goal <70  -     refill pravastatin (PRAVACHOL) 80 MG tablet; Take 1 tablet (80 mg total) by mouth every evening.  Dispense: 90 tablet; Refill: 1    Controlled type 2 diabetes mellitus with microalbuminuria, with long-term current use of insulin  -     A1C, CMP and Microalbumin/Creatinine Ratio, Urine  -     refill lisinopriL (PRINIVIL,ZESTRIL) 40 MG tablet; Take 1 tablet (40 mg total) by mouth once daily.  Dispense: 90 tablet; Refill: 1  -     refill glipiZIDE (GLUCOTROL) 5 MG tablet; Take 1 tablet (5 mg total) by mouth once daily.  Dispense: 90 tablet; Refill: 1    Erectile dysfunction, unspecified erectile dysfunction type  -     refill sildenafiL (VIAGRA) 50 MG tablet; Take 1 tablet (50 mg total) by mouth daily as needed for Erectile Dysfunction.  Dispense: 10 tablet; Refill: 5    Viral URI with cough  -     promethazine-dextromethorphan (PROMETHAZINE-DM) 6.25-15 mg/5 mL Syrp; Take 5 mLs by mouth every 8 (eight) hours as needed.  Dispense: 118 mL; Refill: 0    Cigarette nicotine dependence without  complication  -     recommend cessation    Immunization due  -     (In Office Administered) Pneumococcal Conjugate Vaccine (20 Valent) (IM)  -     Influenza - Quadrivalent (PF)  -     (In Office Administered) Zoster Recombinant Vaccine    Labs today.     Follow up in about 6 months (around 4/6/2023).

## 2022-10-12 ENCOUNTER — TELEPHONE (OUTPATIENT)
Dept: INTERNAL MEDICINE | Facility: CLINIC | Age: 52
End: 2022-10-12
Payer: COMMERCIAL

## 2022-10-12 DIAGNOSIS — R80.9 CONTROLLED TYPE 2 DIABETES MELLITUS WITH MICROALBUMINURIA, WITHOUT LONG-TERM CURRENT USE OF INSULIN: Primary | ICD-10-CM

## 2022-10-12 DIAGNOSIS — E11.29 CONTROLLED TYPE 2 DIABETES MELLITUS WITH MICROALBUMINURIA, WITHOUT LONG-TERM CURRENT USE OF INSULIN: Primary | ICD-10-CM

## 2023-03-01 ENCOUNTER — PATIENT MESSAGE (OUTPATIENT)
Dept: INTERNAL MEDICINE | Facility: CLINIC | Age: 53
End: 2023-03-01
Payer: COMMERCIAL

## 2023-03-02 ENCOUNTER — PATIENT MESSAGE (OUTPATIENT)
Dept: INTERNAL MEDICINE | Facility: CLINIC | Age: 53
End: 2023-03-02
Payer: COMMERCIAL

## 2023-03-03 ENCOUNTER — OFFICE VISIT (OUTPATIENT)
Dept: INTERNAL MEDICINE | Facility: CLINIC | Age: 53
End: 2023-03-03
Payer: COMMERCIAL

## 2023-03-03 DIAGNOSIS — J06.9 URI WITH COUGH AND CONGESTION: Primary | ICD-10-CM

## 2023-03-03 DIAGNOSIS — R80.9 CONTROLLED TYPE 2 DIABETES MELLITUS WITH MICROALBUMINURIA, WITH LONG-TERM CURRENT USE OF INSULIN: ICD-10-CM

## 2023-03-03 DIAGNOSIS — E11.29 CONTROLLED TYPE 2 DIABETES MELLITUS WITH MICROALBUMINURIA, WITH LONG-TERM CURRENT USE OF INSULIN: ICD-10-CM

## 2023-03-03 DIAGNOSIS — Z79.4 CONTROLLED TYPE 2 DIABETES MELLITUS WITH MICROALBUMINURIA, WITH LONG-TERM CURRENT USE OF INSULIN: ICD-10-CM

## 2023-03-03 PROCEDURE — 1159F MED LIST DOCD IN RCRD: CPT | Mod: CPTII,95,,

## 2023-03-03 PROCEDURE — 4010F ACE/ARB THERAPY RXD/TAKEN: CPT | Mod: CPTII,95,,

## 2023-03-03 PROCEDURE — 3072F PR LOW RISK FOR RETINOPATHY: ICD-10-PCS | Mod: CPTII,95,,

## 2023-03-03 PROCEDURE — 4010F PR ACE/ARB THEARPY RXD/TAKEN: ICD-10-PCS | Mod: CPTII,95,,

## 2023-03-03 PROCEDURE — 1160F RVW MEDS BY RX/DR IN RCRD: CPT | Mod: CPTII,95,,

## 2023-03-03 PROCEDURE — 1160F PR REVIEW ALL MEDS BY PRESCRIBER/CLIN PHARMACIST DOCUMENTED: ICD-10-PCS | Mod: CPTII,95,,

## 2023-03-03 PROCEDURE — 99213 OFFICE O/P EST LOW 20 MIN: CPT | Mod: 95,,,

## 2023-03-03 PROCEDURE — 1159F PR MEDICATION LIST DOCUMENTED IN MEDICAL RECORD: ICD-10-PCS | Mod: CPTII,95,,

## 2023-03-03 PROCEDURE — 99213 PR OFFICE/OUTPT VISIT, EST, LEVL III, 20-29 MIN: ICD-10-PCS | Mod: 95,,,

## 2023-03-03 PROCEDURE — 3072F LOW RISK FOR RETINOPATHY: CPT | Mod: CPTII,95,,

## 2023-03-03 RX ORDER — PROMETHAZINE HYDROCHLORIDE AND DEXTROMETHORPHAN HYDROBROMIDE 6.25; 15 MG/5ML; MG/5ML
5 SYRUP ORAL EVERY 6 HOURS PRN
Qty: 118 ML | Refills: 0 | Status: SHIPPED | OUTPATIENT
Start: 2023-03-03 | End: 2023-03-13

## 2023-03-03 RX ORDER — METHYLPREDNISOLONE 4 MG/1
TABLET ORAL
Qty: 21 EACH | Refills: 0 | Status: SHIPPED | OUTPATIENT
Start: 2023-03-03 | End: 2023-04-11

## 2023-03-03 RX ORDER — CETIRIZINE HYDROCHLORIDE 10 MG/1
10 TABLET ORAL DAILY
Qty: 30 TABLET | Refills: 0 | Status: SHIPPED | OUTPATIENT
Start: 2023-03-03 | End: 2023-04-19

## 2023-03-03 RX ORDER — FLUTICASONE PROPIONATE 50 MCG
1 SPRAY, SUSPENSION (ML) NASAL DAILY
Qty: 18.2 ML | Refills: 1 | Status: SHIPPED | OUTPATIENT
Start: 2023-03-03 | End: 2023-10-27

## 2023-03-03 NOTE — PROGRESS NOTES
Laron Kothari .  03/03/2023  [id number]    Kristen Adler DO  Patient Care Team:  Kristen Adler DO as PCP - General (Internal Medicine)  Eric Rios MD as Consulting Physician (Vascular Surgery)  Brittni Bird LPN as Care Coordinator (Internal Medicine)          Visit Type:an urgent visit for a new problem    The patient location is: [place]  The chief complaint leading to consultation is:   Congestion       Visit type: audiovisual    Face to Face time with patient: 9   minutes of total time spent on the encounter, which includes face to face time and non-face to face time preparing to see the patient (eg, review of tests), Obtaining and/or reviewing separately obtained history, Documenting clinical information in the electronic or other health record, Independently interpreting results (not separately reported) and communicating results to the patient/family/caregiver, or Care coordination (not separately reported).         Each patient to whom he or she provides medical services by telemedicine is:  (1) informed of the relationship between the physician and patient and the respective role of any other health care provider with respect to management of the patient; and (2) notified that he or she may decline to receive medical services by telemedicine and may withdraw from such care at any time.    Notes:      He has been congested and coughing since Sunday   Denies fever, body aches, or SOB  Tried Muncinex and Robitussin without relief   Cough is worse at night when he lays down     History:  Past Medical History:   Diagnosis Date    Blood in stool 8/7/2018    Deep vein thrombosis (DVT) 2017    Left leg    Diabetes mellitus, type 2 2013    Gout     Hyperlipidemia     Hypertension     Neuropathy      Past Surgical History:   Procedure Laterality Date    APPENDECTOMY      ARTHROSCOPIC CHONDROPLASTY OF KNEE JOINT Right 11/24/2020    Procedure: ARTHROSCOPY, KNEE, WITH CHONDROPLASTY;  Surgeon: Nahum BEARDEN  MD Rose;  Location: [facility] OR;  Service: Orthopedics;  Laterality: Right;  chondroplasty medial condyle and anteriorly    COLONOSCOPY N/A 8/7/2018    Procedure: COLONOSCOPY;  Surgeon: Ten Valentine MD;  Location: [facility] ENDO;  Service: Endoscopy;  Laterality: N/A;    COLONOSCOPY N/A 2/23/2022    Procedure: COLONOSCOPY;  Surgeon: Octavio Craig MD;  Location: [facility] ENDO;  Service: Endoscopy;  Laterality: N/A;    KNEE ARTHROSCOPY W/ MENISCECTOMY Right 11/24/2020    Procedure: ARTHROSCOPY, KNEE, WITH MENISCECTOMY;  Surgeon: Nahum Rose MD;  Location: [facility] OR;  Service: Orthopedics;  Laterality: Right;  Partial medial and lateral meniscectomy    ROBOT-ASSISTED CHOLECYSTECTOMY USING DA TRIPP XI N/A 1/21/2020    Procedure: XI ROBOTIC CHOLECYSTECTOMY;  Surgeon: Sebastien Rivera MD;  Location: [facility] OR;  Service: General;  Laterality: N/A;    TONSILLECTOMY, ADENOIDECTOMY       Family History   Problem Relation Age of Onset    Diabetes Father     Prostate cancer Father     Cataracts Father     Hypertension Mother     Hypertension Brother      Social History     Socioeconomic History    Marital status:    Tobacco Use    Smoking status: Every Day     Packs/day: 0.50     Types: Cigarettes    Smokeless tobacco: Never    Tobacco comments:     no smoking after m.n prior to sx   Substance and Sexual Activity    Alcohol use: Yes     Alcohol/week: 46.0 standard drinks     Types: 46 Cans of beer per week     Comment: daily    Drug use: No    Sexual activity: Yes     Partners: Female     Patient Active Problem List   Diagnosis    Hyperlipidemia LDL goal <70    Tobacco use disorder    Hypertension goal BP (blood pressure) < 130/80    Diabetes mellitus with microalbuminuria    Type 2 diabetes mellitus with diabetic neuropathy    Leg DVT (deep venous thromboembolism), acute, left    Blood in stool    Colon polyps    Alcohol abuse    Acute medial meniscus tear of right knee     Review of patient's allergies  "indicates:  No Known Allergies    The following were reviewed at this visit: active problem list, medication list, allergies, family history, social history, and health maintenance.    Medications:  Current Outpatient Medications on File Prior to Visit   Medication Sig Dispense Refill    blood sugar diagnostic Strp Once daily glucose testing. 50 strip 6    blood-glucose meter Misc Use as directed. 1 each 0    gabapentin (NEURONTIN) 300 MG capsule Take 1 capsule (300 mg total) by mouth 3 (three) times daily. 90 capsule 11    glipiZIDE (GLUCOTROL) 5 MG tablet Take 1 tablet (5 mg total) by mouth once daily. 90 tablet 1    lancets (LANCETS,THIN) Misc Once daily glucose testing. 50 each 6    lisinopriL (PRINIVIL,ZESTRIL) 40 MG tablet Take 1 tablet (40 mg total) by mouth once daily. 90 tablet 1    meloxicam (MOBIC) 15 MG tablet Take 1 tablet (15 mg total) by mouth once daily. Take with food 90 tablet 0    meloxicam (MOBIC) 15 MG tablet Take 1 tablet (15 mg total) by mouth once daily. Take with food 30 tablet 3    metFORMIN (GLUCOPHAGE) 1000 MG tablet Take 1 tablet (1,000 mg total) by mouth 2 (two) times daily with meals. 180 tablet 0    pen needle, diabetic (PEN NEEDLE) 31 gauge x 5/16" Ndle Use once daily with insulin injection. 50 each 3    pravastatin (PRAVACHOL) 80 MG tablet Take 1 tablet (80 mg total) by mouth every evening. 90 tablet 1    sildenafiL (VIAGRA) 50 MG tablet Take 1 tablet (50 mg total) by mouth daily as needed for Erectile Dysfunction. 10 tablet 5     Current Facility-Administered Medications on File Prior to Visit   Medication Dose Route Frequency Provider Last Rate Last Admin    0.9%  NaCl infusion   Intravenous Continuous Nahum Rose MD        chlorhexidine 0.12 % solution 10 mL  10 mL Mouth/Throat On Call Procedure Nahum Rose MD   10 mL at 11/24/20 0731    nozaseptin (NOZIN) nasal    Each Nostril On Call Procedure Nahum Rose MD   Given at 11/24/20 0708 "       Medications have been reviewed and reconciled with patient at this visit.  Barriers to medications reviewed with patient.    Adverse reactions to current medications reviewed with patient..    Over the counter medications reviewed and reconciled with patient.    Exam:  Wt Readings from Last 3 Encounters:   10/06/22 107.6 kg (237 lb 3.4 oz)   06/21/22 104.4 kg (230 lb 2.6 oz)   03/14/22 111.6 kg (246 lb)     Temp Readings from Last 3 Encounters:   10/06/22 97.6 °F (36.4 °C) (Oral)   06/21/22 98.5 °F (36.9 °C) (Oral)   02/23/22 98 °F (36.7 °C) (Temporal)     BP Readings from Last 3 Encounters:   10/06/22 122/72   06/21/22 (!) 164/96   02/23/22 128/74     Pulse Readings from Last 3 Encounters:   10/06/22 85   06/21/22 61   02/23/22 78     There is no height or weight on file to calculate BMI.      Review of Systems   Constitutional:  Positive for chills. Negative for fever and weight loss.   HENT:  Positive for sore throat. Negative for ear pain.    Respiratory:  Positive for cough. Negative for hemoptysis and shortness of breath.    Gastrointestinal:  Negative for heartburn.   Musculoskeletal:  Positive for myalgias.   Skin:  Negative for rash.   Neurological:  Positive for headaches.   Endo/Heme/Allergies:  Negative for environmental allergies.   Answers submitted by the patient for this visit:  Cough Questionnaire (Submitted on 3/2/2023)  Chief Complaint: Cough  Chronicity: new  Onset: in the past 7 days  Progression since onset: gradually worsening  Frequency: every few minutes  Cough characteristics: productive of sputum  ear congestion: No  nasal congestion: Yes  postnasal drip: Yes  rhinorrhea: Yes  sweats: Yes  Aggravated by: cold air, lying down  asthma: No  bronchiectasis: No  bronchitis: No  COPD: No  emphysema: No  pneumonia: No  Treatments tried: OTC cough suppressant  Improvement on treatment: no relief      Physical Exam  Nursing note reviewed.   Pulmonary:      Effort: Pulmonary effort is normal.  No respiratory distress.   Neurological:      Mental Status: He is alert and oriented to person, place, and time.   Psychiatric:         Mood and Affect: Mood normal.         Behavior: Behavior normal.         Thought Content: Thought content normal.         Judgment: Judgment normal.       Laboratory Reviewed ({Yes)  Lab Results   Component Value Date    WBC 11.48 06/21/2022    HGB 13.7 (L) 06/21/2022    HCT 41.8 06/21/2022     06/21/2022    CHOL 167 10/06/2022    TRIG 83 10/06/2022    HDL 41 10/06/2022    ALT 27 10/06/2022    AST 23 10/06/2022     10/06/2022    K 4.6 10/06/2022     10/06/2022    CREATININE 0.8 10/06/2022    BUN 13 10/06/2022    CO2 25 10/06/2022    PSA 0.74 07/20/2011    INR 0.9 01/06/2020    HGBA1C 6.0 (H) 10/06/2022     Diagnoses and all orders for this visit:    URI with cough and congestion  -     fluticasone propionate (FLONASE) 50 mcg/actuation nasal spray; 1 spray (50 mcg total) by Each Nostril route once daily.  -     promethazine-dextromethorphan (PROMETHAZINE-DM) 6.25-15 mg/5 mL Syrp; Take 5 mLs by mouth every 6 (six) hours as needed (cough).  -     cetirizine (ZYRTEC) 10 MG tablet; Take 1 tablet (10 mg total) by mouth once daily.  -     methylPREDNISolone (MEDROL DOSEPACK) 4 mg tablet; use as directed    Controlled type 2 diabetes mellitus with microalbuminuria, with long-term current use of insulin      Lab Results   Component Value Date    HGBA1C 6.0 (H) 10/06/2022    Stable on medication. Continue current Plan of Care      Instructions  Drink plenty of fluids  Get plenty of Rest  Take OTC tylenol and or ibuprofen as directed on package for pain/fever. Do not take more than package suggests to take  Follow up with your PCP in 1 week if symptoms persist   Gargle with warm water and salt and use warm liquids to help with throat pain        Care Plan/Goals: Reviewed    Goals         Blood Pressure < 140/90 (pt-stated)             Follow up: No follow-ups on  file.    After visit summary was printed and given to patient upon discharge today.  Patient goals and care plan are included in After Visit Summary.

## 2023-03-23 DIAGNOSIS — N52.9 ERECTILE DYSFUNCTION, UNSPECIFIED ERECTILE DYSFUNCTION TYPE: ICD-10-CM

## 2023-03-23 RX ORDER — SILDENAFIL 50 MG/1
50 TABLET, FILM COATED ORAL DAILY PRN
Qty: 30 TABLET | Refills: 5 | Status: SHIPPED | OUTPATIENT
Start: 2023-03-23 | End: 2023-04-09 | Stop reason: SDUPTHER

## 2023-03-23 NOTE — TELEPHONE ENCOUNTER
No new care gaps identified.  [facility] Embedded Care Gaps. Reference number: [id number]. 3/23/2023   2:35:31 PM CDT

## 2023-03-23 NOTE — TELEPHONE ENCOUNTER
Refill Decision Note   Laron Kothari  is requesting a refill authorization.  Brief Assessment and Rationale for Refill:  Approve     Medication Therapy Plan:  Pt eligible for 6month supply, recent adherence data indicates 30 tablets per month average use    Medication Reconciliation Completed: No    Comments:     No Care Gaps recommended.     Note composed:5:22 PM 03/23/2023

## 2023-04-04 ENCOUNTER — PATIENT MESSAGE (OUTPATIENT)
Dept: INTERNAL MEDICINE | Facility: CLINIC | Age: 53
End: 2023-04-04
Payer: COMMERCIAL

## 2023-04-10 ENCOUNTER — PATIENT OUTREACH (OUTPATIENT)
Dept: ADMINISTRATIVE | Facility: HOSPITAL | Age: 53
End: 2023-04-10
Payer: COMMERCIAL

## 2023-04-11 ENCOUNTER — HOSPITAL ENCOUNTER (INPATIENT)
Facility: HOSPITAL | Age: 53
LOS: 2 days | Discharge: HOME | DRG: 247 | End: 2023-04-13
Attending: EMERGENCY MEDICINE | Admitting: HOSPITALIST
Payer: COMMERCIAL

## 2023-04-11 DIAGNOSIS — E11.29 DIABETES MELLITUS WITH MICROALBUMINURIA: Chronic | ICD-10-CM

## 2023-04-11 DIAGNOSIS — R07.9 CHEST PAIN: ICD-10-CM

## 2023-04-11 DIAGNOSIS — R80.9 DIABETES MELLITUS WITH MICROALBUMINURIA: Chronic | ICD-10-CM

## 2023-04-11 DIAGNOSIS — I21.4 NSTEMI (NON-ST ELEVATED MYOCARDIAL INFARCTION): ICD-10-CM

## 2023-04-11 DIAGNOSIS — I25.10 CORONARY ARTERY DISEASE, UNSPECIFIED VESSEL OR LESION TYPE, UNSPECIFIED WHETHER ANGINA PRESENT, UNSPECIFIED WHETHER NATIVE OR TRANSPLANTED HEART: Primary | ICD-10-CM

## 2023-04-11 LAB
ALBUMIN SERPL BCP-MCNC: 3.9 G/DL (ref 3.5–5.2)
ALP SERPL-CCNC: 60 U/L (ref 55–135)
ALT SERPL W/O P-5'-P-CCNC: 32 U/L (ref 10–44)
ANION GAP SERPL CALC-SCNC: 12 MMOL/L (ref 8–16)
AORTIC ROOT ANNULUS: 4.01 CM
APTT PPP: 25.7 SEC (ref 21–32)
APTT PPP: 45.4 SEC (ref 21–32)
ASCENDING AORTA: 3.94 CM
AST SERPL-CCNC: 109 U/L (ref 10–40)
AV INDEX (PROSTH): 0.93
AV MEAN GRADIENT: 3 MMHG
AV PEAK GRADIENT: 5 MMHG
AV VALVE AREA: 4.23 CM2
AV VELOCITY RATIO: 0.9
BASOPHILS # BLD AUTO: 0.11 K/UL (ref 0–0.2)
BASOPHILS NFR BLD: 0.8 % (ref 0–1.9)
BILIRUB SERPL-MCNC: 0.7 MG/DL (ref 0.1–1)
BNP SERPL-MCNC: 41 PG/ML (ref 0–99)
BSA FOR ECHO PROCEDURE: 2.47 M2
BUN SERPL-MCNC: 13 MG/DL (ref 6–20)
CALCIUM SERPL-MCNC: 9.6 MG/DL (ref 8.7–10.5)
CATH EF QUANTITATIVE: 60 %
CHLORIDE SERPL-SCNC: 103 MMOL/L (ref 95–110)
CO2 SERPL-SCNC: 24 MMOL/L (ref 23–29)
CREAT SERPL-MCNC: 1 MG/DL (ref 0.5–1.4)
CV ECHO LV RWT: 0.62 CM
DIFFERENTIAL METHOD: ABNORMAL
DOP CALC AO PEAK VEL: 1.07 M/S
DOP CALC AO VTI: 21.4 CM
DOP CALC LVOT AREA: 4.5 CM2
DOP CALC LVOT DIAMETER: 2.4 CM
DOP CALC LVOT PEAK VEL: 0.96 M/S
DOP CALC LVOT STROKE VOLUME: 90.43 CM3
DOP CALC RVOT PEAK VEL: 0.66 M/S
DOP CALC RVOT VTI: 14.1 CM
DOP CALCLVOT PEAK VEL VTI: 20 CM
E WAVE DECELERATION TIME: 366.29 MSEC
E/A RATIO: 0.79
E/E' RATIO: 7 M/S
ECHO LV POSTERIOR WALL: 1.3 CM (ref 0.6–1.1)
EJECTION FRACTION: 55 %
EOSINOPHIL # BLD AUTO: 0.7 K/UL (ref 0–0.5)
EOSINOPHIL NFR BLD: 4.5 % (ref 0–8)
ERYTHROCYTE [DISTWIDTH] IN BLOOD BY AUTOMATED COUNT: 12.3 % (ref 11.5–14.5)
EST. GFR  (NO RACE VARIABLE): >60 ML/MIN/1.73 M^2
FRACTIONAL SHORTENING: 28 % (ref 28–44)
GLUCOSE SERPL-MCNC: 117 MG/DL (ref 70–110)
HCT VFR BLD AUTO: 39 % (ref 40–54)
HCV AB SERPL QL IA: NEGATIVE
HEP C VIRUS HOLD SPECIMEN: NORMAL
HGB BLD-MCNC: 13.1 G/DL (ref 14–18)
HIV 1+2 AB+HIV1 P24 AG SERPL QL IA: NEGATIVE
IMM GRANULOCYTES # BLD AUTO: 0.05 K/UL (ref 0–0.04)
IMM GRANULOCYTES NFR BLD AUTO: 0.3 % (ref 0–0.5)
INR PPP: 1 (ref 0.8–1.2)
INTERVENTRICULAR SEPTUM: 1.6 CM (ref 0.6–1.1)
IVC DIAMETER: 1.15 CM
IVRT: 83.73 MSEC
LA MAJOR: 5.31 CM
LA MINOR: 5.08 CM
LA WIDTH: 3.4 CM
LEFT ATRIUM SIZE: 2.66 CM
LEFT ATRIUM VOLUME INDEX: 16.5 ML/M2
LEFT ATRIUM VOLUME: 39.92 CM3
LEFT INTERNAL DIMENSION IN SYSTOLE: 3.05 CM (ref 2.1–4)
LEFT VENTRICLE DIASTOLIC VOLUME INDEX: 32.59 ML/M2
LEFT VENTRICLE DIASTOLIC VOLUME: 78.87 ML
LEFT VENTRICLE MASS INDEX: 98 G/M2
LEFT VENTRICLE SYSTOLIC VOLUME INDEX: 15 ML/M2
LEFT VENTRICLE SYSTOLIC VOLUME: 36.42 ML
LEFT VENTRICULAR INTERNAL DIMENSION IN DIASTOLE: 4.21 CM (ref 3.5–6)
LEFT VENTRICULAR MASS: 237.56 G
LV LATERAL E/E' RATIO: 7.7 M/S
LV SEPTAL E/E' RATIO: 6.42 M/S
LVOT MG: 2.05 MMHG
LVOT MV: 0.67 CM/S
LYMPHOCYTES # BLD AUTO: 4.6 K/UL (ref 1–4.8)
LYMPHOCYTES NFR BLD: 32 % (ref 18–48)
MCH RBC QN AUTO: 32 PG (ref 27–31)
MCHC RBC AUTO-ENTMCNC: 33.6 G/DL (ref 32–36)
MCV RBC AUTO: 95 FL (ref 82–98)
MONOCYTES # BLD AUTO: 0.9 K/UL (ref 0.3–1)
MONOCYTES NFR BLD: 5.9 % (ref 4–15)
MV PEAK A VEL: 0.98 M/S
MV PEAK E VEL: 0.77 M/S
MV STENOSIS PRESSURE HALF TIME: 106.22 MS
MV VALVE AREA P 1/2 METHOD: 2.07 CM2
NEUTROPHILS # BLD AUTO: 8.1 K/UL (ref 1.8–7.7)
NEUTROPHILS NFR BLD: 56.5 % (ref 38–73)
NRBC BLD-RTO: 0 /100 WBC
PISA TR MAX VEL: 1.96 M/S
PLATELET # BLD AUTO: 268 K/UL (ref 150–450)
PMV BLD AUTO: 10 FL (ref 9.2–12.9)
POC ACTIVATED CLOTTING TIME K: 215 SEC (ref 74–137)
POC ACTIVATED CLOTTING TIME K: 263 SEC (ref 74–137)
POC ACTIVATED CLOTTING TIME K: 317 SEC (ref 74–137)
POCT GLUCOSE: 188 MG/DL (ref 70–110)
POCT GLUCOSE: 196 MG/DL (ref 70–110)
POTASSIUM SERPL-SCNC: 4.1 MMOL/L (ref 3.5–5.1)
PROT SERPL-MCNC: 7.5 G/DL (ref 6–8.4)
PROTHROMBIN TIME: 10.4 SEC (ref 9–12.5)
PULM VEIN S/D RATIO: 1.07
PV MEAN GRADIENT: 0.91 MMHG
PV PEAK D VEL: 0.41 M/S
PV PEAK S VEL: 0.44 M/S
RA MAJOR: 3.97 CM
RA PRESSURE: 3 MMHG
RBC # BLD AUTO: 4.09 M/UL (ref 4.6–6.2)
SAMPLE: ABNORMAL
SARS-COV-2 RDRP RESP QL NAA+PROBE: NEGATIVE
SODIUM SERPL-SCNC: 139 MMOL/L (ref 136–145)
STJ: 3.98 CM
TDI LATERAL: 0.1 M/S
TDI SEPTAL: 0.12 M/S
TDI: 0.11 M/S
TR MAX PG: 15 MMHG
TRICUSPID ANNULAR PLANE SYSTOLIC EXCURSION: 1.9 CM
TROPONIN I SERPL DL<=0.01 NG/ML-MCNC: 12.21 NG/ML (ref 0–0.03)
TROPONIN I SERPL DL<=0.01 NG/ML-MCNC: 15.83 NG/ML (ref 0–0.03)
TROPONIN I SERPL DL<=0.01 NG/ML-MCNC: 7.63 NG/ML (ref 0–0.03)
TV REST PULMONARY ARTERY PRESSURE: 18 MMHG
WBC # BLD AUTO: 14.38 K/UL (ref 3.9–12.7)

## 2023-04-11 PROCEDURE — 96374 THER/PROPH/DIAG INJ IV PUSH: CPT

## 2023-04-11 PROCEDURE — 85610 PROTHROMBIN TIME: CPT | Performed by: EMERGENCY MEDICINE

## 2023-04-11 PROCEDURE — 99900035 HC TECH TIME PER 15 MIN (STAT)

## 2023-04-11 PROCEDURE — 93458 L HRT ARTERY/VENTRICLE ANGIO: CPT | Mod: 59 | Performed by: INTERNAL MEDICINE

## 2023-04-11 PROCEDURE — U0002 COVID-19 LAB TEST NON-CDC: HCPCS | Performed by: NURSE PRACTITIONER

## 2023-04-11 PROCEDURE — C1725 CATH, TRANSLUMIN NON-LASER: HCPCS | Performed by: INTERNAL MEDICINE

## 2023-04-11 PROCEDURE — 99152 MOD SED SAME PHYS/QHP 5/>YRS: CPT | Mod: ,,, | Performed by: INTERNAL MEDICINE

## 2023-04-11 PROCEDURE — C9600 PERC DRUG-EL COR STENT SING: HCPCS | Mod: RC | Performed by: INTERNAL MEDICINE

## 2023-04-11 PROCEDURE — 25000003 PHARM REV CODE 250: Performed by: INTERNAL MEDICINE

## 2023-04-11 PROCEDURE — 93005 ELECTROCARDIOGRAM TRACING: CPT

## 2023-04-11 PROCEDURE — 99152 PR MOD CONSCIOUS SEDATION, SAME PHYS, 5+ YRS, FIRST 15 MIN: ICD-10-PCS | Mod: ,,, | Performed by: INTERNAL MEDICINE

## 2023-04-11 PROCEDURE — 36415 COLL VENOUS BLD VENIPUNCTURE: CPT | Performed by: HOSPITALIST

## 2023-04-11 PROCEDURE — 85730 THROMBOPLASTIN TIME PARTIAL: CPT | Performed by: EMERGENCY MEDICINE

## 2023-04-11 PROCEDURE — 93458 PR CATH PLACE/CORON ANGIO, IMG SUPER/INTERP,W LEFT HEART VENTRICULOGRAPHY: ICD-10-PCS | Mod: 26,59,, | Performed by: INTERNAL MEDICINE

## 2023-04-11 PROCEDURE — C1874 STENT, COATED/COV W/DEL SYS: HCPCS | Performed by: INTERNAL MEDICINE

## 2023-04-11 PROCEDURE — 25000003 PHARM REV CODE 250: Performed by: NURSE PRACTITIONER

## 2023-04-11 PROCEDURE — 92928 PRQ TCAT PLMT NTRAC ST 1 LES: CPT | Mod: RC,,, | Performed by: INTERNAL MEDICINE

## 2023-04-11 PROCEDURE — C1769 GUIDE WIRE: HCPCS | Performed by: INTERNAL MEDICINE

## 2023-04-11 PROCEDURE — 93458 L HRT ARTERY/VENTRICLE ANGIO: CPT | Mod: 26,59,, | Performed by: INTERNAL MEDICINE

## 2023-04-11 PROCEDURE — 84484 ASSAY OF TROPONIN QUANT: CPT | Performed by: NURSE PRACTITIONER

## 2023-04-11 PROCEDURE — 99153 MOD SED SAME PHYS/QHP EA: CPT | Performed by: INTERNAL MEDICINE

## 2023-04-11 PROCEDURE — 63600175 PHARM REV CODE 636 W HCPCS: Performed by: NURSE PRACTITIONER

## 2023-04-11 PROCEDURE — C1887 CATHETER, GUIDING: HCPCS | Performed by: INTERNAL MEDICINE

## 2023-04-11 PROCEDURE — 85347 COAGULATION TIME ACTIVATED: CPT | Performed by: INTERNAL MEDICINE

## 2023-04-11 PROCEDURE — 85730 THROMBOPLASTIN TIME PARTIAL: CPT | Mod: 91 | Performed by: HOSPITALIST

## 2023-04-11 PROCEDURE — 99223 1ST HOSP IP/OBS HIGH 75: CPT | Mod: ,,, | Performed by: STUDENT IN AN ORGANIZED HEALTH CARE EDUCATION/TRAINING PROGRAM

## 2023-04-11 PROCEDURE — 85025 COMPLETE CBC W/AUTO DIFF WBC: CPT | Performed by: NURSE PRACTITIONER

## 2023-04-11 PROCEDURE — 25000003 PHARM REV CODE 250: Performed by: EMERGENCY MEDICINE

## 2023-04-11 PROCEDURE — 87389 HIV-1 AG W/HIV-1&-2 AB AG IA: CPT | Performed by: EMERGENCY MEDICINE

## 2023-04-11 PROCEDURE — 93010 ELECTROCARDIOGRAM REPORT: CPT | Mod: 76,,, | Performed by: STUDENT IN AN ORGANIZED HEALTH CARE EDUCATION/TRAINING PROGRAM

## 2023-04-11 PROCEDURE — 80053 COMPREHEN METABOLIC PANEL: CPT | Performed by: NURSE PRACTITIONER

## 2023-04-11 PROCEDURE — C1894 INTRO/SHEATH, NON-LASER: HCPCS | Performed by: INTERNAL MEDICINE

## 2023-04-11 PROCEDURE — 94761 N-INVAS EAR/PLS OXIMETRY MLT: CPT

## 2023-04-11 PROCEDURE — 27201423 OPTIME MED/SURG SUP & DEVICES STERILE SUPPLY: Performed by: INTERNAL MEDICINE

## 2023-04-11 PROCEDURE — 21400001 HC TELEMETRY ROOM

## 2023-04-11 PROCEDURE — 27000221 HC OXYGEN, UP TO 24 HOURS

## 2023-04-11 PROCEDURE — 99223 PR INITIAL HOSPITAL CARE,LEVL III: ICD-10-PCS | Mod: ,,, | Performed by: STUDENT IN AN ORGANIZED HEALTH CARE EDUCATION/TRAINING PROGRAM

## 2023-04-11 PROCEDURE — 86803 HEPATITIS C AB TEST: CPT | Performed by: EMERGENCY MEDICINE

## 2023-04-11 PROCEDURE — 99152 MOD SED SAME PHYS/QHP 5/>YRS: CPT | Performed by: INTERNAL MEDICINE

## 2023-04-11 PROCEDURE — 63600175 PHARM REV CODE 636 W HCPCS: Performed by: EMERGENCY MEDICINE

## 2023-04-11 PROCEDURE — 93010 ELECTROCARDIOGRAM REPORT: CPT | Mod: ,,, | Performed by: STUDENT IN AN ORGANIZED HEALTH CARE EDUCATION/TRAINING PROGRAM

## 2023-04-11 PROCEDURE — 99291 CRITICAL CARE FIRST HOUR: CPT

## 2023-04-11 PROCEDURE — 84484 ASSAY OF TROPONIN QUANT: CPT | Mod: 91 | Performed by: NURSE PRACTITIONER

## 2023-04-11 PROCEDURE — 93010 EKG 12-LEAD: ICD-10-PCS | Mod: 76,,, | Performed by: STUDENT IN AN ORGANIZED HEALTH CARE EDUCATION/TRAINING PROGRAM

## 2023-04-11 PROCEDURE — 25500020 PHARM REV CODE 255: Performed by: INTERNAL MEDICINE

## 2023-04-11 PROCEDURE — 83880 ASSAY OF NATRIURETIC PEPTIDE: CPT | Performed by: NURSE PRACTITIONER

## 2023-04-11 PROCEDURE — 63600175 PHARM REV CODE 636 W HCPCS: Performed by: INTERNAL MEDICINE

## 2023-04-11 PROCEDURE — 92928 PR STENT: ICD-10-PCS | Mod: RC,,, | Performed by: INTERNAL MEDICINE

## 2023-04-11 DEVICE — EVEROLIMUS-ELUTING PLATINUM CHROMIUM CORONARY STENT SYSTEM
Type: IMPLANTABLE DEVICE | Site: CORONARY | Status: FUNCTIONAL
Brand: SYNERGY™ XD

## 2023-04-11 RX ORDER — ACETAMINOPHEN 325 MG/1
650 TABLET ORAL EVERY 8 HOURS PRN
Status: DISCONTINUED | OUTPATIENT
Start: 2023-04-11 | End: 2023-04-13 | Stop reason: HOSPADM

## 2023-04-11 RX ORDER — NALOXONE HCL 0.4 MG/ML
0.02 VIAL (ML) INJECTION
Status: DISCONTINUED | OUTPATIENT
Start: 2023-04-11 | End: 2023-04-13 | Stop reason: HOSPADM

## 2023-04-11 RX ORDER — SODIUM NITROPRUSSIDE 25 MG/ML
INJECTION INTRAVENOUS
Status: DISCONTINUED | OUTPATIENT
Start: 2023-04-11 | End: 2023-04-11 | Stop reason: HOSPADM

## 2023-04-11 RX ORDER — ASPIRIN 325 MG
325 TABLET ORAL
Status: COMPLETED | OUTPATIENT
Start: 2023-04-11 | End: 2023-04-11

## 2023-04-11 RX ORDER — TALC
6 POWDER (GRAM) TOPICAL NIGHTLY PRN
Status: DISCONTINUED | OUTPATIENT
Start: 2023-04-11 | End: 2023-04-13 | Stop reason: HOSPADM

## 2023-04-11 RX ORDER — HEPARIN SODIUM,PORCINE/D5W 25000/250
0-40 INTRAVENOUS SOLUTION INTRAVENOUS CONTINUOUS
Status: DISCONTINUED | OUTPATIENT
Start: 2023-04-11 | End: 2023-04-11

## 2023-04-11 RX ORDER — SIMETHICONE 80 MG
1 TABLET,CHEWABLE ORAL 4 TIMES DAILY PRN
Status: DISCONTINUED | OUTPATIENT
Start: 2023-04-11 | End: 2023-04-13 | Stop reason: HOSPADM

## 2023-04-11 RX ORDER — ACETAMINOPHEN 325 MG/1
650 TABLET ORAL EVERY 4 HOURS PRN
Status: DISCONTINUED | OUTPATIENT
Start: 2023-04-11 | End: 2023-04-13 | Stop reason: HOSPADM

## 2023-04-11 RX ORDER — ONDANSETRON 8 MG/1
8 TABLET, ORALLY DISINTEGRATING ORAL EVERY 8 HOURS PRN
Status: DISCONTINUED | OUTPATIENT
Start: 2023-04-11 | End: 2023-04-13 | Stop reason: HOSPADM

## 2023-04-11 RX ORDER — SODIUM CHLORIDE 9 MG/ML
INJECTION, SOLUTION INTRAVENOUS CONTINUOUS
Status: ACTIVE | OUTPATIENT
Start: 2023-04-11 | End: 2023-04-11

## 2023-04-11 RX ORDER — ASPIRIN 81 MG/1
81 TABLET ORAL DAILY
Status: DISCONTINUED | OUTPATIENT
Start: 2023-04-12 | End: 2023-04-13 | Stop reason: HOSPADM

## 2023-04-11 RX ORDER — LISINOPRIL 20 MG/1
40 TABLET ORAL DAILY
Status: DISCONTINUED | OUTPATIENT
Start: 2023-04-11 | End: 2023-04-13 | Stop reason: HOSPADM

## 2023-04-11 RX ORDER — POLYETHYLENE GLYCOL 3350 17 G/17G
17 POWDER, FOR SOLUTION ORAL DAILY PRN
Status: DISCONTINUED | OUTPATIENT
Start: 2023-04-11 | End: 2023-04-13 | Stop reason: HOSPADM

## 2023-04-11 RX ORDER — FENTANYL CITRATE 50 UG/ML
INJECTION, SOLUTION INTRAMUSCULAR; INTRAVENOUS
Status: DISCONTINUED | OUTPATIENT
Start: 2023-04-11 | End: 2023-04-11 | Stop reason: HOSPADM

## 2023-04-11 RX ORDER — DEXTROSE 40 %
30 GEL (GRAM) ORAL
Status: DISCONTINUED | OUTPATIENT
Start: 2023-04-11 | End: 2023-04-13 | Stop reason: HOSPADM

## 2023-04-11 RX ORDER — PHENYLEPHRINE HYDROCHLORIDE 10 MG/ML
INJECTION INTRAVENOUS
Status: DISCONTINUED | OUTPATIENT
Start: 2023-04-11 | End: 2023-04-11 | Stop reason: HOSPADM

## 2023-04-11 RX ORDER — GLUCAGON 1 MG
1 KIT INJECTION
Status: DISCONTINUED | OUTPATIENT
Start: 2023-04-11 | End: 2023-04-13 | Stop reason: HOSPADM

## 2023-04-11 RX ORDER — HYDROCODONE BITARTRATE AND ACETAMINOPHEN 5; 325 MG/1; MG/1
1 TABLET ORAL EVERY 6 HOURS PRN
Status: DISCONTINUED | OUTPATIENT
Start: 2023-04-11 | End: 2023-04-13 | Stop reason: HOSPADM

## 2023-04-11 RX ORDER — PRAVASTATIN SODIUM 20 MG/1
80 TABLET ORAL NIGHTLY
Status: DISCONTINUED | OUTPATIENT
Start: 2023-04-11 | End: 2023-04-13 | Stop reason: HOSPADM

## 2023-04-11 RX ORDER — MORPHINE SULFATE 4 MG/ML
2 INJECTION, SOLUTION INTRAMUSCULAR; INTRAVENOUS EVERY 4 HOURS PRN
Status: DISCONTINUED | OUTPATIENT
Start: 2023-04-11 | End: 2023-04-12

## 2023-04-11 RX ORDER — MIDAZOLAM HYDROCHLORIDE 1 MG/ML
INJECTION, SOLUTION INTRAMUSCULAR; INTRAVENOUS
Status: DISCONTINUED | OUTPATIENT
Start: 2023-04-11 | End: 2023-04-11 | Stop reason: HOSPADM

## 2023-04-11 RX ORDER — LIDOCAINE HYDROCHLORIDE 20 MG/ML
INJECTION, SOLUTION EPIDURAL; INFILTRATION; INTRACAUDAL; PERINEURAL
Status: DISCONTINUED | OUTPATIENT
Start: 2023-04-11 | End: 2023-04-11 | Stop reason: HOSPADM

## 2023-04-11 RX ORDER — SODIUM CHLORIDE, SODIUM LACTATE, POTASSIUM CHLORIDE, CALCIUM CHLORIDE 600; 310; 30; 20 MG/100ML; MG/100ML; MG/100ML; MG/100ML
INJECTION, SOLUTION INTRAVENOUS CONTINUOUS
Status: DISCONTINUED | OUTPATIENT
Start: 2023-04-11 | End: 2023-04-12

## 2023-04-11 RX ORDER — ASPIRIN 325 MG
325 TABLET ORAL DAILY
Status: DISCONTINUED | OUTPATIENT
Start: 2023-04-12 | End: 2023-04-11

## 2023-04-11 RX ORDER — IBUPROFEN 200 MG
16 TABLET ORAL
Status: DISCONTINUED | OUTPATIENT
Start: 2023-04-11 | End: 2023-04-13 | Stop reason: HOSPADM

## 2023-04-11 RX ORDER — ATROPINE SULFATE 1 MG/ML
INJECTION, SOLUTION INTRAMUSCULAR; INTRAVENOUS; SUBCUTANEOUS
Status: DISCONTINUED | OUTPATIENT
Start: 2023-04-11 | End: 2023-04-11 | Stop reason: HOSPADM

## 2023-04-11 RX ORDER — ONDANSETRON 2 MG/ML
4 INJECTION INTRAMUSCULAR; INTRAVENOUS EVERY 8 HOURS PRN
Status: DISCONTINUED | OUTPATIENT
Start: 2023-04-11 | End: 2023-04-13 | Stop reason: HOSPADM

## 2023-04-11 RX ORDER — HEPARIN SODIUM 1000 [USP'U]/ML
INJECTION, SOLUTION INTRAVENOUS; SUBCUTANEOUS
Status: DISCONTINUED | OUTPATIENT
Start: 2023-04-11 | End: 2023-04-11 | Stop reason: HOSPADM

## 2023-04-11 RX ORDER — DEXTROSE 40 %
15 GEL (GRAM) ORAL
Status: DISCONTINUED | OUTPATIENT
Start: 2023-04-11 | End: 2023-04-13 | Stop reason: HOSPADM

## 2023-04-11 RX ORDER — ACETAMINOPHEN 650 MG/1
650 SUPPOSITORY RECTAL EVERY 4 HOURS PRN
Status: DISCONTINUED | OUTPATIENT
Start: 2023-04-11 | End: 2023-04-13 | Stop reason: HOSPADM

## 2023-04-11 RX ORDER — MORPHINE SULFATE 4 MG/ML
4 INJECTION, SOLUTION INTRAMUSCULAR; INTRAVENOUS ONCE
Status: DISCONTINUED | OUTPATIENT
Start: 2023-04-11 | End: 2023-04-11

## 2023-04-11 RX ORDER — ONDANSETRON 2 MG/ML
INJECTION INTRAMUSCULAR; INTRAVENOUS
Status: DISCONTINUED | OUTPATIENT
Start: 2023-04-11 | End: 2023-04-11 | Stop reason: HOSPADM

## 2023-04-11 RX ORDER — ASPIRIN 325 MG
TABLET ORAL
Status: DISPENSED
Start: 2023-04-11 | End: 2023-04-11

## 2023-04-11 RX ORDER — SODIUM CHLORIDE 0.9 % (FLUSH) 0.9 %
3 SYRINGE (ML) INJECTION EVERY 12 HOURS PRN
Status: DISCONTINUED | OUTPATIENT
Start: 2023-04-11 | End: 2023-04-13 | Stop reason: HOSPADM

## 2023-04-11 RX ORDER — NITROGLYCERIN 5 MG/ML
INJECTION, SOLUTION INTRAVENOUS
Status: DISCONTINUED | OUTPATIENT
Start: 2023-04-11 | End: 2023-04-11 | Stop reason: HOSPADM

## 2023-04-11 RX ORDER — PROMETHAZINE HYDROCHLORIDE 25 MG/1
25 TABLET ORAL EVERY 6 HOURS PRN
Status: DISCONTINUED | OUTPATIENT
Start: 2023-04-11 | End: 2023-04-13 | Stop reason: HOSPADM

## 2023-04-11 RX ORDER — MAG HYDROX/ALUMINUM HYD/SIMETH 200-200-20
30 SUSPENSION, ORAL (FINAL DOSE FORM) ORAL 4 TIMES DAILY PRN
Status: DISCONTINUED | OUTPATIENT
Start: 2023-04-11 | End: 2023-04-13 | Stop reason: HOSPADM

## 2023-04-11 RX ORDER — IBUPROFEN 200 MG
24 TABLET ORAL
Status: DISCONTINUED | OUTPATIENT
Start: 2023-04-11 | End: 2023-04-13 | Stop reason: HOSPADM

## 2023-04-11 RX ADMIN — MORPHINE SULFATE 2 MG: 4 INJECTION INTRAVENOUS at 06:04

## 2023-04-11 RX ADMIN — MORPHINE SULFATE 2 MG: 4 INJECTION INTRAVENOUS at 04:04

## 2023-04-11 RX ADMIN — ASPIRIN 325 MG ORAL TABLET 325 MG: 325 PILL ORAL at 01:04

## 2023-04-11 RX ADMIN — NITROGLYCERIN 1 INCH: 20 OINTMENT TOPICAL at 02:04

## 2023-04-11 RX ADMIN — TICAGRELOR 90 MG: 90 TABLET ORAL at 08:04

## 2023-04-11 RX ADMIN — SODIUM CHLORIDE, POTASSIUM CHLORIDE, SODIUM LACTATE AND CALCIUM CHLORIDE: 600; 310; 30; 20 INJECTION, SOLUTION INTRAVENOUS at 05:04

## 2023-04-11 RX ADMIN — Medication 6 MG: at 08:04

## 2023-04-11 RX ADMIN — SODIUM CHLORIDE: 9 INJECTION, SOLUTION INTRAVENOUS at 02:04

## 2023-04-11 RX ADMIN — HYDROCODONE BITARTRATE AND ACETAMINOPHEN 1 TABLET: 5; 325 TABLET ORAL at 05:04

## 2023-04-11 RX ADMIN — PRAVASTATIN SODIUM 80 MG: 20 TABLET ORAL at 08:04

## 2023-04-11 RX ADMIN — HEPARIN SODIUM 12 UNITS/KG/HR: 10000 INJECTION, SOLUTION INTRAVENOUS at 03:04

## 2023-04-11 RX ADMIN — SODIUM CHLORIDE: 9 INJECTION, SOLUTION INTRAVENOUS at 11:04

## 2023-04-11 NOTE — CONSULTS
"[facility] - Emergency Dept.  Cardiology  Consult Note    Patient Name: Laron Kothari Jr.  MRN: [id number]  Admission Date: 4/11/2023  Hospital Length of Stay: 0 days  Code Status: Full Code   Attending Provider: Colin Pelaez MD   Consulting Provider: Laura Oliveros PA-C  Primary Care Physician: Kristen Adler DO  Principal Problem:NSTEMI (non-ST elevated myocardial infarction)    Patient information was obtained from patient, past medical records and ER records.     Inpatient consult to Cardiology  Consult performed by: Laura Oliveros PA-C  Consult ordered by: Raul Pelaez NP    Inpatient consult to Cardiology  Consult performed by: Laura Oliveros PA-C  Consult ordered by: Raul Pelaez NP        Subjective:     Chief Complaint:  CP/SOB/NSTEMI    HPI:   Mr. Kothari is a 52 year old male patient whose current medical conditions include DVT in 2017, DM type II, HTN, hyperlipidemia, gout, neuropathy, and tobacco abuse who presented to [facility] ED his AM with a chief complaint of substernal chest pressure/heaviness that onset earlier in the day while at work. Patient reported performing his normal job duties when he suddenly became extremely diaphoretic, SOB, and weak. He tried bending down to "catch his breath" but symptoms persisted and then he began to experience constant chest discomfort. He denied any associated nausea, vomiting, palpitations, nears syncope, or syncope. Initial workup in ED revealed troponin of 7.626 and patient was subsequently admitted for further evaluation and treatment. Cardiology consulted to assist with management. Patient seen and examined while in ED. Still with ongoing chest discomfort/heaviness. Reports it feels similar to when he has a "chest cold" but more intense and unrelenting. Received nitro overnight with no improvement. States morphine only provided temporary relief. No prior history of CAD or MI. Labs reviewed. Troponin bumped to 15. EKG reviewed, T wave " inversions/non-specific ST wave abnormality noted. Echo pending. [facility] planned today by Dr. Lara.       Past Medical History:   Diagnosis Date    Blood in stool 08/07/2018    Deep vein thrombosis (DVT) 2017    Left leg    Diabetes mellitus, type 2 2013    Gout     Hyperlipidemia     Hypertension     Neuropathy     Smoker        Past Surgical History:   Procedure Laterality Date    APPENDECTOMY      ARTHROSCOPIC CHONDROPLASTY OF KNEE JOINT Right 11/24/2020    Procedure: ARTHROSCOPY, KNEE, WITH CHONDROPLASTY;  Surgeon: Nahum Rose MD;  Location: [facility] OR;  Service: Orthopedics;  Laterality: Right;  chondroplasty medial condyle and anteriorly    COLONOSCOPY N/A 8/7/2018    Procedure: COLONOSCOPY;  Surgeon: Ten Valentine MD;  Location: [facility] ENDO;  Service: Endoscopy;  Laterality: N/A;    COLONOSCOPY N/A 2/23/2022    Procedure: COLONOSCOPY;  Surgeon: Octavio Craig MD;  Location: [facility] ENDO;  Service: Endoscopy;  Laterality: N/A;    KNEE ARTHROSCOPY W/ MENISCECTOMY Right 11/24/2020    Procedure: ARTHROSCOPY, KNEE, WITH MENISCECTOMY;  Surgeon: Nahum Rose MD;  Location: [facility] OR;  Service: Orthopedics;  Laterality: Right;  Partial medial and lateral meniscectomy    ROBOT-ASSISTED CHOLECYSTECTOMY USING DA TRIPP XI N/A 1/21/2020    Procedure: XI ROBOTIC CHOLECYSTECTOMY;  Surgeon: Sebastien Rivera MD;  Location: [facility] OR;  Service: General;  Laterality: N/A;    TONSILLECTOMY, ADENOIDECTOMY         Review of patient's allergies indicates:  No Known Allergies    No current facility-administered medications on file prior to encounter.     Current Outpatient Medications on File Prior to Encounter   Medication Sig    cetirizine (ZYRTEC) 10 MG tablet Take 1 tablet (10 mg total) by mouth once daily.    fluticasone propionate (FLONASE) 50 mcg/actuation nasal spray 1 spray (50 mcg total) by Each Nostril route once daily.    glipiZIDE (GLUCOTROL) 5 MG tablet Take 1 tablet (5 mg total) by mouth  "once daily.    lisinopriL (PRINIVIL,ZESTRIL) 40 MG tablet Take 1 tablet (40 mg total) by mouth once daily.    meloxicam (MOBIC) 15 MG tablet Take 1 tablet (15 mg total) by mouth once daily. Take with food    meloxicam (MOBIC) 15 MG tablet Take 1 tablet (15 mg total) by mouth once daily. Take with food    metFORMIN (GLUCOPHAGE) 1000 MG tablet Take 1 tablet (1,000 mg total) by mouth 2 (two) times daily with meals.    pravastatin (PRAVACHOL) 80 MG tablet Take 1 tablet (80 mg total) by mouth every evening.    blood sugar diagnostic Strp Once daily glucose testing.    blood-glucose meter Misc Use as directed.    gabapentin (NEURONTIN) 300 MG capsule Take 1 capsule (300 mg total) by mouth 3 (three) times daily.    lancets (LANCETS,THIN) Misc Once daily glucose testing.    pen needle, diabetic (PEN NEEDLE) 31 gauge x 5/16" Ndle Use once daily with insulin injection.    sildenafiL (VIAGRA) 50 MG tablet Take 1 tablet (50 mg total) by mouth daily as needed for Erectile Dysfunction.    [DISCONTINUED] methylPREDNISolone (MEDROL DOSEPACK) 4 mg tablet use as directed     Family History       Problem Relation (Age of Onset)    Cataracts Father    Diabetes Father    Hypertension Mother, Brother    Prostate cancer Father          Tobacco Use    Smoking status: Every Day     Packs/day: 0.50     Types: Cigarettes    Smokeless tobacco: Never    Tobacco comments:     no smoking after m.n prior to sx   Substance and Sexual Activity    Alcohol use: Yes     Alcohol/week: 46.0 standard drinks     Types: 46 Cans of beer per week     Comment: daily    Drug use: No    Sexual activity: Yes     Partners: Female     Review of Systems   Constitutional: Positive for malaise/fatigue.   HENT: Negative.     Eyes: Negative.    Cardiovascular:  Positive for chest pain and dyspnea on exertion.   Respiratory:  Positive for shortness of breath.    Endocrine: Negative.    Hematologic/Lymphatic: Negative.    Skin: Negative.  "   Musculoskeletal: Negative.    Gastrointestinal: Negative.    Genitourinary: Negative.    Neurological: Negative.    Psychiatric/Behavioral: Negative.     Allergic/Immunologic: Negative.    Objective:     Vital Signs (Most Recent):  Temp: 97.5 °F (36.4 °C) (04/11/23 0721)  Pulse: 67 (04/11/23 0737)  Resp: (!) 23 (04/11/23 0737)  BP: 120/72 (04/11/23 0737)  SpO2: 99 % (04/11/23 0737)   Vital Signs (24h Range):  Temp:  [97.5 °F (36.4 °C)-98.2 °F (36.8 °C)] 97.5 °F (36.4 °C)  Pulse:  [] 67  Resp:  [11-27] 23  SpO2:  [96 %-100 %] 99 %  BP: ()/(60-90) 120/72     Weight: 117.2 kg (258 lb 4.8 oz)  Body mass index is 33.16 kg/m².    SpO2: 99 %         Intake/Output Summary (Last 24 hours) at 4/11/2023 0752  Last data filed at 4/11/2023 0742  Gross per 24 hour   Intake 358.89 ml   Output --   Net 358.89 ml       Lines/Drains/Airways       Peripheral Intravenous Line  Duration                  Peripheral IV - Single Lumen 04/11/23 0118 20 G Anterior;Right Upper Arm <1 day         Peripheral IV - Single Lumen 04/11/23 0235 20 G Anterior;Right Forearm <1 day                    Physical Exam  Vitals and nursing note reviewed.   Constitutional:       General: He is not in acute distress.     Appearance: Normal appearance. He is well-developed. He is not diaphoretic.      Comments: On supplemental O2   HENT:      Head: Normocephalic and atraumatic.   Eyes:      General:         Right eye: No discharge.         Left eye: No discharge.      Pupils: Pupils are equal, round, and reactive to light.   Neck:      Thyroid: No thyromegaly.      Vascular: No JVD.      Trachea: No tracheal deviation.   Cardiovascular:      Rate and Rhythm: Normal rate and regular rhythm.      Heart sounds: Normal heart sounds, S1 normal and S2 normal. No murmur heard.  Pulmonary:      Effort: Pulmonary effort is normal. No respiratory distress.      Breath sounds: Normal breath sounds. No wheezing or rales.   Abdominal:      General: There is no  distension.      Tenderness: There is no rebound.   Musculoskeletal:      Cervical back: Neck supple.      Right lower leg: No edema.      Left lower leg: No edema.   Skin:     General: Skin is warm and dry.      Findings: No erythema.   Neurological:      General: No focal deficit present.      Mental Status: He is alert and oriented to person, place, and time.   Psychiatric:         Mood and Affect: Mood normal.         Behavior: Behavior normal.         Thought Content: Thought content normal.       Significant Labs: CMP   Recent Labs   Lab 04/11/23  0116      K 4.1      CO2 24   *   BUN 13   CREATININE 1.0   CALCIUM 9.6   PROT 7.5   ALBUMIN 3.9   BILITOT 0.7   ALKPHOS 60   *   ALT 32   ANIONGAP 12   , CBC   Recent Labs   Lab 04/11/23  0116   WBC 14.38*   HGB 13.1*   HCT 39.0*      , Troponin   Recent Labs   Lab 04/11/23  0116 04/11/23  0354 04/11/23  0640   TROPONINI 7.626* 12.211* 15.833*   , and All pertinent lab results from the last 24 hours have been reviewed.    Significant Imaging: Echocardiogram: Transthoracic echo (TTE) complete (Cupid Only): No results found for this or any previous visit., EKG: Reviewed, and X-Ray: CXR: X-Ray Chest 1 View (CXR): No results found for this visit on 04/11/23. and X-Ray Chest PA and Lateral (CXR): No results found for this visit on 04/11/23.    Assessment and Plan:   Patient who presents with SOB/CP/NSTEMI. Still with ongoing chest discomfort during exam. Continue OMT. Check echo. [facility] today, further rec's to follow.     * NSTEMI (non-ST elevated myocardial infarction)  -Patient who presents with SOB/chest pain and elevated troponin---> consistent with NSTEMI  -Still with ongoing CP, troponin bumped to 15  -Continue ASA, statin, ACEi, heparin gtt  -Check echo  -Will plan to add BB post-procedure  -C today by Dr. Lara. All risks, benefits, and treatment alternatives explained to patient in detail. All questions answered. He has agreed to  proceed.     Type 2 diabetes mellitus with diabetic neuropathy  -Mgmt as per hospital medicine    Hypertension goal BP (blood pressure) < 130/80  -Continue ACEi  -Will plan to optimize regimen post C    Tobacco use disorder  -Smoking cessation advised    Hyperlipidemia LDL goal <70  -Statin          VTE Risk Mitigation (From admission, onward)         Ordered     Reason for No Pharmacological VTE Prophylaxis  Once        Question:  Reasons:  Answer:  IV Heparin w/in 24 hrs. Pre or Post-Op    04/11/23 0353     IP VTE HIGH RISK PATIENT  Once         04/11/23 0353     Place sequential compression device  Until discontinued         04/11/23 0353     heparin 25,000 units in dextrose 5% (100 units/ml) IV bolus from bag - ADDITIONAL PRN BOLUS - 60 units/kg (max bolus 4000 units)  As needed (PRN)        Question:  Heparin Infusion Adjustment (DO NOT MODIFY ANSWER)  Answer:  [email or web address]\epic\Images\Pharmacy\HeparinInfusions\heparin LOW INTENSITY nomogram for [facility] [id number]    04/11/23 0218     heparin 25,000 units in dextrose 5% (100 units/ml) IV bolus from bag - ADDITIONAL PRN BOLUS - 30 units/kg (max bolus 4000 units)  As needed (PRN)        Question:  Heparin Infusion Adjustment (DO NOT MODIFY ANSWER)  Answer:  [email or web address]\[email or web address]\Images\Pharmacy\HeparinInfusions\heparin LOW INTENSITY nomogram for [facility] [id number].pdf    04/11/23 0218     heparin 25,000 units in dextrose 5% 250 mL (100 units/mL) infusion LOW INTENSITY nomogram - [facility]  Continuous        Question Answer Comment   Heparin Infusion Adjustment (DO NOT MODIFY ANSWER) [email or web address]\epic\Images\Pharmacy\HeparinInfusions\heparin LOW INTENSITY nomogram for [facility] [id number]    Begin at (in units/kg/hr) 12        04/11/23 0218                Thank you for your consult. I will follow-up with patient. Please contact us if you have any additional questions.    Laura Oliveros PA-C  Cardiology   [facility] - Emergency Dept.

## 2023-04-11 NOTE — HPI
"Mr. Kothari is a 52 year old male patient whose current medical conditions include DVT in 2017, DM type II, HTN, hyperlipidemia, gout, neuropathy, and tobacco abuse who presented to [facility] ED his AM with a chief complaint of substernal chest pressure/heaviness that onset earlier in the day while at work. Patient reported performing his normal job duties when he suddenly became extremely diaphoretic, SOB, and weak. He tried bending down to "catch his breath" but symptoms persisted and then he began to experience constant chest discomfort. He denied any associated nausea, vomiting, palpitations, nears syncope, or syncope. Initial workup in ED revealed troponin of 7.626 and patient was subsequently admitted for further evaluation and treatment. Cardiology consulted to assist with management. Patient seen and examined while in ED. Still with ongoing chest discomfort/heaviness. Reports it feels similar to when he has a "chest cold" but more intense and unrelenting. Received nitro overnight with no improvement. States morphine only provided temporary relief. No prior history of CAD or MI. Labs reviewed. Troponin bumped to 15. EKG reviewed, T wave inversions/non-specific ST wave abnormality noted. Echo pending. [facility] planned today by Dr. Lara.   "

## 2023-04-11 NOTE — Clinical Note
The radial band was applied to the left radial artery. 13 cc's of air were inserted into the closure device.

## 2023-04-11 NOTE — ASSESSMENT & PLAN NOTE
Patient is chronically on statin.will continue for now. Last Lipid Panel:   Lab Results   Component Value Date    CHOL 167 10/06/2022    HDL 41 10/06/2022    LDLCALC 109.4 10/06/2022    TRIG 83 10/06/2022    CHOLHDL 24.6 10/06/2022     Plan:  -Continue home medication  -low fat/low calorie diet

## 2023-04-11 NOTE — ED PROVIDER NOTES
"SCRIBE #1 NOTE: I, Winifred Judson, am scribing for, and in the presence of, Nico Layne MD. I have scribed the entire note.       History     Chief Complaint   Patient presents with    Chest Pain     Worsening Mid sternal chest pressure since 8 am this morning while working, SOB "when I doze off" states "it feels like a cold in my chest but I do not have a cold"      Review of patient's allergies indicates:  No Known Allergies      History of Present Illness     HPI    4/11/2023, 2:17 AM  History obtained from the patient      History of Present Illness: Laron Kothari Jr. is a 52 y.o. male patient with a PMHx of DVT, DM2, HLD, HTN, and alcohol and tobacco use who presents to the Emergency Department for evaluation of midsternal CP which onset 8 am this morning. Symptoms are constant and moderate in severity. No mitigating or exacerbating factors reported. Associated sxs include diaphoresis and SOB. Patient denies any N/V, leg swelling, back pain, fever, chills, and all other sxs at this time. Pt denies history of MI. No prior Tx reported. No further complaints or concerns at this time.       Arrival mode: Personal vehicle     PCP: Kristen Adler DO        Past Medical History:  Past Medical History:   Diagnosis Date    Blood in stool 08/07/2018    Deep vein thrombosis (DVT) 2017    Left leg    Diabetes mellitus, type 2 2013    Gout     Hyperlipidemia     Hypertension     Neuropathy     Smoker        Past Surgical History:  Past Surgical History:   Procedure Laterality Date    APPENDECTOMY      ARTHROSCOPIC CHONDROPLASTY OF KNEE JOINT Right 11/24/2020    Procedure: ARTHROSCOPY, KNEE, WITH CHONDROPLASTY;  Surgeon: Nahum Rose MD;  Location: [facility] OR;  Service: Orthopedics;  Laterality: Right;  chondroplasty medial condyle and anteriorly    COLONOSCOPY N/A 8/7/2018    Procedure: COLONOSCOPY;  Surgeon: Ten Valentine MD;  Location: [facility] ENDO;  Service: Endoscopy;  Laterality: N/A;    COLONOSCOPY N/A 2/23/2022    " Procedure: COLONOSCOPY;  Surgeon: Octavio Craig MD;  Location: [facility] ENDO;  Service: Endoscopy;  Laterality: N/A;    KNEE ARTHROSCOPY W/ MENISCECTOMY Right 11/24/2020    Procedure: ARTHROSCOPY, KNEE, WITH MENISCECTOMY;  Surgeon: Nahum Rose MD;  Location: [facility] OR;  Service: Orthopedics;  Laterality: Right;  Partial medial and lateral meniscectomy    ROBOT-ASSISTED CHOLECYSTECTOMY USING DA TRIPP XI N/A 1/21/2020    Procedure: XI ROBOTIC CHOLECYSTECTOMY;  Surgeon: Sebastien Rivera MD;  Location: [facility] OR;  Service: General;  Laterality: N/A;    TONSILLECTOMY, ADENOIDECTOMY           Family History:  Family History   Problem Relation Age of Onset    Diabetes Father     Prostate cancer Father     Cataracts Father     Hypertension Mother     Hypertension Brother        Social History:  Social History     Tobacco Use    Smoking status: Every Day     Packs/day: 0.50     Types: Cigarettes    Smokeless tobacco: Never    Tobacco comments:     no smoking after m.n prior to sx   Substance and Sexual Activity    Alcohol use: Yes     Alcohol/week: 46.0 standard drinks     Types: 46 Cans of beer per week     Comment: daily    Drug use: No    Sexual activity: Yes     Partners: Female        Review of Systems     Review of Systems   Constitutional:  Negative for chills and fever.   HENT:  Negative for sore throat.    Respiratory:  Positive for shortness of breath.    Cardiovascular:  Positive for chest pain. Negative for leg swelling.   Gastrointestinal:  Negative for nausea and vomiting.   Genitourinary:  Negative for dysuria.   Musculoskeletal:  Negative for back pain.   Skin:  Negative for rash.   Neurological:  Negative for weakness.   Hematological:  Does not bruise/bleed easily.   All other systems reviewed and are negative.     Physical Exam     Initial Vitals [04/11/23 0017]   BP Pulse Resp Temp SpO2   (!) 141/90 103 20 97.7 °F (36.5 °C) 99 %      MAP       --          Physical Exam  Nursing Notes and Vital  Signs Reviewed.  Constitutional: Patient is in no acute distress. Well-developed and well-nourished.  Head: Atraumatic. Normocephalic.  Eyes: PERRL. EOM intact. Conjunctivae are not pale. No scleral icterus.  ENT: Mucous membranes are moist. Oropharynx is clear and symmetric.    Neck: Supple. Full ROM. No lymphadenopathy.  Cardiovascular: Regular rate. Regular rhythm. No murmurs, rubs, or gallops. Distal pulses are 2+ and symmetric.  Pulmonary/Chest: Clear to auscultation bilaterally. No wheezing or rales.  Abdominal: Soft and non-distended.  There is no tenderness.  No rebound, guarding, or rigidity. Good bowel sounds.  Genitourinary: No CVA tenderness  Musculoskeletal: Moves all extremities. No obvious deformities. No edema. No calf tenderness.  Skin: Warm and dry.  Neurological:  Alert, awake, and appropriate.  Normal speech.  No acute focal neurological deficits are appreciated.  Psychiatric: Normal affect. Good eye contact. Appropriate in content.     ED Course   Critical Care    Date/Time: 4/11/2023 8:07 AM  Performed by: Nico Layne MD  Authorized by: Nico Layne MD   Direct patient critical care time: 6 minutes  Additional history critical care time: 7 minutes  Ordering / reviewing critical care time: 5 minutes  Documentation critical care time: 6 minutes  Consulting other physicians critical care time: 4 minutes  Consult with family critical care time: 3 minutes  Other critical care time: 5 minutes  Total critical care time (exclusive of procedural time) : 36 minutes  Critical care time was exclusive of separately billable procedures and treating other patients and teaching time.  Critical care was necessary to treat or prevent imminent or life-threatening deterioration of the following conditions: NSTEMI.  Critical care was time spent personally by me on the following activities: blood draw for specimens, development of treatment plan with patient or surrogate, discussions with consultants,  "interpretation of cardiac output measurements, evaluation of patient's response to treatment, examination of patient, obtaining history from patient or surrogate, ordering and performing treatments and interventions, ordering and review of laboratory studies, ordering and review of radiographic studies, pulse oximetry, re-evaluation of patient's condition and review of old charts.      ED Vital Signs:  Vitals:    04/11/23 0017 04/11/23 0234 04/11/23 0300 04/11/23 0411   BP: (!) 141/90 121/82     Pulse: 103 73 70    Resp: 20 11  20   Temp: 97.7 °F (36.5 °C)      TempSrc: Oral      SpO2: 99%      Weight: 107.9 kg (237 lb 14 oz)      Height: 6' 2" (1.88 m)       04/11/23 0608 04/11/23 0640 04/11/23 0700 04/11/23 0708   BP: 97/66  94/60    Pulse: 63  64 73   Resp: 18 15 (!) 27 18   Temp: 98.2 °F (36.8 °C)      TempSrc: Oral      SpO2: 100%  96% 99%   Weight:       Height:        04/11/23 0721 04/11/23 0737   BP:  120/72   Pulse: (!) 55 67   Resp: 18 (!) 23   Temp: 97.5 °F (36.4 °C)    TempSrc: Oral    SpO2: 100% 99%   Weight: 117.2 kg (258 lb 4.8 oz)    Height:         Abnormal Lab Results:  Labs Reviewed   CBC W/ AUTO DIFFERENTIAL - Abnormal; Notable for the following components:       Result Value    WBC 14.38 (*)     RBC 4.09 (*)     Hemoglobin 13.1 (*)     Hematocrit 39.0 (*)     MCH 32.0 (*)     Gran # (ANC) 8.1 (*)     Immature Grans (Abs) 0.05 (*)     Eos # 0.7 (*)     All other components within normal limits    Narrative:     Release to patient->Immediate   COMPREHENSIVE METABOLIC PANEL - Abnormal; Notable for the following components:    Glucose 117 (*)      (*)     All other components within normal limits    Narrative:     Release to patient->Immediate   TROPONIN I - Abnormal; Notable for the following components:    Troponin I 7.626 (*)     All other components within normal limits    Narrative:     Release to patient->Immediate   TROPONIN I - Abnormal; Notable for the following components:    " Troponin I 12.211 (*)     All other components within normal limits   TROPONIN I - Abnormal; Notable for the following components:    Troponin I 15.833 (*)     All other components within normal limits   B-TYPE NATRIURETIC PEPTIDE    Narrative:     Release to patient->Immediate   SARS-COV-2 RNA AMPLIFICATION, QUAL   HIV 1 / 2 ANTIBODY    Narrative:     Release to patient->Immediate   HEPATITIS C ANTIBODY    Narrative:     Release to patient->Immediate   HEP C VIRUS HOLD SPECIMEN    Narrative:     Release to patient->Immediate   APTT    Narrative:     Draw baseline aPTT prior to starting the heparin bolus or  infusion  (if patient is on warfarin prior to heparin therapy)   PROTIME-INR    Narrative:     Draw baseline aPTT prior to starting the heparin bolus or  infusion  (if patient is on warfarin prior to heparin therapy)   APTT        All Lab Results:  Results for orders placed or performed during the hospital encounter of 04/11/23   CBC auto differential   Result Value Ref Range    WBC 14.38 (H) 3.90 - 12.70 K/uL    RBC 4.09 (L) 4.60 - 6.20 M/uL    Hemoglobin 13.1 (L) 14.0 - 18.0 g/dL    Hematocrit 39.0 (L) 40.0 - 54.0 %    MCV 95 82 - 98 fL    MCH 32.0 (H) 27.0 - 31.0 pg    MCHC 33.6 32.0 - 36.0 g/dL    RDW 12.3 11.5 - 14.5 %    Platelets 268 150 - 450 K/uL    MPV 10.0 9.2 - 12.9 fL    Immature Granulocytes 0.3 0.0 - 0.5 %    Gran # (ANC) 8.1 (H) 1.8 - 7.7 K/uL    Immature Grans (Abs) 0.05 (H) 0.00 - 0.04 K/uL    Lymph # 4.6 1.0 - 4.8 K/uL    Mono # 0.9 0.3 - 1.0 K/uL    Eos # 0.7 (H) 0.0 - 0.5 K/uL    Baso # 0.11 0.00 - 0.20 K/uL    nRBC 0 0 /100 WBC    Gran % 56.5 38.0 - 73.0 %    Lymph % 32.0 18.0 - 48.0 %    Mono % 5.9 4.0 - 15.0 %    Eosinophil % 4.5 0.0 - 8.0 %    Basophil % 0.8 0.0 - 1.9 %    Differential Method Automated    Comprehensive metabolic panel   Result Value Ref Range    Sodium 139 136 - 145 mmol/L    Potassium 4.1 3.5 - 5.1 mmol/L    Chloride 103 95 - 110 mmol/L    CO2 24 23 - 29 mmol/L     Glucose 117 (H) 70 - 110 mg/dL    BUN 13 6 - 20 mg/dL    Creatinine 1.0 0.5 - 1.4 mg/dL    Calcium 9.6 8.7 - 10.5 mg/dL    Total Protein 7.5 6.0 - 8.4 g/dL    Albumin 3.9 3.5 - 5.2 g/dL    Total Bilirubin 0.7 0.1 - 1.0 mg/dL    Alkaline Phosphatase 60 55 - 135 U/L     (H) 10 - 40 U/L    ALT 32 10 - 44 U/L    Anion Gap 12 8 - 16 mmol/L    eGFR >60 >60 mL/min/1.73 m^2   Troponin I #1   Result Value Ref Range    Troponin I 7.626 (H) 0.000 - 0.026 ng/mL   BNP   Result Value Ref Range    BNP 41 0 - 99 pg/mL   COVID-19 Rapid Screening   Result Value Ref Range    SARS-CoV-2 RNA, Amplification, Qual Negative Negative   HIV 1/2 Ag/Ab (4th Gen)   Result Value Ref Range    HIV 1/2 Ag/Ab Negative Negative   Hepatitis C Antibody   Result Value Ref Range    Hepatitis C Ab Negative Negative   HCV Virus Hold Specimen   Result Value Ref Range    HEP C Virus Hold Specimen Hold for HCV sendout    APTT   Result Value Ref Range    aPTT 25.7 21.0 - 32.0 sec   Protime-INR   Result Value Ref Range    Prothrombin Time 10.4 9.0 - 12.5 sec    INR 1.0 0.8 - 1.2   Troponin I   Result Value Ref Range    Troponin I 12.211 (H) 0.000 - 0.026 ng/mL   Troponin I   Result Value Ref Range    Troponin I 15.833 (H) 0.000 - 0.026 ng/mL         Imaging Results:  Imaging Results              X-Ray Chest AP Portable (Final result)  Result time 04/11/23 07:17:27      Final result by Alessio Bhatia MD (04/11/23 07:17:27)                   Impression:      No acute process seen.      Electronically signed by: Alessio Bhatia MD  Date:    04/11/2023  Time:    07:17               Narrative:    EXAMINATION:  XR CHEST AP PORTABLE    CLINICAL HISTORY:  Chest Pain;    FINDINGS:  Single view of the chest.  Comparison 06/21/2022    Cardiac silhouette is normal.  The lungs demonstrate no evidence of active disease.  No evidence of pleural effusion or pneumothorax.  Bones appear intact.                        Wet Read by Nico Layne MD (04/11/23 02:22:18,  [facility] - Emergency Dept., Emergency Medicine)    No acute fidnings                                     The EKG was ordered, reviewed, and independently interpreted by the ED provider.  Interpretation time: 00:21  Rate: 88 BPM  Rhythm: normal sinus rhythm  Interpretation: Possible left atrial enlargement. Septal infarct (cited on or before 11-NOV-2020). No STEMI.    Interpretation time: 2:28  Rate: 65 BPM  Rhythm: Sinus rhythm with marked sinus arrhythmia   Interpretation: Septal infarct, age undetermined. No STEMI.               The Emergency Provider reviewed the vital signs and test results, which are outlined above.     ED Discussion     3:42 AM: Re-evaluated pt. Pt reports Nitro did not relieve CP, but BP did go down.    3:55 AM: Discussed case with Raul Pelaez NP ([facility]). Dr. Pelaez agrees with current care and management of pt and accepts admission.   Admitting Service: Hospital Medicine  Admitting Physician: Dr. Pelaez  Admit to: Med/tele    3:55 AM: Re-evaluated pt. I have discussed test results, shared treatment plan, and the need for admission with patient and family at bedside. Pt and family express understanding at this time and agree with all information. All questions answered. Pt and family have no further questions or concerns at this time. Pt is ready for admit.    6:00 AM: Discussed case with Dr. Sher (Cards). Requested keep NPO.      Medical Decision Making:   Clinical Tests:   Lab Tests: Ordered and Reviewed  Radiological Study: Ordered and Reviewed  Medical Tests: Ordered and Reviewed       DDx: MI, Angina, PE, PNA, MSK pain.     ED Medication(s):  Medications   heparin 25,000 units in dextrose 5% 250 mL (100 units/mL) infusion LOW INTENSITY nomogram - [facility] (12 Units/kg/hr × 92.5 kg (Adjusted) Intravenous Handoff 4/11/23 0600)   heparin 25,000 units in dextrose 5% (100 units/ml) IV bolus from bag - ADDITIONAL PRN BOLUS - 60 units/kg (max bolus 4000 units) (has no  administration in time range)   heparin 25,000 units in dextrose 5% (100 units/ml) IV bolus from bag - ADDITIONAL PRN BOLUS - 30 units/kg (max bolus 4000 units) (has no administration in time range)   sodium chloride 0.9% flush 3 mL (has no administration in time range)   lactated ringers infusion ( Intravenous New Bag 4/11/23 0520)   melatonin tablet 6 mg (has no administration in time range)   ondansetron injection 4 mg (has no administration in time range)   promethazine tablet 25 mg (has no administration in time range)   polyethylene glycol packet 17 g (has no administration in time range)   acetaminophen tablet 650 mg (has no administration in time range)   simethicone chewable tablet 80 mg (has no administration in time range)   aluminum-magnesium hydroxide-simethicone 200-200-20 mg/5 mL suspension 30 mL (has no administration in time range)   acetaminophen suppository 650 mg (has no administration in time range)   HYDROcodone-acetaminophen 5-325 mg per tablet 1 tablet (has no administration in time range)   morphine injection 2 mg (2 mg Intravenous Given 4/11/23 0640)   naloxone 0.4 mg/mL injection 0.02 mg (has no administration in time range)   glucose chewable tablet 16 g (has no administration in time range)   glucose chewable tablet 24 g (has no administration in time range)   glucagon (human recombinant) injection 1 mg (has no administration in time range)   dextrose 10% bolus 125 mL 125 mL (has no administration in time range)   dextrose 10% bolus 250 mL 250 mL (has no administration in time range)   dextrose 40 % gel 15,000 mg (has no administration in time range)   dextrose 40 % gel 30,000 mg (has no administration in time range)   lisinopriL tablet 40 mg (has no administration in time range)   pravastatin tablet 80 mg (has no administration in time range)   aspirin tablet 325 mg (has no administration in time range)   aspirin tablet 325 mg (325 mg Oral Given 4/11/23 0120)   heparin 25,000 units in  dextrose 5% (100 units/ml) IV bolus from bag INITIAL BOLUS (max bolus 4000 units) (4,000 Units Intravenous Bolus from Bag 4/11/23 0347)   nitroGLYCERIN 2% TD oint ointment 1 inch (0 inches Transdermal Hold 4/11/23 0300)       New Prescriptions    No medications on file               Scribe Attestation:   Scribe #1: I performed the above scribed service and the documentation accurately describes the services I performed. I attest to the accuracy of the note.     Attending:   Physician Attestation Statement for Scribe #1: I, Nico Layne MD, personally performed the services described in this documentation, as scribed by Winifred Roper, in my presence, and it is both accurate and complete.           Clinical Impression       ICD-10-CM ICD-9-CM   1. Chest pain  R07.9 786.50   2. NSTEMI (non-ST elevated myocardial infarction)  I21.4 410.70       Disposition:   Disposition: Admitted  Condition: Fair       Nico Layne MD  04/11/23 2402

## 2023-04-11 NOTE — NURSING
Pt awakened after procedure and remains AAOx3 at time of transfer.   L radial dressing remains CDI at time of transfer.   Transferred pt to room 209 via Bed in no apparent distress.   Report given to Melanie. No further questions at this time.

## 2023-04-11 NOTE — FIRST PROVIDER EVALUATION
"Medical screening examination initiated.  I have conducted a focused provider triage encounter, findings are as follows:    Brief history of present illness:  52-year-old male patient presents to the emergency room for chest pain that started this morning at 8:00 a.m. and has worsened throughout the day.    Vitals:    04/11/23 0017   BP: (!) 141/90   BP Location: Right arm   Patient Position: Sitting   Pulse: 103   Resp: 20   Temp: 97.7 °F (36.5 °C)   TempSrc: Oral   SpO2: 99%   Weight: 107.9 kg (237 lb 14 oz)   Height: 6' 2" (1.88 m)       Pertinent physical exam:  Vital signs stable no acute distress speaking in full sentences    Brief workup plan:  ACS rule out    Preliminary workup initiated; this workup will be continued and followed by the physician or advanced practice provider that is assigned to the patient when roomed.  "

## 2023-04-11 NOTE — H&P (VIEW-ONLY)
"[facility] - Emergency Dept.  Cardiology  Consult Note    Patient Name: Laron Kothari Jr.  MRN: [id number]  Admission Date: 4/11/2023  Hospital Length of Stay: 0 days  Code Status: Full Code   Attending Provider: Colin Pelaez MD   Consulting Provider: Laura Oliveros PA-C  Primary Care Physician: Kristen Adler DO  Principal Problem:NSTEMI (non-ST elevated myocardial infarction)    Patient information was obtained from patient, past medical records and ER records.     Inpatient consult to Cardiology  Consult performed by: Laura Oliveros PA-C  Consult ordered by: Raul Pelaez NP    Inpatient consult to Cardiology  Consult performed by: Laura Oliveros PA-C  Consult ordered by: Raul Pelaez NP        Subjective:     Chief Complaint:  CP/SOB/NSTEMI    HPI:   Mr. Kothari is a 52 year old male patient whose current medical conditions include DVT in 2017, DM type II, HTN, hyperlipidemia, gout, neuropathy, and tobacco abuse who presented to [facility] ED his AM with a chief complaint of substernal chest pressure/heaviness that onset earlier in the day while at work. Patient reported performing his normal job duties when he suddenly became extremely diaphoretic, SOB, and weak. He tried bending down to "catch his breath" but symptoms persisted and then he began to experience constant chest discomfort. He denied any associated nausea, vomiting, palpitations, nears syncope, or syncope. Initial workup in ED revealed troponin of 7.626 and patient was subsequently admitted for further evaluation and treatment. Cardiology consulted to assist with management. Patient seen and examined while in ED. Still with ongoing chest discomfort/heaviness. Reports it feels similar to when he has a "chest cold" but more intense and unrelenting. Received nitro overnight with no improvement. States morphine only provided temporary relief. No prior history of CAD or MI. Labs reviewed. Troponin bumped to 15. EKG reviewed, T wave " inversions/non-specific ST wave abnormality noted. Echo pending. [facility] planned today by Dr. Lara.       Past Medical History:   Diagnosis Date    Blood in stool 08/07/2018    Deep vein thrombosis (DVT) 2017    Left leg    Diabetes mellitus, type 2 2013    Gout     Hyperlipidemia     Hypertension     Neuropathy     Smoker        Past Surgical History:   Procedure Laterality Date    APPENDECTOMY      ARTHROSCOPIC CHONDROPLASTY OF KNEE JOINT Right 11/24/2020    Procedure: ARTHROSCOPY, KNEE, WITH CHONDROPLASTY;  Surgeon: Nahum Rose MD;  Location: [facility] OR;  Service: Orthopedics;  Laterality: Right;  chondroplasty medial condyle and anteriorly    COLONOSCOPY N/A 8/7/2018    Procedure: COLONOSCOPY;  Surgeon: Ten Valentine MD;  Location: [facility] ENDO;  Service: Endoscopy;  Laterality: N/A;    COLONOSCOPY N/A 2/23/2022    Procedure: COLONOSCOPY;  Surgeon: Octavio Craig MD;  Location: [facility] ENDO;  Service: Endoscopy;  Laterality: N/A;    KNEE ARTHROSCOPY W/ MENISCECTOMY Right 11/24/2020    Procedure: ARTHROSCOPY, KNEE, WITH MENISCECTOMY;  Surgeon: Nahum Rose MD;  Location: [facility] OR;  Service: Orthopedics;  Laterality: Right;  Partial medial and lateral meniscectomy    ROBOT-ASSISTED CHOLECYSTECTOMY USING DA TRIPP XI N/A 1/21/2020    Procedure: XI ROBOTIC CHOLECYSTECTOMY;  Surgeon: Sebastien Rivera MD;  Location: [facility] OR;  Service: General;  Laterality: N/A;    TONSILLECTOMY, ADENOIDECTOMY         Review of patient's allergies indicates:  No Known Allergies    No current facility-administered medications on file prior to encounter.     Current Outpatient Medications on File Prior to Encounter   Medication Sig    cetirizine (ZYRTEC) 10 MG tablet Take 1 tablet (10 mg total) by mouth once daily.    fluticasone propionate (FLONASE) 50 mcg/actuation nasal spray 1 spray (50 mcg total) by Each Nostril route once daily.    glipiZIDE (GLUCOTROL) 5 MG tablet Take 1 tablet (5 mg total) by mouth  "once daily.    lisinopriL (PRINIVIL,ZESTRIL) 40 MG tablet Take 1 tablet (40 mg total) by mouth once daily.    meloxicam (MOBIC) 15 MG tablet Take 1 tablet (15 mg total) by mouth once daily. Take with food    meloxicam (MOBIC) 15 MG tablet Take 1 tablet (15 mg total) by mouth once daily. Take with food    metFORMIN (GLUCOPHAGE) 1000 MG tablet Take 1 tablet (1,000 mg total) by mouth 2 (two) times daily with meals.    pravastatin (PRAVACHOL) 80 MG tablet Take 1 tablet (80 mg total) by mouth every evening.    blood sugar diagnostic Strp Once daily glucose testing.    blood-glucose meter Misc Use as directed.    gabapentin (NEURONTIN) 300 MG capsule Take 1 capsule (300 mg total) by mouth 3 (three) times daily.    lancets (LANCETS,THIN) Misc Once daily glucose testing.    pen needle, diabetic (PEN NEEDLE) 31 gauge x 5/16" Ndle Use once daily with insulin injection.    sildenafiL (VIAGRA) 50 MG tablet Take 1 tablet (50 mg total) by mouth daily as needed for Erectile Dysfunction.    [DISCONTINUED] methylPREDNISolone (MEDROL DOSEPACK) 4 mg tablet use as directed     Family History       Problem Relation (Age of Onset)    Cataracts Father    Diabetes Father    Hypertension Mother, Brother    Prostate cancer Father          Tobacco Use    Smoking status: Every Day     Packs/day: 0.50     Types: Cigarettes    Smokeless tobacco: Never    Tobacco comments:     no smoking after m.n prior to sx   Substance and Sexual Activity    Alcohol use: Yes     Alcohol/week: 46.0 standard drinks     Types: 46 Cans of beer per week     Comment: daily    Drug use: No    Sexual activity: Yes     Partners: Female     Review of Systems   Constitutional: Positive for malaise/fatigue.   HENT: Negative.     Eyes: Negative.    Cardiovascular:  Positive for chest pain and dyspnea on exertion.   Respiratory:  Positive for shortness of breath.    Endocrine: Negative.    Hematologic/Lymphatic: Negative.    Skin: Negative.  "   Musculoskeletal: Negative.    Gastrointestinal: Negative.    Genitourinary: Negative.    Neurological: Negative.    Psychiatric/Behavioral: Negative.     Allergic/Immunologic: Negative.    Objective:     Vital Signs (Most Recent):  Temp: 97.5 °F (36.4 °C) (04/11/23 0721)  Pulse: 67 (04/11/23 0737)  Resp: (!) 23 (04/11/23 0737)  BP: 120/72 (04/11/23 0737)  SpO2: 99 % (04/11/23 0737)   Vital Signs (24h Range):  Temp:  [97.5 °F (36.4 °C)-98.2 °F (36.8 °C)] 97.5 °F (36.4 °C)  Pulse:  [] 67  Resp:  [11-27] 23  SpO2:  [96 %-100 %] 99 %  BP: ()/(60-90) 120/72     Weight: 117.2 kg (258 lb 4.8 oz)  Body mass index is 33.16 kg/m².    SpO2: 99 %         Intake/Output Summary (Last 24 hours) at 4/11/2023 0752  Last data filed at 4/11/2023 0742  Gross per 24 hour   Intake 358.89 ml   Output --   Net 358.89 ml       Lines/Drains/Airways       Peripheral Intravenous Line  Duration                  Peripheral IV - Single Lumen 04/11/23 0118 20 G Anterior;Right Upper Arm <1 day         Peripheral IV - Single Lumen 04/11/23 0235 20 G Anterior;Right Forearm <1 day                    Physical Exam  Vitals and nursing note reviewed.   Constitutional:       General: He is not in acute distress.     Appearance: Normal appearance. He is well-developed. He is not diaphoretic.      Comments: On supplemental O2   HENT:      Head: Normocephalic and atraumatic.   Eyes:      General:         Right eye: No discharge.         Left eye: No discharge.      Pupils: Pupils are equal, round, and reactive to light.   Neck:      Thyroid: No thyromegaly.      Vascular: No JVD.      Trachea: No tracheal deviation.   Cardiovascular:      Rate and Rhythm: Normal rate and regular rhythm.      Heart sounds: Normal heart sounds, S1 normal and S2 normal. No murmur heard.  Pulmonary:      Effort: Pulmonary effort is normal. No respiratory distress.      Breath sounds: Normal breath sounds. No wheezing or rales.   Abdominal:      General: There is no  distension.      Tenderness: There is no rebound.   Musculoskeletal:      Cervical back: Neck supple.      Right lower leg: No edema.      Left lower leg: No edema.   Skin:     General: Skin is warm and dry.      Findings: No erythema.   Neurological:      General: No focal deficit present.      Mental Status: He is alert and oriented to person, place, and time.   Psychiatric:         Mood and Affect: Mood normal.         Behavior: Behavior normal.         Thought Content: Thought content normal.       Significant Labs: CMP   Recent Labs   Lab 04/11/23  0116      K 4.1      CO2 24   *   BUN 13   CREATININE 1.0   CALCIUM 9.6   PROT 7.5   ALBUMIN 3.9   BILITOT 0.7   ALKPHOS 60   *   ALT 32   ANIONGAP 12   , CBC   Recent Labs   Lab 04/11/23  0116   WBC 14.38*   HGB 13.1*   HCT 39.0*      , Troponin   Recent Labs   Lab 04/11/23  0116 04/11/23  0354 04/11/23  0640   TROPONINI 7.626* 12.211* 15.833*   , and All pertinent lab results from the last 24 hours have been reviewed.    Significant Imaging: Echocardiogram: Transthoracic echo (TTE) complete (Cupid Only): No results found for this or any previous visit., EKG: Reviewed, and X-Ray: CXR: X-Ray Chest 1 View (CXR): No results found for this visit on 04/11/23. and X-Ray Chest PA and Lateral (CXR): No results found for this visit on 04/11/23.    Assessment and Plan:   Patient who presents with SOB/CP/NSTEMI. Still with ongoing chest discomfort during exam. Continue OMT. Check echo. [facility] today, further rec's to follow.     * NSTEMI (non-ST elevated myocardial infarction)  -Patient who presents with SOB/chest pain and elevated troponin---> consistent with NSTEMI  -Still with ongoing CP, troponin bumped to 15  -Continue ASA, statin, ACEi, heparin gtt  -Check echo  -Will plan to add BB post-procedure  -C today by Dr. Lara. All risks, benefits, and treatment alternatives explained to patient in detail. All questions answered. He has agreed to  proceed.     Type 2 diabetes mellitus with diabetic neuropathy  -Mgmt as per hospital medicine    Hypertension goal BP (blood pressure) < 130/80  -Continue ACEi  -Will plan to optimize regimen post C    Tobacco use disorder  -Smoking cessation advised    Hyperlipidemia LDL goal <70  -Statin          VTE Risk Mitigation (From admission, onward)         Ordered     Reason for No Pharmacological VTE Prophylaxis  Once        Question:  Reasons:  Answer:  IV Heparin w/in 24 hrs. Pre or Post-Op    04/11/23 0353     IP VTE HIGH RISK PATIENT  Once         04/11/23 0353     Place sequential compression device  Until discontinued         04/11/23 0353     heparin 25,000 units in dextrose 5% (100 units/ml) IV bolus from bag - ADDITIONAL PRN BOLUS - 60 units/kg (max bolus 4000 units)  As needed (PRN)        Question:  Heparin Infusion Adjustment (DO NOT MODIFY ANSWER)  Answer:  [email or web address]\epic\Images\Pharmacy\HeparinInfusions\heparin LOW INTENSITY nomogram for [facility] [id number]    04/11/23 0218     heparin 25,000 units in dextrose 5% (100 units/ml) IV bolus from bag - ADDITIONAL PRN BOLUS - 30 units/kg (max bolus 4000 units)  As needed (PRN)        Question:  Heparin Infusion Adjustment (DO NOT MODIFY ANSWER)  Answer:  \\[email or web address]\epic\Images\Pharmacy\HeparinInfusions\heparin LOW INTENSITY nomogram for [facility] [id number].pdf    04/11/23 0218     heparin 25,000 units in dextrose 5% 250 mL (100 units/mL) infusion LOW INTENSITY nomogram - [facility]  Continuous        Question Answer Comment   Heparin Infusion Adjustment (DO NOT MODIFY ANSWER) \\[email or web address]\epic\Images\Pharmacy\HeparinInfusions\heparin LOW INTENSITY nomogram for [facility] [id number].pdf    Begin at (in units/kg/hr) 12        04/11/23 0218                Thank you for your consult. I will follow-up with patient. Please contact us if you have any additional questions.    Laura Oliveros PA-C  Cardiology   [facility] Emergency Dept.

## 2023-04-11 NOTE — SUBJECTIVE & OBJECTIVE
Past Medical History:   Diagnosis Date    Blood in stool 08/07/2018    Deep vein thrombosis (DVT) 2017    Left leg    Diabetes mellitus, type 2 2013    Gout     Hyperlipidemia     Hypertension     Neuropathy     Smoker        Past Surgical History:   Procedure Laterality Date    APPENDECTOMY      ARTHROSCOPIC CHONDROPLASTY OF KNEE JOINT Right 11/24/2020    Procedure: ARTHROSCOPY, KNEE, WITH CHONDROPLASTY;  Surgeon: Nahum Rose MD;  Location: [facility] OR;  Service: Orthopedics;  Laterality: Right;  chondroplasty medial condyle and anteriorly    COLONOSCOPY N/A 8/7/2018    Procedure: COLONOSCOPY;  Surgeon: Ten Valentine MD;  Location: [facility] ENDO;  Service: Endoscopy;  Laterality: N/A;    COLONOSCOPY N/A 2/23/2022    Procedure: COLONOSCOPY;  Surgeon: Octavio Craig MD;  Location: [facility] ENDO;  Service: Endoscopy;  Laterality: N/A;    KNEE ARTHROSCOPY W/ MENISCECTOMY Right 11/24/2020    Procedure: ARTHROSCOPY, KNEE, WITH MENISCECTOMY;  Surgeon: Nahum Rose MD;  Location: [facility] OR;  Service: Orthopedics;  Laterality: Right;  Partial medial and lateral meniscectomy    ROBOT-ASSISTED CHOLECYSTECTOMY USING DA TRIPP XI N/A 1/21/2020    Procedure: XI ROBOTIC CHOLECYSTECTOMY;  Surgeon: Sebastien Rivera MD;  Location: [facility] OR;  Service: General;  Laterality: N/A;    TONSILLECTOMY, ADENOIDECTOMY         Review of patient's allergies indicates:  No Known Allergies    No current facility-administered medications on file prior to encounter.     Current Outpatient Medications on File Prior to Encounter   Medication Sig    cetirizine (ZYRTEC) 10 MG tablet Take 1 tablet (10 mg total) by mouth once daily.    fluticasone propionate (FLONASE) 50 mcg/actuation nasal spray 1 spray (50 mcg total) by Each Nostril route once daily.    glipiZIDE (GLUCOTROL) 5 MG tablet Take 1 tablet (5 mg total) by mouth once daily.    lisinopriL (PRINIVIL,ZESTRIL) 40 MG tablet Take 1 tablet (40 mg total) by mouth once daily.    meloxicam  "(MOBIC) 15 MG tablet Take 1 tablet (15 mg total) by mouth once daily. Take with food    meloxicam (MOBIC) 15 MG tablet Take 1 tablet (15 mg total) by mouth once daily. Take with food    metFORMIN (GLUCOPHAGE) 1000 MG tablet Take 1 tablet (1,000 mg total) by mouth 2 (two) times daily with meals.    pravastatin (PRAVACHOL) 80 MG tablet Take 1 tablet (80 mg total) by mouth every evening.    blood sugar diagnostic Strp Once daily glucose testing.    blood-glucose meter Misc Use as directed.    gabapentin (NEURONTIN) 300 MG capsule Take 1 capsule (300 mg total) by mouth 3 (three) times daily.    lancets (LANCETS,THIN) Misc Once daily glucose testing.    pen needle, diabetic (PEN NEEDLE) 31 gauge x 5/16" Ndle Use once daily with insulin injection.    sildenafiL (VIAGRA) 50 MG tablet Take 1 tablet (50 mg total) by mouth daily as needed for Erectile Dysfunction.    [DISCONTINUED] methylPREDNISolone (MEDROL DOSEPACK) 4 mg tablet use as directed     Family History       Problem Relation (Age of Onset)    Cataracts Father    Diabetes Father    Hypertension Mother, Brother    Prostate cancer Father          Tobacco Use    Smoking status: Every Day     Packs/day: 0.50     Types: Cigarettes    Smokeless tobacco: Never    Tobacco comments:     no smoking after m.n prior to sx   Substance and Sexual Activity    Alcohol use: Yes     Alcohol/week: 46.0 standard drinks     Types: 46 Cans of beer per week     Comment: daily    Drug use: No    Sexual activity: Yes     Partners: Female     Review of Systems   Constitutional: Positive for malaise/fatigue.   HENT: Negative.     Eyes: Negative.    Cardiovascular:  Positive for chest pain and dyspnea on exertion.   Respiratory:  Positive for shortness of breath.    Endocrine: Negative.    Hematologic/Lymphatic: Negative.    Skin: Negative.    Musculoskeletal: Negative.    Gastrointestinal: Negative.    Genitourinary: Negative.    Neurological: Negative.    Psychiatric/Behavioral: Negative.   "   Allergic/Immunologic: Negative.    Objective:     Vital Signs (Most Recent):  Temp: 97.5 °F (36.4 °C) (04/11/23 0721)  Pulse: 67 (04/11/23 0737)  Resp: (!) 23 (04/11/23 0737)  BP: 120/72 (04/11/23 0737)  SpO2: 99 % (04/11/23 0737)   Vital Signs (24h Range):  Temp:  [97.5 °F (36.4 °C)-98.2 °F (36.8 °C)] 97.5 °F (36.4 °C)  Pulse:  [] 67  Resp:  [11-27] 23  SpO2:  [96 %-100 %] 99 %  BP: ()/(60-90) 120/72     Weight: 117.2 kg (258 lb 4.8 oz)  Body mass index is 33.16 kg/m².    SpO2: 99 %         Intake/Output Summary (Last 24 hours) at 4/11/2023 0752  Last data filed at 4/11/2023 0742  Gross per 24 hour   Intake 358.89 ml   Output --   Net 358.89 ml       Lines/Drains/Airways       Peripheral Intravenous Line  Duration                  Peripheral IV - Single Lumen 04/11/23 0118 20 G Anterior;Right Upper Arm <1 day         Peripheral IV - Single Lumen 04/11/23 0235 20 G Anterior;Right Forearm <1 day                    Physical Exam  Vitals and nursing note reviewed.   Constitutional:       General: He is not in acute distress.     Appearance: Normal appearance. He is well-developed. He is not diaphoretic.      Comments: On supplemental O2   HENT:      Head: Normocephalic and atraumatic.   Eyes:      General:         Right eye: No discharge.         Left eye: No discharge.      Pupils: Pupils are equal, round, and reactive to light.   Neck:      Thyroid: No thyromegaly.      Vascular: No JVD.      Trachea: No tracheal deviation.   Cardiovascular:      Rate and Rhythm: Normal rate and regular rhythm.      Heart sounds: Normal heart sounds, S1 normal and S2 normal. No murmur heard.  Pulmonary:      Effort: Pulmonary effort is normal. No respiratory distress.      Breath sounds: Normal breath sounds. No wheezing or rales.   Abdominal:      General: There is no distension.      Tenderness: There is no rebound.   Musculoskeletal:      Cervical back: Neck supple.      Right lower leg: No edema.      Left lower  leg: No edema.   Skin:     General: Skin is warm and dry.      Findings: No erythema.   Neurological:      General: No focal deficit present.      Mental Status: He is alert and oriented to person, place, and time.   Psychiatric:         Mood and Affect: Mood normal.         Behavior: Behavior normal.         Thought Content: Thought content normal.       Significant Labs: CMP   Recent Labs   Lab 04/11/23  0116      K 4.1      CO2 24   *   BUN 13   CREATININE 1.0   CALCIUM 9.6   PROT 7.5   ALBUMIN 3.9   BILITOT 0.7   ALKPHOS 60   *   ALT 32   ANIONGAP 12   , CBC   Recent Labs   Lab 04/11/23  0116   WBC 14.38*   HGB 13.1*   HCT 39.0*      , Troponin   Recent Labs   Lab 04/11/23  0116 04/11/23  0354 04/11/23  0640   TROPONINI 7.626* 12.211* 15.833*   , and All pertinent lab results from the last 24 hours have been reviewed.    Significant Imaging: Echocardiogram: Transthoracic echo (TTE) complete (Cupid Only): No results found for this or any previous visit., EKG: Reviewed, and X-Ray: CXR: X-Ray Chest 1 View (CXR): No results found for this visit on 04/11/23. and X-Ray Chest PA and Lateral (CXR): No results found for this visit on 04/11/23.

## 2023-04-11 NOTE — NURSING TRANSFER
Nursing Transfer Note      4/11/2023     Reason patient is being transferred: NSTEMI    Transfer From: [facility]    Transfer via stretcher    Transfer with to cardiac monitor     Transported by [facility] nurse     Medicines sent: no      Chart send with patient: Yes    Notified: spouse, daughter    Patient reassessed at: 4/11/23 1444    Upon arrival to floor: cardiac monitor applied, patient oriented to room, call bell in reach, and bed in lowest position

## 2023-04-11 NOTE — Clinical Note
190 ml of contrast were injected throughout the case. 0 mL of contrast was the total wasted during the case. 190 mL was the total amount used during the case.

## 2023-04-11 NOTE — ASSESSMENT & PLAN NOTE
-Patient who presents with SOB/chest pain and elevated troponin---> consistent with NSTEMI  -Still with ongoing CP, troponin bumped to 15  -Continue ASA, statin, ACEi, heparin gtt  -Check echo  -Will plan to add BB post-procedure  -LHC today by Dr. Lara. All risks, benefits, and treatment alternatives explained to patient in detail. All questions answered. He has agreed to proceed.

## 2023-04-11 NOTE — SUBJECTIVE & OBJECTIVE
Past Medical History:   Diagnosis Date    Blood in stool 8/7/2018    Deep vein thrombosis (DVT) 2017    Left leg    Diabetes mellitus, type 2 2013    Gout     Hyperlipidemia     Hypertension     Neuropathy        Past Surgical History:   Procedure Laterality Date    APPENDECTOMY      ARTHROSCOPIC CHONDROPLASTY OF KNEE JOINT Right 11/24/2020    Procedure: ARTHROSCOPY, KNEE, WITH CHONDROPLASTY;  Surgeon: Nahum Rose MD;  Location: [facility] OR;  Service: Orthopedics;  Laterality: Right;  chondroplasty medial condyle and anteriorly    COLONOSCOPY N/A 8/7/2018    Procedure: COLONOSCOPY;  Surgeon: Ten Valentine MD;  Location: [facility] ENDO;  Service: Endoscopy;  Laterality: N/A;    COLONOSCOPY N/A 2/23/2022    Procedure: COLONOSCOPY;  Surgeon: Octavio Craig MD;  Location: [facility] ENDO;  Service: Endoscopy;  Laterality: N/A;    KNEE ARTHROSCOPY W/ MENISCECTOMY Right 11/24/2020    Procedure: ARTHROSCOPY, KNEE, WITH MENISCECTOMY;  Surgeon: Nahum Rose MD;  Location: [facility] OR;  Service: Orthopedics;  Laterality: Right;  Partial medial and lateral meniscectomy    ROBOT-ASSISTED CHOLECYSTECTOMY USING DA TRIPP XI N/A 1/21/2020    Procedure: XI ROBOTIC CHOLECYSTECTOMY;  Surgeon: Sebastien Rivera MD;  Location: [facility] OR;  Service: General;  Laterality: N/A;    TONSILLECTOMY, ADENOIDECTOMY         Review of patient's allergies indicates:  No Known Allergies    No current facility-administered medications on file prior to encounter.     Current Outpatient Medications on File Prior to Encounter   Medication Sig    cetirizine (ZYRTEC) 10 MG tablet Take 1 tablet (10 mg total) by mouth once daily.    fluticasone propionate (FLONASE) 50 mcg/actuation nasal spray 1 spray (50 mcg total) by Each Nostril route once daily.    glipiZIDE (GLUCOTROL) 5 MG tablet Take 1 tablet (5 mg total) by mouth once daily.    lisinopriL (PRINIVIL,ZESTRIL) 40 MG tablet Take 1 tablet (40 mg total) by mouth once daily.    meloxicam (MOBIC) 15 MG  "tablet Take 1 tablet (15 mg total) by mouth once daily. Take with food    meloxicam (MOBIC) 15 MG tablet Take 1 tablet (15 mg total) by mouth once daily. Take with food    metFORMIN (GLUCOPHAGE) 1000 MG tablet Take 1 tablet (1,000 mg total) by mouth 2 (two) times daily with meals.    pravastatin (PRAVACHOL) 80 MG tablet Take 1 tablet (80 mg total) by mouth every evening.    blood sugar diagnostic Strp Once daily glucose testing.    blood-glucose meter Misc Use as directed.    gabapentin (NEURONTIN) 300 MG capsule Take 1 capsule (300 mg total) by mouth 3 (three) times daily.    lancets (LANCETS,THIN) Misc Once daily glucose testing.    pen needle, diabetic (PEN NEEDLE) 31 gauge x 5/16" Ndle Use once daily with insulin injection.    sildenafiL (VIAGRA) 50 MG tablet Take 1 tablet (50 mg total) by mouth daily as needed for Erectile Dysfunction.    [DISCONTINUED] methylPREDNISolone (MEDROL DOSEPACK) 4 mg tablet use as directed     Family History       Problem Relation (Age of Onset)    Cataracts Father    Diabetes Father    Hypertension Mother, Brother    Prostate cancer Father          Tobacco Use    Smoking status: Every Day     Packs/day: 0.50     Types: Cigarettes    Smokeless tobacco: Never    Tobacco comments:     no smoking after m.n prior to sx   Substance and Sexual Activity    Alcohol use: Yes     Alcohol/week: 46.0 standard drinks     Types: 46 Cans of beer per week     Comment: daily    Drug use: No    Sexual activity: Yes     Partners: Female     Review of Systems   Respiratory:  Negative for cough and shortness of breath.    Cardiovascular:  Positive for chest pain. Negative for palpitations and leg swelling.   All other systems reviewed and are negative.  Objective:     Vital Signs (Most Recent):  Temp: 97.7 °F (36.5 °C) (04/11/23 0017)  Pulse: 70 (04/11/23 0300)  Resp: 20 (04/11/23 0411)  BP: 121/82 (04/11/23 0234)  SpO2: 99 % (04/11/23 0017) Vital Signs (24h Range):  Temp:  [97.7 °F (36.5 °C)] 97.7 °F " (36.5 °C)  Pulse:  [] 70  Resp:  [11-20] 20  SpO2:  [99 %] 99 %  BP: (121-141)/(82-90) 121/82     Weight: 107.9 kg (237 lb 14 oz)  Body mass index is 30.54 kg/m².    Physical Exam  Vitals and nursing note reviewed.   Constitutional:       General: He is awake. He is not in acute distress.     Appearance: Normal appearance. He is well-developed and well-groomed. He is not ill-appearing, toxic-appearing or diaphoretic.   HENT:      Head: Normocephalic and atraumatic.   Eyes:      Extraocular Movements: Extraocular movements intact.      Conjunctiva/sclera: Conjunctivae normal.   Cardiovascular:      Rate and Rhythm: Normal rate and regular rhythm.      Pulses: Normal pulses.      Heart sounds: Normal heart sounds. No murmur heard.  Pulmonary:      Effort: Pulmonary effort is normal.      Breath sounds: Normal breath sounds.   Abdominal:      General: Bowel sounds are normal.      Palpations: Abdomen is soft.      Tenderness: There is no abdominal tenderness.   Musculoskeletal:      Cervical back: Normal range of motion and neck supple.      Comments: 5/5 strength throughout   Skin:     General: Skin is warm and dry.      Capillary Refill: Capillary refill takes less than 2 seconds.   Neurological:      General: No focal deficit present.      Mental Status: He is alert and oriented to person, place, and time. Mental status is at baseline.      GCS: GCS eye subscore is 4. GCS verbal subscore is 5. GCS motor subscore is 6.      Cranial Nerves: Cranial nerves 2-12 are intact.      Sensory: Sensation is intact.      Motor: Motor function is intact.   Psychiatric:         Mood and Affect: Mood normal.         Behavior: Behavior normal. Behavior is cooperative.      LABS:  Recent Results (from the past 24 hour(s))   COVID-19 Rapid Screening    Collection Time: 04/11/23  1:15 AM   Result Value Ref Range    SARS-CoV-2 RNA, Amplification, Qual Negative Negative   CBC auto differential    Collection Time: 04/11/23  1:16 AM    Result Value Ref Range    WBC 14.38 (H) 3.90 - 12.70 K/uL    RBC 4.09 (L) 4.60 - 6.20 M/uL    Hemoglobin 13.1 (L) 14.0 - 18.0 g/dL    Hematocrit 39.0 (L) 40.0 - 54.0 %    MCV 95 82 - 98 fL    MCH 32.0 (H) 27.0 - 31.0 pg    MCHC 33.6 32.0 - 36.0 g/dL    RDW 12.3 11.5 - 14.5 %    Platelets 268 150 - 450 K/uL    MPV 10.0 9.2 - 12.9 fL    Immature Granulocytes 0.3 0.0 - 0.5 %    Gran # (ANC) 8.1 (H) 1.8 - 7.7 K/uL    Immature Grans (Abs) 0.05 (H) 0.00 - 0.04 K/uL    Lymph # 4.6 1.0 - 4.8 K/uL    Mono # 0.9 0.3 - 1.0 K/uL    Eos # 0.7 (H) 0.0 - 0.5 K/uL    Baso # 0.11 0.00 - 0.20 K/uL    nRBC 0 0 /100 WBC    Gran % 56.5 38.0 - 73.0 %    Lymph % 32.0 18.0 - 48.0 %    Mono % 5.9 4.0 - 15.0 %    Eosinophil % 4.5 0.0 - 8.0 %    Basophil % 0.8 0.0 - 1.9 %    Differential Method Automated    Comprehensive metabolic panel    Collection Time: 04/11/23  1:16 AM   Result Value Ref Range    Sodium 139 136 - 145 mmol/L    Potassium 4.1 3.5 - 5.1 mmol/L    Chloride 103 95 - 110 mmol/L    CO2 24 23 - 29 mmol/L    Glucose 117 (H) 70 - 110 mg/dL    BUN 13 6 - 20 mg/dL    Creatinine 1.0 0.5 - 1.4 mg/dL    Calcium 9.6 8.7 - 10.5 mg/dL    Total Protein 7.5 6.0 - 8.4 g/dL    Albumin 3.9 3.5 - 5.2 g/dL    Total Bilirubin 0.7 0.1 - 1.0 mg/dL    Alkaline Phosphatase 60 55 - 135 U/L     (H) 10 - 40 U/L    ALT 32 10 - 44 U/L    Anion Gap 12 8 - 16 mmol/L    eGFR >60 >60 mL/min/1.73 m^2   Troponin I #1    Collection Time: 04/11/23  1:16 AM   Result Value Ref Range    Troponin I 7.626 (H) 0.000 - 0.026 ng/mL   BNP    Collection Time: 04/11/23  1:16 AM   Result Value Ref Range    BNP 41 0 - 99 pg/mL   HIV 1/2 Ag/Ab (4th Gen)    Collection Time: 04/11/23  1:16 AM   Result Value Ref Range    HIV 1/2 Ag/Ab Negative Negative   Hepatitis C Antibody    Collection Time: 04/11/23  1:16 AM   Result Value Ref Range    Hepatitis C Ab Negative Negative   HCV Virus Hold Specimen    Collection Time: 04/11/23  1:16 AM   Result Value Ref Range    HEP C  Virus Hold Specimen Hold for HCV sendout    APTT    Collection Time: 04/11/23  2:33 AM   Result Value Ref Range    aPTT 25.7 21.0 - 32.0 sec   Protime-INR    Collection Time: 04/11/23  2:33 AM   Result Value Ref Range    Prothrombin Time 10.4 9.0 - 12.5 sec    INR 1.0 0.8 - 1.2   Troponin I    Collection Time: 04/11/23  3:54 AM   Result Value Ref Range    Troponin I 12.211 (H) 0.000 - 0.026 ng/mL       RADIOLOGY  No results found.    EKG    MICROBIOLOGY    MDM     Amount and/or Complexity of Data Reviewed  Clinical lab tests: reviewed  Tests in the radiology section of CPT®: reviewed  Tests in the medicine section of CPT®: reviewed  Discussion of test results with the performing providers: yes  Decide to obtain previous medical records or to obtain history from someone other than the patient: yes  Obtain history from someone other than the patient: yes  Review and summarize past medical records: yes  Discuss the patient with other providers: yes  Independent visualization of images, tracings, or specimens: yes

## 2023-04-11 NOTE — ASSESSMENT & PLAN NOTE
Patient presents with NSTEMI. Chest pain is currently uncontrolled. GAURANG score is 7. Patient is not currently on NSTEMI Pathway.    EKG reviewed. Troponins reviewed and results noted-   Recent Labs   Lab 04/11/23  0354   TROPONINI 12.211*   .     Lipid panel reviewed and shows-     Lab Results   Component Value Date    LDLCALC 109.4 10/06/2022     Lab Results   Component Value Date    TRIG 83 10/06/2022         Medical management includes; Anticoagulation, High Intensity Statin, ACE/ARB and Nitrate Echo has not been performed. Latest ECHO results are as follows- No results found for this or any previous visit.  .   Consult for cards ordered. Patient counseled on lifestyle modifications- quit smoking, begin progressive daily aerobic exercise program, follow a low fat, low cholesterol diet, attempt to lose weight, decrease or avoid alcohol intake, reduce salt in diet and cooking, reduce exposure to stress, improve dietary compliance, use calcium 1 gram daily with Vit D, continue current medications, continue current healthy lifestyle patterns and return for routine annual checkups. Cardiology is consulted. Plan of care not reviewed with cardiology team. Continue to monitor patient closely and adjust therapy as needed.

## 2023-04-11 NOTE — H&P
"[facility]  [facility]  History & Physical    Patient Name: Laron Kothari Jr.  MRN: [id number]  Patient Class: IP- Inpatient  Admission Date: 4/11/2023  Attending Physician: Colin Pelaez MD   Primary Care Provider: Kristen Adler DO         Patient information was obtained from patient, past medical records and ER records.     Subjective:     Principal Problem:NSTEMI (non-ST elevated myocardial infarction)    Chief Complaint:   Chief Complaint   Patient presents with    Chest Pain     Worsening Mid sternal chest pressure since 8 am this morning while working, SOB "when I doze off" states "it feels like a cold in my chest but I do not have a cold"         HPI: Laron Kothari Jr. is a 52 y.o. male with a PMH  has a past medical history of Blood in stool (8/7/2018), Deep vein thrombosis (DVT) (2017), Diabetes mellitus, type 2 (2013), Gout, Hyperlipidemia, Hypertension, and Neuropathy.  Presented to the ER for evaluation of substernal chest pain that began at 8:00 a.m. this morning while working.  Patient states that the pain feels similar to chest cold, but the pressure has been persistent and non resolved with rest.  Patient states his pain fluctuates in intensity and currently rates chest pain 10/10 after receiving nitroglycerin paste.  Patient reports the pain does not radiate anywhere nor does he have any shortness of breath associated with chest pain.  Reports aggravating factors include movement and exertion.  Alleviating factors include rest and lying flat.  Denies any previous cardiac history nor does he see a cardiologist.  Denies any other associated symptoms at this time.    ER workup revealed WBC of 14.38, CBG of 117 mg/dL., initial troponin of 7.626 with EKG revealing normal sinus rhythm with a ventricular rate of 88 beats per minute with T-wave inversions in leads V4, V5, V6, III, and AVF.  Cardiology consulted and added to secure chat.  Waiting on recommendations.  We will initiate " on heparin drip.  Patient and family are in agreement with treatment plan.  Patient will be placed in inpatient status.  PCP: Kristen Adler        Past Medical History:   Diagnosis Date    Blood in stool 8/7/2018    Deep vein thrombosis (DVT) 2017    Left leg    Diabetes mellitus, type 2 2013    Gout     Hyperlipidemia     Hypertension     Neuropathy        Past Surgical History:   Procedure Laterality Date    APPENDECTOMY      ARTHROSCOPIC CHONDROPLASTY OF KNEE JOINT Right 11/24/2020    Procedure: ARTHROSCOPY, KNEE, WITH CHONDROPLASTY;  Surgeon: Nahum Rose MD;  Location: [facility] OR;  Service: Orthopedics;  Laterality: Right;  chondroplasty medial condyle and anteriorly    COLONOSCOPY N/A 8/7/2018    Procedure: COLONOSCOPY;  Surgeon: Ten Valentine MD;  Location: [facility] ENDO;  Service: Endoscopy;  Laterality: N/A;    COLONOSCOPY N/A 2/23/2022    Procedure: COLONOSCOPY;  Surgeon: Octavio Craig MD;  Location: [facility] ENDO;  Service: Endoscopy;  Laterality: N/A;    KNEE ARTHROSCOPY W/ MENISCECTOMY Right 11/24/2020    Procedure: ARTHROSCOPY, KNEE, WITH MENISCECTOMY;  Surgeon: Nahum Rose MD;  Location: [facility] OR;  Service: Orthopedics;  Laterality: Right;  Partial medial and lateral meniscectomy    ROBOT-ASSISTED CHOLECYSTECTOMY USING DA TRIPP XI N/A 1/21/2020    Procedure: XI ROBOTIC CHOLECYSTECTOMY;  Surgeon: Sebastien Rivera MD;  Location: [facility];  Service: General;  Laterality: N/A;    TONSILLECTOMY, ADENOIDECTOMY         Review of patient's allergies indicates:  No Known Allergies    No current facility-administered medications on file prior to encounter.     Current Outpatient Medications on File Prior to Encounter   Medication Sig    cetirizine (ZYRTEC) 10 MG tablet Take 1 tablet (10 mg total) by mouth once daily.    fluticasone propionate (FLONASE) 50 mcg/actuation nasal spray 1 spray (50 mcg total) by Each Nostril route once daily.    glipiZIDE (GLUCOTROL) 5 MG tablet Take 1  "tablet (5 mg total) by mouth once daily.    lisinopriL (PRINIVIL,ZESTRIL) 40 MG tablet Take 1 tablet (40 mg total) by mouth once daily.    meloxicam (MOBIC) 15 MG tablet Take 1 tablet (15 mg total) by mouth once daily. Take with food    meloxicam (MOBIC) 15 MG tablet Take 1 tablet (15 mg total) by mouth once daily. Take with food    metFORMIN (GLUCOPHAGE) 1000 MG tablet Take 1 tablet (1,000 mg total) by mouth 2 (two) times daily with meals.    pravastatin (PRAVACHOL) 80 MG tablet Take 1 tablet (80 mg total) by mouth every evening.    blood sugar diagnostic Strp Once daily glucose testing.    blood-glucose meter Misc Use as directed.    gabapentin (NEURONTIN) 300 MG capsule Take 1 capsule (300 mg total) by mouth 3 (three) times daily.    lancets (LANCETS,THIN) Misc Once daily glucose testing.    pen needle, diabetic (PEN NEEDLE) 31 gauge x 5/16" Ndle Use once daily with insulin injection.    sildenafiL (VIAGRA) 50 MG tablet Take 1 tablet (50 mg total) by mouth daily as needed for Erectile Dysfunction.    [DISCONTINUED] methylPREDNISolone (MEDROL DOSEPACK) 4 mg tablet use as directed     Family History       Problem Relation (Age of Onset)    Cataracts Father    Diabetes Father    Hypertension Mother, Brother    Prostate cancer Father          Tobacco Use    Smoking status: Every Day     Packs/day: 0.50     Types: Cigarettes    Smokeless tobacco: Never    Tobacco comments:     no smoking after m.n prior to sx   Substance and Sexual Activity    Alcohol use: Yes     Alcohol/week: 46.0 standard drinks     Types: 46 Cans of beer per week     Comment: daily    Drug use: No    Sexual activity: Yes     Partners: Female     Review of Systems   Respiratory:  Negative for cough and shortness of breath.    Cardiovascular:  Positive for chest pain. Negative for palpitations and leg swelling.   All other systems reviewed and are negative.  Objective:     Vital Signs (Most Recent):  Temp: 97.7 °F (36.5 °C) " (04/11/23 0017)  Pulse: 70 (04/11/23 0300)  Resp: 20 (04/11/23 0411)  BP: 121/82 (04/11/23 0234)  SpO2: 99 % (04/11/23 0017) Vital Signs (24h Range):  Temp:  [97.7 °F (36.5 °C)] 97.7 °F (36.5 °C)  Pulse:  [] 70  Resp:  [11-20] 20  SpO2:  [99 %] 99 %  BP: (121-141)/(82-90) 121/82     Weight: 107.9 kg (237 lb 14 oz)  Body mass index is 30.54 kg/m².    Physical Exam  Vitals and nursing note reviewed.   Constitutional:       General: He is awake. He is not in acute distress.     Appearance: Normal appearance. He is well-developed and well-groomed. He is not ill-appearing, toxic-appearing or diaphoretic.   HENT:      Head: Normocephalic and atraumatic.   Eyes:      Extraocular Movements: Extraocular movements intact.      Conjunctiva/sclera: Conjunctivae normal.   Cardiovascular:      Rate and Rhythm: Normal rate and regular rhythm.      Pulses: Normal pulses.      Heart sounds: Normal heart sounds. No murmur heard.  Pulmonary:      Effort: Pulmonary effort is normal.      Breath sounds: Normal breath sounds.   Abdominal:      General: Bowel sounds are normal.      Palpations: Abdomen is soft.      Tenderness: There is no abdominal tenderness.   Musculoskeletal:      Cervical back: Normal range of motion and neck supple.      Comments: 5/5 strength throughout   Skin:     General: Skin is warm and dry.      Capillary Refill: Capillary refill takes less than 2 seconds.   Neurological:      General: No focal deficit present.      Mental Status: He is alert and oriented to person, place, and time. Mental status is at baseline.      GCS: GCS eye subscore is 4. GCS verbal subscore is 5. GCS motor subscore is 6.      Cranial Nerves: Cranial nerves 2-12 are intact.      Sensory: Sensation is intact.      Motor: Motor function is intact.   Psychiatric:         Mood and Affect: Mood normal.         Behavior: Behavior normal. Behavior is cooperative.      LABS:  Recent Results (from the past 24 hour(s))   COVID-19 Rapid  Screening    Collection Time: 04/11/23  1:15 AM   Result Value Ref Range    SARS-CoV-2 RNA, Amplification, Qual Negative Negative   CBC auto differential    Collection Time: 04/11/23  1:16 AM   Result Value Ref Range    WBC 14.38 (H) 3.90 - 12.70 K/uL    RBC 4.09 (L) 4.60 - 6.20 M/uL    Hemoglobin 13.1 (L) 14.0 - 18.0 g/dL    Hematocrit 39.0 (L) 40.0 - 54.0 %    MCV 95 82 - 98 fL    MCH 32.0 (H) 27.0 - 31.0 pg    MCHC 33.6 32.0 - 36.0 g/dL    RDW 12.3 11.5 - 14.5 %    Platelets 268 150 - 450 K/uL    MPV 10.0 9.2 - 12.9 fL    Immature Granulocytes 0.3 0.0 - 0.5 %    Gran # (ANC) 8.1 (H) 1.8 - 7.7 K/uL    Immature Grans (Abs) 0.05 (H) 0.00 - 0.04 K/uL    Lymph # 4.6 1.0 - 4.8 K/uL    Mono # 0.9 0.3 - 1.0 K/uL    Eos # 0.7 (H) 0.0 - 0.5 K/uL    Baso # 0.11 0.00 - 0.20 K/uL    nRBC 0 0 /100 WBC    Gran % 56.5 38.0 - 73.0 %    Lymph % 32.0 18.0 - 48.0 %    Mono % 5.9 4.0 - 15.0 %    Eosinophil % 4.5 0.0 - 8.0 %    Basophil % 0.8 0.0 - 1.9 %    Differential Method Automated    Comprehensive metabolic panel    Collection Time: 04/11/23  1:16 AM   Result Value Ref Range    Sodium 139 136 - 145 mmol/L    Potassium 4.1 3.5 - 5.1 mmol/L    Chloride 103 95 - 110 mmol/L    CO2 24 23 - 29 mmol/L    Glucose 117 (H) 70 - 110 mg/dL    BUN 13 6 - 20 mg/dL    Creatinine 1.0 0.5 - 1.4 mg/dL    Calcium 9.6 8.7 - 10.5 mg/dL    Total Protein 7.5 6.0 - 8.4 g/dL    Albumin 3.9 3.5 - 5.2 g/dL    Total Bilirubin 0.7 0.1 - 1.0 mg/dL    Alkaline Phosphatase 60 55 - 135 U/L     (H) 10 - 40 U/L    ALT 32 10 - 44 U/L    Anion Gap 12 8 - 16 mmol/L    eGFR >60 >60 mL/min/1.73 m^2   Troponin I #1    Collection Time: 04/11/23  1:16 AM   Result Value Ref Range    Troponin I 7.626 (H) 0.000 - 0.026 ng/mL   BNP    Collection Time: 04/11/23  1:16 AM   Result Value Ref Range    BNP 41 0 - 99 pg/mL   HIV 1/2 Ag/Ab (4th Gen)    Collection Time: 04/11/23  1:16 AM   Result Value Ref Range    HIV 1/2 Ag/Ab Negative Negative   Hepatitis C Antibody     Collection Time: 04/11/23  1:16 AM   Result Value Ref Range    Hepatitis C Ab Negative Negative   HCV Virus Hold Specimen    Collection Time: 04/11/23  1:16 AM   Result Value Ref Range    HEP C Virus Hold Specimen Hold for HCV sendout    APTT    Collection Time: 04/11/23  2:33 AM   Result Value Ref Range    aPTT 25.7 21.0 - 32.0 sec   Protime-INR    Collection Time: 04/11/23  2:33 AM   Result Value Ref Range    Prothrombin Time 10.4 9.0 - 12.5 sec    INR 1.0 0.8 - 1.2   Troponin I    Collection Time: 04/11/23  3:54 AM   Result Value Ref Range    Troponin I 12.211 (H) 0.000 - 0.026 ng/mL       RADIOLOGY  No results found.    EKG    MICROBIOLOGY    MDM     Amount and/or Complexity of Data Reviewed  Clinical lab tests: reviewed  Tests in the radiology section of CPT®: reviewed  Tests in the medicine section of CPT®: reviewed  Discussion of test results with the performing providers: yes  Decide to obtain previous medical records or to obtain history from someone other than the patient: yes  Obtain history from someone other than the patient: yes  Review and summarize past medical records: yes  Discuss the patient with other providers: yes  Independent visualization of images, tracings, or specimens: yes          Assessment/Plan:     * NSTEMI (non-ST elevated myocardial infarction)  Patient presents with NSTEMI. Chest pain is currently uncontrolled. GAURANG score is 7. Patient is not currently on NSTEMI Pathway.    EKG reviewed. Troponins reviewed and results noted-   Recent Labs   Lab 04/11/23  0354   TROPONINI 12.211*   .     Lipid panel reviewed and shows-     Lab Results   Component Value Date    LDLCALC 109.4 10/06/2022     Lab Results   Component Value Date    TRIG 83 10/06/2022         Medical management includes; Anticoagulation, High Intensity Statin, ACE/ARB and Nitrate Echo has not been performed. Latest ECHO results are as follows- No results found for this or any previous visit.  .   Consult for cards ordered.  Patient counseled on lifestyle modifications- quit smoking, begin progressive daily aerobic exercise program, follow a low fat, low cholesterol diet, attempt to lose weight, decrease or avoid alcohol intake, reduce salt in diet and cooking, reduce exposure to stress, improve dietary compliance, use calcium 1 gram daily with Vit D, continue current medications, continue current healthy lifestyle patterns and return for routine annual checkups. Cardiology is consulted. Plan of care not reviewed with cardiology team. Continue to monitor patient closely and adjust therapy as needed.        Hyperlipidemia LDL goal <70  Patient is chronically on statin.will continue for now. Last Lipid Panel:   Lab Results   Component Value Date    CHOL 167 10/06/2022    HDL 41 10/06/2022    LDLCALC 109.4 10/06/2022    TRIG 83 10/06/2022    CHOLHDL 24.6 10/06/2022     Plan:  -Continue home medication  -low fat/low calorie diet        Hypertension goal BP (blood pressure) < 130/80  Currently hypotensive likely 2/2 nitro given for chest pain.BP usually well controlled per patient with home medications.  Plan:  -Optimize pain control   -Continue home medications (lisinopril) if bp is stable, titrate as needed   -Monitor BP  -Low salt/cardiac diet when not NPO  -IV hydralazine prn for SBP>160 or DBP>90           Type 2 diabetes mellitus with diabetic neuropathy  Patient's FSGs are controlled on current medication regimen.  Last A1c reviewed-   Lab Results   Component Value Date    HGBA1C 6.0 (H) 10/06/2022     Most recent fingerstick glucose reviewed- No results for input(s): POCTGLUCOSE in the last 24 hours.  Current correctional scale  Low  Maintain anti-hyperglycemic dose as follows-   Antihyperglycemics (From admission, onward)    None      Plan:  -Hold Oral hypoglycemics while patient is in the hospital  -SSI  -Hyperglycemic protocol    VTE Risk Mitigation (From admission, onward)         Ordered     Reason for No Pharmacological VTE  Prophylaxis  Once        Question:  Reasons:  Answer:  IV Heparin w/in 24 hrs. Pre or Post-Op    04/11/23 0353     IP VTE HIGH RISK PATIENT  Once         04/11/23 0353     Place sequential compression device  Until discontinued         04/11/23 0353     heparin 25,000 units in dextrose 5% (100 units/ml) IV bolus from bag - ADDITIONAL PRN BOLUS - 60 units/kg (max bolus 4000 units)  As needed (PRN)        Question:  Heparin Infusion Adjustment (DO NOT MODIFY ANSWER)  Answer:  \\[email or web address]\epic\Images\Pharmacy\HeparinInfusions\heparin LOW INTENSITY nomogram for [facility] [id number].pdf    04/11/23 0218     heparin 25,000 units in dextrose 5% (100 units/ml) IV bolus from bag - ADDITIONAL PRN BOLUS - 30 units/kg (max bolus 4000 units)  As needed (PRN)        Question:  Heparin Infusion Adjustment (DO NOT MODIFY ANSWER)  Answer:  \\[email or web address]\epic\Images\Pharmacy\HeparinInfusions\heparin LOW INTENSITY nomogram for [facility] [id number].pdf    04/11/23 0218     heparin 25,000 units in dextrose 5% 250 mL (100 units/mL) infusion LOW INTENSITY nomogram - [facility]  Continuous        Question Answer Comment   Heparin Infusion Adjustment (DO NOT MODIFY ANSWER) \\[email or web address]\epic\Images\Pharmacy\HeparinInfusions\heparin LOW INTENSITY nomogram for [facility] [id number].pdf    Begin at (in units/kg/hr) 12        04/11/23 0218               //Core Measures   -DVT proph: SCDs, on heparin   -Code status full  -Surrogate: spouse    Components of this note were documented using a voice recognition system and are subject to errors not corrected at the time the document was proof read. Please contact the author for any clarifications.       Raul Pelaez NP  Department of Hospital Medicine  [facility] Dept.

## 2023-04-11 NOTE — ASSESSMENT & PLAN NOTE
Patient's FSGs are controlled on current medication regimen.  Last A1c reviewed-   Lab Results   Component Value Date    HGBA1C 6.0 (H) 10/06/2022     Most recent fingerstick glucose reviewed- No results for input(s): POCTGLUCOSE in the last 24 hours.  Current correctional scale  Low  Maintain anti-hyperglycemic dose as follows-   Antihyperglycemics (From admission, onward)    None      Plan:  -Hold Oral hypoglycemics while patient is in the hospital  -SSI  -Hyperglycemic protocol

## 2023-04-11 NOTE — HPI
Laron Kothari Jr. is a 52 y.o. male with a PMH  has a past medical history of Blood in stool (8/7/2018), Deep vein thrombosis (DVT) (2017), Diabetes mellitus, type 2 (2013), Gout, Hyperlipidemia, Hypertension, and Neuropathy.  Presented to the ER for evaluation of substernal chest pain that began at 8:00 a.m. this morning while working.  Patient states that the pain feels similar to chest cold, but the pressure has been persistent and non resolved with rest.  Patient states his pain fluctuates in intensity and currently rates chest pain 10/10 after receiving nitroglycerin paste.  Patient reports the pain does not radiate anywhere nor does he have any shortness of breath associated with chest pain.  Reports aggravating factors include movement and exertion.  Alleviating factors include rest and lying flat.  Denies any previous cardiac history nor does he see a cardiologist.  Denies any other associated symptoms at this time.    ER workup revealed WBC of 14.38, CBG of 117 mg/dL., initial troponin of 7.626 with EKG revealing normal sinus rhythm with a ventricular rate of 88 beats per minute with T-wave inversions in leads V4, V5, V6, III, and AVF.  Cardiology consulted and added to secure chat.  Waiting on recommendations.  We will initiate on heparin drip.  Patient and family are in agreement with treatment plan.  Patient will be placed in inpatient status.  PCP: Kristen Adler

## 2023-04-11 NOTE — OP NOTE
INPATIENT Operative Note         SUMMARY     Surgery Date: 4/11/2023     Surgeon(s) and Role:     * Sue Lara MD - Primary    ASSISTANT:katarzyna    Pre-op Diagnosis:  NSTEMI (non-ST elevated myocardial infarction) [I21.4]      Post-op Diagnosis:  NSTEMI (non-ST elevated myocardial infarction) [I21.4]    Procedure(s) (LRB):  Left heart cath (Left)  Angioplasty-coronary  Stent, Drug Eluting, Single Vessel, Coronary    COMPLICATION:none    Anesthesia: RN IV Sedation    Findings/Key Components:  Occluded mid rca treated with 2 stents.  Lcx om1 distal 90% trifurcation point small vessels  Lad apical 80%   Lvf noprmal inf akinesis  Lvedp 17-20    Estimated Blood Loss: < 50 ML.         SPECIMEN: NONE    Devices/Prostetics: synergy xd x2     PLAN:   Routine post sstent care

## 2023-04-11 NOTE — NURSING
Hematoma noted superior to the L radial TR band . Cath lab notified and 2nd TR band applied per Deo Dixon

## 2023-04-11 NOTE — ASSESSMENT & PLAN NOTE
Currently hypotensive likely 2/2 nitro given for chest pain.BP usually well controlled per patient with home medications.  Plan:  -Optimize pain control   -Continue home medications (lisinopril) if bp is stable, titrate as needed   -Monitor BP  -Low salt/cardiac diet when not NPO  -IV hydralazine prn for SBP>160 or DBP>90

## 2023-04-11 NOTE — CARE UPDATE
Mr. Taye Christensen is a 52 y.o. male with a PMH  has a past medical history of DVT (2017), DM, HTN, HLD and Neuropathy who was admitted to [facility] for NSTEMI.   ER workup revealed WBC of 14.38, CBG of 117 mg/dL., initial troponin of 7.626 with EKG revealing normal sinus rhythm with a ventricular rate of 88 beats per minute with T-wave inversions in leads V4, V5, V6, III, and AVF.    Patient placed on heparin gtt, cards consulted and patient underwent PCI with stent placement x 2 earlier today.       Patient seen and examined post op, drowsy, awakens to voice, states chest pain has resolved.  Post op hematoma noted superior to left radial band, 2nd TR band was applied.  Continue cardiac monitoring, ASA/statin.  Follow-up any further cardiology recs.      Full note to follow in am.     Lila Miles [facility]

## 2023-04-11 NOTE — Clinical Note
ostium   left anterior descending. An angiography was performed of the left coronary arteries. Multiple views were taken.

## 2023-04-11 NOTE — PLAN OF CARE
Patient updated on plan of care. Fall and safety precautions in place. Vitals every 4 hours.  Patient remained free from falls. Education provided; questions answered encouraged.  Educated pt to not put pressure on left arm where cath lab went through. Pt ambulated in the room well. Will monitor for bleeding on left wrist. Hematoma present on left forearm above incision.

## 2023-04-12 ENCOUNTER — CLINICAL SUPPORT (OUTPATIENT)
Dept: SMOKING CESSATION | Facility: CLINIC | Age: 53
End: 2023-04-12
Payer: COMMERCIAL

## 2023-04-12 DIAGNOSIS — F17.200 NICOTINE DEPENDENCE: Primary | ICD-10-CM

## 2023-04-12 LAB
ANION GAP SERPL CALC-SCNC: 8 MMOL/L (ref 8–16)
BASOPHILS # BLD AUTO: 0.08 K/UL (ref 0–0.2)
BASOPHILS # BLD AUTO: 0.08 K/UL (ref 0–0.2)
BASOPHILS NFR BLD: 0.7 % (ref 0–1.9)
BASOPHILS NFR BLD: 0.7 % (ref 0–1.9)
BUN SERPL-MCNC: 10 MG/DL (ref 6–20)
CALCIUM SERPL-MCNC: 8.8 MG/DL (ref 8.7–10.5)
CHLORIDE SERPL-SCNC: 104 MMOL/L (ref 95–110)
CO2 SERPL-SCNC: 23 MMOL/L (ref 23–29)
CREAT SERPL-MCNC: 0.8 MG/DL (ref 0.5–1.4)
DIFFERENTIAL METHOD: ABNORMAL
DIFFERENTIAL METHOD: ABNORMAL
EOSINOPHIL # BLD AUTO: 0.6 K/UL (ref 0–0.5)
EOSINOPHIL # BLD AUTO: 0.6 K/UL (ref 0–0.5)
EOSINOPHIL NFR BLD: 5.2 % (ref 0–8)
EOSINOPHIL NFR BLD: 5.2 % (ref 0–8)
ERYTHROCYTE [DISTWIDTH] IN BLOOD BY AUTOMATED COUNT: 12.4 % (ref 11.5–14.5)
ERYTHROCYTE [DISTWIDTH] IN BLOOD BY AUTOMATED COUNT: 12.4 % (ref 11.5–14.5)
EST. GFR  (NO RACE VARIABLE): >60 ML/MIN/1.73 M^2
GLUCOSE SERPL-MCNC: 166 MG/DL (ref 70–110)
HCT VFR BLD AUTO: 35.6 % (ref 40–54)
HCT VFR BLD AUTO: 35.6 % (ref 40–54)
HGB BLD-MCNC: 11.6 G/DL (ref 14–18)
HGB BLD-MCNC: 11.6 G/DL (ref 14–18)
IMM GRANULOCYTES # BLD AUTO: 0.03 K/UL (ref 0–0.04)
IMM GRANULOCYTES # BLD AUTO: 0.03 K/UL (ref 0–0.04)
IMM GRANULOCYTES NFR BLD AUTO: 0.3 % (ref 0–0.5)
IMM GRANULOCYTES NFR BLD AUTO: 0.3 % (ref 0–0.5)
LYMPHOCYTES # BLD AUTO: 3.8 K/UL (ref 1–4.8)
LYMPHOCYTES # BLD AUTO: 3.8 K/UL (ref 1–4.8)
LYMPHOCYTES NFR BLD: 33.9 % (ref 18–48)
LYMPHOCYTES NFR BLD: 33.9 % (ref 18–48)
MCH RBC QN AUTO: 31.6 PG (ref 27–31)
MCH RBC QN AUTO: 31.6 PG (ref 27–31)
MCHC RBC AUTO-ENTMCNC: 32.6 G/DL (ref 32–36)
MCHC RBC AUTO-ENTMCNC: 32.6 G/DL (ref 32–36)
MCV RBC AUTO: 97 FL (ref 82–98)
MCV RBC AUTO: 97 FL (ref 82–98)
MONOCYTES # BLD AUTO: 0.7 K/UL (ref 0.3–1)
MONOCYTES # BLD AUTO: 0.7 K/UL (ref 0.3–1)
MONOCYTES NFR BLD: 6.3 % (ref 4–15)
MONOCYTES NFR BLD: 6.3 % (ref 4–15)
NEUTROPHILS # BLD AUTO: 5.9 K/UL (ref 1.8–7.7)
NEUTROPHILS # BLD AUTO: 5.9 K/UL (ref 1.8–7.7)
NEUTROPHILS NFR BLD: 53.6 % (ref 38–73)
NEUTROPHILS NFR BLD: 53.6 % (ref 38–73)
NRBC BLD-RTO: 0 /100 WBC
NRBC BLD-RTO: 0 /100 WBC
PLATELET # BLD AUTO: 247 K/UL (ref 150–450)
PLATELET # BLD AUTO: 247 K/UL (ref 150–450)
PMV BLD AUTO: 10.1 FL (ref 9.2–12.9)
PMV BLD AUTO: 10.1 FL (ref 9.2–12.9)
POCT GLUCOSE: 146 MG/DL (ref 70–110)
POCT GLUCOSE: 155 MG/DL (ref 70–110)
POCT GLUCOSE: 181 MG/DL (ref 70–110)
POCT GLUCOSE: 214 MG/DL (ref 70–110)
POTASSIUM SERPL-SCNC: 3.9 MMOL/L (ref 3.5–5.1)
RBC # BLD AUTO: 3.67 M/UL (ref 4.6–6.2)
RBC # BLD AUTO: 3.67 M/UL (ref 4.6–6.2)
SODIUM SERPL-SCNC: 135 MMOL/L (ref 136–145)
TROPONIN I SERPL DL<=0.01 NG/ML-MCNC: 11.84 NG/ML (ref 0–0.03)
WBC # BLD AUTO: 11.07 K/UL (ref 3.9–12.7)
WBC # BLD AUTO: 11.07 K/UL (ref 3.9–12.7)

## 2023-04-12 PROCEDURE — 25000003 PHARM REV CODE 250: Performed by: PHYSICIAN ASSISTANT

## 2023-04-12 PROCEDURE — 99407 BEHAV CHNG SMOKING > 10 MIN: CPT | Mod: S$GLB,,,

## 2023-04-12 PROCEDURE — 93005 ELECTROCARDIOGRAM TRACING: CPT

## 2023-04-12 PROCEDURE — 93010 EKG 12-LEAD: ICD-10-PCS | Mod: ,,, | Performed by: STUDENT IN AN ORGANIZED HEALTH CARE EDUCATION/TRAINING PROGRAM

## 2023-04-12 PROCEDURE — 36415 COLL VENOUS BLD VENIPUNCTURE: CPT | Performed by: INTERNAL MEDICINE

## 2023-04-12 PROCEDURE — 99407 PR TOBACCO USE CESSATION INTENSIVE >10 MINUTES: ICD-10-PCS | Mod: S$GLB,,,

## 2023-04-12 PROCEDURE — 83036 HEMOGLOBIN GLYCOSYLATED A1C: CPT | Performed by: INTERNAL MEDICINE

## 2023-04-12 PROCEDURE — 84484 ASSAY OF TROPONIN QUANT: CPT | Performed by: INTERNAL MEDICINE

## 2023-04-12 PROCEDURE — 99233 PR SUBSEQUENT HOSPITAL CARE,LEVL III: ICD-10-PCS | Mod: 25,,, | Performed by: STUDENT IN AN ORGANIZED HEALTH CARE EDUCATION/TRAINING PROGRAM

## 2023-04-12 PROCEDURE — 63600175 PHARM REV CODE 636 W HCPCS: Performed by: INTERNAL MEDICINE

## 2023-04-12 PROCEDURE — 93010 ELECTROCARDIOGRAM REPORT: CPT | Mod: ,,, | Performed by: STUDENT IN AN ORGANIZED HEALTH CARE EDUCATION/TRAINING PROGRAM

## 2023-04-12 PROCEDURE — 80048 BASIC METABOLIC PNL TOTAL CA: CPT | Performed by: INTERNAL MEDICINE

## 2023-04-12 PROCEDURE — 25000003 PHARM REV CODE 250: Performed by: INTERNAL MEDICINE

## 2023-04-12 PROCEDURE — 85025 COMPLETE CBC W/AUTO DIFF WBC: CPT | Performed by: INTERNAL MEDICINE

## 2023-04-12 PROCEDURE — 21400001 HC TELEMETRY ROOM

## 2023-04-12 PROCEDURE — 99233 SBSQ HOSP IP/OBS HIGH 50: CPT | Mod: 25,,, | Performed by: STUDENT IN AN ORGANIZED HEALTH CARE EDUCATION/TRAINING PROGRAM

## 2023-04-12 RX ORDER — INSULIN ASPART 100 [IU]/ML
0-5 INJECTION, SOLUTION INTRAVENOUS; SUBCUTANEOUS
Status: DISCONTINUED | OUTPATIENT
Start: 2023-04-12 | End: 2023-04-13 | Stop reason: HOSPADM

## 2023-04-12 RX ORDER — METOPROLOL SUCCINATE 25 MG/1
25 TABLET, EXTENDED RELEASE ORAL DAILY
Status: DISCONTINUED | OUTPATIENT
Start: 2023-04-12 | End: 2023-04-13

## 2023-04-12 RX ADMIN — HYDROCODONE BITARTRATE AND ACETAMINOPHEN 1 TABLET: 5; 325 TABLET ORAL at 06:04

## 2023-04-12 RX ADMIN — POLYETHYLENE GLYCOL 3350 17 G: 17 POWDER, FOR SOLUTION ORAL at 08:04

## 2023-04-12 RX ADMIN — LISINOPRIL 40 MG: 20 TABLET ORAL at 08:04

## 2023-04-12 RX ADMIN — Medication 6 MG: at 09:04

## 2023-04-12 RX ADMIN — TICAGRELOR 90 MG: 90 TABLET ORAL at 08:04

## 2023-04-12 RX ADMIN — PRAVASTATIN SODIUM 80 MG: 20 TABLET ORAL at 09:04

## 2023-04-12 RX ADMIN — TICAGRELOR 90 MG: 90 TABLET ORAL at 09:04

## 2023-04-12 RX ADMIN — HYDROCODONE BITARTRATE AND ACETAMINOPHEN 1 TABLET: 5; 325 TABLET ORAL at 11:04

## 2023-04-12 RX ADMIN — SODIUM CHLORIDE, POTASSIUM CHLORIDE, SODIUM LACTATE AND CALCIUM CHLORIDE: 600; 310; 30; 20 INJECTION, SOLUTION INTRAVENOUS at 11:04

## 2023-04-12 RX ADMIN — HYDROCODONE BITARTRATE AND ACETAMINOPHEN 1 TABLET: 5; 325 TABLET ORAL at 12:04

## 2023-04-12 RX ADMIN — ASPIRIN 81 MG: 81 TABLET, COATED ORAL at 08:04

## 2023-04-12 RX ADMIN — METOPROLOL SUCCINATE 25 MG: 25 TABLET, EXTENDED RELEASE ORAL at 12:04

## 2023-04-12 NOTE — PLAN OF CARE
[facility] Tele[facility] (Hospital)  Initial Discharge Assessment       Primary Care Provider: Kristen Adler DO    Admission Diagnosis: NSTEMI (non-ST elevated myocardial infarction) [I21.4]  Chest pain [R07.9]    Admission Date: 4/11/2023  Expected Discharge Date:     Discharge Barriers Identified: None    Payor: [employer] / Plan: BCBS ALL OUT OF STATE / Product Type: PPO /     Extended Emergency Contact Information  Primary Emergency Contact: Tova Kendrick   [place]  Mobile Phone: [phone]  Relation: Spouse  Secondary Emergency Contact: doyle luu  Mobile Phone: [phone]  Relation: Daughter  Preferred language: English   needed? No    Discharge Plan A: Home with family         [facility] [address] [place], [place] - [address]  [address] [address] [facility] [address]  Phone: [phone] Fax: [phone]    [facility] - [place], [place] - [address]  [address] [address]  Phone: [phone] Fax: [phone]      Initial Assessment (most recent)       Adult Discharge Assessment - 04/12/23 1132          Discharge Assessment    Assessment Type Discharge Planning Assessment     Confirmed/corrected address, phone number and insurance Yes     Confirmed Demographics Correct on Facesheet     Source of Information patient     Communicated SHANTAL with patient/caregiver Date not available/Unable to determine     Reason For Admission NSTEMI (non-ST elevated myocardial infarction)     People in Home spouse;child(gagandeep), dependent     Facility Arrived From: home     Do you expect to return to your current living situation? Yes     Do you have help at home or someone to help you manage your care at home? Yes     Who are your caregiver(s) and their phone number(s)? family     Prior to hospitilization cognitive status: Alert/Oriented     Current cognitive status: Alert/Oriented     Walking or Climbing Stairs --   independent    Dressing/Bathing --   independent     Home Accessibility stairs to enter home     Number of Stairs, Main Entrance two     Surface of Stairs, Main Entrance concrete     Stair Railings, Main Entrance none     Equipment Currently Used at Home none     Readmission within 30 days? No     Patient currently being followed by outpatient case management? No     Do you currently have service(s) that help you manage your care at home? No     Do you take prescription medications? Yes     Do you have prescription coverage? Yes     Coverage BCBS     Do you have any problems affording any of your prescribed medications? No     Is the patient taking medications as prescribed? yes     Who is going to help you get home at discharge? family     How do you get to doctors appointments? car, drives self     Are you on dialysis? No     Do you take coumadin? No     Discharge Plan A Home with family     DME Needed Upon Discharge  none     Discharge Plan discussed with: Patient     Discharge Barriers Identified None                   Anticipated DC dispo: home with family  Prior Level of Function: independent with ADLs  PCP: Kristen Adler DO    Comments:  Swer met with patient at bedside to introduce role and discuss discharge planning. Patient lives with spouse and children who will also be help at home and can provide transport at time of discharge. CM discharge needs depends on hospital progress. Swer will continue following to assist with other needs.

## 2023-04-12 NOTE — PROGRESS NOTES
"[facility] - Telemetry ([facility])  Cardiology  Progress Note    Patient Name: Laron Kothari Jr.  MRN: [id number]  Admission Date: 4/11/2023  Hospital Length of Stay: 1 days  Code Status: Full Code   Attending Physician: Chelsea Ziegler MD   Primary Care Physician: Kristen Adler DO  Expected Discharge Date:   Principal Problem:NSTEMI (non-ST elevated myocardial infarction)    Subjective:   HPI:  Mr. Kothari is a 52 year old male patient whose current medical conditions include DVT in 2017, DM type II, HTN, hyperlipidemia, gout, neuropathy, and tobacco abuse who presented to [facility] ED his AM with a chief complaint of substernal chest pressure/heaviness that onset earlier in the day while at work. Patient reported performing his normal job duties when he suddenly became extremely diaphoretic, SOB, and weak. He tried bending down to "catch his breath" but symptoms persisted and then he began to experience constant chest discomfort. He denied any associated nausea, vomiting, palpitations, nears syncope, or syncope. Initial workup in ED revealed troponin of 7.626 and patient was subsequently admitted for further evaluation and treatment. Cardiology consulted to assist with management. Patient seen and examined while in ED. Still with ongoing chest discomfort/heaviness. Reports it feels similar to when he has a "chest cold" but more intense and unrelenting. Received nitro overnight with no improvement. States morphine only provided temporary relief. No prior history of CAD or MI. Labs reviewed. Troponin bumped to 15. EKG reviewed, T wave inversions/non-specific ST wave abnormality noted. Echo pending. [facility] planned today by Dr. Lara.        Hospital Course:   4/12/23-Patient seen and examined today, s/p [facility] with PCI of RCA. Feels well. Still with chest pain symptoms but mild and improved. Troponin trending down. Creatinine stable. BB added to maximize med regimen. Echo with normal EF.           Review of Systems   Constitutional: " Negative.   HENT: Negative.     Eyes: Negative.    Cardiovascular:  Positive for chest pain (mild improved).   Respiratory: Negative.     Endocrine: Negative.    Hematologic/Lymphatic: Negative.    Skin: Negative.    Musculoskeletal: Negative.    Gastrointestinal: Negative.    Genitourinary: Negative.    Neurological: Negative.    Psychiatric/Behavioral: Negative.     Allergic/Immunologic: Negative.    Objective:     Vital Signs (Most Recent):  Temp: 98.3 °F (36.8 °C) (04/12/23 1122)  Pulse: 71 (04/12/23 1122)  Resp: 16 (04/12/23 1122)  BP: 109/66 (04/12/23 1122)  SpO2: 98 % (04/12/23 1122) Vital Signs (24h Range):  Temp:  [97.5 °F (36.4 °C)-99.1 °F (37.3 °C)] 98.3 °F (36.8 °C)  Pulse:  [63-84] 71  Resp:  [16-18] 16  SpO2:  [96 %-98 %] 98 %  BP: ()/(58-71) 109/66     Weight: 110.8 kg (244 lb 4.3 oz)  Body mass index is 31.36 kg/m².     SpO2: 98 %         Intake/Output Summary (Last 24 hours) at 4/12/2023 1138  Last data filed at 4/12/2023 0838  Gross per 24 hour   Intake 1059.25 ml   Output --   Net 1059.25 ml       Lines/Drains/Airways       Peripheral Intravenous Line  Duration                  Peripheral IV - Single Lumen 04/11/23 0118 20 G Anterior;Right Upper Arm 1 day         Peripheral IV - Single Lumen 04/11/23 0235 20 G Anterior;Right Forearm 1 day                    Physical Exam  Vitals and nursing note reviewed.   Constitutional:       General: He is not in acute distress.     Appearance: Normal appearance. He is well-developed. He is not diaphoretic.   HENT:      Head: Normocephalic and atraumatic.   Eyes:      General:         Right eye: No discharge.         Left eye: No discharge.      Pupils: Pupils are equal, round, and reactive to light.   Neck:      Thyroid: No thyromegaly.      Vascular: No JVD.      Trachea: No tracheal deviation.   Cardiovascular:      Rate and Rhythm: Normal rate and regular rhythm.      Heart sounds: Normal heart sounds, S1 normal and S2 normal. No murmur  heard.  Pulmonary:      Effort: Pulmonary effort is normal. No respiratory distress.      Breath sounds: Normal breath sounds. No wheezing or rales.   Abdominal:      General: There is no distension.      Tenderness: There is no rebound.   Musculoskeletal:      Cervical back: Neck supple.      Right lower leg: No edema.      Left lower leg: No edema.   Skin:     General: Skin is warm and dry.      Findings: No erythema.      Comments: Left radial access site C/D/I; no bleeding erythema or drainage, intact pulse   Neurological:      General: No focal deficit present.      Mental Status: He is alert and oriented to person, place, and time.   Psychiatric:         Mood and Affect: Mood normal.         Behavior: Behavior normal.         Thought Content: Thought content normal.       Significant Labs: CMP   Recent Labs   Lab 04/11/23  0116 04/12/23  0504    135*   K 4.1 3.9    104   CO2 24 23   * 166*   BUN 13 10   CREATININE 1.0 0.8   CALCIUM 9.6 8.8   PROT 7.5  --    ALBUMIN 3.9  --    BILITOT 0.7  --    ALKPHOS 60  --    *  --    ALT 32  --    ANIONGAP 12 8   , CBC   Recent Labs   Lab 04/11/23  0116 04/12/23  0504   WBC 14.38* 11.07  11.07   HGB 13.1* 11.6*  11.6*   HCT 39.0* 35.6*  35.6*    247  247   , Troponin   Recent Labs   Lab 04/11/23  0354 04/11/23  0640 04/12/23  0504   TROPONINI 12.211* 15.833* 11.838*   , and All pertinent lab results from the last 24 hours have been reviewed.    Significant Imaging: Echocardiogram: Transthoracic echo (TTE) complete ([facility] Only):   Results for orders placed or performed during the hospital encounter of 04/11/23   Echo   Result Value Ref Range    BSA 2.47 m2    TDI SEPTAL 0.12 m/s    LV LATERAL E/E' RATIO 7.70 m/s    LV SEPTAL E/E' RATIO 6.42 m/s    LA WIDTH 3.40 cm    IVC diameter 1.15 cm    Left Ventricular Outflow Tract Mean Velocity 0.67 cm/s    Left Ventricular Outflow Tract Mean Gradient 2.05 mmHg    TDI LATERAL 0.10 m/s    LVIDd  4.21 3.5 - 6.0 cm    IVS 1.60 (A) 0.6 - 1.1 cm    Posterior Wall 1.30 (A) 0.6 - 1.1 cm    Ao root annulus 4.01 cm    LVIDs 3.05 2.1 - 4.0 cm    FS 28 28 - 44 %    LA volume 39.92 cm3    STJ 3.98 cm    Ascending aorta 3.94 cm    LV mass 237.56 g    LA size 2.66 cm    TAPSE 1.90 cm    Left Ventricle Relative Wall Thickness 0.62 cm    AV mean gradient 3 mmHg    AV valve area 4.23 cm2    AV Velocity Ratio 0.90     AV index (prosthetic) 0.93     MV valve area p 1/2 method 2.07 cm2    E/A ratio 0.79     Mean e' 0.11 m/s    E wave deceleration time 366.29 msec    IVRT 83.73 msec    Pulm vein S/D ratio 1.07     LVOT diameter 2.40 cm    LVOT area 4.5 cm2    LVOT peak jorge 0.96 m/s    LVOT peak VTI 20.00 cm    Ao peak jorge 1.07 m/s    Ao VTI 21.4 cm    RVOT peak jorge 0.66 m/s    RVOT peak VTI 14.1 cm    LVOT stroke volume 90.43 cm3    AV peak gradient 5 mmHg    PV mean gradient 0.91 mmHg    E/E' ratio 7.00 m/s    MV Peak E Jorge 0.77 m/s    TR Max Jorge 1.96 m/s    MV stenosis pressure 1/2 time 106.22 ms    MV Peak A Jorge 0.98 m/s    PV Peak S Jorge 0.44 m/s    PV Peak D Jorge 0.41 m/s    LV Systolic Volume 36.42 mL    LV Systolic Volume Index 15.0 mL/m2    LV Diastolic Volume 78.87 mL    LV Diastolic Volume Index 32.59 mL/m2    LA Volume Index 16.5 mL/m2    LV Mass Index 98 g/m2    RA Major Axis 3.97 cm    Left Atrium Minor Axis 5.08 cm    Left Atrium Major Axis 5.31 cm    Triscuspid Valve Regurgitation Peak Gradient 15 mmHg    Right Atrial Pressure (from IVC) 3 mmHg    EF 55 %    TV rest pulmonary artery pressure 18 mmHg    Narrative    · The left ventricle is normal in size with concentric remodeling and   normal systolic function.  · Normal left ventricular diastolic function.  · The estimated PA systolic pressure is 18 mmHg.  · Normal right ventricular size with normal right ventricular systolic   function.  · Normal central venous pressure (3 mmHg).  · The estimated ejection fraction is 55%.  · The ascending aorta is mildly  dilated.  · There are segmental left ventricular wall motion abnormalities.      , EKG: reviewed, and X-Ray: CXR: X-Ray Chest 1 View (CXR): No results found for this visit on 04/11/23. and X-Ray Chest PA and Lateral (CXR): No results found for this visit on 04/11/23.    Assessment and Plan:   Patient who presents with CP/NSTEMI s/p C with PCI of RCA. STable, meds adjusted. Continue OMT and f/u in clinic.    * NSTEMI (non-ST elevated myocardial infarction)  -Patient who presents with SOB/chest pain and elevated troponin---> consistent with NSTEMI  -Still with ongoing CP, troponin bumped to 15  -Continue ASA, statin, ACEi, heparin gtt  -Check echo  -Will plan to add BB post-procedure  -LHC today by Dr. Lara. All risks, benefits, and treatment alternatives explained to patient in detail. All questions answered. He has agreed to proceed.     4/12/23  -s/p C yesterday with PCI of RCA  -Stable, troponin trending down  -Continue ASA,Brilinta, statin, ACEI  -Add BB to maximize regimen  -Counseled on post-restrictions and importance of DAPT    Type 2 diabetes mellitus with diabetic neuropathy  -Mgmt as per hospital medicine    Hypertension goal BP (blood pressure) < 130/80  -Continue ACEi  -Will plan to optimize regimen post LHC    Tobacco use disorder  -Smoking cessation advised    Hyperlipidemia LDL goal <70  -Statin          VTE Risk Mitigation (From admission, onward)         Ordered     Reason for No Pharmacological VTE Prophylaxis  Once        Question:  Reasons:  Answer:  IV Heparin w/in 24 hrs. Pre or Post-Op    04/11/23 0353     IP VTE HIGH RISK PATIENT  Once         04/11/23 0353     Place sequential compression device  Until discontinued         04/11/23 0353                Laura Oliveros PA-C  Cardiology  [facility] - Telemetry ([facility])

## 2023-04-12 NOTE — PROGRESS NOTES
[facility] Medicine  Progress Note    Patient Name: Laron Kothari Jr.  MRN: [id number]  Patient Class: IP- Inpatient   Admission Date: 4/11/2023  Length of Stay: 1 days  Attending Physician: Chelsea Ziegler MD  Primary Care Provider: Kristen Adler DO        Subjective:     Principal Problem:NSTEMI (non-ST elevated myocardial infarction)        HPI:  Laron Kothari Jr. is a 52 y.o. male with a PMH  has a past medical history of Blood in stool (8/7/2018), Deep vein thrombosis (DVT) (2017), Diabetes mellitus, type 2 (2013), Gout, Hyperlipidemia, Hypertension, and Neuropathy.  Presented to the ER for evaluation of substernal chest pain that began at 8:00 a.m. this morning while working.  Patient states that the pain feels similar to chest cold, but the pressure has been persistent and non resolved with rest.  Patient states his pain fluctuates in intensity and currently rates chest pain 10/10 after receiving nitroglycerin paste.  Patient reports the pain does not radiate anywhere nor does he have any shortness of breath associated with chest pain.  Reports aggravating factors include movement and exertion.  Alleviating factors include rest and lying flat.  Denies any previous cardiac history nor does he see a cardiologist.  Denies any other associated symptoms at this time.    ER workup revealed WBC of 14.38, CBG of 117 mg/dL., initial troponin of 7.626 with EKG revealing normal sinus rhythm with a ventricular rate of 88 beats per minute with T-wave inversions in leads V4, V5, V6, III, and AVF.  Cardiology consulted and added to secure chat.  Waiting on recommendations.  We will initiate on heparin drip.  Patient and family are in agreement with treatment plan.  Patient will be placed in inpatient status.  PCP: Kristen Adler        Overview/Hospital Course:  PT was started on heparin drip, cardiology consulted, pt underwent LHC 04/11 with Dr. Lara with JUSTIN x 2 to mid RCA.       Interval  History: ZARI. PT seen with wife at bedside. Reports sensation of palpitations lasting a few seconds overnight. Denies chest pain, n/v, shortness of breath. Denies LUE pain/numbness     Review of Systems  Objective:     Vital Signs (Most Recent):  Temp: 98.3 °F (36.8 °C) (04/12/23 1122)  Pulse: 71 (04/12/23 1122)  Resp: 16 (04/12/23 1122)  BP: 109/66 (04/12/23 1122)  SpO2: 98 % (04/12/23 1122) Vital Signs (24h Range):  Temp:  [97.5 °F (36.4 °C)-99.1 °F (37.3 °C)] 98.3 °F (36.8 °C)  Pulse:  [63-84] 71  Resp:  [16-18] 16  SpO2:  [96 %-98 %] 98 %  BP: ()/(58-71) 109/66     Weight: 110.8 kg (244 lb 4.3 oz)  Body mass index is 31.36 kg/m².    Intake/Output Summary (Last 24 hours) at 4/12/2023 1237  Last data filed at 4/12/2023 0838  Gross per 24 hour   Intake 1059.25 ml   Output --   Net 1059.25 ml      Physical Exam  Vitals and nursing note reviewed.   Constitutional:       General: He is not in acute distress.     Appearance: Normal appearance. He is not ill-appearing.   Cardiovascular:      Rate and Rhythm: Normal rate and regular rhythm.      Heart sounds: No murmur heard.    No friction rub. No gallop.   Pulmonary:      Effort: Pulmonary effort is normal.      Breath sounds: Normal breath sounds. No wheezing, rhonchi or rales.   Abdominal:      General: Bowel sounds are normal. There is no distension.      Palpations: Abdomen is soft.      Tenderness: There is no abdominal tenderness. There is no guarding or rebound.   Musculoskeletal:      Right lower leg: No edema.      Left lower leg: No edema.   Skin:     General: Skin is warm and dry.      Comments: LUE splint in place, SILT, mild L wrist swelling at access site   Neurological:      General: No focal deficit present.      Mental Status: He is alert and oriented to person, place, and time. Mental status is at baseline.       Significant Labs: All pertinent labs within the past 24 hours have been reviewed.    Significant Imaging: I have reviewed all  pertinent imaging results/findings within the past 24 hours.      Assessment/Plan:      * NSTEMI (non-ST elevated myocardial infarction)  - Cardiology consulted and following- defer management   - s/p LHC with JUSTIN x 2 to mid RCA per Dr. Lara   - Trop downtreding post cath   - Continue ASA, Brilinta, Statin   - continue ACE, B blocker         Type 2 diabetes mellitus with diabetic neuropathy  Patient's FSGs are uncontrolled due to hyperglycemia on current medication regimen.  Last A1c reviewed-   Lab Results   Component Value Date    HGBA1C 6.0 (H) 10/06/2022     Most recent fingerstick glucose reviewed-   Recent Labs   Lab 04/11/23  1554 04/11/23 2016 04/12/23  0526 04/12/23  1142   POCTGLUCOSE 188* 196* 155* 214*     Current correctional scale  none  Maintain anti-hyperglycemic dose as follows-   Antihyperglycemics (From admission, onward)    None      Plan:  -Hold Oral hypoglycemics while patient is in the hospital  - start low dose SSI   -Hyperglycemic protocol    Hypertension goal BP (blood pressure) < 130/80  - continue toprol, lisinopril   - stop IVF   - monitor BP adjust regimen pending trends       Tobacco use disorder  - start nicotine patch   - cessation clinic enrollment on discharge     Hyperlipidemia LDL goal <70  - Continue statin           VTE Risk Mitigation (From admission, onward)         Ordered     Reason for No Pharmacological VTE Prophylaxis  Once        Question:  Reasons:  Answer:  IV Heparin w/in 24 hrs. Pre or Post-Op    04/11/23 0353     IP VTE HIGH RISK PATIENT  Once         04/11/23 0353     Place sequential compression device  Until discontinued         04/11/23 0353                Discharge Planning   SHANTAL:      Code Status: Full Code   Is the patient medically ready for discharge?:     Reason for patient still in hospital (select all that apply): Patient trending condition  Discharge Plan A: Home with family                  Chelsea Ziegler MD  Department of Hospital Medicine    [facility] - Telemetry ([facility])

## 2023-04-12 NOTE — SUBJECTIVE & OBJECTIVE
From: Jasbir Box  To: Vignesh Abraham PA-C  Sent: 9/1/2021 11:03 AM CDT  Subject: Test Results Question    Good morning. No results yet? I feel so horrible. I haven't slept, still feel very nauseous, no appetite and feel a sour taste in my mouth.  If it Interval History: ZOËEON. PT seen with wife at bedside. Reports sensation of palpitations lasting a few seconds overnight. Denies chest pain, n/v, shortness of breath. Denies LUE pain/numbness     Review of Systems  Objective:     Vital Signs (Most Recent):  Temp: 98.3 °F (36.8 °C) (04/12/23 1122)  Pulse: 71 (04/12/23 1122)  Resp: 16 (04/12/23 1122)  BP: 109/66 (04/12/23 1122)  SpO2: 98 % (04/12/23 1122) Vital Signs (24h Range):  Temp:  [97.5 °F (36.4 °C)-99.1 °F (37.3 °C)] 98.3 °F (36.8 °C)  Pulse:  [63-84] 71  Resp:  [16-18] 16  SpO2:  [96 %-98 %] 98 %  BP: ()/(58-71) 109/66     Weight: 110.8 kg (244 lb 4.3 oz)  Body mass index is 31.36 kg/m².    Intake/Output Summary (Last 24 hours) at 4/12/2023 1237  Last data filed at 4/12/2023 0838  Gross per 24 hour   Intake 1059.25 ml   Output --   Net 1059.25 ml      Physical Exam  Vitals and nursing note reviewed.   Constitutional:       General: He is not in acute distress.     Appearance: Normal appearance. He is not ill-appearing.   Cardiovascular:      Rate and Rhythm: Normal rate and regular rhythm.      Heart sounds: No murmur heard.    No friction rub. No gallop.   Pulmonary:      Effort: Pulmonary effort is normal.      Breath sounds: Normal breath sounds. No wheezing, rhonchi or rales.   Abdominal:      General: Bowel sounds are normal. There is no distension.      Palpations: Abdomen is soft.      Tenderness: There is no abdominal tenderness. There is no guarding or rebound.   Musculoskeletal:      Right lower leg: No edema.      Left lower leg: No edema.   Skin:     General: Skin is warm and dry.      Comments: LUE splint in place, SILT, mild L wrist swelling at access site   Neurological:      General: No focal deficit present.      Mental Status: He is alert and oriented to person, place, and time. Mental status is at baseline.       Significant Labs: All pertinent labs within the past 24 hours have been reviewed.    Significant Imaging: I have reviewed  all pertinent imaging results/findings within the past 24 hours.

## 2023-04-12 NOTE — ASSESSMENT & PLAN NOTE
-Patient who presents with SOB/chest pain and elevated troponin---> consistent with NSTEMI  -Still with ongoing CP, troponin bumped to 15  -Continue ASA, statin, ACEi, heparin gtt  -Check echo  -Will plan to add BB post-procedure  -LHC today by Dr. Lara. All risks, benefits, and treatment alternatives explained to patient in detail. All questions answered. He has agreed to proceed.     4/12/23  -s/p LHC yesterday with PCI of RCA  -Stable, troponin trending down  -Continue ASA,Brilinta, statin, ACEI  -Add BB to maximize regimen  -Counseled on post-restrictions and importance of DAPT

## 2023-04-12 NOTE — SUBJECTIVE & OBJECTIVE
Review of Systems   Constitutional: Negative.   HENT: Negative.     Eyes: Negative.    Cardiovascular:  Positive for chest pain (mild improved).   Respiratory: Negative.     Endocrine: Negative.    Hematologic/Lymphatic: Negative.    Skin: Negative.    Musculoskeletal: Negative.    Gastrointestinal: Negative.    Genitourinary: Negative.    Neurological: Negative.    Psychiatric/Behavioral: Negative.     Allergic/Immunologic: Negative.    Objective:     Vital Signs (Most Recent):  Temp: 98.3 °F (36.8 °C) (04/12/23 1122)  Pulse: 71 (04/12/23 1122)  Resp: 16 (04/12/23 1122)  BP: 109/66 (04/12/23 1122)  SpO2: 98 % (04/12/23 1122) Vital Signs (24h Range):  Temp:  [97.5 °F (36.4 °C)-99.1 °F (37.3 °C)] 98.3 °F (36.8 °C)  Pulse:  [63-84] 71  Resp:  [16-18] 16  SpO2:  [96 %-98 %] 98 %  BP: ()/(58-71) 109/66     Weight: 110.8 kg (244 lb 4.3 oz)  Body mass index is 31.36 kg/m².     SpO2: 98 %         Intake/Output Summary (Last 24 hours) at 4/12/2023 1138  Last data filed at 4/12/2023 0838  Gross per 24 hour   Intake 1059.25 ml   Output --   Net 1059.25 ml       Lines/Drains/Airways       Peripheral Intravenous Line  Duration                  Peripheral IV - Single Lumen 04/11/23 0118 20 G Anterior;Right Upper Arm 1 day         Peripheral IV - Single Lumen 04/11/23 0235 20 G Anterior;Right Forearm 1 day                    Physical Exam  Vitals and nursing note reviewed.   Constitutional:       General: He is not in acute distress.     Appearance: Normal appearance. He is well-developed. He is not diaphoretic.   HENT:      Head: Normocephalic and atraumatic.   Eyes:      General:         Right eye: No discharge.         Left eye: No discharge.      Pupils: Pupils are equal, round, and reactive to light.   Neck:      Thyroid: No thyromegaly.      Vascular: No JVD.      Trachea: No tracheal deviation.   Cardiovascular:      Rate and Rhythm: Normal rate and regular rhythm.      Heart sounds: Normal heart sounds, S1  normal and S2 normal. No murmur heard.  Pulmonary:      Effort: Pulmonary effort is normal. No respiratory distress.      Breath sounds: Normal breath sounds. No wheezing or rales.   Abdominal:      General: There is no distension.      Tenderness: There is no rebound.   Musculoskeletal:      Cervical back: Neck supple.      Right lower leg: No edema.      Left lower leg: No edema.   Skin:     General: Skin is warm and dry.      Findings: No erythema.      Comments: Left radial access site C/D/I; no bleeding erythema or drainage, intact pulse   Neurological:      General: No focal deficit present.      Mental Status: He is alert and oriented to person, place, and time.   Psychiatric:         Mood and Affect: Mood normal.         Behavior: Behavior normal.         Thought Content: Thought content normal.       Significant Labs: CMP   Recent Labs   Lab 04/11/23  0116 04/12/23  0504    135*   K 4.1 3.9    104   CO2 24 23   * 166*   BUN 13 10   CREATININE 1.0 0.8   CALCIUM 9.6 8.8   PROT 7.5  --    ALBUMIN 3.9  --    BILITOT 0.7  --    ALKPHOS 60  --    *  --    ALT 32  --    ANIONGAP 12 8   , CBC   Recent Labs   Lab 04/11/23  0116 04/12/23  0504   WBC 14.38* 11.07  11.07   HGB 13.1* 11.6*  11.6*   HCT 39.0* 35.6*  35.6*    247  247   , Troponin   Recent Labs   Lab 04/11/23  0354 04/11/23  0640 04/12/23  0504   TROPONINI 12.211* 15.833* 11.838*   , and All pertinent lab results from the last 24 hours have been reviewed.    Significant Imaging: Echocardiogram: Transthoracic echo (TTE) complete (Cupid Only):   Results for orders placed or performed during the hospital encounter of 04/11/23   Echo   Result Value Ref Range    BSA 2.47 m2    TDI SEPTAL 0.12 m/s    LV LATERAL E/E' RATIO 7.70 m/s    LV SEPTAL E/E' RATIO 6.42 m/s    LA WIDTH 3.40 cm    IVC diameter 1.15 cm    Left Ventricular Outflow Tract Mean Velocity 0.67 cm/s    Left Ventricular Outflow Tract Mean Gradient 2.05 mmHg     TDI LATERAL 0.10 m/s    LVIDd 4.21 3.5 - 6.0 cm    IVS 1.60 (A) 0.6 - 1.1 cm    Posterior Wall 1.30 (A) 0.6 - 1.1 cm    Ao root annulus 4.01 cm    LVIDs 3.05 2.1 - 4.0 cm    FS 28 28 - 44 %    LA volume 39.92 cm3    STJ 3.98 cm    Ascending aorta 3.94 cm    LV mass 237.56 g    LA size 2.66 cm    TAPSE 1.90 cm    Left Ventricle Relative Wall Thickness 0.62 cm    AV mean gradient 3 mmHg    AV valve area 4.23 cm2    AV Velocity Ratio 0.90     AV index (prosthetic) 0.93     MV valve area p 1/2 method 2.07 cm2    E/A ratio 0.79     Mean e' 0.11 m/s    E wave deceleration time 366.29 msec    IVRT 83.73 msec    Pulm vein S/D ratio 1.07     LVOT diameter 2.40 cm    LVOT area 4.5 cm2    LVOT peak jorge 0.96 m/s    LVOT peak VTI 20.00 cm    Ao peak jorge 1.07 m/s    Ao VTI 21.4 cm    RVOT peak jorge 0.66 m/s    RVOT peak VTI 14.1 cm    LVOT stroke volume 90.43 cm3    AV peak gradient 5 mmHg    PV mean gradient 0.91 mmHg    E/E' ratio 7.00 m/s    MV Peak E Jorge 0.77 m/s    TR Max Jorge 1.96 m/s    MV stenosis pressure 1/2 time 106.22 ms    MV Peak A Jorge 0.98 m/s    PV Peak S Jorge 0.44 m/s    PV Peak D Jorge 0.41 m/s    LV Systolic Volume 36.42 mL    LV Systolic Volume Index 15.0 mL/m2    LV Diastolic Volume 78.87 mL    LV Diastolic Volume Index 32.59 mL/m2    LA Volume Index 16.5 mL/m2    LV Mass Index 98 g/m2    RA Major Axis 3.97 cm    Left Atrium Minor Axis 5.08 cm    Left Atrium Major Axis 5.31 cm    Triscuspid Valve Regurgitation Peak Gradient 15 mmHg    Right Atrial Pressure (from IVC) 3 mmHg    EF 55 %    TV rest pulmonary artery pressure 18 mmHg    Narrative    · The left ventricle is normal in size with concentric remodeling and   normal systolic function.  · Normal left ventricular diastolic function.  · The estimated PA systolic pressure is 18 mmHg.  · Normal right ventricular size with normal right ventricular systolic   function.  · Normal central venous pressure (3 mmHg).  · The estimated ejection fraction is 55%.  · The  ascending aorta is mildly dilated.  · There are segmental left ventricular wall motion abnormalities.      , EKG: reviewed, and X-Ray: CXR: X-Ray Chest 1 View (CXR): No results found for this visit on 04/11/23. and X-Ray Chest PA and Lateral (CXR): No results found for this visit on 04/11/23.

## 2023-04-12 NOTE — PROGRESS NOTES
Pt is currently enrolled in the tobacco trust, [facility]# [id number]. Pt requested an appt with the smoking cessation clinic, and requested nicotine patches.

## 2023-04-12 NOTE — ASSESSMENT & PLAN NOTE
- Cardiology consulted and following- defer management   - s/p LHC with JUSTIN x 2 to mid RCA per Dr. Lara   - Trop downtreding post cath   - Continue ASA, Brilinta, Statin   - continue ACE, B blocker

## 2023-04-12 NOTE — HOSPITAL COURSE
4/12/23-Patient seen and examined today, s/p [facility] with PCI of RCA. Feels well. Still with chest pain symptoms but mild and improved. Troponin trending down. Creatinine stable. BB added to maximize med regimen. Echo with normal EF.     4/13/23-Patient seen and examined today, resting in bed. Still with some mild discomfort, mostly at night, not worse with exertion. Different from MI pain. He admits he may be anxious. Labs stable. Ranexa added.

## 2023-04-12 NOTE — HOSPITAL COURSE
"PT was started on heparin drip, cardiology consulted, pt underwent C 04/11 with Dr. Lara with JUSTIN x 2 to mid RCA. Post procedure, had wrist hematoma, managed conservatively. Pt had no recurrence of CP, dyspnea.     Pt seen and examined on day of discharge. VSS. Awake, alert, in NAD. Denies CP, SOB. Denies palpitations but reports intermittent "fluttering" sensation that lasts 1-2 seconds, attributed to anxiety. Ranexa added per cardiology. Denies numbness or pain to LUE. PT cleared for discharge home per cardiology. He will continue ASA, Brilinta, statin, ACE inihibitor, B blocker and ranexa. Follow up with PCP and Cardiology within 1 week. Tobacco cessation encouraged, will follow up in cessation clinic on discharge.     Pt appears stable for discharge home today per my exam. Return precautions discussed, all questions answered.   "

## 2023-04-13 VITALS
HEIGHT: 74 IN | TEMPERATURE: 96 F | OXYGEN SATURATION: 98 % | DIASTOLIC BLOOD PRESSURE: 80 MMHG | HEART RATE: 70 BPM | RESPIRATION RATE: 18 BRPM | BODY MASS INDEX: 31.35 KG/M2 | SYSTOLIC BLOOD PRESSURE: 113 MMHG | WEIGHT: 244.25 LBS

## 2023-04-13 LAB
ANION GAP SERPL CALC-SCNC: 9 MMOL/L (ref 8–16)
BASOPHILS # BLD AUTO: 0.08 K/UL (ref 0–0.2)
BASOPHILS NFR BLD: 0.8 % (ref 0–1.9)
BUN SERPL-MCNC: 10 MG/DL (ref 6–20)
CALCIUM SERPL-MCNC: 9.4 MG/DL (ref 8.7–10.5)
CHLORIDE SERPL-SCNC: 103 MMOL/L (ref 95–110)
CO2 SERPL-SCNC: 25 MMOL/L (ref 23–29)
CREAT SERPL-MCNC: 0.9 MG/DL (ref 0.5–1.4)
DIFFERENTIAL METHOD: ABNORMAL
EOSINOPHIL # BLD AUTO: 0.7 K/UL (ref 0–0.5)
EOSINOPHIL NFR BLD: 7 % (ref 0–8)
ERYTHROCYTE [DISTWIDTH] IN BLOOD BY AUTOMATED COUNT: 12.4 % (ref 11.5–14.5)
EST. GFR  (NO RACE VARIABLE): >60 ML/MIN/1.73 M^2
ESTIMATED AVG GLUCOSE: 197 MG/DL (ref 68–131)
GLUCOSE SERPL-MCNC: 160 MG/DL (ref 70–110)
HBA1C MFR BLD: 8.5 % (ref 4–5.6)
HCT VFR BLD AUTO: 38.6 % (ref 40–54)
HGB BLD-MCNC: 12.4 G/DL (ref 14–18)
IMM GRANULOCYTES # BLD AUTO: 0.04 K/UL (ref 0–0.04)
IMM GRANULOCYTES NFR BLD AUTO: 0.4 % (ref 0–0.5)
LYMPHOCYTES # BLD AUTO: 3.3 K/UL (ref 1–4.8)
LYMPHOCYTES NFR BLD: 31.3 % (ref 18–48)
MAGNESIUM SERPL-MCNC: 1.8 MG/DL (ref 1.6–2.6)
MCH RBC QN AUTO: 31.6 PG (ref 27–31)
MCHC RBC AUTO-ENTMCNC: 32.1 G/DL (ref 32–36)
MCV RBC AUTO: 99 FL (ref 82–98)
MONOCYTES # BLD AUTO: 0.8 K/UL (ref 0.3–1)
MONOCYTES NFR BLD: 7.6 % (ref 4–15)
NEUTROPHILS # BLD AUTO: 5.5 K/UL (ref 1.8–7.7)
NEUTROPHILS NFR BLD: 52.9 % (ref 38–73)
NRBC BLD-RTO: 0 /100 WBC
PLATELET # BLD AUTO: 252 K/UL (ref 150–450)
PMV BLD AUTO: 10 FL (ref 9.2–12.9)
POCT GLUCOSE: 165 MG/DL (ref 70–110)
POTASSIUM SERPL-SCNC: 4 MMOL/L (ref 3.5–5.1)
RBC # BLD AUTO: 3.92 M/UL (ref 4.6–6.2)
SODIUM SERPL-SCNC: 137 MMOL/L (ref 136–145)
WBC # BLD AUTO: 10.38 K/UL (ref 3.9–12.7)

## 2023-04-13 PROCEDURE — 25000003 PHARM REV CODE 250: Performed by: STUDENT IN AN ORGANIZED HEALTH CARE EDUCATION/TRAINING PROGRAM

## 2023-04-13 PROCEDURE — 36415 COLL VENOUS BLD VENIPUNCTURE: CPT | Performed by: INTERNAL MEDICINE

## 2023-04-13 PROCEDURE — 80048 BASIC METABOLIC PNL TOTAL CA: CPT | Performed by: INTERNAL MEDICINE

## 2023-04-13 PROCEDURE — 99233 PR SUBSEQUENT HOSPITAL CARE,LEVL III: ICD-10-PCS | Mod: ,,, | Performed by: STUDENT IN AN ORGANIZED HEALTH CARE EDUCATION/TRAINING PROGRAM

## 2023-04-13 PROCEDURE — 85025 COMPLETE CBC W/AUTO DIFF WBC: CPT | Performed by: INTERNAL MEDICINE

## 2023-04-13 PROCEDURE — 25000003 PHARM REV CODE 250: Performed by: INTERNAL MEDICINE

## 2023-04-13 PROCEDURE — 83735 ASSAY OF MAGNESIUM: CPT | Performed by: INTERNAL MEDICINE

## 2023-04-13 PROCEDURE — 99233 SBSQ HOSP IP/OBS HIGH 50: CPT | Mod: ,,, | Performed by: STUDENT IN AN ORGANIZED HEALTH CARE EDUCATION/TRAINING PROGRAM

## 2023-04-13 RX ORDER — RANOLAZINE 500 MG/1
500 TABLET, EXTENDED RELEASE ORAL 2 TIMES DAILY
Qty: 60 TABLET | Refills: 0 | Status: SHIPPED | OUTPATIENT
Start: 2023-04-13 | End: 2023-04-18 | Stop reason: SDUPTHER

## 2023-04-13 RX ORDER — RANOLAZINE 500 MG/1
500 TABLET, EXTENDED RELEASE ORAL 2 TIMES DAILY
Status: DISCONTINUED | OUTPATIENT
Start: 2023-04-13 | End: 2023-04-13 | Stop reason: HOSPADM

## 2023-04-13 RX ORDER — METOPROLOL SUCCINATE 50 MG/1
50 TABLET, EXTENDED RELEASE ORAL DAILY
Qty: 30 TABLET | Refills: 0 | Status: SHIPPED | OUTPATIENT
Start: 2023-04-14 | End: 2023-04-18 | Stop reason: SDUPTHER

## 2023-04-13 RX ORDER — ASPIRIN 81 MG/1
81 TABLET ORAL DAILY
Qty: 30 TABLET | Refills: 0 | Status: SHIPPED | OUTPATIENT
Start: 2023-04-14 | End: 2023-04-18 | Stop reason: SDUPTHER

## 2023-04-13 RX ORDER — METOPROLOL SUCCINATE 50 MG/1
50 TABLET, EXTENDED RELEASE ORAL DAILY
Status: DISCONTINUED | OUTPATIENT
Start: 2023-04-13 | End: 2023-04-13 | Stop reason: HOSPADM

## 2023-04-13 RX ADMIN — ASPIRIN 81 MG: 81 TABLET, COATED ORAL at 09:04

## 2023-04-13 RX ADMIN — METOPROLOL SUCCINATE 50 MG: 50 TABLET, EXTENDED RELEASE ORAL at 09:04

## 2023-04-13 RX ADMIN — TICAGRELOR 90 MG: 90 TABLET ORAL at 09:04

## 2023-04-13 RX ADMIN — LISINOPRIL 40 MG: 20 TABLET ORAL at 09:04

## 2023-04-13 RX ADMIN — RANOLAZINE 500 MG: 500 TABLET, EXTENDED RELEASE ORAL at 09:04

## 2023-04-13 NOTE — PROGRESS NOTES
"[facility] - Telemetry ([facility])  Cardiology  Progress Note    Patient Name: Laron Kothari Jr.  MRN: [id number]  Admission Date: 4/11/2023  Hospital Length of Stay: 2 days  Code Status: Full Code   Attending Physician: Chelsea Ziegler MD   Primary Care Physician: Kristen Adler DO  Expected Discharge Date: 4/13/2023  Principal Problem:NSTEMI (non-ST elevated myocardial infarction)    Subjective:   HPI:  Mr. Kothari is a 52 year old male patient whose current medical conditions include DVT in 2017, DM type II, HTN, hyperlipidemia, gout, neuropathy, and tobacco abuse who presented to [facility] ED his AM with a chief complaint of substernal chest pressure/heaviness that onset earlier in the day while at work. Patient reported performing his normal job duties when he suddenly became extremely diaphoretic, SOB, and weak. He tried bending down to "catch his breath" but symptoms persisted and then he began to experience constant chest discomfort. He denied any associated nausea, vomiting, palpitations, nears syncope, or syncope. Initial workup in ED revealed troponin of 7.626 and patient was subsequently admitted for further evaluation and treatment. Cardiology consulted to assist with management. Patient seen and examined while in ED. Still with ongoing chest discomfort/heaviness. Reports it feels similar to when he has a "chest cold" but more intense and unrelenting. Received nitro overnight with no improvement. States morphine only provided temporary relief. No prior history of CAD or MI. Labs reviewed. Troponin bumped to 15. EKG reviewed, T wave inversions/non-specific ST wave abnormality noted. Echo pending. [facility] planned today by Dr. Lara.     Hospital Course:   4/12/23-Patient seen and examined today, s/p [facility] with PCI of RCA. Feels well. Still with chest pain symptoms but mild and improved. Troponin trending down. Creatinine stable. BB added to maximize med regimen. Echo with normal EF.     4/13/23-Patient seen and examined " today, resting in bed. Still with some mild discomfort, mostly at night, not worse with exertion. Different from MI pain. He admits he may be anxious. Labs stable. Ranexa added.          Review of Systems   Constitutional: Negative.   HENT: Negative.     Eyes: Negative.    Cardiovascular:  Positive for chest pain.   Respiratory: Negative.     Endocrine: Negative.    Hematologic/Lymphatic: Negative.    Skin: Negative.    Musculoskeletal: Negative.    Gastrointestinal: Negative.    Genitourinary: Negative.    Neurological: Negative.    Psychiatric/Behavioral: Negative.     Allergic/Immunologic: Negative.    Objective:     Vital Signs (Most Recent):  Temp: 98.3 °F (36.8 °C) (04/13/23 0729)  Pulse: 76 (04/13/23 0900)  Resp: 19 (04/13/23 0729)  BP: 127/72 (04/13/23 0729)  SpO2: 96 % (04/13/23 0729) Vital Signs (24h Range):  Temp:  [98 °F (36.7 °C)-98.6 °F (37 °C)] 98.3 °F (36.8 °C)  Pulse:  [62-86] 76  Resp:  [16-19] 19  SpO2:  [96 %-99 %] 96 %  BP: (109-130)/(66-75) 127/72     Weight: 110.8 kg (244 lb 4.3 oz)  Body mass index is 31.36 kg/m².     SpO2: 96 %         Intake/Output Summary (Last 24 hours) at 4/13/2023 1023  Last data filed at 4/13/2023 0613  Gross per 24 hour   Intake 385.66 ml   Output 1600 ml   Net -1214.34 ml       Lines/Drains/Airways       Peripheral Intravenous Line  Duration                  Peripheral IV - Single Lumen 04/11/23 0118 20 G Anterior;Right Upper Arm 2 days         Peripheral IV - Single Lumen 04/11/23 0235 20 G Anterior;Right Forearm 2 days                    Physical Exam  Vitals and nursing note reviewed.   Constitutional:       General: He is not in acute distress.     Appearance: Normal appearance. He is well-developed. He is not diaphoretic.   HENT:      Head: Normocephalic and atraumatic.   Eyes:      General:         Right eye: No discharge.         Left eye: No discharge.      Pupils: Pupils are equal, round, and reactive to light.   Neck:      Thyroid: No thyromegaly.       Vascular: No JVD.      Trachea: No tracheal deviation.   Cardiovascular:      Rate and Rhythm: Normal rate and regular rhythm.      Heart sounds: Normal heart sounds, S1 normal and S2 normal. No murmur heard.  Pulmonary:      Effort: Pulmonary effort is normal. No respiratory distress.      Breath sounds: Normal breath sounds. No wheezing or rales.   Abdominal:      General: There is no distension.      Tenderness: There is no rebound.   Musculoskeletal:      Cervical back: Neck supple.      Right lower leg: No edema.      Left lower leg: No edema.   Skin:     General: Skin is warm and dry.      Findings: No erythema.      Comments: L radial access site C/D/I; no bleeding erythema or drainage, no hematoma, intact pulse   Neurological:      General: No focal deficit present.      Mental Status: He is alert and oriented to person, place, and time.      Sensory: Sensory deficit: reviewed.   Psychiatric:         Mood and Affect: Mood normal.         Behavior: Behavior normal.         Thought Content: Thought content normal.       Significant Labs: CMP   Recent Labs   Lab 04/12/23  0504 04/13/23  0653   * 137   K 3.9 4.0    103   CO2 23 25   * 160*   BUN 10 10   CREATININE 0.8 0.9   CALCIUM 8.8 9.4   ANIONGAP 8 9   , CBC   Recent Labs   Lab 04/12/23  0504 04/13/23  0653   WBC 11.07  11.07 10.38   HGB 11.6*  11.6* 12.4*   HCT 35.6*  35.6* 38.6*     247 252   , Troponin   Recent Labs   Lab 04/12/23  0504   TROPONINI 11.838*   , and All pertinent lab results from the last 24 hours have been reviewed.    Significant Imaging: Echocardiogram: Transthoracic echo (TTE) complete (Cupid Only):   Results for orders placed or performed during the hospital encounter of 04/11/23   Echo   Result Value Ref Range    BSA 2.47 m2    TDI SEPTAL 0.12 m/s    LV LATERAL E/E' RATIO 7.70 m/s    LV SEPTAL E/E' RATIO 6.42 m/s    LA WIDTH 3.40 cm    IVC diameter 1.15 cm    Left Ventricular Outflow Tract Mean Velocity  0.67 cm/s    Left Ventricular Outflow Tract Mean Gradient 2.05 mmHg    TDI LATERAL 0.10 m/s    LVIDd 4.21 3.5 - 6.0 cm    IVS 1.60 (A) 0.6 - 1.1 cm    Posterior Wall 1.30 (A) 0.6 - 1.1 cm    Ao root annulus 4.01 cm    LVIDs 3.05 2.1 - 4.0 cm    FS 28 28 - 44 %    LA volume 39.92 cm3    STJ 3.98 cm    Ascending aorta 3.94 cm    LV mass 237.56 g    LA size 2.66 cm    TAPSE 1.90 cm    Left Ventricle Relative Wall Thickness 0.62 cm    AV mean gradient 3 mmHg    AV valve area 4.23 cm2    AV Velocity Ratio 0.90     AV index (prosthetic) 0.93     MV valve area p 1/2 method 2.07 cm2    E/A ratio 0.79     Mean e' 0.11 m/s    E wave deceleration time 366.29 msec    IVRT 83.73 msec    Pulm vein S/D ratio 1.07     LVOT diameter 2.40 cm    LVOT area 4.5 cm2    LVOT peak jorge 0.96 m/s    LVOT peak VTI 20.00 cm    Ao peak jorge 1.07 m/s    Ao VTI 21.4 cm    RVOT peak jorge 0.66 m/s    RVOT peak VTI 14.1 cm    LVOT stroke volume 90.43 cm3    AV peak gradient 5 mmHg    PV mean gradient 0.91 mmHg    E/E' ratio 7.00 m/s    MV Peak E Jorge 0.77 m/s    TR Max Jorge 1.96 m/s    MV stenosis pressure 1/2 time 106.22 ms    MV Peak A Jorge 0.98 m/s    PV Peak S Jorge 0.44 m/s    PV Peak D Jorge 0.41 m/s    LV Systolic Volume 36.42 mL    LV Systolic Volume Index 15.0 mL/m2    LV Diastolic Volume 78.87 mL    LV Diastolic Volume Index 32.59 mL/m2    LA Volume Index 16.5 mL/m2    LV Mass Index 98 g/m2    RA Major Axis 3.97 cm    Left Atrium Minor Axis 5.08 cm    Left Atrium Major Axis 5.31 cm    Triscuspid Valve Regurgitation Peak Gradient 15 mmHg    Right Atrial Pressure (from IVC) 3 mmHg    EF 55 %    TV rest pulmonary artery pressure 18 mmHg    Narrative    · The left ventricle is normal in size with concentric remodeling and   normal systolic function.  · Normal left ventricular diastolic function.  · The estimated PA systolic pressure is 18 mmHg.  · Normal right ventricular size with normal right ventricular systolic   function.  · Normal central venous  pressure (3 mmHg).  · The estimated ejection fraction is 55%.  · The ascending aorta is mildly dilated.  · There are segmental left ventricular wall motion abnormalities.      , EKG: reviewed  , and X-Ray: CXR: X-Ray Chest PA and Lateral (CXR): No results found for this visit on 04/11/23.    Assessment and Plan:   Patient who presents with CP/NSTEMI s/p RCA intervention. Stable. Meds adjusted. Follow-up in clinic.     * NSTEMI (non-ST elevated myocardial infarction)  -Patient who presents with SOB/chest pain and elevated troponin---> consistent with NSTEMI  -Still with ongoing CP, troponin bumped to 15  -Continue ASA, statin, ACEi, heparin gtt  -Check echo  -Will plan to add BB post-procedure  -LHC today by Dr. Lara. All risks, benefits, and treatment alternatives explained to patient in detail. All questions answered. He has agreed to proceed.     4/12/23  -s/p LHC yesterday with PCI of RCA  -Stable, troponin trending down  -Continue ASA,Brilinta, statin, ACEI  -Add BB to maximize regimen  -Counseled on post-restrictions and importance of DAPT    4/13/23  -Stable, reports intermittent chest discomfort, mild at night when he is dozing off, admits he feels anxious  -Pain is different from his MI discomfort  -Continue OMT-ASA, Brilinta, statin ACEi  -Ranexa added  -Follow-up in clinic next week    Type 2 diabetes mellitus with diabetic neuropathy  -Mgmt as per hospital medicine    Hypertension goal BP (blood pressure) < 130/80  -Continue ACEi, BB    Tobacco use disorder  -Smoking cessation advised    Hyperlipidemia LDL goal <70  -Statin          VTE Risk Mitigation (From admission, onward)         Ordered     Reason for No Pharmacological VTE Prophylaxis  Once        Question:  Reasons:  Answer:  IV Heparin w/in 24 hrs. Pre or Post-Op    04/11/23 0353     IP VTE HIGH RISK PATIENT  Once         04/11/23 0353     Place sequential compression device  Until discontinued         04/11/23 0353                Laura SAHU  DONALDO Oliveros  Cardiology  [facility] - Telemetry ([facility])

## 2023-04-13 NOTE — ASSESSMENT & PLAN NOTE
-Patient who presents with SOB/chest pain and elevated troponin---> consistent with NSTEMI  -Still with ongoing CP, troponin bumped to 15  -Continue ASA, statin, ACEi, heparin gtt  -Check echo  -Will plan to add BB post-procedure  -LHC today by Dr. Lara. All risks, benefits, and treatment alternatives explained to patient in detail. All questions answered. He has agreed to proceed.     4/12/23  -s/p LHC yesterday with PCI of RCA  -Stable, troponin trending down  -Continue ASA,Brilinta, statin, ACEI  -Add BB to maximize regimen  -Counseled on post-restrictions and importance of DAPT    4/13/23  -Stable, reports intermittent chest discomfort, mild at night when he is dozing off, admits he feels anxious  -Pain is different from his MI discomfort  -Continue OMT-ASA, Brilinta, statin ACEi  -Ranexa added  -Follow-up in clinic next week

## 2023-04-13 NOTE — SUBJECTIVE & OBJECTIVE
Review of Systems   Constitutional: Negative.   HENT: Negative.     Eyes: Negative.    Cardiovascular:  Positive for chest pain.   Respiratory: Negative.     Endocrine: Negative.    Hematologic/Lymphatic: Negative.    Skin: Negative.    Musculoskeletal: Negative.    Gastrointestinal: Negative.    Genitourinary: Negative.    Neurological: Negative.    Psychiatric/Behavioral: Negative.     Allergic/Immunologic: Negative.    Objective:     Vital Signs (Most Recent):  Temp: 98.3 °F (36.8 °C) (04/13/23 0729)  Pulse: 76 (04/13/23 0900)  Resp: 19 (04/13/23 0729)  BP: 127/72 (04/13/23 0729)  SpO2: 96 % (04/13/23 0729) Vital Signs (24h Range):  Temp:  [98 °F (36.7 °C)-98.6 °F (37 °C)] 98.3 °F (36.8 °C)  Pulse:  [62-86] 76  Resp:  [16-19] 19  SpO2:  [96 %-99 %] 96 %  BP: (109-130)/(66-75) 127/72     Weight: 110.8 kg (244 lb 4.3 oz)  Body mass index is 31.36 kg/m².     SpO2: 96 %         Intake/Output Summary (Last 24 hours) at 4/13/2023 1023  Last data filed at 4/13/2023 0613  Gross per 24 hour   Intake 385.66 ml   Output 1600 ml   Net -1214.34 ml       Lines/Drains/Airways       Peripheral Intravenous Line  Duration                  Peripheral IV - Single Lumen 04/11/23 0118 20 G Anterior;Right Upper Arm 2 days         Peripheral IV - Single Lumen 04/11/23 0235 20 G Anterior;Right Forearm 2 days                    Physical Exam  Vitals and nursing note reviewed.   Constitutional:       General: He is not in acute distress.     Appearance: Normal appearance. He is well-developed. He is not diaphoretic.   HENT:      Head: Normocephalic and atraumatic.   Eyes:      General:         Right eye: No discharge.         Left eye: No discharge.      Pupils: Pupils are equal, round, and reactive to light.   Neck:      Thyroid: No thyromegaly.      Vascular: No JVD.      Trachea: No tracheal deviation.   Cardiovascular:      Rate and Rhythm: Normal rate and regular rhythm.      Heart sounds: Normal heart sounds, S1 normal and S2  normal. No murmur heard.  Pulmonary:      Effort: Pulmonary effort is normal. No respiratory distress.      Breath sounds: Normal breath sounds. No wheezing or rales.   Abdominal:      General: There is no distension.      Tenderness: There is no rebound.   Musculoskeletal:      Cervical back: Neck supple.      Right lower leg: No edema.      Left lower leg: No edema.   Skin:     General: Skin is warm and dry.      Findings: No erythema.      Comments: L radial access site C/D/I; no bleeding erythema or drainage, no hematoma, intact pulse   Neurological:      General: No focal deficit present.      Mental Status: He is alert and oriented to person, place, and time.      Sensory: Sensory deficit: reviewed.   Psychiatric:         Mood and Affect: Mood normal.         Behavior: Behavior normal.         Thought Content: Thought content normal.       Significant Labs: CMP   Recent Labs   Lab 04/12/23  0504 04/13/23  0653   * 137   K 3.9 4.0    103   CO2 23 25   * 160*   BUN 10 10   CREATININE 0.8 0.9   CALCIUM 8.8 9.4   ANIONGAP 8 9   , CBC   Recent Labs   Lab 04/12/23  0504 04/13/23  0653   WBC 11.07  11.07 10.38   HGB 11.6*  11.6* 12.4*   HCT 35.6*  35.6* 38.6*     247 252   , Troponin   Recent Labs   Lab 04/12/23  0504   TROPONINI 11.838*   , and All pertinent lab results from the last 24 hours have been reviewed.    Significant Imaging: Echocardiogram: Transthoracic echo (TTE) complete ([facility] Only):   Results for orders placed or performed during the hospital encounter of 04/11/23   Echo   Result Value Ref Range    BSA 2.47 m2    TDI SEPTAL 0.12 m/s    LV LATERAL E/E' RATIO 7.70 m/s    LV SEPTAL E/E' RATIO 6.42 m/s    LA WIDTH 3.40 cm    IVC diameter 1.15 cm    Left Ventricular Outflow Tract Mean Velocity 0.67 cm/s    Left Ventricular Outflow Tract Mean Gradient 2.05 mmHg    TDI LATERAL 0.10 m/s    LVIDd 4.21 3.5 - 6.0 cm    IVS 1.60 (A) 0.6 - 1.1 cm    Posterior Wall 1.30 (A) 0.6 - 1.1  cm    Ao root annulus 4.01 cm    LVIDs 3.05 2.1 - 4.0 cm    FS 28 28 - 44 %    LA volume 39.92 cm3    STJ 3.98 cm    Ascending aorta 3.94 cm    LV mass 237.56 g    LA size 2.66 cm    TAPSE 1.90 cm    Left Ventricle Relative Wall Thickness 0.62 cm    AV mean gradient 3 mmHg    AV valve area 4.23 cm2    AV Velocity Ratio 0.90     AV index (prosthetic) 0.93     MV valve area p 1/2 method 2.07 cm2    E/A ratio 0.79     Mean e' 0.11 m/s    E wave deceleration time 366.29 msec    IVRT 83.73 msec    Pulm vein S/D ratio 1.07     LVOT diameter 2.40 cm    LVOT area 4.5 cm2    LVOT peak jorge 0.96 m/s    LVOT peak VTI 20.00 cm    Ao peak jorge 1.07 m/s    Ao VTI 21.4 cm    RVOT peak jorge 0.66 m/s    RVOT peak VTI 14.1 cm    LVOT stroke volume 90.43 cm3    AV peak gradient 5 mmHg    PV mean gradient 0.91 mmHg    E/E' ratio 7.00 m/s    MV Peak E Jorge 0.77 m/s    TR Max Jorge 1.96 m/s    MV stenosis pressure 1/2 time 106.22 ms    MV Peak A Jorge 0.98 m/s    PV Peak S Jorge 0.44 m/s    PV Peak D Jorge 0.41 m/s    LV Systolic Volume 36.42 mL    LV Systolic Volume Index 15.0 mL/m2    LV Diastolic Volume 78.87 mL    LV Diastolic Volume Index 32.59 mL/m2    LA Volume Index 16.5 mL/m2    LV Mass Index 98 g/m2    RA Major Axis 3.97 cm    Left Atrium Minor Axis 5.08 cm    Left Atrium Major Axis 5.31 cm    Triscuspid Valve Regurgitation Peak Gradient 15 mmHg    Right Atrial Pressure (from IVC) 3 mmHg    EF 55 %    TV rest pulmonary artery pressure 18 mmHg    Narrative    · The left ventricle is normal in size with concentric remodeling and   normal systolic function.  · Normal left ventricular diastolic function.  · The estimated PA systolic pressure is 18 mmHg.  · Normal right ventricular size with normal right ventricular systolic   function.  · Normal central venous pressure (3 mmHg).  · The estimated ejection fraction is 55%.  · The ascending aorta is mildly dilated.  · There are segmental left ventricular wall motion abnormalities.      , EKG:  reviewed  , and X-Ray: CXR: X-Ray Chest PA and Lateral (CXR): No results found for this visit on 04/11/23.

## 2023-04-13 NOTE — DISCHARGE SUMMARY
[facility] - Telemetry ([facility] Medicine  Discharge Summary      Patient Name: Laron Kothari Jr.  MRN: [id number]  NEETA: [id number]  Patient Class: IP- Inpatient  Admission Date: 4/11/2023  Hospital Length of Stay: 2 days  Discharge Date and Time: No discharge date for patient encounter.  Attending Physician: Chelsea Ziegler MD   Discharging Provider: Chelsea Ziegler MD  Primary Care Provider: Kristen Adler DO    Primary Care Team: [facility] MEDICINE A    HPI:   Laron Kothari Jr. is a 52 y.o. male with a PMH  has a past medical history of Blood in stool (8/7/2018), Deep vein thrombosis (DVT) (2017), Diabetes mellitus, type 2 (2013), Gout, Hyperlipidemia, Hypertension, and Neuropathy.  Presented to the ER for evaluation of substernal chest pain that began at 8:00 a.m. this morning while working.  Patient states that the pain feels similar to chest cold, but the pressure has been persistent and non resolved with rest.  Patient states his pain fluctuates in intensity and currently rates chest pain 10/10 after receiving nitroglycerin paste.  Patient reports the pain does not radiate anywhere nor does he have any shortness of breath associated with chest pain.  Reports aggravating factors include movement and exertion.  Alleviating factors include rest and lying flat.  Denies any previous cardiac history nor does he see a cardiologist.  Denies any other associated symptoms at this time.    ER workup revealed WBC of 14.38, CBG of 117 mg/dL., initial troponin of 7.626 with EKG revealing normal sinus rhythm with a ventricular rate of 88 beats per minute with T-wave inversions in leads V4, V5, V6, III, and AVF.  Cardiology consulted and added to secure chat.  Waiting on recommendations.  We will initiate on heparin drip.  Patient and family are in agreement with treatment plan.  Patient will be placed in inpatient status.  PCP: Kristen Adler        Procedure(s) (LRB):  Left heart cath  "(Left)  Angioplasty-coronary  Stent, Drug Eluting, Single Vessel, Coronary      Hospital Course:   PT was started on heparin drip, cardiology consulted, pt underwent LHC 04/11 with Dr. Lara with JUSTIN x 2 to mid RCA. Post procedure, had wrist hematoma, managed conservatively. Pt had no recurrence of CP, dyspnea.     Pt seen and examined on day of discharge. VSS. Awake, alert, in NAD. Denies CP, SOB. Denies palpitations but reports intermittent "fluttering" sensation that lasts 1-2 seconds, attributed to anxiety. Ranexa added per cardiology. Denies numbness or pain to LUE. PT cleared for discharge home per cardiology. He will continue ASA, Brilinta, statin, ACE inihibitor, B blocker and ranexa. Follow up with PCP and Cardiology within 1 week. Tobacco cessation encouraged, will follow up in cessation clinic on discharge.     Pt appears stable for discharge home today per my exam. Return precautions discussed, all questions answered.        Goals of Care Treatment Preferences:  Code Status: Full Code      Consults:   Consults (From admission, onward)        Status Ordering Provider     Inpatient consult to Cardiology  Once        Provider:  Yuki Sher MD    Completed ASHLEY RANDLE     Inpatient consult to Cardiology  Once        Provider:  Yuki Sher MD    Completed ASHLEY RANDLE          No new Assessment & Plan notes have been filed under this hospital service since the last note was generated.  Service: Hospital Medicine    Final Active Diagnoses:    Diagnosis Date Noted POA    PRINCIPAL PROBLEM:  NSTEMI (non-ST elevated myocardial infarction) [I21.4] 04/11/2023 Yes    Type 2 diabetes mellitus with diabetic neuropathy [E11.40] 04/14/2016 Yes     Chronic    Hypertension goal BP (blood pressure) < 130/80 [I10] 04/14/2016 Yes     Chronic    Tobacco use disorder [F17.200] 01/20/2015 Yes    Hyperlipidemia LDL goal <70 [E78.5]  Yes     Chronic      Problems Resolved During this Admission: "       Discharged Condition: good    Disposition: Home or Self Care    Follow Up:   Follow-up Information     Kristen Adler DO. Schedule an appointment as soon as possible for a visit in 3 day(s).    Specialty: Internal Medicine  Contact information:  [id number] [place] [id number] [phone]             Ave Hawkins NP. Schedule an appointment as soon as possible for a visit in 1 week(s).    Specialty: Cardiology  Contact information:  [address]  [place] [address] [phone]                       Patient Instructions:      Diet diabetic     Diet Cardiac     Notify your health care provider if you experience any of the following:  difficulty breathing or increased cough     Activity as tolerated       Significant Diagnostic Studies: See Hospital Course     Pending Diagnostic Studies:     None         Medications:  Reconciled Home Medications:      Medication List      START taking these medications    aspirin 81 MG EC tablet  Commonly known as: ECOTRIN  Take 1 tablet (81 mg total) by mouth once daily.  Start taking on: April 14, 2023     BRILINTA 90 mg tablet  Generic drug: ticagrelor  Take 1 tablet (90 mg total) by mouth 2 (two) times daily.     metoprolol succinate 50 MG 24 hr tablet  Commonly known as: TOPROL-XL  Take 1 tablet (50 mg total) by mouth once daily.  Start taking on: April 14, 2023     ranolazine 500 MG Tb12  Commonly known as: RANEXA  Take 1 tablet (500 mg total) by mouth 2 (two) times daily.        CONTINUE taking these medications    blood sugar diagnostic Strp  Once daily glucose testing.     blood-glucose meter Misc  Use as directed.     cetirizine 10 MG tablet  Commonly known as: ZYRTEC  Take 1 tablet (10 mg total) by mouth once daily.     fluticasone propionate 50 mcg/actuation nasal spray  Commonly known as: FLONASE  1 spray (50 mcg total) by Each Nostril route once daily.     gabapentin 300 MG capsule  Commonly known as: NEURONTIN  Take 1 capsule  "(300 mg total) by mouth 3 (three) times daily.     glipiZIDE 5 MG tablet  Commonly known as: GLUCOTROL  Take 1 tablet (5 mg total) by mouth once daily.     lancets Misc  Commonly known as: LANCETS,THIN  Once daily glucose testing.     lisinopriL 40 MG tablet  Commonly known as: PRINIVIL,ZESTRIL  Take 1 tablet (40 mg total) by mouth once daily.     metFORMIN 1000 MG tablet  Commonly known as: GLUCOPHAGE  Take 1 tablet (1,000 mg total) by mouth 2 (two) times daily with meals.     pen needle, diabetic 31 gauge x 5/16" Ndle  Commonly known as: PEN NEEDLE  Use once daily with insulin injection.     pravastatin 80 MG tablet  Commonly known as: PRAVACHOL  Take 1 tablet (80 mg total) by mouth every evening.     sildenafiL 50 MG tablet  Commonly known as: VIAGRA  Take 1 tablet (50 mg total) by mouth daily as needed for Erectile Dysfunction.        STOP taking these medications    meloxicam 15 MG tablet  Commonly known as: MOBIC            Indwelling Lines/Drains at time of discharge:   Lines/Drains/Airways     None                 Time spent on the discharge of patient: 35 minutes         Chelsea Ziegler MD  Department of Hospital Medicine  [facility] - Telemetry ([facility])  "

## 2023-04-13 NOTE — PLAN OF CARE
[facility] - Telemetry (Hospital)  Discharge Final Note    Primary Care Provider: Kristen Adler DO    Expected Discharge Date: 4/13/2023    Final Discharge Note (most recent)       Final Note - 04/13/23 0844          Final Note    Assessment Type Final Discharge Note     Anticipated Discharge Disposition Home or Self Care     Hospital Resources/Appts/Education Provided Appointments scheduled and added to AVS        Post-Acute Status    Coverage BCBS     Discharge Delays None known at this time                     Important Message from Medicare               DC Disposition: home with family  Family Notified: Patient  Transportation: family    Patient had no equipment or placement needs from .     Patient has PCP appointment with Kristen Adler DO on 4/19/23 at 8:00 am.  Patient also has Cardiology appointment with Ave Hawkins NP on 4/19/23 at 9:00 am.

## 2023-04-14 NOTE — PHYSICIAN QUERY
PT Name: Laron Kothari Jr.  MR #: [id number]  DOCUMENTATION CLARIFICATION     CDS/: Annabelle Cabrera  RN, CCDS             Contact information: [email or web address]    This form is a permanent document in the medical record.    Query Date: April 14, 2023    By submitting this query, we are merely seeking further clarification of documentation. Please utilize your independent clinical judgment when addressing the question(s) below.    The Medical Record contains the following:  Indicators Supporting Clinical Findings Location in Medical Record   X Surgery or Procedure performed Left heart cath (Left)  Angioplasty-coronary  Stent, Drug Eluting, Single Vessel, Coronary    The sheath was inserted into the Left radial artery 4/11 Op note    Estimated Blood Loss (EBL)     X Bleeding, hemorrhage, hematoma documented Hematoma noted superior to the L radial TR band . Cath lab notified and 2nd TR band applied per Deo Dixon    Post op hematoma noted superior to left radial band, 2nd TR band was applied    Hematoma present on left forearm above incision.      Post procedure, had wrist hematoma, managed conservatively. 4/11 nurse note        4/11 NP care update      4/11 nurse plan of care    4/12 discharge summary    Evacuation of hematoma documented     X Anticoagulants/Antiplatelets/Blood thinners administration   Patient initiated on heparin drip and admitted to Hospital Medicine for NSTEMI. 4/11 h/p    Transfusion     X Treatment  Cath lab notified and 2nd TR band applied per Deo Dixon 4/11 nurse note    Other       Provider, please clarify if the  Hematoma, Left Wrist  is: (Check all that apply)    [   ] Complication of the procedure   [ ] Present, but not a complication of the procedure   [   ] Incidental finding, not clinically significant   [   ] Due to (please specify): _______________   [   ] Due to medication (please specify): ________________   [  x ] Other (please specify): _____________   This occurs at times  when hemostasis is not achieved with tr band aa second one is placed.  Need this forwarded to DR FIORE OR MARIAM ALONZO who were following on patient.                           Please document in your progress notes daily for the duration of treatment, until resolved, and include in your discharge summary.    Form No. [id number]

## 2023-04-17 NOTE — PLAN OF CARE
In response to query:     4/11: there was documented to be a left radial hematoma but we are radial band was.  Prior to discharge however, patient did not have a hematoma.  This had resolved.  Patient denied any pain, swelling, oozing, bleeding at or near radial site prior to discharge.  Postop instructions given to patient and patient voiced understanding.    Dr. Aguilar

## 2023-04-18 ENCOUNTER — TELEPHONE (OUTPATIENT)
Dept: CARDIOLOGY | Facility: CLINIC | Age: 53
End: 2023-04-18
Payer: COMMERCIAL

## 2023-04-18 PROBLEM — I25.10 CAD (CORONARY ARTERY DISEASE): Status: ACTIVE | Noted: 2023-04-18

## 2023-04-18 NOTE — PROGRESS NOTES
Subjective:   Patient ID:  Laron Kothari Jr. is a 52 y.o. male who presents for evaluation of No chief complaint on file.      Rachana Kothari is a 52 year old male patient whose current medical conditions include CAD s/p [facility] PCI x2 to RCA 4/23' by Dr. Lara, DVT in 2017, DM type II, HTN, hyperlipidemia, gout, neuropathy, and tobacco abuse who presents to clinic for hospital follow up. Since DC patient still feels SOB and fatigue not worse than prior to DC. He also reports LUE pain, numbness and tingling, discussed about monitoring sxs.    [facility] 4/11/23 dist LAD 80%, dist cx 90%, mid % with successful PCI  Echo with normal EF    Past Medical History:   Diagnosis Date    Blood in stool 08/07/2018    Deep vein thrombosis (DVT) 2017    Left leg    Diabetes mellitus, type 2 2013    Gout     Hyperlipidemia     Hypertension     Neuropathy     Smoker        Past Surgical History:   Procedure Laterality Date    APPENDECTOMY      ARTHROSCOPIC CHONDROPLASTY OF KNEE JOINT Right 11/24/2020    Procedure: ARTHROSCOPY, KNEE, WITH CHONDROPLASTY;  Surgeon: Nahum Rose MD;  Location: [facility] OR;  Service: Orthopedics;  Laterality: Right;  chondroplasty medial condyle and anteriorly    COLONOSCOPY N/A 8/7/2018    Procedure: COLONOSCOPY;  Surgeon: Ten Valentine MD;  Location: [facility] ENDO;  Service: Endoscopy;  Laterality: N/A;    COLONOSCOPY N/A 2/23/2022    Procedure: COLONOSCOPY;  Surgeon: Octavio Craig MD;  Location: [facility] ENDO;  Service: Endoscopy;  Laterality: N/A;    KNEE ARTHROSCOPY W/ MENISCECTOMY Right 11/24/2020    Procedure: ARTHROSCOPY, KNEE, WITH MENISCECTOMY;  Surgeon: Nahum Rose MD;  Location: [facility] OR;  Service: Orthopedics;  Laterality: Right;  Partial medial and lateral meniscectomy    LEFT HEART CATHETERIZATION Left 4/11/2023    Procedure: Left heart cath;  Surgeon: Sue Lara MD;  Location: [facility] CATH LAB;  Service: Cardiology;  Laterality: Left;    PERCUTANEOUS TRANSLUMINAL BALLOON  ANGIOPLASTY OF CORONARY ARTERY  4/11/2023    Procedure: Angioplasty-coronary;  Surgeon: Sue Lara MD;  Location: [facility] CATH LAB;  Service: Cardiology;;    ROBOT-ASSISTED CHOLECYSTECTOMY USING DA TRIPP XI N/A 1/21/2020    Procedure: XI ROBOTIC CHOLECYSTECTOMY;  Surgeon: Sebastien Rivera MD;  Location: [facility] OR;  Service: General;  Laterality: N/A;    STENT, DRUG ELUTING, SINGLE VESSEL, CORONARY  4/11/2023    Procedure: Stent, Drug Eluting, Single Vessel, Coronary;  Surgeon: Sue Lara MD;  Location: [facility] CATH LAB;  Service: Cardiology;;    TONSILLECTOMY, ADENOIDECTOMY         Social History     Tobacco Use    Smoking status: Every Day     Packs/day: 1.00     Years: 36.00     Pack years: 36.00     Types: Cigarettes    Smokeless tobacco: Never    Tobacco comments:     no smoking after m.n prior to sx   Substance Use Topics    Alcohol use: Yes     Alcohol/week: 46.0 standard drinks     Types: 46 Cans of beer per week     Comment: daily    Drug use: No       Family History   Problem Relation Age of Onset    Diabetes Father     Prostate cancer Father     Cataracts Father     Hypertension Mother     Hypertension Brother        Current Outpatient Medications on File Prior to Visit   Medication Sig Dispense Refill    aspirin (ECOTRIN) 81 MG EC tablet Take 1 tablet (81 mg total) by mouth once daily. 30 tablet 0    blood sugar diagnostic Strp Once daily glucose testing. 50 strip 6    blood-glucose meter Misc Use as directed. 1 each 0    cetirizine (ZYRTEC) 10 MG tablet Take 1 tablet (10 mg total) by mouth once daily. 30 tablet 0    empagliflozin (JARDIANCE) 10 mg tablet Take 1 tablet (10 mg total) by mouth once daily. 30 tablet 3    fluticasone propionate (FLONASE) 50 mcg/actuation nasal spray 1 spray (50 mcg total) by Each Nostril route once daily. 18.2 mL 1    gabapentin (NEURONTIN) 300 MG capsule Take 1 capsule (300 mg total) by mouth 3 (three) times daily. 90 capsule 11    glipiZIDE (GLUCOTROL) 5 MG tablet Take 1  "tablet (5 mg total) by mouth once daily. 90 tablet 0    lancets (LANCETS,THIN) Misc Once daily glucose testing. 50 each 6    lisinopriL (PRINIVIL,ZESTRIL) 40 MG tablet Take 1 tablet (40 mg total) by mouth once daily. 90 tablet 1    metFORMIN (GLUCOPHAGE) 1000 MG tablet Take 1 tablet (1,000 mg total) by mouth 2 (two) times daily with meals. 180 tablet 0    metoprolol succinate (TOPROL-XL) 50 MG 24 hr tablet Take 1 tablet (50 mg total) by mouth once daily. 30 tablet 0    pen needle, diabetic (PEN NEEDLE) 31 gauge x 5/16" Ndle Use once daily with insulin injection. 50 each 3    pravastatin (PRAVACHOL) 80 MG tablet Take 1 tablet (80 mg total) by mouth every evening. 90 tablet 1    ranolazine (RANEXA) 500 MG Tb12 Take 1 tablet (500 mg total) by mouth 2 (two) times daily. 60 tablet 0    sildenafiL (VIAGRA) 50 MG tablet Take 1 tablet (50 mg total) by mouth daily as needed for Erectile Dysfunction. 30 tablet 5    ticagrelor (BRILINTA) 90 mg tablet Take 1 tablet (90 mg total) by mouth 2 (two) times daily. 60 tablet 0     No current facility-administered medications on file prior to visit.      Wt Readings from Last 3 Encounters:   04/19/23 105.2 kg (231 lb 14.8 oz)   04/12/23 110.8 kg (244 lb 4.3 oz)   10/06/22 107.6 kg (237 lb 3.4 oz)     Temp Readings from Last 3 Encounters:   04/19/23 96.5 °F (35.8 °C) (Tympanic)   04/13/23 96.4 °F (35.8 °C) (Oral)   10/06/22 97.6 °F (36.4 °C) (Oral)     BP Readings from Last 3 Encounters:   04/19/23 100/62   04/13/23 113/80   10/06/22 122/72     Pulse Readings from Last 3 Encounters:   04/19/23 101   04/13/23 70   10/06/22 85        Review of Systems   Constitutional: Negative.   HENT: Negative.     Eyes: Negative.    Cardiovascular: Negative.    Respiratory: Negative.     Skin: Negative.    Musculoskeletal: Negative.    Gastrointestinal: Negative.    Genitourinary: Negative.    Neurological:  Positive for numbness.   Psychiatric/Behavioral: Negative.       Objective:   Physical " Exam  Vitals and nursing note reviewed.   Constitutional:       Appearance: Normal appearance.   HENT:      Head: Normocephalic and atraumatic.   Eyes:      General:         Right eye: No discharge.         Left eye: No discharge.      Pupils: Pupils are equal, round, and reactive to light.   Cardiovascular:      Rate and Rhythm: Normal rate and regular rhythm.      Heart sounds: S1 normal and S2 normal. No murmur heard.    No friction rub.   Pulmonary:      Effort: Pulmonary effort is normal. No respiratory distress.      Breath sounds: Normal breath sounds. No rales.   Abdominal:      Palpations: Abdomen is soft.      Tenderness: There is no abdominal tenderness.   Musculoskeletal:      Cervical back: Neck supple.      Right lower leg: No edema.      Left lower leg: No edema.   Skin:     General: Skin is warm and dry.   Neurological:      General: No focal deficit present.      Mental Status: He is alert and oriented to person, place, and time.   Psychiatric:         Mood and Affect: Mood normal.         Behavior: Behavior normal.         Thought Content: Thought content normal.       Lab Results   Component Value Date    CHOL 167 10/06/2022    CHOL 165 12/08/2021    CHOL 138 06/22/2021     Lab Results   Component Value Date    HDL 41 10/06/2022    HDL 38 (L) 12/08/2021    HDL 39 (L) 06/22/2021     Lab Results   Component Value Date    LDLCALC 109.4 10/06/2022    LDLCALC 109.0 12/08/2021    LDLCALC 80.4 06/22/2021     Lab Results   Component Value Date    TRIG 83 10/06/2022    TRIG 90 12/08/2021    TRIG 93 06/22/2021     Lab Results   Component Value Date    CHOLHDL 24.6 10/06/2022    CHOLHDL 23.0 12/08/2021    CHOLHDL 28.3 06/22/2021       Chemistry        Component Value Date/Time     04/13/2023 0653    K 4.0 04/13/2023 0653     04/13/2023 0653    CO2 25 04/13/2023 0653    BUN 10 04/13/2023 0653    CREATININE 0.9 04/13/2023 0653     (H) 04/13/2023 0653        Component Value Date/Time     CALCIUM 9.4 04/13/2023 0653    ALKPHOS 60 04/11/2023 0116     (H) 04/11/2023 0116    ALT 32 04/11/2023 0116    BILITOT 0.7 04/11/2023 0116    ESTGFRAFRICA >60 06/21/2022 2017    EGFRNONAA >60 06/21/2022 2017          No results found for: TSH  Lab Results   Component Value Date    INR 1.0 04/11/2023    INR 0.9 01/06/2020    INR 3.4 (A) 03/03/2017     @RESUFAST(WBC,HGB,HCT,MCV,PLT)  @LABRCNTIP(BNP,BNPTRIAGEBLO)@  CrCl cannot be calculated (Unknown ideal weight.).     Results for orders placed during the hospital encounter of 04/11/23    Echo    Interpretation Summary  · The left ventricle is normal in size with concentric remodeling and normal systolic function.  · Normal left ventricular diastolic function.  · The estimated PA systolic pressure is 18 mmHg.  · Normal right ventricular size with normal right ventricular systolic function.  · Normal central venous pressure (3 mmHg).  · The estimated ejection fraction is 55%.  · The ascending aorta is mildly dilated.  · There are segmental left ventricular wall motion abnormalities.     No results found for this or any previous visit.     Assessment:      1. Coronary artery disease, unspecified vessel or lesion type, unspecified whether angina present, unspecified whether native or transplanted heart    2. Hyperlipidemia LDL goal <70    3. Hypertension goal BP (blood pressure) < 130/80    4. Alcohol abuse    5. Diabetes mellitus with microalbuminuria    6. Type 2 diabetes mellitus with diabetic neuropathy, with long-term current use of insulin    7. Tobacco use disorder        Plan:   Coronary artery disease, unspecified vessel or lesion type, unspecified whether angina present, unspecified whether native or transplanted heart    Hyperlipidemia LDL goal <70    Hypertension goal BP (blood pressure) < 130/80    Alcohol abuse    Diabetes mellitus with microalbuminuria    Type 2 diabetes mellitus with diabetic neuropathy, with long-term current use of  insulin    Tobacco use disorder      ASA, Brilinta (1 year), BB, Ranexa, statin- CAD  ACEi, BB, ASA- HTN  statin- HLD    Counseled on tobacco cessation and encouraged to stop drinking alcohol  Low Na diet  Discussed restrictions, ok to return to normal activity in 1 week  Risk factor modification and excercise program, weight loss    RTC 3 months  Follow up Dr. Lara 6 months    Ave Hawkins, [facility], Cardiology

## 2023-04-19 ENCOUNTER — OFFICE VISIT (OUTPATIENT)
Dept: CARDIOLOGY | Facility: CLINIC | Age: 53
End: 2023-04-19
Payer: COMMERCIAL

## 2023-04-19 ENCOUNTER — TELEPHONE (OUTPATIENT)
Dept: CARDIOLOGY | Facility: CLINIC | Age: 53
End: 2023-04-19

## 2023-04-19 ENCOUNTER — OFFICE VISIT (OUTPATIENT)
Dept: INTERNAL MEDICINE | Facility: CLINIC | Age: 53
End: 2023-04-19
Payer: COMMERCIAL

## 2023-04-19 VITALS
OXYGEN SATURATION: 94 % | HEART RATE: 72 BPM | BODY MASS INDEX: 29.65 KG/M2 | SYSTOLIC BLOOD PRESSURE: 108 MMHG | WEIGHT: 231.06 LBS | DIASTOLIC BLOOD PRESSURE: 68 MMHG | HEIGHT: 74 IN

## 2023-04-19 VITALS
SYSTOLIC BLOOD PRESSURE: 100 MMHG | TEMPERATURE: 97 F | WEIGHT: 231.94 LBS | HEART RATE: 101 BPM | RESPIRATION RATE: 18 BRPM | DIASTOLIC BLOOD PRESSURE: 62 MMHG | BODY MASS INDEX: 29.77 KG/M2 | HEIGHT: 74 IN | OXYGEN SATURATION: 97 %

## 2023-04-19 DIAGNOSIS — Z79.4 TYPE 2 DIABETES MELLITUS WITH DIABETIC NEUROPATHY, WITH LONG-TERM CURRENT USE OF INSULIN: Chronic | ICD-10-CM

## 2023-04-19 DIAGNOSIS — Z95.5 STATUS POST CORONARY ARTERY STENT PLACEMENT: ICD-10-CM

## 2023-04-19 DIAGNOSIS — E78.5 HYPERLIPIDEMIA LDL GOAL <70: ICD-10-CM

## 2023-04-19 DIAGNOSIS — F17.210 CIGARETTE NICOTINE DEPENDENCE WITHOUT COMPLICATION: ICD-10-CM

## 2023-04-19 DIAGNOSIS — E78.5 HYPERLIPIDEMIA LDL GOAL <70: Chronic | ICD-10-CM

## 2023-04-19 DIAGNOSIS — F17.200 TOBACCO USE DISORDER: ICD-10-CM

## 2023-04-19 DIAGNOSIS — I25.10 CAD IN NATIVE ARTERY: Primary | ICD-10-CM

## 2023-04-19 DIAGNOSIS — E11.40 TYPE 2 DIABETES MELLITUS WITH DIABETIC NEUROPATHY, WITH LONG-TERM CURRENT USE OF INSULIN: Chronic | ICD-10-CM

## 2023-04-19 DIAGNOSIS — R80.9 DIABETES MELLITUS WITH MICROALBUMINURIA: Chronic | ICD-10-CM

## 2023-04-19 DIAGNOSIS — I25.118 CORONARY ARTERY DISEASE OF NATIVE ARTERY OF NATIVE HEART WITH STABLE ANGINA PECTORIS: Primary | ICD-10-CM

## 2023-04-19 DIAGNOSIS — E11.65 UNCONTROLLED TYPE 2 DIABETES MELLITUS WITH HYPERGLYCEMIA, WITHOUT LONG-TERM CURRENT USE OF INSULIN: ICD-10-CM

## 2023-04-19 DIAGNOSIS — E11.29 DIABETES MELLITUS WITH MICROALBUMINURIA: Chronic | ICD-10-CM

## 2023-04-19 DIAGNOSIS — Z23 IMMUNIZATION DUE: ICD-10-CM

## 2023-04-19 DIAGNOSIS — F10.10 ALCOHOL ABUSE: ICD-10-CM

## 2023-04-19 DIAGNOSIS — I10 HYPERTENSION GOAL BP (BLOOD PRESSURE) < 130/80: ICD-10-CM

## 2023-04-19 DIAGNOSIS — I10 HYPERTENSION GOAL BP (BLOOD PRESSURE) < 130/80: Chronic | ICD-10-CM

## 2023-04-19 DIAGNOSIS — I25.10 CORONARY ARTERY DISEASE, UNSPECIFIED VESSEL OR LESION TYPE, UNSPECIFIED WHETHER ANGINA PRESENT, UNSPECIFIED WHETHER NATIVE OR TRANSPLANTED HEART: Primary | ICD-10-CM

## 2023-04-19 PROCEDURE — 3074F PR MOST RECENT SYSTOLIC BLOOD PRESSURE < 130 MM HG: ICD-10-PCS | Mod: CPTII,S$GLB,,

## 2023-04-19 PROCEDURE — 99999 PR PBB SHADOW E&M-EST. PATIENT-LVL V: ICD-10-PCS | Mod: PBBFAC,,,

## 2023-04-19 PROCEDURE — 3008F BODY MASS INDEX DOCD: CPT | Mod: CPTII,S$GLB,, | Performed by: INTERNAL MEDICINE

## 2023-04-19 PROCEDURE — 4010F PR ACE/ARB THEARPY RXD/TAKEN: ICD-10-PCS | Mod: CPTII,S$GLB,, | Performed by: INTERNAL MEDICINE

## 2023-04-19 PROCEDURE — 3072F PR LOW RISK FOR RETINOPATHY: ICD-10-PCS | Mod: CPTII,S$GLB,, | Performed by: INTERNAL MEDICINE

## 2023-04-19 PROCEDURE — 90471 ZOSTER RECOMBINANT VACCINE: ICD-10-PCS | Mod: S$GLB,,, | Performed by: INTERNAL MEDICINE

## 2023-04-19 PROCEDURE — 3052F HG A1C>EQUAL 8.0%<EQUAL 9.0%: CPT | Mod: CPTII,S$GLB,, | Performed by: INTERNAL MEDICINE

## 2023-04-19 PROCEDURE — 3078F PR MOST RECENT DIASTOLIC BLOOD PRESSURE < 80 MM HG: ICD-10-PCS | Mod: CPTII,S$GLB,,

## 2023-04-19 PROCEDURE — 1159F PR MEDICATION LIST DOCUMENTED IN MEDICAL RECORD: ICD-10-PCS | Mod: CPTII,S$GLB,, | Performed by: INTERNAL MEDICINE

## 2023-04-19 PROCEDURE — 3052F PR MOST RECENT HEMOGLOBIN A1C LEVEL 8.0 - < 9.0%: ICD-10-PCS | Mod: CPTII,S$GLB,, | Performed by: INTERNAL MEDICINE

## 2023-04-19 PROCEDURE — 1111F DSCHRG MED/CURRENT MED MERGE: CPT | Mod: CPTII,S$GLB,, | Performed by: INTERNAL MEDICINE

## 2023-04-19 PROCEDURE — 4010F ACE/ARB THERAPY RXD/TAKEN: CPT | Mod: CPTII,S$GLB,, | Performed by: INTERNAL MEDICINE

## 2023-04-19 PROCEDURE — 3074F PR MOST RECENT SYSTOLIC BLOOD PRESSURE < 130 MM HG: ICD-10-PCS | Mod: CPTII,S$GLB,, | Performed by: INTERNAL MEDICINE

## 2023-04-19 PROCEDURE — 3078F PR MOST RECENT DIASTOLIC BLOOD PRESSURE < 80 MM HG: ICD-10-PCS | Mod: CPTII,S$GLB,, | Performed by: INTERNAL MEDICINE

## 2023-04-19 PROCEDURE — 1159F MED LIST DOCD IN RCRD: CPT | Mod: CPTII,S$GLB,,

## 2023-04-19 PROCEDURE — 99214 OFFICE O/P EST MOD 30 MIN: CPT | Mod: 25,S$GLB,, | Performed by: INTERNAL MEDICINE

## 2023-04-19 PROCEDURE — 3078F DIAST BP <80 MM HG: CPT | Mod: CPTII,S$GLB,, | Performed by: INTERNAL MEDICINE

## 2023-04-19 PROCEDURE — 1160F RVW MEDS BY RX/DR IN RCRD: CPT | Mod: CPTII,S$GLB,, | Performed by: INTERNAL MEDICINE

## 2023-04-19 PROCEDURE — 4010F ACE/ARB THERAPY RXD/TAKEN: CPT | Mod: CPTII,S$GLB,,

## 2023-04-19 PROCEDURE — 3008F BODY MASS INDEX DOCD: CPT | Mod: CPTII,S$GLB,,

## 2023-04-19 PROCEDURE — 3052F HG A1C>EQUAL 8.0%<EQUAL 9.0%: CPT | Mod: CPTII,S$GLB,,

## 2023-04-19 PROCEDURE — 3078F DIAST BP <80 MM HG: CPT | Mod: CPTII,S$GLB,,

## 2023-04-19 PROCEDURE — 1111F DSCHRG MED/CURRENT MED MERGE: CPT | Mod: CPTII,S$GLB,,

## 2023-04-19 PROCEDURE — 3074F SYST BP LT 130 MM HG: CPT | Mod: CPTII,S$GLB,,

## 2023-04-19 PROCEDURE — 99999 PR PBB SHADOW E&M-EST. PATIENT-LVL V: CPT | Mod: PBBFAC,,, | Performed by: INTERNAL MEDICINE

## 2023-04-19 PROCEDURE — 1160F RVW MEDS BY RX/DR IN RCRD: CPT | Mod: CPTII,S$GLB,,

## 2023-04-19 PROCEDURE — 1111F PR DISCHARGE MEDS RECONCILED W/ CURRENT OUTPATIENT MED LIST: ICD-10-PCS | Mod: CPTII,S$GLB,,

## 2023-04-19 PROCEDURE — 3008F PR BODY MASS INDEX (BMI) DOCUMENTED: ICD-10-PCS | Mod: CPTII,S$GLB,, | Performed by: INTERNAL MEDICINE

## 2023-04-19 PROCEDURE — 4010F PR ACE/ARB THEARPY RXD/TAKEN: ICD-10-PCS | Mod: CPTII,S$GLB,,

## 2023-04-19 PROCEDURE — 90750 HZV VACC RECOMBINANT IM: CPT | Mod: S$GLB,,, | Performed by: INTERNAL MEDICINE

## 2023-04-19 PROCEDURE — 90750 ZOSTER RECOMBINANT VACCINE: ICD-10-PCS | Mod: S$GLB,,, | Performed by: INTERNAL MEDICINE

## 2023-04-19 PROCEDURE — 1159F PR MEDICATION LIST DOCUMENTED IN MEDICAL RECORD: ICD-10-PCS | Mod: CPTII,S$GLB,,

## 2023-04-19 PROCEDURE — 1159F MED LIST DOCD IN RCRD: CPT | Mod: CPTII,S$GLB,, | Performed by: INTERNAL MEDICINE

## 2023-04-19 PROCEDURE — 3072F PR LOW RISK FOR RETINOPATHY: ICD-10-PCS | Mod: CPTII,S$GLB,,

## 2023-04-19 PROCEDURE — 99214 PR OFFICE/OUTPT VISIT, EST, LEVL IV, 30-39 MIN: ICD-10-PCS | Mod: 25,S$GLB,, | Performed by: INTERNAL MEDICINE

## 2023-04-19 PROCEDURE — 99214 OFFICE O/P EST MOD 30 MIN: CPT | Mod: S$GLB,,,

## 2023-04-19 PROCEDURE — 3072F LOW RISK FOR RETINOPATHY: CPT | Mod: CPTII,S$GLB,,

## 2023-04-19 PROCEDURE — 99999 PR PBB SHADOW E&M-EST. PATIENT-LVL V: ICD-10-PCS | Mod: PBBFAC,,, | Performed by: INTERNAL MEDICINE

## 2023-04-19 PROCEDURE — 99999 PR PBB SHADOW E&M-EST. PATIENT-LVL V: CPT | Mod: PBBFAC,,,

## 2023-04-19 PROCEDURE — 1160F PR REVIEW ALL MEDS BY PRESCRIBER/CLIN PHARMACIST DOCUMENTED: ICD-10-PCS | Mod: CPTII,S$GLB,, | Performed by: INTERNAL MEDICINE

## 2023-04-19 PROCEDURE — 3074F SYST BP LT 130 MM HG: CPT | Mod: CPTII,S$GLB,, | Performed by: INTERNAL MEDICINE

## 2023-04-19 PROCEDURE — 99214 PR OFFICE/OUTPT VISIT, EST, LEVL IV, 30-39 MIN: ICD-10-PCS | Mod: S$GLB,,,

## 2023-04-19 PROCEDURE — 1111F PR DISCHARGE MEDS RECONCILED W/ CURRENT OUTPATIENT MED LIST: ICD-10-PCS | Mod: CPTII,S$GLB,, | Performed by: INTERNAL MEDICINE

## 2023-04-19 PROCEDURE — 90471 IMMUNIZATION ADMIN: CPT | Mod: S$GLB,,, | Performed by: INTERNAL MEDICINE

## 2023-04-19 PROCEDURE — 3072F LOW RISK FOR RETINOPATHY: CPT | Mod: CPTII,S$GLB,, | Performed by: INTERNAL MEDICINE

## 2023-04-19 PROCEDURE — 3052F PR MOST RECENT HEMOGLOBIN A1C LEVEL 8.0 - < 9.0%: ICD-10-PCS | Mod: CPTII,S$GLB,,

## 2023-04-19 PROCEDURE — 1160F PR REVIEW ALL MEDS BY PRESCRIBER/CLIN PHARMACIST DOCUMENTED: ICD-10-PCS | Mod: CPTII,S$GLB,,

## 2023-04-19 PROCEDURE — 3008F PR BODY MASS INDEX (BMI) DOCUMENTED: ICD-10-PCS | Mod: CPTII,S$GLB,,

## 2023-04-19 RX ORDER — RANOLAZINE 500 MG/1
500 TABLET, EXTENDED RELEASE ORAL 2 TIMES DAILY
Qty: 60 TABLET | Refills: 11 | Status: SHIPPED | OUTPATIENT
Start: 2023-04-19 | End: 2023-09-13

## 2023-04-19 RX ORDER — ASPIRIN 81 MG/1
81 TABLET ORAL DAILY
Qty: 30 TABLET | Refills: 11 | Status: SHIPPED | OUTPATIENT
Start: 2023-04-19 | End: 2023-05-04 | Stop reason: SDUPTHER

## 2023-04-19 RX ORDER — METOPROLOL SUCCINATE 50 MG/1
50 TABLET, EXTENDED RELEASE ORAL DAILY
Qty: 30 TABLET | Refills: 11 | Status: SHIPPED | OUTPATIENT
Start: 2023-04-19 | End: 2023-04-27

## 2023-04-19 NOTE — TELEPHONE ENCOUNTER
Contacted to pt about today's appt. Pt states he downstairs, leaving from another appt and on his way up

## 2023-04-19 NOTE — PROGRESS NOTES
Laron Kothari .  52 y.o. Black or  male  [id number]    Chief Complaint:  Chief Complaint   Patient presents with    Follow-up       History of Present Illness:  Recently hospitalized. Found to have CAD. S/P stent placement. Appointment with cardiology this morning.   DM--uncontrolled. Compliant with metformin and glipizide.   HTN--stable.   HLD--compliant with pravastatin.     Laboratory:  Lab Results   Component Value Date    WBC 10.38 04/13/2023    HGB 12.4 (L) 04/13/2023    HCT 38.6 (L) 04/13/2023     04/13/2023    CHOL 167 10/06/2022    TRIG 83 10/06/2022    HDL 41 10/06/2022    ALT 32 04/11/2023     (H) 04/11/2023     04/13/2023    K 4.0 04/13/2023     04/13/2023    CREATININE 0.9 04/13/2023    BUN 10 04/13/2023    CO2 25 04/13/2023    PSA 0.74 07/20/2011    INR 1.0 04/11/2023    HGBA1C 8.5 (H) 04/12/2023     Lab Results   Component Value Date    LDLCALC 109.4 10/06/2022     History:  Past Medical History:   Diagnosis Date    Blood in stool 08/07/2018    Deep vein thrombosis (DVT) 2017    Left leg    Diabetes mellitus, type 2 2013    Gout     Hyperlipidemia     Hypertension     Neuropathy     Smoker        Medications:  Current Outpatient Medications on File Prior to Visit   Medication Sig Dispense Refill    blood sugar diagnostic Strp Once daily glucose testing. 50 strip 6    blood-glucose meter Misc Use as directed. 1 each 0    cetirizine (ZYRTEC) 10 MG tablet Take 1 tablet (10 mg total) by mouth once daily. (Patient not taking: Reported on 4/19/2023) 30 tablet 0    fluticasone propionate (FLONASE) 50 mcg/actuation nasal spray 1 spray (50 mcg total) by Each Nostril route once daily. (Patient taking differently: 1 spray by Each Nostril route daily as needed.) 18.2 mL 1    glipiZIDE (GLUCOTROL) 5 MG tablet Take 1 tablet (5 mg total) by mouth once daily. 90 tablet 0    lancets (LANCETS,THIN) Misc Once daily glucose testing. 50 each 6    lisinopriL (PRINIVIL,ZESTRIL) 40 MG  "tablet Take 1 tablet (40 mg total) by mouth once daily. 90 tablet 1    metFORMIN (GLUCOPHAGE) 1000 MG tablet Take 1 tablet (1,000 mg total) by mouth 2 (two) times daily with meals. 180 tablet 0    pen needle, diabetic (PEN NEEDLE) 31 gauge x 5/16" Ndle Use once daily with insulin injection. 50 each 3    pravastatin (PRAVACHOL) 80 MG tablet Take 1 tablet (80 mg total) by mouth every evening. 90 tablet 1    sildenafiL (VIAGRA) 50 MG tablet Take 1 tablet (50 mg total) by mouth daily as needed for Erectile Dysfunction. 30 tablet 5    gabapentin (NEURONTIN) 300 MG capsule Take 1 capsule (300 mg total) by mouth 3 (three) times daily. 90 capsule 11     No current facility-administered medications on file prior to visit.       Allergies:  Review of patient's allergies indicates:  No Known Allergies    Review of Systems   Constitutional:  Negative for fever.   Respiratory:  Negative for shortness of breath.    Cardiovascular:  Negative for chest pain and leg swelling.   Neurological:  Positive for tingling (left hand). Negative for dizziness and headaches.     Exam:  Vitals:    04/19/23 0813   BP: 100/62   Pulse: 101   Resp: 18   Temp: 96.5 °F (35.8 °C)     Weight: 105.2 kg (231 lb 14.8 oz)   Body mass index is 29.78 kg/m².      Physical Exam  Vitals reviewed.   Constitutional:       General: He is not in acute distress.     Appearance: He is well-developed. He is not ill-appearing.   Eyes:      General: No scleral icterus.  Cardiovascular:      Rate and Rhythm: Normal rate and regular rhythm.      Pulses:           Radial pulses are 2+ on the right side and 2+ on the left side.      Heart sounds: Normal heart sounds.   Pulmonary:      Effort: Pulmonary effort is normal. No respiratory distress.      Breath sounds: Normal breath sounds.   Musculoskeletal:      Right lower leg: No edema.      Left lower leg: No edema.   Skin:     General: Skin is warm and dry.      Comments: Bilateral LE hyperpigmentation.   Varicose veins " bilateral LE.    Neurological:      Mental Status: He is alert and oriented to person, place, and time.   Psychiatric:         Behavior: Behavior normal.       Assessment:  The primary encounter diagnosis was CAD in native artery. Diagnoses of Status post coronary artery stent placement, Uncontrolled type 2 diabetes mellitus with hyperglycemia, without long-term current use of insulin, Hypertension goal BP (blood pressure) < 130/80, Hyperlipidemia LDL goal <70, Cigarette nicotine dependence without complication, and Immunization due were also pertinent to this visit.    Plan:  CAD in native artery  -     f/u with cardiology  -     start empagliflozin (JARDIANCE) 10 mg tablet; Take 1 tablet (10 mg total) by mouth once daily.  Dispense: 30 tablet; Refill: 3    Status post coronary artery stent placement    Uncontrolled type 2 diabetes mellitus with hyperglycemia, without long-term current use of insulin  -     start empagliflozin (JARDIANCE) 10 mg tablet; Take 1 tablet (10 mg total) by mouth once daily.  Dispense: 30 tablet; Refill: 3    Hypertension goal BP (blood pressure) < 130/80  -     -     continue lisinopril    Hyperlipidemia LDL goal <70  -     continue pravastatin    Cigarette nicotine dependence without complication  -     recommend cessation  -     CT Chest Lung Screening Low Dose; Future; Expected date: 04/26/2023    Immunization due  -     (In Office Administered) Zoster Recombinant Vaccine    Follow up in about 3 months (around 7/19/2023).

## 2023-04-20 ENCOUNTER — HOSPITAL ENCOUNTER (OUTPATIENT)
Facility: HOSPITAL | Age: 53
Discharge: HOME | End: 2023-04-21
Attending: EMERGENCY MEDICINE | Admitting: INTERNAL MEDICINE
Payer: COMMERCIAL

## 2023-04-20 DIAGNOSIS — R07.9 CHEST PAIN, UNSPECIFIED TYPE: Primary | ICD-10-CM

## 2023-04-20 DIAGNOSIS — I25.2 HISTORY OF NON-ST ELEVATION MYOCARDIAL INFARCTION (NSTEMI): ICD-10-CM

## 2023-04-20 LAB
ALBUMIN SERPL BCP-MCNC: 3.7 G/DL (ref 3.5–5.2)
ALP SERPL-CCNC: 69 U/L (ref 55–135)
ALT SERPL W/O P-5'-P-CCNC: 20 U/L (ref 10–44)
ANION GAP SERPL CALC-SCNC: 11 MMOL/L (ref 8–16)
APTT PPP: 26.8 SEC (ref 21–32)
AST SERPL-CCNC: 13 U/L (ref 10–40)
BASOPHILS # BLD AUTO: 0.1 K/UL (ref 0–0.2)
BASOPHILS NFR BLD: 0.8 % (ref 0–1.9)
BILIRUB SERPL-MCNC: 0.3 MG/DL (ref 0.1–1)
BUN SERPL-MCNC: 13 MG/DL (ref 6–20)
CALCIUM SERPL-MCNC: 9.8 MG/DL (ref 8.7–10.5)
CHLORIDE SERPL-SCNC: 106 MMOL/L (ref 95–110)
CO2 SERPL-SCNC: 24 MMOL/L (ref 23–29)
CREAT SERPL-MCNC: 1.2 MG/DL (ref 0.5–1.4)
DIFFERENTIAL METHOD: ABNORMAL
EOSINOPHIL # BLD AUTO: 0.7 K/UL (ref 0–0.5)
EOSINOPHIL NFR BLD: 5.4 % (ref 0–8)
ERYTHROCYTE [DISTWIDTH] IN BLOOD BY AUTOMATED COUNT: 12 % (ref 11.5–14.5)
EST. GFR  (NO RACE VARIABLE): >60 ML/MIN/1.73 M^2
GLUCOSE SERPL-MCNC: 115 MG/DL (ref 70–110)
HCT VFR BLD AUTO: 38.1 % (ref 40–54)
HGB BLD-MCNC: 12.4 G/DL (ref 14–18)
IMM GRANULOCYTES # BLD AUTO: 0.04 K/UL (ref 0–0.04)
IMM GRANULOCYTES NFR BLD AUTO: 0.3 % (ref 0–0.5)
INR PPP: 1 (ref 0.8–1.2)
LYMPHOCYTES # BLD AUTO: 3.6 K/UL (ref 1–4.8)
LYMPHOCYTES NFR BLD: 29.2 % (ref 18–48)
MCH RBC QN AUTO: 31.7 PG (ref 27–31)
MCHC RBC AUTO-ENTMCNC: 32.5 G/DL (ref 32–36)
MCV RBC AUTO: 97 FL (ref 82–98)
MONOCYTES # BLD AUTO: 0.7 K/UL (ref 0.3–1)
MONOCYTES NFR BLD: 5.8 % (ref 4–15)
NEUTROPHILS # BLD AUTO: 7.1 K/UL (ref 1.8–7.7)
NEUTROPHILS NFR BLD: 58.5 % (ref 38–73)
NRBC BLD-RTO: 0 /100 WBC
PLATELET # BLD AUTO: 314 K/UL (ref 150–450)
PMV BLD AUTO: 9.6 FL (ref 9.2–12.9)
POTASSIUM SERPL-SCNC: 4.3 MMOL/L (ref 3.5–5.1)
PROT SERPL-MCNC: 7.9 G/DL (ref 6–8.4)
PROTHROMBIN TIME: 10.3 SEC (ref 9–12.5)
RBC # BLD AUTO: 3.91 M/UL (ref 4.6–6.2)
SODIUM SERPL-SCNC: 141 MMOL/L (ref 136–145)
TROPONIN I SERPL DL<=0.01 NG/ML-MCNC: 0.09 NG/ML (ref 0–0.03)
WBC # BLD AUTO: 12.14 K/UL (ref 3.9–12.7)

## 2023-04-20 PROCEDURE — G0378 HOSPITAL OBSERVATION PER HR: HCPCS

## 2023-04-20 PROCEDURE — 85610 PROTHROMBIN TIME: CPT | Performed by: EMERGENCY MEDICINE

## 2023-04-20 PROCEDURE — 85025 COMPLETE CBC W/AUTO DIFF WBC: CPT | Performed by: EMERGENCY MEDICINE

## 2023-04-20 PROCEDURE — 85730 THROMBOPLASTIN TIME PARTIAL: CPT | Performed by: EMERGENCY MEDICINE

## 2023-04-20 PROCEDURE — 84484 ASSAY OF TROPONIN QUANT: CPT | Performed by: EMERGENCY MEDICINE

## 2023-04-20 PROCEDURE — 99284 EMERGENCY DEPT VISIT MOD MDM: CPT | Mod: 25

## 2023-04-20 PROCEDURE — 80053 COMPREHEN METABOLIC PANEL: CPT | Performed by: EMERGENCY MEDICINE

## 2023-04-20 NOTE — ED PROVIDER NOTES
SCRIBE #1 NOTE: IWinifred, am scribing for, and in the presence of, Alessio Byrne MD. I have scribed the entire note.       History     Chief Complaint   Patient presents with    Chest Pain     Pt c/o chest pain radiating to left arm also c/o sob, denies N/V, pt had 2 stents placed last Tuesday and Dx c NSTEMI, pt took 325 of asa pta     Review of patient's allergies indicates:  No Known Allergies      History of Present Illness     HPI    4/20/2023, 6:26 PM  History obtained from the patient      History of Present Illness: Laron Kothari Jr. is a 52 y.o. male patient with a PMHx of DVT, DM2, HLD, HTN, and tobacco use who presents to the Emergency Department for evaluation of left sided CP radiating to LUE which onset 1 day PTA  Symptoms are constant and moderate in severity. Pt reports that sxs appear approximately every 20 minutes and lasts for a few seconds. Pt had an NSTEMI recently and had stents placed. He reports that these sxs feel different from the chest pressure/tightness that he experienced with his MI. Exacerbating factors include laying in the supine position. Associated sxs include SOB secondary to pain. Patient denies any N/V, fever, cough, leg swelling, and all other sxs at this time. Prior Tx includes 325 ASA PTA. No further complaints or concerns at this time.       Arrival mode: Ambulance Service    PCP: Kristen Adler DO        Past Medical History:  Past Medical History:   Diagnosis Date    Blood in stool 08/07/2018    Deep vein thrombosis (DVT) 2017    Left leg    Diabetes mellitus, type 2 2013    Gout     Hyperlipidemia     Hypertension     Neuropathy     Smoker        Past Surgical History:  Past Surgical History:   Procedure Laterality Date    APPENDECTOMY      ARTHROSCOPIC CHONDROPLASTY OF KNEE JOINT Right 11/24/2020    Procedure: ARTHROSCOPY, KNEE, WITH CHONDROPLASTY;  Surgeon: Nahum Rose MD;  Location: [facility] OR;  Service: Orthopedics;  Laterality: Right;  chondroplasty  medial condyle and anteriorly    COLONOSCOPY N/A 8/7/2018    Procedure: COLONOSCOPY;  Surgeon: Ten Valentine MD;  Location: [facility] ENDO;  Service: Endoscopy;  Laterality: N/A;    COLONOSCOPY N/A 2/23/2022    Procedure: COLONOSCOPY;  Surgeon: Octavio Craig MD;  Location: [facility] ENDO;  Service: Endoscopy;  Laterality: N/A;    KNEE ARTHROSCOPY W/ MENISCECTOMY Right 11/24/2020    Procedure: ARTHROSCOPY, KNEE, WITH MENISCECTOMY;  Surgeon: Nahum Rose MD;  Location: [facility] OR;  Service: Orthopedics;  Laterality: Right;  Partial medial and lateral meniscectomy    LEFT HEART CATHETERIZATION Left 4/11/2023    Procedure: Left heart cath;  Surgeon: Sue Lara MD;  Location: [facility] CATH LAB;  Service: Cardiology;  Laterality: Left;    PERCUTANEOUS TRANSLUMINAL BALLOON ANGIOPLASTY OF CORONARY ARTERY  4/11/2023    Procedure: Angioplasty-coronary;  Surgeon: Sue Lara MD;  Location: [facility] CATH LAB;  Service: Cardiology;;    ROBOT-ASSISTED CHOLECYSTECTOMY USING DA TRIPP XI N/A 1/21/2020    Procedure: XI ROBOTIC CHOLECYSTECTOMY;  Surgeon: Sebastien Rivera MD;  Location: [facility] OR;  Service: General;  Laterality: N/A;    STENT, DRUG ELUTING, SINGLE VESSEL, CORONARY  4/11/2023    Procedure: Stent, Drug Eluting, Single Vessel, Coronary;  Surgeon: Sue Lara MD;  Location: [facility] CATH LAB;  Service: Cardiology;;    TONSILLECTOMY, ADENOIDECTOMY           Family History:  Family History   Problem Relation Age of Onset    Diabetes Father     Prostate cancer Father     Cataracts Father     Hypertension Mother     Hypertension Brother        Social History:  Social History     Tobacco Use    Smoking status: Every Day     Packs/day: 0.25     Years: 36.00     Pack years: 9.00     Types: Cigarettes    Smokeless tobacco: Never    Tobacco comments:     no smoking after m.n prior to sx   Substance and Sexual Activity    Alcohol use: Yes     Alcohol/week: 26.0 standard drinks     Types: 26 Cans of beer per week     Comment:  daily    Drug use: No    Sexual activity: Yes     Partners: Female        Review of Systems     Review of Systems   Constitutional:  Negative for fever.   HENT:  Negative for sore throat.    Respiratory:  Positive for shortness of breath. Negative for cough.    Cardiovascular:  Positive for chest pain (left sided). Negative for leg swelling.   Gastrointestinal:  Negative for nausea and vomiting.   Genitourinary:  Negative for dysuria.   Musculoskeletal:  Positive for myalgias (LUE). Negative for back pain.   Skin:  Negative for rash.   Neurological:  Negative for weakness.   Hematological:  Does not bruise/bleed easily.   All other systems reviewed and are negative.     Physical Exam     Initial Vitals [04/20/23 1817]   BP Pulse Resp Temp SpO2   (!) 162/104 94 18 98.1 °F (36.7 °C) 100 %      MAP       --          Physical Exam  Nursing Notes and Vital Signs Reviewed.  Constitutional: Patient is in no acute distress. Well-developed and well-nourished.  Head: Atraumatic. Normocephalic.  Eyes: PERRL. EOM intact. Conjunctivae are not pale. No scleral icterus.  ENT: Mucous membranes are moist. Oropharynx is clear and symmetric.    Neck: Supple. Full ROM. No lymphadenopathy.  Cardiovascular: Regular rate. Regular rhythm. No murmurs, rubs, or gallops. Distal pulses are 2+ and symmetric.  Pulmonary/Chest: No respiratory distress. Clear to auscultation bilaterally. No wheezing or rales.  Abdominal: Soft and non-distended.  There is no tenderness.  No rebound, guarding, or rigidity. Good bowel sounds.  Genitourinary: No CVA tenderness  Musculoskeletal: Moves all extremities. No obvious deformities. No edema.  Skin: Warm and dry.  Neurological:  Alert, awake, and appropriate.  Normal speech.  No acute focal neurological deficits are appreciated.  Psychiatric: Normal affect. Good eye contact. Appropriate in content.     ED Course   Procedures  ED Vital Signs:  Vitals:    04/20/23 1817 04/20/23 1823 04/20/23 2100   BP: (!)  "162/104 (!) 168/91 109/67   Pulse: 94 75 64   Resp: 18  11   Temp: 98.1 °F (36.7 °C)     TempSrc: Oral     SpO2: 100%  100%   Weight: 104.8 kg (231 lb)     Height: 6' 2" (1.88 m)         Abnormal Lab Results:  Labs Reviewed   CBC W/ AUTO DIFFERENTIAL - Abnormal; Notable for the following components:       Result Value    RBC 3.91 (*)     Hemoglobin 12.4 (*)     Hematocrit 38.1 (*)     MCH 31.7 (*)     Eos # 0.7 (*)     All other components within normal limits   COMPREHENSIVE METABOLIC PANEL - Abnormal; Notable for the following components:    Glucose 115 (*)     All other components within normal limits   TROPONIN I - Abnormal; Notable for the following components:    Troponin I 0.094 (*)     All other components within normal limits   APTT   PROTIME-INR        All Lab Results:  Results for orders placed or performed during the hospital encounter of 04/20/23   CBC auto differential   Result Value Ref Range    WBC 12.14 3.90 - 12.70 K/uL    RBC 3.91 (L) 4.60 - 6.20 M/uL    Hemoglobin 12.4 (L) 14.0 - 18.0 g/dL    Hematocrit 38.1 (L) 40.0 - 54.0 %    MCV 97 82 - 98 fL    MCH 31.7 (H) 27.0 - 31.0 pg    MCHC 32.5 32.0 - 36.0 g/dL    RDW 12.0 11.5 - 14.5 %    Platelets 314 150 - 450 K/uL    MPV 9.6 9.2 - 12.9 fL    Immature Granulocytes 0.3 0.0 - 0.5 %    Gran # (ANC) 7.1 1.8 - 7.7 K/uL    Immature Grans (Abs) 0.04 0.00 - 0.04 K/uL    Lymph # 3.6 1.0 - 4.8 K/uL    Mono # 0.7 0.3 - 1.0 K/uL    Eos # 0.7 (H) 0.0 - 0.5 K/uL    Baso # 0.10 0.00 - 0.20 K/uL    nRBC 0 0 /100 WBC    Gran % 58.5 38.0 - 73.0 %    Lymph % 29.2 18.0 - 48.0 %    Mono % 5.8 4.0 - 15.0 %    Eosinophil % 5.4 0.0 - 8.0 %    Basophil % 0.8 0.0 - 1.9 %    Differential Method Automated    Comprehensive metabolic panel   Result Value Ref Range    Sodium 141 136 - 145 mmol/L    Potassium 4.3 3.5 - 5.1 mmol/L    Chloride 106 95 - 110 mmol/L    CO2 24 23 - 29 mmol/L    Glucose 115 (H) 70 - 110 mg/dL    BUN 13 6 - 20 mg/dL    Creatinine 1.2 0.5 - 1.4 mg/dL    " Calcium 9.8 8.7 - 10.5 mg/dL    Total Protein 7.9 6.0 - 8.4 g/dL    Albumin 3.7 3.5 - 5.2 g/dL    Total Bilirubin 0.3 0.1 - 1.0 mg/dL    Alkaline Phosphatase 69 55 - 135 U/L    AST 13 10 - 40 U/L    ALT 20 10 - 44 U/L    Anion Gap 11 8 - 16 mmol/L    eGFR >60 >60 mL/min/1.73 m^2   Troponin I   Result Value Ref Range    Troponin I 0.094 (H) 0.000 - 0.026 ng/mL   APTT   Result Value Ref Range    aPTT 26.8 21.0 - 32.0 sec   Protime-INR   Result Value Ref Range    Prothrombin Time 10.3 9.0 - 12.5 sec    INR 1.0 0.8 - 1.2         Imaging Results:  Imaging Results              X-Ray Chest AP Portable (Final result)  Result time 04/20/23 20:09:24      Final result by Lan Del Real MD (04/20/23 20:09:24)                   Impression:      No acute abnormality.      Electronically signed by: Lan Del Real  Date:    04/20/2023  Time:    20:09               Narrative:    EXAMINATION:  XR CHEST AP PORTABLE    CLINICAL HISTORY:  chest pain;    TECHNIQUE:  Single frontal view of the chest was performed.    COMPARISON:  Multiple priors.    FINDINGS:  The lungs are clear, with normal appearance of pulmonary vasculature and no pleural effusion or pneumothorax.    The cardiac silhouette is normal in size. The hilar and mediastinal contours are unremarkable.    Bones are intact.                                       The EKG was ordered, reviewed, and independently interpreted by the ED provider.  Interpretation time: 18:11  Rate: 77 BPM  Rhythm: normal sinus rhythm  Interpretation: No acute ST changes. No STEMI.           The Emergency Provider reviewed the vital signs and test results, which are outlined above.     ED Discussion     10:42 PM: Discussed pt's case with Dr. Armas (Interventional Cardiology) who agrees with plan to  place patient in observation.    10:43 PM: Discussed case with Regina Acevedo NP (Hospital Medicine). Dr. Cortes agrees with current care and management of pt and accepts admission.   Admitting Service:  Hospital Medicine  Admitting Physician: Dr. Cortes  Admit to: Obs med/tele    10:43 PM: Re-evaluated pt. I have discussed test results, shared treatment plan, and the need for admission with patient and family at bedside. Pt and family express understanding at this time and agree with all information. All questions answered. Pt and family have no further questions or concerns at this time. Pt is ready for admit.       Medical Decision Making:   History:   Old Medical Records: I decided to obtain old medical records.  Old Records Summarized: records from previous admission(s).       <> Summary of Records: Records reviewed.  Patient just hospitalized for an NSTEMI with a troponin reaching up to a level of 15.  He had multiple stents placed in his left heart catheterization  Initial Assessment:   Chest pain atypical for ACS.  Described as intermittent sharp shooting pains that last for just a few seconds at a time.  Patient did just have an NSTEMI with a left heart catheterization procedure performed  Differential Diagnosis:   ACS, left heart catheterization complications  Independently Interpreted Test(s):   I have ordered and independently interpreted X-rays - see prior notes.  I have ordered and independently interpreted EKG Reading(s) - see prior notes  Clinical Tests:   Lab Tests: Ordered and Reviewed  Radiological Study: Ordered and Reviewed  Medical Tests: Ordered and Reviewed  ED Management:  I performed a bedside ultrasound to evaluate for any significant pericardial effusion.  No evidence of pericardial effusion on bedside ultrasound.  Troponin mildly elevated, but trending downward when compared to prior troponins.  Given his history of recent NSTEMI and left heart catheterization, discussed with Cardiology and placed in observation         ED Medication(s):  Medications - No data to display    New Prescriptions    No medications on file               Scribe Attestation:   Scribe #1: I performed the above  scribed service and the documentation accurately describes the services I performed. I attest to the accuracy of the note.     Attending:   Physician Attestation Statement for Scribe #1: I, Alessio Byrne MD, personally performed the services described in this documentation, as scribed by Winifred Roper, in my presence, and it is both accurate and complete.           Clinical Impression       ICD-10-CM ICD-9-CM   1. Chest pain, unspecified type  R07.9 786.50   2. History of non-ST elevation myocardial infarction (NSTEMI)  I25.2 412       Disposition:   Disposition: Placed in Observation  Condition: Fair       Alessio Byrne MD  04/22/23 6096

## 2023-04-21 VITALS
DIASTOLIC BLOOD PRESSURE: 72 MMHG | SYSTOLIC BLOOD PRESSURE: 128 MMHG | OXYGEN SATURATION: 98 % | BODY MASS INDEX: 29.65 KG/M2 | HEIGHT: 74 IN | WEIGHT: 231 LBS | HEART RATE: 67 BPM | TEMPERATURE: 99 F | RESPIRATION RATE: 19 BRPM

## 2023-04-21 PROBLEM — R07.9 CHEST PAIN: Status: ACTIVE | Noted: 2023-04-11

## 2023-04-21 PROBLEM — I25.118 CORONARY ARTERY DISEASE OF NATIVE ARTERY OF NATIVE HEART WITH STABLE ANGINA PECTORIS: Status: ACTIVE | Noted: 2023-04-18

## 2023-04-21 LAB
POCT GLUCOSE: 143 MG/DL (ref 70–110)
TROPONIN I SERPL DL<=0.01 NG/ML-MCNC: 0.07 NG/ML (ref 0–0.03)
TROPONIN I SERPL DL<=0.01 NG/ML-MCNC: 0.09 NG/ML (ref 0–0.03)

## 2023-04-21 PROCEDURE — 99214 PR OFFICE/OUTPT VISIT, EST, LEVL IV, 30-39 MIN: ICD-10-PCS | Mod: ,,, | Performed by: INTERNAL MEDICINE

## 2023-04-21 PROCEDURE — 82962 GLUCOSE BLOOD TEST: CPT

## 2023-04-21 PROCEDURE — 25000242 PHARM REV CODE 250 ALT 637 W/ HCPCS: Performed by: INTERNAL MEDICINE

## 2023-04-21 PROCEDURE — 99214 OFFICE O/P EST MOD 30 MIN: CPT | Mod: ,,, | Performed by: INTERNAL MEDICINE

## 2023-04-21 PROCEDURE — 25000003 PHARM REV CODE 250: Performed by: INTERNAL MEDICINE

## 2023-04-21 PROCEDURE — G0378 HOSPITAL OBSERVATION PER HR: HCPCS

## 2023-04-21 PROCEDURE — 84484 ASSAY OF TROPONIN QUANT: CPT | Performed by: INTERNAL MEDICINE

## 2023-04-21 RX ORDER — MORPHINE SULFATE 4 MG/ML
4 INJECTION, SOLUTION INTRAMUSCULAR; INTRAVENOUS EVERY 4 HOURS PRN
Status: DISCONTINUED | OUTPATIENT
Start: 2023-04-21 | End: 2023-04-21 | Stop reason: HOSPADM

## 2023-04-21 RX ORDER — METOPROLOL SUCCINATE 50 MG/1
50 TABLET, EXTENDED RELEASE ORAL DAILY
Status: DISCONTINUED | OUTPATIENT
Start: 2023-04-21 | End: 2023-04-21 | Stop reason: HOSPADM

## 2023-04-21 RX ORDER — ACETAMINOPHEN 325 MG/1
650 TABLET ORAL EVERY 6 HOURS PRN
Status: DISCONTINUED | OUTPATIENT
Start: 2023-04-21 | End: 2023-04-21 | Stop reason: HOSPADM

## 2023-04-21 RX ORDER — MAG HYDROX/ALUMINUM HYD/SIMETH 200-200-20
30 SUSPENSION, ORAL (FINAL DOSE FORM) ORAL EVERY 6 HOURS PRN
Status: DISCONTINUED | OUTPATIENT
Start: 2023-04-21 | End: 2023-04-21 | Stop reason: HOSPADM

## 2023-04-21 RX ORDER — ONDANSETRON 2 MG/ML
4 INJECTION INTRAMUSCULAR; INTRAVENOUS EVERY 8 HOURS PRN
Status: DISCONTINUED | OUTPATIENT
Start: 2023-04-21 | End: 2023-04-21 | Stop reason: HOSPADM

## 2023-04-21 RX ORDER — GABAPENTIN 300 MG/1
300 CAPSULE ORAL 3 TIMES DAILY
Status: DISCONTINUED | OUTPATIENT
Start: 2023-04-21 | End: 2023-04-21 | Stop reason: HOSPADM

## 2023-04-21 RX ORDER — GUAIFENESIN 100 MG/5ML
200 SOLUTION ORAL EVERY 4 HOURS PRN
Status: DISCONTINUED | OUTPATIENT
Start: 2023-04-21 | End: 2023-04-21 | Stop reason: HOSPADM

## 2023-04-21 RX ORDER — IBUPROFEN 200 MG
16 TABLET ORAL
Status: DISCONTINUED | OUTPATIENT
Start: 2023-04-21 | End: 2023-04-21 | Stop reason: HOSPADM

## 2023-04-21 RX ORDER — MAG HYDROX/ALUMINUM HYD/SIMETH 200-200-20
30 SUSPENSION, ORAL (FINAL DOSE FORM) ORAL ONCE
Status: COMPLETED | OUTPATIENT
Start: 2023-04-21 | End: 2023-04-21

## 2023-04-21 RX ORDER — IBUPROFEN 200 MG
24 TABLET ORAL
Status: DISCONTINUED | OUTPATIENT
Start: 2023-04-21 | End: 2023-04-21 | Stop reason: HOSPADM

## 2023-04-21 RX ORDER — NITROGLYCERIN 0.4 MG/1
0.4 TABLET SUBLINGUAL EVERY 5 MIN PRN
Status: DISCONTINUED | OUTPATIENT
Start: 2023-04-21 | End: 2023-04-21 | Stop reason: HOSPADM

## 2023-04-21 RX ORDER — ATORVASTATIN CALCIUM 40 MG/1
40 TABLET, FILM COATED ORAL DAILY
Status: DISCONTINUED | OUTPATIENT
Start: 2023-04-21 | End: 2023-04-21 | Stop reason: HOSPADM

## 2023-04-21 RX ORDER — GLUCAGON 1 MG
1 KIT INJECTION
Status: DISCONTINUED | OUTPATIENT
Start: 2023-04-21 | End: 2023-04-21 | Stop reason: HOSPADM

## 2023-04-21 RX ORDER — MORPHINE SULFATE 4 MG/ML
2 INJECTION, SOLUTION INTRAMUSCULAR; INTRAVENOUS EVERY 4 HOURS PRN
Status: DISCONTINUED | OUTPATIENT
Start: 2023-04-21 | End: 2023-04-21 | Stop reason: HOSPADM

## 2023-04-21 RX ORDER — ASPIRIN 81 MG/1
81 TABLET ORAL DAILY
Status: DISCONTINUED | OUTPATIENT
Start: 2023-04-21 | End: 2023-04-21 | Stop reason: HOSPADM

## 2023-04-21 RX ORDER — LISINOPRIL 20 MG/1
40 TABLET ORAL DAILY
Status: DISCONTINUED | OUTPATIENT
Start: 2023-04-21 | End: 2023-04-21 | Stop reason: HOSPADM

## 2023-04-21 RX ORDER — INSULIN ASPART 100 [IU]/ML
0-5 INJECTION, SOLUTION INTRAVENOUS; SUBCUTANEOUS
Status: DISCONTINUED | OUTPATIENT
Start: 2023-04-21 | End: 2023-04-21 | Stop reason: HOSPADM

## 2023-04-21 RX ORDER — RANOLAZINE 500 MG/1
500 TABLET, EXTENDED RELEASE ORAL 2 TIMES DAILY
Status: DISCONTINUED | OUTPATIENT
Start: 2023-04-21 | End: 2023-04-21 | Stop reason: HOSPADM

## 2023-04-21 RX ORDER — LIDOCAINE HYDROCHLORIDE 20 MG/ML
15 SOLUTION OROPHARYNGEAL ONCE
Status: COMPLETED | OUTPATIENT
Start: 2023-04-21 | End: 2023-04-21

## 2023-04-21 RX ADMIN — ASPIRIN 81 MG: 81 TABLET, COATED ORAL at 08:04

## 2023-04-21 RX ADMIN — METOPROLOL SUCCINATE 50 MG: 50 TABLET, EXTENDED RELEASE ORAL at 08:04

## 2023-04-21 RX ADMIN — ATORVASTATIN CALCIUM 40 MG: 40 TABLET, FILM COATED ORAL at 08:04

## 2023-04-21 RX ADMIN — NITROGLYCERIN 0.4 MG: 0.4 TABLET SUBLINGUAL at 09:04

## 2023-04-21 RX ADMIN — ALUMINUM HYDROXIDE, MAGNESIUM HYDROXIDE, AND DIMETHICONE 30 ML: 200; 20; 200 SUSPENSION ORAL at 10:04

## 2023-04-21 RX ADMIN — LISINOPRIL 40 MG: 20 TABLET ORAL at 08:04

## 2023-04-21 RX ADMIN — LIDOCAINE HYDROCHLORIDE 15 ML: 20 SOLUTION ORAL; TOPICAL at 10:04

## 2023-04-21 RX ADMIN — RANOLAZINE 500 MG: 500 TABLET, EXTENDED RELEASE ORAL at 08:04

## 2023-04-21 RX ADMIN — TICAGRELOR 90 MG: 90 TABLET ORAL at 09:04

## 2023-04-21 NOTE — ASSESSMENT & PLAN NOTE
Patient's FSGs are uncontrolled due to hyperglycemia on current medication regimen.  Last A1c reviewed-   Lab Results   Component Value Date    HGBA1C 8.5 (H) 04/12/2023     Most recent fingerstick glucose reviewed- No results for input(s): POCTGLUCOSE in the last 24 hours.  Current correctional scale  Low  Maintain anti-hyperglycemic dose as follows-   Antihyperglycemics (From admission, onward)    Start     Stop Route Frequency Ordered    04/21/23 0541  insulin aspart U-100 pen 0-5 Units         -- SubQ Before meals & nightly PRN 04/21/23 0441        Hold Oral hypoglycemics while patient is in the hospital.

## 2023-04-21 NOTE — SUBJECTIVE & OBJECTIVE
Past Medical History:   Diagnosis Date    Blood in stool 08/07/2018    Deep vein thrombosis (DVT) 2017    Left leg    Diabetes mellitus, type 2 2013    Gout     Hyperlipidemia     Hypertension     Neuropathy     Smoker        Past Surgical History:   Procedure Laterality Date    APPENDECTOMY      ARTHROSCOPIC CHONDROPLASTY OF KNEE JOINT Right 11/24/2020    Procedure: ARTHROSCOPY, KNEE, WITH CHONDROPLASTY;  Surgeon: Nahum Rose MD;  Location: [facility];  Service: Orthopedics;  Laterality: Right;  chondroplasty medial condyle and anteriorly    COLONOSCOPY N/A 8/7/2018    Procedure: COLONOSCOPY;  Surgeon: Ten Valentine MD;  Location: [facility] ENDO;  Service: Endoscopy;  Laterality: N/A;    COLONOSCOPY N/A 2/23/2022    Procedure: COLONOSCOPY;  Surgeon: Octavio Craig MD;  Location: [facility] ENDO;  Service: Endoscopy;  Laterality: N/A;    KNEE ARTHROSCOPY W/ MENISCECTOMY Right 11/24/2020    Procedure: ARTHROSCOPY, KNEE, WITH MENISCECTOMY;  Surgeon: Nahum Rose MD;  Location: [facility];  Service: Orthopedics;  Laterality: Right;  Partial medial and lateral meniscectomy    LEFT HEART CATHETERIZATION Left 4/11/2023    Procedure: Left heart cath;  Surgeon: Sue Lara MD;  Location: [facility] CATH LAB;  Service: Cardiology;  Laterality: Left;    PERCUTANEOUS TRANSLUMINAL BALLOON ANGIOPLASTY OF CORONARY ARTERY  4/11/2023    Procedure: Angioplasty-coronary;  Surgeon: Sue Lara MD;  Location: [facility] CATH LAB;  Service: Cardiology;;    ROBOT-ASSISTED CHOLECYSTECTOMY USING DA TRIPP XI N/A 1/21/2020    Procedure: XI ROBOTIC CHOLECYSTECTOMY;  Surgeon: Sebastien Rivera MD;  Location: [facility] OR;  Service: General;  Laterality: N/A;    STENT, DRUG ELUTING, SINGLE VESSEL, CORONARY  4/11/2023    Procedure: Stent, Drug Eluting, Single Vessel, Coronary;  Surgeon: Sue Lara MD;  Location: [facility] CATH LAB;  Service: Cardiology;;    TONSILLECTOMY, ADENOIDECTOMY         Review of patient's allergies indicates:  No Known  "Allergies    No current facility-administered medications on file prior to encounter.     Current Outpatient Medications on File Prior to Encounter   Medication Sig    aspirin (ECOTRIN) 81 MG EC tablet Take 1 tablet (81 mg total) by mouth once daily.    blood sugar diagnostic Strp Once daily glucose testing.    blood-glucose meter Misc Use as directed.    empagliflozin (JARDIANCE) 10 mg tablet Take 1 tablet (10 mg total) by mouth once daily.    fluticasone propionate (FLONASE) 50 mcg/actuation nasal spray 1 spray (50 mcg total) by Each Nostril route once daily. (Patient taking differently: 1 spray by Each Nostril route daily as needed.)    gabapentin (NEURONTIN) 300 MG capsule Take 1 capsule (300 mg total) by mouth 3 (three) times daily.    glipiZIDE (GLUCOTROL) 5 MG tablet Take 1 tablet (5 mg total) by mouth once daily.    lancets (LANCETS,THIN) Misc Once daily glucose testing.    lisinopriL (PRINIVIL,ZESTRIL) 40 MG tablet Take 1 tablet (40 mg total) by mouth once daily.    metFORMIN (GLUCOPHAGE) 1000 MG tablet Take 1 tablet (1,000 mg total) by mouth 2 (two) times daily with meals.    metoprolol succinate (TOPROL-XL) 50 MG 24 hr tablet Take 1 tablet (50 mg total) by mouth once daily.    pen needle, diabetic (PEN NEEDLE) 31 gauge x 5/16" Ndle Use once daily with insulin injection.    pravastatin (PRAVACHOL) 80 MG tablet Take 1 tablet (80 mg total) by mouth every evening.    ranolazine (RANEXA) 500 MG Tb12 Take 1 tablet (500 mg total) by mouth 2 (two) times daily.    sildenafiL (VIAGRA) 50 MG tablet Take 1 tablet (50 mg total) by mouth daily as needed for Erectile Dysfunction.    ticagrelor (BRILINTA) 90 mg tablet Take 1 tablet (90 mg total) by mouth 2 (two) times daily.     Family History       Problem Relation (Age of Onset)    Cataracts Father    Diabetes Father    Hypertension Mother, Brother    Prostate cancer Father          Tobacco Use    Smoking status: Every Day     Packs/day: 0.25     Years: 36.00     Pack " years: 9.00     Types: Cigarettes    Smokeless tobacco: Never    Tobacco comments:     no smoking after m.n prior to sx   Substance and Sexual Activity    Alcohol use: Yes     Alcohol/week: 26.0 standard drinks     Types: 26 Cans of beer per week     Comment: daily    Drug use: No    Sexual activity: Yes     Partners: Female     Review of Systems   Constitutional: Negative.   HENT: Negative.     Eyes: Negative.    Cardiovascular:  Positive for chest pain (atypical;).   Respiratory: Negative.     Endocrine: Negative.    Hematologic/Lymphatic: Negative.    Skin: Negative.    Musculoskeletal: Negative.    Gastrointestinal: Negative.    Genitourinary: Negative.    Neurological: Negative.    Psychiatric/Behavioral: Negative.     Allergic/Immunologic: Negative.    Objective:     Vital Signs (Most Recent):  Temp: 98.1 °F (36.7 °C) (04/20/23 1817)  Pulse: 70 (04/21/23 0130)  Resp: 19 (04/21/23 0130)  BP: 110/67 (04/21/23 0000)  SpO2: 99 % (04/21/23 0130) Vital Signs (24h Range):  Temp:  [98.1 °F (36.7 °C)] 98.1 °F (36.7 °C)  Pulse:  [64-94] 70  Resp:  [11-19] 19  SpO2:  [99 %-100 %] 99 %  BP: (109-168)/() 110/67     Weight: 104.8 kg (231 lb)  Body mass index is 29.66 kg/m².    SpO2: 99 %       No intake or output data in the 24 hours ending 04/21/23 0942    Lines/Drains/Airways       Peripheral Intravenous Line  Duration                  Peripheral IV - Single Lumen 04/20/23 20 G Right Antecubital 1 day                    Physical Exam  Vitals and nursing note reviewed.   Constitutional:       General: He is not in acute distress.     Appearance: Normal appearance. He is well-developed. He is not diaphoretic.   HENT:      Head: Normocephalic and atraumatic.   Eyes:      General:         Right eye: No discharge.         Left eye: No discharge.      Pupils: Pupils are equal, round, and reactive to light.   Neck:      Thyroid: No thyromegaly.      Vascular: No JVD.      Trachea: No tracheal deviation.   Cardiovascular:       Rate and Rhythm: Normal rate and regular rhythm.      Heart sounds: Normal heart sounds, S1 normal and S2 normal. No murmur heard.  Pulmonary:      Effort: Pulmonary effort is normal. No respiratory distress.      Breath sounds: Normal breath sounds. No wheezing or rales.   Abdominal:      General: There is no distension.      Palpations: Abdomen is soft.      Tenderness: There is no rebound.   Musculoskeletal:      Cervical back: Neck supple.      Right lower leg: No edema.      Left lower leg: No edema.   Skin:     General: Skin is warm and dry.      Findings: No erythema.   Neurological:      Mental Status: He is alert and oriented to person, place, and time.   Psychiatric:         Mood and Affect: Mood normal.         Behavior: Behavior normal.         Thought Content: Thought content normal.       Significant Labs: CMP   Recent Labs   Lab 04/20/23 1847      K 4.3      CO2 24   *   BUN 13   CREATININE 1.2   CALCIUM 9.8   PROT 7.9   ALBUMIN 3.7   BILITOT 0.3   ALKPHOS 69   AST 13   ALT 20   ANIONGAP 11   , CBC   Recent Labs   Lab 04/20/23 1847   WBC 12.14   HGB 12.4*   HCT 38.1*      , INR   Recent Labs   Lab 04/20/23  1847   INR 1.0   , Troponin   Recent Labs   Lab 04/20/23 1847 04/21/23  0132   TROPONINI 0.094* 0.094*   , and All pertinent lab results from the last 24 hours have been reviewed.    Significant Imaging: Echocardiogram: Transthoracic echo (TTE) complete (Cupid Only):   Results for orders placed or performed during the hospital encounter of 04/11/23   Echo   Result Value Ref Range    BSA 2.47 m2    TDI SEPTAL 0.12 m/s    LV LATERAL E/E' RATIO 7.70 m/s    LV SEPTAL E/E' RATIO 6.42 m/s    LA WIDTH 3.40 cm    IVC diameter 1.15 cm    Left Ventricular Outflow Tract Mean Velocity 0.67 cm/s    Left Ventricular Outflow Tract Mean Gradient 2.05 mmHg    TDI LATERAL 0.10 m/s    LVIDd 4.21 3.5 - 6.0 cm    IVS 1.60 (A) 0.6 - 1.1 cm    Posterior Wall 1.30 (A) 0.6 - 1.1 cm    Ao  root annulus 4.01 cm    LVIDs 3.05 2.1 - 4.0 cm    FS 28 28 - 44 %    LA volume 39.92 cm3    STJ 3.98 cm    Ascending aorta 3.94 cm    LV mass 237.56 g    LA size 2.66 cm    TAPSE 1.90 cm    Left Ventricle Relative Wall Thickness 0.62 cm    AV mean gradient 3 mmHg    AV valve area 4.23 cm2    AV Velocity Ratio 0.90     AV index (prosthetic) 0.93     MV valve area p 1/2 method 2.07 cm2    E/A ratio 0.79     Mean e' 0.11 m/s    E wave deceleration time 366.29 msec    IVRT 83.73 msec    Pulm vein S/D ratio 1.07     LVOT diameter 2.40 cm    LVOT area 4.5 cm2    LVOT peak jorge 0.96 m/s    LVOT peak VTI 20.00 cm    Ao peak jorge 1.07 m/s    Ao VTI 21.4 cm    RVOT peak jorge 0.66 m/s    RVOT peak VTI 14.1 cm    LVOT stroke volume 90.43 cm3    AV peak gradient 5 mmHg    PV mean gradient 0.91 mmHg    E/E' ratio 7.00 m/s    MV Peak E Jorge 0.77 m/s    TR Max Jorge 1.96 m/s    MV stenosis pressure 1/2 time 106.22 ms    MV Peak A Jorge 0.98 m/s    PV Peak S Jorge 0.44 m/s    PV Peak D Jorge 0.41 m/s    LV Systolic Volume 36.42 mL    LV Systolic Volume Index 15.0 mL/m2    LV Diastolic Volume 78.87 mL    LV Diastolic Volume Index 32.59 mL/m2    LA Volume Index 16.5 mL/m2    LV Mass Index 98 g/m2    RA Major Axis 3.97 cm    Left Atrium Minor Axis 5.08 cm    Left Atrium Major Axis 5.31 cm    Triscuspid Valve Regurgitation Peak Gradient 15 mmHg    Right Atrial Pressure (from IVC) 3 mmHg    EF 55 %    TV rest pulmonary artery pressure 18 mmHg    Narrative    · The left ventricle is normal in size with concentric remodeling and   normal systolic function.  · Normal left ventricular diastolic function.  · The estimated PA systolic pressure is 18 mmHg.  · Normal right ventricular size with normal right ventricular systolic   function.  · Normal central venous pressure (3 mmHg).  · The estimated ejection fraction is 55%.  · The ascending aorta is mildly dilated.  · There are segmental left ventricular wall motion abnormalities.      , EKG: Reviewed,  and X-Ray: CXR: X-Ray Chest 1 View (CXR): No results found for this visit on 04/20/23. and X-Ray Chest PA and Lateral (CXR): No results found for this visit on 04/20/23.

## 2023-04-21 NOTE — H&P
[facility] - Emergency Dept.  [facility] Medicine  History & Physical    Patient Name: Laron Kothari Jr.  MRN: [id number]  Patient Class: OP- Observation  Admission Date: 4/20/2023  Attending Physician: Gordon Cortes MD   Primary Care Provider: Kristen Adler DO         Patient information was obtained from patient, past medical records and ER records.     Subjective:     Principal Problem:Chest pain    Chief Complaint:   Chief Complaint   Patient presents with    Chest Pain     Pt c/o chest pain radiating to left arm also c/o sob, denies N/V, pt had 2 stents placed last Tuesday and Dx c NSTEMI, pt took 325 of asa pta        HPI: Mr. Kothari is a 52-year-old  male with PMH significant for CAD, recent hospitalization for NSTEMI, had to drug-eluting stents placed to his RCA on 04/11/2023 by Dr. Lara.  He presented to the ED today complaining midsternal intermittent chest discomfort, occasionally radiating to the left arm, associated with nausea, without vomiting.  Describes the feeling as uncomfortable and pressure-like.  Initial troponin 0.09, repeat 0.09.  EKG NSR with no ST or T-wave changes.  Patient reports compliance with aspirin, Brilinta, Ranexa, statin, ACEI, BB.  ED physician discussed case with Dr. Armas, on-call, recommended placing in observation for repeat cardiac enzymes.  Currently chest pain-free.      Placed in observation for chest pain, recent NSTEMI, JUSTIN x2 to RCA.      Past Medical History:   Diagnosis Date    Blood in stool 08/07/2018    Deep vein thrombosis (DVT) 2017    Left leg    Diabetes mellitus, type 2 2013    Gout     Hyperlipidemia     Hypertension     Neuropathy     Smoker        Past Surgical History:   Procedure Laterality Date    APPENDECTOMY      ARTHROSCOPIC CHONDROPLASTY OF KNEE JOINT Right 11/24/2020    Procedure: ARTHROSCOPY, KNEE, WITH CHONDROPLASTY;  Surgeon: Nahum Rose MD;  Location: [facility] OR;  Service: Orthopedics;  Laterality: Right;   chondroplasty medial condyle and anteriorly    COLONOSCOPY N/A 8/7/2018    Procedure: COLONOSCOPY;  Surgeon: Ten Valentine MD;  Location: [facility] ENDO;  Service: Endoscopy;  Laterality: N/A;    COLONOSCOPY N/A 2/23/2022    Procedure: COLONOSCOPY;  Surgeon: Octavio Craig MD;  Location: [facility] ENDO;  Service: Endoscopy;  Laterality: N/A;    KNEE ARTHROSCOPY W/ MENISCECTOMY Right 11/24/2020    Procedure: ARTHROSCOPY, KNEE, WITH MENISCECTOMY;  Surgeon: Nahum Rose MD;  Location: [facility] OR;  Service: Orthopedics;  Laterality: Right;  Partial medial and lateral meniscectomy    LEFT HEART CATHETERIZATION Left 4/11/2023    Procedure: Left heart cath;  Surgeon: Sue Lara MD;  Location: [facility] CATH LAB;  Service: Cardiology;  Laterality: Left;    PERCUTANEOUS TRANSLUMINAL BALLOON ANGIOPLASTY OF CORONARY ARTERY  4/11/2023    Procedure: Angioplasty-coronary;  Surgeon: Sue Lara MD;  Location: [facility] CATH LAB;  Service: Cardiology;;    ROBOT-ASSISTED CHOLECYSTECTOMY USING DA TRIPP XI N/A 1/21/2020    Procedure: XI ROBOTIC CHOLECYSTECTOMY;  Surgeon: Sebastien Rivera MD;  Location: [facility] OR;  Service: General;  Laterality: N/A;    STENT, DRUG ELUTING, SINGLE VESSEL, CORONARY  4/11/2023    Procedure: Stent, Drug Eluting, Single Vessel, Coronary;  Surgeon: Sue Lara MD;  Location: [facility] CATH LAB;  Service: Cardiology;;    TONSILLECTOMY, ADENOIDECTOMY         Review of patient's allergies indicates:  No Known Allergies    No current facility-administered medications on file prior to encounter.     Current Outpatient Medications on File Prior to Encounter   Medication Sig    aspirin (ECOTRIN) 81 MG EC tablet Take 1 tablet (81 mg total) by mouth once daily.    blood sugar diagnostic Strp Once daily glucose testing.    blood-glucose meter Misc Use as directed.    empagliflozin (JARDIANCE) 10 mg tablet Take 1 tablet (10 mg total) by mouth once daily.    fluticasone propionate (FLONASE) 50 mcg/actuation  "nasal spray 1 spray (50 mcg total) by Each Nostril route once daily. (Patient taking differently: 1 spray by Each Nostril route daily as needed.)    gabapentin (NEURONTIN) 300 MG capsule Take 1 capsule (300 mg total) by mouth 3 (three) times daily.    glipiZIDE (GLUCOTROL) 5 MG tablet Take 1 tablet (5 mg total) by mouth once daily.    lancets (LANCETS,THIN) Misc Once daily glucose testing.    lisinopriL (PRINIVIL,ZESTRIL) 40 MG tablet Take 1 tablet (40 mg total) by mouth once daily.    metFORMIN (GLUCOPHAGE) 1000 MG tablet Take 1 tablet (1,000 mg total) by mouth 2 (two) times daily with meals.    metoprolol succinate (TOPROL-XL) 50 MG 24 hr tablet Take 1 tablet (50 mg total) by mouth once daily.    pen needle, diabetic (PEN NEEDLE) 31 gauge x 5/16" Ndle Use once daily with insulin injection.    pravastatin (PRAVACHOL) 80 MG tablet Take 1 tablet (80 mg total) by mouth every evening.    ranolazine (RANEXA) 500 MG Tb12 Take 1 tablet (500 mg total) by mouth 2 (two) times daily.    sildenafiL (VIAGRA) 50 MG tablet Take 1 tablet (50 mg total) by mouth daily as needed for Erectile Dysfunction.    ticagrelor (BRILINTA) 90 mg tablet Take 1 tablet (90 mg total) by mouth 2 (two) times daily.     Family History       Problem Relation (Age of Onset)    Cataracts Father    Diabetes Father    Hypertension Mother, Brother    Prostate cancer Father          Tobacco Use    Smoking status: Every Day     Packs/day: 0.25     Years: 36.00     Pack years: 9.00     Types: Cigarettes    Smokeless tobacco: Never    Tobacco comments:     no smoking after m.n prior to sx   Substance and Sexual Activity    Alcohol use: Yes     Alcohol/week: 26.0 standard drinks     Types: 26 Cans of beer per week     Comment: daily    Drug use: No    Sexual activity: Yes     Partners: Female     Review of Systems   Constitutional: Negative.  Negative for chills and fever.   HENT: Negative.  Negative for congestion, rhinorrhea, sore throat and " trouble swallowing.    Eyes: Negative.  Negative for visual disturbance.   Respiratory: Negative.  Negative for cough, shortness of breath and wheezing.    Cardiovascular:  Positive for chest pain. Negative for palpitations.   Gastrointestinal:  Positive for nausea. Negative for abdominal pain, diarrhea and vomiting.   Endocrine: Negative.    Genitourinary: Negative.  Negative for dysuria and flank pain.   Musculoskeletal: Negative.  Negative for back pain.   Skin: Negative.  Negative for rash.   Allergic/Immunologic: Negative.    Neurological: Negative.  Negative for speech difficulty, weakness, numbness and headaches.   Hematological: Negative.    Psychiatric/Behavioral: Negative.  Negative for hallucinations.    All other systems reviewed and are negative.  Objective:     Vital Signs (Most Recent):  Temp: 98.1 °F (36.7 °C) (04/20/23 1817)  Pulse: 70 (04/21/23 0130)  Resp: 19 (04/21/23 0130)  BP: 110/67 (04/21/23 0000)  SpO2: 99 % (04/21/23 0130) Vital Signs (24h Range):  Temp:  [98.1 °F (36.7 °C)] 98.1 °F (36.7 °C)  Pulse:  [64-94] 70  Resp:  [11-19] 19  SpO2:  [99 %-100 %] 99 %  BP: (109-168)/() 110/67     Weight: 104.8 kg (231 lb)  Body mass index is 29.66 kg/m².    Physical Exam  Vitals and nursing note reviewed.   Constitutional:       General: He is awake. He is not in acute distress.     Appearance: He is not ill-appearing.   HENT:      Head: Normocephalic and atraumatic.      Mouth/Throat:      Mouth: Mucous membranes are moist.   Eyes:      General: No scleral icterus.     Conjunctiva/sclera: Conjunctivae normal.   Cardiovascular:      Rate and Rhythm: Normal rate and regular rhythm.      Heart sounds: No murmur heard.  Pulmonary:      Effort: Pulmonary effort is normal. No respiratory distress.      Breath sounds: Normal breath sounds. No wheezing.   Abdominal:      Palpations: Abdomen is soft.      Tenderness: There is no abdominal tenderness.   Musculoskeletal:         General: No swelling.  Normal range of motion.      Cervical back: Normal range of motion and neck supple.   Skin:     General: Skin is warm.      Coloration: Skin is not jaundiced.   Neurological:      General: No focal deficit present.      Mental Status: He is alert and oriented to person, place, and time. Mental status is at baseline.   Psychiatric:         Attention and Perception: Attention normal.         Mood and Affect: Mood normal.         Speech: Speech normal.         Behavior: Behavior normal. Behavior is cooperative.           Significant Labs: All pertinent labs within the past 24 hours have been reviewed.  BMP:   Recent Labs   Lab 04/20/23 1847   *      K 4.3      CO2 24   BUN 13   CREATININE 1.2   CALCIUM 9.8     CBC:   Recent Labs   Lab 04/20/23 1847   WBC 12.14   HGB 12.4*   HCT 38.1*        CMP:   Recent Labs   Lab 04/20/23 1847      K 4.3      CO2 24   *   BUN 13   CREATININE 1.2   CALCIUM 9.8   PROT 7.9   ALBUMIN 3.7   BILITOT 0.3   ALKPHOS 69   AST 13   ALT 20   ANIONGAP 11   Troponin:   Recent Labs   Lab 04/20/23 1847 04/21/23  0132   TROPONINI 0.094* 0.094*       Significant Imaging: I have reviewed all pertinent imaging results/findings within the past 24 hours.  I have reviewed and interpreted all pertinent imaging results/findings within the past 24 hours.    Imaging Results              X-Ray Chest AP Portable (Final result)  Result time 04/20/23 20:09:24      Final result by Lan Del Real MD (04/20/23 20:09:24)                   Impression:      No acute abnormality.      Electronically signed by: Lan Del Real  Date:    04/20/2023  Time:    20:09               Narrative:    EXAMINATION:  XR CHEST AP PORTABLE    CLINICAL HISTORY:  chest pain;    TECHNIQUE:  Single frontal view of the chest was performed.    COMPARISON:  Multiple priors.    FINDINGS:  The lungs are clear, with normal appearance of pulmonary vasculature and no pleural effusion or  pneumothorax.    The cardiac silhouette is normal in size. The hilar and mediastinal contours are unremarkable.    Bones are intact.                                    I have independently reviewed and interpreted the EKG.     I have independently reviewed all pertinent labs within the past 24 hours.    I have independently reviewed, visualized and interpreted all pertinent imaging results within the past 24 hours and discussed the findings with the ED physician, Dr. Haskins          Assessment/Plan:     * Chest pain  -recent admission for NSTEMI, JUSTIN x2 to RCA.    -EKG today NSR with no ST or T-wave changes.    -troponin 0.09, repeat 0.09.    -currently chest pain-free.    -reports compliance with aspirin, Brilinta, Ranexa, BB, ACEI, statin.    -cardiology consult.    -monitor on telemetry.      Type 2 diabetes mellitus with diabetic neuropathy  Patient's FSGs are uncontrolled due to hyperglycemia on current medication regimen.  Last A1c reviewed-   Lab Results   Component Value Date    HGBA1C 8.5 (H) 04/12/2023     Most recent fingerstick glucose reviewed- No results for input(s): POCTGLUCOSE in the last 24 hours.  Current correctional scale  Low  Maintain anti-hyperglycemic dose as follows-   Antihyperglycemics (From admission, onward)    Start     Stop Route Frequency Ordered    04/21/23 0541  insulin aspart U-100 pen 0-5 Units         -- SubQ Before meals & nightly PRN 04/21/23 0441        Hold Oral hypoglycemics while patient is in the hospital.    Hypertension goal BP (blood pressure) < 130/80  -continue ACEI, BB.    -monitor and make adjustments as needed      Hyperlipidemia LDL goal <70  -continue statin      VTE Risk Mitigation (From admission, onward)         Ordered     Place sequential compression device  Until discontinued         04/21/23 0108                   On 04/21/2023, patient should be placed in hospital observation services under my care.        Gordon Cortes MD  Department of Hospital  Medicine  [facility] - Emergency Dept.

## 2023-04-21 NOTE — HPI
Mr. Kothari is a 52 year old male patient whose current medical conditions include hyperlipidemia, HTN, DM type II, and CAD s/p recent NSTEMI with RCA stent placement on 4/11/23 (also has distal LCX and LAD disease, medical mgmt) who presented to [facility] ED yesterday with a chief complaint of substernal sharp chest discomfort that occurred yesterday while at rest. Pain lasted a few seconds and then spontaneously resolved but recurred again approximately 20-30 minutes later, concerning patient and prompting him to go to the ED. He denied any associated SOB, diaphoresis, palpitations, near syncope, or syncope. Initial workup in ED revealed flat troponin, 0.094 x 2 and patient was subsequently admitted for further evaluation and treatment. Cardiology consulted to assist with management. Patient seen and examined today, resting in bed. Feels well. Does report some mild chest soreness with movement. States chest pain was different in nature from his MI pain earlier this month. He reports compliance with his medications. Followed in cardiology clinic. Chart reviewed. EKG without acute ischemic changes.

## 2023-04-21 NOTE — ASSESSMENT & PLAN NOTE
-recent admission for NSTEMI, JUSTIN x2 to RCA.    -EKG today NSR with no ST or T-wave changes.    -troponin 0.09, repeat 0.09.    -currently chest pain-free.    -reports compliance with aspirin, Brilinta, Ranexa, BB, ACEI, statin.    -cardiology consult.    -monitor on telemetry.

## 2023-04-21 NOTE — ASSESSMENT & PLAN NOTE
-Presents with atypical CP, some soreness with movement during exam  -NOT similar to his prior MI pain  -Troponin flat  -EKG without acute ischemic changes  -Continue ASA, Brilinta, Ranexa, BB, ACEi  -Will discuss anti-inflammatory with MD  -Follow-up in clinic

## 2023-04-21 NOTE — HPI
Mr. Kothari is a 52-year-old  male with PMH significant for CAD, recent hospitalization for NSTEMI, had to drug-eluting stents placed to his RCA on 04/11/2023 by Dr. Lara.  He presented to the ED today complaining midsternal intermittent chest discomfort, occasionally radiating to the left arm, associated with nausea, without vomiting.  Describes the feeling as uncomfortable and pressure-like.  Initial troponin 0.09, repeat 0.09.  EKG NSR with no ST or T-wave changes.  Patient reports compliance with aspirin, Brilinta, Ranexa, statin, ACEI, BB.  ED physician discussed case with Dr. Armas, on-call, recommended placing in observation for repeat cardiac enzymes.  Currently chest pain-free.      Placed in observation for chest pain, recent NSTEMI, JUSTIN x2 to RCA.

## 2023-04-21 NOTE — SUBJECTIVE & OBJECTIVE
Past Medical History:   Diagnosis Date    Blood in stool 08/07/2018    Deep vein thrombosis (DVT) 2017    Left leg    Diabetes mellitus, type 2 2013    Gout     Hyperlipidemia     Hypertension     Neuropathy     Smoker        Past Surgical History:   Procedure Laterality Date    APPENDECTOMY      ARTHROSCOPIC CHONDROPLASTY OF KNEE JOINT Right 11/24/2020    Procedure: ARTHROSCOPY, KNEE, WITH CHONDROPLASTY;  Surgeon: Nahum Rose MD;  Location: [facility];  Service: Orthopedics;  Laterality: Right;  chondroplasty medial condyle and anteriorly    COLONOSCOPY N/A 8/7/2018    Procedure: COLONOSCOPY;  Surgeon: Ten Valentine MD;  Location: [facility] ENDO;  Service: Endoscopy;  Laterality: N/A;    COLONOSCOPY N/A 2/23/2022    Procedure: COLONOSCOPY;  Surgeon: Octavio Craig MD;  Location: [facility] ENDO;  Service: Endoscopy;  Laterality: N/A;    KNEE ARTHROSCOPY W/ MENISCECTOMY Right 11/24/2020    Procedure: ARTHROSCOPY, KNEE, WITH MENISCECTOMY;  Surgeon: Nahum Rose MD;  Location: [facility];  Service: Orthopedics;  Laterality: Right;  Partial medial and lateral meniscectomy    LEFT HEART CATHETERIZATION Left 4/11/2023    Procedure: Left heart cath;  Surgeon: Sue Lara MD;  Location: [facility] CATH LAB;  Service: Cardiology;  Laterality: Left;    PERCUTANEOUS TRANSLUMINAL BALLOON ANGIOPLASTY OF CORONARY ARTERY  4/11/2023    Procedure: Angioplasty-coronary;  Surgeon: Sue Lara MD;  Location: [facility] CATH LAB;  Service: Cardiology;;    ROBOT-ASSISTED CHOLECYSTECTOMY USING DA TRIPP XI N/A 1/21/2020    Procedure: XI ROBOTIC CHOLECYSTECTOMY;  Surgeon: Sebastien Rivera MD;  Location: [facility] OR;  Service: General;  Laterality: N/A;    STENT, DRUG ELUTING, SINGLE VESSEL, CORONARY  4/11/2023    Procedure: Stent, Drug Eluting, Single Vessel, Coronary;  Surgeon: Sue Lara MD;  Location: [facility] CATH LAB;  Service: Cardiology;;    TONSILLECTOMY, ADENOIDECTOMY         Review of patient's allergies indicates:  No Known  "Allergies    No current facility-administered medications on file prior to encounter.     Current Outpatient Medications on File Prior to Encounter   Medication Sig    aspirin (ECOTRIN) 81 MG EC tablet Take 1 tablet (81 mg total) by mouth once daily.    blood sugar diagnostic Strp Once daily glucose testing.    blood-glucose meter Misc Use as directed.    empagliflozin (JARDIANCE) 10 mg tablet Take 1 tablet (10 mg total) by mouth once daily.    fluticasone propionate (FLONASE) 50 mcg/actuation nasal spray 1 spray (50 mcg total) by Each Nostril route once daily. (Patient taking differently: 1 spray by Each Nostril route daily as needed.)    gabapentin (NEURONTIN) 300 MG capsule Take 1 capsule (300 mg total) by mouth 3 (three) times daily.    glipiZIDE (GLUCOTROL) 5 MG tablet Take 1 tablet (5 mg total) by mouth once daily.    lancets (LANCETS,THIN) Misc Once daily glucose testing.    lisinopriL (PRINIVIL,ZESTRIL) 40 MG tablet Take 1 tablet (40 mg total) by mouth once daily.    metFORMIN (GLUCOPHAGE) 1000 MG tablet Take 1 tablet (1,000 mg total) by mouth 2 (two) times daily with meals.    metoprolol succinate (TOPROL-XL) 50 MG 24 hr tablet Take 1 tablet (50 mg total) by mouth once daily.    pen needle, diabetic (PEN NEEDLE) 31 gauge x 5/16" Ndle Use once daily with insulin injection.    pravastatin (PRAVACHOL) 80 MG tablet Take 1 tablet (80 mg total) by mouth every evening.    ranolazine (RANEXA) 500 MG Tb12 Take 1 tablet (500 mg total) by mouth 2 (two) times daily.    sildenafiL (VIAGRA) 50 MG tablet Take 1 tablet (50 mg total) by mouth daily as needed for Erectile Dysfunction.    ticagrelor (BRILINTA) 90 mg tablet Take 1 tablet (90 mg total) by mouth 2 (two) times daily.     Family History       Problem Relation (Age of Onset)    Cataracts Father    Diabetes Father    Hypertension Mother, Brother    Prostate cancer Father          Tobacco Use    Smoking status: Every Day     Packs/day: 0.25     Years: 36.00     Pack " years: 9.00     Types: Cigarettes    Smokeless tobacco: Never    Tobacco comments:     no smoking after m.n prior to sx   Substance and Sexual Activity    Alcohol use: Yes     Alcohol/week: 26.0 standard drinks     Types: 26 Cans of beer per week     Comment: daily    Drug use: No    Sexual activity: Yes     Partners: Female     Review of Systems   Constitutional: Negative.  Negative for chills and fever.   HENT: Negative.  Negative for congestion, rhinorrhea, sore throat and trouble swallowing.    Eyes: Negative.  Negative for visual disturbance.   Respiratory: Negative.  Negative for cough, shortness of breath and wheezing.    Cardiovascular:  Positive for chest pain. Negative for palpitations.   Gastrointestinal:  Positive for nausea. Negative for abdominal pain, diarrhea and vomiting.   Endocrine: Negative.    Genitourinary: Negative.  Negative for dysuria and flank pain.   Musculoskeletal: Negative.  Negative for back pain.   Skin: Negative.  Negative for rash.   Allergic/Immunologic: Negative.    Neurological: Negative.  Negative for speech difficulty, weakness, numbness and headaches.   Hematological: Negative.    Psychiatric/Behavioral: Negative.  Negative for hallucinations.    All other systems reviewed and are negative.  Objective:     Vital Signs (Most Recent):  Temp: 98.1 °F (36.7 °C) (04/20/23 1817)  Pulse: 70 (04/21/23 0130)  Resp: 19 (04/21/23 0130)  BP: 110/67 (04/21/23 0000)  SpO2: 99 % (04/21/23 0130) Vital Signs (24h Range):  Temp:  [98.1 °F (36.7 °C)] 98.1 °F (36.7 °C)  Pulse:  [64-94] 70  Resp:  [11-19] 19  SpO2:  [99 %-100 %] 99 %  BP: (109-168)/() 110/67     Weight: 104.8 kg (231 lb)  Body mass index is 29.66 kg/m².    Physical Exam  Vitals and nursing note reviewed.   Constitutional:       General: He is awake. He is not in acute distress.     Appearance: He is not ill-appearing.   HENT:      Head: Normocephalic and atraumatic.      Mouth/Throat:      Mouth: Mucous membranes are moist.    Eyes:      General: No scleral icterus.     Conjunctiva/sclera: Conjunctivae normal.   Cardiovascular:      Rate and Rhythm: Normal rate and regular rhythm.      Heart sounds: No murmur heard.  Pulmonary:      Effort: Pulmonary effort is normal. No respiratory distress.      Breath sounds: Normal breath sounds. No wheezing.   Abdominal:      Palpations: Abdomen is soft.      Tenderness: There is no abdominal tenderness.   Musculoskeletal:         General: No swelling. Normal range of motion.      Cervical back: Normal range of motion and neck supple.   Skin:     General: Skin is warm.      Coloration: Skin is not jaundiced.   Neurological:      General: No focal deficit present.      Mental Status: He is alert and oriented to person, place, and time. Mental status is at baseline.   Psychiatric:         Attention and Perception: Attention normal.         Mood and Affect: Mood normal.         Speech: Speech normal.         Behavior: Behavior normal. Behavior is cooperative.           Significant Labs: All pertinent labs within the past 24 hours have been reviewed.  BMP:   Recent Labs   Lab 04/20/23  1847   *      K 4.3      CO2 24   BUN 13   CREATININE 1.2   CALCIUM 9.8     CBC:   Recent Labs   Lab 04/20/23 1847   WBC 12.14   HGB 12.4*   HCT 38.1*        CMP:   Recent Labs   Lab 04/20/23 1847      K 4.3      CO2 24   *   BUN 13   CREATININE 1.2   CALCIUM 9.8   PROT 7.9   ALBUMIN 3.7   BILITOT 0.3   ALKPHOS 69   AST 13   ALT 20   ANIONGAP 11   Troponin:   Recent Labs   Lab 04/20/23  1847 04/21/23  0132   TROPONINI 0.094* 0.094*       Significant Imaging: I have reviewed all pertinent imaging results/findings within the past 24 hours.  I have reviewed and interpreted all pertinent imaging results/findings within the past 24 hours.    Imaging Results              X-Ray Chest AP Portable (Final result)  Result time 04/20/23 20:09:24      Final result by Lan Del Real MD  (04/20/23 20:09:24)                   Impression:      No acute abnormality.      Electronically signed by: Lan Nasima  Date:    04/20/2023  Time:    20:09               Narrative:    EXAMINATION:  XR CHEST AP PORTABLE    CLINICAL HISTORY:  chest pain;    TECHNIQUE:  Single frontal view of the chest was performed.    COMPARISON:  Multiple priors.    FINDINGS:  The lungs are clear, with normal appearance of pulmonary vasculature and no pleural effusion or pneumothorax.    The cardiac silhouette is normal in size. The hilar and mediastinal contours are unremarkable.    Bones are intact.                                    I have independently reviewed and interpreted the EKG.     I have independently reviewed all pertinent labs within the past 24 hours.    I have independently reviewed, visualized and interpreted all pertinent imaging results within the past 24 hours and discussed the findings with the ED physician, Dr. Haskins

## 2023-04-26 ENCOUNTER — PATIENT MESSAGE (OUTPATIENT)
Dept: CARDIOLOGY | Facility: CLINIC | Age: 53
End: 2023-04-26
Payer: COMMERCIAL

## 2023-04-27 ENCOUNTER — HOSPITAL ENCOUNTER (OUTPATIENT)
Dept: CARDIOLOGY | Facility: HOSPITAL | Age: 53
Discharge: HOME | End: 2023-04-27
Payer: COMMERCIAL

## 2023-04-27 ENCOUNTER — OFFICE VISIT (OUTPATIENT)
Dept: CARDIOLOGY | Facility: CLINIC | Age: 53
End: 2023-04-27
Payer: COMMERCIAL

## 2023-04-27 ENCOUNTER — TELEPHONE (OUTPATIENT)
Dept: SMOKING CESSATION | Facility: CLINIC | Age: 53
End: 2023-04-27
Payer: COMMERCIAL

## 2023-04-27 VITALS
OXYGEN SATURATION: 96 % | DIASTOLIC BLOOD PRESSURE: 62 MMHG | BODY MASS INDEX: 29.99 KG/M2 | WEIGHT: 233.69 LBS | SYSTOLIC BLOOD PRESSURE: 110 MMHG | HEART RATE: 106 BPM | HEIGHT: 74 IN

## 2023-04-27 DIAGNOSIS — I25.118 CORONARY ARTERY DISEASE OF NATIVE ARTERY OF NATIVE HEART WITH STABLE ANGINA PECTORIS: Primary | ICD-10-CM

## 2023-04-27 DIAGNOSIS — R07.9 CHEST PAIN, UNSPECIFIED TYPE: ICD-10-CM

## 2023-04-27 DIAGNOSIS — I10 HYPERTENSION GOAL BP (BLOOD PRESSURE) < 130/80: Chronic | ICD-10-CM

## 2023-04-27 DIAGNOSIS — F10.10 ALCOHOL ABUSE: ICD-10-CM

## 2023-04-27 DIAGNOSIS — I25.10 CORONARY ARTERY DISEASE, UNSPECIFIED VESSEL OR LESION TYPE, UNSPECIFIED WHETHER ANGINA PRESENT, UNSPECIFIED WHETHER NATIVE OR TRANSPLANTED HEART: ICD-10-CM

## 2023-04-27 DIAGNOSIS — E78.5 HYPERLIPIDEMIA LDL GOAL <70: Chronic | ICD-10-CM

## 2023-04-27 DIAGNOSIS — I25.118 CORONARY ARTERY DISEASE OF NATIVE ARTERY OF NATIVE HEART WITH STABLE ANGINA PECTORIS: ICD-10-CM

## 2023-04-27 DIAGNOSIS — R07.9 CHEST PAIN, UNSPECIFIED TYPE: Primary | ICD-10-CM

## 2023-04-27 DIAGNOSIS — Z79.4 TYPE 2 DIABETES MELLITUS WITH DIABETIC NEUROPATHY, WITH LONG-TERM CURRENT USE OF INSULIN: Chronic | ICD-10-CM

## 2023-04-27 DIAGNOSIS — I82.402 LEG DVT (DEEP VENOUS THROMBOEMBOLISM), ACUTE, LEFT: ICD-10-CM

## 2023-04-27 DIAGNOSIS — E11.40 TYPE 2 DIABETES MELLITUS WITH DIABETIC NEUROPATHY, WITH LONG-TERM CURRENT USE OF INSULIN: Chronic | ICD-10-CM

## 2023-04-27 DIAGNOSIS — F17.200 TOBACCO USE DISORDER: ICD-10-CM

## 2023-04-27 PROCEDURE — 1111F PR DISCHARGE MEDS RECONCILED W/ CURRENT OUTPATIENT MED LIST: ICD-10-PCS | Mod: CPTII,S$GLB,,

## 2023-04-27 PROCEDURE — 4010F ACE/ARB THERAPY RXD/TAKEN: CPT | Mod: CPTII,S$GLB,,

## 2023-04-27 PROCEDURE — 3052F PR MOST RECENT HEMOGLOBIN A1C LEVEL 8.0 - < 9.0%: ICD-10-PCS | Mod: CPTII,S$GLB,,

## 2023-04-27 PROCEDURE — 3008F BODY MASS INDEX DOCD: CPT | Mod: CPTII,S$GLB,,

## 2023-04-27 PROCEDURE — 1159F MED LIST DOCD IN RCRD: CPT | Mod: CPTII,S$GLB,,

## 2023-04-27 PROCEDURE — 3074F SYST BP LT 130 MM HG: CPT | Mod: CPTII,S$GLB,,

## 2023-04-27 PROCEDURE — 1160F RVW MEDS BY RX/DR IN RCRD: CPT | Mod: CPTII,S$GLB,,

## 2023-04-27 PROCEDURE — 3074F PR MOST RECENT SYSTOLIC BLOOD PRESSURE < 130 MM HG: ICD-10-PCS | Mod: CPTII,S$GLB,,

## 2023-04-27 PROCEDURE — 99214 PR OFFICE/OUTPT VISIT, EST, LEVL IV, 30-39 MIN: ICD-10-PCS | Mod: S$GLB,,,

## 2023-04-27 PROCEDURE — 1159F PR MEDICATION LIST DOCUMENTED IN MEDICAL RECORD: ICD-10-PCS | Mod: CPTII,S$GLB,,

## 2023-04-27 PROCEDURE — 1111F DSCHRG MED/CURRENT MED MERGE: CPT | Mod: CPTII,S$GLB,,

## 2023-04-27 PROCEDURE — 99214 OFFICE O/P EST MOD 30 MIN: CPT | Mod: S$GLB,,,

## 2023-04-27 PROCEDURE — 3072F PR LOW RISK FOR RETINOPATHY: ICD-10-PCS | Mod: CPTII,S$GLB,,

## 2023-04-27 PROCEDURE — 93010 ELECTROCARDIOGRAM REPORT: CPT | Mod: ,,, | Performed by: STUDENT IN AN ORGANIZED HEALTH CARE EDUCATION/TRAINING PROGRAM

## 2023-04-27 PROCEDURE — 3078F PR MOST RECENT DIASTOLIC BLOOD PRESSURE < 80 MM HG: ICD-10-PCS | Mod: CPTII,S$GLB,,

## 2023-04-27 PROCEDURE — 93010 EKG 12-LEAD: ICD-10-PCS | Mod: ,,, | Performed by: STUDENT IN AN ORGANIZED HEALTH CARE EDUCATION/TRAINING PROGRAM

## 2023-04-27 PROCEDURE — 3052F HG A1C>EQUAL 8.0%<EQUAL 9.0%: CPT | Mod: CPTII,S$GLB,,

## 2023-04-27 PROCEDURE — 99999 PR PBB SHADOW E&M-EST. PATIENT-LVL V: CPT | Mod: PBBFAC,,,

## 2023-04-27 PROCEDURE — 93005 ELECTROCARDIOGRAM TRACING: CPT

## 2023-04-27 PROCEDURE — 1160F PR REVIEW ALL MEDS BY PRESCRIBER/CLIN PHARMACIST DOCUMENTED: ICD-10-PCS | Mod: CPTII,S$GLB,,

## 2023-04-27 PROCEDURE — 3072F LOW RISK FOR RETINOPATHY: CPT | Mod: CPTII,S$GLB,,

## 2023-04-27 PROCEDURE — 3078F DIAST BP <80 MM HG: CPT | Mod: CPTII,S$GLB,,

## 2023-04-27 PROCEDURE — 3008F PR BODY MASS INDEX (BMI) DOCUMENTED: ICD-10-PCS | Mod: CPTII,S$GLB,,

## 2023-04-27 PROCEDURE — 99999 PR PBB SHADOW E&M-EST. PATIENT-LVL V: ICD-10-PCS | Mod: PBBFAC,,,

## 2023-04-27 PROCEDURE — 4010F PR ACE/ARB THEARPY RXD/TAKEN: ICD-10-PCS | Mod: CPTII,S$GLB,,

## 2023-04-27 RX ORDER — METOPROLOL SUCCINATE 50 MG/1
50 TABLET, EXTENDED RELEASE ORAL 2 TIMES DAILY
Qty: 30 TABLET | Refills: 11
Start: 2023-04-27 | End: 2023-05-04 | Stop reason: SDUPTHER

## 2023-04-27 NOTE — TELEPHONE ENCOUNTER
Spoke to patient over the phone in regard to rescheduling missed clinic intake appt. Rescheduled for 5/10/23 at 0800.

## 2023-04-27 NOTE — PROGRESS NOTES
Subjective:   Patient ID:  Laron Kothari Jr. is a 52 y.o. male who presents for evaluation of No chief complaint on file.      Rachana Kothari is a 52 year old male patient whose current medical conditions include CAD s/p [facility] PCI x2 to RCA 4/23' by Dr. Lara, DVT in 2017, DM type II, HTN, hyperlipidemia, gout, neuropathy, and tobacco abuse who presents to clinic for palpitations. His wife reports he is not sleeping well    [facility] 4/11/23 dist LAD 80%, dist cx 90%, mid % with successful PCI  Echo with normal EF    Past Medical History:   Diagnosis Date    Blood in stool 08/07/2018    Deep vein thrombosis (DVT) 2017    Left leg    Diabetes mellitus, type 2 2013    Gout     Hyperlipidemia     Hypertension     Neuropathy     Smoker        Past Surgical History:   Procedure Laterality Date    APPENDECTOMY      ARTHROSCOPIC CHONDROPLASTY OF KNEE JOINT Right 11/24/2020    Procedure: ARTHROSCOPY, KNEE, WITH CHONDROPLASTY;  Surgeon: Nahum Rose MD;  Location: [facility] OR;  Service: Orthopedics;  Laterality: Right;  chondroplasty medial condyle and anteriorly    COLONOSCOPY N/A 8/7/2018    Procedure: COLONOSCOPY;  Surgeon: Ten Valentine MD;  Location: [facility] ENDO;  Service: Endoscopy;  Laterality: N/A;    COLONOSCOPY N/A 2/23/2022    Procedure: COLONOSCOPY;  Surgeon: Octavio Craig MD;  Location: [facility] ENDO;  Service: Endoscopy;  Laterality: N/A;    KNEE ARTHROSCOPY W/ MENISCECTOMY Right 11/24/2020    Procedure: ARTHROSCOPY, KNEE, WITH MENISCECTOMY;  Surgeon: Nahum Rose MD;  Location: [facility] OR;  Service: Orthopedics;  Laterality: Right;  Partial medial and lateral meniscectomy    LEFT HEART CATHETERIZATION Left 4/11/2023    Procedure: Left heart cath;  Surgeon: Sue Lara MD;  Location: [facility] CATH LAB;  Service: Cardiology;  Laterality: Left;    PERCUTANEOUS TRANSLUMINAL BALLOON ANGIOPLASTY OF CORONARY ARTERY  4/11/2023    Procedure: Angioplasty-coronary;  Surgeon: Sue Lara MD;  Location: [facility]  CATH LAB;  Service: Cardiology;;    ROBOT-ASSISTED CHOLECYSTECTOMY USING DA TRIPP XI N/A 1/21/2020    Procedure: XI ROBOTIC CHOLECYSTECTOMY;  Surgeon: Sebastien Rivera MD;  Location: [facility] OR;  Service: General;  Laterality: N/A;    STENT, DRUG ELUTING, SINGLE VESSEL, CORONARY  4/11/2023    Procedure: Stent, Drug Eluting, Single Vessel, Coronary;  Surgeon: Sue Lara MD;  Location: [facility] CATH LAB;  Service: Cardiology;;    TONSILLECTOMY, ADENOIDECTOMY         Social History     Tobacco Use    Smoking status: Every Day     Packs/day: 0.25     Years: 36.00     Pack years: 9.00     Types: Cigarettes    Smokeless tobacco: Never    Tobacco comments:     no smoking after m.n prior to sx   Substance Use Topics    Alcohol use: Yes     Alcohol/week: 26.0 standard drinks     Types: 26 Cans of beer per week     Comment: daily    Drug use: No       Family History   Problem Relation Age of Onset    Diabetes Father     Prostate cancer Father     Cataracts Father     Hypertension Mother     Hypertension Brother        Current Outpatient Medications on File Prior to Visit   Medication Sig Dispense Refill    aspirin (ECOTRIN) 81 MG EC tablet Take 1 tablet (81 mg total) by mouth once daily. 30 tablet 11    blood sugar diagnostic Strp Once daily glucose testing. 50 strip 6    blood-glucose meter Misc Use as directed. 1 each 0    empagliflozin (JARDIANCE) 10 mg tablet Take 1 tablet (10 mg total) by mouth once daily. 30 tablet 3    fluticasone propionate (FLONASE) 50 mcg/actuation nasal spray 1 spray (50 mcg total) by Each Nostril route once daily. (Patient taking differently: 1 spray by Each Nostril route daily as needed.) 18.2 mL 1    gabapentin (NEURONTIN) 300 MG capsule Take 1 capsule (300 mg total) by mouth 3 (three) times daily. 90 capsule 11    glipiZIDE (GLUCOTROL) 5 MG tablet Take 1 tablet (5 mg total) by mouth once daily. 90 tablet 0    lancets (LANCETS,THIN) Misc Once daily glucose testing. 50 each 6    lisinopriL  "(PRINIVIL,ZESTRIL) 40 MG tablet Take 1 tablet (40 mg total) by mouth once daily. 90 tablet 1    metFORMIN (GLUCOPHAGE) 1000 MG tablet Take 1 tablet (1,000 mg total) by mouth 2 (two) times daily with meals. 180 tablet 0    metoprolol succinate (TOPROL-XL) 50 MG 24 hr tablet Take 1 tablet (50 mg total) by mouth once daily. 30 tablet 11    pen needle, diabetic (PEN NEEDLE) 31 gauge x 5/16" Ndle Use once daily with insulin injection. 50 each 3    pravastatin (PRAVACHOL) 80 MG tablet Take 1 tablet (80 mg total) by mouth every evening. 90 tablet 1    ranolazine (RANEXA) 500 MG Tb12 Take 1 tablet (500 mg total) by mouth 2 (two) times daily. 60 tablet 11    sildenafiL (VIAGRA) 50 MG tablet Take 1 tablet (50 mg total) by mouth daily as needed for Erectile Dysfunction. 30 tablet 5    ticagrelor (BRILINTA) 90 mg tablet Take 1 tablet (90 mg total) by mouth 2 (two) times daily. 60 tablet 11     No current facility-administered medications on file prior to visit.      Wt Readings from Last 3 Encounters:   04/20/23 104.8 kg (231 lb)   04/19/23 104.8 kg (231 lb 0.7 oz)   04/19/23 105.2 kg (231 lb 14.8 oz)     Temp Readings from Last 3 Encounters:   04/21/23 98.7 °F (37.1 °C)   04/19/23 96.5 °F (35.8 °C) (Tympanic)   04/13/23 96.4 °F (35.8 °C) (Oral)     BP Readings from Last 3 Encounters:   04/21/23 128/72   04/19/23 108/68   04/19/23 100/62     Pulse Readings from Last 3 Encounters:   04/21/23 67   04/19/23 72   04/19/23 101        Review of Systems   Constitutional: Positive for malaise/fatigue.   HENT: Negative.     Eyes: Negative.    Cardiovascular:  Positive for palpitations.   Respiratory: Negative.     Skin: Negative.    Musculoskeletal: Negative.    Gastrointestinal: Negative.    Genitourinary: Negative.    Neurological:  Positive for numbness.   Psychiatric/Behavioral: Negative.       Objective:   Physical Exam  Vitals and nursing note reviewed.   Constitutional:       Appearance: Normal appearance.   HENT:      Head: " Normocephalic and atraumatic.   Eyes:      General:         Right eye: No discharge.         Left eye: No discharge.      Pupils: Pupils are equal, round, and reactive to light.   Cardiovascular:      Rate and Rhythm: Normal rate and regular rhythm.      Heart sounds: S1 normal and S2 normal. No murmur heard.    No friction rub.   Pulmonary:      Effort: Pulmonary effort is normal. No respiratory distress.      Breath sounds: Normal breath sounds. No rales.   Abdominal:      Palpations: Abdomen is soft.      Tenderness: There is no abdominal tenderness.   Musculoskeletal:      Cervical back: Neck supple.      Right lower leg: No edema.      Left lower leg: No edema.   Skin:     General: Skin is warm and dry.   Neurological:      General: No focal deficit present.      Mental Status: He is alert and oriented to person, place, and time.   Psychiatric:         Mood and Affect: Mood normal.         Behavior: Behavior normal.         Thought Content: Thought content normal.       Lab Results   Component Value Date    CHOL 167 10/06/2022    CHOL 165 12/08/2021    CHOL 138 06/22/2021     Lab Results   Component Value Date    HDL 41 10/06/2022    HDL 38 (L) 12/08/2021    HDL 39 (L) 06/22/2021     Lab Results   Component Value Date    LDLCALC 109.4 10/06/2022    LDLCALC 109.0 12/08/2021    LDLCALC 80.4 06/22/2021     Lab Results   Component Value Date    TRIG 83 10/06/2022    TRIG 90 12/08/2021    TRIG 93 06/22/2021     Lab Results   Component Value Date    CHOLHDL 24.6 10/06/2022    CHOLHDL 23.0 12/08/2021    CHOLHDL 28.3 06/22/2021       Chemistry        Component Value Date/Time     04/20/2023 1847    K 4.3 04/20/2023 1847     04/20/2023 1847    CO2 24 04/20/2023 1847    BUN 13 04/20/2023 1847    CREATININE 1.2 04/20/2023 1847     (H) 04/20/2023 1847        Component Value Date/Time    CALCIUM 9.8 04/20/2023 1847    ALKPHOS 69 04/20/2023 1847    AST 13 04/20/2023 1847    ALT 20 04/20/2023 1847     BILITOT 0.3 04/20/2023 1847    ESTGFRAFRICA >60 06/21/2022 2017    EGFRNONAA >60 06/21/2022 2017          No results found for: TSH  Lab Results   Component Value Date    INR 1.0 04/20/2023    INR 1.0 04/11/2023    INR 0.9 01/06/2020     @RESUFAST(WBC,HGB,HCT,MCV,PLT)  @LABRCNTIP(BNP,BNPTRIAGEBLO)@  CrCl cannot be calculated (Unknown ideal weight.).     Results for orders placed during the hospital encounter of 04/11/23    Echo    Interpretation Summary  · The left ventricle is normal in size with concentric remodeling and normal systolic function.  · Normal left ventricular diastolic function.  · The estimated PA systolic pressure is 18 mmHg.  · Normal right ventricular size with normal right ventricular systolic function.  · Normal central venous pressure (3 mmHg).  · The estimated ejection fraction is 55%.  · The ascending aorta is mildly dilated.  · There are segmental left ventricular wall motion abnormalities.     No results found for this or any previous visit.     Assessment:      1. Alcohol abuse    2. Hyperlipidemia LDL goal <70    3. Hypertension goal BP (blood pressure) < 130/80    4. Chest pain, unspecified type    5. Coronary artery disease of native artery of native heart with stable angina pectoris    6. Type 2 diabetes mellitus with diabetic neuropathy, with long-term current use of insulin    7. Tobacco use disorder    8. Leg DVT (deep venous thromboembolism), acute, left        Plan:   Alcohol abuse    Hyperlipidemia LDL goal <70    Hypertension goal BP (blood pressure) < 130/80    Chest pain, unspecified type    Coronary artery disease of native artery of native heart with stable angina pectoris    Type 2 diabetes mellitus with diabetic neuropathy, with long-term current use of insulin    Tobacco use disorder    Leg DVT (deep venous thromboembolism), acute, left      ASA, Brilinta (1 year), BB, Ranexa, statin- CAD  ACEi, BB, ASA- HTN  statin- HLD    Counseled on tobacco cessation and encouraged  to stop drinking alcohol  Low Na diet  Risk factor modification and excercise program, weight loss    Holter, increased BB to BID- palpitations  Cardiac rehab ref in    Follow up Dr. Lara 6 months    Courtney Guillot, FNP-C [facility], Cardiology

## 2023-04-28 ENCOUNTER — HOSPITAL ENCOUNTER (OUTPATIENT)
Dept: CARDIOLOGY | Facility: HOSPITAL | Age: 53
Discharge: HOME | End: 2023-04-28
Payer: COMMERCIAL

## 2023-04-28 DIAGNOSIS — I25.118 CORONARY ARTERY DISEASE OF NATIVE ARTERY OF NATIVE HEART WITH STABLE ANGINA PECTORIS: ICD-10-CM

## 2023-04-28 PROCEDURE — 93244 EXT ECG>48HR<7D REV&INTERPJ: CPT | Mod: ,,, | Performed by: INTERNAL MEDICINE

## 2023-04-28 PROCEDURE — 93244 CV CARDIAC MONITOR - 3-15 DAY ADULT (CUPID ONLY): ICD-10-PCS | Mod: ,,, | Performed by: INTERNAL MEDICINE

## 2023-05-01 NOTE — HOSPITAL COURSE
The patient presented with chest pain. Due to his cardiac history, he was placed in observation. Troponin trend was flat. Cardiology evaluated the patient and deemed stable for discharge. He was given medication for indigestion. Patient seen and examined, stable for discharge. Outpatient cardiology f/u.

## 2023-05-01 NOTE — DISCHARGE SUMMARY
[facility] - Emergency Dept.  [facility] Medicine  Discharge Summary      Patient Name: Laron Kothari Jr.  MRN: [id number]  NEETA: [id number]  Patient Class: OP- Observation  Admission Date: 4/20/2023  Hospital Length of Stay: 0 days  Discharge Date and Time: 4/21/2023 11:07 AM  Attending Physician: No att. providers found   Discharging Provider: Yomi Hurst MD  Primary Care Provider: Kristen Adler DO    Primary Care Team: Networked reference to record PCT     HPI:   Mr. Kothari is a 52-year-old  male with PMH significant for CAD, recent hospitalization for NSTEMI, had to drug-eluting stents placed to his RCA on 04/11/2023 by Dr. Lara.  He presented to the ED today complaining midsternal intermittent chest discomfort, occasionally radiating to the left arm, associated with nausea, without vomiting.  Describes the feeling as uncomfortable and pressure-like.  Initial troponin 0.09, repeat 0.09.  EKG NSR with no ST or T-wave changes.  Patient reports compliance with aspirin, Brilinta, Ranexa, statin, ACEI, BB.  ED physician discussed case with Dr. Armas, on-call, recommended placing in observation for repeat cardiac enzymes.  Currently chest pain-free.      Placed in observation for chest pain, recent NSTEMI, JUSTIN x2 to RCA.      * No surgery found *      Hospital Course:   The patient presented with chest pain. Due to his cardiac history, he was placed in observation. Troponin trend was flat. Cardiology evaluated the patient and deemed stable for discharge. He was given medication for indigestion. Patient seen and examined, stable for discharge. Outpatient cardiology f/u.       Goals of Care Treatment Preferences:  Code Status: Full Code      Consults:   Consults (From admission, onward)        Status Ordering Provider     Inpatient consult to Cardiology  Once        Provider:  Oscar Armas MD    Completed [facility]            Final Active Diagnoses:    Diagnosis Date Noted POA    PRINCIPAL  "PROBLEM:  Chest pain [R07.9] 04/11/2023 Yes    Coronary artery disease of native artery of native heart with stable angina pectoris [I25.118] 04/18/2023 Unknown    Type 2 diabetes mellitus with diabetic neuropathy [E11.40] 04/14/2016 Yes     Chronic    Hypertension goal BP (blood pressure) < 130/80 [I10] 04/14/2016 Yes     Chronic    Tobacco use disorder [F17.200] 01/20/2015 Yes    Hyperlipidemia LDL goal <70 [E78.5]  Yes     Chronic      Problems Resolved During this Admission:       Discharged Condition: stable    Disposition: Home or Self Care    Follow Up: F/u cardiology in 1 week    Patient Instructions:     Significant Diagnostic Studies: Labs: Troponin 0.068    Pending Diagnostic Studies:     None         Medications:  Reconciled Home Medications:      Medication List      ASK your doctor about these medications    aspirin 81 MG EC tablet  Commonly known as: ECOTRIN  Take 1 tablet (81 mg total) by mouth once daily.     blood sugar diagnostic Strp  Once daily glucose testing.     blood-glucose meter Misc  Use as directed.     empagliflozin 10 mg tablet  Commonly known as: JARDIANCE  Take 1 tablet (10 mg total) by mouth once daily.     fluticasone propionate 50 mcg/actuation nasal spray  Commonly known as: FLONASE  1 spray (50 mcg total) by Each Nostril route once daily.     gabapentin 300 MG capsule  Commonly known as: NEURONTIN  Take 1 capsule (300 mg total) by mouth 3 (three) times daily.     glipiZIDE 5 MG tablet  Commonly known as: GLUCOTROL  Take 1 tablet (5 mg total) by mouth once daily.     lancets Misc  Commonly known as: LANCETS,THIN  Once daily glucose testing.     lisinopriL 40 MG tablet  Commonly known as: PRINIVIL,ZESTRIL  Take 1 tablet (40 mg total) by mouth once daily.     metFORMIN 1000 MG tablet  Commonly known as: GLUCOPHAGE  Take 1 tablet (1,000 mg total) by mouth 2 (two) times daily with meals.     pen needle, diabetic 31 gauge x 5/16" Ndle  Commonly known as: PEN NEEDLE  Use once " daily with insulin injection.     pravastatin 80 MG tablet  Commonly known as: PRAVACHOL  Take 1 tablet (80 mg total) by mouth every evening.     ranolazine 500 MG Tb12  Commonly known as: RANEXA  Take 1 tablet (500 mg total) by mouth 2 (two) times daily.     sildenafiL 50 MG tablet  Commonly known as: VIAGRA  Take 1 tablet (50 mg total) by mouth daily as needed for Erectile Dysfunction.     ticagrelor 90 mg tablet  Commonly known as: BRILINTA  Take 1 tablet (90 mg total) by mouth 2 (two) times daily.            Indwelling Lines/Drains at time of discharge:   Lines/Drains/Airways     None                 Time spent on the discharge of patient: 31 minutes         Yomi Hurst MD  Department of Hospital Medicine  [facility] - Emergency Dept.

## 2023-05-04 ENCOUNTER — TELEPHONE (OUTPATIENT)
Dept: CARDIOLOGY | Facility: CLINIC | Age: 53
End: 2023-05-04
Payer: COMMERCIAL

## 2023-05-04 DIAGNOSIS — I25.10 CORONARY ARTERY DISEASE, UNSPECIFIED VESSEL OR LESION TYPE, UNSPECIFIED WHETHER ANGINA PRESENT, UNSPECIFIED WHETHER NATIVE OR TRANSPLANTED HEART: ICD-10-CM

## 2023-05-04 NOTE — TELEPHONE ENCOUNTER
----- Message from Ke Moore sent at 5/4/2023 11:43 AM CDT -----  Contact: paul Taveras is calling to get updated plan of care following patient's heart attack. Please call her back at [phone] ext [phone]        Thanks  DD

## 2023-05-05 RX ORDER — ASPIRIN 81 MG/1
81 TABLET ORAL DAILY
Qty: 30 TABLET | Refills: 11 | Status: SHIPPED | OUTPATIENT
Start: 2023-05-05 | End: 2023-09-15 | Stop reason: SDUPTHER

## 2023-05-05 RX ORDER — METOPROLOL SUCCINATE 50 MG/1
50 TABLET, EXTENDED RELEASE ORAL 2 TIMES DAILY
Qty: 60 TABLET | Refills: 11
Start: 2023-05-05 | End: 2023-05-12

## 2023-05-10 ENCOUNTER — TELEPHONE (OUTPATIENT)
Dept: CARDIOLOGY | Facility: CLINIC | Age: 53
End: 2023-05-10
Payer: COMMERCIAL

## 2023-05-10 ENCOUNTER — TELEPHONE (OUTPATIENT)
Dept: SMOKING CESSATION | Facility: CLINIC | Age: 53
End: 2023-05-10
Payer: COMMERCIAL

## 2023-05-10 LAB
OHS CV HOLTER SINUS AVERAGE HR: 88
OHS CV HOLTER SINUS MAX HR: 134
OHS CV HOLTER SINUS MIN HR: 57

## 2023-05-10 NOTE — TELEPHONE ENCOUNTER
Attempted to contact pt to inform them Ave will be unavailable and need to reschedule the appt from 03:30P 05/12 to 9:30A same day

## 2023-05-10 NOTE — TELEPHONE ENCOUNTER
Attempted to contact patient in regard to rescheduling appt. Left recorded message with return contact information; [phone].        ----- Message from Ke Moore sent at 5/10/2023  7:27 AM CDT -----  Contact: christa  Patient is requesting a call to reschedule. Please call him back at [phone].      Thanks  DD

## 2023-05-11 ENCOUNTER — TELEPHONE (OUTPATIENT)
Dept: CARDIOLOGY | Facility: CLINIC | Age: 53
End: 2023-05-11
Payer: COMMERCIAL

## 2023-05-11 NOTE — PROGRESS NOTES
Subjective:   Patient ID:  Laron Kothari Jr. is a 52 y.o. male who presents for evaluation of No chief complaint on file.      Rachana Kothari is a 52 year old male patient whose current medical conditions include CAD s/p [facility] PCI x2 to RCA 4/23' by Dr. Lara, DVT in 2017, DM type II, HTN, hyperlipidemia, gout, neuropathy, and tobacco abuse who presents to clinic for palpitations. His wife reports he is not sleeping well    [facility] 4/11/23 dist LAD 80%, dist cx 90%, mid % with successful PCI  Echo with normal EF    5/12/23  Here today for 2 week follow up for palpitations post STEMI w/ PCI x2 RCA. Still complaining of fatigue, SOB and occasional palpitations. 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      Holter 4/28/23 [facility]    Past Medical History:   Diagnosis Date    Blood in stool 08/07/2018    Deep vein thrombosis (DVT) 2017    Left leg    Diabetes mellitus, type 2 2013    Gout     Hyperlipidemia     Hypertension     Neuropathy     Smoker        Past Surgical History:   Procedure Laterality Date    APPENDECTOMY      ARTHROSCOPIC CHONDROPLASTY OF KNEE JOINT Right 11/24/2020    Procedure: ARTHROSCOPY, KNEE, WITH CHONDROPLASTY;  Surgeon: Nahum Rose MD;  Location: [facility] OR;  Service: Orthopedics;  Laterality: Right;  chondroplasty medial condyle and anteriorly    COLONOSCOPY N/A 8/7/2018    Procedure: COLONOSCOPY;  Surgeon: Ten Valentine MD;  Location: [facility] ENDO;  Service: Endoscopy;  Laterality: N/A;    COLONOSCOPY N/A 2/23/2022    Procedure: COLONOSCOPY;  Surgeon: Octavio Craig MD;  Location: [facility] ENDO;  Service: Endoscopy;  Laterality: N/A;    KNEE ARTHROSCOPY W/ MENISCECTOMY Right 11/24/2020    Procedure: ARTHROSCOPY, KNEE, WITH MENISCECTOMY;  Surgeon: Nahum Rose MD;  Location: [facility] OR;  Service: Orthopedics;  Laterality: Right;  Partial medial and lateral meniscectomy    LEFT HEART CATHETERIZATION Left 4/11/2023     Procedure: Left heart cath;  Surgeon: Sue Lara MD;  Location: [facility] CATH LAB;  Service: Cardiology;  Laterality: Left;    PERCUTANEOUS TRANSLUMINAL BALLOON ANGIOPLASTY OF CORONARY ARTERY  4/11/2023    Procedure: Angioplasty-coronary;  Surgeon: Sue Lara MD;  Location: [facility] CATH LAB;  Service: Cardiology;;    ROBOT-ASSISTED CHOLECYSTECTOMY USING DA TRIPP XI N/A 1/21/2020    Procedure: XI ROBOTIC CHOLECYSTECTOMY;  Surgeon: Sebastien Rivera MD;  Location: [facility] OR;  Service: General;  Laterality: N/A;    STENT, DRUG ELUTING, SINGLE VESSEL, CORONARY  4/11/2023    Procedure: Stent, Drug Eluting, Single Vessel, Coronary;  Surgeon: Sue Lara MD;  Location: [facility] CATH LAB;  Service: Cardiology;;    TONSILLECTOMY, ADENOIDECTOMY         Social History     Tobacco Use    Smoking status: Every Day     Packs/day: 0.15     Years: 36.00     Pack years: 5.40     Types: Cigarettes    Smokeless tobacco: Never    Tobacco comments:     no smoking after m.n prior to sx   Substance Use Topics    Alcohol use: Yes     Alcohol/week: 26.0 standard drinks     Types: 26 Cans of beer per week     Comment: daily    Drug use: No       Family History   Problem Relation Age of Onset    Diabetes Father     Prostate cancer Father     Cataracts Father     Hypertension Mother     Hypertension Brother        Current Outpatient Medications on File Prior to Visit   Medication Sig Dispense Refill    aspirin (ECOTRIN) 81 MG EC tablet Take 1 tablet (81 mg total) by mouth once daily. 30 tablet 11    blood sugar diagnostic Strp Once daily glucose testing. 50 strip 6    blood-glucose meter Misc Use as directed. 1 each 0    empagliflozin (JARDIANCE) 10 mg tablet Take 1 tablet (10 mg total) by mouth once daily. 30 tablet 3    fluticasone propionate (FLONASE) 50 mcg/actuation nasal spray 1 spray (50 mcg total) by Each Nostril route once daily. (Patient not taking: Reported on 4/27/2023) 18.2 mL 1    gabapentin (NEURONTIN) 300 MG capsule Take 1  "capsule (300 mg total) by mouth 3 (three) times daily. 90 capsule 11    glipiZIDE (GLUCOTROL) 5 MG tablet Take 1 tablet (5 mg total) by mouth once daily. 90 tablet 0    lancets (LANCETS,THIN) Misc Once daily glucose testing. 50 each 6    lisinopriL (PRINIVIL,ZESTRIL) 40 MG tablet Take 1 tablet (40 mg total) by mouth once daily. 90 tablet 1    metFORMIN (GLUCOPHAGE) 1000 MG tablet Take 1 tablet (1,000 mg total) by mouth 2 (two) times daily with meals. 180 tablet 0    metoprolol succinate (TOPROL-XL) 50 MG 24 hr tablet Take 1 tablet (50 mg total) by mouth 2 (two) times daily. 60 tablet 11    pen needle, diabetic (PEN NEEDLE) 31 gauge x 5/16" Ndle Use once daily with insulin injection. 50 each 3    pravastatin (PRAVACHOL) 80 MG tablet Take 1 tablet (80 mg total) by mouth every evening. 90 tablet 1    ranolazine (RANEXA) 500 MG Tb12 Take 1 tablet (500 mg total) by mouth 2 (two) times daily. 60 tablet 11    sildenafiL (VIAGRA) 50 MG tablet Take 1 tablet (50 mg total) by mouth daily as needed for Erectile Dysfunction. 30 tablet 5    ticagrelor (BRILINTA) 90 mg tablet Take 1 tablet (90 mg total) by mouth 2 (two) times daily. 60 tablet 11     No current facility-administered medications on file prior to visit.      Wt Readings from Last 3 Encounters:   04/27/23 106 kg (233 lb 11 oz)   04/20/23 104.8 kg (231 lb)   04/19/23 104.8 kg (231 lb 0.7 oz)     Temp Readings from Last 3 Encounters:   04/21/23 98.7 °F (37.1 °C)   04/19/23 96.5 °F (35.8 °C) (Tympanic)   04/13/23 96.4 °F (35.8 °C) (Oral)     BP Readings from Last 3 Encounters:   04/27/23 110/62   04/21/23 128/72   04/19/23 108/68     Pulse Readings from Last 3 Encounters:   04/27/23 106   04/21/23 67   04/19/23 72        Review of Systems   Constitutional: Positive for malaise/fatigue.   HENT: Negative.     Eyes: Negative.    Cardiovascular:  Positive for palpitations.   Respiratory: Negative.     Skin: Negative.    Musculoskeletal: Negative.    Gastrointestinal: " Negative.    Genitourinary: Negative.    Neurological:  Positive for dizziness, numbness and weakness.   Psychiatric/Behavioral: Negative.       Objective:   Physical Exam  Vitals and nursing note reviewed.   Constitutional:       Appearance: Normal appearance.   HENT:      Head: Normocephalic and atraumatic.   Eyes:      General:         Right eye: No discharge.         Left eye: No discharge.      Pupils: Pupils are equal, round, and reactive to light.   Cardiovascular:      Rate and Rhythm: Normal rate and regular rhythm.      Heart sounds: S1 normal and S2 normal. No murmur heard.    No friction rub.   Pulmonary:      Effort: Pulmonary effort is normal. No respiratory distress.      Breath sounds: Normal breath sounds. No rales.   Abdominal:      Palpations: Abdomen is soft.      Tenderness: There is no abdominal tenderness.   Musculoskeletal:      Cervical back: Neck supple.      Right lower leg: No edema.      Left lower leg: No edema.   Skin:     General: Skin is warm and dry.   Neurological:      General: No focal deficit present.      Mental Status: He is alert and oriented to person, place, and time.   Psychiatric:         Mood and Affect: Mood normal.         Behavior: Behavior normal.         Thought Content: Thought content normal.       Lab Results   Component Value Date    CHOL 167 10/06/2022    CHOL 165 12/08/2021    CHOL 138 06/22/2021     Lab Results   Component Value Date    HDL 41 10/06/2022    HDL 38 (L) 12/08/2021    HDL 39 (L) 06/22/2021     Lab Results   Component Value Date    LDLCALC 109.4 10/06/2022    LDLCALC 109.0 12/08/2021    LDLCALC 80.4 06/22/2021     Lab Results   Component Value Date    TRIG 83 10/06/2022    TRIG 90 12/08/2021    TRIG 93 06/22/2021     Lab Results   Component Value Date    CHOLHDL 24.6 10/06/2022    CHOLHDL 23.0 12/08/2021    CHOLHDL 28.3 06/22/2021       Chemistry        Component Value Date/Time     04/20/2023 1847    K 4.3 04/20/2023 1847      04/20/2023 1847    CO2 24 04/20/2023 1847    BUN 13 04/20/2023 1847    CREATININE 1.2 04/20/2023 1847     (H) 04/20/2023 1847        Component Value Date/Time    CALCIUM 9.8 04/20/2023 1847    ALKPHOS 69 04/20/2023 1847    AST 13 04/20/2023 1847    ALT 20 04/20/2023 1847    BILITOT 0.3 04/20/2023 1847    ESTGFRAFRICA >60 06/21/2022 2017    EGFRNONAA >60 06/21/2022 2017          No results found for: TSH  Lab Results   Component Value Date    INR 1.0 04/20/2023    INR 1.0 04/11/2023    INR 0.9 01/06/2020     @RESUFAST(WBC,HGB,HCT,MCV,PLT)  @LABRCNTIP(BNP,BNPTRIAGEBLO)@  CrCl cannot be calculated (Patient's most recent lab result is older than the maximum 7 days allowed.).     Results for orders placed during the hospital encounter of 04/11/23    Echo    Interpretation Summary  · The left ventricle is normal in size with concentric remodeling and normal systolic function.  · Normal left ventricular diastolic function.  · The estimated PA systolic pressure is 18 mmHg.  · Normal right ventricular size with normal right ventricular systolic function.  · Normal central venous pressure (3 mmHg).  · The estimated ejection fraction is 55%.  · The ascending aorta is mildly dilated.  · There are segmental left ventricular wall motion abnormalities.     No results found for this or any previous visit.     Assessment:      1. Hyperlipidemia LDL goal <70    2. Hypertension goal BP (blood pressure) < 130/80    3. Chest pain, unspecified type    4. Coronary artery disease of native artery of native heart with stable angina pectoris    5. Tobacco use disorder          Plan:   Hyperlipidemia LDL goal <70    Hypertension goal BP (blood pressure) < 130/80    Chest pain, unspecified type    Coronary artery disease of native artery of native heart with stable angina pectoris    Tobacco use disorder        ASA, Brilinta (1 year), BB, Ranexa, statin- CAD  ACEi, BB, ASA- HTN  statin- HLD    Counseled on tobacco cessation and  encouraged to stop drinking alcohol  Low Na diet  Risk factor modification and excercise program, weight loss      Increased metoprolol to 100mg in am and 50mg in evening.  Will follow up on cardiac rehab status  No work for 30 days    RTC 3most with me  Follow up Dr. Lara scheduled in Oct    Ave Hawkins, MIR[facility], Cardiology

## 2023-05-11 NOTE — TELEPHONE ENCOUNTER
----- Message from Ave Hawkins NP sent at 5/11/2023  1:12 PM CDT -----  Will discuss holter results at follow up appt, please have labs done in morning tomorrow has follow up scheduled tomorrow morning with me

## 2023-05-12 ENCOUNTER — OFFICE VISIT (OUTPATIENT)
Dept: CARDIOLOGY | Facility: CLINIC | Age: 53
End: 2023-05-12
Payer: COMMERCIAL

## 2023-05-12 ENCOUNTER — LAB VISIT (OUTPATIENT)
Dept: LAB | Facility: HOSPITAL | Age: 53
End: 2023-05-12
Payer: COMMERCIAL

## 2023-05-12 VITALS
SYSTOLIC BLOOD PRESSURE: 128 MMHG | HEART RATE: 78 BPM | BODY MASS INDEX: 29.6 KG/M2 | WEIGHT: 230.63 LBS | HEIGHT: 74 IN | RESPIRATION RATE: 16 BRPM | DIASTOLIC BLOOD PRESSURE: 78 MMHG | OXYGEN SATURATION: 100 %

## 2023-05-12 DIAGNOSIS — I25.10 CORONARY ARTERY DISEASE, UNSPECIFIED VESSEL OR LESION TYPE, UNSPECIFIED WHETHER ANGINA PRESENT, UNSPECIFIED WHETHER NATIVE OR TRANSPLANTED HEART: ICD-10-CM

## 2023-05-12 DIAGNOSIS — R07.9 CHEST PAIN, UNSPECIFIED TYPE: ICD-10-CM

## 2023-05-12 DIAGNOSIS — I25.118 CORONARY ARTERY DISEASE OF NATIVE ARTERY OF NATIVE HEART WITH STABLE ANGINA PECTORIS: ICD-10-CM

## 2023-05-12 DIAGNOSIS — E78.5 HYPERLIPIDEMIA LDL GOAL <70: Chronic | ICD-10-CM

## 2023-05-12 DIAGNOSIS — F17.200 TOBACCO USE DISORDER: ICD-10-CM

## 2023-05-12 DIAGNOSIS — I10 HYPERTENSION GOAL BP (BLOOD PRESSURE) < 130/80: Primary | Chronic | ICD-10-CM

## 2023-05-12 LAB
ALBUMIN SERPL BCP-MCNC: 3.8 G/DL (ref 3.5–5.2)
ALP SERPL-CCNC: 58 U/L (ref 55–135)
ALT SERPL W/O P-5'-P-CCNC: 12 U/L (ref 10–44)
ANION GAP SERPL CALC-SCNC: 7 MMOL/L (ref 8–16)
AST SERPL-CCNC: 15 U/L (ref 10–40)
BILIRUB SERPL-MCNC: 0.4 MG/DL (ref 0.1–1)
BUN SERPL-MCNC: 13 MG/DL (ref 6–20)
CALCIUM SERPL-MCNC: 9.5 MG/DL (ref 8.7–10.5)
CHLORIDE SERPL-SCNC: 106 MMOL/L (ref 95–110)
CO2 SERPL-SCNC: 27 MMOL/L (ref 23–29)
CREAT SERPL-MCNC: 1.4 MG/DL (ref 0.5–1.4)
EST. GFR  (NO RACE VARIABLE): >60 ML/MIN/1.73 M^2
GLUCOSE SERPL-MCNC: 165 MG/DL (ref 70–110)
MAGNESIUM SERPL-MCNC: 2 MG/DL (ref 1.6–2.6)
POTASSIUM SERPL-SCNC: 4.4 MMOL/L (ref 3.5–5.1)
PROT SERPL-MCNC: 6.9 G/DL (ref 6–8.4)
SODIUM SERPL-SCNC: 140 MMOL/L (ref 136–145)

## 2023-05-12 PROCEDURE — 1160F RVW MEDS BY RX/DR IN RCRD: CPT | Mod: CPTII,S$GLB,,

## 2023-05-12 PROCEDURE — 3074F PR MOST RECENT SYSTOLIC BLOOD PRESSURE < 130 MM HG: ICD-10-PCS | Mod: CPTII,S$GLB,,

## 2023-05-12 PROCEDURE — 83735 ASSAY OF MAGNESIUM: CPT

## 2023-05-12 PROCEDURE — 3078F DIAST BP <80 MM HG: CPT | Mod: CPTII,S$GLB,,

## 2023-05-12 PROCEDURE — 3074F SYST BP LT 130 MM HG: CPT | Mod: CPTII,S$GLB,,

## 2023-05-12 PROCEDURE — 99214 PR OFFICE/OUTPT VISIT, EST, LEVL IV, 30-39 MIN: ICD-10-PCS | Mod: S$GLB,,,

## 2023-05-12 PROCEDURE — 4010F PR ACE/ARB THEARPY RXD/TAKEN: ICD-10-PCS | Mod: CPTII,S$GLB,,

## 2023-05-12 PROCEDURE — 99999 PR PBB SHADOW E&M-EST. PATIENT-LVL IV: ICD-10-PCS | Mod: PBBFAC,,,

## 2023-05-12 PROCEDURE — 3078F PR MOST RECENT DIASTOLIC BLOOD PRESSURE < 80 MM HG: ICD-10-PCS | Mod: CPTII,S$GLB,,

## 2023-05-12 PROCEDURE — 1159F MED LIST DOCD IN RCRD: CPT | Mod: CPTII,S$GLB,,

## 2023-05-12 PROCEDURE — 1111F PR DISCHARGE MEDS RECONCILED W/ CURRENT OUTPATIENT MED LIST: ICD-10-PCS | Mod: CPTII,S$GLB,,

## 2023-05-12 PROCEDURE — 1159F PR MEDICATION LIST DOCUMENTED IN MEDICAL RECORD: ICD-10-PCS | Mod: CPTII,S$GLB,,

## 2023-05-12 PROCEDURE — 3008F BODY MASS INDEX DOCD: CPT | Mod: CPTII,S$GLB,,

## 2023-05-12 PROCEDURE — 3072F LOW RISK FOR RETINOPATHY: CPT | Mod: CPTII,S$GLB,,

## 2023-05-12 PROCEDURE — 4010F ACE/ARB THERAPY RXD/TAKEN: CPT | Mod: CPTII,S$GLB,,

## 2023-05-12 PROCEDURE — 1111F DSCHRG MED/CURRENT MED MERGE: CPT | Mod: CPTII,S$GLB,,

## 2023-05-12 PROCEDURE — 3008F PR BODY MASS INDEX (BMI) DOCUMENTED: ICD-10-PCS | Mod: CPTII,S$GLB,,

## 2023-05-12 PROCEDURE — 3052F PR MOST RECENT HEMOGLOBIN A1C LEVEL 8.0 - < 9.0%: ICD-10-PCS | Mod: CPTII,S$GLB,,

## 2023-05-12 PROCEDURE — 99214 OFFICE O/P EST MOD 30 MIN: CPT | Mod: S$GLB,,,

## 2023-05-12 PROCEDURE — 3052F HG A1C>EQUAL 8.0%<EQUAL 9.0%: CPT | Mod: CPTII,S$GLB,,

## 2023-05-12 PROCEDURE — 99999 PR PBB SHADOW E&M-EST. PATIENT-LVL IV: CPT | Mod: PBBFAC,,,

## 2023-05-12 PROCEDURE — 3072F PR LOW RISK FOR RETINOPATHY: ICD-10-PCS | Mod: CPTII,S$GLB,,

## 2023-05-12 PROCEDURE — 80053 COMPREHEN METABOLIC PANEL: CPT

## 2023-05-12 PROCEDURE — 1160F PR REVIEW ALL MEDS BY PRESCRIBER/CLIN PHARMACIST DOCUMENTED: ICD-10-PCS | Mod: CPTII,S$GLB,,

## 2023-05-12 PROCEDURE — 36415 COLL VENOUS BLD VENIPUNCTURE: CPT

## 2023-05-12 RX ORDER — METOPROLOL SUCCINATE 50 MG/1
150 TABLET, EXTENDED RELEASE ORAL 2 TIMES DAILY
Qty: 60 TABLET | Refills: 11
Start: 2023-05-12 | End: 2023-05-19 | Stop reason: SDUPTHER

## 2023-05-12 RX ORDER — NEBULIZER AND COMPRESSOR
1 EACH MISCELLANEOUS DAILY
Refills: 0 | COMMUNITY
Start: 2023-05-12

## 2023-05-17 ENCOUNTER — PATIENT MESSAGE (OUTPATIENT)
Dept: CARDIOLOGY | Facility: CLINIC | Age: 53
End: 2023-05-17
Payer: COMMERCIAL

## 2023-05-17 DIAGNOSIS — I25.118 CORONARY ARTERY DISEASE OF NATIVE ARTERY OF NATIVE HEART WITH STABLE ANGINA PECTORIS: Primary | ICD-10-CM

## 2023-05-19 ENCOUNTER — PATIENT MESSAGE (OUTPATIENT)
Dept: CARDIOLOGY | Facility: CLINIC | Age: 53
End: 2023-05-19
Payer: COMMERCIAL

## 2023-05-19 DIAGNOSIS — I25.10 CORONARY ARTERY DISEASE, UNSPECIFIED VESSEL OR LESION TYPE, UNSPECIFIED WHETHER ANGINA PRESENT, UNSPECIFIED WHETHER NATIVE OR TRANSPLANTED HEART: ICD-10-CM

## 2023-05-19 RX ORDER — METOPROLOL SUCCINATE 50 MG/1
150 TABLET, EXTENDED RELEASE ORAL 2 TIMES DAILY
Qty: 60 TABLET | Refills: 11
Start: 2023-05-19 | End: 2023-05-19

## 2023-05-19 RX ORDER — METOPROLOL SUCCINATE 50 MG/1
TABLET, EXTENDED RELEASE ORAL
Qty: 120 TABLET | Refills: 11
Start: 2023-05-19 | End: 2023-05-26 | Stop reason: SDUPTHER

## 2023-05-22 ENCOUNTER — PATIENT MESSAGE (OUTPATIENT)
Dept: OPHTHALMOLOGY | Facility: CLINIC | Age: 53
End: 2023-05-22

## 2023-05-22 ENCOUNTER — TELEPHONE (OUTPATIENT)
Dept: CARDIOLOGY | Facility: CLINIC | Age: 53
End: 2023-05-22
Payer: COMMERCIAL

## 2023-05-22 ENCOUNTER — OFFICE VISIT (OUTPATIENT)
Dept: OPHTHALMOLOGY | Facility: CLINIC | Age: 53
End: 2023-05-22
Payer: COMMERCIAL

## 2023-05-22 DIAGNOSIS — I10 HYPERTENSION GOAL BP (BLOOD PRESSURE) < 130/80: ICD-10-CM

## 2023-05-22 DIAGNOSIS — H52.4 BILATERAL PRESBYOPIA: ICD-10-CM

## 2023-05-22 DIAGNOSIS — E11.9 DIABETES MELLITUS TYPE 2 WITHOUT RETINOPATHY: Primary | ICD-10-CM

## 2023-05-22 PROCEDURE — 99999 PR PBB SHADOW E&M-EST. PATIENT-LVL III: CPT | Mod: PBBFAC,,, | Performed by: OPTOMETRIST

## 2023-05-22 PROCEDURE — 99999 PR PBB SHADOW E&M-EST. PATIENT-LVL III: ICD-10-PCS | Mod: PBBFAC,,, | Performed by: OPTOMETRIST

## 2023-05-22 PROCEDURE — 4010F ACE/ARB THERAPY RXD/TAKEN: CPT | Mod: CPTII,S$GLB,, | Performed by: OPTOMETRIST

## 2023-05-22 PROCEDURE — 92014 COMPRE OPH EXAM EST PT 1/>: CPT | Mod: S$GLB,,, | Performed by: OPTOMETRIST

## 2023-05-22 PROCEDURE — 4010F PR ACE/ARB THEARPY RXD/TAKEN: ICD-10-PCS | Mod: CPTII,S$GLB,, | Performed by: OPTOMETRIST

## 2023-05-22 PROCEDURE — 92015 DETERMINE REFRACTIVE STATE: CPT | Mod: S$GLB,,, | Performed by: OPTOMETRIST

## 2023-05-22 PROCEDURE — 3052F PR MOST RECENT HEMOGLOBIN A1C LEVEL 8.0 - < 9.0%: ICD-10-PCS | Mod: CPTII,S$GLB,, | Performed by: OPTOMETRIST

## 2023-05-22 PROCEDURE — 1159F MED LIST DOCD IN RCRD: CPT | Mod: CPTII,S$GLB,, | Performed by: OPTOMETRIST

## 2023-05-22 PROCEDURE — 3052F HG A1C>EQUAL 8.0%<EQUAL 9.0%: CPT | Mod: CPTII,S$GLB,, | Performed by: OPTOMETRIST

## 2023-05-22 PROCEDURE — 1159F PR MEDICATION LIST DOCUMENTED IN MEDICAL RECORD: ICD-10-PCS | Mod: CPTII,S$GLB,, | Performed by: OPTOMETRIST

## 2023-05-22 PROCEDURE — 92015 PR REFRACTION: ICD-10-PCS | Mod: S$GLB,,, | Performed by: OPTOMETRIST

## 2023-05-22 PROCEDURE — 92014 PR EYE EXAM, EST PATIENT,COMPREHESV: ICD-10-PCS | Mod: S$GLB,,, | Performed by: OPTOMETRIST

## 2023-05-22 NOTE — TELEPHONE ENCOUNTER
Contacted pt to inform him that he did not need appt for paperwork. Let pt know he can drop it off at the [facility] and we will get it when we go there Wednesday. Pt ZURDO w/o q/c

## 2023-05-22 NOTE — PROGRESS NOTES
HPI     Annual Exam     Additional comments:                 Comments    Ep to Dr. VALLECILLO  Pt states he strain to see when reading.   Pt states that he do have glasses but leave them at home   Last eye exam on 04/12/2022  Last dilated was on 04/12/2022  Update Glasses Rx  Lab Results       Component                Value               Date                       HGBA1C                   8.5 (H)             04/12/2023            Last edited by Karen Hall on 5/22/2023  9:40 AM.            Assessment /Plan     For exam results, see Encounter Report.    Diabetes mellitus type 2 without retinopathy    Hypertension goal BP (blood pressure) < 130/80    Bilateral presbyopia      No Background Diabetic Retinopathy  Strict BG control, f/u w/ PCP, and annual DFE  Stressed importance of DM control to preserve vision    No HTN Retinopathy    Dispense Final Rx for glasses.  RTC 1 year  Discussed above and answered questions.

## 2023-05-23 ENCOUNTER — PATIENT MESSAGE (OUTPATIENT)
Dept: CARDIOLOGY | Facility: CLINIC | Age: 53
End: 2023-05-23
Payer: COMMERCIAL

## 2023-05-24 ENCOUNTER — PATIENT MESSAGE (OUTPATIENT)
Dept: CARDIOLOGY | Facility: CLINIC | Age: 53
End: 2023-05-24
Payer: COMMERCIAL

## 2023-05-30 ENCOUNTER — PATIENT MESSAGE (OUTPATIENT)
Dept: CARDIOLOGY | Facility: CLINIC | Age: 53
End: 2023-05-30
Payer: COMMERCIAL

## 2023-05-31 ENCOUNTER — CLINICAL SUPPORT (OUTPATIENT)
Dept: SMOKING CESSATION | Facility: CLINIC | Age: 53
End: 2023-05-31
Payer: COMMERCIAL

## 2023-05-31 DIAGNOSIS — F17.200 NICOTINE DEPENDENCE: Primary | ICD-10-CM

## 2023-05-31 PROCEDURE — 99404 PR PREVENT COUNSEL,INDIV,60 MIN: ICD-10-PCS | Mod: S$GLB,,, | Performed by: GENERAL PRACTICE

## 2023-05-31 PROCEDURE — 99999 PR PBB SHADOW E&M-EST. PATIENT-LVL II: ICD-10-PCS | Mod: PBBFAC,,,

## 2023-05-31 PROCEDURE — 99999 PR PBB SHADOW E&M-EST. PATIENT-LVL II: CPT | Mod: PBBFAC,,,

## 2023-05-31 PROCEDURE — 99404 PREV MED CNSL INDIV APPRX 60: CPT | Mod: S$GLB,,, | Performed by: GENERAL PRACTICE

## 2023-05-31 RX ORDER — IBUPROFEN 200 MG
1 TABLET ORAL DAILY
Qty: 28 PATCH | Refills: 0 | Status: SHIPPED | OUTPATIENT
Start: 2023-05-31

## 2023-05-31 NOTE — Clinical Note
Patient intake for Quit 1 Episode.  Patient will be participating in bi-weekly tobacco cessation meetings and will begin the prescribed tobacco cessation medication regimen of 21 mg nicotine patch QD. FTND score of 5 indicates a moderate to high dependence on nicotine. CONSUELO-D score of 6 is perceived as no mental distress / depression at this time.

## 2023-06-01 ENCOUNTER — PATIENT MESSAGE (OUTPATIENT)
Dept: CARDIOLOGY | Facility: CLINIC | Age: 53
End: 2023-06-01
Payer: COMMERCIAL

## 2023-06-07 DIAGNOSIS — E78.5 HYPERLIPIDEMIA LDL GOAL <70: ICD-10-CM

## 2023-06-07 NOTE — TELEPHONE ENCOUNTER
No care due was identified.  [facility] Embedded Care Due Messages. Reference number: [id number].   6/07/2023 6:29:43 PM CDT

## 2023-06-08 RX ORDER — PRAVASTATIN SODIUM 80 MG/1
80 TABLET ORAL NIGHTLY
Qty: 41 TABLET | Refills: 0 | Status: SHIPPED | OUTPATIENT
Start: 2023-06-08 | End: 2023-07-19 | Stop reason: SDUPTHER

## 2023-06-08 NOTE — TELEPHONE ENCOUNTER
Refill Routing Note   Medication(s) are not appropriate for processing by [facility] for the following reason(s):      Patient seen in ED/Hospital since LOV with PCP    [facility] action(s):  Defer None identified        Pharmacist review requested: Yes     Appointments  past 12m or future 3m with PCP    Date Provider   Last Visit   4/19/2023 Kristen Adler, DO   Next Visit   7/19/2023 Kristen Adler, DO   ED visits in past 90 days: 0        Note composed:11:07 AM 06/08/2023

## 2023-06-08 NOTE — TELEPHONE ENCOUNTER
Refill Decision Note   Laron Kothari  is requesting a refill authorization.  Brief Assessment and Rationale for Refill:  Approve     Medication Therapy Plan:  FOVS 7/19/23      Pharmacist review requested: Yes   Extended chart review required: Yes   Comments:     Note composed:11:38 AM 06/08/2023

## 2023-06-15 ENCOUNTER — PATIENT MESSAGE (OUTPATIENT)
Dept: CARDIOLOGY | Facility: CLINIC | Age: 53
End: 2023-06-15
Payer: COMMERCIAL

## 2023-06-15 DIAGNOSIS — I25.118 CORONARY ARTERY DISEASE OF NATIVE ARTERY OF NATIVE HEART WITH STABLE ANGINA PECTORIS: Primary | ICD-10-CM

## 2023-06-19 ENCOUNTER — PATIENT MESSAGE (OUTPATIENT)
Dept: CARDIOLOGY | Facility: CLINIC | Age: 53
End: 2023-06-19
Payer: COMMERCIAL

## 2023-06-20 ENCOUNTER — HOSPITAL ENCOUNTER (OUTPATIENT)
Dept: CARDIOLOGY | Facility: HOSPITAL | Age: 53
Discharge: HOME | End: 2023-06-20
Attending: NURSE PRACTITIONER
Payer: COMMERCIAL

## 2023-06-20 ENCOUNTER — TELEPHONE (OUTPATIENT)
Dept: CARDIOLOGY | Facility: HOSPITAL | Age: 53
End: 2023-06-20
Payer: COMMERCIAL

## 2023-06-20 VITALS
WEIGHT: 230 LBS | HEIGHT: 74 IN | SYSTOLIC BLOOD PRESSURE: 121 MMHG | HEART RATE: 82 BPM | DIASTOLIC BLOOD PRESSURE: 75 MMHG | BODY MASS INDEX: 29.52 KG/M2

## 2023-06-20 DIAGNOSIS — I25.118 CORONARY ARTERY DISEASE OF NATIVE ARTERY OF NATIVE HEART WITH STABLE ANGINA PECTORIS: ICD-10-CM

## 2023-06-20 DIAGNOSIS — I25.118 CORONARY ARTERY DISEASE OF NATIVE ARTERY OF NATIVE HEART WITH STABLE ANGINA PECTORIS: Primary | ICD-10-CM

## 2023-06-20 LAB
CV STRESS BASE HR: 82 BPM
DIASTOLIC BLOOD PRESSURE: 75 MMHG
OHS CV CPX 1 MINUTE RECOVERY HEART RATE: 93 BPM
OHS CV CPX 85 PERCENT MAX PREDICTED HEART RATE MALE: 143
OHS CV CPX ESTIMATED METS: 3
OHS CV CPX MAX PREDICTED HEART RATE: 168
OHS CV CPX PATIENT IS FEMALE: 0
OHS CV CPX PATIENT IS MALE: 1
OHS CV CPX PEAK DIASTOLIC BLOOD PRESSURE: 65 MMHG
OHS CV CPX PEAK HEAR RATE: 100 BPM
OHS CV CPX PEAK RATE PRESSURE PRODUCT: NORMAL
OHS CV CPX PEAK SYSTOLIC BLOOD PRESSURE: 130 MMHG
OHS CV CPX PERCENT MAX PREDICTED HEART RATE ACHIEVED: 60
OHS CV CPX RATE PRESSURE PRODUCT PRESENTING: 9922
STRESS ECHO POST EXERCISE DUR MIN: 4 MINUTES
STRESS ECHO POST EXERCISE DUR SEC: 15 SECONDS
SYSTOLIC BLOOD PRESSURE: 121 MMHG

## 2023-06-20 PROCEDURE — 93017 CV STRESS TEST TRACING ONLY: CPT

## 2023-06-20 PROCEDURE — 93018 EXERCISE STRESS - EKG (CUPID ONLY): ICD-10-PCS | Mod: ,,, | Performed by: INTERNAL MEDICINE

## 2023-06-20 PROCEDURE — 93018 CV STRESS TEST I&R ONLY: CPT | Mod: ,,, | Performed by: INTERNAL MEDICINE

## 2023-06-20 PROCEDURE — 93016 CV STRESS TEST SUPVJ ONLY: CPT | Mod: ,,, | Performed by: INTERNAL MEDICINE

## 2023-06-20 PROCEDURE — 93016 EXERCISE STRESS - EKG (CUPID ONLY): ICD-10-PCS | Mod: ,,, | Performed by: INTERNAL MEDICINE

## 2023-06-20 RX ORDER — PRASUGREL 10 MG/1
10 TABLET, FILM COATED ORAL DAILY
Qty: 30 TABLET | Refills: 11 | Status: SHIPPED | OUTPATIENT
Start: 2023-06-20 | End: 2023-09-13

## 2023-06-20 NOTE — TELEPHONE ENCOUNTER
Effient 10 mg daily sent to preferred pharmacy.    He is to start this medication AFTER he finishes his current bottle of Brilinta.    He needs to call office if any issues with new medication, etc.    Thanks

## 2023-06-20 NOTE — TELEPHONE ENCOUNTER
Please phone patient. If Brilinta is too expensive we can switch to Effient 10 mg daily.    Please find out if he is sill taking and where I need to send medication.    Thanks

## 2023-06-20 NOTE — TELEPHONE ENCOUNTER
Contacted pt about Brilinta. Pt states that he is still taking the Brilinta, but he would like to switch to Effient 10mg/day b/c the Brilinta is too expensive. Pt states that he would like the Effient sent to the [facility] in [place]. Pt vu w/o q/c        ----Laura Oliveros PA-C 06/20/23 07:35 AM----  Please phone patient. If Brilinta is too expensive we can switch to Effient 10 mg daily.    Please find out if he is sill taking and where I need to send medication.    Thanks

## 2023-06-22 ENCOUNTER — CLINICAL SUPPORT (OUTPATIENT)
Dept: CARDIAC REHAB | Facility: HOSPITAL | Age: 53
End: 2023-06-22
Payer: COMMERCIAL

## 2023-06-22 ENCOUNTER — PATIENT MESSAGE (OUTPATIENT)
Dept: CARDIOLOGY | Facility: CLINIC | Age: 53
End: 2023-06-22
Payer: COMMERCIAL

## 2023-06-22 VITALS
BODY MASS INDEX: 30.33 KG/M2 | HEIGHT: 74 IN | WEIGHT: 236.31 LBS | HEART RATE: 68 BPM | SYSTOLIC BLOOD PRESSURE: 118 MMHG | DIASTOLIC BLOOD PRESSURE: 64 MMHG

## 2023-06-22 DIAGNOSIS — I25.118 CORONARY ARTERY DISEASE OF NATIVE ARTERY OF NATIVE HEART WITH STABLE ANGINA PECTORIS: ICD-10-CM

## 2023-06-22 NOTE — PROGRESS NOTES
Oriented to cardiac rehab - consents signed all questions answered no further questions    Will be in MW 8-9 class

## 2023-06-23 ENCOUNTER — CLINICAL SUPPORT (OUTPATIENT)
Dept: CARDIAC REHAB | Facility: HOSPITAL | Age: 53
End: 2023-06-23
Payer: COMMERCIAL

## 2023-06-23 ENCOUNTER — PATIENT MESSAGE (OUTPATIENT)
Dept: INTERNAL MEDICINE | Facility: CLINIC | Age: 53
End: 2023-06-23
Payer: COMMERCIAL

## 2023-06-23 DIAGNOSIS — I25.118 CORONARY ARTERY DISEASE OF NATIVE ARTERY OF NATIVE HEART WITH STABLE ANGINA PECTORIS: ICD-10-CM

## 2023-06-23 PROCEDURE — 93798 PR CARDIAC REHAB/MONITOR: ICD-10-PCS | Mod: ,,, | Performed by: INTERNAL MEDICINE

## 2023-06-23 PROCEDURE — 93798 PHYS/QHP OP CAR RHAB W/ECG: CPT | Mod: ,,, | Performed by: INTERNAL MEDICINE

## 2023-06-26 ENCOUNTER — CLINICAL SUPPORT (OUTPATIENT)
Dept: CARDIAC REHAB | Facility: HOSPITAL | Age: 53
End: 2023-06-26
Payer: COMMERCIAL

## 2023-06-26 ENCOUNTER — TELEPHONE (OUTPATIENT)
Dept: CARDIAC REHAB | Facility: HOSPITAL | Age: 53
End: 2023-06-26
Payer: COMMERCIAL

## 2023-06-26 ENCOUNTER — TELEPHONE (OUTPATIENT)
Dept: CARDIOLOGY | Facility: CLINIC | Age: 53
End: 2023-06-26

## 2023-06-26 DIAGNOSIS — I25.10 CORONARY ARTERY DISEASE, UNSPECIFIED VESSEL OR LESION TYPE, UNSPECIFIED WHETHER ANGINA PRESENT, UNSPECIFIED WHETHER NATIVE OR TRANSPLANTED HEART: Primary | ICD-10-CM

## 2023-06-26 PROCEDURE — 93798 PR CARDIAC REHAB/MONITOR: ICD-10-PCS | Mod: ,,, | Performed by: STUDENT IN AN ORGANIZED HEALTH CARE EDUCATION/TRAINING PROGRAM

## 2023-06-26 PROCEDURE — 93798 PHYS/QHP OP CAR RHAB W/ECG: CPT | Mod: ,,, | Performed by: STUDENT IN AN ORGANIZED HEALTH CARE EDUCATION/TRAINING PROGRAM

## 2023-06-26 NOTE — TELEPHONE ENCOUNTER
Contacted pt about rtw letter. I informed him that Ave Hawkins NP will give him a letter for 1m off and then reevaluate for light work duty. Pt vu w/o q/c      ----Nayana Retana RN 06/26/23 09:47 AM----  Spoke with patient over the phone regarding cardiac rehab. Patient completed session #2 today and informed staff he is scheduled to return back to work this upcoming Thursday 06/29. Patient offered m/w/f or tu/th/fri sessions at 1200pm but declined stating this time would not work for him. Patient requesting letter for extended time off from work to continue and complete the entire cardiac rehab program. Will consult with DANIEL Dorado to determine what is to be done from here

## 2023-06-26 NOTE — TELEPHONE ENCOUNTER
Spoke with patient over the phone regarding cardiac rehab. Patient completed session #2 today and informed staff he is scheduled to return back to work this upcoming Thursday 06/29. Patient offered m/w/f or tu/th/fri sessions at 1200pm but declined stating this time would not work for him. Patient requesting letter for extended time off from work to continue and complete the entire cardiac rehab program. Will consult with DANIEL Dorado to determine what is to be done from here.

## 2023-06-27 ENCOUNTER — PATIENT MESSAGE (OUTPATIENT)
Dept: CARDIOLOGY | Facility: CLINIC | Age: 53
End: 2023-06-27
Payer: COMMERCIAL

## 2023-06-28 ENCOUNTER — CLINICAL SUPPORT (OUTPATIENT)
Dept: CARDIAC REHAB | Facility: HOSPITAL | Age: 53
End: 2023-06-28
Payer: COMMERCIAL

## 2023-06-28 DIAGNOSIS — I25.10 CORONARY ARTERY DISEASE, UNSPECIFIED VESSEL OR LESION TYPE, UNSPECIFIED WHETHER ANGINA PRESENT, UNSPECIFIED WHETHER NATIVE OR TRANSPLANTED HEART: Primary | ICD-10-CM

## 2023-06-28 PROCEDURE — 93798 PHYS/QHP OP CAR RHAB W/ECG: CPT | Mod: ,,, | Performed by: STUDENT IN AN ORGANIZED HEALTH CARE EDUCATION/TRAINING PROGRAM

## 2023-06-28 PROCEDURE — 93798 PR CARDIAC REHAB/MONITOR: ICD-10-PCS | Mod: ,,, | Performed by: STUDENT IN AN ORGANIZED HEALTH CARE EDUCATION/TRAINING PROGRAM

## 2023-06-28 NOTE — PROGRESS NOTES
Patient tolerated session well. Education completed on hypertension. Patient was present and participated. Patient verbalized understanding. All questions were answered.

## 2023-06-29 ENCOUNTER — TELEPHONE (OUTPATIENT)
Dept: CARDIAC REHAB | Facility: HOSPITAL | Age: 53
End: 2023-06-29
Payer: COMMERCIAL

## 2023-06-29 NOTE — TELEPHONE ENCOUNTER
Left voicemail reminding patient to bring glucometer and supplies for cardiac rehab class. Patient notified this is a requirement to begin the class and participate.

## 2023-06-30 ENCOUNTER — CLINICAL SUPPORT (OUTPATIENT)
Dept: CARDIAC REHAB | Facility: HOSPITAL | Age: 53
End: 2023-06-30
Payer: COMMERCIAL

## 2023-06-30 DIAGNOSIS — I25.10 CORONARY ARTERY DISEASE, UNSPECIFIED VESSEL OR LESION TYPE, UNSPECIFIED WHETHER ANGINA PRESENT, UNSPECIFIED WHETHER NATIVE OR TRANSPLANTED HEART: Primary | ICD-10-CM

## 2023-06-30 DIAGNOSIS — Z79.4 CONTROLLED TYPE 2 DIABETES MELLITUS WITH MICROALBUMINURIA, WITH LONG-TERM CURRENT USE OF INSULIN: ICD-10-CM

## 2023-06-30 DIAGNOSIS — E11.29 CONTROLLED TYPE 2 DIABETES MELLITUS WITH MICROALBUMINURIA, WITH LONG-TERM CURRENT USE OF INSULIN: ICD-10-CM

## 2023-06-30 DIAGNOSIS — R80.9 CONTROLLED TYPE 2 DIABETES MELLITUS WITH MICROALBUMINURIA, WITH LONG-TERM CURRENT USE OF INSULIN: ICD-10-CM

## 2023-06-30 PROCEDURE — 93798 PR CARDIAC REHAB/MONITOR: ICD-10-PCS | Mod: ,,, | Performed by: INTERNAL MEDICINE

## 2023-06-30 PROCEDURE — 93798 PHYS/QHP OP CAR RHAB W/ECG: CPT | Mod: ,,, | Performed by: INTERNAL MEDICINE

## 2023-06-30 RX ORDER — METFORMIN HYDROCHLORIDE 1000 MG/1
TABLET ORAL
Qty: 180 TABLET | Refills: 1 | Status: SHIPPED | OUTPATIENT
Start: 2023-06-30 | End: 2023-09-14 | Stop reason: SDUPTHER

## 2023-06-30 NOTE — TELEPHONE ENCOUNTER
Refill Decision Note   Laron Kothari  is requesting a refill authorization.  Brief Assessment and Rationale for Refill:  Approve     Medication Therapy Plan:       Medication Reconciliation Completed: No   Comments:     No Care Gaps recommended.     Note composed:9:18 AM 06/30/2023

## 2023-07-03 ENCOUNTER — CLINICAL SUPPORT (OUTPATIENT)
Dept: CARDIAC REHAB | Facility: HOSPITAL | Age: 53
End: 2023-07-03
Payer: COMMERCIAL

## 2023-07-03 DIAGNOSIS — I25.118 CORONARY ARTERY DISEASE OF NATIVE ARTERY OF NATIVE HEART WITH STABLE ANGINA PECTORIS: Primary | ICD-10-CM

## 2023-07-03 PROCEDURE — 93798 PHYS/QHP OP CAR RHAB W/ECG: CPT | Mod: ,,, | Performed by: INTERNAL MEDICINE

## 2023-07-03 PROCEDURE — 93798 PR CARDIAC REHAB/MONITOR: ICD-10-PCS | Mod: ,,, | Performed by: INTERNAL MEDICINE

## 2023-07-05 ENCOUNTER — CLINICAL SUPPORT (OUTPATIENT)
Dept: CARDIAC REHAB | Facility: HOSPITAL | Age: 53
End: 2023-07-05
Payer: COMMERCIAL

## 2023-07-05 ENCOUNTER — TELEPHONE (OUTPATIENT)
Dept: CARDIOLOGY | Facility: CLINIC | Age: 53
End: 2023-07-05
Payer: COMMERCIAL

## 2023-07-05 DIAGNOSIS — I21.4 NSTEMI (NON-ST ELEVATED MYOCARDIAL INFARCTION): Primary | ICD-10-CM

## 2023-07-05 PROCEDURE — 93798 PHYS/QHP OP CAR RHAB W/ECG: CPT | Mod: ,,, | Performed by: INTERNAL MEDICINE

## 2023-07-05 PROCEDURE — 93798 PR CARDIAC REHAB/MONITOR: ICD-10-PCS | Mod: ,,, | Performed by: INTERNAL MEDICINE

## 2023-07-05 NOTE — PROGRESS NOTES
Patient tolerated cardiac rehab session well. Patient also participated in 30 minutes of education today that was provided on Angina Pectoris. Patient was present for the entire education class, participated, and asked questions. All questions were answered. Patient verbalized understanding.

## 2023-07-05 NOTE — TELEPHONE ENCOUNTER
This pt needs the visits from cardiac rehab sent to . Fax number is [phone] Claim number is [id number]. I will send them over for you. Thanks pb

## 2023-07-05 NOTE — TELEPHONE ENCOUNTER
Contacted pt regarding his leave paperwork for work. Pt states [employer] is wanting us to send over all visit progress notes from 06/28/23 to 06/30/23. I informed him that he does not have any progress notes other than the cardiac rehab notes that I faxed over. Pt states he will speak w/ them and let us know if there is anything else we need to fax over. Pt vu w/o q/c

## 2023-07-07 ENCOUNTER — CLINICAL SUPPORT (OUTPATIENT)
Dept: CARDIAC REHAB | Facility: HOSPITAL | Age: 53
End: 2023-07-07
Payer: COMMERCIAL

## 2023-07-07 DIAGNOSIS — I25.118 CORONARY ARTERY DISEASE OF NATIVE ARTERY OF NATIVE HEART WITH STABLE ANGINA PECTORIS: Primary | ICD-10-CM

## 2023-07-07 PROCEDURE — 93798 PHYS/QHP OP CAR RHAB W/ECG: CPT | Mod: ,,, | Performed by: INTERNAL MEDICINE

## 2023-07-07 PROCEDURE — 93798 PR CARDIAC REHAB/MONITOR: ICD-10-PCS | Mod: ,,, | Performed by: INTERNAL MEDICINE

## 2023-07-10 ENCOUNTER — CLINICAL SUPPORT (OUTPATIENT)
Dept: CARDIAC REHAB | Facility: HOSPITAL | Age: 53
End: 2023-07-10
Payer: COMMERCIAL

## 2023-07-10 DIAGNOSIS — I25.118 CORONARY ARTERY DISEASE OF NATIVE ARTERY OF NATIVE HEART WITH STABLE ANGINA PECTORIS: Primary | ICD-10-CM

## 2023-07-10 DIAGNOSIS — R07.9 CHEST PAIN, UNSPECIFIED TYPE: ICD-10-CM

## 2023-07-10 PROCEDURE — 93798 PHYS/QHP OP CAR RHAB W/ECG: CPT | Mod: ,,, | Performed by: INTERNAL MEDICINE

## 2023-07-10 PROCEDURE — 93798 PR CARDIAC REHAB/MONITOR: ICD-10-PCS | Mod: ,,, | Performed by: INTERNAL MEDICINE

## 2023-07-12 ENCOUNTER — CLINICAL SUPPORT (OUTPATIENT)
Dept: CARDIAC REHAB | Facility: HOSPITAL | Age: 53
End: 2023-07-12
Attending: INTERNAL MEDICINE
Payer: COMMERCIAL

## 2023-07-12 DIAGNOSIS — I25.118 CORONARY ARTERY DISEASE OF NATIVE ARTERY OF NATIVE HEART WITH STABLE ANGINA PECTORIS: Primary | ICD-10-CM

## 2023-07-12 PROCEDURE — 93798 PHYS/QHP OP CAR RHAB W/ECG: CPT | Mod: ,,, | Performed by: INTERNAL MEDICINE

## 2023-07-12 PROCEDURE — 93798 PR CARDIAC REHAB/MONITOR: ICD-10-PCS | Mod: ,,, | Performed by: INTERNAL MEDICINE

## 2023-07-12 NOTE — PROGRESS NOTES
Patient tolerated cardiac rehab session well. Patient also participated in 30 minutes of education today that was provided on medication compliance. Patient was present for the entire education class, participated, and asked questions. All questions were answered. Patient verbalized understanding.

## 2023-07-14 ENCOUNTER — CLINICAL SUPPORT (OUTPATIENT)
Dept: CARDIAC REHAB | Facility: HOSPITAL | Age: 53
End: 2023-07-14
Payer: COMMERCIAL

## 2023-07-14 ENCOUNTER — TELEPHONE (OUTPATIENT)
Dept: CARDIAC REHAB | Facility: HOSPITAL | Age: 53
End: 2023-07-14
Payer: COMMERCIAL

## 2023-07-14 DIAGNOSIS — I25.118 CORONARY ARTERY DISEASE OF NATIVE ARTERY OF NATIVE HEART WITH STABLE ANGINA PECTORIS: Primary | ICD-10-CM

## 2023-07-14 PROCEDURE — 93798 PR CARDIAC REHAB/MONITOR: ICD-10-PCS | Mod: ,,, | Performed by: INTERNAL MEDICINE

## 2023-07-14 PROCEDURE — 93798 PHYS/QHP OP CAR RHAB W/ECG: CPT | Mod: ,,, | Performed by: INTERNAL MEDICINE

## 2023-07-14 NOTE — TELEPHONE ENCOUNTER
Patient's blood sugar was as follows for cardiac rehab today:    8am prior to workout= 135  9am after workout while in recovery stage= 69; patient provided with maryann doherty; denies any s/sx of hypoglycemia

## 2023-07-17 ENCOUNTER — CLINICAL SUPPORT (OUTPATIENT)
Dept: CARDIAC REHAB | Facility: HOSPITAL | Age: 53
End: 2023-07-17
Payer: COMMERCIAL

## 2023-07-17 DIAGNOSIS — I25.118 CORONARY ARTERY DISEASE OF NATIVE ARTERY OF NATIVE HEART WITH STABLE ANGINA PECTORIS: Primary | ICD-10-CM

## 2023-07-17 PROCEDURE — 93798 PHYS/QHP OP CAR RHAB W/ECG: CPT | Mod: ,,, | Performed by: INTERNAL MEDICINE

## 2023-07-17 PROCEDURE — 93798 PR CARDIAC REHAB/MONITOR: ICD-10-PCS | Mod: ,,, | Performed by: INTERNAL MEDICINE

## 2023-07-19 ENCOUNTER — OFFICE VISIT (OUTPATIENT)
Dept: CARDIOLOGY | Facility: CLINIC | Age: 53
End: 2023-07-19
Payer: COMMERCIAL

## 2023-07-19 ENCOUNTER — OFFICE VISIT (OUTPATIENT)
Dept: INTERNAL MEDICINE | Facility: CLINIC | Age: 53
End: 2023-07-19
Payer: COMMERCIAL

## 2023-07-19 ENCOUNTER — LAB VISIT (OUTPATIENT)
Dept: LAB | Facility: HOSPITAL | Age: 53
End: 2023-07-19
Attending: INTERNAL MEDICINE
Payer: COMMERCIAL

## 2023-07-19 VITALS
RESPIRATION RATE: 20 BRPM | SYSTOLIC BLOOD PRESSURE: 152 MMHG | DIASTOLIC BLOOD PRESSURE: 82 MMHG | WEIGHT: 242.94 LBS | HEIGHT: 74 IN | OXYGEN SATURATION: 98 % | HEART RATE: 60 BPM | TEMPERATURE: 97 F | BODY MASS INDEX: 31.18 KG/M2

## 2023-07-19 VITALS
SYSTOLIC BLOOD PRESSURE: 156 MMHG | OXYGEN SATURATION: 97 % | DIASTOLIC BLOOD PRESSURE: 84 MMHG | BODY MASS INDEX: 31.27 KG/M2 | HEART RATE: 63 BPM | WEIGHT: 243.63 LBS | HEIGHT: 74 IN

## 2023-07-19 DIAGNOSIS — E78.5 HYPERLIPIDEMIA LDL GOAL <70: ICD-10-CM

## 2023-07-19 DIAGNOSIS — E78.5 HYPERLIPIDEMIA LDL GOAL <70: Chronic | ICD-10-CM

## 2023-07-19 DIAGNOSIS — E11.29 CONTROLLED TYPE 2 DIABETES MELLITUS WITH MICROALBUMINURIA, WITHOUT LONG-TERM CURRENT USE OF INSULIN: ICD-10-CM

## 2023-07-19 DIAGNOSIS — R07.9 CHEST PAIN, UNSPECIFIED TYPE: ICD-10-CM

## 2023-07-19 DIAGNOSIS — I10 HYPERTENSION GOAL BP (BLOOD PRESSURE) < 130/80: Chronic | ICD-10-CM

## 2023-07-19 DIAGNOSIS — I10 HYPERTENSION GOAL BP (BLOOD PRESSURE) < 130/80: Primary | Chronic | ICD-10-CM

## 2023-07-19 DIAGNOSIS — G47.00 INSOMNIA, UNSPECIFIED TYPE: ICD-10-CM

## 2023-07-19 DIAGNOSIS — I25.10 CAD IN NATIVE ARTERY: ICD-10-CM

## 2023-07-19 DIAGNOSIS — I25.118 CORONARY ARTERY DISEASE OF NATIVE ARTERY OF NATIVE HEART WITH STABLE ANGINA PECTORIS: Primary | ICD-10-CM

## 2023-07-19 DIAGNOSIS — R80.9 CONTROLLED TYPE 2 DIABETES MELLITUS WITH MICROALBUMINURIA, WITHOUT LONG-TERM CURRENT USE OF INSULIN: ICD-10-CM

## 2023-07-19 DIAGNOSIS — R06.81 WITNESSED EPISODE OF APNEA: ICD-10-CM

## 2023-07-19 DIAGNOSIS — R06.83 SNORING: ICD-10-CM

## 2023-07-19 DIAGNOSIS — F17.200 TOBACCO USE DISORDER: ICD-10-CM

## 2023-07-19 LAB
ALBUMIN/CREAT UR: 27.4 UG/MG (ref 0–30)
CREAT UR-MCNC: 201 MG/DL (ref 23–375)
MICROALBUMIN UR DL<=1MG/L-MCNC: 55 UG/ML

## 2023-07-19 PROCEDURE — 3079F PR MOST RECENT DIASTOLIC BLOOD PRESSURE 80-89 MM HG: ICD-10-PCS | Mod: CPTII,S$GLB,, | Performed by: INTERNAL MEDICINE

## 2023-07-19 PROCEDURE — 4010F ACE/ARB THERAPY RXD/TAKEN: CPT | Mod: CPTII,S$GLB,,

## 2023-07-19 PROCEDURE — 4010F ACE/ARB THERAPY RXD/TAKEN: CPT | Mod: CPTII,S$GLB,, | Performed by: INTERNAL MEDICINE

## 2023-07-19 PROCEDURE — 82570 ASSAY OF URINE CREATININE: CPT | Performed by: INTERNAL MEDICINE

## 2023-07-19 PROCEDURE — 1159F MED LIST DOCD IN RCRD: CPT | Mod: CPTII,S$GLB,, | Performed by: INTERNAL MEDICINE

## 2023-07-19 PROCEDURE — 3077F PR MOST RECENT SYSTOLIC BLOOD PRESSURE >= 140 MM HG: ICD-10-PCS | Mod: CPTII,S$GLB,,

## 2023-07-19 PROCEDURE — 3008F PR BODY MASS INDEX (BMI) DOCUMENTED: ICD-10-PCS | Mod: CPTII,S$GLB,,

## 2023-07-19 PROCEDURE — 3079F DIAST BP 80-89 MM HG: CPT | Mod: CPTII,S$GLB,,

## 2023-07-19 PROCEDURE — 3077F SYST BP >= 140 MM HG: CPT | Mod: CPTII,S$GLB,, | Performed by: INTERNAL MEDICINE

## 2023-07-19 PROCEDURE — 3008F PR BODY MASS INDEX (BMI) DOCUMENTED: ICD-10-PCS | Mod: CPTII,S$GLB,, | Performed by: INTERNAL MEDICINE

## 2023-07-19 PROCEDURE — 1160F RVW MEDS BY RX/DR IN RCRD: CPT | Mod: CPTII,S$GLB,,

## 2023-07-19 PROCEDURE — 1160F PR REVIEW ALL MEDS BY PRESCRIBER/CLIN PHARMACIST DOCUMENTED: ICD-10-PCS | Mod: CPTII,S$GLB,, | Performed by: INTERNAL MEDICINE

## 2023-07-19 PROCEDURE — 1159F MED LIST DOCD IN RCRD: CPT | Mod: CPTII,S$GLB,,

## 2023-07-19 PROCEDURE — 99214 OFFICE O/P EST MOD 30 MIN: CPT | Mod: S$GLB,,,

## 2023-07-19 PROCEDURE — 3044F PR MOST RECENT HEMOGLOBIN A1C LEVEL <7.0%: ICD-10-PCS | Mod: CPTII,S$GLB,, | Performed by: INTERNAL MEDICINE

## 2023-07-19 PROCEDURE — 3008F BODY MASS INDEX DOCD: CPT | Mod: CPTII,S$GLB,,

## 2023-07-19 PROCEDURE — 1159F PR MEDICATION LIST DOCUMENTED IN MEDICAL RECORD: ICD-10-PCS | Mod: CPTII,S$GLB,, | Performed by: INTERNAL MEDICINE

## 2023-07-19 PROCEDURE — 99214 OFFICE O/P EST MOD 30 MIN: CPT | Mod: S$GLB,,, | Performed by: INTERNAL MEDICINE

## 2023-07-19 PROCEDURE — 3079F DIAST BP 80-89 MM HG: CPT | Mod: CPTII,S$GLB,, | Performed by: INTERNAL MEDICINE

## 2023-07-19 PROCEDURE — 3044F HG A1C LEVEL LT 7.0%: CPT | Mod: CPTII,S$GLB,, | Performed by: INTERNAL MEDICINE

## 2023-07-19 PROCEDURE — 1160F RVW MEDS BY RX/DR IN RCRD: CPT | Mod: CPTII,S$GLB,, | Performed by: INTERNAL MEDICINE

## 2023-07-19 PROCEDURE — 4010F PR ACE/ARB THEARPY RXD/TAKEN: ICD-10-PCS | Mod: CPTII,S$GLB,,

## 2023-07-19 PROCEDURE — 1160F PR REVIEW ALL MEDS BY PRESCRIBER/CLIN PHARMACIST DOCUMENTED: ICD-10-PCS | Mod: CPTII,S$GLB,,

## 2023-07-19 PROCEDURE — 99999 PR PBB SHADOW E&M-EST. PATIENT-LVL IV: CPT | Mod: PBBFAC,,,

## 2023-07-19 PROCEDURE — 4010F PR ACE/ARB THEARPY RXD/TAKEN: ICD-10-PCS | Mod: CPTII,S$GLB,, | Performed by: INTERNAL MEDICINE

## 2023-07-19 PROCEDURE — 99214 PR OFFICE/OUTPT VISIT, EST, LEVL IV, 30-39 MIN: ICD-10-PCS | Mod: S$GLB,,,

## 2023-07-19 PROCEDURE — 3072F PR LOW RISK FOR RETINOPATHY: ICD-10-PCS | Mod: CPTII,S$GLB,,

## 2023-07-19 PROCEDURE — 3079F PR MOST RECENT DIASTOLIC BLOOD PRESSURE 80-89 MM HG: ICD-10-PCS | Mod: CPTII,S$GLB,,

## 2023-07-19 PROCEDURE — 3077F PR MOST RECENT SYSTOLIC BLOOD PRESSURE >= 140 MM HG: ICD-10-PCS | Mod: CPTII,S$GLB,, | Performed by: INTERNAL MEDICINE

## 2023-07-19 PROCEDURE — 3072F LOW RISK FOR RETINOPATHY: CPT | Mod: CPTII,S$GLB,,

## 2023-07-19 PROCEDURE — 99999 PR PBB SHADOW E&M-EST. PATIENT-LVL V: CPT | Mod: PBBFAC,,, | Performed by: INTERNAL MEDICINE

## 2023-07-19 PROCEDURE — 3044F PR MOST RECENT HEMOGLOBIN A1C LEVEL <7.0%: ICD-10-PCS | Mod: CPTII,S$GLB,,

## 2023-07-19 PROCEDURE — 3044F HG A1C LEVEL LT 7.0%: CPT | Mod: CPTII,S$GLB,,

## 2023-07-19 PROCEDURE — 99999 PR PBB SHADOW E&M-EST. PATIENT-LVL IV: ICD-10-PCS | Mod: PBBFAC,,,

## 2023-07-19 PROCEDURE — 99214 PR OFFICE/OUTPT VISIT, EST, LEVL IV, 30-39 MIN: ICD-10-PCS | Mod: S$GLB,,, | Performed by: INTERNAL MEDICINE

## 2023-07-19 PROCEDURE — 99999 PR PBB SHADOW E&M-EST. PATIENT-LVL V: ICD-10-PCS | Mod: PBBFAC,,, | Performed by: INTERNAL MEDICINE

## 2023-07-19 PROCEDURE — 3077F SYST BP >= 140 MM HG: CPT | Mod: CPTII,S$GLB,,

## 2023-07-19 PROCEDURE — 3072F PR LOW RISK FOR RETINOPATHY: ICD-10-PCS | Mod: CPTII,S$GLB,, | Performed by: INTERNAL MEDICINE

## 2023-07-19 PROCEDURE — 3008F BODY MASS INDEX DOCD: CPT | Mod: CPTII,S$GLB,, | Performed by: INTERNAL MEDICINE

## 2023-07-19 PROCEDURE — 3072F LOW RISK FOR RETINOPATHY: CPT | Mod: CPTII,S$GLB,, | Performed by: INTERNAL MEDICINE

## 2023-07-19 PROCEDURE — 1159F PR MEDICATION LIST DOCUMENTED IN MEDICAL RECORD: ICD-10-PCS | Mod: CPTII,S$GLB,,

## 2023-07-19 RX ORDER — PRAVASTATIN SODIUM 80 MG/1
80 TABLET ORAL NIGHTLY
Qty: 41 TABLET | Refills: 11 | Status: SHIPPED | OUTPATIENT
Start: 2023-07-19 | End: 2023-09-14 | Stop reason: SDUPTHER

## 2023-07-19 RX ORDER — TRAZODONE HYDROCHLORIDE 50 MG/1
50 TABLET ORAL NIGHTLY
Qty: 30 TABLET | Refills: 2 | Status: SHIPPED | OUTPATIENT
Start: 2023-07-19 | End: 2023-09-14 | Stop reason: SDUPTHER

## 2023-07-19 NOTE — PROGRESS NOTES
Laron Kothari .  53 y.o. Black or  male  [id number]    Chief Complaint:  Chief Complaint   Patient presents with    Follow-up       History of Present Illness:  HTN--elevated today. Previously controlled. He is compliant with medication.   DM--improved. Compliant with medication but has not been able to get empagliflozin due to cost.   HLD--compliant with pravastatin.   CAD--stable. Management per cardiology. He is in cardiac rehab.   Insomnia--reports having symptoms for a while. He would like to get some good sleep. He states he snores and his wife tells him he stops breathing. A sleep study has been ordered by his cardiologist.     Laboratory:  Lab Results   Component Value Date    WBC 13.33 (H) 06/20/2023    HGB 12.7 (L) 06/20/2023    HCT 40.4 06/20/2023     06/20/2023    CHOL 167 06/20/2023    TRIG 198 (H) 06/20/2023    HDL 34 (L) 06/20/2023    ALT 12 05/12/2023    AST 15 05/12/2023     05/12/2023    K 4.4 05/12/2023     05/12/2023    CREATININE 1.4 05/12/2023    BUN 13 05/12/2023    CO2 27 05/12/2023    PSA 0.74 07/20/2011    INR 1.0 04/20/2023    HGBA1C 6.5 (H) 06/20/2023     Lab Results   Component Value Date    LDLCALC 93.4 06/20/2023       History:  Past Medical History:   Diagnosis Date    Blood in stool 08/07/2018    Deep vein thrombosis (DVT) 2017    Left leg    Diabetes mellitus, type 2 2013    Gout     Hyperlipidemia     Hypertension     Neuropathy     Smoker        Medications:  Current Outpatient Medications on File Prior to Visit   Medication Sig Dispense Refill    aspirin (ECOTRIN) 81 MG EC tablet Take 1 tablet (81 mg total) by mouth once daily. 30 tablet 11    BLOOD PRESSURE CUFF Misc 1 Device by Misc.(Non-Drug; Combo Route) route once daily.  0    blood sugar diagnostic Strp Once daily glucose testing. 50 strip 6    blood-glucose meter Misc Use as directed. 1 each 0    empagliflozin (JARDIANCE) 10 mg tablet Take 1 tablet (10 mg total) by mouth once daily. 30  "tablet 3    fluticasone propionate (FLONASE) 50 mcg/actuation nasal spray 1 spray (50 mcg total) by Each Nostril route once daily. 18.2 mL 1    gabapentin (NEURONTIN) 300 MG capsule Take 1 capsule (300 mg total) by mouth 3 (three) times daily. 90 capsule 11    glipiZIDE (GLUCOTROL) 5 MG tablet Take 1 tablet (5 mg total) by mouth once daily. 90 tablet 0    lancets (LANCETS,THIN) Misc Once daily glucose testing. 50 each 6    lisinopriL (PRINIVIL,ZESTRIL) 40 MG tablet Take 1 tablet (40 mg total) by mouth once daily. 90 tablet 1    metFORMIN (GLUCOPHAGE) 1000 MG tablet TAKE 1 TABLET BY MOUTH TWICE DAILY WITH MEALS 180 tablet 1    metoprolol succinate (TOPROL-XL) 50 MG 24 hr tablet Take 2 tablets in am and 1 tablet in the evening DAILY 120 tablet 11    nicotine (NICODERM CQ) 21 mg/24 hr Place 1 patch onto the skin once daily. 28 patch 0    pen needle, diabetic (PEN NEEDLE) 31 gauge x 5/16" Ndle Use once daily with insulin injection. 50 each 3    prasugreL (EFFIENT) 10 mg Tab Take 1 tablet (10 mg total) by mouth once daily. 30 tablet 11    ranolazine (RANEXA) 500 MG Tb12 Take 1 tablet (500 mg total) by mouth 2 (two) times daily. 60 tablet 11    sildenafiL (VIAGRA) 50 MG tablet Take 1 tablet (50 mg total) by mouth daily as needed for Erectile Dysfunction. 30 tablet 5    pravastatin (PRAVACHOL) 80 MG tablet Take 1 tablet (80 mg total) by mouth every evening. 41 tablet 0     No current facility-administered medications on file prior to visit.       Allergies:  Review of patient's allergies indicates:  No Known Allergies    Review of Systems   Respiratory:  Negative for shortness of breath.    Cardiovascular:  Positive for palpitations. Negative for chest pain and leg swelling.   Neurological:  Negative for dizziness and headaches.   Psychiatric/Behavioral:  The patient has insomnia.      Exam:  Vitals:    07/19/23 1027   BP: (!) 152/82   Pulse: 60   Resp: 20   Temp:      Weight: 110.2 kg (242 lb 15.2 oz)   Body mass index is " 31.19 kg/m².      Physical Exam  Vitals reviewed.   Constitutional:       General: He is not in acute distress.     Appearance: He is well-developed. He is not ill-appearing.   Eyes:      General: No scleral icterus.  Cardiovascular:      Rate and Rhythm: Normal rate and regular rhythm.      Heart sounds: Normal heart sounds.   Pulmonary:      Effort: Pulmonary effort is normal. No respiratory distress.      Breath sounds: Normal breath sounds.   Skin:     General: Skin is warm and dry.   Neurological:      Mental Status: He is alert and oriented to person, place, and time.   Psychiatric:         Behavior: Behavior normal.       Assessment:  The primary encounter diagnosis was Hypertension goal BP (blood pressure) < 130/80. Diagnoses of Controlled type 2 diabetes mellitus with microalbuminuria, without long-term current use of insulin, Hyperlipidemia LDL goal <70, CAD in native artery, Insomnia, unspecified type, Snoring, and Witnessed episode of apnea were also pertinent to this visit.    Plan:  Hypertension goal BP (blood pressure) < 130/80  -     continue lisinopril and metoprolol  -     Lifestyle modifications discussed  -     monitor     Controlled type 2 diabetes mellitus with microalbuminuria, without long-term current use of insulin  -     continue metformin and glipizide  -     advised to obtain coupon for empagliflozin through the 's website    Hyperlipidemia LDL goal <70  -     continue pravastatin    CAD in native artery  -     continue aspirin, metoprolol, ranolazine, prasugrel and pravastatin    Insomnia, unspecified type  -     start traZODone (DESYREL) 50 MG tablet; Take 1 tablet (50 mg total) by mouth every evening.  Dispense: 30 tablet; Refill: 2    Snoring  -     recommend sleep study    Witnessed episode of apnea  -     recommend sleep study    Follow up in about 4 weeks (around 8/16/2023).

## 2023-07-19 NOTE — PROGRESS NOTES
Subjective:   Patient ID:  Laron Kothari Jr. is a 53 y.o. male who presents for evaluation of No chief complaint on file.      Rachana Kothari is a 52 year old male patient whose current medical conditions include CAD s/p [facility] PCI x2 to RCA 4/23' by Dr. Lara, DVT in 2017, DM type II, HTN, hyperlipidemia, gout, neuropathy, and tobacco abuse who presents to clinic for palpitations. His wife reports he is not sleeping well    [facility] 4/11/23 dist LAD 80%, dist cx 90%, mid % with successful PCI  Echo with normal EF    5/12/23  Here today for 2 week follow up for palpitations post STEMI w/ PCI x2 RCA. Still complaining of fatigue, SOB and occasional palpitations. 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      [facility] 4/28/23 ST, PVC    7/19/23  Here today, for follow up. Reports improvement in fatigue, denies any CP with ADLs. Did not take his medication this morning yet, BP elevated in clinic. Overall doing well CV wise. 3 week in to cardiac rehab, tolerating well. He does reports trouble sleeping and some discomfort on his left side when laying down    Past Medical History:   Diagnosis Date    Blood in stool 08/07/2018    Deep vein thrombosis (DVT) 2017    Left leg    Diabetes mellitus, type 2 2013    Gout     Hyperlipidemia     Hypertension     Neuropathy     Smoker        Past Surgical History:   Procedure Laterality Date    APPENDECTOMY      ARTHROSCOPIC CHONDROPLASTY OF KNEE JOINT Right 11/24/2020    Procedure: ARTHROSCOPY, KNEE, WITH CHONDROPLASTY;  Surgeon: Nahum Rose MD;  Location: [facility] OR;  Service: Orthopedics;  Laterality: Right;  chondroplasty medial condyle and anteriorly    COLONOSCOPY N/A 8/7/2018    Procedure: COLONOSCOPY;  Surgeon: Ten Valentine MD;  Location: [facility] ENDO;  Service: Endoscopy;  Laterality: N/A;    COLONOSCOPY N/A 2/23/2022    Procedure: COLONOSCOPY;  Surgeon: Octavio Craig MD;  Location: [facility];   Service: Endoscopy;  Laterality: N/A;    KNEE ARTHROSCOPY W/ MENISCECTOMY Right 11/24/2020    Procedure: ARTHROSCOPY, KNEE, WITH MENISCECTOMY;  Surgeon: Nahum Rose MD;  Location: [facility] OR;  Service: Orthopedics;  Laterality: Right;  Partial medial and lateral meniscectomy    LEFT HEART CATHETERIZATION Left 4/11/2023    Procedure: Left heart cath;  Surgeon: Sue Lara MD;  Location: [facility] CATH LAB;  Service: Cardiology;  Laterality: Left;    PERCUTANEOUS TRANSLUMINAL BALLOON ANGIOPLASTY OF CORONARY ARTERY  4/11/2023    Procedure: Angioplasty-coronary;  Surgeon: Sue Lara MD;  Location: [facility] CATH LAB;  Service: Cardiology;;    ROBOT-ASSISTED CHOLECYSTECTOMY USING DA TRIPP XI N/A 1/21/2020    Procedure: XI ROBOTIC CHOLECYSTECTOMY;  Surgeon: Sebastien Rivera MD;  Location: [facility] OR;  Service: General;  Laterality: N/A;    STENT, DRUG ELUTING, SINGLE VESSEL, CORONARY  4/11/2023    Procedure: Stent, Drug Eluting, Single Vessel, Coronary;  Surgeon: Sue Lara MD;  Location: [facility] CATH LAB;  Service: Cardiology;;    TONSILLECTOMY, ADENOIDECTOMY         Social History     Tobacco Use    Smoking status: Every Day     Packs/day: 1.50     Years: 36.00     Pack years: 54.00     Types: Cigarettes    Smokeless tobacco: Never    Tobacco comments:     Smoked 1.5-2 packs per day until April 2023, now smoking 10 cpd   Substance Use Topics    Alcohol use: Yes     Alcohol/week: 26.0 standard drinks     Types: 26 Cans of beer per week     Comment: daily    Drug use: No       Family History   Problem Relation Age of Onset    Diabetes Father     Prostate cancer Father     Cataracts Father     Hypertension Mother     Hypertension Brother        Current Outpatient Medications on File Prior to Visit   Medication Sig Dispense Refill    aspirin (ECOTRIN) 81 MG EC tablet Take 1 tablet (81 mg total) by mouth once daily. 30 tablet 11    BLOOD PRESSURE CUFF Misc 1 Device by Misc.(Non-Drug; Combo Route) route once daily.  0     "blood sugar diagnostic Strp Once daily glucose testing. 50 strip 6    blood-glucose meter Misc Use as directed. 1 each 0    empagliflozin (JARDIANCE) 10 mg tablet Take 1 tablet (10 mg total) by mouth once daily. 30 tablet 3    fluticasone propionate (FLONASE) 50 mcg/actuation nasal spray 1 spray (50 mcg total) by Each Nostril route once daily. 18.2 mL 1    gabapentin (NEURONTIN) 300 MG capsule Take 1 capsule (300 mg total) by mouth 3 (three) times daily. 90 capsule 11    glipiZIDE (GLUCOTROL) 5 MG tablet Take 1 tablet (5 mg total) by mouth once daily. 90 tablet 0    lancets (LANCETS,THIN) Misc Once daily glucose testing. 50 each 6    lisinopriL (PRINIVIL,ZESTRIL) 40 MG tablet Take 1 tablet (40 mg total) by mouth once daily. 90 tablet 1    metFORMIN (GLUCOPHAGE) 1000 MG tablet TAKE 1 TABLET BY MOUTH TWICE DAILY WITH MEALS 180 tablet 1    metoprolol succinate (TOPROL-XL) 50 MG 24 hr tablet Take 2 tablets in am and 1 tablet in the evening DAILY 120 tablet 11    nicotine (NICODERM CQ) 21 mg/24 hr Place 1 patch onto the skin once daily. 28 patch 0    pen needle, diabetic (PEN NEEDLE) 31 gauge x 5/16" Ndle Use once daily with insulin injection. 50 each 3    prasugreL (EFFIENT) 10 mg Tab Take 1 tablet (10 mg total) by mouth once daily. 30 tablet 11    pravastatin (PRAVACHOL) 80 MG tablet Take 1 tablet (80 mg total) by mouth every evening. 41 tablet 0    ranolazine (RANEXA) 500 MG Tb12 Take 1 tablet (500 mg total) by mouth 2 (two) times daily. 60 tablet 11    sildenafiL (VIAGRA) 50 MG tablet Take 1 tablet (50 mg total) by mouth daily as needed for Erectile Dysfunction. 30 tablet 5     No current facility-administered medications on file prior to visit.      Wt Readings from Last 3 Encounters:   07/19/23 110.5 kg (243 lb 9.7 oz)   06/22/23 107.2 kg (236 lb 5.3 oz)   06/20/23 104.3 kg (230 lb)     Temp Readings from Last 3 Encounters:   04/21/23 98.7 °F (37.1 °C)   04/19/23 96.5 °F (35.8 °C) (Tympanic)   04/13/23 96.4 °F " (35.8 °C) (Oral)     BP Readings from Last 3 Encounters:   07/19/23 (!) 156/84   06/22/23 118/64   06/20/23 121/75     Pulse Readings from Last 3 Encounters:   07/19/23 63   06/22/23 68   06/20/23 82        Review of Systems   Constitutional: Negative.   HENT: Negative.     Eyes: Negative.    Cardiovascular: Negative.    Respiratory: Negative.     Skin: Negative.    Musculoskeletal: Negative.    Gastrointestinal: Negative.    Genitourinary: Negative.    Neurological: Negative.    Psychiatric/Behavioral: Negative.       Objective:   Physical Exam  Vitals and nursing note reviewed.   Constitutional:       Appearance: Normal appearance.   HENT:      Head: Normocephalic and atraumatic.   Eyes:      General:         Right eye: No discharge.         Left eye: No discharge.      Pupils: Pupils are equal, round, and reactive to light.   Cardiovascular:      Rate and Rhythm: Normal rate and regular rhythm.      Heart sounds: S1 normal and S2 normal. No murmur heard.    No friction rub.   Pulmonary:      Effort: Pulmonary effort is normal. No respiratory distress.      Breath sounds: Normal breath sounds. No rales.   Abdominal:      Palpations: Abdomen is soft.      Tenderness: There is no abdominal tenderness.   Musculoskeletal:      Cervical back: Neck supple.      Right lower leg: No edema.      Left lower leg: No edema.   Skin:     General: Skin is warm and dry.   Neurological:      General: No focal deficit present.      Mental Status: He is alert and oriented to person, place, and time.   Psychiatric:         Mood and Affect: Mood normal.         Behavior: Behavior normal.         Thought Content: Thought content normal.       Lab Results   Component Value Date    CHOL 167 06/20/2023    CHOL 167 10/06/2022    CHOL 165 12/08/2021     Lab Results   Component Value Date    HDL 34 (L) 06/20/2023    HDL 41 10/06/2022    HDL 38 (L) 12/08/2021     Lab Results   Component Value Date    LDLCALC 93.4 06/20/2023    LDLCALC 109.4  10/06/2022    LDLCALC 109.0 12/08/2021     Lab Results   Component Value Date    TRIG 198 (H) 06/20/2023    TRIG 83 10/06/2022    TRIG 90 12/08/2021     Lab Results   Component Value Date    CHOLHDL 20.4 06/20/2023    CHOLHDL 24.6 10/06/2022    CHOLHDL 23.0 12/08/2021       Chemistry        Component Value Date/Time     05/12/2023 0920    K 4.4 05/12/2023 0920     05/12/2023 0920    CO2 27 05/12/2023 0920    BUN 13 05/12/2023 0920    CREATININE 1.4 05/12/2023 0920     (H) 05/12/2023 0920        Component Value Date/Time    CALCIUM 9.5 05/12/2023 0920    ALKPHOS 58 05/12/2023 0920    AST 15 05/12/2023 0920    ALT 12 05/12/2023 0920    BILITOT 0.4 05/12/2023 0920    ESTGFRAFRICA >60 06/21/2022 2017    EGFRNONAA >60 06/21/2022 2017          No results found for: TSH  Lab Results   Component Value Date    INR 1.0 04/20/2023    INR 1.0 04/11/2023    INR 0.9 01/06/2020     @RESUFAST(WBC,HGB,HCT,MCV,PLT)  @LABRCNTIP(BNP,BNPTRIAGEBLO)@  CrCl cannot be calculated (Patient's most recent lab result is older than the maximum 7 days allowed.).     Results for orders placed during the hospital encounter of 04/11/23    Echo    Interpretation Summary  · The left ventricle is normal in size with concentric remodeling and normal systolic function.  · Normal left ventricular diastolic function.  · The estimated PA systolic pressure is 18 mmHg.  · Normal right ventricular size with normal right ventricular systolic function.  · Normal central venous pressure (3 mmHg).  · The estimated ejection fraction is 55%.  · The ascending aorta is mildly dilated.  · There are segmental left ventricular wall motion abnormalities.     No results found for this or any previous visit.     Assessment:      1. Hyperlipidemia LDL goal <70    2. Hypertension goal BP (blood pressure) < 130/80    3. Chest pain, unspecified type    4. Coronary artery disease of native artery of native heart with stable angina pectoris    5. Tobacco use  disorder            Plan:   Hyperlipidemia LDL goal <70    Hypertension goal BP (blood pressure) < 130/80    Chest pain, unspecified type    Coronary artery disease of native artery of native heart with stable angina pectoris    Tobacco use disorder          ASA, Effient, BB, Ranexa, statin- CAD  ACEi, BB, ASA- HTN  statin- HLD    Counseled on tobacco cessation and encouraged to stop drinking alcohol  Low Na diet  Risk factor modification and excercise program, weight loss      Ok to return to work in August with no restrictions, activity as tolerated    Follow up with Dr. Lara as scheduled in Oct or sooner with DARREL as needed    Ave Hawkins, FNP-C [facility], Cardiology

## 2023-07-20 ENCOUNTER — TELEPHONE (OUTPATIENT)
Dept: SLEEP MEDICINE | Facility: CLINIC | Age: 53
End: 2023-07-20
Payer: COMMERCIAL

## 2023-07-21 ENCOUNTER — CLINICAL SUPPORT (OUTPATIENT)
Dept: CARDIAC REHAB | Facility: HOSPITAL | Age: 53
End: 2023-07-21
Attending: INTERNAL MEDICINE
Payer: COMMERCIAL

## 2023-07-21 ENCOUNTER — TELEPHONE (OUTPATIENT)
Dept: CARDIAC REHAB | Facility: HOSPITAL | Age: 53
End: 2023-07-21
Payer: COMMERCIAL

## 2023-07-21 DIAGNOSIS — I25.118 CORONARY ARTERY DISEASE OF NATIVE ARTERY OF NATIVE HEART WITH STABLE ANGINA PECTORIS: Primary | ICD-10-CM

## 2023-07-21 PROCEDURE — 93798 PR CARDIAC REHAB/MONITOR: ICD-10-PCS | Mod: ,,, | Performed by: INTERNAL MEDICINE

## 2023-07-21 PROCEDURE — 93798 PHYS/QHP OP CAR RHAB W/ECG: CPT | Mod: ,,, | Performed by: INTERNAL MEDICINE

## 2023-07-21 NOTE — PROGRESS NOTES
Patient tolerated session today. See note regarding elevated B/P readings. Provider notified via SecureChat.

## 2023-07-21 NOTE — TELEPHONE ENCOUNTER
"Pt's B/P elevated throughout his cardiac rehab session today. Pt reported "chest like congestion or pressure" while on the treadmill which remained after he got off and went to the Nustep. Pt states he also had some SOB while on the treadmill but that did resolve when stopping. Patient states he has not slept in over 24 hours despite his PCP prescribing him Trazadone 50 mg that he started two nights ago Wednesday night. He denies any additional symptoms: H/A, dizziness, nausea, arm pain, jaw pain, etc.     B/P readings during session today are as follows:   Resting B/P prior to session: 183/88  Treadmill B/P: 158/95  Nustep B/P: 172/100  Recovery B/P: 142/79    Blood sugar readings:  Pre-workout: 235  Post-workout: 104  Pt denies any s/sx hypoglycemia. Pt provided with granola bars to take with him.     Patient remained in NSR throughout class. Pulse was as expected with workout level.     Per patient, he took all his medications this morning around 0620am, approximately 1.5 hour prior to cardiac rehab class. Pt states he ate before class and also smoked two cigarettes.     Pt instructed if s/sx worsen or new symptoms develop, to report directly to the ER and do not wait. Patient also instructed to continue monitoring his B/P and BS at home. Patient verbalized understanding. Pt reports he knows how to send a [employer] message to the provider if needed.    Ave Hawkins NP notified via secure chat of all the above.         "

## 2023-07-26 ENCOUNTER — CLINICAL SUPPORT (OUTPATIENT)
Dept: CARDIAC REHAB | Facility: HOSPITAL | Age: 53
End: 2023-07-26
Payer: COMMERCIAL

## 2023-07-26 DIAGNOSIS — I25.118 CORONARY ARTERY DISEASE OF NATIVE ARTERY OF NATIVE HEART WITH STABLE ANGINA PECTORIS: Primary | ICD-10-CM

## 2023-07-26 PROCEDURE — 93798 PHYS/QHP OP CAR RHAB W/ECG: CPT | Mod: ,,, | Performed by: STUDENT IN AN ORGANIZED HEALTH CARE EDUCATION/TRAINING PROGRAM

## 2023-07-26 PROCEDURE — 93798 PR CARDIAC REHAB/MONITOR: ICD-10-PCS | Mod: ,,, | Performed by: STUDENT IN AN ORGANIZED HEALTH CARE EDUCATION/TRAINING PROGRAM

## 2023-07-26 NOTE — PROGRESS NOTES
Patient tolerated cardiac rehab session well. Patient also participated in 30 minutes of education today that was provided on exercise terminology. Patient was present for the entire education class, participated, and asked questions. All questions were answered. Patient verbalized understanding.

## 2023-07-28 ENCOUNTER — CLINICAL SUPPORT (OUTPATIENT)
Dept: CARDIAC REHAB | Facility: HOSPITAL | Age: 53
End: 2023-07-28
Payer: COMMERCIAL

## 2023-07-28 DIAGNOSIS — I21.4 NSTEMI (NON-ST ELEVATED MYOCARDIAL INFARCTION): Primary | ICD-10-CM

## 2023-07-28 DIAGNOSIS — E11.29 CONTROLLED TYPE 2 DIABETES MELLITUS WITH MICROALBUMINURIA, WITH LONG-TERM CURRENT USE OF INSULIN: ICD-10-CM

## 2023-07-28 DIAGNOSIS — R80.9 CONTROLLED TYPE 2 DIABETES MELLITUS WITH MICROALBUMINURIA, WITH LONG-TERM CURRENT USE OF INSULIN: ICD-10-CM

## 2023-07-28 DIAGNOSIS — Z79.4 CONTROLLED TYPE 2 DIABETES MELLITUS WITH MICROALBUMINURIA, WITH LONG-TERM CURRENT USE OF INSULIN: ICD-10-CM

## 2023-07-28 PROCEDURE — 93798 PR CARDIAC REHAB/MONITOR: ICD-10-PCS | Mod: ,,, | Performed by: INTERNAL MEDICINE

## 2023-07-28 PROCEDURE — 93798 PHYS/QHP OP CAR RHAB W/ECG: CPT | Mod: ,,, | Performed by: INTERNAL MEDICINE

## 2023-07-28 RX ORDER — GLIPIZIDE 5 MG/1
TABLET ORAL
Qty: 90 TABLET | Refills: 1 | Status: SHIPPED | OUTPATIENT
Start: 2023-07-28 | End: 2023-09-14 | Stop reason: SDUPTHER

## 2023-07-28 NOTE — TELEPHONE ENCOUNTER
Refill Decision Note   Laron Kothari  is requesting a refill authorization.  Brief Assessment and Rationale for Refill:  Approve     Medication Therapy Plan:         Comments:     Note composed:5:55 PM 07/28/2023

## 2023-07-28 NOTE — TELEPHONE ENCOUNTER
No care due was identified.  [facility] Embedded Care Due Messages. Reference number: [id number].   7/28/2023 9:37:27 AM CDT

## 2023-07-31 ENCOUNTER — CLINICAL SUPPORT (OUTPATIENT)
Dept: CARDIAC REHAB | Facility: HOSPITAL | Age: 53
End: 2023-07-31
Payer: COMMERCIAL

## 2023-07-31 DIAGNOSIS — I25.118 CORONARY ARTERY DISEASE OF NATIVE ARTERY OF NATIVE HEART WITH STABLE ANGINA PECTORIS: Primary | ICD-10-CM

## 2023-07-31 PROCEDURE — 93798 PR CARDIAC REHAB/MONITOR: ICD-10-PCS | Mod: ,,, | Performed by: INTERNAL MEDICINE

## 2023-07-31 PROCEDURE — 93798 PHYS/QHP OP CAR RHAB W/ECG: CPT | Mod: ,,, | Performed by: INTERNAL MEDICINE

## 2023-08-02 ENCOUNTER — CLINICAL SUPPORT (OUTPATIENT)
Dept: CARDIAC REHAB | Facility: HOSPITAL | Age: 53
End: 2023-08-02
Payer: COMMERCIAL

## 2023-08-02 DIAGNOSIS — I21.4 NSTEMI (NON-ST ELEVATED MYOCARDIAL INFARCTION): Primary | ICD-10-CM

## 2023-08-02 PROCEDURE — 93798 PR CARDIAC REHAB/MONITOR: ICD-10-PCS | Mod: ,,, | Performed by: STUDENT IN AN ORGANIZED HEALTH CARE EDUCATION/TRAINING PROGRAM

## 2023-08-02 PROCEDURE — 93798 PHYS/QHP OP CAR RHAB W/ECG: CPT | Mod: ,,, | Performed by: STUDENT IN AN ORGANIZED HEALTH CARE EDUCATION/TRAINING PROGRAM

## 2023-08-02 NOTE — PROGRESS NOTES
Patient tolerated cardiac rehab session well. Patient also participated in 30 minutes of education today that was provided on target, resting, and maximum heart rate. Patient was present for the entire education class, participated, and asked questions. All questions were answered. Patient verbalized understanding.

## 2023-08-04 ENCOUNTER — CLINICAL SUPPORT (OUTPATIENT)
Dept: CARDIAC REHAB | Facility: HOSPITAL | Age: 53
End: 2023-08-04
Payer: COMMERCIAL

## 2023-08-04 DIAGNOSIS — I25.118 CORONARY ARTERY DISEASE OF NATIVE ARTERY OF NATIVE HEART WITH STABLE ANGINA PECTORIS: Primary | ICD-10-CM

## 2023-08-04 PROCEDURE — 93798 PHYS/QHP OP CAR RHAB W/ECG: CPT | Mod: ,,, | Performed by: INTERNAL MEDICINE

## 2023-08-04 PROCEDURE — 93798 PR CARDIAC REHAB/MONITOR: ICD-10-PCS | Mod: ,,, | Performed by: INTERNAL MEDICINE

## 2023-08-04 NOTE — PROGRESS NOTES
Patient tolerated cardiac rehab session well. Spoke to patient after class regarding returning to work yesterday 8/3/23. Patient provided with other class times/options and when they will become available this month encase he needs to change his class due to his work schedule. Patient reports he will contact me next week to let me know if anything needs to be re-arranged. All questions answered.

## 2023-08-07 ENCOUNTER — CLINICAL SUPPORT (OUTPATIENT)
Dept: CARDIAC REHAB | Facility: HOSPITAL | Age: 53
End: 2023-08-07
Payer: COMMERCIAL

## 2023-08-07 DIAGNOSIS — I25.118 CORONARY ARTERY DISEASE OF NATIVE ARTERY OF NATIVE HEART WITH STABLE ANGINA PECTORIS: Primary | ICD-10-CM

## 2023-08-07 PROCEDURE — 93798 PHYS/QHP OP CAR RHAB W/ECG: CPT | Mod: ,,, | Performed by: STUDENT IN AN ORGANIZED HEALTH CARE EDUCATION/TRAINING PROGRAM

## 2023-08-07 PROCEDURE — 93798 PR CARDIAC REHAB/MONITOR: ICD-10-PCS | Mod: ,,, | Performed by: STUDENT IN AN ORGANIZED HEALTH CARE EDUCATION/TRAINING PROGRAM

## 2023-08-08 ENCOUNTER — TELEPHONE (OUTPATIENT)
Dept: CARDIAC REHAB | Facility: HOSPITAL | Age: 53
End: 2023-08-08
Payer: COMMERCIAL

## 2023-08-08 NOTE — TELEPHONE ENCOUNTER
Spoke with patient over the phone regarding the possible change in time of cardiac rehab class after returning to work last week. Patient reports he is going to keep the same class time of M/W/F at 8am. He does not wish to change at this time.

## 2023-08-09 ENCOUNTER — CLINICAL SUPPORT (OUTPATIENT)
Dept: CARDIAC REHAB | Facility: HOSPITAL | Age: 53
End: 2023-08-09
Payer: COMMERCIAL

## 2023-08-09 DIAGNOSIS — I25.118 CORONARY ARTERY DISEASE OF NATIVE ARTERY OF NATIVE HEART WITH STABLE ANGINA PECTORIS: Primary | ICD-10-CM

## 2023-08-09 PROCEDURE — 93798 PHYS/QHP OP CAR RHAB W/ECG: CPT | Mod: ,,, | Performed by: STUDENT IN AN ORGANIZED HEALTH CARE EDUCATION/TRAINING PROGRAM

## 2023-08-09 PROCEDURE — 93798 PR CARDIAC REHAB/MONITOR: ICD-10-PCS | Mod: ,,, | Performed by: STUDENT IN AN ORGANIZED HEALTH CARE EDUCATION/TRAINING PROGRAM

## 2023-08-09 NOTE — PROGRESS NOTES
Patient tolerated cardiac rehab session well. Patient also participated in 30 minutes of education today that was provided on resistance training. Patient was present for the entire education class, participated, and asked questions. All questions were answered. Patient verbalized understanding.

## 2023-08-11 ENCOUNTER — TELEPHONE (OUTPATIENT)
Dept: CARDIOLOGY | Facility: CLINIC | Age: 53
End: 2023-08-11
Payer: COMMERCIAL

## 2023-08-11 ENCOUNTER — CLINICAL SUPPORT (OUTPATIENT)
Dept: CARDIAC REHAB | Facility: HOSPITAL | Age: 53
End: 2023-08-11
Attending: NURSE PRACTITIONER
Payer: COMMERCIAL

## 2023-08-11 DIAGNOSIS — I25.118 CORONARY ARTERY DISEASE OF NATIVE ARTERY OF NATIVE HEART WITH STABLE ANGINA PECTORIS: Primary | ICD-10-CM

## 2023-08-11 PROCEDURE — 93798 PHYS/QHP OP CAR RHAB W/ECG: CPT | Mod: ,,, | Performed by: INTERNAL MEDICINE

## 2023-08-11 PROCEDURE — 93798 PR CARDIAC REHAB/MONITOR: ICD-10-PCS | Mod: ,,, | Performed by: INTERNAL MEDICINE

## 2023-08-11 NOTE — TELEPHONE ENCOUNTER
Contacted pt regarding meds. Pt states that he is only taking the Prasugrel, and he is not taking the Brilinta. Pt vu w/o q/c      --- CHERI Barragan 08/11/23 01:49 PM ---  Please make sure patient is taking Prasurgrel and not Brilinta.    Route back,    Thanks

## 2023-08-14 ENCOUNTER — HOSPITAL ENCOUNTER (OUTPATIENT)
Dept: SLEEP MEDICINE | Facility: HOSPITAL | Age: 53
Discharge: HOME | End: 2023-08-14
Payer: COMMERCIAL

## 2023-08-14 DIAGNOSIS — G47.33 OSA (OBSTRUCTIVE SLEEP APNEA): Primary | ICD-10-CM

## 2023-08-14 DIAGNOSIS — I25.118 CORONARY ARTERY DISEASE OF NATIVE ARTERY OF NATIVE HEART WITH STABLE ANGINA PECTORIS: ICD-10-CM

## 2023-08-14 PROCEDURE — 95806 SLEEP STUDY UNATT&RESP EFFT: CPT | Performed by: INTERNAL MEDICINE

## 2023-08-14 PROCEDURE — 95800 PR SLEEP STUDY, UNATTENDED, RECORD HEART RATE/O2 SAT/RESP ANAL/SLEEP TIME: ICD-10-PCS | Mod: 26,,, | Performed by: INTERNAL MEDICINE

## 2023-08-14 PROCEDURE — 95800 SLP STDY UNATTENDED: CPT | Mod: 26,,, | Performed by: INTERNAL MEDICINE

## 2023-08-14 NOTE — PROCEDURES
PHYSICIAN INTERPRETATION AND COMMENTS: Findings are consistent with moderate, positional obstructive sleep  apnea(MARIYA). INLAB CPAP titration indicated, please refer to sleep disorders clinic  CLINICAL HISTORY: 53 year old male presented with: 17 inch neck, BMI of 30.8, an [place] sleepiness score of 2, history of  hypertension, heart disease, diabetes, depression and symptoms of nocturnal snoring, waking up choking and witnessed  apneas. Based on the clinical history, the patient has a high pre-test probability of having Severe MARIYA.  SLEEP STUDY FINDINGS: Patient underwent a 1 night Home Sleep Test and by behavioral criteria, slept for approximately  6.18 hours, with a sleep latency of 6 minutes and a sleep efficiency of 90%. Mild sleep disordered breathing (AHI=10) is  noted based on a 4% hypopnea desaturation criteria, predominantly in the supine position (14 events/hour). The patient  slept supine 63.7% of the night based on valid recording time of 6.25 hours and is 4.7 times as likely to have  apneas/hypopneas when supine. When considering more subtle measures of sleep disordered breathing, the overall  respiratory disturbance index is moderate(RDI=24) based on a 1% hypopnea desaturation criteria with confirmation by  surrogate arousal indicators. The apneas/hypopneas are accompanied by minimal oxygen desaturation (percent time  below 90% SpO2: 0%, Min SpO2: 92.7%). The average desaturation across all sleep disordered breathing events is 1.9%.  Snoring occurs for 0.7% (30 dB) of the study. The mean pulse rate is 74.7 BPM, with frequent pulse rate variability (51 events  with >= 6 BPM increase/decrease per hour).  TREATMENT CONSIDERATIONS: Consider an attended CPAP titration study, given the reported Heart disease. Consider nasal  continuous positive airway pressure based on the RDI severity and co-morbidities. A mandibular advancement splint  (MAS) or referral to an ENT surgeon for modification to the airway  "should be considered to reduce the potential  contribution of MARIYA on existing diseases if the patient prefers an alternative therapy or the CPAP trial is unsuccessful.  Based on the MARIYA Supine data in the study, a Mandibular Advancement Splint (MAS) will likely provide treatment benefit  independent of MARIYA severity. The patient should avoid sleeping supine; the non-supine AHI is 4.7 times less severe than the  supine [place]  [address] [address] [place] [address]/Ave Hawkins NP         The sleep study that you ordered is complete.  You have ordered sleep LAB services to perform the sleep study for Laron Kothari Jr. .      Please find Sleep Study result in  the "Media tab" of Chart Review menu.        You can look  for the report in the  Media by the document type "Sleep Study Documents". Alphabetizing  "Document type" column helps to find the SLEEP STUDY report  Faster.       As the ordering provider, you are responsible for reviewing the results and implementing a treatment plan with your patient.    If you need a Sleep Medicine provider to explain the sleep study findings and arrange treatment for the patient, please refer patient for consultation to our Sleep Clinic via [facility] with Ambulatory Consult Sleep.     To do that please place an order for an  "Ambulatory Consult Sleep" -  order , it will go to our clinic work queue for our staff  to contact the patient for an appointment.      For any questions, please contact our sleep lab  staff at [phone] to talk to clinical staff          Dwayne Matthews MD   "

## 2023-08-14 NOTE — Clinical Note
PHYSICIAN INTERPRETATION AND COMMENTS: Findings are consistent with moderate, positional obstructive sleep apnea(MARIYA). INLAB CPAP titration indicated, please refer to sleep disorders clinic CLINICAL HISTORY: 53 year old male presented with: 17 inch neck, BMI of 30.8, an [place] sleepiness score of 2, history of hypertension, heart disease, diabetes, depression and symptoms of nocturnal snoring, waking up choking and witnessed apneas. Based on the clinical history, the patient has a high pre-test probability of having Severe MARIYA.

## 2023-08-15 DIAGNOSIS — G47.33 OSA (OBSTRUCTIVE SLEEP APNEA): Primary | ICD-10-CM

## 2023-08-16 ENCOUNTER — CLINICAL SUPPORT (OUTPATIENT)
Dept: CARDIAC REHAB | Facility: HOSPITAL | Age: 53
End: 2023-08-16
Payer: COMMERCIAL

## 2023-08-16 DIAGNOSIS — I25.118 CORONARY ARTERY DISEASE OF NATIVE ARTERY OF NATIVE HEART WITH STABLE ANGINA PECTORIS: Primary | ICD-10-CM

## 2023-08-16 PROCEDURE — 93798 PR CARDIAC REHAB/MONITOR: ICD-10-PCS | Mod: ,,, | Performed by: STUDENT IN AN ORGANIZED HEALTH CARE EDUCATION/TRAINING PROGRAM

## 2023-08-16 PROCEDURE — 93798 PHYS/QHP OP CAR RHAB W/ECG: CPT | Mod: ,,, | Performed by: STUDENT IN AN ORGANIZED HEALTH CARE EDUCATION/TRAINING PROGRAM

## 2023-08-16 NOTE — PROGRESS NOTES
Patient tolerated cardiac rehab session well. Patient also participated in 30 minutes of education today that was provided on stroke. Patient was present for the entire education class, participated, and asked questions. All questions were answered. Patient verbalized understanding.

## 2023-08-18 ENCOUNTER — TELEPHONE (OUTPATIENT)
Dept: CARDIAC REHAB | Facility: HOSPITAL | Age: 53
End: 2023-08-18

## 2023-08-18 NOTE — TELEPHONE ENCOUNTER
Spoke to patient regarding missed cardiac rehab session this morning. Patient reports he slept through class this morning and will be here Monday 8/21/23 for 8 am class.

## 2023-08-21 ENCOUNTER — OFFICE VISIT (OUTPATIENT)
Dept: PULMONOLOGY | Facility: CLINIC | Age: 53
End: 2023-08-21
Payer: COMMERCIAL

## 2023-08-21 ENCOUNTER — CLINICAL SUPPORT (OUTPATIENT)
Dept: CARDIAC REHAB | Facility: HOSPITAL | Age: 53
End: 2023-08-21
Payer: COMMERCIAL

## 2023-08-21 VITALS
WEIGHT: 241 LBS | RESPIRATION RATE: 18 BRPM | SYSTOLIC BLOOD PRESSURE: 120 MMHG | DIASTOLIC BLOOD PRESSURE: 82 MMHG | BODY MASS INDEX: 30.93 KG/M2 | HEART RATE: 79 BPM | OXYGEN SATURATION: 99 % | HEIGHT: 74 IN

## 2023-08-21 DIAGNOSIS — G47.01 INSOMNIA DUE TO MEDICAL CONDITION: ICD-10-CM

## 2023-08-21 DIAGNOSIS — I10 HYPERTENSION GOAL BP (BLOOD PRESSURE) < 130/80: Chronic | ICD-10-CM

## 2023-08-21 DIAGNOSIS — I25.118 CORONARY ARTERY DISEASE OF NATIVE ARTERY OF NATIVE HEART WITH STABLE ANGINA PECTORIS: Primary | ICD-10-CM

## 2023-08-21 DIAGNOSIS — E78.5 HYPERLIPIDEMIA LDL GOAL <70: Chronic | ICD-10-CM

## 2023-08-21 DIAGNOSIS — F17.200 TOBACCO USE DISORDER: ICD-10-CM

## 2023-08-21 DIAGNOSIS — E11.29 CONTROLLED TYPE 2 DIABETES MELLITUS WITH MICROALBUMINURIA, WITHOUT LONG-TERM CURRENT USE OF INSULIN: ICD-10-CM

## 2023-08-21 DIAGNOSIS — G47.33 OSA (OBSTRUCTIVE SLEEP APNEA): Primary | ICD-10-CM

## 2023-08-21 DIAGNOSIS — R80.9 CONTROLLED TYPE 2 DIABETES MELLITUS WITH MICROALBUMINURIA, WITHOUT LONG-TERM CURRENT USE OF INSULIN: ICD-10-CM

## 2023-08-21 PROCEDURE — 3008F BODY MASS INDEX DOCD: CPT | Mod: CPTII,S$GLB,, | Performed by: INTERNAL MEDICINE

## 2023-08-21 PROCEDURE — 3044F HG A1C LEVEL LT 7.0%: CPT | Mod: CPTII,S$GLB,, | Performed by: INTERNAL MEDICINE

## 2023-08-21 PROCEDURE — 3066F NEPHROPATHY DOC TX: CPT | Mod: CPTII,S$GLB,, | Performed by: INTERNAL MEDICINE

## 2023-08-21 PROCEDURE — 93798 PR CARDIAC REHAB/MONITOR: ICD-10-PCS | Mod: ,,, | Performed by: INTERNAL MEDICINE

## 2023-08-21 PROCEDURE — 99205 PR OFFICE/OUTPT VISIT, NEW, LEVL V, 60-74 MIN: ICD-10-PCS | Mod: S$GLB,,, | Performed by: INTERNAL MEDICINE

## 2023-08-21 PROCEDURE — 3079F PR MOST RECENT DIASTOLIC BLOOD PRESSURE 80-89 MM HG: ICD-10-PCS | Mod: CPTII,S$GLB,, | Performed by: INTERNAL MEDICINE

## 2023-08-21 PROCEDURE — 1159F PR MEDICATION LIST DOCUMENTED IN MEDICAL RECORD: ICD-10-PCS | Mod: CPTII,S$GLB,, | Performed by: INTERNAL MEDICINE

## 2023-08-21 PROCEDURE — 1160F RVW MEDS BY RX/DR IN RCRD: CPT | Mod: CPTII,S$GLB,, | Performed by: INTERNAL MEDICINE

## 2023-08-21 PROCEDURE — 3061F NEG MICROALBUMINURIA REV: CPT | Mod: CPTII,S$GLB,, | Performed by: INTERNAL MEDICINE

## 2023-08-21 PROCEDURE — 1159F MED LIST DOCD IN RCRD: CPT | Mod: CPTII,S$GLB,, | Performed by: INTERNAL MEDICINE

## 2023-08-21 PROCEDURE — 1160F PR REVIEW ALL MEDS BY PRESCRIBER/CLIN PHARMACIST DOCUMENTED: ICD-10-PCS | Mod: CPTII,S$GLB,, | Performed by: INTERNAL MEDICINE

## 2023-08-21 PROCEDURE — 3061F PR NEG MICROALBUMINURIA RESULT DOCUMENTED/REVIEW: ICD-10-PCS | Mod: CPTII,S$GLB,, | Performed by: INTERNAL MEDICINE

## 2023-08-21 PROCEDURE — 4010F ACE/ARB THERAPY RXD/TAKEN: CPT | Mod: CPTII,S$GLB,, | Performed by: INTERNAL MEDICINE

## 2023-08-21 PROCEDURE — 4010F PR ACE/ARB THEARPY RXD/TAKEN: ICD-10-PCS | Mod: CPTII,S$GLB,, | Performed by: INTERNAL MEDICINE

## 2023-08-21 PROCEDURE — 3044F PR MOST RECENT HEMOGLOBIN A1C LEVEL <7.0%: ICD-10-PCS | Mod: CPTII,S$GLB,, | Performed by: INTERNAL MEDICINE

## 2023-08-21 PROCEDURE — 99999 PR PBB SHADOW E&M-EST. PATIENT-LVL V: CPT | Mod: PBBFAC,,, | Performed by: INTERNAL MEDICINE

## 2023-08-21 PROCEDURE — 3008F PR BODY MASS INDEX (BMI) DOCUMENTED: ICD-10-PCS | Mod: CPTII,S$GLB,, | Performed by: INTERNAL MEDICINE

## 2023-08-21 PROCEDURE — 93798 PHYS/QHP OP CAR RHAB W/ECG: CPT | Mod: ,,, | Performed by: INTERNAL MEDICINE

## 2023-08-21 PROCEDURE — 99999 PR PBB SHADOW E&M-EST. PATIENT-LVL V: ICD-10-PCS | Mod: PBBFAC,,, | Performed by: INTERNAL MEDICINE

## 2023-08-21 PROCEDURE — 3066F PR DOCUMENTATION OF TREATMENT FOR NEPHROPATHY: ICD-10-PCS | Mod: CPTII,S$GLB,, | Performed by: INTERNAL MEDICINE

## 2023-08-21 PROCEDURE — 99205 OFFICE O/P NEW HI 60 MIN: CPT | Mod: S$GLB,,, | Performed by: INTERNAL MEDICINE

## 2023-08-21 PROCEDURE — 3074F PR MOST RECENT SYSTOLIC BLOOD PRESSURE < 130 MM HG: ICD-10-PCS | Mod: CPTII,S$GLB,, | Performed by: INTERNAL MEDICINE

## 2023-08-21 PROCEDURE — 3079F DIAST BP 80-89 MM HG: CPT | Mod: CPTII,S$GLB,, | Performed by: INTERNAL MEDICINE

## 2023-08-21 PROCEDURE — 3074F SYST BP LT 130 MM HG: CPT | Mod: CPTII,S$GLB,, | Performed by: INTERNAL MEDICINE

## 2023-08-21 NOTE — ASSESSMENT & PLAN NOTE
Ready to quit: Yes  Counseling given: No  Tobacco comments: Smoked 1.5-2 packs per day until April 2023, now smoking 10 cpd

## 2023-08-21 NOTE — PROGRESS NOTES
Pulmonary Outpatient  Visit     Subjective:       Patient ID: Laron Kothari Jr. is a 53 y.o. male.    Social History     Tobacco Use   Smoking Status Every Day    Current packs/day: 1.50    Average packs/day: 1.5 packs/day for 36.0 years (54.1 ttl pk-yrs)    Types: Cigarettes    Start date: 2003   Smokeless Tobacco Never   Tobacco Comments    Smoked 1.5-2 packs per day until April 2023, now smoking 10 cpd            Chief Complaint: Sleep Apnea      Laron Kothari Jr. Is 53 y.o.  Asked to see by Ave Hawkins NP  HSAT was +ve for Moderate MARIYA  Comorbidity: CAD, DM, HLD, HTN  Current smoker 1 PPD 30 year  Recent PCI x 2: April 2023  Chaufeur licence  CXR reviewed  LDCT reviewed  Goals CPAP discussed                 Review of Systems   Respiratory:  Positive for apnea and snoring.    Psychiatric/Behavioral:  Positive for sleep disturbance.    All other systems reviewed and are negative.      Outpatient Encounter Medications as of 8/21/2023   Medication Sig Dispense Refill    aspirin (ECOTRIN) 81 MG EC tablet Take 1 tablet (81 mg total) by mouth once daily. 30 tablet 11    BLOOD PRESSURE CUFF Misc 1 Device by Misc.(Non-Drug; Combo Route) route once daily.  0    blood sugar diagnostic Strp Once daily glucose testing. 50 strip 6    blood-glucose meter Misc Use as directed. 1 each 0    empagliflozin (JARDIANCE) 10 mg tablet Take 1 tablet (10 mg total) by mouth once daily. 30 tablet 3    fluticasone propionate (FLONASE) 50 mcg/actuation nasal spray 1 spray (50 mcg total) by Each Nostril route once daily. 18.2 mL 1    glipiZIDE (GLUCOTROL) 5 MG tablet Take 1 tablet by mouth once daily 90 tablet 1    lancets (LANCETS,THIN) Misc Once daily glucose testing. 50 each 6    lisinopriL (PRINIVIL,ZESTRIL) 40 MG tablet Take 1 tablet (40 mg total) by mouth once daily. 90 tablet 1    metFORMIN (GLUCOPHAGE) 1000 MG tablet TAKE 1 TABLET BY MOUTH TWICE DAILY WITH MEALS 180 tablet 1     "metoprolol succinate (TOPROL-XL) 50 MG 24 hr tablet Take 2 tablets in am and 1 tablet in the evening DAILY 120 tablet 11    nicotine (NICODERM CQ) 21 mg/24 hr Place 1 patch onto the skin once daily. 28 patch 0    pen needle, diabetic (PEN NEEDLE) 31 gauge x 5/16" Ndle Use once daily with insulin injection. 50 each 3    prasugreL (EFFIENT) 10 mg Tab Take 1 tablet (10 mg total) by mouth once daily. 30 tablet 11    pravastatin (PRAVACHOL) 80 MG tablet Take 1 tablet (80 mg total) by mouth every evening. 41 tablet 11    sildenafiL (VIAGRA) 50 MG tablet Take 1 tablet (50 mg total) by mouth daily as needed for Erectile Dysfunction. 30 tablet 5    traZODone (DESYREL) 50 MG tablet Take 1 tablet (50 mg total) by mouth every evening. 30 tablet 2    gabapentin (NEURONTIN) 300 MG capsule Take 1 capsule (300 mg total) by mouth 3 (three) times daily. 90 capsule 11    ranolazine (RANEXA) 500 MG Tb12 Take 1 tablet (500 mg total) by mouth 2 (two) times daily. 60 tablet 11     No facility-administered encounter medications on file as of 8/21/2023.       The following portions of the patient's history were reviewed and updated as appropriate: He  has a past medical history of Blood in stool (08/07/2018), Deep vein thrombosis (DVT) (2017), Diabetes mellitus, type 2 (2013), Gout, Hyperlipidemia, Hypertension, Neuropathy, and Smoker.  He does not have any pertinent problems on file.  He  has a past surgical history that includes Appendectomy; TONSILLECTOMY, ADENOIDECTOMY; Colonoscopy (N/A, 8/7/2018); Robot-assisted cholecystectomy using da Stan Xi (N/A, 1/21/2020); Knee arthroscopy w/ meniscectomy (Right, 11/24/2020); Arthroscopic chondroplasty of knee joint (Right, 11/24/2020); Colonoscopy (N/A, 2/23/2022); Left heart catheterization (Left, 4/11/2023); Percutaneous transluminal balloon angioplasty of coronary artery (4/11/2023); and stent, drug eluting, single vessel, coronary (4/11/2023).  His family history includes Cataracts in his " father; Diabetes in his father; Hypertension in his brother and mother; Prostate cancer in his father.  He  reports that he has been smoking cigarettes. He started smoking about 20 years ago. He has a 54.1 pack-year smoking history. He has never used smokeless tobacco. He reports current alcohol use of about 26.0 standard drinks of alcohol per week. He reports that he does not use drugs.  He has a current medication list which includes the following prescription(s): aspirin, blood pressure cuff, blood sugar diagnostic, blood-glucose meter, empagliflozin, fluticasone propionate, glipizide, lancets, lisinopril, metformin, metoprolol succinate, nicotine, pen needle, diabetic, prasugrel, pravastatin, sildenafil, trazodone, gabapentin, and ranolazine.  Current Outpatient Medications on File Prior to Visit   Medication Sig Dispense Refill    aspirin (ECOTRIN) 81 MG EC tablet Take 1 tablet (81 mg total) by mouth once daily. 30 tablet 11    BLOOD PRESSURE CUFF Misc 1 Device by Misc.(Non-Drug; Combo Route) route once daily.  0    blood sugar diagnostic Strp Once daily glucose testing. 50 strip 6    blood-glucose meter Misc Use as directed. 1 each 0    empagliflozin (JARDIANCE) 10 mg tablet Take 1 tablet (10 mg total) by mouth once daily. 30 tablet 3    fluticasone propionate (FLONASE) 50 mcg/actuation nasal spray 1 spray (50 mcg total) by Each Nostril route once daily. 18.2 mL 1    glipiZIDE (GLUCOTROL) 5 MG tablet Take 1 tablet by mouth once daily 90 tablet 1    lancets (LANCETS,THIN) Misc Once daily glucose testing. 50 each 6    lisinopriL (PRINIVIL,ZESTRIL) 40 MG tablet Take 1 tablet (40 mg total) by mouth once daily. 90 tablet 1    metFORMIN (GLUCOPHAGE) 1000 MG tablet TAKE 1 TABLET BY MOUTH TWICE DAILY WITH MEALS 180 tablet 1    metoprolol succinate (TOPROL-XL) 50 MG 24 hr tablet Take 2 tablets in am and 1 tablet in the evening DAILY 120 tablet 11    nicotine (NICODERM CQ) 21 mg/24 hr Place 1 patch onto the skin once  "daily. 28 patch 0    pen needle, diabetic (PEN NEEDLE) 31 gauge x 5/16" Ndle Use once daily with insulin injection. 50 each 3    prasugreL (EFFIENT) 10 mg Tab Take 1 tablet (10 mg total) by mouth once daily. 30 tablet 11    pravastatin (PRAVACHOL) 80 MG tablet Take 1 tablet (80 mg total) by mouth every evening. 41 tablet 11    sildenafiL (VIAGRA) 50 MG tablet Take 1 tablet (50 mg total) by mouth daily as needed for Erectile Dysfunction. 30 tablet 5    traZODone (DESYREL) 50 MG tablet Take 1 tablet (50 mg total) by mouth every evening. 30 tablet 2    gabapentin (NEURONTIN) 300 MG capsule Take 1 capsule (300 mg total) by mouth 3 (three) times daily. 90 capsule 11    ranolazine (RANEXA) 500 MG Tb12 Take 1 tablet (500 mg total) by mouth 2 (two) times daily. 60 tablet 11     No current facility-administered medications on file prior to visit.     He has No Known Allergies..      BP Readings from Last 3 Encounters:   08/21/23 120/82   07/19/23 (!) 152/82   07/19/23 (!) 156/84     Snoring / Sleep:     [place] Questionnaire (validated MARIYA screening questionnaire)    YES -- Snoring/apnea    YES -- Fatigue    Body mass index is 30.94 kg/m².  (>25 is overweight, >30 is obese)    Blood Pressure = Hypertension  (PreHTN 120-139/80-89, Stg1 140-159/90-99, Stg2 >160/>100)  [place] = 3 of three MARIYA categories are positive (high risk is 2-3 positive categories)     [place] Sleepiness Scale TOTAL =    EPWORTH SLEEPINESS SCALE 8/21/2023   Sitting and reading 0   Watching TV 1   Sitting, inactive in a public place (e.g. a theatre or a meeting) 0   As a passenger in a car for an hour without a break 0   Lying down to rest in the afternoon when circumstances permit 0   Sitting and talking to someone 0   Sitting quietly after a lunch without alcohol 1   In a car, while stopped for a few minutes in traffic 0   Total score 2      (validated sleepiness questionnaire with a higher score indicating greater sleepiness; range 0-24)  No flowsheet " "data found.    STOP-Bang Questionnaire (validated MARIYA screening questionnaire)  Negative unless checked off.  [x] Snoring    [x]  Tired/Fatigued/Sleepy  [x] Obstruction (apneas/choking)  [] Pressure (HTN)  [] BMI >35  [x] Age >50  [] Neck >40 cm  [x] Gender male   STOP-Bang = 5 (low risk 0-2,high risk 3-8)    Neck circumference 17"      MMRC Dyspnea Scale (4 is worst)     [x] MMRC 0: Dyspneic on strenuous excercise (0 points)    [] MMRC 1: Dyspneic on walking a slight hill (0 points)    [] MMRC 2: Dyspneic on walking level ground; must stop occasionally due to breathlessness (1 point)    [] MMRC 3: Must stop for breathlessness after walking 100 yards or after a few minutes (2 points)    [] MMRC 4: Cannot leave house; breathless on dressing/undressing (3 points)                     No flowsheet data found.              Objective:     Vital Signs (Most Recent)  Vital Signs  Pulse: 79  Resp: 18  SpO2: 99 %  BP: 120/82  Pain Score: 0-No pain  Height and Weight  Height: 6' 2" (188 cm)  Weight: 109.3 kg (241 lb)  BSA (Calculated - sq m): 2.39 sq meters  BMI (Calculated): 30.9  Weight in (lb) to have BMI = 25: 194.3]  Wt Readings from Last 2 Encounters:   08/21/23 109.3 kg (241 lb)   07/19/23 110.2 kg (242 lb 15.2 oz)       Physical Exam  Vitals and nursing note reviewed.   Constitutional:       Appearance: Normal appearance.          Comments: Neck 17"   HENT:      Head: Normocephalic and atraumatic.      Right Ear: Tympanic membrane normal.      Nose: Nose normal.   Eyes:      Pupils: Pupils are equal, round, and reactive to light.   Cardiovascular:      Rate and Rhythm: Normal rate and regular rhythm.      Pulses: Normal pulses.      Heart sounds: Normal heart sounds.   Pulmonary:      Effort: Pulmonary effort is normal.      Breath sounds: Normal breath sounds.   Abdominal:      General: Bowel sounds are normal.      Palpations: Abdomen is soft.   Musculoskeletal:         General: Normal range of motion.      Cervical " "back: Normal range of motion.   Skin:     General: Skin is warm and dry.      Capillary Refill: Capillary refill takes less than 2 seconds.   Neurological:      General: No focal deficit present.      Mental Status: He is alert and oriented to person, place, and time.   Psychiatric:         Mood and Affect: Mood normal.          Laboratory  Lab Results   Component Value Date    WBC 13.33 (H) 06/20/2023    RBC 4.15 (L) 06/20/2023    HGB 12.7 (L) 06/20/2023    HCT 40.4 06/20/2023    MCV 97 06/20/2023    MCH 30.6 06/20/2023    MCHC 31.4 (L) 06/20/2023    RDW 13.1 06/20/2023     06/20/2023    MPV 10.2 06/20/2023    GRAN 6.9 06/20/2023    GRAN 51.4 06/20/2023    LYMPH 4.6 06/20/2023    LYMPH 34.6 06/20/2023    MONO 0.8 06/20/2023    MONO 6.2 06/20/2023    EOS 0.9 (H) 06/20/2023    BASO 0.10 06/20/2023    EOSINOPHIL 6.5 06/20/2023    BASOPHIL 0.8 06/20/2023       BMP  Lab Results   Component Value Date     05/12/2023    K 4.4 05/12/2023     05/12/2023    CO2 27 05/12/2023    BUN 13 05/12/2023    CREATININE 1.4 05/12/2023    CALCIUM 9.5 05/12/2023    ANIONGAP 7 (L) 05/12/2023    ESTGFRAFRICA >60 06/21/2022    EGFRNONAA >60 06/21/2022    AST 15 05/12/2023    ALT 12 05/12/2023    PROT 6.9 05/12/2023          No results found for: "IGE"     No results found for: "ASPERGILLUS"  No results found for: "AFUMIGATUSCL"     No results found for: "ACE"     Diagnostic Results:  I have personally reviewed today the following studies:    Exercise Stress - EKG    The ECG portion of the study is negative for ischemia. Sensitivity is   reduced secondary to the target heart rate not being achieved.    Sensitivity is reduced secondary to the target heart rate not being   achieved.    The patient reported no chest pain during the stress test.    During stress, rare PVCs are noted.    The exercise capacity was severely impaired.    Cardiac Rehab entry.       PHYSICIAN INTERPRETATION AND COMMENTS: Findings are consistent with " moderate, positional obstructive sleep  apnea(MARIYA). INLAB CPAP titration indicated, please refer to sleep disorders clinic  CLINICAL HISTORY: 53 year old male presented with: 17 inch neck, BMI of 30.8, an [place] sleepiness score of 2, history of  hypertension, heart disease, diabetes, depression and symptoms of nocturnal snoring, waking up choking and witnessed  apneas. Based on the clinical history, the patient has a high pre-test probability of having Severe MARIYA.  SLEEP STUDY FINDINGS: Patient underwent a 1 night Home Sleep Test and by behavioral criteria, slept for approximately  6.18 hours, with a sleep latency of 6 minutes and a sleep efficiency of 90%. Mild sleep disordered breathing (AHI=10) is  noted based on a 4% hypopnea desaturation criteria, predominantly in the supine position (14 events/hour). The patient  slept supine 63.7% of the night based on valid recording time of 6.25 hours and is 4.7 times as likely to have  apneas/hypopneas when supine. When considering more subtle measures of sleep disordered breathing, the overall  respiratory disturbance index is moderate(RDI=24) based on a 1% hypopnea desaturation criteria with confirmation by  surrogate arousal indicators. The apneas/hypopneas are accompanied by minimal oxygen desaturation (percent time  below 90% SpO2: 0%, Min SpO2: 92.7%). The average desaturation across all sleep disordered breathing events is 1.9%.  Snoring occurs for 0.7% (30 dB) of the study. The mean pulse rate is 74.7 BPM, with frequent pulse rate variability (51 events  with >= 6 BPM increase/decrease per hour).  TREATMENT CONSIDERATIONS: Consider an attended CPAP titration study, given the reported Heart disease. Consider nasal  continuous positive airway pressure based on the RDI severity and co-morbidities. A mandibular advancement splint  (MAS) or referral to an ENT surgeon for modification to the airway should be considered to reduce the potential  contribution of MARIYA on  existing diseases if the patient prefers an alternative therapy or the CPAP trial is unsuccessful.  Based on the MARIYA Supine data in the study, a Mandibular Advancement Splint (MAS) will likely provide treatment benefit  independent of MARIYA severity. The patient should avoid sleeping supine; the non-supine AHI is 4.7 times less severe than the  supine AHI.    EXAMINATION:  XR CHEST AP PORTABLE     CLINICAL HISTORY:  chest pain;     TECHNIQUE:  Single frontal view of the chest was performed.     COMPARISON:  Multiple priors.     FINDINGS:  The lungs are clear, with normal appearance of pulmonary vasculature and no pleural effusion or pneumothorax.     The cardiac silhouette is normal in size. The hilar and mediastinal contours are unremarkable.     Bones are intact.     Impression:     No acute abnormality.    Assessment/Plan:     Problem List Items Addressed This Visit       Hyperlipidemia LDL goal <70 (Chronic)     On Pravachol         Hypertension goal BP (blood pressure) < 130/80 (Chronic)     On Lisinopril         Tobacco use disorder       Ready to quit: Yes  Counseling given: No  Tobacco comments: Smoked 1.5-2 packs per day until April 2023, now smoking 10 cpd            Controlled type 2 diabetes mellitus with microalbuminuria, without long-term current use of insulin     On metformin and Glipizide, jardiance         Relevant Orders    FERRITIN    Insomnia due to medical condition     Symptoms 2 year  On Trazodone  Bed time 09:30pm  SOL: 2 hrs  Wake time: 4 am  No Naps  Legs hurt getting inti bed           Relevant Orders    FERRITIN     Other Visit Diagnoses       MARIYA (obstructive sleep apnea)    -  Primary    Relevant Orders    CPAP FOR HOME USE           Goals of CPAP  Check ferritin  May also have RLS: consider added Requip  Smoking cessation  Followup with LDCT since meets criteria       Follow up in about 10 weeks (around 10/30/2023), or follow with Mary, Smoking cessation, CPAP orders, Download, goals  of CPAP, ferritin.    This note was prepared using voice recognition system and is likely to have sound alike errors that may have been overlooked even after proof reading.  Please call me with any questions    Discussed diagnosis, its evaluation, treatment and usual course. All questions answered.    Thank you for the courtesy of participating in the care of this patient    Dwayne Matthews MD      Personal Diagnostic Review  []  CXR    []  ECHO    []  ONSAT    []  6MWD    []  LABS    []  CHEST CT    []  PET CT    []  Biopsy results              Problems Address                                                 Amount and/or Complexity                                                                      Risk       3           [] 2 or more self-limited or minor problems                      [] Limited                                                                        [] Low                  [] 1 stable chronic illness                                                  Any combination of the two                                               OTC drugs                  []Acute, uncomplicated illness or injury                            Review of prior external notes from unique source           Minor surgery with no risk factors                                                                                                               [] 1 []2  []3+                                                                                                              Review of results from each unique test                                                                                                               [] 1 []2  [] 3+                                                                                                              Order of each unique test                                                                                                               [] 1 []2  [] 3+                                                                                                               Or                                                                                                             [] Assessment requiring an independent historian      4            [] One or more chronic illness with exacerbation,              [] Moderate                                                                      [] Moderate                 Progression, or side effects of treatment                            -test documents or independent historians                        Prescription drug management                []  2 or more sable chronic illnesses                                    [] Independent interpretation of tests                              Minor surgery with identifiable risk                [] 1 undiagnosed new problem with uncertain prognosis    [] Discussion or management of test results                    elective major surgery                [] 1 acute illness with                systemic symptoms                                                                                                                                                              [] 1 acute complicated injury                                                                                                                                          Elective major surgery                                                                                                                                                                                                                                                                                                                                                                                                  5            [] 1 or more chronic illnesses with severe exacerbation,     [] Extensive(two from below)                                         [] High                                                                                                                [] Independent interpretation of results                         Drug therapy requiring intensive                                                                                                               []Discussion of management or test interpretation           monitoring                                                                                                                                                                                                       Decision to de-escalate care                 [] 1 acute or chronic illness or injury that poses a threat                                                                                               Decision regarding hospitalization

## 2023-08-21 NOTE — ASSESSMENT & PLAN NOTE
Symptoms 2 year  On Trazodone  Bed time 09:30pm  SOL: 2 hrs  Wake time: 4 am  No Naps  Legs hurt getting inti bed

## 2023-08-23 ENCOUNTER — CLINICAL SUPPORT (OUTPATIENT)
Dept: CARDIAC REHAB | Facility: HOSPITAL | Age: 53
End: 2023-08-23
Payer: COMMERCIAL

## 2023-08-23 DIAGNOSIS — I25.118 CORONARY ARTERY DISEASE OF NATIVE ARTERY OF NATIVE HEART WITH STABLE ANGINA PECTORIS: Primary | ICD-10-CM

## 2023-08-23 PROCEDURE — 93798 PR CARDIAC REHAB/MONITOR: ICD-10-PCS | Mod: ,,, | Performed by: INTERNAL MEDICINE

## 2023-08-23 PROCEDURE — 93798 PHYS/QHP OP CAR RHAB W/ECG: CPT | Mod: ,,, | Performed by: INTERNAL MEDICINE

## 2023-08-23 NOTE — PROGRESS NOTES
Patient tolerated cardiac rehab session well. Patient also participated in 30 minutes of education today that was provided on CPR. Patient was present for the entire education class, participated, and asked questions. All questions were answered. Patient verbalized understanding.

## 2023-08-25 ENCOUNTER — TELEPHONE (OUTPATIENT)
Dept: CARDIAC REHAB | Facility: HOSPITAL | Age: 53
End: 2023-08-25
Payer: COMMERCIAL

## 2023-08-25 ENCOUNTER — CLINICAL SUPPORT (OUTPATIENT)
Dept: CARDIAC REHAB | Facility: HOSPITAL | Age: 53
End: 2023-08-25
Payer: COMMERCIAL

## 2023-08-25 DIAGNOSIS — I21.4 NSTEMI (NON-ST ELEVATED MYOCARDIAL INFARCTION): ICD-10-CM

## 2023-08-25 DIAGNOSIS — I25.118 CORONARY ARTERY DISEASE OF NATIVE ARTERY OF NATIVE HEART WITH STABLE ANGINA PECTORIS: Primary | ICD-10-CM

## 2023-08-25 PROCEDURE — 93798 PHYS/QHP OP CAR RHAB W/ECG: CPT | Mod: ,,, | Performed by: INTERNAL MEDICINE

## 2023-08-25 PROCEDURE — 93798 PR CARDIAC REHAB/MONITOR: ICD-10-PCS | Mod: ,,, | Performed by: INTERNAL MEDICINE

## 2023-08-25 NOTE — TELEPHONE ENCOUNTER
Patient requests to move to the following cardiac rehab class: M/W/F at 330pm. Patient to start new class time on Monday 8/28/23. Patient aware and verbalized understanding.

## 2023-08-25 NOTE — TELEPHONE ENCOUNTER
Patient requesting to move to the following Cardiac Rehab class: M/W/F at 330pm due to his work schedule. Patient advised to let me know by the end of today so spot does not get filled. Patient verbalized understanding.

## 2023-08-27 DIAGNOSIS — I25.10 CORONARY ARTERY DISEASE, UNSPECIFIED VESSEL OR LESION TYPE, UNSPECIFIED WHETHER ANGINA PRESENT, UNSPECIFIED WHETHER NATIVE OR TRANSPLANTED HEART: ICD-10-CM

## 2023-08-28 ENCOUNTER — CLINICAL SUPPORT (OUTPATIENT)
Dept: CARDIAC REHAB | Facility: HOSPITAL | Age: 53
End: 2023-08-28
Payer: COMMERCIAL

## 2023-08-28 DIAGNOSIS — I25.118 CORONARY ARTERY DISEASE OF NATIVE ARTERY OF NATIVE HEART WITH STABLE ANGINA PECTORIS: Primary | ICD-10-CM

## 2023-08-28 PROCEDURE — 93798 PHYS/QHP OP CAR RHAB W/ECG: CPT | Mod: ,,, | Performed by: INTERNAL MEDICINE

## 2023-08-28 PROCEDURE — 93798 PR CARDIAC REHAB/MONITOR: ICD-10-PCS | Mod: ,,, | Performed by: INTERNAL MEDICINE

## 2023-08-28 RX ORDER — METOPROLOL SUCCINATE 50 MG/1
TABLET, EXTENDED RELEASE ORAL
Qty: 90 TABLET | Refills: 0 | Status: SHIPPED | OUTPATIENT
Start: 2023-08-28 | End: 2023-09-13 | Stop reason: SDUPTHER

## 2023-08-28 NOTE — TELEPHONE ENCOUNTER
Last seen 07/19/23. F/U w/ Dr. Lara scheduled for 10/26/23    Please see the attached refill request.

## 2023-08-31 ENCOUNTER — TELEPHONE (OUTPATIENT)
Dept: SMOKING CESSATION | Facility: CLINIC | Age: 53
End: 2023-08-31
Payer: COMMERCIAL

## 2023-09-01 ENCOUNTER — TELEPHONE (OUTPATIENT)
Dept: SLEEP MEDICINE | Facility: CLINIC | Age: 53
End: 2023-09-01
Payer: COMMERCIAL

## 2023-09-01 DIAGNOSIS — R79.0 LOW FERRITIN: Primary | ICD-10-CM

## 2023-09-01 RX ORDER — FERROUS FUM/VIT C/B12-IF/FOLIC 110-0.5MG
1 CAPSULE ORAL 2 TIMES DAILY
Qty: 60 CAPSULE | Refills: 2 | Status: SHIPPED | OUTPATIENT
Start: 2023-09-01 | End: 2023-09-25

## 2023-09-01 NOTE — TELEPHONE ENCOUNTER
Requested Prescriptions     Signed Prescriptions Disp Refills    iron fum-B12-IF-C-folic acid (FEROCON) 110-0.5 mg capsule 60 capsule 2     Sig: Take 1 capsule by mouth 2 (two) times daily.     Authorizing Provider: SOMMER SULTANA

## 2023-09-06 ENCOUNTER — CLINICAL SUPPORT (OUTPATIENT)
Dept: CARDIAC REHAB | Facility: HOSPITAL | Age: 53
End: 2023-09-06
Attending: INTERNAL MEDICINE
Payer: COMMERCIAL

## 2023-09-06 DIAGNOSIS — I25.118 CORONARY ARTERY DISEASE OF NATIVE ARTERY OF NATIVE HEART WITH STABLE ANGINA PECTORIS: Primary | ICD-10-CM

## 2023-09-06 PROCEDURE — 93798 PR CARDIAC REHAB/MONITOR: ICD-10-PCS | Mod: ,,, | Performed by: STUDENT IN AN ORGANIZED HEALTH CARE EDUCATION/TRAINING PROGRAM

## 2023-09-06 PROCEDURE — 93798 PHYS/QHP OP CAR RHAB W/ECG: CPT | Mod: ,,, | Performed by: STUDENT IN AN ORGANIZED HEALTH CARE EDUCATION/TRAINING PROGRAM

## 2023-09-06 NOTE — PROGRESS NOTES
Patient tolerated cardiac rehab session well. Patient also participated in 30 minutes of education today that was provided on cholesterol and fat. Patient was present for the entire education class, participated, and asked questions. All questions were answered. Patient verbalized understanding.

## 2023-09-08 ENCOUNTER — TELEPHONE (OUTPATIENT)
Dept: CARDIAC REHAB | Facility: HOSPITAL | Age: 53
End: 2023-09-08
Payer: COMMERCIAL

## 2023-09-08 ENCOUNTER — CLINICAL SUPPORT (OUTPATIENT)
Dept: CARDIAC REHAB | Facility: HOSPITAL | Age: 53
End: 2023-09-08
Payer: COMMERCIAL

## 2023-09-08 DIAGNOSIS — I25.118 CORONARY ARTERY DISEASE OF NATIVE ARTERY OF NATIVE HEART WITH STABLE ANGINA PECTORIS: Primary | ICD-10-CM

## 2023-09-08 PROCEDURE — 93798 PR CARDIAC REHAB/MONITOR: ICD-10-PCS | Mod: ,,, | Performed by: INTERNAL MEDICINE

## 2023-09-08 PROCEDURE — 93798 PHYS/QHP OP CAR RHAB W/ECG: CPT | Mod: ,,, | Performed by: INTERNAL MEDICINE

## 2023-09-08 NOTE — TELEPHONE ENCOUNTER
Today in Cardiac Rehab, patient reports intermittent dizzy episodes with both standing and sitting positions. Patient denies any additional symptoms but reports a headache after the dizzy episodes every time. Per patient, he states headaches are new to him. Patient worked out for the entire session today. Patient's vitals in Rehab Class today were as follows:    Resting B/P: 138/77; Blood Sugar: 89 (Patient ate on the way to Cardiac Rehab class)  B/P during workout: 156/92; 153/88  Recovery B/P: 119/84; Blood Sugar: 80    Patient reports seeing Dr. Matthews with pulmonology recently and did not discuss the above symptoms with him. Symptoms began approximately 2-3 weeks ago. Patient states he was prescribed iron for anemia which he has not started taking yet. He also will start using a CPAP next week when sleeping due to new diagnosis of sleep apnea. He was also advised by Dr. Matthews that he has restless leg syndrome. Patient states he has been taking all other medication as prescribed. Patient also reports bilateral leg pain that worsens at night when sleeping.    Please advise. Thank you.

## 2023-09-11 ENCOUNTER — TELEPHONE (OUTPATIENT)
Dept: CARDIAC REHAB | Facility: HOSPITAL | Age: 53
End: 2023-09-11

## 2023-09-11 ENCOUNTER — CLINICAL SUPPORT (OUTPATIENT)
Dept: CARDIAC REHAB | Facility: HOSPITAL | Age: 53
End: 2023-09-11
Payer: COMMERCIAL

## 2023-09-11 DIAGNOSIS — I50.42 CHRONIC COMBINED SYSTOLIC AND DIASTOLIC HEART FAILURE: Primary | ICD-10-CM

## 2023-09-11 DIAGNOSIS — E78.5 HYPERLIPIDEMIA LDL GOAL <70: Chronic | ICD-10-CM

## 2023-09-11 DIAGNOSIS — I25.118 CORONARY ARTERY DISEASE OF NATIVE ARTERY OF NATIVE HEART WITH STABLE ANGINA PECTORIS: Primary | ICD-10-CM

## 2023-09-11 DIAGNOSIS — Z79.4 TYPE 2 DIABETES MELLITUS WITH DIABETIC NEUROPATHY, WITH LONG-TERM CURRENT USE OF INSULIN: Chronic | ICD-10-CM

## 2023-09-11 DIAGNOSIS — E11.40 TYPE 2 DIABETES MELLITUS WITH DIABETIC NEUROPATHY, WITH LONG-TERM CURRENT USE OF INSULIN: Chronic | ICD-10-CM

## 2023-09-11 DIAGNOSIS — I21.4 NSTEMI (NON-ST ELEVATED MYOCARDIAL INFARCTION): ICD-10-CM

## 2023-09-11 PROCEDURE — 93798 PHYS/QHP OP CAR RHAB W/ECG: CPT | Mod: ,,, | Performed by: INTERNAL MEDICINE

## 2023-09-11 PROCEDURE — 93798 PR CARDIAC REHAB/MONITOR: ICD-10-PCS | Mod: ,,, | Performed by: INTERNAL MEDICINE

## 2023-09-11 NOTE — TELEPHONE ENCOUNTER
Patient scheduled for the next available appt on 9/15 per his request. Per patient, over the weekend he developed bilateral leg/foot swelling in addition to his dizzy episodes and headaches previously mentioned. Patient reports he has been taking all medications as prescribed. Patient also reports increased SOB with exertion. Patient's resting vitals today in rehab are as follows:    /80  Pulse 100  Blood Glucose 84 (patient reports eating right before class today)    Patient reports 3 lb weight gain since Friday 9/8/23; however, Cardiac Rehab records records state a weight of 245 lb on 9/8 and a weight of 243 lb today.

## 2023-09-13 ENCOUNTER — PATIENT MESSAGE (OUTPATIENT)
Dept: INTERNAL MEDICINE | Facility: CLINIC | Age: 53
End: 2023-09-13
Payer: COMMERCIAL

## 2023-09-13 ENCOUNTER — CLINICAL SUPPORT (OUTPATIENT)
Dept: CARDIAC REHAB | Facility: HOSPITAL | Age: 53
End: 2023-09-13
Payer: COMMERCIAL

## 2023-09-13 DIAGNOSIS — E78.5 HYPERLIPIDEMIA LDL GOAL <70: Chronic | ICD-10-CM

## 2023-09-13 DIAGNOSIS — I25.118 CORONARY ARTERY DISEASE OF NATIVE ARTERY OF NATIVE HEART WITH STABLE ANGINA PECTORIS: ICD-10-CM

## 2023-09-13 DIAGNOSIS — I25.10 CAD IN NATIVE ARTERY: ICD-10-CM

## 2023-09-13 DIAGNOSIS — E11.65 UNCONTROLLED TYPE 2 DIABETES MELLITUS WITH HYPERGLYCEMIA, WITHOUT LONG-TERM CURRENT USE OF INSULIN: ICD-10-CM

## 2023-09-13 DIAGNOSIS — I25.10 CORONARY ARTERY DISEASE, UNSPECIFIED VESSEL OR LESION TYPE, UNSPECIFIED WHETHER ANGINA PRESENT, UNSPECIFIED WHETHER NATIVE OR TRANSPLANTED HEART: ICD-10-CM

## 2023-09-13 DIAGNOSIS — E11.29 CONTROLLED TYPE 2 DIABETES MELLITUS WITH MICROALBUMINURIA, WITH LONG-TERM CURRENT USE OF INSULIN: ICD-10-CM

## 2023-09-13 DIAGNOSIS — R80.9 CONTROLLED TYPE 2 DIABETES MELLITUS WITH MICROALBUMINURIA, WITH LONG-TERM CURRENT USE OF INSULIN: ICD-10-CM

## 2023-09-13 DIAGNOSIS — I10 HYPERTENSION GOAL BP (BLOOD PRESSURE) < 130/80: Chronic | ICD-10-CM

## 2023-09-13 DIAGNOSIS — G47.00 INSOMNIA, UNSPECIFIED TYPE: ICD-10-CM

## 2023-09-13 DIAGNOSIS — I25.10 CORONARY ARTERY DISEASE, UNSPECIFIED VESSEL OR LESION TYPE, UNSPECIFIED WHETHER ANGINA PRESENT, UNSPECIFIED WHETHER NATIVE OR TRANSPLANTED HEART: Primary | ICD-10-CM

## 2023-09-13 DIAGNOSIS — Z79.4 CONTROLLED TYPE 2 DIABETES MELLITUS WITH MICROALBUMINURIA, WITH LONG-TERM CURRENT USE OF INSULIN: ICD-10-CM

## 2023-09-13 DIAGNOSIS — I25.118 CORONARY ARTERY DISEASE OF NATIVE ARTERY OF NATIVE HEART WITH STABLE ANGINA PECTORIS: Primary | ICD-10-CM

## 2023-09-13 PROCEDURE — 93798 PR CARDIAC REHAB/MONITOR: ICD-10-PCS | Mod: ,,, | Performed by: STUDENT IN AN ORGANIZED HEALTH CARE EDUCATION/TRAINING PROGRAM

## 2023-09-13 PROCEDURE — 93798 PHYS/QHP OP CAR RHAB W/ECG: CPT | Mod: ,,, | Performed by: STUDENT IN AN ORGANIZED HEALTH CARE EDUCATION/TRAINING PROGRAM

## 2023-09-13 RX ORDER — METOPROLOL SUCCINATE 50 MG/1
TABLET, EXTENDED RELEASE ORAL
Qty: 90 TABLET | Refills: 11 | Status: SHIPPED | OUTPATIENT
Start: 2023-09-13 | End: 2023-09-15 | Stop reason: SDUPTHER

## 2023-09-13 RX ORDER — TRAZODONE HYDROCHLORIDE 50 MG/1
TABLET ORAL
Refills: 0 | OUTPATIENT
Start: 2023-09-13

## 2023-09-13 RX ORDER — METFORMIN HYDROCHLORIDE 1000 MG/1
TABLET ORAL
Refills: 0 | OUTPATIENT
Start: 2023-09-13

## 2023-09-13 RX ORDER — PRASUGREL 10 MG/1
10 TABLET, FILM COATED ORAL DAILY
Qty: 90 TABLET | Refills: 3 | Status: SHIPPED | OUTPATIENT
Start: 2023-09-13 | End: 2023-09-15 | Stop reason: SDUPTHER

## 2023-09-13 RX ORDER — LISINOPRIL 40 MG/1
TABLET ORAL
Refills: 0 | OUTPATIENT
Start: 2023-09-13

## 2023-09-13 RX ORDER — RANOLAZINE 500 MG/1
500 TABLET, EXTENDED RELEASE ORAL 2 TIMES DAILY
Qty: 180 TABLET | Refills: 3 | Status: SHIPPED | OUTPATIENT
Start: 2023-09-13 | End: 2023-09-15 | Stop reason: SDUPTHER

## 2023-09-13 RX ORDER — PRAVASTATIN SODIUM 80 MG/1
TABLET ORAL
Refills: 0 | OUTPATIENT
Start: 2023-09-13

## 2023-09-13 RX ORDER — GLIPIZIDE 5 MG/1
TABLET ORAL
Refills: 0 | OUTPATIENT
Start: 2023-09-13

## 2023-09-13 NOTE — TELEPHONE ENCOUNTER
Refill Decision Note   Laron Kothari  is requesting a refill authorization.  Brief Assessment and Rationale for Refill:  Quick Discontinue     Medication Therapy Plan:    Pharmacy is requesting new scripts for the following medications without required information, (sig/ frequency/qty/etc)      Medication Reconciliation Completed: No     Comments: Pharmacies have been requesting medications for patients without required information, (sig, frequency, qty, etc.). In addition, requests are sent for medication(s) pt. are currently not taking, and medications patients have never taken.    We have spoken to the pharmacies about these request types and advised their teams previously that we are unable to assess these New Script requests and require all details for these requests. This is a known issue and has been reported.     Note composed:12:37 PM 09/13/2023

## 2023-09-13 NOTE — TELEPHONE ENCOUNTER
Refill Routing Note   Medication(s) are not appropriate for processing by [facility] for the following reason(s):      Non-participating provider    [facility] action(s):  Route Care Due:  None identified              Appointments  past 12m or future 3m with PCP    Date Provider   Last Visit   7/19/2023 Ave Hawkins NP   Next Visit   9/15/2023 Ave Hawkins NP   ED visits in past 90 days: 0        Note composed:12:12 PM 09/13/2023

## 2023-09-13 NOTE — PROGRESS NOTES
Patient tolerated cardiac rehab session well. Patient also participated in 30 minutes of education today that was provided on Mediterranean Diet. Patient was present for the entire education class, participated, and asked questions. All questions were answered. Patient verbalized understanding.

## 2023-09-14 NOTE — TELEPHONE ENCOUNTER
----- Message from Heather Villarreal sent at 9/14/2023  4:37 PM CDT -----  Type:  Pharmacy Calling to Clarify an RX    Name of Caller:tien cantu  [facility]  Prescription Name:Lisinopril  /provastatin tab / glipizide tab / metformin acl tab /  trazadone  What do they need to clarify?:90 day refill  Best Call Back Number:[phone]  Additional Information: reference # [id number]

## 2023-09-15 ENCOUNTER — OFFICE VISIT (OUTPATIENT)
Dept: CARDIOLOGY | Facility: CLINIC | Age: 53
End: 2023-09-15
Payer: COMMERCIAL

## 2023-09-15 ENCOUNTER — LAB VISIT (OUTPATIENT)
Dept: LAB | Facility: HOSPITAL | Age: 53
End: 2023-09-15
Payer: COMMERCIAL

## 2023-09-15 VITALS
RESPIRATION RATE: 16 BRPM | SYSTOLIC BLOOD PRESSURE: 130 MMHG | HEIGHT: 74 IN | BODY MASS INDEX: 31.21 KG/M2 | WEIGHT: 243.19 LBS | OXYGEN SATURATION: 99 % | DIASTOLIC BLOOD PRESSURE: 76 MMHG | HEART RATE: 95 BPM

## 2023-09-15 DIAGNOSIS — E11.29 CONTROLLED TYPE 2 DIABETES MELLITUS WITH MICROALBUMINURIA, WITH LONG-TERM CURRENT USE OF INSULIN: ICD-10-CM

## 2023-09-15 DIAGNOSIS — I25.118 CORONARY ARTERY DISEASE OF NATIVE ARTERY OF NATIVE HEART WITH STABLE ANGINA PECTORIS: ICD-10-CM

## 2023-09-15 DIAGNOSIS — R80.9 CONTROLLED TYPE 2 DIABETES MELLITUS WITH MICROALBUMINURIA, WITH LONG-TERM CURRENT USE OF INSULIN: ICD-10-CM

## 2023-09-15 DIAGNOSIS — I82.402 LEG DVT (DEEP VENOUS THROMBOEMBOLISM), ACUTE, LEFT: Primary | ICD-10-CM

## 2023-09-15 DIAGNOSIS — I25.10 CORONARY ARTERY DISEASE, UNSPECIFIED VESSEL OR LESION TYPE, UNSPECIFIED WHETHER ANGINA PRESENT, UNSPECIFIED WHETHER NATIVE OR TRANSPLANTED HEART: ICD-10-CM

## 2023-09-15 DIAGNOSIS — I82.402 LEG DVT (DEEP VENOUS THROMBOEMBOLISM), ACUTE, LEFT: ICD-10-CM

## 2023-09-15 DIAGNOSIS — I10 HYPERTENSION GOAL BP (BLOOD PRESSURE) < 130/80: Chronic | ICD-10-CM

## 2023-09-15 DIAGNOSIS — E78.5 HYPERLIPIDEMIA LDL GOAL <70: Chronic | ICD-10-CM

## 2023-09-15 DIAGNOSIS — Z79.4 CONTROLLED TYPE 2 DIABETES MELLITUS WITH MICROALBUMINURIA, WITH LONG-TERM CURRENT USE OF INSULIN: ICD-10-CM

## 2023-09-15 PROCEDURE — 3051F PR MOST RECENT HEMOGLOBIN A1C LEVEL 7.0 - < 8.0%: ICD-10-PCS | Mod: CPTII,S$GLB,,

## 2023-09-15 PROCEDURE — 99214 PR OFFICE/OUTPT VISIT, EST, LEVL IV, 30-39 MIN: ICD-10-PCS | Mod: S$GLB,,,

## 2023-09-15 PROCEDURE — 85379 FIBRIN DEGRADATION QUANT: CPT

## 2023-09-15 PROCEDURE — 1160F PR REVIEW ALL MEDS BY PRESCRIBER/CLIN PHARMACIST DOCUMENTED: ICD-10-PCS | Mod: CPTII,S$GLB,,

## 2023-09-15 PROCEDURE — 3078F DIAST BP <80 MM HG: CPT | Mod: CPTII,S$GLB,,

## 2023-09-15 PROCEDURE — 3075F PR MOST RECENT SYSTOLIC BLOOD PRESS GE 130-139MM HG: ICD-10-PCS | Mod: CPTII,S$GLB,,

## 2023-09-15 PROCEDURE — 3008F PR BODY MASS INDEX (BMI) DOCUMENTED: ICD-10-PCS | Mod: CPTII,S$GLB,,

## 2023-09-15 PROCEDURE — 99999 PR PBB SHADOW E&M-EST. PATIENT-LVL IV: CPT | Mod: PBBFAC,,,

## 2023-09-15 PROCEDURE — 3078F PR MOST RECENT DIASTOLIC BLOOD PRESSURE < 80 MM HG: ICD-10-PCS | Mod: CPTII,S$GLB,,

## 2023-09-15 PROCEDURE — 3075F SYST BP GE 130 - 139MM HG: CPT | Mod: CPTII,S$GLB,,

## 2023-09-15 PROCEDURE — 36415 COLL VENOUS BLD VENIPUNCTURE: CPT

## 2023-09-15 PROCEDURE — 99999 PR PBB SHADOW E&M-EST. PATIENT-LVL IV: ICD-10-PCS | Mod: PBBFAC,,,

## 2023-09-15 PROCEDURE — 99214 OFFICE O/P EST MOD 30 MIN: CPT | Mod: S$GLB,,,

## 2023-09-15 PROCEDURE — 3061F PR NEG MICROALBUMINURIA RESULT DOCUMENTED/REVIEW: ICD-10-PCS | Mod: CPTII,S$GLB,,

## 2023-09-15 PROCEDURE — 3008F BODY MASS INDEX DOCD: CPT | Mod: CPTII,S$GLB,,

## 2023-09-15 PROCEDURE — 1160F RVW MEDS BY RX/DR IN RCRD: CPT | Mod: CPTII,S$GLB,,

## 2023-09-15 PROCEDURE — 3061F NEG MICROALBUMINURIA REV: CPT | Mod: CPTII,S$GLB,,

## 2023-09-15 PROCEDURE — 4010F ACE/ARB THERAPY RXD/TAKEN: CPT | Mod: CPTII,S$GLB,,

## 2023-09-15 PROCEDURE — 1159F PR MEDICATION LIST DOCUMENTED IN MEDICAL RECORD: ICD-10-PCS | Mod: CPTII,S$GLB,,

## 2023-09-15 PROCEDURE — 3066F NEPHROPATHY DOC TX: CPT | Mod: CPTII,S$GLB,,

## 2023-09-15 PROCEDURE — 3066F PR DOCUMENTATION OF TREATMENT FOR NEPHROPATHY: ICD-10-PCS | Mod: CPTII,S$GLB,,

## 2023-09-15 PROCEDURE — 4010F PR ACE/ARB THEARPY RXD/TAKEN: ICD-10-PCS | Mod: CPTII,S$GLB,,

## 2023-09-15 PROCEDURE — 1159F MED LIST DOCD IN RCRD: CPT | Mod: CPTII,S$GLB,,

## 2023-09-15 PROCEDURE — 3051F HG A1C>EQUAL 7.0%<8.0%: CPT | Mod: CPTII,S$GLB,,

## 2023-09-15 RX ORDER — ASPIRIN 81 MG/1
81 TABLET ORAL DAILY
Qty: 30 TABLET | Refills: 11 | Status: SHIPPED | OUTPATIENT
Start: 2023-09-15 | End: 2024-09-14

## 2023-09-15 RX ORDER — RANOLAZINE 500 MG/1
500 TABLET, EXTENDED RELEASE ORAL 2 TIMES DAILY
Qty: 180 TABLET | Refills: 3 | Status: SHIPPED | OUTPATIENT
Start: 2023-09-15 | End: 2024-09-15

## 2023-09-15 RX ORDER — PRAVASTATIN SODIUM 80 MG/1
80 TABLET ORAL NIGHTLY
Qty: 90 TABLET | Refills: 3 | Status: SHIPPED | OUTPATIENT
Start: 2023-09-15

## 2023-09-15 RX ORDER — PRAVASTATIN SODIUM 80 MG/1
80 TABLET ORAL NIGHTLY
Qty: 41 TABLET | Refills: 0 | Status: SHIPPED | OUTPATIENT
Start: 2023-09-15 | End: 2023-09-15 | Stop reason: SDUPTHER

## 2023-09-15 RX ORDER — METFORMIN HYDROCHLORIDE 1000 MG/1
1000 TABLET ORAL 2 TIMES DAILY WITH MEALS
Qty: 180 TABLET | Refills: 0 | Status: SHIPPED | OUTPATIENT
Start: 2023-09-15 | End: 2023-12-06

## 2023-09-15 RX ORDER — PRASUGREL 10 MG/1
10 TABLET, FILM COATED ORAL DAILY
Qty: 90 TABLET | Refills: 3 | Status: SHIPPED | OUTPATIENT
Start: 2023-09-15

## 2023-09-15 RX ORDER — LISINOPRIL 40 MG/1
40 TABLET ORAL DAILY
Qty: 90 TABLET | Refills: 0 | Status: SHIPPED | OUTPATIENT
Start: 2023-09-15 | End: 2023-09-15 | Stop reason: SDUPTHER

## 2023-09-15 RX ORDER — GLIPIZIDE 5 MG/1
5 TABLET ORAL DAILY
Qty: 90 TABLET | Refills: 0 | Status: SHIPPED | OUTPATIENT
Start: 2023-09-15 | End: 2023-12-06

## 2023-09-15 RX ORDER — METOPROLOL SUCCINATE 50 MG/1
TABLET, EXTENDED RELEASE ORAL
Qty: 90 TABLET | Refills: 3 | Status: SHIPPED | OUTPATIENT
Start: 2023-09-15 | End: 2023-11-07 | Stop reason: SDUPTHER

## 2023-09-15 RX ORDER — TRAZODONE HYDROCHLORIDE 50 MG/1
50 TABLET ORAL NIGHTLY
Qty: 30 TABLET | Refills: 0 | Status: SHIPPED | OUTPATIENT
Start: 2023-09-15 | End: 2023-10-27

## 2023-09-15 RX ORDER — LISINOPRIL 40 MG/1
40 TABLET ORAL DAILY
Qty: 90 TABLET | Refills: 3 | Status: SHIPPED | OUTPATIENT
Start: 2023-09-15 | End: 2023-12-11

## 2023-09-15 NOTE — PROGRESS NOTES
Subjective:   Patient ID:  Laron Kothari Jr. is a 53 y.o. male who presents for evaluation of Follow-up      Follow-up    Mr. Kothari is a 52 year old male patient whose current medical conditions include CAD s/p [facility] PCI x2 to RCA 4/23' by Dr. Lara, DVT in 2017, DM type II, HTN, hyperlipidemia, gout, neuropathy, and tobacco abuse who presents to clinic for palpitations. His wife reports he is not sleeping well    [facility] 4/11/23 dist LAD 80%, dist cx 90%, mid % with successful PCI  Echo with normal EF    5/12/23  Here today for 2 week follow up for palpitations post STEMI w/ PCI x2 RCA. Still complaining of fatigue, SOB and occasional palpitations. 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      [facility] 4/28/23 ST, PVC    7/19/23  Here today, for follow up. Reports improvement in fatigue, denies any CP with ADLs. Did not take his medication this morning yet, BP elevated in clinic. Overall doing well CV wise. 3 week in to cardiac rehab, tolerating well. He does reports trouble sleeping and some discomfort on his left side when laying down    9/15/23  Here today for CV follow up. Doing well in cardai rehab, has 6 sessions left. Back at work has some fatigue but improving. Reports LE edema and alejandra leg pain worsening in the last few weeks. Labs reviewed from last week BNP neg. Denies any CP, SOB at this time. Down to 6 cigarettes per day now. Weight stable    Past Medical History:   Diagnosis Date    Blood in stool 08/07/2018    Deep vein thrombosis (DVT) 2017    Left leg    Diabetes mellitus, type 2 2013    Gout     Hyperlipidemia     Hypertension     Neuropathy     Smoker        Past Surgical History:   Procedure Laterality Date    APPENDECTOMY      ARTHROSCOPIC CHONDROPLASTY OF KNEE JOINT Right 11/24/2020    Procedure: ARTHROSCOPY, KNEE, WITH CHONDROPLASTY;  Surgeon: Nahum Rose MD;  Location: [facility];  Service: Orthopedics;  Laterality:  Right;  chondroplasty medial condyle and anteriorly    COLONOSCOPY N/A 8/7/2018    Procedure: COLONOSCOPY;  Surgeon: Ten Valentine MD;  Location: [facility] ENDO;  Service: Endoscopy;  Laterality: N/A;    COLONOSCOPY N/A 2/23/2022    Procedure: COLONOSCOPY;  Surgeon: Octavio Craig MD;  Location: [facility] ENDO;  Service: Endoscopy;  Laterality: N/A;    KNEE ARTHROSCOPY W/ MENISCECTOMY Right 11/24/2020    Procedure: ARTHROSCOPY, KNEE, WITH MENISCECTOMY;  Surgeon: Nahum Rose MD;  Location: [facility] OR;  Service: Orthopedics;  Laterality: Right;  Partial medial and lateral meniscectomy    LEFT HEART CATHETERIZATION Left 4/11/2023    Procedure: Left heart cath;  Surgeon: Sue Lara MD;  Location: [facility] CATH LAB;  Service: Cardiology;  Laterality: Left;    PERCUTANEOUS TRANSLUMINAL BALLOON ANGIOPLASTY OF CORONARY ARTERY  4/11/2023    Procedure: Angioplasty-coronary;  Surgeon: Sue Lara MD;  Location: [facility] CATH LAB;  Service: Cardiology;;    ROBOT-ASSISTED CHOLECYSTECTOMY USING DA TRIPP XI N/A 1/21/2020    Procedure: XI ROBOTIC CHOLECYSTECTOMY;  Surgeon: Sebastien Rivera MD;  Location: [facility] OR;  Service: General;  Laterality: N/A;    STENT, DRUG ELUTING, SINGLE VESSEL, CORONARY  4/11/2023    Procedure: Stent, Drug Eluting, Single Vessel, Coronary;  Surgeon: Sue Lara MD;  Location: [facility] CATH LAB;  Service: Cardiology;;    TONSILLECTOMY, ADENOIDECTOMY         Social History     Tobacco Use    Smoking status: Every Day     Current packs/day: 1.50     Average packs/day: 1.5 packs/day for 36.1 years (54.2 ttl pk-yrs)     Types: Cigarettes     Start date: 2003    Smokeless tobacco: Never    Tobacco comments:     Smoked 1.5-2 packs per day until April 2023, now smoking 10 cpd   Substance Use Topics    Alcohol use: Yes     Alcohol/week: 26.0 standard drinks of alcohol     Types: 26 Cans of beer per week     Comment: daily    Drug use: No       Family History   Problem Relation Age of Onset    Diabetes Father   "   Prostate cancer Father     Cataracts Father     Hypertension Mother     Hypertension Brother        Current Outpatient Medications on File Prior to Visit   Medication Sig Dispense Refill    BLOOD PRESSURE CUFF Misc 1 Device by Misc.(Non-Drug; Combo Route) route once daily.  0    blood sugar diagnostic Strp Once daily glucose testing. 50 strip 6    blood-glucose meter Misc Use as directed. 1 each 0    empagliflozin (JARDIANCE) 10 mg tablet Take 1 tablet (10 mg total) by mouth once daily. 30 tablet 5    fluticasone propionate (FLONASE) 50 mcg/actuation nasal spray 1 spray (50 mcg total) by Each Nostril route once daily. 18.2 mL 1    glipiZIDE (GLUCOTROL) 5 MG tablet Take 1 tablet (5 mg total) by mouth once daily. 90 tablet 0    iron fum-B12-IF-C-folic acid (FEROCON) 110-0.5 mg capsule Take 1 capsule by mouth 2 (two) times daily. 60 capsule 2    lancets (LANCETS,THIN) Misc Once daily glucose testing. 50 each 6    metFORMIN (GLUCOPHAGE) 1000 MG tablet Take 1 tablet (1,000 mg total) by mouth 2 (two) times daily with meals. 180 tablet 0    nicotine (NICODERM CQ) 21 mg/24 hr Place 1 patch onto the skin once daily. 28 patch 0    pen needle, diabetic (PEN NEEDLE) 31 gauge x 5/16" Ndle Use once daily with insulin injection. 50 each 3    sildenafiL (VIAGRA) 50 MG tablet Take 1 tablet (50 mg total) by mouth daily as needed for Erectile Dysfunction. 30 tablet 5    traZODone (DESYREL) 50 MG tablet Take 1 tablet (50 mg total) by mouth every evening. 30 tablet 0    [DISCONTINUED] aspirin (ECOTRIN) 81 MG EC tablet Take 1 tablet (81 mg total) by mouth once daily. 30 tablet 11    [DISCONTINUED] lisinopriL (PRINIVIL,ZESTRIL) 40 MG tablet Take 1 tablet (40 mg total) by mouth once daily. 90 tablet 0    [DISCONTINUED] metoprolol succinate (TOPROL-XL) 50 MG 24 hr tablet Take 1 tablet by mouth once daily 90 tablet 11    [DISCONTINUED] prasugreL (EFFIENT) 10 mg Tab Take 1 tablet (10 mg total) by mouth once daily. 90 tablet 3    " [DISCONTINUED] pravastatin (PRAVACHOL) 80 MG tablet Take 1 tablet (80 mg total) by mouth every evening. 41 tablet 0    [DISCONTINUED] ranolazine (RANEXA) 500 MG Tb12 Take 1 tablet (500 mg total) by mouth 2 (two) times daily. 180 tablet 3    gabapentin (NEURONTIN) 300 MG capsule Take 1 capsule (300 mg total) by mouth 3 (three) times daily. 90 capsule 11     No current facility-administered medications on file prior to visit.      Wt Readings from Last 3 Encounters:   09/15/23 110.3 kg (243 lb 2.7 oz)   08/21/23 109.3 kg (241 lb)   07/19/23 110.2 kg (242 lb 15.2 oz)     Temp Readings from Last 3 Encounters:   07/19/23 96.6 °F (35.9 °C) (Tympanic)   04/21/23 98.7 °F (37.1 °C)   04/19/23 96.5 °F (35.8 °C) (Tympanic)     BP Readings from Last 3 Encounters:   09/15/23 130/76   08/21/23 120/82   07/19/23 (!) 152/82     Pulse Readings from Last 3 Encounters:   09/15/23 95   08/21/23 79   07/19/23 60        Review of Systems   Constitutional: Negative.   HENT: Negative.     Eyes: Negative.    Cardiovascular: Negative.    Respiratory: Negative.     Skin: Negative.    Musculoskeletal: Negative.    Gastrointestinal: Negative.    Genitourinary: Negative.    Neurological: Negative.    Psychiatric/Behavioral: Negative.         Objective:   Physical Exam  Vitals and nursing note reviewed.   Constitutional:       Appearance: Normal appearance.   HENT:      Head: Normocephalic and atraumatic.   Eyes:      General:         Right eye: No discharge.         Left eye: No discharge.      Pupils: Pupils are equal, round, and reactive to light.   Cardiovascular:      Rate and Rhythm: Normal rate and regular rhythm.      Heart sounds: S1 normal and S2 normal. No murmur heard.     No friction rub.   Pulmonary:      Effort: Pulmonary effort is normal. No respiratory distress.      Breath sounds: Normal breath sounds. No rales.   Abdominal:      Palpations: Abdomen is soft.      Tenderness: There is no abdominal tenderness.   Musculoskeletal:  "     Cervical back: Neck supple.      Right lower leg: No edema.      Left lower leg: No edema.   Skin:     General: Skin is warm and dry.   Neurological:      General: No focal deficit present.      Mental Status: He is alert and oriented to person, place, and time.   Psychiatric:         Mood and Affect: Mood normal.         Behavior: Behavior normal.         Thought Content: Thought content normal.         Lab Results   Component Value Date    CHOL 111 (L) 09/13/2023    CHOL 167 06/20/2023    CHOL 167 10/06/2022     Lab Results   Component Value Date    HDL 30 (L) 09/13/2023    HDL 34 (L) 06/20/2023    HDL 41 10/06/2022     Lab Results   Component Value Date    LDLCALC 56.8 (L) 09/13/2023    LDLCALC 93.4 06/20/2023    LDLCALC 109.4 10/06/2022     Lab Results   Component Value Date    TRIG 121 09/13/2023    TRIG 198 (H) 06/20/2023    TRIG 83 10/06/2022     Lab Results   Component Value Date    CHOLHDL 27.0 09/13/2023    CHOLHDL 20.4 06/20/2023    CHOLHDL 24.6 10/06/2022       Chemistry        Component Value Date/Time     09/13/2023 1451    K 4.0 09/13/2023 1451     09/13/2023 1451    CO2 20 (L) 09/13/2023 1451    BUN 9 09/13/2023 1451    CREATININE 1.0 09/13/2023 1451     (H) 09/13/2023 1451        Component Value Date/Time    CALCIUM 9.0 09/13/2023 1451    ALKPHOS 58 05/12/2023 0920    AST 15 05/12/2023 0920    ALT 12 05/12/2023 0920    BILITOT 0.4 05/12/2023 0920    ESTGFRAFRICA >60 06/21/2022 2017    EGFRNONAA >60 06/21/2022 2017          No results found for: "TSH"  Lab Results   Component Value Date    INR 1.0 04/20/2023    INR 1.0 04/11/2023    INR 0.9 01/06/2020     @RESUFAST(WBC,HGB,HCT,MCV,PLT)  @LABRCNTIP(BNP,BNPTRIAGEBLO)@  Estimated Creatinine Clearance: 114.2 mL/min (based on SCr of 1 mg/dL).     Results for orders placed during the hospital encounter of 04/11/23    Echo    Interpretation Summary  · The left ventricle is normal in size with concentric remodeling and normal systolic " function.  · Normal left ventricular diastolic function.  · The estimated PA systolic pressure is 18 mmHg.  · Normal right ventricular size with normal right ventricular systolic function.  · Normal central venous pressure (3 mmHg).  · The estimated ejection fraction is 55%.  · The ascending aorta is mildly dilated.  · There are segmental left ventricular wall motion abnormalities.     No results found for this or any previous visit.     Assessment:      1. Leg DVT (deep venous thromboembolism), acute, left    2. Coronary artery disease, unspecified vessel or lesion type, unspecified whether angina present, unspecified whether native or transplanted heart    3. Controlled type 2 diabetes mellitus with microalbuminuria, with long-term current use of insulin    4. Hypertension goal BP (blood pressure) < 130/80    5. Coronary artery disease of native artery of native heart with stable angina pectoris    6. Hyperlipidemia LDL goal <70            Plan:   Leg DVT (deep venous thromboembolism), acute, left  -     CV Ultrasound doppler venous legs bilat; Future  -     D-DIMER, QUANTITATIVE; Future; Expected date: 09/15/2023    Coronary artery disease, unspecified vessel or lesion type, unspecified whether angina present, unspecified whether native or transplanted heart  -     aspirin (ECOTRIN) 81 MG EC tablet; Take 1 tablet (81 mg total) by mouth once daily.  Dispense: 30 tablet; Refill: 11  -     metoprolol succinate (TOPROL-XL) 50 MG 24 hr tablet; Take 1 tablet by mouth once daily  Dispense: 90 tablet; Refill: 3  -     ranolazine (RANEXA) 500 MG Tb12; Take 1 tablet (500 mg total) by mouth 2 (two) times daily.  Dispense: 180 tablet; Refill: 3    Controlled type 2 diabetes mellitus with microalbuminuria, with long-term current use of insulin  -     lisinopriL (PRINIVIL,ZESTRIL) 40 MG tablet; Take 1 tablet (40 mg total) by mouth once daily.  Dispense: 90 tablet; Refill: 3    Hypertension goal BP (blood pressure) < 130/80  -      lisinopriL (PRINIVIL,ZESTRIL) 40 MG tablet; Take 1 tablet (40 mg total) by mouth once daily.  Dispense: 90 tablet; Refill: 3    Coronary artery disease of native artery of native heart with stable angina pectoris  -     prasugreL (EFFIENT) 10 mg Tab; Take 1 tablet (10 mg total) by mouth once daily.  Dispense: 90 tablet; Refill: 3    Hyperlipidemia LDL goal <70  -     pravastatin (PRAVACHOL) 80 MG tablet; Take 1 tablet (80 mg total) by mouth every evening.  Dispense: 90 tablet; Refill: 3        ASA, Effient, BB, Ranexa, statin- CAD  ACEi, BB, ASA- HTN  statin- HLD    Counseled on tobacco cessation and encouraged to stop drinking alcohol  Low Na diet  Risk factor modification and excercise program, weight loss      Follow up with Dr. Lara as scheduled in Oct or sooner with DARREL as needed    US rule out DVT, labs today with phone review    Courtney Guillot, FNP-C [facility]

## 2023-09-18 ENCOUNTER — CLINICAL SUPPORT (OUTPATIENT)
Dept: CARDIAC REHAB | Facility: HOSPITAL | Age: 53
End: 2023-09-18
Payer: COMMERCIAL

## 2023-09-18 DIAGNOSIS — I25.118 CORONARY ARTERY DISEASE OF NATIVE ARTERY OF NATIVE HEART WITH STABLE ANGINA PECTORIS: Primary | ICD-10-CM

## 2023-09-18 LAB — D DIMER PPP IA.FEU-MCNC: 0.42 MG/L FEU

## 2023-09-18 PROCEDURE — 93798 PHYS/QHP OP CAR RHAB W/ECG: CPT | Mod: ,,, | Performed by: STUDENT IN AN ORGANIZED HEALTH CARE EDUCATION/TRAINING PROGRAM

## 2023-09-18 PROCEDURE — 93798 PR CARDIAC REHAB/MONITOR: ICD-10-PCS | Mod: ,,, | Performed by: STUDENT IN AN ORGANIZED HEALTH CARE EDUCATION/TRAINING PROGRAM

## 2023-09-19 ENCOUNTER — HOSPITAL ENCOUNTER (OUTPATIENT)
Dept: CARDIOLOGY | Facility: HOSPITAL | Age: 53
Discharge: HOME | End: 2023-09-19
Payer: COMMERCIAL

## 2023-09-19 VITALS
SYSTOLIC BLOOD PRESSURE: 130 MMHG | HEIGHT: 74 IN | WEIGHT: 243 LBS | DIASTOLIC BLOOD PRESSURE: 76 MMHG | BODY MASS INDEX: 31.18 KG/M2

## 2023-09-19 DIAGNOSIS — I82.402 LEG DVT (DEEP VENOUS THROMBOEMBOLISM), ACUTE, LEFT: ICD-10-CM

## 2023-09-19 PROCEDURE — 93970 EXTREMITY STUDY: CPT | Mod: 26,,, | Performed by: INTERNAL MEDICINE

## 2023-09-19 PROCEDURE — 93970 EXTREMITY STUDY: CPT

## 2023-09-19 PROCEDURE — 93970 CV US DOPPLER VENOUS LEGS BILATERAL (CUPID ONLY): ICD-10-PCS | Mod: 26,,, | Performed by: INTERNAL MEDICINE

## 2023-09-20 ENCOUNTER — CLINICAL SUPPORT (OUTPATIENT)
Dept: CARDIAC REHAB | Facility: HOSPITAL | Age: 53
End: 2023-09-20
Payer: COMMERCIAL

## 2023-09-20 ENCOUNTER — TELEPHONE (OUTPATIENT)
Dept: CARDIAC REHAB | Facility: HOSPITAL | Age: 53
End: 2023-09-20

## 2023-09-20 DIAGNOSIS — I25.118 CORONARY ARTERY DISEASE OF NATIVE ARTERY OF NATIVE HEART WITH STABLE ANGINA PECTORIS: Primary | ICD-10-CM

## 2023-09-20 PROCEDURE — 93798 PR CARDIAC REHAB/MONITOR: ICD-10-PCS | Mod: ,,, | Performed by: INTERNAL MEDICINE

## 2023-09-20 PROCEDURE — 93798 PHYS/QHP OP CAR RHAB W/ECG: CPT | Mod: ,,, | Performed by: INTERNAL MEDICINE

## 2023-09-20 NOTE — TELEPHONE ENCOUNTER
Patient is requesting that someone call him to review his ultrasound results on bilateral legs as well as lab results (particularly concerned about his RBCs).

## 2023-09-20 NOTE — PROGRESS NOTES
Patient tolerated cardiac rehab session well. Patient also participated in 30 minutes of education today that was provided on Cardiac Arrhythmias. Patient was present for the entire education class, participated, and asked questions. All questions were answered. Patient verbalized understanding.

## 2023-09-21 ENCOUNTER — TELEPHONE (OUTPATIENT)
Dept: CARDIOLOGY | Facility: CLINIC | Age: 53
End: 2023-09-21
Payer: COMMERCIAL

## 2023-09-21 NOTE — TELEPHONE ENCOUNTER
----- Message from Ave Hawkins NP sent at 9/21/2023  2:50 PM CDT -----  Labs all look ok, no DVT noted. Cont current medications

## 2023-09-21 NOTE — TELEPHONE ENCOUNTER
Contacted pt and informed him Labs all look ok, no DVT noted. Cont current medications. Pt vu w/o q/c      ----- Message from Ave Hawkins NP sent at 9/21/2023  2:50 PM CDT -----  Labs all look ok, no DVT noted. Cont current medications

## 2023-09-22 ENCOUNTER — TELEPHONE (OUTPATIENT)
Dept: CARDIAC REHAB | Facility: HOSPITAL | Age: 53
End: 2023-09-22
Payer: COMMERCIAL

## 2023-09-22 NOTE — TELEPHONE ENCOUNTER
Patient sent message notifying staff that he will not make it to Cardiac Rehab class today due to his daughter going into labor. Patient will return to class as scheduled on Monday 9/25/23.

## 2023-09-24 DIAGNOSIS — R79.0 LOW FERRITIN: ICD-10-CM

## 2023-09-25 RX ORDER — FERROUS FUM/VIT C/B12-IF/FOLIC 110-0.5MG
1 CAPSULE ORAL 2 TIMES DAILY
Qty: 60 CAPSULE | Refills: 2 | Status: SHIPPED | OUTPATIENT
Start: 2023-09-25 | End: 2023-10-03

## 2023-09-26 ENCOUNTER — HOSPITAL ENCOUNTER (OUTPATIENT)
Dept: CARDIOLOGY | Facility: HOSPITAL | Age: 53
Discharge: HOME | End: 2023-09-26
Payer: COMMERCIAL

## 2023-09-26 ENCOUNTER — TELEPHONE (OUTPATIENT)
Dept: CARDIOLOGY | Facility: CLINIC | Age: 53
End: 2023-09-26

## 2023-09-26 VITALS
DIASTOLIC BLOOD PRESSURE: 75 MMHG | WEIGHT: 243 LBS | BODY MASS INDEX: 31.18 KG/M2 | HEART RATE: 67 BPM | SYSTOLIC BLOOD PRESSURE: 126 MMHG | HEIGHT: 74 IN

## 2023-09-26 DIAGNOSIS — I21.4 NSTEMI (NON-ST ELEVATED MYOCARDIAL INFARCTION): ICD-10-CM

## 2023-09-26 LAB
CV STRESS BASE HR: 66 BPM
DIASTOLIC BLOOD PRESSURE: 75 MMHG
OHS CV CPX 1 MINUTE RECOVERY HEART RATE: 93 BPM
OHS CV CPX 85 PERCENT MAX PREDICTED HEART RATE MALE: 143
OHS CV CPX ESTIMATED METS: 7
OHS CV CPX MAX PREDICTED HEART RATE: 168
OHS CV CPX PATIENT IS FEMALE: 0
OHS CV CPX PATIENT IS MALE: 1
OHS CV CPX PEAK DIASTOLIC BLOOD PRESSURE: 84 MMHG
OHS CV CPX PEAK HEAR RATE: 106 BPM
OHS CV CPX PEAK RATE PRESSURE PRODUCT: NORMAL
OHS CV CPX PEAK SYSTOLIC BLOOD PRESSURE: 182 MMHG
OHS CV CPX PERCENT MAX PREDICTED HEART RATE ACHIEVED: 63
OHS CV CPX RATE PRESSURE PRODUCT PRESENTING: 8316
STRESS ECHO POST EXERCISE DUR MIN: 6 MINUTES
STRESS ECHO POST EXERCISE DUR SEC: 5 SECONDS
SYSTOLIC BLOOD PRESSURE: 126 MMHG

## 2023-09-26 PROCEDURE — 93018 CV STRESS TEST I&R ONLY: CPT | Mod: ,,, | Performed by: INTERNAL MEDICINE

## 2023-09-26 PROCEDURE — 93017 CV STRESS TEST TRACING ONLY: CPT

## 2023-09-26 PROCEDURE — 93018 EXERCISE STRESS - EKG (CUPID ONLY): ICD-10-PCS | Mod: ,,, | Performed by: INTERNAL MEDICINE

## 2023-09-26 PROCEDURE — 93016 EXERCISE STRESS - EKG (CUPID ONLY): ICD-10-PCS | Mod: ,,, | Performed by: INTERNAL MEDICINE

## 2023-09-26 PROCEDURE — 93016 CV STRESS TEST SUPVJ ONLY: CPT | Mod: ,,, | Performed by: INTERNAL MEDICINE

## 2023-09-26 NOTE — TELEPHONE ENCOUNTER
Attempted to contact pt and inform him stress test is normal. No answer, LVM      ----- Message from Ave Hawkins NP sent at 9/26/2023 10:58 AM CDT -----  Stress test tere

## 2023-09-27 ENCOUNTER — CLINICAL SUPPORT (OUTPATIENT)
Dept: CARDIAC REHAB | Facility: HOSPITAL | Age: 53
End: 2023-09-27
Payer: COMMERCIAL

## 2023-09-27 DIAGNOSIS — I25.118 CORONARY ARTERY DISEASE OF NATIVE ARTERY OF NATIVE HEART WITH STABLE ANGINA PECTORIS: Primary | ICD-10-CM

## 2023-09-27 DIAGNOSIS — I82.402 LEG DVT (DEEP VENOUS THROMBOEMBOLISM), ACUTE, LEFT: ICD-10-CM

## 2023-09-27 PROCEDURE — 93798 PHYS/QHP OP CAR RHAB W/ECG: CPT | Mod: ,,, | Performed by: INTERNAL MEDICINE

## 2023-09-27 PROCEDURE — 93798 PR CARDIAC REHAB/MONITOR: ICD-10-PCS | Mod: ,,, | Performed by: INTERNAL MEDICINE

## 2023-10-03 ENCOUNTER — CLINICAL SUPPORT (OUTPATIENT)
Dept: CARDIAC REHAB | Facility: HOSPITAL | Age: 53
End: 2023-10-03
Payer: COMMERCIAL

## 2023-10-03 ENCOUNTER — TELEPHONE (OUTPATIENT)
Dept: CARDIAC REHAB | Facility: HOSPITAL | Age: 53
End: 2023-10-03

## 2023-10-03 ENCOUNTER — PATIENT MESSAGE (OUTPATIENT)
Dept: PULMONOLOGY | Facility: CLINIC | Age: 53
End: 2023-10-03
Payer: COMMERCIAL

## 2023-10-03 VITALS
DIASTOLIC BLOOD PRESSURE: 72 MMHG | WEIGHT: 240.5 LBS | SYSTOLIC BLOOD PRESSURE: 132 MMHG | HEART RATE: 60 BPM | BODY MASS INDEX: 30.87 KG/M2 | HEIGHT: 74 IN

## 2023-10-03 DIAGNOSIS — R79.0 LOW FERRITIN: Primary | ICD-10-CM

## 2023-10-03 DIAGNOSIS — R79.0 LOW FERRITIN: ICD-10-CM

## 2023-10-03 DIAGNOSIS — I50.42 CHRONIC COMBINED SYSTOLIC AND DIASTOLIC HEART FAILURE: Primary | ICD-10-CM

## 2023-10-03 RX ORDER — IRON,CARBONYL/ASCORBIC ACID 100-250 MG
1 TABLET ORAL DAILY
Qty: 30 TABLET | Refills: 11 | Status: SHIPPED | OUTPATIENT
Start: 2023-10-03 | End: 2023-10-03 | Stop reason: SDUPTHER

## 2023-10-03 RX ORDER — IRON,CARBONYL/ASCORBIC ACID 100-250 MG
1 TABLET ORAL DAILY
Qty: 30 TABLET | Refills: 11 | Status: SHIPPED | OUTPATIENT
Start: 2023-10-03 | End: 2024-09-27

## 2023-10-03 NOTE — TELEPHONE ENCOUNTER
Requested Prescriptions     Signed Prescriptions Disp Refills    iron-vitamin C 100-250 mg, ICAR-C, 100-250 mg Tab 30 tablet 11     Sig: Take 1 tablet by mouth once daily.     Authorizing Provider: SOMMER SULTANA

## 2023-10-03 NOTE — PROGRESS NOTES
Discharge session completed - went over diets - plans to keep exercising - all improvements - pt aware of importance of exercise and diet for his heart health -

## 2023-10-03 NOTE — TELEPHONE ENCOUNTER
Pt came in for discharge assessment for cardio rehab- still has pain in legs 6 of 10 and at end of work days pt states 13 of 10 - ankles swollen minimally +1 edema - pt states at end of day when he works they are huge - he cannot see his ankles    Did ultra sound of legs 9/19 - WNL    Spoke to pt about good shoes - compression socks - continue working out- focus on more leg resisitence exercises - pt verbalized understanding  Stated he will get some and try them     Any other suggestions or ideas for his legs  Please advise:

## 2023-10-04 ENCOUNTER — CLINICAL SUPPORT (OUTPATIENT)
Dept: CARDIAC REHAB | Facility: HOSPITAL | Age: 53
End: 2023-10-04
Payer: COMMERCIAL

## 2023-10-04 DIAGNOSIS — I25.118 CORONARY ARTERY DISEASE OF NATIVE ARTERY OF NATIVE HEART WITH STABLE ANGINA PECTORIS: Primary | ICD-10-CM

## 2023-10-04 DIAGNOSIS — I10 HYPERTENSION GOAL BP (BLOOD PRESSURE) < 130/80: Chronic | ICD-10-CM

## 2023-10-04 PROCEDURE — 93798 PHYS/QHP OP CAR RHAB W/ECG: CPT | Mod: ,,, | Performed by: INTERNAL MEDICINE

## 2023-10-04 PROCEDURE — 93798 PR CARDIAC REHAB/MONITOR: ICD-10-PCS | Mod: ,,, | Performed by: INTERNAL MEDICINE

## 2023-10-06 ENCOUNTER — CLINICAL SUPPORT (OUTPATIENT)
Dept: CARDIAC REHAB | Facility: HOSPITAL | Age: 53
End: 2023-10-06
Payer: COMMERCIAL

## 2023-10-06 DIAGNOSIS — I25.118 CORONARY ARTERY DISEASE OF NATIVE ARTERY OF NATIVE HEART WITH STABLE ANGINA PECTORIS: Primary | ICD-10-CM

## 2023-10-06 DIAGNOSIS — I10 HYPERTENSION GOAL BP (BLOOD PRESSURE) < 130/80: Chronic | ICD-10-CM

## 2023-10-06 PROCEDURE — 93798 PR CARDIAC REHAB/MONITOR: ICD-10-PCS | Mod: ,,, | Performed by: INTERNAL MEDICINE

## 2023-10-06 PROCEDURE — 93798 PHYS/QHP OP CAR RHAB W/ECG: CPT | Mod: ,,, | Performed by: INTERNAL MEDICINE

## 2023-10-10 ENCOUNTER — CLINICAL SUPPORT (OUTPATIENT)
Dept: CARDIAC REHAB | Facility: HOSPITAL | Age: 53
End: 2023-10-10
Payer: COMMERCIAL

## 2023-10-10 DIAGNOSIS — E78.5 HYPERLIPIDEMIA LDL GOAL <70: Chronic | ICD-10-CM

## 2023-10-10 DIAGNOSIS — I25.118 CORONARY ARTERY DISEASE OF NATIVE ARTERY OF NATIVE HEART WITH STABLE ANGINA PECTORIS: Primary | ICD-10-CM

## 2023-10-10 PROCEDURE — 93798 PR CARDIAC REHAB/MONITOR: ICD-10-PCS | Mod: ,,, | Performed by: INTERNAL MEDICINE

## 2023-10-10 PROCEDURE — 93798 PHYS/QHP OP CAR RHAB W/ECG: CPT | Mod: ,,, | Performed by: INTERNAL MEDICINE

## 2023-10-11 ENCOUNTER — CLINICAL SUPPORT (OUTPATIENT)
Dept: CARDIAC REHAB | Facility: HOSPITAL | Age: 53
End: 2023-10-11
Attending: NURSE PRACTITIONER
Payer: COMMERCIAL

## 2023-10-11 DIAGNOSIS — I25.118 CORONARY ARTERY DISEASE OF NATIVE ARTERY OF NATIVE HEART WITH STABLE ANGINA PECTORIS: Primary | ICD-10-CM

## 2023-10-11 DIAGNOSIS — E78.5 HYPERLIPIDEMIA LDL GOAL <70: Chronic | ICD-10-CM

## 2023-10-11 PROCEDURE — 93798 PR CARDIAC REHAB/MONITOR: ICD-10-PCS | Mod: ,,, | Performed by: INTERNAL MEDICINE

## 2023-10-11 PROCEDURE — 93798 PHYS/QHP OP CAR RHAB W/ECG: CPT | Mod: ,,, | Performed by: INTERNAL MEDICINE

## 2023-10-11 NOTE — PROGRESS NOTES
Tolerated well- 30 minutes of education on cardiac medications    Last session- pt plans to continue workouts - certificate given - see LSI for final assessment

## 2023-10-16 ENCOUNTER — PATIENT OUTREACH (OUTPATIENT)
Dept: ADMINISTRATIVE | Facility: HOSPITAL | Age: 53
End: 2023-10-16
Payer: COMMERCIAL

## 2023-10-16 NOTE — PROGRESS NOTES
SPECIALTY PROVIDER/PCP REPORT: per chart review, pt has multiple visits with Dr Owens, teams updated.  
16

## 2023-10-23 DIAGNOSIS — I25.10 CORONARY ARTERY DISEASE, UNSPECIFIED VESSEL OR LESION TYPE, UNSPECIFIED WHETHER ANGINA PRESENT, UNSPECIFIED WHETHER NATIVE OR TRANSPLANTED HEART: ICD-10-CM

## 2023-10-23 DIAGNOSIS — I21.4 NSTEMI (NON-ST ELEVATED MYOCARDIAL INFARCTION): Primary | ICD-10-CM

## 2023-10-26 DIAGNOSIS — G47.00 INSOMNIA, UNSPECIFIED TYPE: ICD-10-CM

## 2023-10-26 DIAGNOSIS — J06.9 URI WITH COUGH AND CONGESTION: ICD-10-CM

## 2023-10-27 RX ORDER — FLUTICASONE PROPIONATE 50 MCG
1 SPRAY, SUSPENSION (ML) NASAL DAILY PRN
Qty: 16 G | Refills: 0 | Status: SHIPPED | OUTPATIENT
Start: 2023-10-27 | End: 2023-12-04

## 2023-10-27 RX ORDER — TRAZODONE HYDROCHLORIDE 50 MG/1
50 TABLET ORAL NIGHTLY
Qty: 90 TABLET | Refills: 1 | Status: SHIPPED | OUTPATIENT
Start: 2023-10-27

## 2023-10-27 NOTE — TELEPHONE ENCOUNTER
Refill Routing Note     Refill Routing Note   Medication(s) are not appropriate for processing by [facility] for the following reason(s):      No active prescription written by provider    [facility] action(s):  Defer Care Due:  None identified     Medication Therapy Plan: The provider responsible for managing the medication isnt PCP      Appointments  past 12m or future 3m with PCP    Date Provider   Last Visit   7/19/2023 Kristen Adler DO   Next Visit   2/8/2024 Kristen Adler DO   ED visits in past 90 days: 0        Note composed:11:02 AM 10/27/2023

## 2023-10-27 NOTE — TELEPHONE ENCOUNTER
No care due was identified.  [facility] Embedded Care Due Messages. Reference number: [id number].   10/26/2023 8:28:05 PM CDT

## 2023-10-27 NOTE — TELEPHONE ENCOUNTER
No care due was identified.  [facility] Embedded Care Due Messages. Reference number: [id number].   10/27/2023 10:55:51 AM CDT

## 2023-10-27 NOTE — TELEPHONE ENCOUNTER
Refill Routing Note   Medication(s) are not appropriate for processing by [facility] for the following reason(s):      Drug-disease interaction: traZODone and NSTEMI (non-ST elevated myocardial infarction)    [facility] action(s):  Defer None identified        Medication reconciliation completed: No     Appointments  past 12m or future 3m with PCP    Date Provider   Last Visit   7/19/2023 Kristen Adler DO   Next Visit   2/8/2024 Krisetn Adler DO   ED visits in past 90 days: 0        Note composed:9:04 PM 10/26/2023

## 2023-11-06 ENCOUNTER — PATIENT MESSAGE (OUTPATIENT)
Dept: CARDIOLOGY | Facility: CLINIC | Age: 53
End: 2023-11-06
Payer: COMMERCIAL

## 2023-11-06 DIAGNOSIS — I25.10 CORONARY ARTERY DISEASE, UNSPECIFIED VESSEL OR LESION TYPE, UNSPECIFIED WHETHER ANGINA PRESENT, UNSPECIFIED WHETHER NATIVE OR TRANSPLANTED HEART: ICD-10-CM

## 2023-11-06 NOTE — TELEPHONE ENCOUNTER
Pt last seen 09/15/23. F/U scheduled for 01/11/24 w/ Dr. Lara    Please see the attached refill request.

## 2023-11-07 ENCOUNTER — TELEPHONE (OUTPATIENT)
Dept: PULMONOLOGY | Facility: CLINIC | Age: 53
End: 2023-11-07
Payer: COMMERCIAL

## 2023-11-07 DIAGNOSIS — I25.10 CORONARY ARTERY DISEASE, UNSPECIFIED VESSEL OR LESION TYPE, UNSPECIFIED WHETHER ANGINA PRESENT, UNSPECIFIED WHETHER NATIVE OR TRANSPLANTED HEART: ICD-10-CM

## 2023-11-07 RX ORDER — METOPROLOL SUCCINATE 50 MG/1
TABLET, EXTENDED RELEASE ORAL
Qty: 90 TABLET | Refills: 0 | Status: SHIPPED | OUTPATIENT
Start: 2023-11-07 | End: 2023-11-07

## 2023-11-07 RX ORDER — METOPROLOL SUCCINATE 50 MG/1
TABLET, EXTENDED RELEASE ORAL
Qty: 120 TABLET | Refills: 3 | Status: SHIPPED | OUTPATIENT
Start: 2023-11-07 | End: 2023-11-08 | Stop reason: SDUPTHER

## 2023-11-08 DIAGNOSIS — I25.10 CORONARY ARTERY DISEASE, UNSPECIFIED VESSEL OR LESION TYPE, UNSPECIFIED WHETHER ANGINA PRESENT, UNSPECIFIED WHETHER NATIVE OR TRANSPLANTED HEART: ICD-10-CM

## 2023-11-08 RX ORDER — METOPROLOL SUCCINATE 50 MG/1
TABLET, EXTENDED RELEASE ORAL
Qty: 120 TABLET | Refills: 3 | Status: SHIPPED | OUTPATIENT
Start: 2023-11-08 | End: 2024-03-25 | Stop reason: SDUPTHER

## 2023-11-21 ENCOUNTER — HOSPITAL ENCOUNTER (EMERGENCY)
Facility: HOSPITAL | Age: 53
Discharge: HOME | End: 2023-11-22
Attending: EMERGENCY MEDICINE
Payer: COMMERCIAL

## 2023-11-21 VITALS
SYSTOLIC BLOOD PRESSURE: 105 MMHG | BODY MASS INDEX: 29.18 KG/M2 | HEIGHT: 74 IN | DIASTOLIC BLOOD PRESSURE: 77 MMHG | HEART RATE: 96 BPM | OXYGEN SATURATION: 98 % | RESPIRATION RATE: 18 BRPM | WEIGHT: 227.38 LBS | TEMPERATURE: 99 F

## 2023-11-21 DIAGNOSIS — R11.2 NAUSEA VOMITING AND DIARRHEA: Primary | ICD-10-CM

## 2023-11-21 DIAGNOSIS — R10.84 GENERALIZED ABDOMINAL PAIN: ICD-10-CM

## 2023-11-21 DIAGNOSIS — R19.7 NAUSEA VOMITING AND DIARRHEA: Primary | ICD-10-CM

## 2023-11-21 LAB
ALBUMIN SERPL BCP-MCNC: 4 G/DL (ref 3.5–5.2)
ALP SERPL-CCNC: 84 U/L (ref 55–135)
ALT SERPL W/O P-5'-P-CCNC: 19 U/L (ref 10–44)
ANION GAP SERPL CALC-SCNC: 12 MMOL/L (ref 8–16)
AST SERPL-CCNC: 19 U/L (ref 10–40)
BASOPHILS # BLD AUTO: 0.03 K/UL (ref 0–0.2)
BASOPHILS NFR BLD: 0.2 % (ref 0–1.9)
BILIRUB SERPL-MCNC: 0.5 MG/DL (ref 0.1–1)
BUN SERPL-MCNC: 21 MG/DL (ref 6–20)
CALCIUM SERPL-MCNC: 9.4 MG/DL (ref 8.7–10.5)
CHLORIDE SERPL-SCNC: 107 MMOL/L (ref 95–110)
CK SERPL-CCNC: 387 U/L (ref 20–200)
CO2 SERPL-SCNC: 22 MMOL/L (ref 23–29)
CREAT SERPL-MCNC: 1.7 MG/DL (ref 0.5–1.4)
DIFFERENTIAL METHOD: ABNORMAL
EOSINOPHIL # BLD AUTO: 0.1 K/UL (ref 0–0.5)
EOSINOPHIL NFR BLD: 0.3 % (ref 0–8)
ERYTHROCYTE [DISTWIDTH] IN BLOOD BY AUTOMATED COUNT: 15.9 % (ref 11.5–14.5)
EST. GFR  (NO RACE VARIABLE): 48 ML/MIN/1.73 M^2
GLUCOSE SERPL-MCNC: 182 MG/DL (ref 70–110)
HCT VFR BLD AUTO: 37.1 % (ref 40–54)
HGB BLD-MCNC: 11 G/DL (ref 14–18)
IMM GRANULOCYTES # BLD AUTO: 0.07 K/UL (ref 0–0.04)
IMM GRANULOCYTES NFR BLD AUTO: 0.4 % (ref 0–0.5)
INFLUENZA A, MOLECULAR: NEGATIVE
INFLUENZA B, MOLECULAR: NEGATIVE
LIPASE SERPL-CCNC: 382 U/L (ref 4–60)
LYMPHOCYTES # BLD AUTO: 1.3 K/UL (ref 1–4.8)
LYMPHOCYTES NFR BLD: 8.4 % (ref 18–48)
MAGNESIUM SERPL-MCNC: 2.1 MG/DL (ref 1.6–2.6)
MCH RBC QN AUTO: 24.1 PG (ref 27–31)
MCHC RBC AUTO-ENTMCNC: 29.6 G/DL (ref 32–36)
MCV RBC AUTO: 81 FL (ref 82–98)
MONOCYTES # BLD AUTO: 0.8 K/UL (ref 0.3–1)
MONOCYTES NFR BLD: 4.9 % (ref 4–15)
NEUTROPHILS # BLD AUTO: 13.5 K/UL (ref 1.8–7.7)
NEUTROPHILS NFR BLD: 85.8 % (ref 38–73)
NRBC BLD-RTO: 0 /100 WBC
PLATELET # BLD AUTO: 482 K/UL (ref 150–450)
PMV BLD AUTO: 9.5 FL (ref 9.2–12.9)
POTASSIUM SERPL-SCNC: 4 MMOL/L (ref 3.5–5.1)
PROT SERPL-MCNC: 8.5 G/DL (ref 6–8.4)
RBC # BLD AUTO: 4.57 M/UL (ref 4.6–6.2)
SARS-COV-2 RDRP RESP QL NAA+PROBE: NEGATIVE
SODIUM SERPL-SCNC: 141 MMOL/L (ref 136–145)
SPECIMEN SOURCE: NORMAL
WBC # BLD AUTO: 15.77 K/UL (ref 3.9–12.7)

## 2023-11-21 PROCEDURE — 83690 ASSAY OF LIPASE: CPT | Performed by: NURSE PRACTITIONER

## 2023-11-21 PROCEDURE — 96374 THER/PROPH/DIAG INJ IV PUSH: CPT

## 2023-11-21 PROCEDURE — 25000003 PHARM REV CODE 250: Performed by: EMERGENCY MEDICINE

## 2023-11-21 PROCEDURE — 83735 ASSAY OF MAGNESIUM: CPT | Performed by: NURSE PRACTITIONER

## 2023-11-21 PROCEDURE — 87502 INFLUENZA DNA AMP PROBE: CPT | Performed by: NURSE PRACTITIONER

## 2023-11-21 PROCEDURE — 96361 HYDRATE IV INFUSION ADD-ON: CPT

## 2023-11-21 PROCEDURE — 85025 COMPLETE CBC W/AUTO DIFF WBC: CPT | Performed by: NURSE PRACTITIONER

## 2023-11-21 PROCEDURE — 82550 ASSAY OF CK (CPK): CPT | Performed by: NURSE PRACTITIONER

## 2023-11-21 PROCEDURE — U0002 COVID-19 LAB TEST NON-CDC: HCPCS | Performed by: NURSE PRACTITIONER

## 2023-11-21 PROCEDURE — 81000 URINALYSIS NONAUTO W/SCOPE: CPT | Mod: 59 | Performed by: NURSE PRACTITIONER

## 2023-11-21 PROCEDURE — 80053 COMPREHEN METABOLIC PANEL: CPT | Performed by: NURSE PRACTITIONER

## 2023-11-21 PROCEDURE — 63600175 PHARM REV CODE 636 W HCPCS: Performed by: NURSE PRACTITIONER

## 2023-11-21 PROCEDURE — 99285 EMERGENCY DEPT VISIT HI MDM: CPT | Mod: 25

## 2023-11-21 PROCEDURE — 80307 DRUG TEST PRSMV CHEM ANLYZR: CPT | Performed by: EMERGENCY MEDICINE

## 2023-11-21 RX ORDER — DIAZEPAM 5 MG/1
5 TABLET ORAL
Status: COMPLETED | OUTPATIENT
Start: 2023-11-21 | End: 2023-11-21

## 2023-11-21 RX ORDER — ONDANSETRON 2 MG/ML
4 INJECTION INTRAMUSCULAR; INTRAVENOUS
Status: COMPLETED | OUTPATIENT
Start: 2023-11-21 | End: 2023-11-21

## 2023-11-21 RX ADMIN — ONDANSETRON 4 MG: 2 INJECTION INTRAMUSCULAR; INTRAVENOUS at 11:11

## 2023-11-21 RX ADMIN — SODIUM CHLORIDE 1000 ML: 9 INJECTION, SOLUTION INTRAVENOUS at 11:11

## 2023-11-21 RX ADMIN — DIAZEPAM 5 MG: 5 TABLET ORAL at 11:11

## 2023-11-22 LAB
AMPHET+METHAMPHET UR QL: NEGATIVE
BACTERIA #/AREA URNS HPF: ABNORMAL /HPF
BARBITURATES UR QL SCN>200 NG/ML: NEGATIVE
BENZODIAZ UR QL SCN>200 NG/ML: NEGATIVE
BILIRUB UR QL STRIP: NEGATIVE
BZE UR QL SCN: NEGATIVE
CANNABINOIDS UR QL SCN: NEGATIVE
CLARITY UR: ABNORMAL
COLOR UR: YELLOW
CREAT UR-MCNC: 348.9 MG/DL (ref 23–375)
GLUCOSE UR QL STRIP: ABNORMAL
HGB UR QL STRIP: NEGATIVE
HYALINE CASTS #/AREA URNS LPF: 78 /LPF
KETONES UR QL STRIP: NEGATIVE
LEUKOCYTE ESTERASE UR QL STRIP: NEGATIVE
METHADONE UR QL SCN>300 NG/ML: NEGATIVE
MICROSCOPIC COMMENT: ABNORMAL
NITRITE UR QL STRIP: NEGATIVE
OPIATES UR QL SCN: NEGATIVE
PCP UR QL SCN>25 NG/ML: NEGATIVE
PH UR STRIP: 5 [PH] (ref 5–8)
PROT UR QL STRIP: ABNORMAL
RBC #/AREA URNS HPF: 2 /HPF (ref 0–4)
SP GR UR STRIP: 1.03 (ref 1–1.03)
SQUAMOUS #/AREA URNS HPF: 0 /HPF
TOXICOLOGY INFORMATION: NORMAL
URN SPEC COLLECT METH UR: ABNORMAL
UROBILINOGEN UR STRIP-ACNC: NEGATIVE EU/DL
WBC #/AREA URNS HPF: 2 /HPF (ref 0–5)

## 2023-11-22 RX ORDER — ONDANSETRON 4 MG/1
8 TABLET, ORALLY DISINTEGRATING ORAL EVERY 8 HOURS PRN
Qty: 12 TABLET | Refills: 0 | Status: SHIPPED | OUTPATIENT
Start: 2023-11-22

## 2023-11-22 NOTE — ED PROVIDER NOTES
"SCRIBE #1 NOTE: I, Zeny Chand, am scribing for, and in the presence of, Blas Wynn MD. I have scribed the HPI, ROS, and PEx.     SCRIBE #2 NOTE: I, Andrei House, am scribing for, and in the presence of,  Blas Wynn MD. I have scribed the remaining portions of the note not scribed by Scribe #1.      History     Chief Complaint   Patient presents with    Vomiting     Pt reports vomiting and diarrhea with abdominal cramping since last night. Went to [facility] and had labs drawn, fluids started and received zofran and bentyl but decided to leave due to long wait time and "staff rudeness". Denies relief from fluids or meds.     Review of patient's allergies indicates:  No Known Allergies      History of Present Illness     [facility]    11/21/2023, 11:52 PM  History obtained from the patient      History of Present Illness: Laron Kothari Jr. is a 53 y.o. male patient with a PMHx of DVT, type 2 diabetes, gout, hyperlipidemia, hypertension, and neuropathy who presents to the Emergency Department for evaluation of emesis which onset several hours PTA. Symptoms are constant and moderate in severity. Patient has been "nonstop" vomiting since 1 AM. He has been having nausea, vomiting, abdominal pain/cramping and diarrhea. He denies being around anyone sick and is unsure if he ate something bad. Patient denies any fever, blood in stool, and all other sxs at this time. He was seen at [facility] and was given Zofran and Bentyl but left due to staff being rude further complaints or concerns at this time.       Arrival mode: Personal vehicle     PCP: Kristen Adler DO        Past Medical History:  Past Medical History:   Diagnosis Date    Blood in stool 08/07/2018    Deep vein thrombosis (DVT) 2017    Left leg    Diabetes mellitus, type 2 2013    Gout     Hyperlipidemia     Hypertension     Neuropathy     Smoker        Past Surgical History:  Past Surgical History:   Procedure Laterality Date    APPENDECTOMY      " ARTHROSCOPIC CHONDROPLASTY OF KNEE JOINT Right 11/24/2020    Procedure: ARTHROSCOPY, KNEE, WITH CHONDROPLASTY;  Surgeon: Nahum Rose MD;  Location: [facility] OR;  Service: Orthopedics;  Laterality: Right;  chondroplasty medial condyle and anteriorly    COLONOSCOPY N/A 8/7/2018    Procedure: COLONOSCOPY;  Surgeon: Ten Valentine MD;  Location: [facility] ENDO;  Service: Endoscopy;  Laterality: N/A;    COLONOSCOPY N/A 2/23/2022    Procedure: COLONOSCOPY;  Surgeon: Octavio Craig MD;  Location: [facility] ENDO;  Service: Endoscopy;  Laterality: N/A;    KNEE ARTHROSCOPY W/ MENISCECTOMY Right 11/24/2020    Procedure: ARTHROSCOPY, KNEE, WITH MENISCECTOMY;  Surgeon: Nahum Rose MD;  Location: [facility] OR;  Service: Orthopedics;  Laterality: Right;  Partial medial and lateral meniscectomy    LEFT HEART CATHETERIZATION Left 4/11/2023    Procedure: Left heart cath;  Surgeon: Sue Lara MD;  Location: [facility] CATH LAB;  Service: Cardiology;  Laterality: Left;    PERCUTANEOUS TRANSLUMINAL BALLOON ANGIOPLASTY OF CORONARY ARTERY  4/11/2023    Procedure: Angioplasty-coronary;  Surgeon: Sue Lara MD;  Location: [facility] CATH LAB;  Service: Cardiology;;    ROBOT-ASSISTED CHOLECYSTECTOMY USING DA TRIPP XI N/A 1/21/2020    Procedure: XI ROBOTIC CHOLECYSTECTOMY;  Surgeon: Sebastien Rivera MD;  Location: [facility] OR;  Service: General;  Laterality: N/A;    STENT, DRUG ELUTING, SINGLE VESSEL, CORONARY  4/11/2023    Procedure: Stent, Drug Eluting, Single Vessel, Coronary;  Surgeon: Sue Lara MD;  Location: [facility] CATH LAB;  Service: Cardiology;;    TONSILLECTOMY, ADENOIDECTOMY           Family History:  Family History   Problem Relation Age of Onset    Diabetes Father     Prostate cancer Father     Cataracts Father     Hypertension Mother     Hypertension Brother        Social History:  Social History     Tobacco Use    Smoking status: Every Day     Current packs/day: 1.50     Average packs/day: 1.5 packs/day for 36.3 years (54.4  ttl pk-yrs)     Types: Cigarettes     Start date: 2003    Smokeless tobacco: Never    Tobacco comments:     Smoked 1.5-2 packs per day until April 2023, now smoking 10 cpd   Substance and Sexual Activity    Alcohol use: Yes     Alcohol/week: 26.0 standard drinks of alcohol     Types: 26 Cans of beer per week     Comment: daily    Drug use: No    Sexual activity: Yes     Partners: Female        Review of Systems     Review of Systems   Constitutional:  Negative for fever.   Gastrointestinal:  Positive for abdominal pain (and cramping), diarrhea, nausea and vomiting. Negative for blood in stool.      Physical Exam     Initial Vitals [11/21/23 2019]   BP Pulse Resp Temp SpO2   105/77 96 18 98.5 °F (36.9 °C) 98 %      MAP       --          Physical Exam  Nursing Notes and Vital Signs Reviewed.  Constitutional: Patient is in mild distress. Well-developed and well-nourished.  Head: Atraumatic. Normocephalic.  Eyes: PERRL. EOM intact. Conjunctivae are not pale. No scleral icterus.  ENT: Mucous membranes are moist. Oropharynx is clear and symmetric.    Neck: Supple. Full ROM. No lymphadenopathy.  Cardiovascular: Regular rate. Regular rhythm. No murmurs, rubs, or gallops. Distal pulses are 2+ and symmetric.  Pulmonary/Chest: No respiratory distress. Clear to auscultation bilaterally. No wheezing or rales.  Abdominal: Soft and non-distended.  There is generalized tenderness.  No rebound, guarding, or rigidity. Good bowel sounds.  Genitourinary: No CVA tenderness  Musculoskeletal: Moves all extremities. No obvious deformities. No edema. No calf tenderness.  Skin: Warm and dry.  Neurological:  Alert, awake, and appropriate.  Normal speech.  No acute focal neurological deficits are appreciated.  Psychiatric: Normal affect. Good eye contact. Appropriate in content.     ED Course   Procedures  ED Vital Signs:  Vitals:    11/21/23 2019   BP: 105/77   Pulse: 96   Resp: 18   Temp: 98.5 °F (36.9 °C)   TempSrc: Oral   SpO2: 98%  "  Weight: 103.1 kg (227 lb 6.5 oz)   Height: 6' 2" (1.88 m)       Abnormal Lab Results:  Labs Reviewed   CBC W/ AUTO DIFFERENTIAL - Abnormal; Notable for the following components:       Result Value    WBC 15.77 (*)     RBC 4.57 (*)     Hemoglobin 11.0 (*)     Hematocrit 37.1 (*)     MCV 81 (*)     MCH 24.1 (*)     MCHC 29.6 (*)     RDW 15.9 (*)     Platelets 482 (*)     Gran # (ANC) 13.5 (*)     Immature Grans (Abs) 0.07 (*)     Gran % 85.8 (*)     Lymph % 8.4 (*)     All other components within normal limits   COMPREHENSIVE METABOLIC PANEL - Abnormal; Notable for the following components:    CO2 22 (*)     Glucose 182 (*)     BUN 21 (*)     Creatinine 1.7 (*)     Total Protein 8.5 (*)     eGFR 48 (*)     All other components within normal limits   LIPASE - Abnormal; Notable for the following components:    Lipase 382 (*)     All other components within normal limits   URINALYSIS, REFLEX TO URINE CULTURE - Abnormal; Notable for the following components:    Appearance, UA Hazy (*)     Protein, UA 1+ (*)     Glucose, UA Trace (*)     All other components within normal limits    Narrative:     Specimen Source->Urine   CK - Abnormal; Notable for the following components:     (*)     All other components within normal limits   URINALYSIS MICROSCOPIC - Abnormal; Notable for the following components:    Hyaline Casts, UA 78 (*)     All other components within normal limits    Narrative:     Specimen Source->Urine   INFLUENZA A & B BY MOLECULAR   MAGNESIUM   SARS-COV-2 RNA AMPLIFICATION, QUAL   DRUG SCREEN PANEL, URINE EMERGENCY    Narrative:     Specimen Source->Urine        All Lab Results:  Results for orders placed or performed during the hospital encounter of 11/21/23   Influenza A & B by Molecular    Specimen: Nasopharyngeal Swab   Result Value Ref Range    Influenza A, Molecular Negative Negative    Influenza B, Molecular Negative Negative    Flu A & B Source Nasal swab    CBC W/ AUTO DIFFERENTIAL   Result " Value Ref Range    WBC 15.77 (H) 3.90 - 12.70 K/uL    RBC 4.57 (L) 4.60 - 6.20 M/uL    Hemoglobin 11.0 (L) 14.0 - 18.0 g/dL    Hematocrit 37.1 (L) 40.0 - 54.0 %    MCV 81 (L) 82 - 98 fL    MCH 24.1 (L) 27.0 - 31.0 pg    MCHC 29.6 (L) 32.0 - 36.0 g/dL    RDW 15.9 (H) 11.5 - 14.5 %    Platelets 482 (H) 150 - 450 K/uL    MPV 9.5 9.2 - 12.9 fL    Immature Granulocytes 0.4 0.0 - 0.5 %    Gran # (ANC) 13.5 (H) 1.8 - 7.7 K/uL    Immature Grans (Abs) 0.07 (H) 0.00 - 0.04 K/uL    Lymph # 1.3 1.0 - 4.8 K/uL    Mono # 0.8 0.3 - 1.0 K/uL    Eos # 0.1 0.0 - 0.5 K/uL    Baso # 0.03 0.00 - 0.20 K/uL    nRBC 0 0 /100 WBC    Gran % 85.8 (H) 38.0 - 73.0 %    Lymph % 8.4 (L) 18.0 - 48.0 %    Mono % 4.9 4.0 - 15.0 %    Eosinophil % 0.3 0.0 - 8.0 %    Basophil % 0.2 0.0 - 1.9 %    Differential Method Automated    Comp. Metabolic Panel   Result Value Ref Range    Sodium 141 136 - 145 mmol/L    Potassium 4.0 3.5 - 5.1 mmol/L    Chloride 107 95 - 110 mmol/L    CO2 22 (L) 23 - 29 mmol/L    Glucose 182 (H) 70 - 110 mg/dL    BUN 21 (H) 6 - 20 mg/dL    Creatinine 1.7 (H) 0.5 - 1.4 mg/dL    Calcium 9.4 8.7 - 10.5 mg/dL    Total Protein 8.5 (H) 6.0 - 8.4 g/dL    Albumin 4.0 3.5 - 5.2 g/dL    Total Bilirubin 0.5 0.1 - 1.0 mg/dL    Alkaline Phosphatase 84 55 - 135 U/L    AST 19 10 - 40 U/L    ALT 19 10 - 44 U/L    eGFR 48 (A) >60 mL/min/1.73 m^2    Anion Gap 12 8 - 16 mmol/L   Lipase   Result Value Ref Range    Lipase 382 (H) 4 - 60 U/L   Urinalysis, Reflex to Urine Culture Urine, Clean Catch    Specimen: Urine   Result Value Ref Range    Specimen UA Urine, Clean Catch     Color, UA Yellow Yellow, Straw, Becki    Appearance, UA Hazy (A) Clear    pH, UA 5.0 5.0 - 8.0    Specific Gravity, UA 1.030 1.005 - 1.030    Protein, UA 1+ (A) Negative    Glucose, UA Trace (A) Negative    Ketones, UA Negative Negative    Bilirubin (UA) Negative Negative    Occult Blood UA Negative Negative    Nitrite, UA Negative Negative    Urobilinogen, UA Negative <2.0  EU/dL    Leukocytes, UA Negative Negative   Magnesium   Result Value Ref Range    Magnesium 2.1 1.6 - 2.6 mg/dL   COVID-19 Rapid Screening   Result Value Ref Range    SARS-CoV-2 RNA, Amplification, Qual Negative Negative   CPK   Result Value Ref Range     (H) 20 - 200 U/L   Drug screen panel, emergency   Result Value Ref Range    Benzodiazepines Negative Negative    Methadone metabolites Negative Negative    Cocaine (Metab.) Negative Negative    Opiate Scrn, Ur Negative Negative    Barbiturate Screen, Ur Negative Negative    Amphetamine Screen, Ur Negative Negative    THC Negative Negative    Phencyclidine Negative Negative    Creatinine, Urine 348.9 23.0 - 375.0 mg/dL    Toxicology Information SEE COMMENT    Urinalysis Microscopic   Result Value Ref Range    RBC, UA 2 0 - 4 /hpf    WBC, UA 2 0 - 5 /hpf    Bacteria None None-Occ /hpf    Squam Epithel, UA 0 /hpf    Hyaline Casts, UA 78 (A) 0-1/lpf /lpf    Microscopic Comment SEE COMMENT          Imaging Results:  Imaging Results              CT Abdomen Pelvis  Without Contrast (Final result)  Result time 11/21/23 23:05:15      Final result by Joellen Holbrook MD (11/21/23 23:05:15)                   Impression:      No acute findings.    Chronic and incidental findings, as above.      Electronically signed by: Joellen Holbrook  Date:    11/21/2023  Time:    23:05               Narrative:    EXAMINATION:  CT ABDOMEN PELVIS WITHOUT CONTRAST    CLINICAL HISTORY:  Abdominal pain, acute, nonlocalized;    TECHNIQUE:  Low dose axial images, sagittal and coronal reformations were obtained from the lung bases to the pubic symphysis, Oral contrast was not administered.    COMPARISON:  MRCP 01/10/2020    FINDINGS:  Heart: Normal in size. No pericardial effusion.    Lung Bases: Well aerated, without consolidation or pleural fluid.    Liver: Normal size and attenuation.  Punctate calcified granulomas scattered throughout the liver.    Gallbladder: Absent.    Bile  Ducts: No evidence of dilated ducts.    Pancreas: Nonspecific pancreatic ductal dilatation, similar to prior.    Spleen: Unremarkable.    Adrenals: Unremarkable.    Kidneys/ Ureters: Unremarkable.    Bladder: No evidence of wall thickening.    Reproductive organs: Unremarkable.    GI Tract/Mesentery: No evidence of bowel obstruction or inflammation.    Peritoneal Space: No ascites. No free air.    Retroperitoneum: No significant adenopathy.    Abdominal wall: Unremarkable.    Vasculature: No significant atherosclerosis or aneurysm.    Bones: No acute fracture.                                           The Emergency Provider reviewed the vital signs and test results, which are outlined above.     ED Discussion       1:18 AM: Reassessed pt at this time. Discussed with pt all pertinent ED information and results. Discussed pt dx and plan of tx. Gave pt all f/u and return to the ED instructions. All questions and concerns were addressed at this time. Pt expresses understanding of information and instructions, and is comfortable with plan to discharge. Pt is stable for discharge.    I discussed with patient and/or family/caretaker that evaluation in the ED does not suggest any emergent or life threatening medical conditions requiring immediate intervention beyond what was provided in the ED, and I believe patient is safe for discharge.  Regardless, an unremarkable evaluation in the ED does not preclude the development or presence of a serious of life threatening condition. As such, patient was instructed to return immediately for any worsening or change in current symptoms.         Medical Decision Making  Amount and/or Complexity of Data Reviewed  Labs: ordered.  Radiology: ordered. Decision-making details documented in ED Course.    Risk  Prescription drug management.                ED Medication(s):  Medications   ondansetron injection 4 mg (4 mg Intravenous Given 11/21/23 4167)   sodium chloride 0.9% bolus 1,000 mL  1,000 mL (0 mLs Intravenous Stopped 11/22/23 0048)   diazePAM tablet 5 mg (5 mg Oral Given 11/21/23 2348)       Discharge Medication List as of 11/22/2023  1:15 AM        START taking these medications    Details   ondansetron (ZOFRAN-ODT) 4 MG TbDL Take 2 tablets (8 mg total) by mouth every 8 (eight) hours as needed (nausea vomiting)., Starting Wed 11/22/2023, Print              Follow-up Information       Kristen Adler DO In 2 days.    Specialty: Internal Medicine  Contact information:  [address] [facility] [address] [place] [address] [phone]               [facility] - Emergency Dept..    Specialty: Emergency Medicine  Why: As needed, If symptoms worsen  Contact information:  [address] [facility] [address] [phone]                               Scribe Attestation:   Scribe #1: I performed the above scribed service and the documentation accurately describes the services I performed. I attest to the accuracy of the note.     Attending:   Physician Attestation Statement for Scribe #1: I, Blas Wynn MD, personally performed the services described in this documentation, as scribed by Zeny Chand, in my presence, and it is both accurate and complete.       Scribe Attestation:   Scribe #2: I performed the above scribed service and the documentation accurately describes the services I performed. I attest to the accuracy of the note.    Attending Attestation:           Physician Attestation for Scribe:    Physician Attestation Statement for Scribe #2: IBlas MD, reviewed documentation, as scribed by Andrei House in my presence, and it is both accurate and complete. I also acknowledge and confirm the content of the note done by Crispin #1.           Clinical Impression       ICD-10-CM ICD-9-CM   1. Nausea vomiting and diarrhea  R11.2 787.91    R19.7 787.01   2. Generalized abdominal pain  R10.84 789.07       Disposition:   Disposition: Discharged  Condition: Stable          Blas Wynn MD  11/23/23 0043

## 2023-11-22 NOTE — FIRST PROVIDER EVALUATION
"Medical screening examination initiated.  I have conducted a focused provider triage encounter, findings are as follows:    Brief history of present illness:  53-year-old male patient presents to emergency room with abdominal cramping vomiting and diarrhea.  Patient also states that his hands are and feet her cramping    Vitals:    11/21/23 2019   BP: 105/77   Pulse: 96   Resp: 18   Temp: 98.5 °F (36.9 °C)   TempSrc: Oral   SpO2: 98%   Weight: 103.1 kg (227 lb 6.5 oz)   Height: 6' 2" (1.88 m)       Pertinent physical exam:   vital signs stable speaking in full sentences no abnormal gait    Brief workup plan:  Lab work    Preliminary workup initiated; this workup will be continued and followed by the physician or advanced practice provider that is assigned to the patient when roomed.  "

## 2023-11-23 ENCOUNTER — HOSPITAL ENCOUNTER (EMERGENCY)
Facility: HOSPITAL | Age: 53
Discharge: HOME | End: 2023-11-24
Attending: EMERGENCY MEDICINE
Payer: COMMERCIAL

## 2023-11-23 DIAGNOSIS — R19.7 DIARRHEA: ICD-10-CM

## 2023-11-23 DIAGNOSIS — K52.9 ENTERITIS: Primary | ICD-10-CM

## 2023-11-23 LAB
ALBUMIN SERPL BCP-MCNC: 3.6 G/DL (ref 3.5–5.2)
ALP SERPL-CCNC: 71 U/L (ref 55–135)
ALT SERPL W/O P-5'-P-CCNC: 20 U/L (ref 10–44)
ANION GAP SERPL CALC-SCNC: 11 MMOL/L (ref 8–16)
AST SERPL-CCNC: 26 U/L (ref 10–40)
BASOPHILS # BLD AUTO: 0.02 K/UL (ref 0–0.2)
BASOPHILS NFR BLD: 0.2 % (ref 0–1.9)
BILIRUB SERPL-MCNC: 0.2 MG/DL (ref 0.1–1)
BUN SERPL-MCNC: 17 MG/DL (ref 6–20)
CALCIUM SERPL-MCNC: 8.9 MG/DL (ref 8.7–10.5)
CHLORIDE SERPL-SCNC: 104 MMOL/L (ref 95–110)
CK SERPL-CCNC: 519 U/L (ref 20–200)
CO2 SERPL-SCNC: 20 MMOL/L (ref 23–29)
CREAT SERPL-MCNC: 1.2 MG/DL (ref 0.5–1.4)
DIFFERENTIAL METHOD: ABNORMAL
EOSINOPHIL # BLD AUTO: 0.4 K/UL (ref 0–0.5)
EOSINOPHIL NFR BLD: 3.3 % (ref 0–8)
ERYTHROCYTE [DISTWIDTH] IN BLOOD BY AUTOMATED COUNT: 15.8 % (ref 11.5–14.5)
EST. GFR  (NO RACE VARIABLE): >60 ML/MIN/1.73 M^2
GLUCOSE SERPL-MCNC: 90 MG/DL (ref 70–110)
HCT VFR BLD AUTO: 30.9 % (ref 40–54)
HGB BLD-MCNC: 9.2 G/DL (ref 14–18)
IMM GRANULOCYTES # BLD AUTO: 0.02 K/UL (ref 0–0.04)
IMM GRANULOCYTES NFR BLD AUTO: 0.2 % (ref 0–0.5)
INFLUENZA A, MOLECULAR: NEGATIVE
INFLUENZA B, MOLECULAR: NEGATIVE
LIPASE SERPL-CCNC: 41 U/L (ref 4–60)
LYMPHOCYTES # BLD AUTO: 2 K/UL (ref 1–4.8)
LYMPHOCYTES NFR BLD: 19 % (ref 18–48)
MAGNESIUM SERPL-MCNC: 1.7 MG/DL (ref 1.6–2.6)
MCH RBC QN AUTO: 24 PG (ref 27–31)
MCHC RBC AUTO-ENTMCNC: 29.8 G/DL (ref 32–36)
MCV RBC AUTO: 81 FL (ref 82–98)
MONOCYTES # BLD AUTO: 1.2 K/UL (ref 0.3–1)
MONOCYTES NFR BLD: 11.8 % (ref 4–15)
NEUTROPHILS # BLD AUTO: 6.9 K/UL (ref 1.8–7.7)
NEUTROPHILS NFR BLD: 65.5 % (ref 38–73)
NRBC BLD-RTO: 0 /100 WBC
PLATELET # BLD AUTO: 392 K/UL (ref 150–450)
PMV BLD AUTO: 9.3 FL (ref 9.2–12.9)
POTASSIUM SERPL-SCNC: 4.2 MMOL/L (ref 3.5–5.1)
PROT SERPL-MCNC: 7.2 G/DL (ref 6–8.4)
RBC # BLD AUTO: 3.84 M/UL (ref 4.6–6.2)
SARS-COV-2 RDRP RESP QL NAA+PROBE: NEGATIVE
SODIUM SERPL-SCNC: 135 MMOL/L (ref 136–145)
SPECIMEN SOURCE: NORMAL
WBC # BLD AUTO: 10.55 K/UL (ref 3.9–12.7)

## 2023-11-23 PROCEDURE — 93010 EKG 12-LEAD: ICD-10-PCS | Mod: ,,, | Performed by: INTERNAL MEDICINE

## 2023-11-23 PROCEDURE — 87502 INFLUENZA DNA AMP PROBE: CPT | Performed by: EMERGENCY MEDICINE

## 2023-11-23 PROCEDURE — 85025 COMPLETE CBC W/AUTO DIFF WBC: CPT | Performed by: EMERGENCY MEDICINE

## 2023-11-23 PROCEDURE — 80053 COMPREHEN METABOLIC PANEL: CPT | Performed by: EMERGENCY MEDICINE

## 2023-11-23 PROCEDURE — 81003 URINALYSIS AUTO W/O SCOPE: CPT | Performed by: EMERGENCY MEDICINE

## 2023-11-23 PROCEDURE — U0002 COVID-19 LAB TEST NON-CDC: HCPCS | Performed by: EMERGENCY MEDICINE

## 2023-11-23 PROCEDURE — 84443 ASSAY THYROID STIM HORMONE: CPT | Performed by: EMERGENCY MEDICINE

## 2023-11-23 PROCEDURE — 99285 EMERGENCY DEPT VISIT HI MDM: CPT

## 2023-11-23 PROCEDURE — 93005 ELECTROCARDIOGRAM TRACING: CPT

## 2023-11-23 PROCEDURE — 84439 ASSAY OF FREE THYROXINE: CPT | Performed by: EMERGENCY MEDICINE

## 2023-11-23 PROCEDURE — 83735 ASSAY OF MAGNESIUM: CPT | Performed by: EMERGENCY MEDICINE

## 2023-11-23 PROCEDURE — 93010 ELECTROCARDIOGRAM REPORT: CPT | Mod: ,,, | Performed by: INTERNAL MEDICINE

## 2023-11-23 PROCEDURE — 83690 ASSAY OF LIPASE: CPT | Performed by: EMERGENCY MEDICINE

## 2023-11-23 PROCEDURE — 82550 ASSAY OF CK (CPK): CPT | Performed by: EMERGENCY MEDICINE

## 2023-11-23 NOTE — Clinical Note
"Laron Painter" Taye was seen and treated in our emergency department on 11/23/2023.  He may return to work on 11/27/2023.       If you have any questions or concerns, please don't hesitate to call.      Remington Bernard RN    "

## 2023-11-24 VITALS
RESPIRATION RATE: 16 BRPM | BODY MASS INDEX: 30.17 KG/M2 | WEIGHT: 235 LBS | DIASTOLIC BLOOD PRESSURE: 61 MMHG | OXYGEN SATURATION: 99 % | TEMPERATURE: 100 F | HEART RATE: 80 BPM | SYSTOLIC BLOOD PRESSURE: 109 MMHG

## 2023-11-24 LAB
BILIRUB UR QL STRIP: NEGATIVE
C DIFF GDH STL QL: NEGATIVE
C DIFF TOX A+B STL QL IA: NEGATIVE
CLARITY UR: CLEAR
COLOR UR: COLORLESS
GLUCOSE UR QL STRIP: NEGATIVE
HGB UR QL STRIP: NEGATIVE
KETONES UR QL STRIP: NEGATIVE
LACTATE SERPL-SCNC: 0.8 MMOL/L (ref 0.5–2.2)
LEUKOCYTE ESTERASE UR QL STRIP: NEGATIVE
NITRITE UR QL STRIP: NEGATIVE
PH UR STRIP: 5 [PH] (ref 5–8)
PROT UR QL STRIP: NEGATIVE
SP GR UR STRIP: 1.01 (ref 1–1.03)
T4 FREE SERPL-MCNC: 0.71 NG/DL (ref 0.71–1.51)
TSH SERPL DL<=0.005 MIU/L-ACNC: 10.34 UIU/ML (ref 0.4–4)
URN SPEC COLLECT METH UR: ABNORMAL
UROBILINOGEN UR STRIP-ACNC: NEGATIVE EU/DL

## 2023-11-24 PROCEDURE — 87045 FECES CULTURE AEROBIC BACT: CPT | Performed by: EMERGENCY MEDICINE

## 2023-11-24 PROCEDURE — 83605 ASSAY OF LACTIC ACID: CPT | Performed by: EMERGENCY MEDICINE

## 2023-11-24 PROCEDURE — 87040 BLOOD CULTURE FOR BACTERIA: CPT | Mod: 59 | Performed by: EMERGENCY MEDICINE

## 2023-11-24 PROCEDURE — A9698 NON-RAD CONTRAST MATERIALNOC: HCPCS | Performed by: EMERGENCY MEDICINE

## 2023-11-24 PROCEDURE — 63700000 PHARM REV CODE 250 ALT 637 W/O HCPCS: Performed by: EMERGENCY MEDICINE

## 2023-11-24 PROCEDURE — 87427 SHIGA-LIKE TOXIN AG IA: CPT | Performed by: EMERGENCY MEDICINE

## 2023-11-24 PROCEDURE — 25500020 PHARM REV CODE 255: Performed by: EMERGENCY MEDICINE

## 2023-11-24 PROCEDURE — 87449 NOS EACH ORGANISM AG IA: CPT | Mod: 91 | Performed by: EMERGENCY MEDICINE

## 2023-11-24 PROCEDURE — 87449 NOS EACH ORGANISM AG IA: CPT | Performed by: EMERGENCY MEDICINE

## 2023-11-24 PROCEDURE — 87046 STOOL CULTR AEROBIC BACT EA: CPT | Performed by: EMERGENCY MEDICINE

## 2023-11-24 RX ORDER — AZITHROMYCIN 500 MG/1
500 TABLET, FILM COATED ORAL DAILY
Qty: 2 TABLET | Refills: 0 | Status: SHIPPED | OUTPATIENT
Start: 2023-11-24 | End: 2023-11-26

## 2023-11-24 RX ORDER — AZITHROMYCIN 250 MG/1
500 TABLET, FILM COATED ORAL
Status: COMPLETED | OUTPATIENT
Start: 2023-11-24 | End: 2023-11-24

## 2023-11-24 RX ADMIN — AZITHROMYCIN 500 MG: 250 TABLET, FILM COATED ORAL at 05:11

## 2023-11-24 RX ADMIN — IOHEXOL 1000 ML: 9 SOLUTION ORAL at 01:11

## 2023-11-24 RX ADMIN — IOHEXOL 100 ML: 350 INJECTION, SOLUTION INTRAVENOUS at 01:11

## 2023-11-24 NOTE — ED PROVIDER NOTES
SCRIBE #1 NOTE: I, Janie Diaz, am scribing for, and in the presence of, Joanie Lora DO. I have scribed the HPI, WANDA, PEx.     SCRIBE #2 NOTE: I, Andrei House, am scribing for, and in the presence of,  Alessio Byrne MD. I have scribed the remaining portions of the note not scribed by Scribe #1.      History     Chief Complaint   Patient presents with    Diarrhea     Pt reports being seen here 2 days ago for watery diarrhea and vomiting and was treated/discharged; symptoms have returned today along with body aches     Review of patient's allergies indicates:  No Known Allergies      History of Present Illness     HPI    11/23/2023, 10:41 PM  History obtained from the patient      History of Present Illness: Laron Kothari Jr. is a 53 y.o. male patient with a PMHx of HTN, HLD, DVT, and DM who presents to the Emergency Department for evaluation of diarrhea which onset three days ago. Pt was seen for his symptoms on Monday at both [facility] and [facility] but has had no relief since.  However he did not feel his prescriptions due to pharmacy hours.  Pt denies any recent travel, antibiotic use, or sick contacts. Symptoms are constant and moderate in severity. No mitigating or exacerbating factors reported. Associated sxs include upper abdominal pain, nausea, vomiting, fever, and back pain. Patient denies any blood in stool, hematuria, dysuria, and all other sxs at this time. No Prior Tx reported. No further complaints or concerns at this time.       Arrival mode: Personal vehicle  PCP: Kristen Adler DO        Past Medical History:  Past Medical History:   Diagnosis Date    Blood in stool 08/07/2018    Deep vein thrombosis (DVT) 2017    Left leg    Diabetes mellitus, type 2 2013    Gout     Hyperlipidemia     Hypertension     Neuropathy     Smoker        Past Surgical History:  Past Surgical History:   Procedure Laterality Date    APPENDECTOMY      ARTHROSCOPIC CHONDROPLASTY OF KNEE JOINT Right 11/24/2020    Procedure:  ARTHROSCOPY, KNEE, WITH CHONDROPLASTY;  Surgeon: Nahum Rose MD;  Location: [facility] OR;  Service: Orthopedics;  Laterality: Right;  chondroplasty medial condyle and anteriorly    COLONOSCOPY N/A 8/7/2018    Procedure: COLONOSCOPY;  Surgeon: Ten Valentine MD;  Location: [facility] ENDO;  Service: Endoscopy;  Laterality: N/A;    COLONOSCOPY N/A 2/23/2022    Procedure: COLONOSCOPY;  Surgeon: Octavio Craig MD;  Location: [facility] ENDO;  Service: Endoscopy;  Laterality: N/A;    KNEE ARTHROSCOPY W/ MENISCECTOMY Right 11/24/2020    Procedure: ARTHROSCOPY, KNEE, WITH MENISCECTOMY;  Surgeon: Nahum Rose MD;  Location: [facility] OR;  Service: Orthopedics;  Laterality: Right;  Partial medial and lateral meniscectomy    LEFT HEART CATHETERIZATION Left 4/11/2023    Procedure: Left heart cath;  Surgeon: Sue Lara MD;  Location: [facility] CATH LAB;  Service: Cardiology;  Laterality: Left;    PERCUTANEOUS TRANSLUMINAL BALLOON ANGIOPLASTY OF CORONARY ARTERY  4/11/2023    Procedure: Angioplasty-coronary;  Surgeon: Sue Lara MD;  Location: [facility] CATH LAB;  Service: Cardiology;;    ROBOT-ASSISTED CHOLECYSTECTOMY USING DA TRIPP XI N/A 1/21/2020    Procedure: XI ROBOTIC CHOLECYSTECTOMY;  Surgeon: Sebastien Rivera MD;  Location: [facility] OR;  Service: General;  Laterality: N/A;    STENT, DRUG ELUTING, SINGLE VESSEL, CORONARY  4/11/2023    Procedure: Stent, Drug Eluting, Single Vessel, Coronary;  Surgeon: Sue Lara MD;  Location: [facility] CATH LAB;  Service: Cardiology;;    TONSILLECTOMY, ADENOIDECTOMY           Family History:  Family History   Problem Relation Age of Onset    Diabetes Father     Prostate cancer Father     Cataracts Father     Hypertension Mother     Hypertension Brother        Social History:  Social History     Tobacco Use    Smoking status: Every Day     Current packs/day: 1.50     Average packs/day: 1.5 packs/day for 36.3 years (54.4 ttl pk-yrs)     Types: Cigarettes     Start date: 2003    Smokeless  tobacco: Never    Tobacco comments:     Smoked 1.5-2 packs per day until April 2023, now smoking 10 cpd   Substance and Sexual Activity    Alcohol use: Yes     Alcohol/week: 26.0 standard drinks of alcohol     Types: 26 Cans of beer per week     Comment: daily    Drug use: No    Sexual activity: Yes     Partners: Female        Review of Systems     Review of Systems   Constitutional:  Positive for fever (Subjective).   Gastrointestinal:  Positive for diarrhea, nausea and vomiting. Negative for blood in stool.   Genitourinary:  Negative for dysuria and hematuria.   Musculoskeletal:  Positive for back pain.      Physical Exam     Initial Vitals [11/23/23 2205]   BP Pulse Resp Temp SpO2   (!) 167/109 97 18 100 °F (37.8 °C) 100 %      MAP       --          Physical Exam  Nursing Notes and Vital Signs Reviewed.  Constitutional: Patient is in no acute distress. Well-developed and well-nourished.  Head: Atraumatic. Normocephalic.  Eyes: PERRL. EOM intact. Conjunctivae are not pale. No scleral icterus.  ENT: Dry mucous membranes. Oropharynx is clear and symmetric.    Neck: Supple. Full ROM. No lymphadenopathy.  Cardiovascular: Regular rate. Regular rhythm. No murmurs, rubs, or gallops. Distal pulses are 2+ and symmetric.  Pulmonary/Chest: No respiratory distress. Clear to auscultation bilaterally. No wheezing or rales.  Abdominal: Soft and non-distended.  There is no tenderness.  No rebound, guarding, or rigidity. Good bowel sounds.  Genitourinary: No CVA tenderness  Musculoskeletal: Moves all extremities. No obvious deformities. No edema. No calf tenderness. Tenderness to palpation in left lower lumbar paraspinals with an area of increased muscle texture.  Skin: Warm and dry.  Neurological:  Alert, awake, and appropriate.  Normal speech.  No acute focal neurological deficits are appreciated.  Psychiatric: Normal affect. Good eye contact. Appropriate in content.     ED Course   Procedures  ED Vital Signs:  Vitals:     11/23/23 2205 11/23/23 2235 11/23/23 2330 11/24/23 0025   BP: (!) 167/109 (!) 147/78 137/75 129/75   Pulse: 97 88 81 84   Resp: 18 18 16    Temp: 100 °F (37.8 °C)      TempSrc: Oral      SpO2: 100% 100% 100% 100%   Weight: 106.6 kg (235 lb 0.2 oz)       11/24/23 0100 11/24/23 0230 11/24/23 0300 11/24/23 0400   BP: 130/77 124/60 120/60 112/62   Pulse: 81 87 83 79   Resp:       Temp:       TempSrc:       SpO2: 100% 100% 97% 96%   Weight:           Abnormal Lab Results:  Labs Reviewed   CBC W/ AUTO DIFFERENTIAL - Abnormal; Notable for the following components:       Result Value    RBC 3.84 (*)     Hemoglobin 9.2 (*)     Hematocrit 30.9 (*)     MCV 81 (*)     MCH 24.0 (*)     MCHC 29.8 (*)     RDW 15.8 (*)     Mono # 1.2 (*)     All other components within normal limits   COMPREHENSIVE METABOLIC PANEL - Abnormal; Notable for the following components:    Sodium 135 (*)     CO2 20 (*)     All other components within normal limits   URINALYSIS, REFLEX TO URINE CULTURE - Abnormal; Notable for the following components:    Color, UA Colorless (*)     All other components within normal limits    Narrative:     Specimen Source->Urine   TSH - Abnormal; Notable for the following components:    TSH 10.341 (*)     All other components within normal limits   CK - Abnormal; Notable for the following components:     (*)     All other components within normal limits   CLOSTRIDIUM DIFFICILE   INFLUENZA A & B BY MOLECULAR   CULTURE, STOOL   CULTURE, BLOOD   CULTURE, BLOOD   ENTEROHEMORRHAGIC E.COLI   LIPASE   MAGNESIUM   SARS-COV-2 RNA AMPLIFICATION, QUAL   T4, FREE   LACTIC ACID, PLASMA        All Lab Results:  Results for orders placed or performed during the hospital encounter of 11/23/23   Clostridium difficile EIA    Specimen: Stool   Result Value Ref Range    C. diff Antigen Negative Negative    C difficile Toxins A+B, EIA Negative Negative   Influenza A & B by Molecular    Specimen: Nasopharyngeal Swab   Result Value Ref  Range    Influenza A, Molecular Negative Negative    Influenza B, Molecular Negative Negative    Flu A & B Source Nasal swab    CBC W/ AUTO DIFFERENTIAL   Result Value Ref Range    WBC 10.55 3.90 - 12.70 K/uL    RBC 3.84 (L) 4.60 - 6.20 M/uL    Hemoglobin 9.2 (L) 14.0 - 18.0 g/dL    Hematocrit 30.9 (L) 40.0 - 54.0 %    MCV 81 (L) 82 - 98 fL    MCH 24.0 (L) 27.0 - 31.0 pg    MCHC 29.8 (L) 32.0 - 36.0 g/dL    RDW 15.8 (H) 11.5 - 14.5 %    Platelets 392 150 - 450 K/uL    MPV 9.3 9.2 - 12.9 fL    Immature Granulocytes 0.2 0.0 - 0.5 %    Gran # (ANC) 6.9 1.8 - 7.7 K/uL    Immature Grans (Abs) 0.02 0.00 - 0.04 K/uL    Lymph # 2.0 1.0 - 4.8 K/uL    Mono # 1.2 (H) 0.3 - 1.0 K/uL    Eos # 0.4 0.0 - 0.5 K/uL    Baso # 0.02 0.00 - 0.20 K/uL    nRBC 0 0 /100 WBC    Gran % 65.5 38.0 - 73.0 %    Lymph % 19.0 18.0 - 48.0 %    Mono % 11.8 4.0 - 15.0 %    Eosinophil % 3.3 0.0 - 8.0 %    Basophil % 0.2 0.0 - 1.9 %    Differential Method Automated    Comp. Metabolic Panel   Result Value Ref Range    Sodium 135 (L) 136 - 145 mmol/L    Potassium 4.2 3.5 - 5.1 mmol/L    Chloride 104 95 - 110 mmol/L    CO2 20 (L) 23 - 29 mmol/L    Glucose 90 70 - 110 mg/dL    BUN 17 6 - 20 mg/dL    Creatinine 1.2 0.5 - 1.4 mg/dL    Calcium 8.9 8.7 - 10.5 mg/dL    Total Protein 7.2 6.0 - 8.4 g/dL    Albumin 3.6 3.5 - 5.2 g/dL    Total Bilirubin 0.2 0.1 - 1.0 mg/dL    Alkaline Phosphatase 71 55 - 135 U/L    AST 26 10 - 40 U/L    ALT 20 10 - 44 U/L    eGFR >60 >60 mL/min/1.73 m^2    Anion Gap 11 8 - 16 mmol/L   Lipase   Result Value Ref Range    Lipase 41 4 - 60 U/L   Urinalysis, Reflex to Urine Culture Urine, Clean Catch    Specimen: Urine   Result Value Ref Range    Specimen UA Urine, Clean Catch     Color, UA Colorless (A) Yellow, Straw, Becki    Appearance, UA Clear Clear    pH, UA 5.0 5.0 - 8.0    Specific Gravity, UA 1.010 1.005 - 1.030    Protein, UA Negative Negative    Glucose, UA Negative Negative    Ketones, UA Negative Negative    Bilirubin (UA)  Negative Negative    Occult Blood UA Negative Negative    Nitrite, UA Negative Negative    Urobilinogen, UA Negative <2.0 EU/dL    Leukocytes, UA Negative Negative   TSH   Result Value Ref Range    TSH 10.341 (H) 0.400 - 4.000 uIU/mL   Magnesium   Result Value Ref Range    Magnesium 1.7 1.6 - 2.6 mg/dL   COVID-19 Rapid Screening   Result Value Ref Range    SARS-CoV-2 RNA, Amplification, Qual Negative Negative   CPK   Result Value Ref Range     (H) 20 - 200 U/L   T4, Free   Result Value Ref Range    Free T4 0.71 0.71 - 1.51 ng/dL   Lactic acid, plasma   Result Value Ref Range    Lactate (Lactic Acid) 0.8 0.5 - 2.2 mmol/L         Imaging Results:  Imaging Results              CT Abdomen Pelvis With IV Contrast Routine Oral Contrast (Final result)  Result time 11/24/23 01:52:10      Final result by Joellen Holbrook MD (11/24/23 01:52:10)                   Impression:      Infectious or inflammatory enteritis.  No small bowel obstruction.      Electronically signed by: Joellen Holbrook  Date:    11/24/2023  Time:    01:52               Narrative:    EXAMINATION:  CT ABDOMEN PELVIS WITH IV CONTRAST    CLINICAL HISTORY:  Abdominal abscess/infection suspected;    TECHNIQUE:  Low dose axial images, sagittal and coronal reformations were obtained from the lung bases to the pubic symphysis following the IV administration of 100 mL of Omnipaque 350 and the oral administration of 1000  mL of Omnipaque 9.    COMPARISON:  11/21/2023    FINDINGS:  Abdomen:    - Lower thorax:Unremarkable.    - Lung bases: No infiltrates and no nodules.    - Liver: No focal mass.    - Gallbladder: Absent.    - Bile Ducts: No evidence of intra or extra hepatic biliary ductal dilation.    - Spleen: Negative.    - Kidneys: No mass or hydronephrosis.    - Adrenals: Unremarkable.    - Pancreas: Nonspecific pancreatic ductal dilatation, similar to prior.    - Retroperitoneum:  No significant adenopathy.    - Vascular: No abdominal aortic  aneurysm.    - Abdominal wall:  Unremarkable.    Pelvis:    No pelvic mass, adenopathy, or free fluid.    Bowel/Mesentery:    Multiple mildly prominent fluid-filled loops of small bowel.  Mild fat stranding involving bowel loops in the right lower quadrant.  No small bowel obstruction.    Bones:  No acute osseous abnormality and no suspicious lytic or blastic lesion.                                       The EKG was ordered, reviewed, and independently interpreted by the ED provider.  Interpretation time: 22:41  Rate: 84 BPM  Rhythm: normal sinus rhythm  Interpretation: Rightward axis. T wave abnormality, consider inferior ischemia. No STEMI.             The Emergency Provider reviewed the vital signs and test results, which are outlined above.     ED Discussion     1:55 AM: Dr. Lora transfers care of patient to Dr. Byrne pending lab results.    5:15 AM: Reassessed pt at this time. Discussed with pt all pertinent ED information and results. Discussed pt dx and plan of tx. Gave pt all f/u and return to the ED instructions. All questions and concerns were addressed at this time. Pt expresses understanding of information and instructions, and is comfortable with plan to discharge. Pt is stable for discharge.    I discussed with patient and/or family/caretaker that evaluation in the ED does not suggest any emergent or life threatening medical conditions requiring immediate intervention beyond what was provided in the ED, and I believe patient is safe for discharge.  Regardless, an unremarkable evaluation in the ED does not preclude the development or presence of a serious of life threatening condition. As such, patient was instructed to return immediately for any worsening or change in current symptoms.      ED Course as of 11/24/23 0515   Fri Nov 24, 2023   0029 TSH(!): 10.341 [NF]   0033 Free T4: 0.71  Subclinical hypothyroidism [NF]   0033 CPK(!): 519  Not consistent with rhabdomyolysis [NF]   0033 Hemoglobin(!):  9.2  Progressively worsening and may be delusional as he has received 3 L of IV fluids within the last 3-4 days. [NF]   0053 Old records reviewed.  Patient was seen and evaluated at [facility] on 11/21/2023 and received IV fluids, Zofran, and Bentyl.  He was diagnosed with acute gastroenteritis and EFRAIN secondary to dehydration and left prior to full evaluation.  He presented here and had a similar workup.  Lipase 382 but noncontrast CT showed no abnormal findings including acute pancreatitis.  He had a leukocytosis and an EFRAIN.  His influenza and COVID were negative.  He was discharged with Zofran but did not fill the prescription as the pharmacy was closed. [NF]      ED Course User Index  [NF] Joanie Lora., DO     Medical Decision Making  Nausea/vomiting/diarrhea for multiple days.  This is his 3rd ED visit.  Given the severity of symptoms, the duration of his symptoms, the fever, and his age, will put patient on empiric antibiotics while stool culture is pending.  Discussed him filling his Zofran prescription and other supportive care measures at home.  ER return precautions provided    Amount and/or Complexity of Data Reviewed  External Data Reviewed: notes.  Labs: ordered. Decision-making details documented in ED Course.  Radiology: ordered. Decision-making details documented in ED Course.    Risk  OTC drugs.  Prescription drug management.                ED Medication(s):  Medications   azithromycin tablet 500 mg (has no administration in time range)   iohexoL (OMNIPAQUE 350) injection 100 mL (100 mLs Intravenous Given 11/24/23 0131)   iohexoL (OMNIPAQUE 9) oral solution 1,000 mL (1,000 mLs Oral Given 11/24/23 0131)       New Prescriptions    AZITHROMYCIN (ZITHROMAX) 500 MG TABLET    Take 1 tablet (500 mg total) by mouth once daily. for 2 days               Scribe Attestation:   Scribe #1: I performed the above scribed service and the documentation accurately describes the services I performed. I attest to  the accuracy of the note.     Attending:   Physician Attestation Statement for Scribe #1: I, Joanie Lora DO, personally performed the services described in this documentation, as scribed by Janie Diaz, in my presence, and it is both accurate and complete.       Scribe Attestation:   Scribe #2: I performed the above scribed service and the documentation accurately describes the services I performed. I attest to the accuracy of the note.    Attending Attestation:           Physician Attestation for Scribe:    Physician Attestation Statement for Scribe #2: I, Alessio Byrne MD, reviewed documentation, as scribed by Andrei House in my presence, and it is both accurate and complete. I also acknowledge and confirm the content of the note done by Scribe #1.           Clinical Impression       ICD-10-CM ICD-9-CM   1. Enteritis  K52.9 558.9   2. Diarrhea  R19.7 787.91       Disposition:   Disposition: Discharged  Condition: Stable         Alessio Byrne MD  11/24/23 0517

## 2023-11-25 LAB
E COLI SXT1 STL QL IA: NEGATIVE
E COLI SXT2 STL QL IA: NEGATIVE

## 2023-11-27 ENCOUNTER — TELEPHONE (OUTPATIENT)
Dept: ADMINISTRATIVE | Facility: CLINIC | Age: 53
End: 2023-11-27
Payer: COMMERCIAL

## 2023-11-27 LAB — BACTERIA STL CULT: NORMAL

## 2023-11-27 NOTE — TELEPHONE ENCOUNTER
Patient outreached twice, left voicemail both encounters.  Will provide assistance as needed, in the event patient returns call.

## 2023-11-28 ENCOUNTER — TELEPHONE (OUTPATIENT)
Dept: SMOKING CESSATION | Facility: CLINIC | Age: 53
End: 2023-11-28
Payer: COMMERCIAL

## 2023-11-29 LAB
BACTERIA BLD CULT: NORMAL
BACTERIA BLD CULT: NORMAL

## 2023-12-04 DIAGNOSIS — J06.9 URI WITH COUGH AND CONGESTION: ICD-10-CM

## 2023-12-04 RX ORDER — FLUTICASONE PROPIONATE 50 MCG
2 SPRAY, SUSPENSION (ML) NASAL DAILY
Qty: 16 G | Refills: 0 | Status: SHIPPED | OUTPATIENT
Start: 2023-12-04

## 2023-12-04 NOTE — TELEPHONE ENCOUNTER
Refill Routing Note   Medication(s) are not appropriate for processing by [facility] for the following reason(s):        New or recently adjusted medication  ED/Hospital Visit since last OV with provider    [facility] action(s):  Defer               Appointments  past 12m or future 3m with PCP    Date Provider   Last Visit   7/19/2023 Kristen Adler, DO   Next Visit   2/8/2024 Kristen Adler DO   ED visits in past 90 days: 2        Note composed:3:14 PM 12/04/2023

## 2023-12-04 NOTE — TELEPHONE ENCOUNTER
No care due was identified.  [facility] Embedded Care Due Messages. Reference number: [id number].   12/04/2023 12:30:16 AM CST

## 2023-12-05 ENCOUNTER — OFFICE VISIT (OUTPATIENT)
Dept: PULMONOLOGY | Facility: CLINIC | Age: 53
End: 2023-12-05
Payer: COMMERCIAL

## 2023-12-05 VITALS
RESPIRATION RATE: 18 BRPM | WEIGHT: 240.63 LBS | DIASTOLIC BLOOD PRESSURE: 80 MMHG | BODY MASS INDEX: 30.88 KG/M2 | SYSTOLIC BLOOD PRESSURE: 132 MMHG | OXYGEN SATURATION: 98 % | HEIGHT: 74 IN | HEART RATE: 74 BPM

## 2023-12-05 DIAGNOSIS — F17.200 TOBACCO USE DISORDER: ICD-10-CM

## 2023-12-05 DIAGNOSIS — G47.33 OSA (OBSTRUCTIVE SLEEP APNEA): Primary | ICD-10-CM

## 2023-12-05 DIAGNOSIS — G47.01 INSOMNIA DUE TO MEDICAL CONDITION: ICD-10-CM

## 2023-12-05 PROCEDURE — 4010F ACE/ARB THERAPY RXD/TAKEN: CPT | Mod: ,,, | Performed by: PHYSICIAN ASSISTANT

## 2023-12-05 PROCEDURE — 99214 PR OFFICE/OUTPT VISIT, EST, LEVL IV, 30-39 MIN: ICD-10-PCS | Mod: S$PBB,,, | Performed by: PHYSICIAN ASSISTANT

## 2023-12-05 PROCEDURE — 3061F PR NEG MICROALBUMINURIA RESULT DOCUMENTED/REVIEW: ICD-10-PCS | Mod: ,,, | Performed by: PHYSICIAN ASSISTANT

## 2023-12-05 PROCEDURE — 3066F PR DOCUMENTATION OF TREATMENT FOR NEPHROPATHY: ICD-10-PCS | Mod: ,,, | Performed by: PHYSICIAN ASSISTANT

## 2023-12-05 PROCEDURE — 99214 OFFICE O/P EST MOD 30 MIN: CPT | Mod: S$PBB,,, | Performed by: PHYSICIAN ASSISTANT

## 2023-12-05 PROCEDURE — 3066F NEPHROPATHY DOC TX: CPT | Mod: ,,, | Performed by: PHYSICIAN ASSISTANT

## 2023-12-05 PROCEDURE — 99999 PR PBB SHADOW E&M-EST. PATIENT-LVL V: ICD-10-PCS | Mod: PBBFAC,,, | Performed by: PHYSICIAN ASSISTANT

## 2023-12-05 PROCEDURE — 3061F NEG MICROALBUMINURIA REV: CPT | Mod: ,,, | Performed by: PHYSICIAN ASSISTANT

## 2023-12-05 PROCEDURE — 4010F PR ACE/ARB THEARPY RXD/TAKEN: ICD-10-PCS | Mod: ,,, | Performed by: PHYSICIAN ASSISTANT

## 2023-12-05 PROCEDURE — 99999 PR PBB SHADOW E&M-EST. PATIENT-LVL V: CPT | Mod: PBBFAC,,, | Performed by: PHYSICIAN ASSISTANT

## 2023-12-05 NOTE — ASSESSMENT & PLAN NOTE
Symptoms 2 year  On Trazodone  Bed time 09:30pm  SOL: 2 hrs  Wake time: 4 am  No Naps  Iron supplements, less leg pain

## 2023-12-05 NOTE — ASSESSMENT & PLAN NOTE
Moderate positional MARIYA  Cannot tolerate Full Face CPAP Mask  Wants to try nasal mask, order placed today  Has daytime fatigue, wants to be successful with CPAP  Discussed therapeutic goals for CPAP: Ideal usage 100% of nights for 6-8 hours per night. Minimum usage is 70% of night for at least 4 hours per night.  Discussed CPAP titration study may be option

## 2023-12-05 NOTE — PROGRESS NOTES
Pulmonary Outpatient  Visit     Subjective:       Patient ID: Laron Kothari Jr. is a 53 y.o. male.    Social History     Tobacco Use   Smoking Status Every Day    Current packs/day: 1.50    Average packs/day: 1.5 packs/day for 36.3 years (54.5 ttl pk-yrs)    Types: Cigarettes    Start date: 2003   Smokeless Tobacco Never   Tobacco Comments    Smoked 1.5-2 packs per day until April 2023, now smoking 10 cpd            Chief Complaint: Sleep Apnea      Laron Kothari Jr. Is 53 y.o.  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    Psychiatric/Behavioral:  Positive for sleep disturbance.    All other systems reviewed and are negative.      Outpatient Encounter Medications as of 12/5/2023   Medication Sig Dispense Refill    aspirin (ECOTRIN) 81 MG EC tablet Take 1 tablet (81 mg total) by mouth once daily. 30 tablet 11    BLOOD PRESSURE CUFF Misc 1 Device by Misc.(Non-Drug; Combo Route) route once daily.  0    blood sugar diagnostic Strp Once daily glucose testing. 50 strip 6    blood-glucose meter Misc Use as directed. 1 each 0    empagliflozin (JARDIANCE) 10 mg tablet Take 1 tablet (10 mg total) by mouth once daily. 30 tablet 5    fluticasone propionate (FLONASE) 50 mcg/actuation nasal spray 2 sprays (100 mcg total) by Each Nostril route once daily. 16 g 0    gabapentin (NEURONTIN) 300 MG capsule Take 1 capsule (300 mg  "total) by mouth 3 (three) times daily. 90 capsule 11    glipiZIDE (GLUCOTROL) 5 MG tablet Take 1 tablet (5 mg total) by mouth once daily. 90 tablet 0    iron-vitamin C 100-250 mg, ICAR-C, 100-250 mg Tab Take 1 tablet by mouth once daily. 30 tablet 11    lancets (LANCETS,THIN) Misc Once daily glucose testing. 50 each 6    lisinopriL (PRINIVIL,ZESTRIL) 40 MG tablet Take 1 tablet (40 mg total) by mouth once daily. 90 tablet 3    metFORMIN (GLUCOPHAGE) 1000 MG tablet Take 1 tablet (1,000 mg total) by mouth 2 (two) times daily with meals. 180 tablet 0    metoprolol succinate (TOPROL-XL) 50 MG 24 hr tablet Take 2 tablets in the morning and 1 in the evening, total 150mg daily 120 tablet 3    nicotine (NICODERM CQ) 21 mg/24 hr Place 1 patch onto the skin once daily. 28 patch 0    ondansetron (ZOFRAN-ODT) 4 MG TbDL Take 2 tablets (8 mg total) by mouth every 8 (eight) hours as needed (nausea vomiting). 12 tablet 0    pen needle, diabetic (PEN NEEDLE) 31 gauge x 5/16" Ndle Use once daily with insulin injection. 50 each 3    prasugreL (EFFIENT) 10 mg Tab Take 1 tablet (10 mg total) by mouth once daily. 90 tablet 3    pravastatin (PRAVACHOL) 80 MG tablet Take 1 tablet (80 mg total) by mouth every evening. 90 tablet 3    ranolazine (RANEXA) 500 MG Tb12 Take 1 tablet (500 mg total) by mouth 2 (two) times daily. 180 tablet 3    sildenafiL (VIAGRA) 50 MG tablet Take 1 tablet (50 mg total) by mouth daily as needed for Erectile Dysfunction. 30 tablet 5    traZODone (DESYREL) 50 MG tablet TAKE 1 TABLET EVERY EVENING 90 tablet 1    [DISCONTINUED] fluticasone propionate (FLONASE) 50 mcg/actuation nasal spray 1 spray (50 mcg total) by Each Nostril route daily as needed for Rhinitis. 16 g 0     No facility-administered encounter medications on file as of 12/5/2023.       The following portions of the patient's history were reviewed and updated as appropriate: He  has a past medical history of Blood in stool (08/07/2018), Deep vein " thrombosis (DVT) (2017), Diabetes mellitus, type 2 (2013), Gout, Hyperlipidemia, Hypertension, Neuropathy, and Smoker.  He does not have any pertinent problems on file.  He  has a past surgical history that includes Appendectomy; TONSILLECTOMY, ADENOIDECTOMY; Colonoscopy (N/A, 8/7/2018); Robot-assisted cholecystectomy using da Stan Xi (N/A, 1/21/2020); Knee arthroscopy w/ meniscectomy (Right, 11/24/2020); Arthroscopic chondroplasty of knee joint (Right, 11/24/2020); Colonoscopy (N/A, 2/23/2022); Left heart catheterization (Left, 4/11/2023); Percutaneous transluminal balloon angioplasty of coronary artery (4/11/2023); and stent, drug eluting, single vessel, coronary (4/11/2023).  His family history includes Cataracts in his father; Diabetes in his father; Hypertension in his brother and mother; Prostate cancer in his father.  He  reports that he has been smoking cigarettes. He started smoking about 20 years ago. He has a 54.5 pack-year smoking history. He has never used smokeless tobacco. He reports current alcohol use of about 26.0 standard drinks of alcohol per week. He reports that he does not use drugs.  He has a current medication list which includes the following prescription(s): aspirin, blood pressure cuff, blood sugar diagnostic, blood-glucose meter, empagliflozin, fluticasone propionate, gabapentin, glipizide, iron-vitamin c 100-250 mg (icar-c), lancets, lisinopril, metformin, metoprolol succinate, nicotine, ondansetron, pen needle, diabetic, prasugrel, pravastatin, ranolazine, sildenafil, and trazodone.  Current Outpatient Medications on File Prior to Visit   Medication Sig Dispense Refill    aspirin (ECOTRIN) 81 MG EC tablet Take 1 tablet (81 mg total) by mouth once daily. 30 tablet 11    BLOOD PRESSURE CUFF Misc 1 Device by Misc.(Non-Drug; Combo Route) route once daily.  0    blood sugar diagnostic Strp Once daily glucose testing. 50 strip 6    blood-glucose meter Misc Use as directed. 1 each 0     "empagliflozin (JARDIANCE) 10 mg tablet Take 1 tablet (10 mg total) by mouth once daily. 30 tablet 5    fluticasone propionate (FLONASE) 50 mcg/actuation nasal spray 2 sprays (100 mcg total) by Each Nostril route once daily. 16 g 0    gabapentin (NEURONTIN) 300 MG capsule Take 1 capsule (300 mg total) by mouth 3 (three) times daily. 90 capsule 11    glipiZIDE (GLUCOTROL) 5 MG tablet Take 1 tablet (5 mg total) by mouth once daily. 90 tablet 0    iron-vitamin C 100-250 mg, ICAR-C, 100-250 mg Tab Take 1 tablet by mouth once daily. 30 tablet 11    lancets (LANCETS,THIN) Misc Once daily glucose testing. 50 each 6    lisinopriL (PRINIVIL,ZESTRIL) 40 MG tablet Take 1 tablet (40 mg total) by mouth once daily. 90 tablet 3    metFORMIN (GLUCOPHAGE) 1000 MG tablet Take 1 tablet (1,000 mg total) by mouth 2 (two) times daily with meals. 180 tablet 0    metoprolol succinate (TOPROL-XL) 50 MG 24 hr tablet Take 2 tablets in the morning and 1 in the evening, total 150mg daily 120 tablet 3    nicotine (NICODERM CQ) 21 mg/24 hr Place 1 patch onto the skin once daily. 28 patch 0    ondansetron (ZOFRAN-ODT) 4 MG TbDL Take 2 tablets (8 mg total) by mouth every 8 (eight) hours as needed (nausea vomiting). 12 tablet 0    pen needle, diabetic (PEN NEEDLE) 31 gauge x 5/16" Ndle Use once daily with insulin injection. 50 each 3    prasugreL (EFFIENT) 10 mg Tab Take 1 tablet (10 mg total) by mouth once daily. 90 tablet 3    pravastatin (PRAVACHOL) 80 MG tablet Take 1 tablet (80 mg total) by mouth every evening. 90 tablet 3    ranolazine (RANEXA) 500 MG Tb12 Take 1 tablet (500 mg total) by mouth 2 (two) times daily. 180 tablet 3    sildenafiL (VIAGRA) 50 MG tablet Take 1 tablet (50 mg total) by mouth daily as needed for Erectile Dysfunction. 30 tablet 5    traZODone (DESYREL) 50 MG tablet TAKE 1 TABLET EVERY EVENING 90 tablet 1     No current facility-administered medications on file prior to visit.     He has No Known Allergies..      BP " "Readings from Last 3 Encounters:   12/05/23 132/80   11/24/23 109/61   11/21/23 105/77     Snoring / Sleep:     [place] Questionnaire (validated MARIYA screening questionnaire)    YES -- Snoring/apnea    YES -- Fatigue    Body mass index is 30.9 kg/m².  (>25 is overweight, >30 is obese)    Blood Pressure = Hypertension  (PreHTN 120-139/80-89, Stg1 140-159/90-99, Stg2 >160/>100)  [place] = 3 of three MARIYA categories are positive (high risk is 2-3 positive categories)     [place] Sleepiness Scale TOTAL =    EPWORTH SLEEPINESS SCALE 8/21/2023   Sitting and reading 0   Watching TV 1   Sitting, inactive in a public place (e.g. a theatre or a meeting) 0   As a passenger in a car for an hour without a break 0   Lying down to rest in the afternoon when circumstances permit 0   Sitting and talking to someone 0   Sitting quietly after a lunch without alcohol 1   In a car, while stopped for a few minutes in traffic 0   Total score 2      (validated sleepiness questionnaire with a higher score indicating greater sleepiness; range 0-24)      STOP-Bang Questionnaire (validated MARIYA screening questionnaire)  Negative unless checked off.  [x] Snoring    [x]  Tired/Fatigued/Sleepy  [x] Obstruction (apneas/choking)  [] Pressure (HTN)  [] BMI >35  [x] Age >50  [] Neck >40 cm  [x] Gender male   STOP-Bang = 5 (low risk 0-2,high risk 3-8)    Neck circumference 17"      MMRC Dyspnea Scale (4 is worst)     [x] MMRC 0: Dyspneic on strenuous excercise (0 points)    [] MMRC 1: Dyspneic on walking a slight hill (0 points)    [] MMRC 2: Dyspneic on walking level ground; must stop occasionally due to breathlessness (1 point)    [] MMRC 3: Must stop for breathlessness after walking 100 yards or after a few minutes (2 points)    [] MMRC 4: Cannot leave house; breathless on dressing/undressing (3 points)                     No flowsheet data found.              Objective:     Vital Signs (Most Recent)  Vital Signs  Pulse: 74  Resp: 18  SpO2: 98 %  BP: " "132/80  Height and Weight  Height: 6' 2" (188 cm)  Weight: 109.1 kg (240 lb 10.1 oz)  BSA (Calculated - sq m): 2.39 sq meters  BMI (Calculated): 30.9  Weight in (lb) to have BMI = 25: 194.3]  Wt Readings from Last 2 Encounters:   12/05/23 109.1 kg (240 lb 10.1 oz)   11/23/23 106.6 kg (235 lb 0.2 oz)       Physical Exam  Vitals and nursing note reviewed.   Constitutional:       Appearance: Normal appearance.          Comments: Neck 17"   HENT:      Head: Normocephalic and atraumatic.      Right Ear: Tympanic membrane normal.      Nose: Nose normal.   Eyes:      Pupils: Pupils are equal, round, and reactive to light.   Cardiovascular:      Rate and Rhythm: Normal rate and regular rhythm.      Pulses: Normal pulses.      Heart sounds: Normal heart sounds.   Pulmonary:      Effort: Pulmonary effort is normal.      Breath sounds: Normal breath sounds.   Abdominal:      General: Bowel sounds are normal.      Palpations: Abdomen is soft.   Musculoskeletal:         General: Normal range of motion.      Cervical back: Normal range of motion.   Skin:     General: Skin is warm and dry.      Capillary Refill: Capillary refill takes less than 2 seconds.   Neurological:      General: No focal deficit present.      Mental Status: He is alert and oriented to person, place, and time.   Psychiatric:         Mood and Affect: Mood normal.          Laboratory  Lab Results   Component Value Date    WBC 10.55 11/23/2023    RBC 3.84 (L) 11/23/2023    HGB 9.2 (L) 11/23/2023    HCT 30.9 (L) 11/23/2023    MCV 81 (L) 11/23/2023    MCH 24.0 (L) 11/23/2023    MCHC 29.8 (L) 11/23/2023    RDW 15.8 (H) 11/23/2023     11/23/2023    MPV 9.3 11/23/2023    GRAN 6.9 11/23/2023    GRAN 65.5 11/23/2023    LYMPH 2.0 11/23/2023    LYMPH 19.0 11/23/2023    MONO 1.2 (H) 11/23/2023    MONO 11.8 11/23/2023    EOS 0.4 11/23/2023    BASO 0.02 11/23/2023    EOSINOPHIL 3.3 11/23/2023    BASOPHIL 0.2 11/23/2023       BMP  Lab Results   Component Value Date    " " (L) 11/23/2023    K 4.2 11/23/2023     11/23/2023    CO2 20 (L) 11/23/2023    BUN 17 11/23/2023    CREATININE 1.2 11/23/2023    CALCIUM 8.9 11/23/2023    ANIONGAP 11 11/23/2023    ESTGFRAFRICA >60 06/21/2022    EGFRNONAA >60 06/21/2022    AST 26 11/23/2023    ALT 20 11/23/2023    PROT 7.2 11/23/2023          No results found for: "IGE"     No results found for: "ASPERGILLUS"  No results found for: "AFUMIGATUSCL"     No results found for: "ACE"     Diagnostic Results:  I have personally reviewed today the following studies:    CT Abdomen Pelvis With IV Contrast Routine Oral Contrast  Narrative: EXAMINATION:  CT ABDOMEN PELVIS WITH IV CONTRAST    CLINICAL HISTORY:  Abdominal abscess/infection suspected;    TECHNIQUE:  Low dose axial images, sagittal and coronal reformations were obtained from the lung bases to the pubic symphysis following the IV administration of 100 mL of Omnipaque 350 and the oral administration of 1000  mL of Omnipaque 9.    COMPARISON:  11/21/2023    FINDINGS:  Abdomen:    - Lower thorax:Unremarkable.    - Lung bases: No infiltrates and no nodules.    - Liver: No focal mass.    - Gallbladder: Absent.    - Bile Ducts: No evidence of intra or extra hepatic biliary ductal dilation.    - Spleen: Negative.    - Kidneys: No mass or hydronephrosis.    - Adrenals: Unremarkable.    - Pancreas: Nonspecific pancreatic ductal dilatation, similar to prior.    - Retroperitoneum:  No significant adenopathy.    - Vascular: No abdominal aortic aneurysm.    - Abdominal wall:  Unremarkable.    Pelvis:    No pelvic mass, adenopathy, or free fluid.    Bowel/Mesentery:    Multiple mildly prominent fluid-filled loops of small bowel.  Mild fat stranding involving bowel loops in the right lower quadrant.  No small bowel obstruction.    Bones:  No acute osseous abnormality and no suspicious lytic or blastic lesion.  Impression: Infectious or inflammatory enteritis.  No small bowel " obstruction.    Electronically signed by: Joellen Holbrook  Date:    11/24/2023  Time:    01:52       PHYSICIAN INTERPRETATION AND COMMENTS: Findings are consistent with moderate, positional obstructive sleep  apnea(MARIYA). INLAB CPAP titration indicated, please refer to sleep disorders clinic  CLINICAL HISTORY: 53 year old male presented with: 17 inch neck, BMI of 30.8, an [place] sleepiness score of 2, history of  hypertension, heart disease, diabetes, depression and symptoms of nocturnal snoring, waking up choking and witnessed  apneas. Based on the clinical history, the patient has a high pre-test probability of having Severe MARIYA.  SLEEP STUDY FINDINGS: Patient underwent a 1 night Home Sleep Test and by behavioral criteria, slept for approximately  6.18 hours, with a sleep latency of 6 minutes and a sleep efficiency of 90%. Mild sleep disordered breathing (AHI=10) is  noted based on a 4% hypopnea desaturation criteria, predominantly in the supine position (14 events/hour). The patient  slept supine 63.7% of the night based on valid recording time of 6.25 hours and is 4.7 times as likely to have  apneas/hypopneas when supine. When considering more subtle measures of sleep disordered breathing, the overall  respiratory disturbance index is moderate(RDI=24) based on a 1% hypopnea desaturation criteria with confirmation by  surrogate arousal indicators. The apneas/hypopneas are accompanied by minimal oxygen desaturation (percent time  below 90% SpO2: 0%, Min SpO2: 92.7%). The average desaturation across all sleep disordered breathing events is 1.9%.  Snoring occurs for 0.7% (30 dB) of the study. The mean pulse rate is 74.7 BPM, with frequent pulse rate variability (51 events  with >= 6 BPM increase/decrease per hour).  TREATMENT CONSIDERATIONS: Consider an attended CPAP titration study, given the reported Heart disease. Consider nasal  continuous positive airway pressure based on the RDI severity and  co-morbidities. A mandibular advancement splint  (MAS) or referral to an ENT surgeon for modification to the airway should be considered to reduce the potential  contribution of MARIYA on existing diseases if the patient prefers an alternative therapy or the CPAP trial is unsuccessful.  Based on the MARIYA Supine data in the study, a Mandibular Advancement Splint (MAS) will likely provide treatment benefit  independent of MARIYA severity. The patient should avoid sleeping supine; the non-supine AHI is 4.7 times less severe than the  supine AHI.    EXAMINATION:  XR CHEST AP PORTABLE     CLINICAL HISTORY:  chest pain;     TECHNIQUE:  Single frontal view of the chest was performed.     COMPARISON:  Multiple priors.     FINDINGS:  The lungs are clear, with normal appearance of pulmonary vasculature and no pleural effusion or pneumothorax.     The cardiac silhouette is normal in size. The hilar and mediastinal contours are unremarkable.     Bones are intact.     Impression:     No acute abnormality.      Compliance Report  Compliance  Payor Standard  Usage 10/29/2023 - 11/27/2023  Usage days 7/30 days (23%)  >= 4 hours 4 days (13%)  < 4 hours 3 days (10%)  Usage hours 28 hours 53 minutes  Average usage (total days) 58 minutes  Average usage (days used) 4 hours 8 minutes  Median usage (days used) 4 hours 36 minutes  Total used hours (value since last reset - 11/27/2023) 53 hours  AirSense 11 AutoSet  Serial number [id number]  Mode AutoSet  Min Pressure 5 cmH2O  Max Pressure 15 cmH2O  EPR Fulltime  EPR level 3  Response Standard  Therapy  Pressure - cmH2O Median: 10.6 95th percentile: 13.5 Maximum: 14.3  Leaks - L/min Median: 1.9 95th percentile: 20.4 Maximum: 51.1  Events per hour AI: 3.1 HI: 0.6 AHI: 3.7  Apnea Index Central: 0.1 Obstructive: 2.9 Unknown: 0.1  RERA Index 0.4  Cheyne-Reyez respiration (average duration per night) 0 minutes (0%)  Assessment/Plan:     Problem List Items Addressed This Visit          Other     Tobacco use disorder       Ready to quit: Yes  Counseling given: No  Tobacco comments: Smoked 1.5-2 packs per day until April 2023, now smoking 10 cpd   Smoking cessation program         Insomnia due to medical condition     Symptoms 2 year  On Trazodone  Bed time 09:30pm  SOL: 2 hrs  Wake time: 4 am  No Naps  Iron supplements, less leg pain         MARIYA (obstructive sleep apnea) - Primary     Moderate positional MARIYA  Cannot tolerate Full Face CPAP Mask  Wants to try nasal mask, order placed today  Has daytime fatigue, wants to be successful with CPAP  Discussed therapeutic goals for CPAP: Ideal usage 100% of nights for 6-8 hours per night. Minimum usage is 70% of night for at least 4 hours per night.  Discussed CPAP titration study may be option          Relevant Orders    CPAP/BIPAP SUPPLIES        Goals of CPAP  Check ferritin  May also have RLS: consider added Requip  Smoking cessation  Followup with LDCT since meets criteria       No follow-ups on file.    This note was prepared using voice recognition system and is likely to have sound alike errors that may have been overlooked even after proof reading.  Please call me with any questions    Discussed diagnosis, its evaluation, treatment and usual course. All questions answered.    Thank you for the courtesy of participating in the care of this patient    Lindsay Gutierrez PA-C      Personal Diagnostic Review  []  CXR    []  ECHO    []  ONSAT    []  6MWD    []  LABS    []  CHEST CT    []  PET CT    []  Biopsy results              Problems Address                                                 Amount and/or Complexity                                                                      Risk       3           [] 2 or more self-limited or minor problems                      [] Limited                                                                        [] Low                  [] 1 stable chronic illness                                                  Any  combination of the two                                               OTC drugs                  []Acute, uncomplicated illness or injury                            Review of prior external notes from unique source           Minor surgery with no risk factors                                                                                                               [] 1 []2  []3+                                                                                                              Review of results from each unique test                                                                                                               [] 1 []2  [] 3+                                                                                                              Order of each unique test                                                                                                               [] 1 []2  [] 3+                                                                                                              Or                                                                                                             [] Assessment requiring an independent historian      4            [] One or more chronic illness with exacerbation,              [] Moderate                                                                      [] Moderate                 Progression, or side effects of treatment                            -test documents or independent historians                        Prescription drug management                []  2 or more sable chronic illnesses                                    [] Independent interpretation of tests                              Minor surgery with identifiable risk                [] 1 undiagnosed new problem with uncertain prognosis    [] Discussion or management of test results                    elective major surgery                [] 1 acute illness with                 systemic symptoms                                                                                                                                                              [] 1 acute complicated injury                                                                                                                                          Elective major surgery                                                                                                                                                                                                                                                                                                                                                                                                  5            [] 1 or more chronic illnesses with severe exacerbation,     [] Extensive(two from below)                                         [] High                                                                                                               [] Independent interpretation of results                         Drug therapy requiring intensive                                                                                                               []Discussion of management or test interpretation           monitoring                                                                                                                                                                                                       Decision to de-escalate care                 [] 1 acute or chronic illness or injury that poses a threat                                                                                               Decision regarding hospitalization

## 2023-12-05 NOTE — ASSESSMENT & PLAN NOTE
Ready to quit: Yes  Counseling given: No  Tobacco comments: Smoked 1.5-2 packs per day until April 2023, now smoking 10 cpd   Smoking cessation program

## 2023-12-11 DIAGNOSIS — Z79.4 CONTROLLED TYPE 2 DIABETES MELLITUS WITH MICROALBUMINURIA, WITH LONG-TERM CURRENT USE OF INSULIN: ICD-10-CM

## 2023-12-11 DIAGNOSIS — E11.29 CONTROLLED TYPE 2 DIABETES MELLITUS WITH MICROALBUMINURIA, WITH LONG-TERM CURRENT USE OF INSULIN: ICD-10-CM

## 2023-12-11 DIAGNOSIS — R80.9 CONTROLLED TYPE 2 DIABETES MELLITUS WITH MICROALBUMINURIA, WITH LONG-TERM CURRENT USE OF INSULIN: ICD-10-CM

## 2023-12-11 DIAGNOSIS — I10 HYPERTENSION GOAL BP (BLOOD PRESSURE) < 130/80: Chronic | ICD-10-CM

## 2023-12-11 RX ORDER — LISINOPRIL 40 MG/1
40 TABLET ORAL DAILY
Qty: 90 TABLET | Refills: 0 | Status: SHIPPED | OUTPATIENT
Start: 2023-12-11 | End: 2024-03-11

## 2023-12-11 NOTE — TELEPHONE ENCOUNTER
Refill Decision Note   Laron Kothari  is requesting a refill authorization.  Brief Assessment and Rationale for Refill:  Approve     Medication Therapy Plan:  ED documentation reviewed. No changes to therapy noted. FOVS 2/8/24.      Extended chart review required: Yes   Comments:     Note composed:1:13 PM 12/11/2023

## 2023-12-11 NOTE — TELEPHONE ENCOUNTER
No care due was identified.  [facility] Embedded Care Due Messages. Reference number: [id number].   12/11/2023 12:17:27 AM CST

## 2024-01-11 ENCOUNTER — HOSPITAL ENCOUNTER (OUTPATIENT)
Dept: CARDIOLOGY | Facility: HOSPITAL | Age: 54
Discharge: HOME | End: 2024-01-11
Attending: INTERNAL MEDICINE
Payer: COMMERCIAL

## 2024-01-11 ENCOUNTER — OFFICE VISIT (OUTPATIENT)
Dept: CARDIOLOGY | Facility: CLINIC | Age: 54
End: 2024-01-11
Payer: COMMERCIAL

## 2024-01-11 VITALS
DIASTOLIC BLOOD PRESSURE: 68 MMHG | HEART RATE: 90 BPM | BODY MASS INDEX: 30.47 KG/M2 | WEIGHT: 237.44 LBS | HEIGHT: 74 IN | OXYGEN SATURATION: 100 % | SYSTOLIC BLOOD PRESSURE: 116 MMHG

## 2024-01-11 DIAGNOSIS — F17.200 TOBACCO USE DISORDER: ICD-10-CM

## 2024-01-11 DIAGNOSIS — E11.29 CONTROLLED TYPE 2 DIABETES MELLITUS WITH MICROALBUMINURIA, WITHOUT LONG-TERM CURRENT USE OF INSULIN: ICD-10-CM

## 2024-01-11 DIAGNOSIS — I82.402 LEG DVT (DEEP VENOUS THROMBOEMBOLISM), ACUTE, LEFT: ICD-10-CM

## 2024-01-11 DIAGNOSIS — E11.40 TYPE 2 DIABETES MELLITUS WITH DIABETIC NEUROPATHY, WITH LONG-TERM CURRENT USE OF INSULIN: Chronic | ICD-10-CM

## 2024-01-11 DIAGNOSIS — G47.33 OSA (OBSTRUCTIVE SLEEP APNEA): ICD-10-CM

## 2024-01-11 DIAGNOSIS — R80.9 CONTROLLED TYPE 2 DIABETES MELLITUS WITH MICROALBUMINURIA, WITHOUT LONG-TERM CURRENT USE OF INSULIN: ICD-10-CM

## 2024-01-11 DIAGNOSIS — I25.118 CORONARY ARTERY DISEASE OF NATIVE ARTERY OF NATIVE HEART WITH STABLE ANGINA PECTORIS: ICD-10-CM

## 2024-01-11 DIAGNOSIS — I21.4 NSTEMI (NON-ST ELEVATED MYOCARDIAL INFARCTION): ICD-10-CM

## 2024-01-11 DIAGNOSIS — E78.5 HYPERLIPIDEMIA LDL GOAL <70: Primary | Chronic | ICD-10-CM

## 2024-01-11 DIAGNOSIS — I73.9 PVD (PERIPHERAL VASCULAR DISEASE): ICD-10-CM

## 2024-01-11 DIAGNOSIS — Z79.4 TYPE 2 DIABETES MELLITUS WITH DIABETIC NEUROPATHY, WITH LONG-TERM CURRENT USE OF INSULIN: Chronic | ICD-10-CM

## 2024-01-11 DIAGNOSIS — G47.01 INSOMNIA DUE TO MEDICAL CONDITION: ICD-10-CM

## 2024-01-11 DIAGNOSIS — M79.604 PAIN IN BOTH LOWER EXTREMITIES: ICD-10-CM

## 2024-01-11 DIAGNOSIS — M79.605 PAIN IN BOTH LOWER EXTREMITIES: ICD-10-CM

## 2024-01-11 DIAGNOSIS — I25.10 CORONARY ARTERY DISEASE, UNSPECIFIED VESSEL OR LESION TYPE, UNSPECIFIED WHETHER ANGINA PRESENT, UNSPECIFIED WHETHER NATIVE OR TRANSPLANTED HEART: ICD-10-CM

## 2024-01-11 DIAGNOSIS — I10 HYPERTENSION GOAL BP (BLOOD PRESSURE) < 130/80: Chronic | ICD-10-CM

## 2024-01-11 PROCEDURE — 93005 ELECTROCARDIOGRAM TRACING: CPT

## 2024-01-11 PROCEDURE — 93010 ELECTROCARDIOGRAM REPORT: CPT | Mod: ,,, | Performed by: STUDENT IN AN ORGANIZED HEALTH CARE EDUCATION/TRAINING PROGRAM

## 2024-01-11 PROCEDURE — 99215 OFFICE O/P EST HI 40 MIN: CPT | Mod: PBBFAC,25 | Performed by: INTERNAL MEDICINE

## 2024-01-11 PROCEDURE — 99999 PR PBB SHADOW E&M-EST. PATIENT-LVL V: CPT | Mod: PBBFAC,,, | Performed by: INTERNAL MEDICINE

## 2024-01-11 PROCEDURE — 99214 OFFICE O/P EST MOD 30 MIN: CPT | Mod: S$GLB,,, | Performed by: INTERNAL MEDICINE

## 2024-01-11 NOTE — PROGRESS NOTES
Subjective:   Patient ID:  Laron Kothari Jr. is a 53 y.o. male who presents for follow up of No chief complaint on file.      HPI  Mr. Kothari is a 52 year old male patient whose current medical conditions include CAD s/p [facility] PCI x2 to RCA 4/23' by Dr. Lara, DVT in 2017, DM type II, HTN, hyperlipidemia, gout, neuropathy, and tobacco abuse who presents to clinic for palpitations. His wife reports he is not sleeping well     [facility] 4/11/23 dist LAD 80%, dist cx 90%, mid % with successful PCI  Echo with normal EF     5/12/23  Here today for 2 week follow up for palpitations post STEMI w/ PCI x2 RCA. Still complaining of fatigue, SOB and occasional palpitations. 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       [facility] 4/28/23 ST, PVC     7/19/23  Here today, for follow up. Reports improvement in fatigue, denies any CP with ADLs. Did not take his medication this morning yet, BP elevated in clinic. Overall doing well CV wise. 3 week in to cardiac rehab, tolerating well. He does reports trouble sleeping and some discomfort on his left side when laying down     9/15/23  Here today for CV follow up. [facility]. Back at work has some fatigue but improving. Reports LE edema and alejandra leg pain worsening in the last few weeks. Labs reviewed from last week BNP neg. Denies any CP, SOB at this time. Down to 6 cigarettes per day now. Weight stable    1/11/2024  Has been complaining  of bilateral leg pain the whole leg feels weak. From the buttock down. Has no nocturnal pain. Has no numbness or tingling. He feels legs tender he is feeling it after the first delivery of the day. It is worse as the day progresses. No swelling.   No chest pain has occasional shortness of breath.continues to smoke.  Past Medical History:   Diagnosis Date    Blood in stool 08/07/2018    Deep vein thrombosis (DVT) 2017    Left leg    Diabetes mellitus,  type 2 2013    Gout     Hyperlipidemia     Hypertension     Neuropathy     Smoker        Past Surgical History:   Procedure Laterality Date    APPENDECTOMY      ARTHROSCOPIC CHONDROPLASTY OF KNEE JOINT Right 11/24/2020    Procedure: ARTHROSCOPY, KNEE, WITH CHONDROPLASTY;  Surgeon: Nahum Rose MD;  Location: [facility] OR;  Service: Orthopedics;  Laterality: Right;  chondroplasty medial condyle and anteriorly    COLONOSCOPY N/A 8/7/2018    Procedure: COLONOSCOPY;  Surgeon: Ten Valentine MD;  Location: [facility] ENDO;  Service: Endoscopy;  Laterality: N/A;    COLONOSCOPY N/A 2/23/2022    Procedure: COLONOSCOPY;  Surgeon: Octavio Craig MD;  Location: [facility] ENDO;  Service: Endoscopy;  Laterality: N/A;    KNEE ARTHROSCOPY W/ MENISCECTOMY Right 11/24/2020    Procedure: ARTHROSCOPY, KNEE, WITH MENISCECTOMY;  Surgeon: Nahum Rose MD;  Location: [facility] OR;  Service: Orthopedics;  Laterality: Right;  Partial medial and lateral meniscectomy    LEFT HEART CATHETERIZATION Left 4/11/2023    Procedure: Left heart cath;  Surgeon: Sue Lara MD;  Location: [facility] CATH LAB;  Service: Cardiology;  Laterality: Left;    PERCUTANEOUS TRANSLUMINAL BALLOON ANGIOPLASTY OF CORONARY ARTERY  4/11/2023    Procedure: Angioplasty-coronary;  Surgeon: Sue Lara MD;  Location: [facility] CATH LAB;  Service: Cardiology;;    ROBOT-ASSISTED CHOLECYSTECTOMY USING DA TRIPP XI N/A 1/21/2020    Procedure: XI ROBOTIC CHOLECYSTECTOMY;  Surgeon: Sebastien Rivera MD;  Location: [facility] OR;  Service: General;  Laterality: N/A;    STENT, DRUG ELUTING, SINGLE VESSEL, CORONARY  4/11/2023    Procedure: Stent, Drug Eluting, Single Vessel, Coronary;  Surgeon: Sue Lara MD;  Location: [facility] CATH LAB;  Service: Cardiology;;    TONSILLECTOMY, ADENOIDECTOMY         Social History     Tobacco Use    Smoking status: Every Day     Current packs/day: 1.50     Average packs/day: 1.5 packs/day for 36.4 years (54.6 ttl pk-yrs)     Types: Cigarettes     Start  "date: 2003    Smokeless tobacco: Never    Tobacco comments:     Smoked 1.5-2 packs per day until April 2023, now smoking 10 cpd   Substance Use Topics    Alcohol use: Yes     Alcohol/week: 26.0 standard drinks of alcohol     Types: 26 Cans of beer per week     Comment: daily    Drug use: No       Family History   Problem Relation Age of Onset    Diabetes Father     Prostate cancer Father     Cataracts Father     Hypertension Mother     Hypertension Brother        Current Outpatient Medications   Medication Sig    aspirin (ECOTRIN) 81 MG EC tablet Take 1 tablet (81 mg total) by mouth once daily.    empagliflozin (JARDIANCE) 10 mg tablet Take 1 tablet (10 mg total) by mouth once daily.    fluticasone propionate (FLONASE) 50 mcg/actuation nasal spray 2 sprays (100 mcg total) by Each Nostril route once daily.    gabapentin (NEURONTIN) 300 MG capsule Take 1 capsule (300 mg total) by mouth 3 (three) times daily.    glipiZIDE (GLUCOTROL) 5 MG tablet Take 1 tablet (5 mg total) by mouth daily with breakfast.    iron-vitamin C 100-250 mg, ICAR-C, 100-250 mg Tab Take 1 tablet by mouth once daily.    lancets (LANCETS,THIN) Misc Once daily glucose testing.    lisinopriL (PRINIVIL,ZESTRIL) 40 MG tablet Take 1 tablet (40 mg total) by mouth once daily.    metFORMIN (GLUCOPHAGE) 1000 MG tablet Take 1 tablet (1,000 mg total) by mouth 2 (two) times daily with meals.    metoprolol succinate (TOPROL-XL) 50 MG 24 hr tablet Take 2 tablets in the morning and 1 in the evening, total 150mg daily    nicotine (NICODERM CQ) 21 mg/24 hr Place 1 patch onto the skin once daily.    ondansetron (ZOFRAN-ODT) 4 MG TbDL Take 2 tablets (8 mg total) by mouth every 8 (eight) hours as needed (nausea vomiting).    pen needle, diabetic (PEN NEEDLE) 31 gauge x 5/16" Ndle Use once daily with insulin injection.    prasugreL (EFFIENT) 10 mg Tab Take 1 tablet (10 mg total) by mouth once daily.    pravastatin (PRAVACHOL) 80 MG tablet Take 1 tablet (80 mg total) by " "mouth every evening.    ranolazine (RANEXA) 500 MG Tb12 Take 1 tablet (500 mg total) by mouth 2 (two) times daily.    sildenafiL (VIAGRA) 50 MG tablet Take 1 tablet (50 mg total) by mouth daily as needed for Erectile Dysfunction.    traZODone (DESYREL) 50 MG tablet TAKE 1 TABLET EVERY EVENING    BLOOD PRESSURE CUFF Misc 1 Device by Misc.(Non-Drug; Combo Route) route once daily.    blood sugar diagnostic Strp Once daily glucose testing.    blood-glucose meter Misc Use as directed.     No current facility-administered medications for this visit.     Current Outpatient Medications on File Prior to Visit   Medication Sig    aspirin (ECOTRIN) 81 MG EC tablet Take 1 tablet (81 mg total) by mouth once daily.    empagliflozin (JARDIANCE) 10 mg tablet Take 1 tablet (10 mg total) by mouth once daily.    fluticasone propionate (FLONASE) 50 mcg/actuation nasal spray 2 sprays (100 mcg total) by Each Nostril route once daily.    gabapentin (NEURONTIN) 300 MG capsule Take 1 capsule (300 mg total) by mouth 3 (three) times daily.    glipiZIDE (GLUCOTROL) 5 MG tablet Take 1 tablet (5 mg total) by mouth daily with breakfast.    iron-vitamin C 100-250 mg, ICAR-C, 100-250 mg Tab Take 1 tablet by mouth once daily.    lancets (LANCETS,THIN) Misc Once daily glucose testing.    lisinopriL (PRINIVIL,ZESTRIL) 40 MG tablet Take 1 tablet (40 mg total) by mouth once daily.    metFORMIN (GLUCOPHAGE) 1000 MG tablet Take 1 tablet (1,000 mg total) by mouth 2 (two) times daily with meals.    metoprolol succinate (TOPROL-XL) 50 MG 24 hr tablet Take 2 tablets in the morning and 1 in the evening, total 150mg daily    nicotine (NICODERM CQ) 21 mg/24 hr Place 1 patch onto the skin once daily.    ondansetron (ZOFRAN-ODT) 4 MG TbDL Take 2 tablets (8 mg total) by mouth every 8 (eight) hours as needed (nausea vomiting).    pen needle, diabetic (PEN NEEDLE) 31 gauge x 5/16" Ndle Use once daily with insulin injection.    prasugreL (EFFIENT) 10 mg Tab Take 1 " tablet (10 mg total) by mouth once daily.    pravastatin (PRAVACHOL) 80 MG tablet Take 1 tablet (80 mg total) by mouth every evening.    ranolazine (RANEXA) 500 MG Tb12 Take 1 tablet (500 mg total) by mouth 2 (two) times daily.    sildenafiL (VIAGRA) 50 MG tablet Take 1 tablet (50 mg total) by mouth daily as needed for Erectile Dysfunction.    traZODone (DESYREL) 50 MG tablet TAKE 1 TABLET EVERY EVENING    BLOOD PRESSURE CUFF Misc 1 Device by Misc.(Non-Drug; Combo Route) route once daily.    blood sugar diagnostic Strp Once daily glucose testing.    blood-glucose meter Misc Use as directed.     No current facility-administered medications on file prior to visit.     Review of patient's allergies indicates:  No Known Allergies   Review of Systems   Constitutional: Negative for malaise/fatigue.   Eyes:  Negative for blurred vision.   Cardiovascular:  Negative for chest pain, claudication, cyanosis, dyspnea on exertion, irregular heartbeat, leg swelling, near-syncope, orthopnea, palpitations and paroxysmal nocturnal dyspnea.   Respiratory:  Negative for cough, hemoptysis and shortness of breath.    Hematologic/Lymphatic: Negative for bleeding problem. Does not bruise/bleed easily.   Skin:  Negative for dry skin and itching.   Musculoskeletal:  Positive for muscle weakness, myalgias and stiffness. Negative for falls.   Gastrointestinal:  Negative for abdominal pain, diarrhea, heartburn, hematemesis, hematochezia and melena.   Genitourinary:  Negative for flank pain and hematuria.   Neurological:  Negative for dizziness, focal weakness, headaches, light-headedness, numbness, paresthesias, seizures and weakness.   Psychiatric/Behavioral:  Negative for altered mental status and memory loss. The patient is not nervous/anxious.    Allergic/Immunologic: Negative for hives.       Objective:   Physical Exam  Vitals and nursing note reviewed.   Constitutional:       General: He is not in acute distress.     Appearance: He is  well-developed. He is obese. He is not diaphoretic.   HENT:      Head: Normocephalic and atraumatic.   Eyes:      General:         Right eye: No discharge.         Left eye: No discharge.      Pupils: Pupils are equal, round, and reactive to light.   Neck:      Thyroid: No thyromegaly.      Vascular: No JVD.   Cardiovascular:      Rate and Rhythm: Normal rate and regular rhythm.      Pulses: Intact distal pulses.           Carotid pulses are 2+ on the right side and 2+ on the left side.       Radial pulses are 2+ on the right side and 2+ on the left side.        Femoral pulses are 2+ on the right side and 2+ on the left side.       Popliteal pulses are 2+ on the right side and 2+ on the left side.        Dorsalis pedis pulses are 1+ on the right side and 1+ on the left side.        Posterior tibial pulses are 1+ on the right side and 1+ on the left side.      Heart sounds: Normal heart sounds. No murmur heard.     No friction rub. No gallop.   Pulmonary:      Effort: Pulmonary effort is normal. No respiratory distress.      Breath sounds: Normal breath sounds. No wheezing or rales.   Chest:      Chest wall: No tenderness.   Abdominal:      General: Bowel sounds are normal. There is no distension.      Palpations: Abdomen is soft.      Tenderness: There is no abdominal tenderness.   Musculoskeletal:         General: Normal range of motion.      Cervical back: Neck supple.      Right lower leg: No edema.      Left lower leg: No edema.      Comments: Bilateral calf tenderness.   Skin:     General: Skin is warm and dry.      Findings: No erythema or rash.   Neurological:      Mental Status: He is alert and oriented to person, place, and time.      Cranial Nerves: No cranial nerve deficit.   Psychiatric:         Behavior: Behavior normal.       Vitals:    01/11/24 1608 01/11/24 1609   BP: 118/62 116/68   BP Location: Left arm Right arm   Patient Position: Sitting Sitting   BP Method: Large (Manual) Large (Manual)  "  Pulse: 90    SpO2: 100%    Weight: 107.7 kg (237 lb 7 oz)    Height: 6' 2" (1.88 m)      Lab Results   Component Value Date    CHOL 111 (L) 09/13/2023    CHOL 167 06/20/2023    CHOL 167 10/06/2022      Body mass index is 30.48 kg/m².   Lab Results   Component Value Date    HGBA1C 7.5 (H) 09/13/2023      BMP  Lab Results   Component Value Date     (L) 11/23/2023    K 4.2 11/23/2023     11/23/2023    CO2 20 (L) 11/23/2023    BUN 17 11/23/2023    CREATININE 1.2 11/23/2023    CALCIUM 8.9 11/23/2023    ANIONGAP 11 11/23/2023    EGFRNORACEVR >60 11/23/2023      Lab Results   Component Value Date    HDL 30 (L) 09/13/2023    HDL 34 (L) 06/20/2023    HDL 41 10/06/2022     Lab Results   Component Value Date    LDLCALC 56.8 (L) 09/13/2023    LDLCALC 93.4 06/20/2023    LDLCALC 109.4 10/06/2022     Lab Results   Component Value Date    TRIG 121 09/13/2023    TRIG 198 (H) 06/20/2023    TRIG 83 10/06/2022     Lab Results   Component Value Date    CHOLHDL 27.0 09/13/2023    CHOLHDL 20.4 06/20/2023    CHOLHDL 24.6 10/06/2022       Chemistry        Component Value Date/Time     (L) 11/23/2023 2240    K 4.2 11/23/2023 2240     11/23/2023 2240    CO2 20 (L) 11/23/2023 2240    BUN 17 11/23/2023 2240    CREATININE 1.2 11/23/2023 2240    GLU 90 11/23/2023 2240        Component Value Date/Time    CALCIUM 8.9 11/23/2023 2240    ALKPHOS 71 11/23/2023 2240    AST 26 11/23/2023 2240    ALT 20 11/23/2023 2240    BILITOT 0.2 11/23/2023 2240    ESTGFRAFRICA >60 06/21/2022 2017    EGFRNONAA >60 06/21/2022 2017          Lab Results   Component Value Date    TSH 10.341 (H) 11/23/2023     Lab Results   Component Value Date    INR 1.0 04/20/2023    INR 1.0 04/11/2023    INR 0.9 01/06/2020     Lab Results   Component Value Date    WBC 10.55 11/23/2023    HGB 9.2 (L) 11/23/2023    HCT 30.9 (L) 11/23/2023    MCV 81 (L) 11/23/2023     11/23/2023     BMP  Sodium   Date Value Ref Range Status   11/23/2023 135 (L) 136 - 145 " mmol/L Final     Potassium   Date Value Ref Range Status   11/23/2023 4.2 3.5 - 5.1 mmol/L Final     Chloride   Date Value Ref Range Status   11/23/2023 104 95 - 110 mmol/L Final     CO2   Date Value Ref Range Status   11/23/2023 20 (L) 23 - 29 mmol/L Final     BUN   Date Value Ref Range Status   11/23/2023 17 6 - 20 mg/dL Final     Creatinine   Date Value Ref Range Status   11/23/2023 1.2 0.5 - 1.4 mg/dL Final     Calcium   Date Value Ref Range Status   11/23/2023 8.9 8.7 - 10.5 mg/dL Final     Anion Gap   Date Value Ref Range Status   11/23/2023 11 8 - 16 mmol/L Final     eGFR if    Date Value Ref Range Status   06/21/2022 >60 >60 mL/min/1.73 m^2 Final     eGFR if non    Date Value Ref Range Status   06/21/2022 >60 >60 mL/min/1.73 m^2 Final     Comment:     Calculation used to obtain the estimated glomerular filtration  rate (eGFR) is the CKD-EPI equation.        CrCl cannot be calculated (Patient's most recent lab result is older than the maximum 7 days allowed.).  Conclusion         There is no evidence of a right lower extremity DVT.    There is no evidence of a left lower extremity DVT.    The right superficial femoral middle vein is normal.    The contralateral (left) common femoral vein does not have a thrombus.    The left superficial femoral middle vein is normal.    The contralateral (right) common femoral vein does not have a thrombus  Assessment:     1. Hyperlipidemia LDL goal <70    2. Hypertension goal BP (blood pressure) < 130/80    3. Type 2 diabetes mellitus with diabetic neuropathy, with long-term current use of insulin    4. Tobacco use disorder    5. Controlled type 2 diabetes mellitus with microalbuminuria, without long-term current use of insulin    6. Leg DVT (deep venous thromboembolism), acute, left    7. Coronary artery disease of native artery of native heart with stable angina pectoris    8. Insomnia due to medical condition    9. MARIYA (obstructive sleep  apnea)    Htn controlled low salt diet emphasized continue current therapy  Diabetes uncontrolled counseled about compliance weight loss   Hlp on target contineu statins  Leg pain concerned about pvd and claudication will evaluate with blue arterial duplex.  Also concerned with leg pain and calf tenderness that this is a side effect from statins. Will check cpk and decide if stopping statins help resolve symptoms.   Plan:   Blue cpk bilateral duplex.  Continue current therapy  Cardiac low salt diet.  Risk factor modification and excercise program.  Smoking cessation counseling  F/u in 6 months with lipid cmp a1c

## 2024-01-12 ENCOUNTER — TELEPHONE (OUTPATIENT)
Dept: CARDIOLOGY | Facility: CLINIC | Age: 54
End: 2024-01-12
Payer: COMMERCIAL

## 2024-01-12 NOTE — TELEPHONE ENCOUNTER
Patient was notified of results. All questions were answered. Pt verbalized understanding. Pt will call back with any other questions or concerns.      ----- Message from Sue Lara MD sent at 1/12/2024 10:18 AM CST -----  MUSCLE ENZYMES ELEVATED ASK HIM TO STOP STATINS FOR 1 MONTH AND REPORT BACK ON LEG PAIN

## 2024-01-23 ENCOUNTER — HOSPITAL ENCOUNTER (OUTPATIENT)
Dept: CARDIOLOGY | Facility: HOSPITAL | Age: 54
Discharge: HOME | End: 2024-01-23
Attending: INTERNAL MEDICINE
Payer: COMMERCIAL

## 2024-01-23 VITALS — BODY MASS INDEX: 30.42 KG/M2 | WEIGHT: 237 LBS | HEIGHT: 74 IN

## 2024-01-23 DIAGNOSIS — R80.9 CONTROLLED TYPE 2 DIABETES MELLITUS WITH MICROALBUMINURIA, WITHOUT LONG-TERM CURRENT USE OF INSULIN: ICD-10-CM

## 2024-01-23 DIAGNOSIS — I73.9 PVD (PERIPHERAL VASCULAR DISEASE): ICD-10-CM

## 2024-01-23 DIAGNOSIS — E11.40 TYPE 2 DIABETES MELLITUS WITH DIABETIC NEUROPATHY, WITH LONG-TERM CURRENT USE OF INSULIN: Chronic | ICD-10-CM

## 2024-01-23 DIAGNOSIS — M79.604 PAIN IN BOTH LOWER EXTREMITIES: ICD-10-CM

## 2024-01-23 DIAGNOSIS — E11.29 CONTROLLED TYPE 2 DIABETES MELLITUS WITH MICROALBUMINURIA, WITHOUT LONG-TERM CURRENT USE OF INSULIN: ICD-10-CM

## 2024-01-23 DIAGNOSIS — F17.200 TOBACCO USE DISORDER: ICD-10-CM

## 2024-01-23 DIAGNOSIS — Z79.4 TYPE 2 DIABETES MELLITUS WITH DIABETIC NEUROPATHY, WITH LONG-TERM CURRENT USE OF INSULIN: Chronic | ICD-10-CM

## 2024-01-23 DIAGNOSIS — M79.605 PAIN IN BOTH LOWER EXTREMITIES: ICD-10-CM

## 2024-01-23 LAB
LEFT ABI: 1.16
LEFT ANT TIBIAL SYS PSV: 129 CM/S
LEFT ARM BP: 164 MMHG
LEFT CFA PSV: 155 CM/S
LEFT DORSALIS PEDIS: 192 MMHG
LEFT PERONEAL SYS PSV: 0 CM/S
LEFT POPLITEAL PSV: 74 CM/S
LEFT POST TIBIAL SYS PSV: 22 CM/S
LEFT POSTERIOR TIBIAL: 182 MMHG
LEFT PROFUNDA SYS PSV: 110 CM/S
LEFT SUPER FEMORAL DIST SYS PSV: 142 CM/S
LEFT SUPER FEMORAL MID SYS PSV: 154 CM/S
LEFT SUPER FEMORAL OSTIAL SYS PSV: 178 CM/S
LEFT SUPER FEMORAL PROX SYS PSV: 148 CM/S
LEFT TBI: 1.13
LEFT TIB/PER TRUNK SYS PSV: 133 CM/S
LEFT TOE PRESSURE: 188 MMHG
OHS CV LEFT LOWER EXTREMITY ABI (NO CALC): 1.1
OHS CV RIGHT ABI LOWER EXTREMITY (NO CALC): 1.28
RIGHT ABI: 1.28
RIGHT ANT TIBIAL SYS PSV: 108 CM/S
RIGHT ARM BP: 166 MMHG
RIGHT CFA PSV: 150 CM/S
RIGHT DORSALIS PEDIS: 180 MMHG
RIGHT PERONEAL SYS PSV: 81 CM/S
RIGHT POPLITEAL PSV: 77 CM/S
RIGHT POST TIBIAL SYS PSV: 56 CM/S
RIGHT POSTERIOR TIBIAL: 212 MMHG
RIGHT PROFUNDA SYS PSV: 109 CM/S
RIGHT SUPER FEMORAL DIST SYS PSV: 155 CM/S
RIGHT SUPER FEMORAL MID SYS PSV: 120 CM/S
RIGHT SUPER FEMORAL OSTIAL SYS PSV: 143 CM/S
RIGHT SUPER FEMORAL PROX SYS PSV: 143 CM/S
RIGHT TBI: 0.88
RIGHT TIB/PER TRUNK SYS PSV: 100 CM/S
RIGHT TOE PRESSURE: 146 MMHG

## 2024-01-23 PROCEDURE — 93925 LOWER EXTREMITY STUDY: CPT | Mod: 26,,, | Performed by: INTERNAL MEDICINE

## 2024-01-23 PROCEDURE — 93922 UPR/L XTREMITY ART 2 LEVELS: CPT | Mod: 26,,, | Performed by: INTERNAL MEDICINE

## 2024-01-23 PROCEDURE — 93922 UPR/L XTREMITY ART 2 LEVELS: CPT

## 2024-01-23 PROCEDURE — 93925 LOWER EXTREMITY STUDY: CPT

## 2024-01-31 DIAGNOSIS — N52.9 ERECTILE DYSFUNCTION, UNSPECIFIED ERECTILE DYSFUNCTION TYPE: ICD-10-CM

## 2024-02-01 RX ORDER — SILDENAFIL 50 MG/1
50 TABLET, FILM COATED ORAL DAILY PRN
Qty: 30 TABLET | Refills: 0 | Status: SHIPPED | OUTPATIENT
Start: 2024-02-01 | End: 2024-03-25 | Stop reason: SDUPTHER

## 2024-02-01 NOTE — TELEPHONE ENCOUNTER
Laron Kothari  is requesting a refill authorization.  Brief Assessment and Rationale for Refill:  Approve     Medication Therapy Plan:  FLOS 7/16/24;Reviewed ED visit notes; approved 30+0 to bridge to FOV 2/8/24      Extended chart review required: Yes   Comments:     Note composed:10:57 AM 02/01/2024

## 2024-02-01 NOTE — TELEPHONE ENCOUNTER
Refill request routed to [facility] Review Pool for Pharmacist Review.

## 2024-02-01 NOTE — TELEPHONE ENCOUNTER
Care Due:                  Date            Visit Type   Department     Provider  --------------------------------------------------------------------------------                                EP -                              PRIMARY      [facility] INTERNAL  Last Visit: 07-      CARE ([facility])   HEMA Adler                              EP -                              PRIMARY      [facility] INTERNAL  Next Visit: 02-      CARE ([facility])   HEMA Adler                                                            Last  Test          Frequency    Reason                     Performed    Due Date  --------------------------------------------------------------------------------    HBA1C.......  6 months...  empagliflozin, glipiZIDE,   09- 03-                             metFORMIN................    [employer] Embedded Care Due Messages. Reference number: [id number].   2/01/2024 8:42:09 AM CST

## 2024-02-08 ENCOUNTER — OFFICE VISIT (OUTPATIENT)
Dept: INTERNAL MEDICINE | Facility: CLINIC | Age: 54
End: 2024-02-08
Payer: COMMERCIAL

## 2024-02-08 VITALS
OXYGEN SATURATION: 98 % | TEMPERATURE: 96 F | HEART RATE: 90 BPM | HEIGHT: 74 IN | WEIGHT: 239.63 LBS | BODY MASS INDEX: 30.75 KG/M2 | RESPIRATION RATE: 20 BRPM | DIASTOLIC BLOOD PRESSURE: 70 MMHG | SYSTOLIC BLOOD PRESSURE: 134 MMHG

## 2024-02-08 DIAGNOSIS — M79.604 BILATERAL LEG PAIN: ICD-10-CM

## 2024-02-08 DIAGNOSIS — I25.10 CORONARY ARTERY DISEASE INVOLVING NATIVE CORONARY ARTERY OF NATIVE HEART WITHOUT ANGINA PECTORIS: ICD-10-CM

## 2024-02-08 DIAGNOSIS — I10 HYPERTENSION GOAL BP (BLOOD PRESSURE) < 130/80: ICD-10-CM

## 2024-02-08 DIAGNOSIS — E11.65 UNCONTROLLED TYPE 2 DIABETES MELLITUS WITH HYPERGLYCEMIA, WITHOUT LONG-TERM CURRENT USE OF INSULIN: Primary | ICD-10-CM

## 2024-02-08 DIAGNOSIS — M79.605 BILATERAL LEG PAIN: ICD-10-CM

## 2024-02-08 DIAGNOSIS — E78.5 HYPERLIPIDEMIA LDL GOAL <70: ICD-10-CM

## 2024-02-08 PROCEDURE — 1160F RVW MEDS BY RX/DR IN RCRD: CPT | Mod: CPTII,S$GLB,, | Performed by: INTERNAL MEDICINE

## 2024-02-08 PROCEDURE — 3078F DIAST BP <80 MM HG: CPT | Mod: CPTII,S$GLB,, | Performed by: INTERNAL MEDICINE

## 2024-02-08 PROCEDURE — 3008F BODY MASS INDEX DOCD: CPT | Mod: CPTII,S$GLB,, | Performed by: INTERNAL MEDICINE

## 2024-02-08 PROCEDURE — 1159F MED LIST DOCD IN RCRD: CPT | Mod: CPTII,S$GLB,, | Performed by: INTERNAL MEDICINE

## 2024-02-08 PROCEDURE — 99214 OFFICE O/P EST MOD 30 MIN: CPT | Mod: S$GLB,,, | Performed by: INTERNAL MEDICINE

## 2024-02-08 PROCEDURE — 3075F SYST BP GE 130 - 139MM HG: CPT | Mod: CPTII,S$GLB,, | Performed by: INTERNAL MEDICINE

## 2024-02-08 PROCEDURE — 99999 PR PBB SHADOW E&M-EST. PATIENT-LVL V: CPT | Mod: PBBFAC,,, | Performed by: INTERNAL MEDICINE

## 2024-02-12 NOTE — PROGRESS NOTES
Laron Kothari .  53 y.o. Black or  male  [id number]    Chief Complaint:  Chief Complaint   Patient presents with    Follow-up       History of Present Illness:  DM--last A1C 7.5. Reports compliance with medication. Due for repeat labs.   HTN--stable.   HLD--pravastatin has been on hold due to leg pain.    CAD--asymptomatic. Compliant with medication. Management per cardiology.   He has been having bilateral leg pain for a while now. He has had a vascular work up and no significant abnormal findings. He reports occasional back pain. He denies trauma.     Laboratory:  Lab Results   Component Value Date    WBC 10.55 11/23/2023    HGB 9.2 (L) 11/23/2023    HCT 30.9 (L) 11/23/2023     11/23/2023    CHOL 111 (L) 09/13/2023    TRIG 121 09/13/2023    HDL 30 (L) 09/13/2023    ALT 20 11/23/2023    AST 26 11/23/2023     (L) 11/23/2023    K 4.2 11/23/2023     11/23/2023    CREATININE 1.2 11/23/2023    BUN 17 11/23/2023    CO2 20 (L) 11/23/2023    TSH 10.341 (H) 11/23/2023    PSA 0.74 07/20/2011    INR 1.0 04/20/2023    HGBA1C 7.5 (H) 09/13/2023     Lab Results   Component Value Date    LDLCALC 56.8 (L) 09/13/2023       History:  Past Medical History:   Diagnosis Date    Blood in stool 08/07/2018    Deep vein thrombosis (DVT) 2017    Left leg    Diabetes mellitus, type 2 2013    Gout     Hyperlipidemia     Hypertension     Neuropathy     Smoker        Medications:  Current Outpatient Medications on File Prior to Visit   Medication Sig Dispense Refill    aspirin (ECOTRIN) 81 MG EC tablet Take 1 tablet (81 mg total) by mouth once daily. 30 tablet 11    BLOOD PRESSURE CUFF Misc 1 Device by Misc.(Non-Drug; Combo Route) route once daily.  0    blood sugar diagnostic Strp Once daily glucose testing. 50 strip 6    blood-glucose meter Misc Use as directed. 1 each 0    empagliflozin (JARDIANCE) 10 mg tablet Take 1 tablet (10 mg total) by mouth once daily. 30 tablet 5    fluticasone propionate (FLONASE) 50  "mcg/actuation nasal spray 2 sprays (100 mcg total) by Each Nostril route once daily. 16 g 0    gabapentin (NEURONTIN) 300 MG capsule Take 1 capsule (300 mg total) by mouth 3 (three) times daily. 90 capsule 11    glipiZIDE (GLUCOTROL) 5 MG tablet Take 1 tablet (5 mg total) by mouth daily with breakfast. 90 tablet 0    iron-vitamin C 100-250 mg, ICAR-C, 100-250 mg Tab Take 1 tablet by mouth once daily. 30 tablet 11    lancets (LANCETS,THIN) Misc Once daily glucose testing. 50 each 6    lisinopriL (PRINIVIL,ZESTRIL) 40 MG tablet Take 1 tablet (40 mg total) by mouth once daily. 90 tablet 0    metFORMIN (GLUCOPHAGE) 1000 MG tablet Take 1 tablet (1,000 mg total) by mouth 2 (two) times daily with meals. 180 tablet 0    metoprolol succinate (TOPROL-XL) 50 MG 24 hr tablet Take 2 tablets in the morning and 1 in the evening, total 150mg daily 120 tablet 3    nicotine (NICODERM CQ) 21 mg/24 hr Place 1 patch onto the skin once daily. 28 patch 0    ondansetron (ZOFRAN-ODT) 4 MG TbDL Take 2 tablets (8 mg total) by mouth every 8 (eight) hours as needed (nausea vomiting). 12 tablet 0    pen needle, diabetic (PEN NEEDLE) 31 gauge x 5/16" Ndle Use once daily with insulin injection. 50 each 3    prasugreL (EFFIENT) 10 mg Tab Take 1 tablet (10 mg total) by mouth once daily. 90 tablet 3    pravastatin (PRAVACHOL) 80 MG tablet Take 1 tablet (80 mg total) by mouth every evening. (Patient taking differently: Take 80 mg by mouth every evening. On hold for 1 month) 90 tablet 3    ranolazine (RANEXA) 500 MG Tb12 Take 1 tablet (500 mg total) by mouth 2 (two) times daily. 180 tablet 3    sildenafiL (VIAGRA) 50 MG tablet Take 1 tablet (50 mg total) by mouth daily as needed for Erectile Dysfunction. 30 tablet 0    traZODone (DESYREL) 50 MG tablet TAKE 1 TABLET EVERY EVENING 90 tablet 1     No current facility-administered medications on file prior to visit.       Allergies:  Review of patient's allergies indicates:  No Known Allergies    Review of " Systems   Constitutional:  Negative for fever.   Respiratory:  Negative for shortness of breath.    Cardiovascular:  Negative for chest pain and leg swelling.   Genitourinary:  Negative for dysuria.   Musculoskeletal:  Positive for myalgias.   Neurological:  Negative for dizziness and headaches.       Exam:  Vitals:    02/08/24 1504   BP: 134/70   Pulse: 90   Resp: 20   Temp: 96.4 °F (35.8 °C)     Weight: 108.7 kg (239 lb 10.2 oz)   Body mass index is 30.77 kg/m².      Physical Exam  Vitals reviewed.   Constitutional:       General: He is not in acute distress.     Appearance: He is well-developed. He is not ill-appearing.   Eyes:      General: No scleral icterus.  Cardiovascular:      Rate and Rhythm: Normal rate and regular rhythm.      Pulses:           Dorsalis pedis pulses are 2+ on the right side and 2+ on the left side.      Heart sounds: Normal heart sounds.   Pulmonary:      Effort: Pulmonary effort is normal. No respiratory distress.      Breath sounds: Normal breath sounds.   Musculoskeletal:      Right lower leg: No edema.      Left lower leg: No edema.   Feet:      Right foot:      Protective Sensation: 5 sites tested.  5 sites sensed.      Skin integrity: No ulcer.      Left foot:      Protective Sensation: 5 sites tested.  5 sites sensed.      Skin integrity: No ulcer.   Skin:     General: Skin is warm and dry.      Comments: Varicose veins present on BLE; no erythema   Neurological:      Mental Status: He is alert and oriented to person, place, and time.   Psychiatric:         Behavior: Behavior normal.       Assessment:  The primary encounter diagnosis was Uncontrolled type 2 diabetes mellitus with hyperglycemia, without long-term current use of insulin. Diagnoses of Hypertension goal BP (blood pressure) < 130/80, Hyperlipidemia LDL goal <70, Coronary artery disease involving native coronary artery of native heart without angina pectoris, and Bilateral leg pain were also pertinent to this  visit.    Plan:  Uncontrolled type 2 diabetes mellitus with hyperglycemia, without long-term current use of insulin  -     check A1C    Hypertension goal BP (blood pressure) < 130/80  -     continue lisinopril and metoprolol    Hyperlipidemia LDL goal <70  -     check lipid panel    Coronary artery disease involving native coronary artery of native heart without angina pectoris  -     continue aspirin, prasugrel, metoprolol, ranolazine and pravastatin    Bilateral leg pain  -     CK; Future; Expected date: 02/08/2024  -     MRI Lumbar Spine Without Contrast; Future; Expected date: 02/08/2024    Schedule labs and MRI.

## 2024-02-15 ENCOUNTER — TELEPHONE (OUTPATIENT)
Dept: CARDIOLOGY | Facility: CLINIC | Age: 54
End: 2024-02-15
Payer: COMMERCIAL

## 2024-02-15 NOTE — TELEPHONE ENCOUNTER
Patient was notified of results. All questions were answered. Pt verbalized understanding. Pt will call back with any other questions or concerns.      ----- Message from Sue Lara MD sent at 2/15/2024  2:36 PM CST -----  Has bl;ockages below knee but getting plenty of blood flow to feet .

## 2024-03-04 DIAGNOSIS — E11.29 CONTROLLED TYPE 2 DIABETES MELLITUS WITH MICROALBUMINURIA, WITH LONG-TERM CURRENT USE OF INSULIN: ICD-10-CM

## 2024-03-04 DIAGNOSIS — Z79.4 CONTROLLED TYPE 2 DIABETES MELLITUS WITH MICROALBUMINURIA, WITH LONG-TERM CURRENT USE OF INSULIN: ICD-10-CM

## 2024-03-04 DIAGNOSIS — R80.9 CONTROLLED TYPE 2 DIABETES MELLITUS WITH MICROALBUMINURIA, WITH LONG-TERM CURRENT USE OF INSULIN: ICD-10-CM

## 2024-03-04 RX ORDER — METFORMIN HYDROCHLORIDE 1000 MG/1
1000 TABLET ORAL 2 TIMES DAILY WITH MEALS
Qty: 180 TABLET | Refills: 0 | Status: SHIPPED | OUTPATIENT
Start: 2024-03-04 | End: 2024-05-10 | Stop reason: SDUPTHER

## 2024-03-04 RX ORDER — GLIPIZIDE 5 MG/1
5 TABLET ORAL
Qty: 90 TABLET | Refills: 0 | Status: SHIPPED | OUTPATIENT
Start: 2024-03-04 | End: 2024-05-10 | Stop reason: SDUPTHER

## 2024-03-04 NOTE — TELEPHONE ENCOUNTER
Refill Decision Note   Laron Kothari  is requesting a refill authorization.  Brief Assessment and Rationale for Refill:  Approve     Medication Therapy Plan:         Pharmacist review requested: Yes   Extended chart review required: Yes   Comments:     Note composed:4:31 PM 03/04/2024

## 2024-03-04 NOTE — TELEPHONE ENCOUNTER
Refill Routing Note   Medication(s) are not appropriate for processing by [facility] for the following reason(s):        Drug-disease interaction    [facility] action(s):  Defer  Approve        Medication Therapy Plan: FOVS including A1c; Drug-Disease: metFORMIN and Alcohol abuse    Pharmacist review requested: Yes     Appointments  past 12m or future 3m with PCP    Date Provider   Last Visit   2/8/2024 Kristen Adler DO   Next Visit   Visit date not found Kristen Adler DO   ED visits in past 90 days: 0        Note composed:5:10 AM 03/04/2024

## 2024-03-04 NOTE — TELEPHONE ENCOUNTER
No care due was identified.  [facility] Embedded Care Due Messages. Reference number: [id number].   3/04/2024 12:31:37 AM CST

## 2024-03-11 DIAGNOSIS — E11.29 CONTROLLED TYPE 2 DIABETES MELLITUS WITH MICROALBUMINURIA, WITH LONG-TERM CURRENT USE OF INSULIN: ICD-10-CM

## 2024-03-11 DIAGNOSIS — R80.9 CONTROLLED TYPE 2 DIABETES MELLITUS WITH MICROALBUMINURIA, WITH LONG-TERM CURRENT USE OF INSULIN: ICD-10-CM

## 2024-03-11 DIAGNOSIS — Z79.4 CONTROLLED TYPE 2 DIABETES MELLITUS WITH MICROALBUMINURIA, WITH LONG-TERM CURRENT USE OF INSULIN: ICD-10-CM

## 2024-03-11 DIAGNOSIS — I10 HYPERTENSION GOAL BP (BLOOD PRESSURE) < 130/80: Chronic | ICD-10-CM

## 2024-03-11 RX ORDER — LISINOPRIL 40 MG/1
40 TABLET ORAL
Qty: 90 TABLET | Refills: 2 | Status: SHIPPED | OUTPATIENT
Start: 2024-03-11 | End: 2024-05-10 | Stop reason: SDUPTHER

## 2024-03-11 NOTE — TELEPHONE ENCOUNTER
No care due was identified.  [facility] Embedded Care Due Messages. Reference number: [id number].   3/11/2024 12:44:32 AM CDT

## 2024-03-11 NOTE — TELEPHONE ENCOUNTER
Refill Decision Note   Laron Kothari  is requesting a refill authorization.  Brief Assessment and Rationale for Refill:  Approve     Medication Therapy Plan:         Comments:     Note composed:2:11 PM 03/11/2024

## 2024-03-25 DIAGNOSIS — N52.9 ERECTILE DYSFUNCTION, UNSPECIFIED ERECTILE DYSFUNCTION TYPE: ICD-10-CM

## 2024-03-25 DIAGNOSIS — I25.10 CORONARY ARTERY DISEASE, UNSPECIFIED VESSEL OR LESION TYPE, UNSPECIFIED WHETHER ANGINA PRESENT, UNSPECIFIED WHETHER NATIVE OR TRANSPLANTED HEART: ICD-10-CM

## 2024-03-25 NOTE — TELEPHONE ENCOUNTER
No care due was identified.  [facility] Embedded Care Due Messages. Reference number: [id number].   3/25/2024 5:16:47 PM CDT

## 2024-03-26 RX ORDER — SILDENAFIL 50 MG/1
50 TABLET, FILM COATED ORAL DAILY PRN
Qty: 30 TABLET | Refills: 11 | Status: SHIPPED | OUTPATIENT
Start: 2024-03-26

## 2024-03-26 RX ORDER — METOPROLOL SUCCINATE 50 MG/1
TABLET, EXTENDED RELEASE ORAL
Qty: 120 TABLET | Refills: 3 | Status: SHIPPED | OUTPATIENT
Start: 2024-03-26

## 2024-03-26 NOTE — TELEPHONE ENCOUNTER
Refill Authorization Note   Laron Kothari  is requesting a refill authorization.  Brief Assessment and Rationale for Refill:  Approve     Medication Therapy Plan:       Medication Reconciliation Completed: No   Comments:     No Care Gaps recommended.     Note composed:1:29 PM 03/26/2024

## 2024-03-27 DIAGNOSIS — I25.10 CORONARY ARTERY DISEASE, UNSPECIFIED VESSEL OR LESION TYPE, UNSPECIFIED WHETHER ANGINA PRESENT, UNSPECIFIED WHETHER NATIVE OR TRANSPLANTED HEART: ICD-10-CM

## 2024-03-27 RX ORDER — METOPROLOL SUCCINATE 50 MG/1
TABLET, EXTENDED RELEASE ORAL
Qty: 120 TABLET | Refills: 3 | Status: CANCELLED | OUTPATIENT
Start: 2024-03-27

## 2024-03-27 NOTE — TELEPHONE ENCOUNTER
No care due was identified.  [facility] Embedded Care Due Messages. Reference number: [id number].   3/27/2024 4:56:20 PM CDT

## 2024-03-27 NOTE — TELEPHONE ENCOUNTER
----- Message from Matilda Tesfaye sent at 3/27/2024  3:38 PM CDT -----  Contact: [phone]  Patient is calling in regards to his metoprolol succinate (TOPROL-XL) 50 MG 24 hr tablet refill. Pt stated that he is completely out and would like a prescription sent over to the   [facility] [address] [place] [id number]  Phone: [phone] Fax: [phone]      Pt contact number is [phone]. Thanks KB

## 2024-03-28 RX ORDER — METOPROLOL SUCCINATE 50 MG/1
TABLET, EXTENDED RELEASE ORAL
Qty: 90 TABLET | Refills: 3 | Status: SHIPPED | OUTPATIENT
Start: 2024-03-28

## 2024-03-28 NOTE — TELEPHONE ENCOUNTER
Refill Routing Note   Medication(s) are not appropriate for processing by [facility] for the following reason(s):        Non-participating provider    [facility] action(s):  Route      Medication Therapy Plan: Pt needs a bridge supply sent to local pharmacy. Pended 30 day supply for your review      Appointments  past 12m or future 3m with PCP    Date Provider   Last Visit   1/11/2024 Sue Lara MD   Next Visit   7/18/2024 Sue Lara MD   ED visits in past 90 days: 0        Note composed:10:02 AM 03/28/2024

## 2024-04-11 DIAGNOSIS — I25.10 CORONARY ARTERY DISEASE, UNSPECIFIED VESSEL OR LESION TYPE, UNSPECIFIED WHETHER ANGINA PRESENT, UNSPECIFIED WHETHER NATIVE OR TRANSPLANTED HEART: ICD-10-CM

## 2024-04-11 RX ORDER — RANOLAZINE 500 MG/1
500 TABLET, EXTENDED RELEASE ORAL 2 TIMES DAILY
Qty: 180 TABLET | Refills: 3 | Status: SHIPPED | OUTPATIENT
Start: 2024-04-11 | End: 2025-04-12

## 2024-04-21 DIAGNOSIS — G47.00 INSOMNIA, UNSPECIFIED TYPE: ICD-10-CM

## 2024-04-22 RX ORDER — TRAZODONE HYDROCHLORIDE 50 MG/1
50 TABLET ORAL NIGHTLY
Qty: 90 TABLET | Refills: 3 | Status: SHIPPED | OUTPATIENT
Start: 2024-04-22

## 2024-04-22 NOTE — TELEPHONE ENCOUNTER
Care Due:                  Date            Visit Type   Department     Provider  --------------------------------------------------------------------------------                                EP -                              PRIMARY      [facility] INTER[facility]  Last Visit: 02-      CARE ([facility])   MEDICINE       Kristen Adler  Next Visit: None Scheduled  None         None Found                                                            Last  Test          Frequency    Reason                     Performed    Due Date  --------------------------------------------------------------------------------    HBA1C.......  6 months...  empagliflozin, glipiZIDE,   09- 03-                             metFORMIN................    [employer] Embedded Care Due Messages. Reference number: [id number].   4/21/2024 11:21:27 PM CDT

## 2024-04-23 NOTE — TELEPHONE ENCOUNTER
Provider Staff:  Action required for this patient    Requires labs      Please see care gap opportunities below in Care Due Message.    Thanks!  [facility]     Appointments      Date Provider   Last Visit   2/8/2024 Kristen Adler DO   Next Visit   Visit date not found Kristen Adler DO     Refill Decision Note   Laron Kothari  is requesting a refill authorization.  Brief Assessment and Rationale for Refill:  Approve     Medication Therapy Plan:         Comments:     Note composed:7:31 PM 04/22/2024

## 2024-05-10 DIAGNOSIS — I10 HYPERTENSION GOAL BP (BLOOD PRESSURE) < 130/80: Chronic | ICD-10-CM

## 2024-05-10 DIAGNOSIS — R80.9 CONTROLLED TYPE 2 DIABETES MELLITUS WITH MICROALBUMINURIA, WITH LONG-TERM CURRENT USE OF INSULIN: ICD-10-CM

## 2024-05-10 DIAGNOSIS — I25.118 CORONARY ARTERY DISEASE OF NATIVE ARTERY OF NATIVE HEART WITH STABLE ANGINA PECTORIS: ICD-10-CM

## 2024-05-10 DIAGNOSIS — Z79.4 CONTROLLED TYPE 2 DIABETES MELLITUS WITH MICROALBUMINURIA, WITH LONG-TERM CURRENT USE OF INSULIN: ICD-10-CM

## 2024-05-10 DIAGNOSIS — E11.29 CONTROLLED TYPE 2 DIABETES MELLITUS WITH MICROALBUMINURIA, WITH LONG-TERM CURRENT USE OF INSULIN: ICD-10-CM

## 2024-05-10 DIAGNOSIS — E78.5 HYPERLIPIDEMIA LDL GOAL <70: Chronic | ICD-10-CM

## 2024-05-10 RX ORDER — LISINOPRIL 40 MG/1
40 TABLET ORAL DAILY
Qty: 90 TABLET | Refills: 1 | Status: SHIPPED | OUTPATIENT
Start: 2024-05-10

## 2024-05-10 RX ORDER — METFORMIN HYDROCHLORIDE 1000 MG/1
1000 TABLET ORAL 2 TIMES DAILY WITH MEALS
Qty: 180 TABLET | Refills: 2 | Status: SHIPPED | OUTPATIENT
Start: 2024-05-10

## 2024-05-10 RX ORDER — GLIPIZIDE 5 MG/1
5 TABLET ORAL
Qty: 90 TABLET | Refills: 2 | Status: SHIPPED | OUTPATIENT
Start: 2024-05-10

## 2024-05-10 NOTE — TELEPHONE ENCOUNTER
No care due was identified.  [facility] Embedded Care Due Messages. Reference number: [id number].   5/10/2024 3:24:11 PM CDT

## 2024-05-11 NOTE — TELEPHONE ENCOUNTER
Refill Routing Note   Refill Routing Note   Medication(s) are not appropriate for processing by [facility] for the following reason(s):        Required labs outdated    [facility] action(s):  Approve  Defer             Appointments  past 12m or future 3m with PCP    Date Provider   Last Visit   2/8/2024 Kristen Adler DO   Next Visit   Visit date not found Kristen Adler DO   ED visits in past 90 days: 0        Note composed:7:20 PM 05/10/2024

## 2024-05-12 RX ORDER — PRAVASTATIN SODIUM 80 MG/1
80 TABLET ORAL NIGHTLY
Qty: 90 TABLET | Refills: 3 | Status: SHIPPED | OUTPATIENT
Start: 2024-05-12

## 2024-05-12 RX ORDER — PRASUGREL 10 MG/1
10 TABLET, FILM COATED ORAL DAILY
Qty: 90 TABLET | Refills: 3 | Status: SHIPPED | OUTPATIENT
Start: 2024-05-12

## 2024-05-23 ENCOUNTER — TELEPHONE (OUTPATIENT)
Dept: SMOKING CESSATION | Facility: CLINIC | Age: 54
End: 2024-05-23
Payer: COMMERCIAL

## 2024-05-26 ENCOUNTER — HOSPITAL ENCOUNTER (EMERGENCY)
Facility: HOSPITAL | Age: 54
Discharge: HOME | End: 2024-05-26
Attending: EMERGENCY MEDICINE
Payer: COMMERCIAL

## 2024-05-26 VITALS
OXYGEN SATURATION: 100 % | RESPIRATION RATE: 17 BRPM | WEIGHT: 231.06 LBS | TEMPERATURE: 99 F | BODY MASS INDEX: 29.66 KG/M2 | DIASTOLIC BLOOD PRESSURE: 91 MMHG | SYSTOLIC BLOOD PRESSURE: 168 MMHG | HEART RATE: 59 BPM

## 2024-05-26 DIAGNOSIS — R07.9 CHEST PAIN: Primary | ICD-10-CM

## 2024-05-26 LAB
ALBUMIN SERPL BCP-MCNC: 3.6 G/DL (ref 3.5–5.2)
ALP SERPL-CCNC: 66 U/L (ref 55–135)
ALT SERPL W/O P-5'-P-CCNC: 19 U/L (ref 10–44)
ANION GAP SERPL CALC-SCNC: 12 MMOL/L (ref 8–16)
AST SERPL-CCNC: 22 U/L (ref 10–40)
BASOPHILS # BLD AUTO: 0.06 K/UL (ref 0–0.2)
BASOPHILS NFR BLD: 0.6 % (ref 0–1.9)
BILIRUB SERPL-MCNC: 0.4 MG/DL (ref 0.1–1)
BILIRUB UR QL STRIP: NEGATIVE
BNP SERPL-MCNC: 99 PG/ML (ref 0–99)
BUN SERPL-MCNC: 14 MG/DL (ref 6–20)
CALCIUM SERPL-MCNC: 9.4 MG/DL (ref 8.7–10.5)
CHLORIDE SERPL-SCNC: 106 MMOL/L (ref 95–110)
CK SERPL-CCNC: 559 U/L (ref 20–200)
CLARITY UR: CLEAR
CO2 SERPL-SCNC: 21 MMOL/L (ref 23–29)
COLOR UR: COLORLESS
CREAT SERPL-MCNC: 1.1 MG/DL (ref 0.5–1.4)
DIFFERENTIAL METHOD BLD: ABNORMAL
EOSINOPHIL # BLD AUTO: 0.5 K/UL (ref 0–0.5)
EOSINOPHIL NFR BLD: 5.2 % (ref 0–8)
ERYTHROCYTE [DISTWIDTH] IN BLOOD BY AUTOMATED COUNT: 21 % (ref 11.5–14.5)
EST. GFR  (NO RACE VARIABLE): >60 ML/MIN/1.73 M^2
GLUCOSE SERPL-MCNC: 159 MG/DL (ref 70–110)
GLUCOSE UR QL STRIP: NEGATIVE
HCT VFR BLD AUTO: 30.5 % (ref 40–54)
HGB BLD-MCNC: 9 G/DL (ref 14–18)
HGB UR QL STRIP: NEGATIVE
IMM GRANULOCYTES # BLD AUTO: 0.03 K/UL (ref 0–0.04)
IMM GRANULOCYTES NFR BLD AUTO: 0.3 % (ref 0–0.5)
KETONES UR QL STRIP: NEGATIVE
LEUKOCYTE ESTERASE UR QL STRIP: NEGATIVE
LYMPHOCYTES # BLD AUTO: 2.8 K/UL (ref 1–4.8)
LYMPHOCYTES NFR BLD: 29.3 % (ref 18–48)
MCH RBC QN AUTO: 21.1 PG (ref 27–31)
MCHC RBC AUTO-ENTMCNC: 29.5 G/DL (ref 32–36)
MCV RBC AUTO: 72 FL (ref 82–98)
MONOCYTES # BLD AUTO: 0.5 K/UL (ref 0.3–1)
MONOCYTES NFR BLD: 5.1 % (ref 4–15)
NEUTROPHILS # BLD AUTO: 5.8 K/UL (ref 1.8–7.7)
NEUTROPHILS NFR BLD: 59.5 % (ref 38–73)
NITRITE UR QL STRIP: NEGATIVE
NRBC BLD-RTO: 0 /100 WBC
PH UR STRIP: 5 [PH] (ref 5–8)
PLATELET # BLD AUTO: 338 K/UL (ref 150–450)
PMV BLD AUTO: 9.2 FL (ref 9.2–12.9)
POTASSIUM SERPL-SCNC: 3.5 MMOL/L (ref 3.5–5.1)
PROT SERPL-MCNC: 7.3 G/DL (ref 6–8.4)
PROT UR QL STRIP: NEGATIVE
RBC # BLD AUTO: 4.26 M/UL (ref 4.6–6.2)
SODIUM SERPL-SCNC: 139 MMOL/L (ref 136–145)
SP GR UR STRIP: <1.005 (ref 1–1.03)
TROPONIN I SERPL DL<=0.01 NG/ML-MCNC: 0.01 NG/ML (ref 0–0.03)
TROPONIN I SERPL DL<=0.01 NG/ML-MCNC: 0.01 NG/ML (ref 0–0.03)
URN SPEC COLLECT METH UR: ABNORMAL
UROBILINOGEN UR STRIP-ACNC: NEGATIVE EU/DL
WBC # BLD AUTO: 9.67 K/UL (ref 3.9–12.7)

## 2024-05-26 PROCEDURE — 83880 ASSAY OF NATRIURETIC PEPTIDE: CPT | Performed by: EMERGENCY MEDICINE

## 2024-05-26 PROCEDURE — 81003 URINALYSIS AUTO W/O SCOPE: CPT | Performed by: EMERGENCY MEDICINE

## 2024-05-26 PROCEDURE — 63600175 PHARM REV CODE 636 W HCPCS: Performed by: FAMILY MEDICINE

## 2024-05-26 PROCEDURE — 96374 THER/PROPH/DIAG INJ IV PUSH: CPT

## 2024-05-26 PROCEDURE — 93010 ELECTROCARDIOGRAM REPORT: CPT | Mod: ,,, | Performed by: INTERNAL MEDICINE

## 2024-05-26 PROCEDURE — 82550 ASSAY OF CK (CPK): CPT | Performed by: EMERGENCY MEDICINE

## 2024-05-26 PROCEDURE — 25000003 PHARM REV CODE 250: Performed by: EMERGENCY MEDICINE

## 2024-05-26 PROCEDURE — 85025 COMPLETE CBC W/AUTO DIFF WBC: CPT | Performed by: EMERGENCY MEDICINE

## 2024-05-26 PROCEDURE — 99285 EMERGENCY DEPT VISIT HI MDM: CPT | Mod: 25

## 2024-05-26 PROCEDURE — 93005 ELECTROCARDIOGRAM TRACING: CPT

## 2024-05-26 PROCEDURE — 84484 ASSAY OF TROPONIN QUANT: CPT | Mod: 91 | Performed by: FAMILY MEDICINE

## 2024-05-26 PROCEDURE — 84484 ASSAY OF TROPONIN QUANT: CPT | Performed by: EMERGENCY MEDICINE

## 2024-05-26 PROCEDURE — 25000242 PHARM REV CODE 250 ALT 637 W/ HCPCS: Performed by: FAMILY MEDICINE

## 2024-05-26 PROCEDURE — 80053 COMPREHEN METABOLIC PANEL: CPT | Performed by: EMERGENCY MEDICINE

## 2024-05-26 RX ORDER — TRAMADOL HYDROCHLORIDE 50 MG/1
50 TABLET ORAL EVERY 6 HOURS PRN
Qty: 12 TABLET | Refills: 0 | Status: SHIPPED | OUTPATIENT
Start: 2024-05-26

## 2024-05-26 RX ORDER — NITROGLYCERIN 0.4 MG/1
0.4 TABLET SUBLINGUAL
Status: COMPLETED | OUTPATIENT
Start: 2024-05-26 | End: 2024-05-26

## 2024-05-26 RX ORDER — KETOROLAC TROMETHAMINE 30 MG/ML
30 INJECTION, SOLUTION INTRAMUSCULAR; INTRAVENOUS
Status: COMPLETED | OUTPATIENT
Start: 2024-05-26 | End: 2024-05-26

## 2024-05-26 RX ADMIN — NITROGLYCERIN 0.4 MG: 0.4 TABLET, ORALLY DISINTEGRATING SUBLINGUAL at 05:05

## 2024-05-26 RX ADMIN — KETOROLAC TROMETHAMINE 30 MG: 30 INJECTION, SOLUTION INTRAMUSCULAR at 06:05

## 2024-05-26 RX ADMIN — NITROGLYCERIN 1 INCH: 20 OINTMENT TOPICAL at 04:05

## 2024-05-26 NOTE — ED PROVIDER NOTES
SCRIBE #1 NOTE: I, Zara Low, am scribing for, and in the presence of, Giovany Mosquera MD. I have scribed the HPI/ROS/PE.    SCRIBE #2 NOTE: I, Santos Roper, am scribing for, and in the presence of,  Elva Maria MD. I have scribed the remaining portions of the note not scribed by Scribe #1.      History     Chief Complaint   Patient presents with    Chest Pain     Pt states for the past three day he has been dealing with increased left sided chest pain that comes and goes. Pt has hx of MI and wants to catch this episode before it gets worse     Review of patient's allergies indicates:  No Known Allergies      History of Present Illness     HPI    5/26/2024, 3:46 PM  History obtained from the patient      History of Present Illness: Laron Kothari Jr. is a 53 y.o. male patient with a PMHx of HTN, HLD, DVT, DM2, stents, and bypass who presents to the Emergency Department for evaluation of midsternal CP which onset gradually Friday AM. Pt describes the pain as intermittent and in the middle of his chest. No mitigating or exacerbating factors reported. Associated sxs include SOB and cough. Sxs are intermittent and moderate in severity. Patient denies any n/v, fever, HA, leg swelling, and all other sxs at this time. No prior tx. No further complaints or concerns at this time.       Arrival mode: Personal vehicle    PCP: Kristen Adler DO        Past Medical History:  Past Medical History:   Diagnosis Date    Blood in stool 08/07/2018    Deep vein thrombosis (DVT) 2017    Left leg    Diabetes mellitus, type 2 2013    Gout     Hyperlipidemia     Hypertension     Neuropathy     Smoker        Past Surgical History:  Past Surgical History:   Procedure Laterality Date    APPENDECTOMY      ARTHROSCOPIC CHONDROPLASTY OF KNEE JOINT Right 11/24/2020    Procedure: ARTHROSCOPY, KNEE, WITH CHONDROPLASTY;  Surgeon: Nahum Rose MD;  Location: [facility] OR;  Service: Orthopedics;  Laterality: Right;  chondroplasty  medial condyle and anteriorly    COLONOSCOPY N/A 8/7/2018    Procedure: COLONOSCOPY;  Surgeon: Ten Valentine MD;  Location: [facility] ENDO;  Service: Endoscopy;  Laterality: N/A;    COLONOSCOPY N/A 2/23/2022    Procedure: COLONOSCOPY;  Surgeon: Octavio Craig MD;  Location: [facility] ENDO;  Service: Endoscopy;  Laterality: N/A;    KNEE ARTHROSCOPY W/ MENISCECTOMY Right 11/24/2020    Procedure: ARTHROSCOPY, KNEE, WITH MENISCECTOMY;  Surgeon: Nahum Rose MD;  Location: [facility] OR;  Service: Orthopedics;  Laterality: Right;  Partial medial and lateral meniscectomy    LEFT HEART CATHETERIZATION Left 4/11/2023    Procedure: Left heart cath;  Surgeon: Sue Lara MD;  Location: [facility] CATH LAB;  Service: Cardiology;  Laterality: Left;    PERCUTANEOUS TRANSLUMINAL BALLOON ANGIOPLASTY OF CORONARY ARTERY  4/11/2023    Procedure: Angioplasty-coronary;  Surgeon: Sue Lara MD;  Location: [facility] CATH LAB;  Service: Cardiology;;    ROBOT-ASSISTED CHOLECYSTECTOMY USING DA TRIPP XI N/A 1/21/2020    Procedure: XI ROBOTIC CHOLECYSTECTOMY;  Surgeon: Sebastien Rivera MD;  Location: [facility] OR;  Service: General;  Laterality: N/A;    STENT, DRUG ELUTING, SINGLE VESSEL, CORONARY  4/11/2023    Procedure: Stent, Drug Eluting, Single Vessel, Coronary;  Surgeon: Sue Lara MD;  Location: [facility] CATH LAB;  Service: Cardiology;;    TONSILLECTOMY, ADENOIDECTOMY           Family History:  Family History   Problem Relation Name Age of Onset    Diabetes Father      Prostate cancer Father      Cataracts Father      Hypertension Mother      Hypertension Brother         Social History:  Social History     Tobacco Use    Smoking status: Every Day     Current packs/day: 1.50     Average packs/day: 1.5 packs/day for 36.8 years (55.2 ttl pk-yrs)     Types: Cigarettes     Start date: 2003    Smokeless tobacco: Never    Tobacco comments:     Smoked 1.5-2 packs per day until April 2023, now smoking 10 cpd   Substance and Sexual Activity    Alcohol  use: Yes     Alcohol/week: 26.0 standard drinks of alcohol     Types: 26 Cans of beer per week     Comment: daily    Drug use: No    Sexual activity: Yes     Partners: Female        Review of Systems     Review of Systems   Constitutional:  Negative for fever.   HENT:  Negative for sore throat.    Respiratory:  Positive for cough (intermittent) and shortness of breath (intermittent).    Cardiovascular:  Positive for chest pain (midsternal; intermittent). Negative for leg swelling.   Gastrointestinal:  Negative for nausea and vomiting.   Genitourinary:  Negative for dysuria.   Musculoskeletal:  Negative for back pain.   Skin:  Negative for rash.   Neurological:  Negative for weakness and headaches.   Hematological:  Does not bruise/bleed easily.   All other systems reviewed and are negative.       Physical Exam     Initial Vitals [05/26/24 1538]   BP Pulse Resp Temp SpO2   (!) 173/91 88 20 98.7 °F (37.1 °C) 99 %      MAP       --          Physical Exam  Nursing Notes and Vital Signs Reviewed.  Constitutional: Patient is in no acute distress. Well-developed and well-nourished.  Head: Atraumatic. Normocephalic.  Eyes: PERRL. EOM intact. Conjunctivae are not pale. No scleral icterus.  ENT: Mucous membranes are moist. Oropharynx is clear and symmetric.    Neck: Supple. Full ROM. No lymphadenopathy.  Cardiovascular: Regular rate. Regular rhythm. No murmurs, rubs, or gallops. Distal pulses are 2+ and symmetric.  Pulmonary/Chest: No respiratory distress. Clear to auscultation bilaterally. No wheezing or rales.  Abdominal: Soft and non-distended.  There is no tenderness.  No rebound, guarding, or rigidity. Good bowel sounds.  Genitourinary: No CVA tenderness  Musculoskeletal: Moves all extremities. No obvious deformities. No edema. No calf tenderness.  Skin: Warm and dry.  Neurological:  Alert, awake, and appropriate.  Normal speech.  No acute focal neurological deficits are appreciated.  Psychiatric: Normal affect. Good eye  contact. Appropriate in content.     ED Course   Procedures  ED Vital Signs:  Vitals:    05/26/24 1536 05/26/24 1538 05/26/24 1625 05/26/24 1802   BP:  (!) 173/91 (!) 160/84 (!) 167/85   Pulse:  88 71 63   Resp:  20 16 20   Temp:  98.7 °F (37.1 °C)     TempSrc:  Oral     SpO2:  99% 98% 99%   Weight: 104.8 kg (231 lb 0.7 oz)       05/26/24 1932 05/26/24 1933   BP:  (!) 168/91   Pulse:  (!) 59   Resp: 17    Temp:     TempSrc:     SpO2:  100%   Weight:         Abnormal Lab Results:  Labs Reviewed   CBC W/ AUTO DIFFERENTIAL - Abnormal; Notable for the following components:       Result Value    RBC 4.26 (*)     Hemoglobin 9.0 (*)     Hematocrit 30.5 (*)     MCV 72 (*)     MCH 21.1 (*)     MCHC 29.5 (*)     RDW 21.0 (*)     All other components within normal limits   COMPREHENSIVE METABOLIC PANEL - Abnormal; Notable for the following components:    CO2 21 (*)     Glucose 159 (*)     All other components within normal limits   URINALYSIS, REFLEX TO URINE CULTURE - Abnormal; Notable for the following components:    Color, UA Colorless (*)     Specific Gravity, UA <1.005 (*)     All other components within normal limits    Narrative:     Specimen Source->Urine   CK - Abnormal; Notable for the following components:     (*)     All other components within normal limits   B-TYPE NATRIURETIC PEPTIDE   TROPONIN I   TROPONIN I        All Lab Results:  Results for orders placed or performed during the hospital encounter of 05/26/24   CBC Auto Differential   Result Value Ref Range    WBC 9.67 3.90 - 12.70 K/uL    RBC 4.26 (L) 4.60 - 6.20 M/uL    Hemoglobin 9.0 (L) 14.0 - 18.0 g/dL    Hematocrit 30.5 (L) 40.0 - 54.0 %    MCV 72 (L) 82 - 98 fL    MCH 21.1 (L) 27.0 - 31.0 pg    MCHC 29.5 (L) 32.0 - 36.0 g/dL    RDW 21.0 (H) 11.5 - 14.5 %    Platelets 338 150 - 450 K/uL    MPV 9.2 9.2 - 12.9 fL    Immature Granulocytes 0.3 0.0 - 0.5 %    Gran # (ANC) 5.8 1.8 - 7.7 K/uL    Immature Grans (Abs) 0.03 0.00 - 0.04 K/uL    Lymph # 2.8  1.0 - 4.8 K/uL    Mono # 0.5 0.3 - 1.0 K/uL    Eos # 0.5 0.0 - 0.5 K/uL    Baso # 0.06 0.00 - 0.20 K/uL    nRBC 0 0 /100 WBC    Gran % 59.5 38.0 - 73.0 %    Lymph % 29.3 18.0 - 48.0 %    Mono % 5.1 4.0 - 15.0 %    Eosinophil % 5.2 0.0 - 8.0 %    Basophil % 0.6 0.0 - 1.9 %    Differential Method Automated    Comprehensive Metabolic Panel   Result Value Ref Range    Sodium 139 136 - 145 mmol/L    Potassium 3.5 3.5 - 5.1 mmol/L    Chloride 106 95 - 110 mmol/L    CO2 21 (L) 23 - 29 mmol/L    Glucose 159 (H) 70 - 110 mg/dL    BUN 14 6 - 20 mg/dL    Creatinine 1.1 0.5 - 1.4 mg/dL    Calcium 9.4 8.7 - 10.5 mg/dL    Total Protein 7.3 6.0 - 8.4 g/dL    Albumin 3.6 3.5 - 5.2 g/dL    Total Bilirubin 0.4 0.1 - 1.0 mg/dL    Alkaline Phosphatase 66 55 - 135 U/L    AST 22 10 - 40 U/L    ALT 19 10 - 44 U/L    eGFR >60 >60 mL/min/1.73 m^2    Anion Gap 12 8 - 16 mmol/L   Urinalysis, Reflex to Urine Culture Urine, Clean Catch    Specimen: Urine   Result Value Ref Range    Specimen UA Urine, Clean Catch     Color, UA Colorless (A) Yellow, Straw, Becki    Appearance, UA Clear Clear    pH, UA 5.0 5.0 - 8.0    Specific Gravity, UA <1.005 (A) 1.005 - 1.030    Protein, UA Negative Negative    Glucose, UA Negative Negative    Ketones, UA Negative Negative    Bilirubin (UA) Negative Negative    Occult Blood UA Negative Negative    Nitrite, UA Negative Negative    Urobilinogen, UA Negative <2.0 EU/dL    Leukocytes, UA Negative Negative   BNP   Result Value Ref Range    BNP 99 0 - 99 pg/mL   CK   Result Value Ref Range     (H) 20 - 200 U/L   Troponin I   Result Value Ref Range    Troponin I 0.006 0.000 - 0.026 ng/mL   Troponin I   Result Value Ref Range    Troponin I 0.013 0.000 - 0.026 ng/mL   EKG 12-lead   Result Value Ref Range    QRS Duration 88 ms    [facility] QTC Calculation 454 ms         Imaging Results:  Imaging Results              X-Ray Chest AP Portable (Final result)  Result time 05/26/24 16:42:39      Final result by Jamilah  MD Richi (05/26/24 16:42:39)                   Impression:      Cardiomegaly with mild perihilar interstitial prominence.  Correlate clinically to low-grade CHF      Electronically signed by: Richi Askew  Date:    05/26/2024  Time:    16:42               Narrative:    EXAMINATION:  XR CHEST AP PORTABLE    CLINICAL HISTORY:  chest pain;    TECHNIQUE:  Single frontal view of the chest was performed.    COMPARISON:  None    FINDINGS:  Mild perihilar interstitial prominence may reflect element of pulmonary edemathe lungs are clear, with normal appearance of pulmonary vasculature and no pleural effusion or pneumothorax.    Cardiomegaly the hilar and mediastinal contours are unremarkable.    Bones are intact.                                       The EKG was ordered, reviewed, and independently interpreted by the ED provider.  Interpretation time: 15:38  Rate: 86 BPM  Rhythm: normal sinus rhythm  Interpretation: Anterior infarct, age undetermined. No STEMI.           The Emergency Provider reviewed the vital signs and test results, which are outlined above.     ED Discussion     4:00 PM: Dr. Mosquera transfers care of patient to Dr. Maria pending lab and imaging results.    5:32 PM: Re-evaluated pt. Pt is resting comfortably and is in no acute distress. Pt states that he had an MI in May 2023 and had stent placement. Pt last saw his cardiologist 3 months ago. Pt is compliant with medications.  D/w pt all pertinent results. D/w pt any concerns expressed at this time. Answered all questions. Pt expresses understanding at this time.    7:14 PM: Reassessed pt at this time. Discussed with pt all pertinent ED information and results. Discussed pt dx and plan of tx. Gave pt all f/u and return to the ED instructions. All questions and concerns were addressed at this time. Pt expresses understanding of information and instructions, and is comfortable with plan to discharge. Pt is stable for discharge.    I discussed with  patient and/or family/caretaker that evaluation in the ED does not suggest any emergent or life threatening medical conditions requiring immediate intervention beyond what was provided in the ED, and I believe patient is safe for discharge.  Regardless, an unremarkable evaluation in the ED does not preclude the development or presence of a serious of life threatening condition. As such, patient was instructed to return immediately for any worsening or change in current symptoms.           Medical Decision Making  Amount and/or Complexity of Data Reviewed  Labs: ordered. Decision-making details documented in ED Course.  Radiology: ordered. Decision-making details documented in ED Course.  ECG/medicine tests: ordered and independent interpretation performed. Decision-making details documented in ED Course.    Risk  Prescription drug management.                ED Medication(s):  Medications   nitroGLYCERIN 2% TD oint ointment 1 inch (1 inch Transdermal Given 5/26/24 1615)   nitroGLYCERIN SL tablet 0.4 mg (0.4 mg Sublingual Given 5/26/24 1732)   ketorolac injection 30 mg (30 mg Intravenous Given 5/26/24 6917)       Discharge Medication List as of 5/26/2024  7:11 PM           Follow-up Information       Schedule an appointment as soon as possible for a visit  with Kristen Adler DO.    Specialty: Internal Medicine  Why: As needed  Contact information:  [address] [place] [address] [address] Hallie CASTREJON [address] [phone]                                 Scribe Attestation:   Scribe #1: I performed the above scribed service and the documentation accurately describes the services I performed. I attest to the accuracy of the note.     Attending:   Physician Attestation Statement for Scribe #1: I, Giovany Mosquera MD, personally performed the services described in this documentation, as scribed by Zara Low, in my presence, and it is both accurate and complete.       Scribe Attestation:   Scribe #2: I performed the above  scribed service and the documentation accurately describes the services I performed. I attest to the accuracy of the note.    Attending Attestation:           Physician Attestation for Scribe:    Physician Attestation Statement for Scribe #2: I, Elva Maria MD, reviewed documentation, as scribed by Santos Roper in my presence, and it is both accurate and complete. I also acknowledge and confirm the content of the note done by Patrickibe #1.           Clinical Impression       ICD-10-CM ICD-9-CM   1. Chest pain  R07.9 786.50       Disposition:   Disposition: Discharged  Condition: Stable         Elva Maria MD  05/28/24 0162

## 2024-05-27 LAB
OHS QRS DURATION: 88 MS
OHS QTC CALCULATION: 454 MS

## 2024-06-13 ENCOUNTER — ON-DEMAND VIRTUAL (OUTPATIENT)
Dept: URGENT CARE | Facility: CLINIC | Age: 54
End: 2024-06-13
Payer: COMMERCIAL

## 2024-06-13 DIAGNOSIS — J06.9 UPPER RESPIRATORY TRACT INFECTION, UNSPECIFIED TYPE: Primary | ICD-10-CM

## 2024-06-13 PROCEDURE — 99213 OFFICE O/P EST LOW 20 MIN: CPT | Mod: 95,,, | Performed by: PHYSICIAN ASSISTANT

## 2024-06-13 RX ORDER — BENZONATATE 200 MG/1
200 CAPSULE ORAL 3 TIMES DAILY PRN
Qty: 20 CAPSULE | Refills: 0 | Status: SHIPPED | OUTPATIENT
Start: 2024-06-13 | End: 2024-06-23

## 2024-06-13 RX ORDER — DOXYCYCLINE 100 MG/1
100 CAPSULE ORAL EVERY 12 HOURS
Qty: 14 CAPSULE | Refills: 0 | Status: SHIPPED | OUTPATIENT
Start: 2024-06-13 | End: 2024-06-20

## 2024-06-13 NOTE — PROGRESS NOTES
Subjective:      Patient ID: Laron Kothari Jr. is a 54 y.o. male.    Vitals:  vitals were not taken for this visit.     Chief Complaint: Cough      Visit Type: TELE AUDIOVISUAL    Present with the patient at the time of consultation: TELEMED PRESENT WITH PATIENT: None at home in [place]    Past Medical History:   Diagnosis Date    Blood in stool 08/07/2018    Deep vein thrombosis (DVT) 2017    Left leg    Diabetes mellitus, type 2 2013    Gout     Hyperlipidemia     Hypertension     Neuropathy     Smoker      Past Surgical History:   Procedure Laterality Date    APPENDECTOMY      ARTHROSCOPIC CHONDROPLASTY OF KNEE JOINT Right 11/24/2020    Procedure: ARTHROSCOPY, KNEE, WITH CHONDROPLASTY;  Surgeon: Nahum Rose MD;  Location: [facility] OR;  Service: Orthopedics;  Laterality: Right;  chondroplasty medial condyle and anteriorly    COLONOSCOPY N/A 8/7/2018    Procedure: COLONOSCOPY;  Surgeon: Ten Valentine MD;  Location: [facility] ENDO;  Service: Endoscopy;  Laterality: N/A;    COLONOSCOPY N/A 2/23/2022    Procedure: COLONOSCOPY;  Surgeon: Octavio Craig MD;  Location: [facility] ENDO;  Service: Endoscopy;  Laterality: N/A;    KNEE ARTHROSCOPY W/ MENISCECTOMY Right 11/24/2020    Procedure: ARTHROSCOPY, KNEE, WITH MENISCECTOMY;  Surgeon: Nahum Rose MD;  Location: [facility] OR;  Service: Orthopedics;  Laterality: Right;  Partial medial and lateral meniscectomy    LEFT HEART CATHETERIZATION Left 4/11/2023    Procedure: Left heart cath;  Surgeon: Sue Lara MD;  Location: [facility] CATH LAB;  Service: Cardiology;  Laterality: Left;    PERCUTANEOUS TRANSLUMINAL BALLOON ANGIOPLASTY OF CORONARY ARTERY  4/11/2023    Procedure: Angioplasty-coronary;  Surgeon: Sue Lara MD;  Location: [facility] CATH LAB;  Service: Cardiology;;    ROBOT-ASSISTED CHOLECYSTECTOMY USING DA TRIPP XI N/A 1/21/2020    Procedure: XI ROBOTIC CHOLECYSTECTOMY;  Surgeon: Sebastien Rivera MD;  Location: [facility] OR;  Service: General;  Laterality: N/A;    STENT,  DRUG ELUTING, SINGLE VESSEL, CORONARY  4/11/2023    Procedure: Stent, Drug Eluting, Single Vessel, Coronary;  Surgeon: Sue Lara MD;  Location: [facility] CATH LAB;  Service: Cardiology;;    TONSILLECTOMY, ADENOIDECTOMY       Review of patient's allergies indicates:  No Known Allergies  Current Outpatient Medications on File Prior to Visit   Medication Sig Dispense Refill    traZODone (DESYREL) 50 MG tablet TAKE 1 TABLET EVERY EVENING 90 tablet 3    aspirin (ECOTRIN) 81 MG EC tablet Take 1 tablet (81 mg total) by mouth once daily. 30 tablet 11    BLOOD PRESSURE CUFF Misc 1 Device by Misc.(Non-Drug; Combo Route) route once daily.  0    blood sugar diagnostic Strp Once daily glucose testing. 50 strip 6    blood-glucose meter Misc Use as directed. 1 each 0    empagliflozin (JARDIANCE) 10 mg tablet Take 1 tablet (10 mg total) by mouth once daily. 30 tablet 5    fluticasone propionate (FLONASE) 50 mcg/actuation nasal spray 2 sprays (100 mcg total) by Each Nostril route once daily. 16 g 0    gabapentin (NEURONTIN) 300 MG capsule Take 1 capsule (300 mg total) by mouth 3 (three) times daily. 90 capsule 11    glipiZIDE (GLUCOTROL) 5 MG tablet Take 1 tablet (5 mg total) by mouth daily with breakfast. 90 tablet 2    iron-vitamin C 100-250 mg, ICAR-C, 100-250 mg Tab Take 1 tablet by mouth once daily. 30 tablet 11    lancets (LANCETS,THIN) Misc Once daily glucose testing. 50 each 6    lisinopriL (PRINIVIL,ZESTRIL) 40 MG tablet Take 1 tablet (40 mg total) by mouth once daily. 90 tablet 1    metFORMIN (GLUCOPHAGE) 1000 MG tablet Take 1 tablet (1,000 mg total) by mouth 2 (two) times daily with meals. 180 tablet 2    metoprolol succinate (TOPROL-XL) 50 MG 24 hr tablet Take 2 tablets in the morning and 1 in the evening, total 150mg daily 120 tablet 3    metoprolol succinate (TOPROL-XL) 50 MG 24 hr tablet Take 2 tablets by mouth in the morning and 1 tablet by mouth in the evening 90 tablet 3    nicotine (NICODERM CQ) 21 mg/24 hr Place  "1 patch onto the skin once daily. 28 patch 0    ondansetron (ZOFRAN-ODT) 4 MG TbDL Take 2 tablets (8 mg total) by mouth every 8 (eight) hours as needed (nausea vomiting). 12 tablet 0    pen needle, diabetic (PEN NEEDLE) 31 gauge x 5/16" Ndle Use once daily with insulin injection. 50 each 3    prasugreL (EFFIENT) 10 mg Tab Take 1 tablet (10 mg total) by mouth once daily. 90 tablet 3    pravastatin (PRAVACHOL) 80 MG tablet Take 1 tablet (80 mg total) by mouth every evening. 90 tablet 3    ranolazine (RANEXA) 500 MG Tb12 Take 1 tablet (500 mg total) by mouth 2 (two) times daily. 180 tablet 3    sildenafiL (VIAGRA) 50 MG tablet Take 1 tablet (50 mg total) by mouth daily as needed for Erectile Dysfunction. 30 tablet 11    traMADoL (ULTRAM) 50 mg tablet Take 1 tablet (50 mg total) by mouth every 6 (six) hours as needed. 12 tablet 0     No current facility-administered medications on file prior to visit.     Family History   Problem Relation Name Age of Onset    Diabetes Father      Prostate cancer Father      Cataracts Father      Hypertension Mother      Hypertension Brother         Medications Ordered                [facility] [address] [place] [id number]    Telephone: [phone]   Fax: [phone]   Hours: Not open 24 hours                         E-Prescribed (2 of 2)              benzonatate (TESSALON) 200 MG capsule    Sig: Take 1 capsule (200 mg total) by mouth 3 (three) times daily as needed for Cough.       Start: 6/13/24     Quantity: 20 capsule Refills: 0                         doxycycline (VIBRAMYCIN) 100 MG Cap    Sig: Take 1 capsule (100 mg total) by mouth every 12 (twelve) hours. for 7 days       Start: 6/13/24     Quantity: 14 capsule Refills: 0                           Ohs Peq Odvv Intake    6/13/2024  3:14 PM CDT - Filed by Patient   What is your current physical address in the event of a medical emergency? [address] [place] [address].   Are you able to take your " vital signs? No   Please attach any relevant images or files          HPI  55yo male presents with c/o chills, body aches, headache, runny nose, sore throat, productive cough over the past week, worsening. Cough keeps him up at night. Taking OTC cold medications. Denies CP, SOB.     Wife recently sick too with pharyngitis, treated. No COVID.         Constitution: Positive for chills. Negative for fever.        +malaise, body aches   HENT:  Positive for congestion and sore throat. Negative for ear pain.    Cardiovascular:  Negative for chest pain.   Respiratory:  Positive for cough and sputum production. Negative for chest tightness, shortness of breath and wheezing.    Gastrointestinal:  Negative for abdominal pain, nausea, vomiting and diarrhea.   Skin:  Negative for rash.   Neurological:  Positive for headaches.        Objective:   The physical exam was conducted virtually.  Physical Exam   Constitutional: He is oriented to person, place, and time.  Non-toxic appearance. He does not appear ill. No distress.   HENT:   Head: Normocephalic and atraumatic.   Eyes: Conjunctivae are normal.   Neck: Neck supple.   Pulmonary/Chest: Effort normal. No respiratory distress.   Abdominal: Normal appearance.   Lymphadenopathy:     He has no cervical adenopathy.   Neurological: He is alert and oriented to person, place, and time. Coordination normal.   Skin: Skin is dry, not diaphoretic, not pale and no rash.   Psychiatric: His behavior is normal. Judgment and thought content normal.       Assessment:     1. Upper respiratory tract infection, unspecified type        Plan:       Upper respiratory tract infection, unspecified type    Other orders  -     doxycycline (VIBRAMYCIN) 100 MG Cap; Take 1 capsule (100 mg total) by mouth every 12 (twelve) hours. for 7 days  Dispense: 14 capsule; Refill: 0  -     benzonatate (TESSALON) 200 MG capsule; Take 1 capsule (200 mg total) by mouth 3 (three) times daily as needed for Cough.   Dispense: 20 capsule; Refill: 0    1. I have sent two prescriptions to your pharmacy, please take as directed.  2. Push fluids, goal is urine to be light yellow/clear for adequate hydration. Rest. Warm tea with honey and salt gargles for throat discomfort and cough.   3. Recommend Mucinex for sinus/nasal/chest congestion.    4. Follow up in 3-4 days with your PCP or one of our local urgent cares if not improving, sooner if any worsening cough, fevers, trouble breathing, wheezing, chest pain or other concerns.   5. You must understand that you've received a Telehealth Urgent Care treatment only and that you may be released before all your medical problems are known or treated. You, the patient, will arrange for follow up care as instructed.  Patient voiced understanding and agrees to plan.

## 2024-06-13 NOTE — PATIENT INSTRUCTIONS
1. I have sent two prescriptions to your pharmacy, please take as directed.  2. Push fluids, goal is urine to be light yellow/clear for adequate hydration. Rest. Warm tea with honey and salt gargles for throat discomfort and cough.   3. Recommend Mucinex for sinus/nasal/chest congestion.    4. Follow up in 3-4 days with your PCP or one of our local urgent cares if not improving, sooner if any worsening cough, fevers, trouble breathing, wheezing, chest pain or other concerns.   5. You must understand that you've received a Telehealth Urgent Care treatment only and that you may be released before all your medical problems are known or treated. You, the patient, will arrange for follow up care as instructed.

## 2024-07-11 ENCOUNTER — PATIENT OUTREACH (OUTPATIENT)
Dept: ADMINISTRATIVE | Facility: HOSPITAL | Age: 54
End: 2024-07-11
Payer: COMMERCIAL

## 2024-07-11 DIAGNOSIS — E11.40 TYPE 2 DIABETES MELLITUS WITH DIABETIC NEUROPATHY, WITH LONG-TERM CURRENT USE OF INSULIN: Chronic | ICD-10-CM

## 2024-07-11 DIAGNOSIS — E11.29 CONTROLLED TYPE 2 DIABETES MELLITUS WITH MICROALBUMINURIA, WITHOUT LONG-TERM CURRENT USE OF INSULIN: Primary | ICD-10-CM

## 2024-07-11 DIAGNOSIS — Z79.4 TYPE 2 DIABETES MELLITUS WITH DIABETIC NEUROPATHY, WITH LONG-TERM CURRENT USE OF INSULIN: Chronic | ICD-10-CM

## 2024-07-11 DIAGNOSIS — R80.9 CONTROLLED TYPE 2 DIABETES MELLITUS WITH MICROALBUMINURIA, WITHOUT LONG-TERM CURRENT USE OF INSULIN: Primary | ICD-10-CM

## 2024-07-11 NOTE — PROGRESS NOTES
Working [facility].    Pt has lab appt scheduled 7/16/24.  Micro albumin urine ordered and linked to appt

## 2024-07-15 ENCOUNTER — PATIENT OUTREACH (OUTPATIENT)
Dept: ADMINISTRATIVE | Facility: HOSPITAL | Age: 54
End: 2024-07-15
Payer: COMMERCIAL

## 2024-07-18 ENCOUNTER — OFFICE VISIT (OUTPATIENT)
Dept: CARDIOLOGY | Facility: CLINIC | Age: 54
End: 2024-07-18
Payer: COMMERCIAL

## 2024-07-18 ENCOUNTER — LAB VISIT (OUTPATIENT)
Dept: LAB | Facility: HOSPITAL | Age: 54
End: 2024-07-18
Attending: INTERNAL MEDICINE
Payer: COMMERCIAL

## 2024-07-18 VITALS
HEIGHT: 74 IN | OXYGEN SATURATION: 98 % | DIASTOLIC BLOOD PRESSURE: 78 MMHG | SYSTOLIC BLOOD PRESSURE: 126 MMHG | BODY MASS INDEX: 30.09 KG/M2 | HEART RATE: 65 BPM | WEIGHT: 234.44 LBS

## 2024-07-18 DIAGNOSIS — E11.40 TYPE 2 DIABETES MELLITUS WITH DIABETIC NEUROPATHY, WITH LONG-TERM CURRENT USE OF INSULIN: Chronic | ICD-10-CM

## 2024-07-18 DIAGNOSIS — I82.402 LEG DVT (DEEP VENOUS THROMBOEMBOLISM), ACUTE, LEFT: ICD-10-CM

## 2024-07-18 DIAGNOSIS — I10 HYPERTENSION GOAL BP (BLOOD PRESSURE) < 130/80: Chronic | ICD-10-CM

## 2024-07-18 DIAGNOSIS — E11.29 CONTROLLED TYPE 2 DIABETES MELLITUS WITH MICROALBUMINURIA, WITHOUT LONG-TERM CURRENT USE OF INSULIN: ICD-10-CM

## 2024-07-18 DIAGNOSIS — Z79.4 TYPE 2 DIABETES MELLITUS WITH DIABETIC NEUROPATHY, WITH LONG-TERM CURRENT USE OF INSULIN: Chronic | ICD-10-CM

## 2024-07-18 DIAGNOSIS — E78.5 HYPERLIPIDEMIA LDL GOAL <70: Chronic | ICD-10-CM

## 2024-07-18 DIAGNOSIS — F10.10 ALCOHOL ABUSE: ICD-10-CM

## 2024-07-18 DIAGNOSIS — G47.33 OSA (OBSTRUCTIVE SLEEP APNEA): ICD-10-CM

## 2024-07-18 DIAGNOSIS — F17.200 TOBACCO USE DISORDER: ICD-10-CM

## 2024-07-18 DIAGNOSIS — I25.118 CORONARY ARTERY DISEASE OF NATIVE ARTERY OF NATIVE HEART WITH STABLE ANGINA PECTORIS: Primary | ICD-10-CM

## 2024-07-18 DIAGNOSIS — R80.9 CONTROLLED TYPE 2 DIABETES MELLITUS WITH MICROALBUMINURIA, WITHOUT LONG-TERM CURRENT USE OF INSULIN: ICD-10-CM

## 2024-07-18 LAB
ALBUMIN SERPL BCP-MCNC: 3.4 G/DL (ref 3.5–5.2)
ALBUMIN/CREAT UR: 48.6 UG/MG (ref 0–30)
ALP SERPL-CCNC: 60 U/L (ref 55–135)
ALT SERPL W/O P-5'-P-CCNC: 16 U/L (ref 10–44)
ANION GAP SERPL CALC-SCNC: 7 MMOL/L (ref 8–16)
AST SERPL-CCNC: 24 U/L (ref 10–40)
BILIRUB SERPL-MCNC: 0.4 MG/DL (ref 0.1–1)
BUN SERPL-MCNC: 12 MG/DL (ref 6–20)
CALCIUM SERPL-MCNC: 9.2 MG/DL (ref 8.7–10.5)
CHLORIDE SERPL-SCNC: 107 MMOL/L (ref 95–110)
CHOLEST SERPL-MCNC: 115 MG/DL (ref 120–199)
CHOLEST/HDLC SERPL: 3.5 {RATIO} (ref 2–5)
CO2 SERPL-SCNC: 25 MMOL/L (ref 23–29)
CREAT SERPL-MCNC: 1.1 MG/DL (ref 0.5–1.4)
CREAT UR-MCNC: 72 MG/DL (ref 23–375)
EST. GFR  (NO RACE VARIABLE): >60 ML/MIN/1.73 M^2
ESTIMATED AVG GLUCOSE: 126 MG/DL (ref 68–131)
GLUCOSE SERPL-MCNC: 137 MG/DL (ref 70–110)
HBA1C MFR BLD: 6 % (ref 4–5.6)
HDLC SERPL-MCNC: 33 MG/DL (ref 40–75)
HDLC SERPL: 28.7 % (ref 20–50)
LDLC SERPL CALC-MCNC: 72.6 MG/DL (ref 63–159)
MICROALBUMIN UR DL<=1MG/L-MCNC: 35 UG/ML
NONHDLC SERPL-MCNC: 82 MG/DL
POTASSIUM SERPL-SCNC: 4.3 MMOL/L (ref 3.5–5.1)
PROT SERPL-MCNC: 6.9 G/DL (ref 6–8.4)
SODIUM SERPL-SCNC: 139 MMOL/L (ref 136–145)
TRIGL SERPL-MCNC: 47 MG/DL (ref 30–150)

## 2024-07-18 PROCEDURE — 82043 UR ALBUMIN QUANTITATIVE: CPT | Performed by: INTERNAL MEDICINE

## 2024-07-18 PROCEDURE — 83036 HEMOGLOBIN GLYCOSYLATED A1C: CPT | Performed by: INTERNAL MEDICINE

## 2024-07-18 PROCEDURE — 80061 LIPID PANEL: CPT | Performed by: INTERNAL MEDICINE

## 2024-07-18 PROCEDURE — 80053 COMPREHEN METABOLIC PANEL: CPT | Performed by: INTERNAL MEDICINE

## 2024-07-18 PROCEDURE — 82570 ASSAY OF URINE CREATININE: CPT | Performed by: INTERNAL MEDICINE

## 2024-07-18 PROCEDURE — 1159F MED LIST DOCD IN RCRD: CPT | Mod: CPTII,S$GLB,, | Performed by: INTERNAL MEDICINE

## 2024-07-18 PROCEDURE — 99214 OFFICE O/P EST MOD 30 MIN: CPT | Mod: S$GLB,,, | Performed by: INTERNAL MEDICINE

## 2024-07-18 PROCEDURE — 4010F ACE/ARB THERAPY RXD/TAKEN: CPT | Mod: CPTII,S$GLB,, | Performed by: INTERNAL MEDICINE

## 2024-07-18 PROCEDURE — 36415 COLL VENOUS BLD VENIPUNCTURE: CPT | Performed by: INTERNAL MEDICINE

## 2024-07-18 PROCEDURE — 3044F HG A1C LEVEL LT 7.0%: CPT | Mod: CPTII,S$GLB,, | Performed by: INTERNAL MEDICINE

## 2024-07-18 PROCEDURE — 3074F SYST BP LT 130 MM HG: CPT | Mod: CPTII,S$GLB,, | Performed by: INTERNAL MEDICINE

## 2024-07-18 PROCEDURE — 99999 PR PBB SHADOW E&M-EST. PATIENT-LVL V: CPT | Mod: PBBFAC,,, | Performed by: INTERNAL MEDICINE

## 2024-07-18 PROCEDURE — 3078F DIAST BP <80 MM HG: CPT | Mod: CPTII,S$GLB,, | Performed by: INTERNAL MEDICINE

## 2024-07-18 PROCEDURE — 1160F RVW MEDS BY RX/DR IN RCRD: CPT | Mod: CPTII,S$GLB,, | Performed by: INTERNAL MEDICINE

## 2024-07-18 PROCEDURE — 3008F BODY MASS INDEX DOCD: CPT | Mod: CPTII,S$GLB,, | Performed by: INTERNAL MEDICINE

## 2024-07-18 NOTE — PROGRESS NOTES
Subjective:   Patient ID:  Laron Kothari Jr. is a 54 y.o. male who presents for follow up of No chief complaint on file.      HPI  Mr. Kothari is a 52 year old male patient whose current medical conditions include CAD s/p [facility] PCI x2 to RCA 4/23' by Dr. Lara, DVT in 2017, DM type II, HTN, hyperlipidemia, gout, neuropathy, and tobacco abuse who presents to clinic for palpitations. His wife reports he is not sleeping well     [facility] 4/11/23 dist LAD 80%, dist cx 90%, mid % with successful PCI  Echo with normal EF     5/12/23  Here today for 2 week follow up for palpitations post STEMI w/ PCI x2 RCA. Still complaining of fatigue, SOB and occasional palpitations. 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       [facility] 4/28/23 ST, PVC     7/19/23  Here today, for follow up. Reports improvement in fatigue, denies any CP with ADLs. Did not take his medication this morning yet, BP elevated in clinic. Overall doing well CV wise. 3 week in to cardiac rehab, tolerating well. He does reports trouble sleeping and some discomfort on his left side when laying down     9/15/23  Here today for CV follow up. [facility]. Back at work has some fatigue but improving. Reports LE edema and alejandra leg pain worsening in the last few weeks. Labs reviewed from last week BNP neg. Denies any CP, SOB at this time. Down to 6 cigarettes per day now. Weight stable     1/11/2024  Has been complaining  of bilateral leg pain the whole leg feels weak. From the buttock down. Has no nocturnal pain. Has no numbness or tingling. He feels legs tender he is feeling it after the first delivery of the day. It is worse as the day progresses. No swelling.   No chest pain has occasional shortness of breath.continues to smoke    7/18/2024   Back working no issues had an episode of chest pain he was hypertensive . He is now well controlled no evidence of mi. He  is back to work compliant with meds diet . His A1c is 6 down from 7.5 it is higher than the one at job physical he needs diet compliance   Past Medical History:   Diagnosis Date    Blood in stool 08/07/2018    Deep vein thrombosis (DVT) 2017    Left leg    Diabetes mellitus, type 2 2013    Gout     Hyperlipidemia     Hypertension     Neuropathy     Smoker        Past Surgical History:   Procedure Laterality Date    APPENDECTOMY      ARTHROSCOPIC CHONDROPLASTY OF KNEE JOINT Right 11/24/2020    Procedure: ARTHROSCOPY, KNEE, WITH CHONDROPLASTY;  Surgeon: Nahum Rose MD;  Location: [facility] OR;  Service: Orthopedics;  Laterality: Right;  chondroplasty medial condyle and anteriorly    COLONOSCOPY N/A 8/7/2018    Procedure: COLONOSCOPY;  Surgeon: Ten Valentine MD;  Location: [facility] ENDO;  Service: Endoscopy;  Laterality: N/A;    COLONOSCOPY N/A 2/23/2022    Procedure: COLONOSCOPY;  Surgeon: Octavio Craig MD;  Location: [facility] ENDO;  Service: Endoscopy;  Laterality: N/A;    KNEE ARTHROSCOPY W/ MENISCECTOMY Right 11/24/2020    Procedure: ARTHROSCOPY, KNEE, WITH MENISCECTOMY;  Surgeon: Nahum Rose MD;  Location: [facility] OR;  Service: Orthopedics;  Laterality: Right;  Partial medial and lateral meniscectomy    LEFT HEART CATHETERIZATION Left 4/11/2023    Procedure: Left heart cath;  Surgeon: Sue Lara MD;  Location: [facility] CATH LAB;  Service: Cardiology;  Laterality: Left;    PERCUTANEOUS TRANSLUMINAL BALLOON ANGIOPLASTY OF CORONARY ARTERY  4/11/2023    Procedure: Angioplasty-coronary;  Surgeon: Sue Lara MD;  Location: [facility] CATH LAB;  Service: Cardiology;;    ROBOT-ASSISTED CHOLECYSTECTOMY USING DA RTIPP XI N/A 1/21/2020    Procedure: XI ROBOTIC CHOLECYSTECTOMY;  Surgeon: Sebastien Rivera MD;  Location: [facility] OR;  Service: General;  Laterality: N/A;    STENT, DRUG ELUTING, SINGLE VESSEL, CORONARY  4/11/2023    Procedure: Stent, Drug Eluting, Single Vessel, Coronary;  Surgeon: Sue Lara MD;   Location: [facility] CATH LAB;  Service: Cardiology;;    TONSILLECTOMY, ADENOIDECTOMY         Social History     Tobacco Use    Smoking status: Every Day     Current packs/day: 1.50     Average packs/day: 1.5 packs/day for 36.9 years (55.4 ttl pk-yrs)     Types: Cigarettes     Start date: 2003    Smokeless tobacco: Never    Tobacco comments:     Smoked 1.5-2 packs per day until April 2023, now smoking 10 cpd   Substance Use Topics    Alcohol use: Yes     Alcohol/week: 26.0 standard drinks of alcohol     Types: 26 Cans of beer per week     Comment: daily    Drug use: No       Family History   Problem Relation Name Age of Onset    Diabetes Father      Prostate cancer Father      Cataracts Father      Hypertension Mother      Hypertension Brother         Current Outpatient Medications   Medication Sig    aspirin (ECOTRIN) 81 MG EC tablet Take 1 tablet (81 mg total) by mouth once daily.    BLOOD PRESSURE CUFF Misc 1 Device by Misc.(Non-Drug; Combo Route) route once daily.    blood sugar diagnostic Strp Once daily glucose testing.    blood-glucose meter Misc Use as directed.    fluticasone propionate (FLONASE) 50 mcg/actuation nasal spray 2 sprays (100 mcg total) by Each Nostril route once daily.    gabapentin (NEURONTIN) 300 MG capsule Take 1 capsule (300 mg total) by mouth 3 (three) times daily.    glipiZIDE (GLUCOTROL) 5 MG tablet Take 1 tablet (5 mg total) by mouth daily with breakfast.    lancets (LANCETS,THIN) Misc Once daily glucose testing.    lisinopriL (PRINIVIL,ZESTRIL) 40 MG tablet Take 1 tablet (40 mg total) by mouth once daily.    metFORMIN (GLUCOPHAGE) 1000 MG tablet Take 1 tablet (1,000 mg total) by mouth 2 (two) times daily with meals.    metoprolol succinate (TOPROL-XL) 50 MG 24 hr tablet Take 2 tablets in the morning and 1 in the evening, total 150mg daily    ondansetron (ZOFRAN-ODT) 4 MG TbDL Take 2 tablets (8 mg total) by mouth every 8 (eight) hours as needed (nausea vomiting).    pen needle, diabetic (PEN  "NEEDLE) 31 gauge x 5/16" Ndle Use once daily with insulin injection.    prasugreL (EFFIENT) 10 mg Tab Take 1 tablet (10 mg total) by mouth once daily.    pravastatin (PRAVACHOL) 80 MG tablet Take 1 tablet (80 mg total) by mouth every evening.    ranolazine (RANEXA) 500 MG Tb12 Take 1 tablet (500 mg total) by mouth 2 (two) times daily.    sildenafiL (VIAGRA) 50 MG tablet Take 1 tablet (50 mg total) by mouth daily as needed for Erectile Dysfunction.    traMADoL (ULTRAM) 50 mg tablet Take 1 tablet (50 mg total) by mouth every 6 (six) hours as needed.    traZODone (DESYREL) 50 MG tablet TAKE 1 TABLET EVERY EVENING    empagliflozin (JARDIANCE) 10 mg tablet Take 1 tablet (10 mg total) by mouth once daily. (Patient not taking: Reported on 7/18/2024)    iron-vitamin C 100-250 mg, ICAR-C, 100-250 mg Tab Take 1 tablet by mouth once daily. (Patient not taking: Reported on 7/18/2024)    metoprolol succinate (TOPROL-XL) 50 MG 24 hr tablet Take 2 tablets by mouth in the morning and 1 tablet by mouth in the evening    nicotine (NICODERM CQ) 21 mg/24 hr Place 1 patch onto the skin once daily. (Patient not taking: Reported on 7/18/2024)     No current facility-administered medications for this visit.     Current Outpatient Medications on File Prior to Visit   Medication Sig    aspirin (ECOTRIN) 81 MG EC tablet Take 1 tablet (81 mg total) by mouth once daily.    BLOOD PRESSURE CUFF Misc 1 Device by Misc.(Non-Drug; Combo Route) route once daily.    blood sugar diagnostic Strp Once daily glucose testing.    blood-glucose meter Misc Use as directed.    fluticasone propionate (FLONASE) 50 mcg/actuation nasal spray 2 sprays (100 mcg total) by Each Nostril route once daily.    gabapentin (NEURONTIN) 300 MG capsule Take 1 capsule (300 mg total) by mouth 3 (three) times daily.    glipiZIDE (GLUCOTROL) 5 MG tablet Take 1 tablet (5 mg total) by mouth daily with breakfast.    lancets (LANCETS,THIN) Misc Once daily glucose testing.    lisinopriL " "(PRINIVIL,ZESTRIL) 40 MG tablet Take 1 tablet (40 mg total) by mouth once daily.    metFORMIN (GLUCOPHAGE) 1000 MG tablet Take 1 tablet (1,000 mg total) by mouth 2 (two) times daily with meals.    metoprolol succinate (TOPROL-XL) 50 MG 24 hr tablet Take 2 tablets in the morning and 1 in the evening, total 150mg daily    ondansetron (ZOFRAN-ODT) 4 MG TbDL Take 2 tablets (8 mg total) by mouth every 8 (eight) hours as needed (nausea vomiting).    pen needle, diabetic (PEN NEEDLE) 31 gauge x 5/16" Ndle Use once daily with insulin injection.    prasugreL (EFFIENT) 10 mg Tab Take 1 tablet (10 mg total) by mouth once daily.    pravastatin (PRAVACHOL) 80 MG tablet Take 1 tablet (80 mg total) by mouth every evening.    ranolazine (RANEXA) 500 MG Tb12 Take 1 tablet (500 mg total) by mouth 2 (two) times daily.    sildenafiL (VIAGRA) 50 MG tablet Take 1 tablet (50 mg total) by mouth daily as needed for Erectile Dysfunction.    traMADoL (ULTRAM) 50 mg tablet Take 1 tablet (50 mg total) by mouth every 6 (six) hours as needed.    traZODone (DESYREL) 50 MG tablet TAKE 1 TABLET EVERY EVENING    empagliflozin (JARDIANCE) 10 mg tablet Take 1 tablet (10 mg total) by mouth once daily. (Patient not taking: Reported on 7/18/2024)    iron-vitamin C 100-250 mg, ICAR-C, 100-250 mg Tab Take 1 tablet by mouth once daily. (Patient not taking: Reported on 7/18/2024)    metoprolol succinate (TOPROL-XL) 50 MG 24 hr tablet Take 2 tablets by mouth in the morning and 1 tablet by mouth in the evening    nicotine (NICODERM CQ) 21 mg/24 hr Place 1 patch onto the skin once daily. (Patient not taking: Reported on 7/18/2024)     No current facility-administered medications on file prior to visit.     Review of patient's allergies indicates:  No Known Allergies   Review of Systems   Constitutional: Negative for malaise/fatigue.   Eyes:  Negative for blurred vision.   Cardiovascular:  Negative for chest pain, claudication, cyanosis, dyspnea on exertion, " irregular heartbeat, leg swelling, near-syncope, orthopnea, palpitations and paroxysmal nocturnal dyspnea.   Respiratory:  Negative for cough, hemoptysis and shortness of breath.    Hematologic/Lymphatic: Negative for bleeding problem. Does not bruise/bleed easily.   Skin:  Negative for dry skin and itching.   Musculoskeletal:  Negative for falls, muscle weakness and myalgias.   Gastrointestinal:  Negative for abdominal pain, diarrhea, heartburn, hematemesis, hematochezia and melena.   Genitourinary:  Negative for flank pain and hematuria.   Neurological:  Negative for dizziness, focal weakness, headaches, light-headedness, numbness, paresthesias, seizures and weakness.   Psychiatric/Behavioral:  Negative for altered mental status and memory loss. The patient is not nervous/anxious.    Allergic/Immunologic: Negative for hives.       Objective:   Physical Exam  Vitals and nursing note reviewed.   Constitutional:       General: He is not in acute distress.     Appearance: He is well-developed. He is obese. He is not diaphoretic.   HENT:      Head: Normocephalic and atraumatic.   Eyes:      General:         Right eye: No discharge.         Left eye: No discharge.      Pupils: Pupils are equal, round, and reactive to light.   Neck:      Thyroid: No thyromegaly.      Vascular: No JVD.   Cardiovascular:      Rate and Rhythm: Normal rate and regular rhythm.      Pulses: Normal pulses and intact distal pulses.      Heart sounds: Normal heart sounds. No murmur heard.     No friction rub. No gallop.   Pulmonary:      Effort: Pulmonary effort is normal. No respiratory distress.      Breath sounds: Normal breath sounds. No wheezing or rales.   Chest:      Chest wall: No tenderness.   Abdominal:      General: Bowel sounds are normal. There is no distension.      Palpations: Abdomen is soft.      Tenderness: There is no abdominal tenderness.   Musculoskeletal:         General: Normal range of motion.      Cervical back: Neck  "supple.      Right lower leg: No edema.      Left lower leg: No edema.   Skin:     General: Skin is warm and dry.      Findings: No erythema or rash.   Neurological:      Mental Status: He is alert and oriented to person, place, and time.      Cranial Nerves: No cranial nerve deficit.   Psychiatric:         Behavior: Behavior normal.       Vitals:    07/18/24 1624 07/18/24 1627   BP: 120/82 126/78   BP Location: Left arm Right arm   Patient Position: Sitting    BP Method: Large (Manual)    Pulse: 65    SpO2: 98%    Weight: 106.4 kg (234 lb 7.4 oz)    Height: 6' 2" (1.88 m)      Lab Results   Component Value Date    CHOL 115 (L) 07/18/2024    CHOL 111 (L) 09/13/2023    CHOL 167 06/20/2023      Body mass index is 30.1 kg/m².   Lab Results   Component Value Date    HGBA1C 6.0 (H) 07/18/2024      BMP  Lab Results   Component Value Date     07/18/2024    K 4.3 07/18/2024     07/18/2024    CO2 25 07/18/2024    BUN 12 07/18/2024    CREATININE 1.1 07/18/2024    CALCIUM 9.2 07/18/2024    ANIONGAP 7 (L) 07/18/2024    EGFRNORACEVR >60.0 07/18/2024      Lab Results   Component Value Date    HDL 33 (L) 07/18/2024    HDL 30 (L) 09/13/2023    HDL 34 (L) 06/20/2023     Lab Results   Component Value Date    LDLCALC 72.6 07/18/2024    LDLCALC 56.8 (L) 09/13/2023    LDLCALC 93.4 06/20/2023     Lab Results   Component Value Date    TRIG 47 07/18/2024    TRIG 121 09/13/2023    TRIG 198 (H) 06/20/2023     Lab Results   Component Value Date    CHOLHDL 28.7 07/18/2024    CHOLHDL 27.0 09/13/2023    CHOLHDL 20.4 06/20/2023       Chemistry        Component Value Date/Time     07/18/2024 0909    K 4.3 07/18/2024 0909     07/18/2024 0909    CO2 25 07/18/2024 0909    BUN 12 07/18/2024 0909    CREATININE 1.1 07/18/2024 0909     (H) 07/18/2024 0909        Component Value Date/Time    CALCIUM 9.2 07/18/2024 0909    ALKPHOS 60 07/18/2024 0909    AST 24 07/18/2024 0909    ALT 16 07/18/2024 0909    BILITOT 0.4 07/18/2024 " 0909    ESTGFRAFRICA >60 06/21/2022 2017    EGFRNONAA >60 06/21/2022 2017          Lab Results   Component Value Date    TSH 10.341 (H) 11/23/2023     Lab Results   Component Value Date    INR 1.0 04/20/2023    INR 1.0 04/11/2023    INR 0.9 01/06/2020     Lab Results   Component Value Date    WBC 9.67 05/26/2024    HGB 9.0 (L) 05/26/2024    HCT 30.5 (L) 05/26/2024    MCV 72 (L) 05/26/2024     05/26/2024     BMP  Sodium   Date Value Ref Range Status   07/18/2024 139 136 - 145 mmol/L Final     Potassium   Date Value Ref Range Status   07/18/2024 4.3 3.5 - 5.1 mmol/L Final     Chloride   Date Value Ref Range Status   07/18/2024 107 95 - 110 mmol/L Final     CO2   Date Value Ref Range Status   07/18/2024 25 23 - 29 mmol/L Final     BUN   Date Value Ref Range Status   07/18/2024 12 6 - 20 mg/dL Final     Creatinine   Date Value Ref Range Status   07/18/2024 1.1 0.5 - 1.4 mg/dL Final     Calcium   Date Value Ref Range Status   07/18/2024 9.2 8.7 - 10.5 mg/dL Final     Anion Gap   Date Value Ref Range Status   07/18/2024 7 (L) 8 - 16 mmol/L Final     eGFR if    Date Value Ref Range Status   06/21/2022 >60 >60 mL/min/1.73 m^2 Final     eGFR if non    Date Value Ref Range Status   06/21/2022 >60 >60 mL/min/1.73 m^2 Final     Comment:     Calculation used to obtain the estimated glomerular filtration  rate (eGFR) is the CKD-EPI equation.        Estimated Creatinine Clearance: 101 mL/min (based on SCr of 1.1 mg/dL).    Assessment:     1. Coronary artery disease of native artery of native heart with stable angina pectoris    2. Hyperlipidemia LDL goal <70    3. Hypertension goal BP (blood pressure) < 130/80    4. Type 2 diabetes mellitus with diabetic neuropathy, with long-term current use of insulin    5. Tobacco use disorder    6. Controlled type 2 diabetes mellitus with microalbuminuria, without long-term current use of insulin    7. Leg DVT (deep venous thromboembolism), acute, left     8. MARIYA (obstructive sleep apnea)    9. Alcohol abuse    Cad s/p stent placement asymptomatic good exercise tolerance continue the same. Rf modification emphasized.   Htn controlled low salt diet emphasized continue same meds.    Hlp on target continue same meds discussed meds and diet compliance.  Diabetes controlled A1c remarkably improved continue the same diet compliance to be addressed.  Etoh and smoking cessation reemphasized and discussed.    Plan:     Continue current therapy  Cardiac low salt diet.  Risk factor modification and excercise program.  Smoking cessation counseling  F/u in 6 months with lipid cmp a1c

## 2024-07-19 NOTE — TELEPHONE ENCOUNTER
Please see the attached refill request.   Tawanda Bailey  [facility]  [address]  [place] [address]  Phone: [phone]  Fax: [phone]  Established Patient  Follow Up Time:

## 2024-07-25 ENCOUNTER — TELEPHONE (OUTPATIENT)
Dept: INTERNAL MEDICINE | Facility: CLINIC | Age: 54
End: 2024-07-25
Payer: COMMERCIAL

## 2024-07-25 DIAGNOSIS — R80.9 MICROALBUMINURIA: Primary | ICD-10-CM

## 2024-08-14 DIAGNOSIS — I25.10 CORONARY ARTERY DISEASE, UNSPECIFIED VESSEL OR LESION TYPE, UNSPECIFIED WHETHER ANGINA PRESENT, UNSPECIFIED WHETHER NATIVE OR TRANSPLANTED HEART: ICD-10-CM

## 2024-08-14 RX ORDER — METOPROLOL SUCCINATE 50 MG/1
TABLET, EXTENDED RELEASE ORAL
Qty: 120 TABLET | Refills: 3 | Status: SHIPPED | OUTPATIENT
Start: 2024-08-14

## 2024-08-25 DIAGNOSIS — G47.00 INSOMNIA, UNSPECIFIED TYPE: ICD-10-CM

## 2024-08-25 NOTE — TELEPHONE ENCOUNTER
No care due was identified.  [facility] Embedded Care Due Messages. Reference number: [id number].   8/25/2024 2:42:29 PM CDT

## 2024-08-26 RX ORDER — TRAZODONE HYDROCHLORIDE 50 MG/1
50 TABLET ORAL NIGHTLY
Qty: 90 TABLET | Refills: 1 | Status: SHIPPED | OUTPATIENT
Start: 2024-08-26

## 2024-08-26 NOTE — TELEPHONE ENCOUNTER
Refill Decision Note   Laron Kothari  is requesting a refill authorization.  Brief Assessment and Rationale for Refill:  Approve     Medication Therapy Plan:  PT had an ED visit for chest pain/ 5/24; no change to pended med and disease state      Comments:     Note composed:12:12 PM 08/26/2024

## 2024-09-19 ENCOUNTER — ON-DEMAND VIRTUAL (OUTPATIENT)
Dept: URGENT CARE | Facility: CLINIC | Age: 54
End: 2024-09-19
Payer: COMMERCIAL

## 2024-09-19 DIAGNOSIS — J06.9 UPPER RESPIRATORY TRACT INFECTION, UNSPECIFIED TYPE: Primary | ICD-10-CM

## 2024-09-19 RX ORDER — BENZONATATE 100 MG/1
100 CAPSULE ORAL 3 TIMES DAILY PRN
Qty: 60 CAPSULE | Refills: 0 | Status: SHIPPED | OUTPATIENT
Start: 2024-09-19 | End: 2024-10-09

## 2024-09-19 NOTE — PROGRESS NOTES
Subjective:      Patient ID: Laron Kothari Jr. is a 54 y.o. male.    Vitals:  vitals were not taken for this visit.     Chief Complaint: URI      Visit Type: TELE AUDIOVISUAL    Present with the patient at the time of consultation: TELEMED PRESENT WITH PATIENT: None, at home    Past Medical History:   Diagnosis Date    Blood in stool 08/07/2018    Deep vein thrombosis (DVT) 2017    Left leg    Diabetes mellitus, type 2 2013    Gout     Hyperlipidemia     Hypertension     Neuropathy     Smoker      Past Surgical History:   Procedure Laterality Date    APPENDECTOMY      ARTHROSCOPIC CHONDROPLASTY OF KNEE JOINT Right 11/24/2020    Procedure: ARTHROSCOPY, KNEE, WITH CHONDROPLASTY;  Surgeon: Nahum Rose MD;  Location: [facility];  Service: Orthopedics;  Laterality: Right;  chondroplasty medial condyle and anteriorly    COLONOSCOPY N/A 8/7/2018    Procedure: COLONOSCOPY;  Surgeon: Ten Valentine MD;  Location: [facility] ENDO;  Service: Endoscopy;  Laterality: N/A;    COLONOSCOPY N/A 2/23/2022    Procedure: COLONOSCOPY;  Surgeon: Octavio Craig MD;  Location: [facility] ENDO;  Service: Endoscopy;  Laterality: N/A;    KNEE ARTHROSCOPY W/ MENISCECTOMY Right 11/24/2020    Procedure: ARTHROSCOPY, KNEE, WITH MENISCECTOMY;  Surgeon: Nahum Rose MD;  Location: [facility] OR;  Service: Orthopedics;  Laterality: Right;  Partial medial and lateral meniscectomy    LEFT HEART CATHETERIZATION Left 4/11/2023    Procedure: Left heart cath;  Surgeon: Sue Lara MD;  Location: [facility] CATH LAB;  Service: Cardiology;  Laterality: Left;    PERCUTANEOUS TRANSLUMINAL BALLOON ANGIOPLASTY OF CORONARY ARTERY  4/11/2023    Procedure: Angioplasty-coronary;  Surgeon: Sue Lara MD;  Location: [facility] CATH LAB;  Service: Cardiology;;    ROBOT-ASSISTED CHOLECYSTECTOMY USING DA TRIPP XI N/A 1/21/2020    Procedure: XI ROBOTIC CHOLECYSTECTOMY;  Surgeon: Sebastien Rivera MD;  Location: [facility] OR;  Service: General;  Laterality: N/A;    STENT, DRUG  ELUTING, SINGLE VESSEL, CORONARY  4/11/2023    Procedure: Stent, Drug Eluting, Single Vessel, Coronary;  Surgeon: Sue Lara MD;  Location: [facility] CATH LAB;  Service: Cardiology;;    TONSILLECTOMY, ADENOIDECTOMY       Review of patient's allergies indicates:  No Known Allergies  Current Outpatient Medications on File Prior to Visit   Medication Sig Dispense Refill    aspirin (ECOTRIN) 81 MG EC tablet Take 1 tablet (81 mg total) by mouth once daily. 30 tablet 11    BLOOD PRESSURE CUFF Misc 1 Device by Misc.(Non-Drug; Combo Route) route once daily.  0    blood sugar diagnostic Strp Once daily glucose testing. 50 strip 6    blood-glucose meter Misc Use as directed. 1 each 0    empagliflozin (JARDIANCE) 10 mg tablet Take 1 tablet (10 mg total) by mouth once daily. (Patient not taking: Reported on 7/18/2024) 30 tablet 5    fluticasone propionate (FLONASE) 50 mcg/actuation nasal spray 2 sprays (100 mcg total) by Each Nostril route once daily. 16 g 0    gabapentin (NEURONTIN) 300 MG capsule Take 1 capsule (300 mg total) by mouth 3 (three) times daily. 90 capsule 11    glipiZIDE (GLUCOTROL) 5 MG tablet Take 1 tablet (5 mg total) by mouth daily with breakfast. 90 tablet 2    iron-vitamin C 100-250 mg, ICAR-C, 100-250 mg Tab Take 1 tablet by mouth once daily. (Patient not taking: Reported on 7/18/2024) 30 tablet 11    lancets (LANCETS,THIN) Misc Once daily glucose testing. 50 each 6    lisinopriL (PRINIVIL,ZESTRIL) 40 MG tablet Take 1 tablet (40 mg total) by mouth once daily. 90 tablet 1    metFORMIN (GLUCOPHAGE) 1000 MG tablet Take 1 tablet (1,000 mg total) by mouth 2 (two) times daily with meals. 180 tablet 2    metoprolol succinate (TOPROL-XL) 50 MG 24 hr tablet Take 2 tablets by mouth in the morning and 1 tablet by mouth in the evening 90 tablet 3    metoprolol succinate (TOPROL-XL) 50 MG 24 hr tablet Take 2 tablets in the morning and 1 in the evening, total 150mg daily 120 tablet 3    nicotine (NICODERM CQ) 21 mg/24  "hr Place 1 patch onto the skin once daily. (Patient not taking: Reported on 7/18/2024) 28 patch 0    ondansetron (ZOFRAN-ODT) 4 MG TbDL Take 2 tablets (8 mg total) by mouth every 8 (eight) hours as needed (nausea vomiting). 12 tablet 0    pen needle, diabetic (PEN NEEDLE) 31 gauge x 5/16" Ndle Use once daily with insulin injection. 50 each 3    prasugreL (EFFIENT) 10 mg Tab Take 1 tablet (10 mg total) by mouth once daily. 90 tablet 3    pravastatin (PRAVACHOL) 80 MG tablet Take 1 tablet (80 mg total) by mouth every evening. 90 tablet 3    ranolazine (RANEXA) 500 MG Tb12 Take 1 tablet (500 mg total) by mouth 2 (two) times daily. 180 tablet 3    sildenafiL (VIAGRA) 50 MG tablet Take 1 tablet (50 mg total) by mouth daily as needed for Erectile Dysfunction. 30 tablet 11    traMADoL (ULTRAM) 50 mg tablet Take 1 tablet (50 mg total) by mouth every 6 (six) hours as needed. 12 tablet 0    traZODone (DESYREL) 50 MG tablet Take 1 tablet (50 mg total) by mouth every evening. 90 tablet 1     No current facility-administered medications on file prior to visit.     Family History   Problem Relation Name Age of Onset    Diabetes Father      Prostate cancer Father      Cataracts Father      Hypertension Mother      Hypertension Brother         Medications Ordered                [facility] [address] [place] [id number]    Telephone: [phone]   Fax: [phone]   Hours: Not open 24 hours                         E-Prescribed (1 of 1)              benzonatate (TESSALON) 100 MG capsule    Sig: Take 1 capsule (100 mg total) by mouth 3 (three) times daily as needed for Cough. May take 1-2 caps 3 times daily as needed.       Start: 9/19/24     Quantity: 60 capsule Refills: 0                           [facility] Peq Odvv Intake    9/19/2024 10:36 AM CDT - Filed by Patient   What is your current physical address in the event of a medical emergency? [address] [place] [address].   Are you able to take your vital " signs? No   Please attach any relevant images or files          URI symptoms for 3 days. + sick contacts at home. No testing done. Taking OTC meds with little relief.    URI   Associated symptoms include congestion, coughing, headaches, nausea and a sore throat. Pertinent negatives include no diarrhea, ear pain, vomiting or wheezing.       Constitution: Positive for chills. Negative for fever.   HENT:  Positive for congestion and sore throat. Negative for ear pain.    Respiratory:  Positive for cough and sputum production. Negative for shortness of breath and wheezing.    Gastrointestinal:  Positive for nausea. Negative for vomiting and diarrhea.   Musculoskeletal:  Positive for muscle ache.   Neurological:  Positive for headaches.        Objective:   The physical exam was conducted virtually.  Physical Exam   Constitutional: He is oriented to person, place, and time. He does not appear ill. No distress.   HENT:   Head: Normocephalic and atraumatic.   Nose: Nose normal.   Eyes: Extraocular movement intact   Pulmonary/Chest: Effort normal.   Abdominal: Normal appearance.   Musculoskeletal: Normal range of motion.         General: Normal range of motion.   Neurological: no focal deficit. He is alert and oriented to person, place, and time.   Psychiatric: His behavior is normal. Mood normal.   Vitals reviewed.      Assessment:     1. Upper respiratory tract infection, unspecified type        Plan:   Further testing recommended.    Patient encouraged to monitor symptoms closely and instructed to follow-up for new or worsening symptoms. Further, in-person, evaluation may be necessary for continued treatment. Please follow up with your primary care doctor or specialist as needed. Verbally discussed plan. Patient confirms understanding and is in agreement with treatment and plan.     You must understand that you've received a [facility] evaluation only and that you may be released before all your medical problems are  known or treated. You, the patient, will arrange for follow up care as instructed.      Upper respiratory tract infection, unspecified type  -     benzonatate (TESSALON) 100 MG capsule; Take 1 capsule (100 mg total) by mouth 3 (three) times daily as needed for Cough. May take 1-2 caps 3 times daily as needed.  Dispense: 60 capsule; Refill: 0        Patient Instructions   OVER THE COUNTER RECOMMENDATIONS/SUGGESTIONS (IF NO CONTRAINDICATIONS).     ·         Make sure to stay well hydrated.     ·         Use Nasal Saline to mechanically move any post nasal drip from your eustachian tube or from the back of your throat.     ·         Use warm saltwater gargles to ease your throat pain. Warm saltwater gargles as needed for sore throat-  1/2 tsp salt to 1 cup warm water, gargle as desired. Warm fluids tend to relieve a sore throat.     .         Throat lozenges, Chloraseptic spray or other over the counter treatments are ok to use as well. Use as directed.     ·         Use an antihistamine such as Claritin, Zyrtec or Allegra to dry you out.     ·         Use pseudoephedrine (behind the counter) to decongest. Pseudoephedrine  30 mg up to 240 mg /day. It can raise your blood pressure and give you palpitations.     ·         Use Mucinex (guaifenesin) to break up mucous up to 2400mg/day to loosen any mucous.     ·         The Mucinex DM pill has a cough suppressant that can be sedating. It can be used at night to stop the tickle at the back of your throat.     ·         You can use Mucinex D (it has guaifenesin and a high dose of pseudoephedrine) in the mornings to help decongest.     ·         Use Afrin (oxymetazoline) in each nare for no longer than 3 days, as it is addictive. It can also dry out your mucous membranes and cause elevated blood pressure. This is especially useful if you are flying.     ·         Use Flonase 1-2 sprays/nostril per day. It is a local acting steroid nasal spray, if you develop a bloody nose,  stop using the medication immediately.     ·         Sometimes Nyquil at night is beneficial to help you get some rest, however it is sedating, and it does have an antihistamine, and Tylenol.     ·         Honey is a natural cough suppressant that can be used.     ·         Tylenol up to 4,000 mg a day is safe for short periods and can be used for body aches, pain, and fever. However, in high doses and prolonged use it can cause liver irritation.     ·         Ibuprofen is a non-steroidal anti-inflammatory that can be used for body aches, pain, and fever. However, it can also cause stomach irritation if overused.

## 2024-09-24 ENCOUNTER — PATIENT MESSAGE (OUTPATIENT)
Dept: INTERNAL MEDICINE | Facility: CLINIC | Age: 54
End: 2024-09-24
Payer: COMMERCIAL

## 2024-12-05 DIAGNOSIS — I10 HYPERTENSION GOAL BP (BLOOD PRESSURE) < 130/80: Chronic | ICD-10-CM

## 2024-12-05 DIAGNOSIS — R80.9 CONTROLLED TYPE 2 DIABETES MELLITUS WITH MICROALBUMINURIA, WITH LONG-TERM CURRENT USE OF INSULIN: ICD-10-CM

## 2024-12-05 DIAGNOSIS — E11.29 CONTROLLED TYPE 2 DIABETES MELLITUS WITH MICROALBUMINURIA, WITH LONG-TERM CURRENT USE OF INSULIN: ICD-10-CM

## 2024-12-05 DIAGNOSIS — Z79.4 CONTROLLED TYPE 2 DIABETES MELLITUS WITH MICROALBUMINURIA, WITH LONG-TERM CURRENT USE OF INSULIN: ICD-10-CM

## 2024-12-05 RX ORDER — LISINOPRIL 40 MG/1
40 TABLET ORAL DAILY
Qty: 90 TABLET | Refills: 0 | Status: SHIPPED | OUTPATIENT
Start: 2024-12-05

## 2024-12-05 NOTE — TELEPHONE ENCOUNTER
Care Due:                  Date            Visit Type   Department     Provider  --------------------------------------------------------------------------------                                EP -                              PRIMARY      [facility] INTER[facility]  Last Visit: 02-      CARE ([facility])   HEMA Adler                              EP -                              PRIMARY      [facility] INTERNAL  Next Visit: 01-      [facility] ([facility])   HEMA Adler                                                            Last  Test          Frequency    Reason                     Performed    Due Date  --------------------------------------------------------------------------------    HBA1C.......  6 months...  empagliflozin, glipiZIDE,   07- 01-                             metFORMIN................    [employer] Embedded Care Due Messages. Reference number: [id number].   12/05/2024 6:44:57 AM CST

## 2024-12-05 NOTE — TELEPHONE ENCOUNTER
Refill Decision Note   Laron Kothari  is requesting a refill authorization.  Brief Assessment and Rationale for Refill:  Approve     Medication Therapy Plan:  Chest pain, ED (Discharge) - appt 1/30/25    Medication Reconciliation Completed: No   Comments:     No Care Gaps recommended.     Note composed:11:34 AM 12/05/2024

## 2025-01-13 ENCOUNTER — OFFICE VISIT (OUTPATIENT)
Dept: CARDIOLOGY | Facility: CLINIC | Age: 55
End: 2025-01-13
Payer: COMMERCIAL

## 2025-01-13 VITALS
WEIGHT: 236.69 LBS | SYSTOLIC BLOOD PRESSURE: 130 MMHG | DIASTOLIC BLOOD PRESSURE: 70 MMHG | OXYGEN SATURATION: 100 % | BODY MASS INDEX: 30.39 KG/M2 | HEART RATE: 63 BPM

## 2025-01-13 DIAGNOSIS — I10 HYPERTENSION GOAL BP (BLOOD PRESSURE) < 130/80: Chronic | ICD-10-CM

## 2025-01-13 DIAGNOSIS — G47.33 OSA (OBSTRUCTIVE SLEEP APNEA): ICD-10-CM

## 2025-01-13 DIAGNOSIS — E11.29 CONTROLLED TYPE 2 DIABETES MELLITUS WITH MICROALBUMINURIA, WITHOUT LONG-TERM CURRENT USE OF INSULIN: Primary | ICD-10-CM

## 2025-01-13 DIAGNOSIS — E11.40 TYPE 2 DIABETES MELLITUS WITH DIABETIC NEUROPATHY, WITH LONG-TERM CURRENT USE OF INSULIN: Chronic | ICD-10-CM

## 2025-01-13 DIAGNOSIS — E78.5 HYPERLIPIDEMIA LDL GOAL <70: Chronic | ICD-10-CM

## 2025-01-13 DIAGNOSIS — F10.10 ALCOHOL ABUSE: ICD-10-CM

## 2025-01-13 DIAGNOSIS — R80.9 CONTROLLED TYPE 2 DIABETES MELLITUS WITH MICROALBUMINURIA, WITHOUT LONG-TERM CURRENT USE OF INSULIN: Primary | ICD-10-CM

## 2025-01-13 DIAGNOSIS — I82.402 LEG DVT (DEEP VENOUS THROMBOEMBOLISM), ACUTE, LEFT: ICD-10-CM

## 2025-01-13 DIAGNOSIS — Z79.4 TYPE 2 DIABETES MELLITUS WITH DIABETIC NEUROPATHY, WITH LONG-TERM CURRENT USE OF INSULIN: Chronic | ICD-10-CM

## 2025-01-13 DIAGNOSIS — F17.200 TOBACCO USE DISORDER: ICD-10-CM

## 2025-01-13 DIAGNOSIS — I25.118 CORONARY ARTERY DISEASE OF NATIVE ARTERY OF NATIVE HEART WITH STABLE ANGINA PECTORIS: ICD-10-CM

## 2025-01-13 PROCEDURE — 3075F SYST BP GE 130 - 139MM HG: CPT | Mod: CPTII,S$GLB,, | Performed by: INTERNAL MEDICINE

## 2025-01-13 PROCEDURE — 3008F BODY MASS INDEX DOCD: CPT | Mod: CPTII,S$GLB,, | Performed by: INTERNAL MEDICINE

## 2025-01-13 PROCEDURE — 3078F DIAST BP <80 MM HG: CPT | Mod: CPTII,S$GLB,, | Performed by: INTERNAL MEDICINE

## 2025-01-13 PROCEDURE — 1159F MED LIST DOCD IN RCRD: CPT | Mod: CPTII,S$GLB,, | Performed by: INTERNAL MEDICINE

## 2025-01-13 PROCEDURE — 99214 OFFICE O/P EST MOD 30 MIN: CPT | Mod: S$GLB,,, | Performed by: INTERNAL MEDICINE

## 2025-01-13 PROCEDURE — 99999 PR PBB SHADOW E&M-EST. PATIENT-LVL V: CPT | Mod: PBBFAC,,, | Performed by: INTERNAL MEDICINE

## 2025-01-13 PROCEDURE — 1160F RVW MEDS BY RX/DR IN RCRD: CPT | Mod: CPTII,S$GLB,, | Performed by: INTERNAL MEDICINE

## 2025-01-13 PROCEDURE — 4010F ACE/ARB THERAPY RXD/TAKEN: CPT | Mod: CPTII,S$GLB,, | Performed by: INTERNAL MEDICINE

## 2025-01-13 NOTE — PROGRESS NOTES
Subjective:   Patient ID:  Laron Kothari Jr. is a 54 y.o. male who presents for follow up of Follow-up      HPI  Mr. Kothari is a 52 year old male patient whose current medical conditions include CAD s/p [facility] PCI x2 to RCA 4/23' by Dr. Lara, DVT in 2017, DM type II, HTN, hyperlipidemia, gout, neuropathy, and tobacco abuse who presents to clinic for palpitations. His wife reports he is not sleeping well     [facility] 4/11/23 dist LAD 80%, dist cx 90%, mid % with successful PCI  Echo with normal EF     5/12/23  Here today for 2 week follow up for palpitations post STEMI w/ PCI x2 RCA. Still complaining of fatigue, SOB and occasional palpitations. 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       [facility] 4/28/23 ST, PVC     7/19/23  Here today, for follow up. Reports improvement in fatigue, denies any CP with ADLs. Did not take his medication this morning yet, BP elevated in clinic. Overall doing well CV wise. 3 week in to cardiac rehab, tolerating well. He does reports trouble sleeping and some discomfort on his left side when laying down     9/15/23  Here today for CV follow up. Doing well in [facility] rehab, has 6 sessions left. Back at work has some fatigue but improving. Reports LE edema and alejandra leg pain worsening in the last few weeks. Labs reviewed from last week BNP neg. Denies any CP, SOB at this time. Down to 6 cigarettes per day now. Weight stable     1/11/2024  Has been complaining  of bilateral leg pain the whole leg feels weak. From the buttock down. Has no nocturnal pain. Has no numbness or tingling. He feels legs tender he is feeling it after the first delivery of the day. It is worse as the day progresses. No swelling.   No chest pain has occasional shortness of breath.continues to smoke     7/18/2024   Back working no issues had an episode of chest pain he was hypertensive . He is now well controlled no evidence of mi. He is back to work  compliant with meds diet . His A1c is 6 down from 7.5 it is higher than the one at job physical he needs diet compliance     1/13/2025  Her for f/u has no new complaints. He is trying to be compliant with diet . Denies any cardiac symptoms. No angina . He can use better compliance had headaches last weekl He exercises twice weekly.weight unchanged    Past Medical History:   Diagnosis Date    Blood in stool 08/07/2018    Deep vein thrombosis (DVT) 2017    Left leg    Diabetes mellitus, type 2 2013    Gout     Hyperlipidemia     Hypertension     Neuropathy     Smoker        Past Surgical History:   Procedure Laterality Date    APPENDECTOMY      ARTHROSCOPIC CHONDROPLASTY OF KNEE JOINT Right 11/24/2020    Procedure: ARTHROSCOPY, KNEE, WITH CHONDROPLASTY;  Surgeon: Nahum Rose MD;  Location: [facility] OR;  Service: Orthopedics;  Laterality: Right;  chondroplasty medial condyle and anteriorly    COLONOSCOPY N/A 8/7/2018    Procedure: COLONOSCOPY;  Surgeon: Ten Valentine MD;  Location: [facility] ENDO;  Service: Endoscopy;  Laterality: N/A;    COLONOSCOPY N/A 2/23/2022    Procedure: COLONOSCOPY;  Surgeon: Octavio Craig MD;  Location: [facility] ENDO;  Service: Endoscopy;  Laterality: N/A;    KNEE ARTHROSCOPY W/ MENISCECTOMY Right 11/24/2020    Procedure: ARTHROSCOPY, KNEE, WITH MENISCECTOMY;  Surgeon: Nahum Rose MD;  Location: [facility] OR;  Service: Orthopedics;  Laterality: Right;  Partial medial and lateral meniscectomy    LEFT HEART CATHETERIZATION Left 4/11/2023    Procedure: Left heart cath;  Surgeon: Sue Lara MD;  Location: [facility] CATH LAB;  Service: Cardiology;  Laterality: Left;    PERCUTANEOUS TRANSLUMINAL BALLOON ANGIOPLASTY OF CORONARY ARTERY  4/11/2023    Procedure: Angioplasty-coronary;  Surgeon: Sue Lara MD;  Location: [facility] CATH LAB;  Service: Cardiology;;    ROBOT-ASSISTED CHOLECYSTECTOMY USING DA TRIPP XI N/A 1/21/2020    Procedure: XI ROBOTIC CHOLECYSTECTOMY;  Surgeon: Sebastien Rivera  MD;  Location: [facility] OR;  Service: General;  Laterality: N/A;    STENT, DRUG ELUTING, SINGLE VESSEL, CORONARY  4/11/2023    Procedure: Stent, Drug Eluting, Single Vessel, Coronary;  Surgeon: Sue Lara MD;  Location: [facility] CATH LAB;  Service: Cardiology;;    TONSILLECTOMY, ADENOIDECTOMY         Social History     Tobacco Use    Smoking status: Every Day     Current packs/day: 1.50     Average packs/day: 1.5 packs/day for 37.4 years (56.1 ttl pk-yrs)     Types: Cigarettes     Start date: 2003    Smokeless tobacco: Never    Tobacco comments:     Smoked 1.5-2 packs per day until April 2023, now smoking 10 cpd   Substance Use Topics    Alcohol use: Yes     Alcohol/week: 26.0 standard drinks of alcohol     Types: 26 Cans of beer per week     Comment: daily    Drug use: No       Family History   Problem Relation Name Age of Onset    Diabetes Father      Prostate cancer Father      Cataracts Father      Hypertension Mother      Hypertension Brother         Current Outpatient Medications   Medication Sig    aspirin (ECOTRIN) 81 MG EC tablet Take 1 tablet (81 mg total) by mouth once daily.    gabapentin (NEURONTIN) 300 MG capsule Take 1 capsule (300 mg total) by mouth 3 (three) times daily.    glipiZIDE (GLUCOTROL) 5 MG tablet Take 1 tablet (5 mg total) by mouth daily with breakfast.    lisinopriL (PRINIVIL,ZESTRIL) 40 MG tablet Take 1 tablet (40 mg total) by mouth once daily.    metFORMIN (GLUCOPHAGE) 1000 MG tablet Take 1 tablet (1,000 mg total) by mouth 2 (two) times daily with meals.    metoprolol succinate (TOPROL-XL) 50 MG 24 hr tablet Take 2 tablets by mouth in the morning and 1 tablet by mouth in the evening    ondansetron (ZOFRAN-ODT) 4 MG TbDL Take 2 tablets (8 mg total) by mouth every 8 (eight) hours as needed (nausea vomiting).    prasugreL (EFFIENT) 10 mg Tab Take 1 tablet (10 mg total) by mouth once daily.    pravastatin (PRAVACHOL) 80 MG tablet Take 1 tablet (80 mg total) by mouth every evening.     "ranolazine (RANEXA) 500 MG Tb12 Take 1 tablet (500 mg total) by mouth 2 (two) times daily.    sildenafiL (VIAGRA) 50 MG tablet Take 1 tablet (50 mg total) by mouth daily as needed for Erectile Dysfunction.    traMADoL (ULTRAM) 50 mg tablet Take 1 tablet (50 mg total) by mouth every 6 (six) hours as needed.    traZODone (DESYREL) 50 MG tablet Take 1 tablet (50 mg total) by mouth every evening.    BLOOD PRESSURE CUFF Misc 1 Device by Misc.(Non-Drug; Combo Route) route once daily.    blood sugar diagnostic Strp Once daily glucose testing. (Patient not taking: Reported on 1/13/2025)    blood-glucose meter Misc Use as directed. (Patient not taking: Reported on 1/13/2025)    empagliflozin (JARDIANCE) 10 mg tablet Take 1 tablet (10 mg total) by mouth once daily. (Patient not taking: Reported on 1/13/2025)    fluticasone propionate (FLONASE) 50 mcg/actuation nasal spray 2 sprays (100 mcg total) by Each Nostril route once daily. (Patient not taking: Reported on 1/13/2025)    lancets (LANCETS,THIN) Misc Once daily glucose testing. (Patient not taking: Reported on 1/13/2025)    metoprolol succinate (TOPROL-XL) 50 MG 24 hr tablet Take 2 tablets in the morning and 1 in the evening, total 150mg daily    nicotine (NICODERM CQ) 21 mg/24 hr Place 1 patch onto the skin once daily.    pen needle, diabetic (PEN NEEDLE) 31 gauge x 5/16" Ndle Use once daily with insulin injection.     No current facility-administered medications for this visit.     Current Outpatient Medications on File Prior to Visit   Medication Sig    aspirin (ECOTRIN) 81 MG EC tablet Take 1 tablet (81 mg total) by mouth once daily.    gabapentin (NEURONTIN) 300 MG capsule Take 1 capsule (300 mg total) by mouth 3 (three) times daily.    glipiZIDE (GLUCOTROL) 5 MG tablet Take 1 tablet (5 mg total) by mouth daily with breakfast.    lisinopriL (PRINIVIL,ZESTRIL) 40 MG tablet Take 1 tablet (40 mg total) by mouth once daily.    metFORMIN (GLUCOPHAGE) 1000 MG tablet Take 1 " "tablet (1,000 mg total) by mouth 2 (two) times daily with meals.    metoprolol succinate (TOPROL-XL) 50 MG 24 hr tablet Take 2 tablets by mouth in the morning and 1 tablet by mouth in the evening    ondansetron (ZOFRAN-ODT) 4 MG TbDL Take 2 tablets (8 mg total) by mouth every 8 (eight) hours as needed (nausea vomiting).    prasugreL (EFFIENT) 10 mg Tab Take 1 tablet (10 mg total) by mouth once daily.    pravastatin (PRAVACHOL) 80 MG tablet Take 1 tablet (80 mg total) by mouth every evening.    ranolazine (RANEXA) 500 MG Tb12 Take 1 tablet (500 mg total) by mouth 2 (two) times daily.    sildenafiL (VIAGRA) 50 MG tablet Take 1 tablet (50 mg total) by mouth daily as needed for Erectile Dysfunction.    traMADoL (ULTRAM) 50 mg tablet Take 1 tablet (50 mg total) by mouth every 6 (six) hours as needed.    traZODone (DESYREL) 50 MG tablet Take 1 tablet (50 mg total) by mouth every evening.    BLOOD PRESSURE CUFF Misc 1 Device by Misc.(Non-Drug; Combo Route) route once daily.    blood sugar diagnostic Strp Once daily glucose testing. (Patient not taking: Reported on 1/13/2025)    blood-glucose meter Misc Use as directed. (Patient not taking: Reported on 1/13/2025)    empagliflozin (JARDIANCE) 10 mg tablet Take 1 tablet (10 mg total) by mouth once daily. (Patient not taking: Reported on 1/13/2025)    fluticasone propionate (FLONASE) 50 mcg/actuation nasal spray 2 sprays (100 mcg total) by Each Nostril route once daily. (Patient not taking: Reported on 1/13/2025)    lancets (LANCETS,THIN) Misc Once daily glucose testing. (Patient not taking: Reported on 1/13/2025)    metoprolol succinate (TOPROL-XL) 50 MG 24 hr tablet Take 2 tablets in the morning and 1 in the evening, total 150mg daily    nicotine (NICODERM CQ) 21 mg/24 hr Place 1 patch onto the skin once daily.    pen needle, diabetic (PEN NEEDLE) 31 gauge x 5/16" Ndle Use once daily with insulin injection.     No current facility-administered medications on file prior to " visit.   Review of patient's allergies indicates:  No Known Allergies     Review of Systems   Constitutional: Negative for malaise/fatigue.   Eyes:  Negative for blurred vision.   Cardiovascular:  Negative for chest pain, claudication, cyanosis, dyspnea on exertion, irregular heartbeat, leg swelling, near-syncope, orthopnea, palpitations and paroxysmal nocturnal dyspnea.   Respiratory:  Negative for cough, hemoptysis and shortness of breath.    Hematologic/Lymphatic: Negative for bleeding problem. Does not bruise/bleed easily.   Skin:  Negative for dry skin and itching.   Musculoskeletal:  Negative for falls, muscle weakness and myalgias.   Gastrointestinal:  Negative for abdominal pain, diarrhea, heartburn, hematemesis, hematochezia and melena.   Genitourinary:  Negative for flank pain and hematuria.   Neurological:  Positive for headaches. Negative for dizziness, focal weakness, light-headedness, numbness, paresthesias, seizures and weakness.   Psychiatric/Behavioral:  Negative for altered mental status and memory loss. The patient is not nervous/anxious.    Allergic/Immunologic: Negative for hives.       Objective:   Physical Exam  Vitals and nursing note reviewed.   Constitutional:       General: He is not in acute distress.     Appearance: He is well-developed. He is not diaphoretic.   HENT:      Head: Normocephalic and atraumatic.   Eyes:      General:         Right eye: No discharge.         Left eye: No discharge.      Pupils: Pupils are equal, round, and reactive to light.   Neck:      Thyroid: No thyromegaly.      Vascular: No JVD.   Cardiovascular:      Rate and Rhythm: Normal rate and regular rhythm.      Pulses: Normal pulses and intact distal pulses.      Heart sounds: Normal heart sounds. No murmur heard.     No friction rub. No gallop.   Pulmonary:      Effort: Pulmonary effort is normal. No respiratory distress.      Breath sounds: Normal breath sounds. No wheezing or rales.   Chest:      Chest  wall: No tenderness.   Abdominal:      General: Bowel sounds are normal. There is no distension.      Palpations: Abdomen is soft.      Tenderness: There is no abdominal tenderness.   Musculoskeletal:         General: Normal range of motion.      Cervical back: Neck supple.      Right lower leg: No edema.      Left lower leg: No edema.   Skin:     General: Skin is warm and dry.      Findings: No erythema or rash.   Neurological:      General: No focal deficit present.      Mental Status: He is alert and oriented to person, place, and time.      Cranial Nerves: No cranial nerve deficit.   Psychiatric:         Mood and Affect: Mood normal.         Behavior: Behavior normal.       Vitals:    01/13/25 1558 01/13/25 1601 01/13/25 1647   BP: (!) 170/80 (!) 160/80 130/70   BP Location: Right arm Left arm Right arm   Patient Position: Sitting Sitting Sitting   Pulse: 63     SpO2: 100%     Weight: 107.3 kg (236 lb 10.6 oz)       Lab Results   Component Value Date    CHOL 115 (L) 07/18/2024    CHOL 111 (L) 09/13/2023    CHOL 167 06/20/2023      Body mass index is 30.39 kg/m².   Lab Results   Component Value Date    HGBA1C 6.0 (H) 07/18/2024      BMP  Lab Results   Component Value Date     07/18/2024    K 4.3 07/18/2024     07/18/2024    CO2 25 07/18/2024    BUN 12 07/18/2024    CREATININE 1.1 07/18/2024    CALCIUM 9.2 07/18/2024    ANIONGAP 7 (L) 07/18/2024    EGFRNORACEVR >60.0 07/18/2024      Lab Results   Component Value Date    HDL 33 (L) 07/18/2024    HDL 30 (L) 09/13/2023    HDL 34 (L) 06/20/2023     Lab Results   Component Value Date    LDLCALC 72.6 07/18/2024    LDLCALC 56.8 (L) 09/13/2023    LDLCALC 93.4 06/20/2023     Lab Results   Component Value Date    TRIG 47 07/18/2024    TRIG 121 09/13/2023    TRIG 198 (H) 06/20/2023     Lab Results   Component Value Date    CHOLHDL 28.7 07/18/2024    CHOLHDL 27.0 09/13/2023    CHOLHDL 20.4 06/20/2023       Chemistry        Component Value Date/Time      07/18/2024 0909    K 4.3 07/18/2024 0909     07/18/2024 0909    CO2 25 07/18/2024 0909    BUN 12 07/18/2024 0909    CREATININE 1.1 07/18/2024 0909     (H) 07/18/2024 0909        Component Value Date/Time    CALCIUM 9.2 07/18/2024 0909    ALKPHOS 60 07/18/2024 0909    AST 24 07/18/2024 0909    ALT 16 07/18/2024 0909    BILITOT 0.4 07/18/2024 0909    ESTGFRAFRICA >60 06/21/2022 2017    EGFRNONAA >60 06/21/2022 2017          Lab Results   Component Value Date    TSH 10.341 (H) 11/23/2023     Lab Results   Component Value Date    INR 1.0 04/20/2023    INR 1.0 04/11/2023    INR 0.9 01/06/2020     Lab Results   Component Value Date    WBC 9.67 05/26/2024    HGB 9.0 (L) 05/26/2024    HCT 30.5 (L) 05/26/2024    MCV 72 (L) 05/26/2024     05/26/2024     BMP  Sodium   Date Value Ref Range Status   07/18/2024 139 136 - 145 mmol/L Final     Potassium   Date Value Ref Range Status   07/18/2024 4.3 3.5 - 5.1 mmol/L Final     Chloride   Date Value Ref Range Status   07/18/2024 107 95 - 110 mmol/L Final     CO2   Date Value Ref Range Status   07/18/2024 25 23 - 29 mmol/L Final     BUN   Date Value Ref Range Status   07/18/2024 12 6 - 20 mg/dL Final     Creatinine   Date Value Ref Range Status   07/18/2024 1.1 0.5 - 1.4 mg/dL Final     Calcium   Date Value Ref Range Status   07/18/2024 9.2 8.7 - 10.5 mg/dL Final     Anion Gap   Date Value Ref Range Status   07/18/2024 7 (L) 8 - 16 mmol/L Final     eGFR if    Date Value Ref Range Status   06/21/2022 >60 >60 mL/min/1.73 m^2 Final     eGFR if non    Date Value Ref Range Status   06/21/2022 >60 >60 mL/min/1.73 m^2 Final     Comment:     Calculation used to obtain the estimated glomerular filtration  rate (eGFR) is the CKD-EPI equation.        CrCl cannot be calculated (Patient's most recent lab result is older than the maximum 7 days allowed.).    Assessment:     1. Controlled type 2 diabetes mellitus with microalbuminuria, without  long-term current use of insulin    2. Hyperlipidemia LDL goal <70    3. Hypertension goal BP (blood pressure) < 130/80    4. Type 2 diabetes mellitus with diabetic neuropathy, with long-term current use of insulin    5. Tobacco use disorder    6. Leg DVT (deep venous thromboembolism), acute, left    7. Alcohol abuse    8. Coronary artery disease of native artery of native heart with stable angina pectoris    9. MARIYA (obstructive sleep apnea)      Cad s/p stent placement asymptomatic good exercise tolerance continue the same. Rf modification emphasized.   Htn controlled low salt diet emphasized continue same meds.however it seems labile had headaches last wee will profile bp at home and review bp ledger.    Hlp on target continue same meds discussed meds and diet compliance.  Diabetes controlled A1c remarkably improved continue the same diet compliance to be addressed.  Still  smoking cessation reemphasized and discussed.  Plan:   Continue current therapy  Cardiac low salt diet.  Risk factor modification and excercise program./weight loss  F/u in 6 months with lipid cmp A1c  Smoking cessation

## 2025-01-30 ENCOUNTER — OFFICE VISIT (OUTPATIENT)
Dept: INTERNAL MEDICINE | Facility: CLINIC | Age: 55
End: 2025-01-30
Payer: COMMERCIAL

## 2025-01-30 ENCOUNTER — LAB VISIT (OUTPATIENT)
Dept: LAB | Facility: HOSPITAL | Age: 55
End: 2025-01-30
Attending: INTERNAL MEDICINE
Payer: COMMERCIAL

## 2025-01-30 VITALS
WEIGHT: 234.81 LBS | OXYGEN SATURATION: 99 % | DIASTOLIC BLOOD PRESSURE: 78 MMHG | TEMPERATURE: 97 F | RESPIRATION RATE: 20 BRPM | BODY MASS INDEX: 30.14 KG/M2 | HEART RATE: 68 BPM | SYSTOLIC BLOOD PRESSURE: 138 MMHG | HEIGHT: 74 IN

## 2025-01-30 DIAGNOSIS — Z00.00 ANNUAL PHYSICAL EXAM: ICD-10-CM

## 2025-01-30 DIAGNOSIS — Z23 NEED FOR VACCINATION: ICD-10-CM

## 2025-01-30 DIAGNOSIS — E78.5 HYPERLIPIDEMIA LDL GOAL <70: ICD-10-CM

## 2025-01-30 DIAGNOSIS — I10 HYPERTENSION GOAL BP (BLOOD PRESSURE) < 130/80: ICD-10-CM

## 2025-01-30 DIAGNOSIS — Z12.11 COLON CANCER SCREENING: ICD-10-CM

## 2025-01-30 DIAGNOSIS — Z00.00 ANNUAL PHYSICAL EXAM: Primary | ICD-10-CM

## 2025-01-30 DIAGNOSIS — F17.210 CIGARETTE NICOTINE DEPENDENCE WITHOUT COMPLICATION: ICD-10-CM

## 2025-01-30 DIAGNOSIS — N52.9 ERECTILE DYSFUNCTION, UNSPECIFIED ERECTILE DYSFUNCTION TYPE: ICD-10-CM

## 2025-01-30 DIAGNOSIS — E11.9 CONTROLLED TYPE 2 DIABETES MELLITUS WITHOUT COMPLICATION, WITHOUT LONG-TERM CURRENT USE OF INSULIN: ICD-10-CM

## 2025-01-30 DIAGNOSIS — M79.89 LEG SWELLING: ICD-10-CM

## 2025-01-30 DIAGNOSIS — R05.9 COUGH, UNSPECIFIED TYPE: ICD-10-CM

## 2025-01-30 LAB
ALBUMIN SERPL BCP-MCNC: 3.6 G/DL (ref 3.5–5.2)
ALP SERPL-CCNC: 59 U/L (ref 40–150)
ALT SERPL W/O P-5'-P-CCNC: 18 U/L (ref 10–44)
ANION GAP SERPL CALC-SCNC: 12 MMOL/L (ref 8–16)
AST SERPL-CCNC: 23 U/L (ref 10–40)
BASOPHILS # BLD AUTO: 0.08 K/UL (ref 0–0.2)
BASOPHILS NFR BLD: 0.9 % (ref 0–1.9)
BILIRUB SERPL-MCNC: 0.4 MG/DL (ref 0.1–1)
BUN SERPL-MCNC: 12 MG/DL (ref 6–20)
CALCIUM SERPL-MCNC: 9.2 MG/DL (ref 8.7–10.5)
CHLORIDE SERPL-SCNC: 108 MMOL/L (ref 95–110)
CHOLEST SERPL-MCNC: 115 MG/DL (ref 120–199)
CHOLEST/HDLC SERPL: 3.4 {RATIO} (ref 2–5)
CO2 SERPL-SCNC: 19 MMOL/L (ref 23–29)
CREAT SERPL-MCNC: 1 MG/DL (ref 0.5–1.4)
DIFFERENTIAL METHOD BLD: ABNORMAL
EOSINOPHIL # BLD AUTO: 0.5 K/UL (ref 0–0.5)
EOSINOPHIL NFR BLD: 5.9 % (ref 0–8)
ERYTHROCYTE [DISTWIDTH] IN BLOOD BY AUTOMATED COUNT: 21.7 % (ref 11.5–14.5)
EST. GFR  (NO RACE VARIABLE): >60 ML/MIN/1.73 M^2
ESTIMATED AVG GLUCOSE: 134 MG/DL (ref 68–131)
GLUCOSE SERPL-MCNC: 118 MG/DL (ref 70–110)
HBA1C MFR BLD: 6.3 % (ref 4–5.6)
HCT VFR BLD AUTO: 30.5 % (ref 40–54)
HDLC SERPL-MCNC: 34 MG/DL (ref 40–75)
HDLC SERPL: 29.6 % (ref 20–50)
HGB BLD-MCNC: 8 G/DL (ref 14–18)
IMM GRANULOCYTES # BLD AUTO: 0.02 K/UL (ref 0–0.04)
IMM GRANULOCYTES NFR BLD AUTO: 0.2 % (ref 0–0.5)
LDLC SERPL CALC-MCNC: 67 MG/DL (ref 63–159)
LYMPHOCYTES # BLD AUTO: 2.1 K/UL (ref 1–4.8)
LYMPHOCYTES NFR BLD: 22.9 % (ref 18–48)
MCH RBC QN AUTO: 18.9 PG (ref 27–31)
MCHC RBC AUTO-ENTMCNC: 26.2 G/DL (ref 32–36)
MCV RBC AUTO: 72 FL (ref 82–98)
MONOCYTES # BLD AUTO: 0.6 K/UL (ref 0.3–1)
MONOCYTES NFR BLD: 6.3 % (ref 4–15)
NEUTROPHILS # BLD AUTO: 5.7 K/UL (ref 1.8–7.7)
NEUTROPHILS NFR BLD: 63.8 % (ref 38–73)
NONHDLC SERPL-MCNC: 81 MG/DL
NRBC BLD-RTO: 0 /100 WBC
PLATELET # BLD AUTO: 415 K/UL (ref 150–450)
PMV BLD AUTO: 9.6 FL (ref 9.2–12.9)
POTASSIUM SERPL-SCNC: 4.2 MMOL/L (ref 3.5–5.1)
PROT SERPL-MCNC: 7.7 G/DL (ref 6–8.4)
RBC # BLD AUTO: 4.24 M/UL (ref 4.6–6.2)
SODIUM SERPL-SCNC: 139 MMOL/L (ref 136–145)
T4 FREE SERPL-MCNC: 0.79 NG/DL (ref 0.71–1.51)
TRIGL SERPL-MCNC: 70 MG/DL (ref 30–150)
TSH SERPL DL<=0.005 MIU/L-ACNC: 12.52 UIU/ML (ref 0.4–4)
WBC # BLD AUTO: 8.94 K/UL (ref 3.9–12.7)

## 2025-01-30 PROCEDURE — 36415 COLL VENOUS BLD VENIPUNCTURE: CPT | Performed by: INTERNAL MEDICINE

## 2025-01-30 PROCEDURE — 80061 LIPID PANEL: CPT | Performed by: INTERNAL MEDICINE

## 2025-01-30 PROCEDURE — 84443 ASSAY THYROID STIM HORMONE: CPT | Performed by: INTERNAL MEDICINE

## 2025-01-30 PROCEDURE — 80053 COMPREHEN METABOLIC PANEL: CPT | Performed by: INTERNAL MEDICINE

## 2025-01-30 PROCEDURE — 83036 HEMOGLOBIN GLYCOSYLATED A1C: CPT | Performed by: INTERNAL MEDICINE

## 2025-01-30 PROCEDURE — 99999 PR PBB SHADOW E&M-EST. PATIENT-LVL V: CPT | Mod: PBBFAC,,, | Performed by: INTERNAL MEDICINE

## 2025-01-30 PROCEDURE — 84439 ASSAY OF FREE THYROXINE: CPT | Performed by: INTERNAL MEDICINE

## 2025-01-30 PROCEDURE — 85025 COMPLETE CBC W/AUTO DIFF WBC: CPT | Performed by: INTERNAL MEDICINE

## 2025-01-30 RX ORDER — TADALAFIL 10 MG/1
10 TABLET ORAL DAILY PRN
Qty: 10 TABLET | Refills: 5 | Status: SHIPPED | OUTPATIENT
Start: 2025-01-30

## 2025-01-30 RX ORDER — PROMETHAZINE HYDROCHLORIDE AND DEXTROMETHORPHAN HYDROBROMIDE 6.25; 15 MG/5ML; MG/5ML
5 SYRUP ORAL EVERY 8 HOURS PRN
Qty: 118 ML | Refills: 0 | Status: SHIPPED | OUTPATIENT
Start: 2025-01-30 | End: 2025-02-09

## 2025-01-30 NOTE — PROGRESS NOTES
Laron Kothari Jr.  54 y.o. Black or  male  [id number]    Chief Complaint:  Chief Complaint   Patient presents with    Annual Exam       History of Present Illness:  History of Present Illness    CHIEF COMPLAINT:  Mr. Kothari presents today for an annual check-up.    DIABETES:   Compliant with medication. Last A1C was at goal.     CARDIOVASCULAR:  He reports elevated blood pressure during a recent cardiologist visit approximately two weeks ago, though he believes it may be slightly lower today. He experiences bilateral leg swelling that began following his heart attack and persists throughout the day, including upon waking.    RESPIRATORY:  He reports a persistent cough for 3-4 months without hemoptysis. He currently smokes half a pack of cigarettes daily, having started in 1986 at age 16, and expresses desire to quit.    ALLERGIES:  He experiences significant allergy symptoms, including post nasal drip and runny nose.    MEDICAL HISTORY:  Last colonoscopy in February 2022 revealed polyps. A1C was 6.0 six months ago. He has implemented lifestyle modifications including complete cessation of alcohol consumption.    DERMATOLOGIC:  He reports sudden onset of rapid hair loss. He also notes skin discoloration and uses baby lotion without experiencing dry skin.    SEXUAL HEALTH:  He reports current erectile dysfunction medication is ineffective.    VACCINATIONS:  He has not received influenza vaccination this season. He received first COVID-19 vaccination and plans to receive additional doses.    OTHER SYMPTOMS:  He reports new habit of eating ice.         Review of Systems   Constitutional:  Negative for fever and weight loss.   Eyes:  Negative for blurred vision.   Respiratory:  Positive for cough. Negative for hemoptysis and shortness of breath.    Cardiovascular:  Positive for leg swelling. Negative for chest pain.   Gastrointestinal:  Negative for abdominal pain, blood in stool, constipation and diarrhea.    Genitourinary:  Negative for dysuria.   Skin:  Positive for rash.   Neurological:  Negative for dizziness and headaches.   Endo/Heme/Allergies:  Positive for environmental allergies.       Laboratory  Lab Results   Component Value Date    WBC 9.67 05/26/2024    HGB 9.0 (L) 05/26/2024    HCT 30.5 (L) 05/26/2024     05/26/2024    CHOL 115 (L) 07/18/2024    TRIG 47 07/18/2024    HDL 33 (L) 07/18/2024    ALT 16 07/18/2024    AST 24 07/18/2024     07/18/2024    K 4.3 07/18/2024     07/18/2024    CREATININE 1.1 07/18/2024    BUN 12 07/18/2024    CO2 25 07/18/2024    TSH 10.341 (H) 11/23/2023    PSA 0.74 07/20/2011    INR 1.0 04/20/2023    HGBA1C 6.0 (H) 07/18/2024     Lab Results   Component Value Date    LDLCALC 72.6 07/18/2024       The following were reviewed: Active problem list, medication list, allergies, family history, social history, and Health Maintenance.     History:  Past Medical History:   Diagnosis Date    Blood in stool 08/07/2018    Deep vein thrombosis (DVT) 2017    Left leg    Diabetes mellitus, type 2 2013    Gout     Hyperlipidemia     Hypertension     Neuropathy     Smoker      Past Surgical History:   Procedure Laterality Date    APPENDECTOMY      ARTHROSCOPIC CHONDROPLASTY OF KNEE JOINT Right 11/24/2020    Procedure: ARTHROSCOPY, KNEE, WITH CHONDROPLASTY;  Surgeon: Nahum Rose MD;  Location: [facility], [place] OR;  Service: Orthopedics;  Laterality: Right;  chondroplasty medial condyle and anteriorly    COLONOSCOPY N/A 8/7/2018    Procedure: COLONOSCOPY;  Surgeon: eTn Valentine MD;  Location: [facility], [place] ENDO;  Service: Endoscopy;  Laterality: N/A;    COLONOSCOPY N/A 2/23/2022    Procedure: COLONOSCOPY;  Surgeon: Octavio Craig MD;  Location: [facility], [place] ENDO;  Service: Endoscopy;  Laterality: N/A;    KNEE ARTHROSCOPY W/ MENISCECTOMY Right 11/24/2020    Procedure: ARTHROSCOPY, KNEE, WITH MENISCECTOMY;  Surgeon: Nahum Rose MD;  Location: [facility], [place] OR;  Service: Orthopedics;   Laterality: Right;  Partial medial and lateral meniscectomy    LEFT HEART CATHETERIZATION Left 4/11/2023    Procedure: Left heart cath;  Surgeon: Sue Lara MD;  Location: [facility] CATH LAB;  Service: Cardiology;  Laterality: Left;    PERCUTANEOUS TRANSLUMINAL BALLOON ANGIOPLASTY OF CORONARY ARTERY  4/11/2023    Procedure: Angioplasty-coronary;  Surgeon: Sue Lara MD;  Location: [facility] CATH LAB;  Service: Cardiology;;    ROBOT-ASSISTED CHOLECYSTECTOMY USING DA TRIPP XI N/A 1/21/2020    Procedure: XI ROBOTIC CHOLECYSTECTOMY;  Surgeon: Sebastien Rivera MD;  Location: [facility] OR;  Service: General;  Laterality: N/A;    STENT, DRUG ELUTING, SINGLE VESSEL, CORONARY  4/11/2023    Procedure: Stent, Drug Eluting, Single Vessel, Coronary;  Surgeon: Sue Lara MD;  Location: [facility] CATH LAB;  Service: Cardiology;;    TONSILLECTOMY, ADENOIDECTOMY       Family History   Problem Relation Name Age of Onset    Diabetes Father      Prostate cancer Father      Cataracts Father      Hypertension Mother      Hypertension Brother       Social History     Socioeconomic History    Marital status:    Tobacco Use    Smoking status: Every Day     Current packs/day: 0.50     Average packs/day: 0.5 packs/day for 39.1 years (19.5 ttl pk-yrs)     Types: Cigarettes     Start date: 1986    Smokeless tobacco: Never    Tobacco comments:     Smoked 1.5-2 packs per day until April 2023, now smoking 10 cpd   Substance and Sexual Activity    Alcohol use: Yes     Alcohol/week: 26.0 standard drinks of alcohol     Types: 26 Cans of beer per week     Comment: daily    Drug use: No    Sexual activity: Yes     Partners: Female     Social Drivers of Health     Stress: No Stress Concern Present (9/9/2020)    [employer] - Occupational Stress Questionnaire     Feeling of Stress : Not at all     Patient Active Problem List   Diagnosis    Hyperlipidemia LDL goal <70    Tobacco use disorder    Hypertension goal BP (blood  pressure) < 130/80    Controlled type 2 diabetes mellitus with microalbuminuria, without long-term current use of insulin    Type 2 diabetes mellitus with diabetic neuropathy    Leg DVT (deep venous thromboembolism), acute, left    Blood in stool    Colon polyps    Alcohol abuse    Acute medial meniscus tear of right knee    Chest pain    Coronary artery disease of native artery of native heart with stable angina pectoris    Insomnia due to medical condition    MARIYA (obstructive sleep apnea)     Review of patient's allergies indicates:  No Known Allergies    Health Maintenance  Health Maintenance Topics with due status: Not Due       Topic Last Completion Date    TETANUS VACCINE 08/09/2016    Diabetes Urine Screening 07/18/2024    Lipid Panel 07/18/2024    High Dose Statin 01/30/2025    Aspirin/Antiplatelet Therapy 01/30/2025    RSV Vaccine (Age 60+ and Pregnant patients) Not Due     Health Maintenance Due   Topic Date Due    Diabetic Eye Exam  05/22/2024    COVID-19 Vaccine (3 - 2024-25 season) 09/01/2024    Hemoglobin A1c  01/18/2025    Foot Exam  02/08/2025    Colorectal Cancer Screening  02/23/2025       Medications:  Current Outpatient Medications on File Prior to Visit   Medication Sig Dispense Refill    aspirin (ECOTRIN) 81 MG EC tablet Take 1 tablet (81 mg total) by mouth once daily. 30 tablet 11    BLOOD PRESSURE CUFF Misc 1 Device by Misc.(Non-Drug; Combo Route) route once daily.  0    blood sugar diagnostic Strp Once daily glucose testing. 50 strip 6    blood-glucose meter Misc Use as directed. 1 each 0    empagliflozin (JARDIANCE) 10 mg tablet Take 1 tablet (10 mg total) by mouth once daily. 30 tablet 5    fluticasone propionate (FLONASE) 50 mcg/actuation nasal spray 2 sprays (100 mcg total) by Each Nostril route once daily. 16 g 0    gabapentin (NEURONTIN) 300 MG capsule Take 1 capsule (300 mg total) by mouth 3 (three) times daily. 90 capsule 11    glipiZIDE (GLUCOTROL) 5 MG tablet Take 1 tablet (5 mg  "total) by mouth daily with breakfast. 90 tablet 2    lancets (LANCETS,THIN) Misc Once daily glucose testing. 50 each 6    lisinopriL (PRINIVIL,ZESTRIL) 40 MG tablet Take 1 tablet (40 mg total) by mouth once daily. 90 tablet 0    metFORMIN (GLUCOPHAGE) 1000 MG tablet Take 1 tablet (1,000 mg total) by mouth 2 (two) times daily with meals. 180 tablet 2    metoprolol succinate (TOPROL-XL) 50 MG 24 hr tablet Take 2 tablets in the morning and 1 in the evening, total 150mg daily 120 tablet 3    nicotine (NICODERM CQ) 21 mg/24 hr Place 1 patch onto the skin once daily. 28 patch 0    pen needle, diabetic (PEN NEEDLE) 31 gauge x 5/16" Ndle Use once daily with insulin injection. 50 each 3    prasugreL (EFFIENT) 10 mg Tab Take 1 tablet (10 mg total) by mouth once daily. 90 tablet 3    pravastatin (PRAVACHOL) 80 MG tablet Take 1 tablet (80 mg total) by mouth every evening. 90 tablet 3    ranolazine (RANEXA) 500 MG Tb12 Take 1 tablet (500 mg total) by mouth 2 (two) times daily. 180 tablet 3    traMADoL (ULTRAM) 50 mg tablet Take 1 tablet (50 mg total) by mouth every 6 (six) hours as needed. 12 tablet 0    traZODone (DESYREL) 50 MG tablet Take 1 tablet (50 mg total) by mouth every evening. 90 tablet 1     Medications have been reviewed and reconciled with patient at visit today.    Exam:  Vitals:    01/30/25 0844   BP: 138/78   Pulse: 68   Resp: 20   Temp: 96.7 °F (35.9 °C)     Weight: 106.5 kg (234 lb 12.6 oz)   Body mass index is 30.15 kg/m².      Physical Exam    Vitals: Reviewed.  Constitutional: No acute distress. Well-developed. Not ill-appearing.  Eyes: No scleral icterus.  Neck: No cervical lymphadenopathy.  Cardiovascular: Normal rate and regular rhythm. Normal heart sounds.  Pulmonary: Pulmonary effort is normal. No respiratory distress. Normal breath sounds.  Abdomen: Soft. Nontender. Nondistended. Normoactive bowel sounds.  Extremities: No edema.  Skin: Warm. Dry.  Neurological: Alert and oriented to person, place, and " time.  Psychiatric: Behavior normal.         Assessment:  The primary encounter diagnosis was Annual physical exam. Diagnoses of Controlled type 2 diabetes mellitus without complication, without long-term current use of insulin, Hypertension goal BP (blood pressure) < 130/80, Hyperlipidemia LDL goal <70, Cough, unspecified type, Cigarette nicotine dependence without complication, Erectile dysfunction, unspecified erectile dysfunction type, Colon cancer screening, and Need for vaccination were also pertinent to this visit.    Assessment & Plan      ANNUAL PHYSICAL EXAM:  - Counseled regarding age appropriate screenings and immunizations  - Counseled regarding lifestyle modifications    DIABETES:  - Evaluated recent improvements in A1C, likely due to lifestyle changes.  - Noted patient's A1C was 6.0 six months ago.  - Acknowledged improvement in diabetes management.  - Recommend continuation of current diabetes management plan.  - Advised scheduling a diabetic eye exam.  - Evaluated recent improvements in A1C, likely due to lifestyle changes including cessation of alcohol consumption.    HYPERTENSION:  - Assessed blood pressure, noting it was elevated above goal but lower than previous visit with Dr. Novoa.  - Recorded blood pressure at cardiologist appointment two weeks ago was 130/70.  - Noted current blood pressure is higher than the goal of 130/80.  - Confirmed patient is taking blood pressure medication.    CHRONIC LEG SWELLING:  - Assessed chronic leg swelling, potentially related to previous myocardial infarction and current medications.  - Confirmed patient sees a cardiologist for follow-up.  - Evaluated chronic leg swelling, potentially related to previous myocardial infarction and current medications.  - Observed swelling in patient's legs.  - Discussed importance of proper sock selection for managing leg swelling.  - Informed patient about availability of diabetic socks that look like regular socks.  -  Recommend wearing diabetic socks instead of tight socks to manage leg swelling.    COUGH:  - Prescribed cough medicine.  - Instructed patient to contact the office if cough persists after taking prescribed cough medicine.  - Noted patient reports having a cold and cough for 3-4 months.  - Confirmed absence of hemoptysis, weight loss, or fever.  - Considered pulmonary function tests if cough persists.    SMOKING CESSATION:  - Advised patient to quit smoking.  - Noted patient smokes about half a pack of cigarettes per day since 1986.  - Calculated patient's smoking history as 20-30 pack years.  - Recommend CT for lung cancer screening.  - Acknowledged patient's desire to quit smoking.    ERECTILE DYSFUNCTION:  - Prescribed Cialis at a moderate dose for erectile dysfunction.  - Discontinued Viagra due to ineffectiveness.    ALCOHOL CESSATION:  - Noted patient reports stopping alcohol consumption.  - Acknowledged positive dietary change.  - Mr. Kothari to continue avoiding alcohol consumption.    COLON POLYPS:  - Evaluated need for repeat colonoscopy based on previous findings of polyps.  - Noted patient had a colonoscopy in February 2022 with polyps found.  - Confirmed absence of current bowel issues or melena/hematochezia.  - Determined patient is due for another colonoscopy.    FOLLOW UP:  - Mr. Kothari to quit smoking.  - Recommend wearing diabetic socks instead of tight socks to manage leg swelling.

## 2025-01-31 ENCOUNTER — HOSPITAL ENCOUNTER (OUTPATIENT)
Dept: PREADMISSION TESTING | Facility: HOSPITAL | Age: 55
Discharge: HOME | End: 2025-01-31
Attending: INTERNAL MEDICINE
Payer: COMMERCIAL

## 2025-01-31 DIAGNOSIS — Z12.11 COLON CANCER SCREENING: ICD-10-CM

## 2025-02-05 ENCOUNTER — PATIENT MESSAGE (OUTPATIENT)
Dept: INTERNAL MEDICINE | Facility: CLINIC | Age: 55
End: 2025-02-05
Payer: COMMERCIAL

## 2025-02-05 ENCOUNTER — TELEPHONE (OUTPATIENT)
Dept: INTERNAL MEDICINE | Facility: CLINIC | Age: 55
End: 2025-02-05
Payer: COMMERCIAL

## 2025-02-05 DIAGNOSIS — D50.9 MICROCYTIC ANEMIA: Primary | ICD-10-CM

## 2025-02-05 DIAGNOSIS — E03.9 HYPOTHYROIDISM (ACQUIRED): Primary | ICD-10-CM

## 2025-02-05 RX ORDER — LEVOTHYROXINE SODIUM 25 UG/1
25 TABLET ORAL
Qty: 30 TABLET | Refills: 1 | Status: SHIPPED | OUTPATIENT
Start: 2025-02-05

## 2025-02-07 ENCOUNTER — HOSPITAL ENCOUNTER (OUTPATIENT)
Dept: PREADMISSION TESTING | Facility: HOSPITAL | Age: 55
Discharge: HOME | End: 2025-02-07
Attending: COLON & RECTAL SURGERY
Payer: COMMERCIAL

## 2025-02-12 DIAGNOSIS — I25.10 CORONARY ARTERY DISEASE, UNSPECIFIED VESSEL OR LESION TYPE, UNSPECIFIED WHETHER ANGINA PRESENT, UNSPECIFIED WHETHER NATIVE OR TRANSPLANTED HEART: ICD-10-CM

## 2025-02-12 RX ORDER — METOPROLOL SUCCINATE 50 MG/1
TABLET, EXTENDED RELEASE ORAL
Qty: 120 TABLET | Refills: 3 | Status: SHIPPED | OUTPATIENT
Start: 2025-02-12 | End: 2025-02-13

## 2025-02-13 DIAGNOSIS — I25.10 CORONARY ARTERY DISEASE, UNSPECIFIED VESSEL OR LESION TYPE, UNSPECIFIED WHETHER ANGINA PRESENT, UNSPECIFIED WHETHER NATIVE OR TRANSPLANTED HEART: ICD-10-CM

## 2025-02-13 RX ORDER — METOPROLOL SUCCINATE 50 MG/1
TABLET, EXTENDED RELEASE ORAL
Qty: 120 TABLET | Refills: 3 | Status: SHIPPED | OUTPATIENT
Start: 2025-02-13

## 2025-02-20 ENCOUNTER — HOSPITAL ENCOUNTER (OUTPATIENT)
Dept: PREADMISSION TESTING | Facility: HOSPITAL | Age: 55
Discharge: HOME | End: 2025-02-20
Attending: COLON & RECTAL SURGERY
Payer: COMMERCIAL

## 2025-02-20 ENCOUNTER — E-CONSULT (OUTPATIENT)
Dept: CARDIOLOGY | Facility: CLINIC | Age: 55
End: 2025-02-20
Payer: COMMERCIAL

## 2025-02-20 DIAGNOSIS — Z12.11 COLON CANCER SCREENING: Primary | ICD-10-CM

## 2025-02-20 DIAGNOSIS — Z01.810 PREOP CARDIOVASCULAR EXAM: Primary | ICD-10-CM

## 2025-02-20 RX ORDER — SODIUM, POTASSIUM,MAG SULFATES 17.5-3.13G
1 SOLUTION, RECONSTITUTED, ORAL ORAL DAILY
Qty: 1 KIT | Refills: 0 | Status: SHIPPED | OUTPATIENT
Start: 2025-02-20 | End: 2025-02-22

## 2025-02-20 NOTE — CONSULTS
[facility] [address]  Response for E-Consult     Patient Name: Laron Kothari Jr.  MRN: [id number]  Primary Care Provider: Kristen Adler DO   Requesting Provider: Imani Chen NP  E-Consult to General Cardiology  Consult performed by: Jona Steven MD  Consult ordered by: Imani Chen NP          55 yo male, E consult for preop clearance of colonoscopy  The chart reviewed.  PMH CAD HTN HLD    Plan  Elevated periop risk of CV events for non-high risk procedure.  Ok to proceed the scheduled procedure without further cardiac study.  OK to hold Effient 5 days before the procedure and resume postop once hemodynamically stable      Total time of Consultation: 10 minute    I did not speak to the requesting provider verbally about this.     *This eConsult is based on the clinical data available to me and is furnished without benefit of a physical examination. The eConsult will need to be interpreted in light of any clinical issues or changes in patient status not available to me at the time of filing this eConsults. Significant changes in patient condition or level of acuity should result in immediate formal consultation and reevaluation. Please alert me if you have further questions.    Thank you for this eConsult referral.     Jona Steven MD  [facility] [address]

## 2025-02-24 DIAGNOSIS — G47.00 INSOMNIA, UNSPECIFIED TYPE: ICD-10-CM

## 2025-02-24 RX ORDER — TRAZODONE HYDROCHLORIDE 50 MG/1
50 TABLET ORAL NIGHTLY
Qty: 90 TABLET | Refills: 3 | Status: SHIPPED | OUTPATIENT
Start: 2025-02-24

## 2025-02-24 NOTE — TELEPHONE ENCOUNTER
No care due was identified.  [facility] Embedded Care Due Messages. Reference number: [id number].   2/24/2025 8:27:48 AM CST

## 2025-02-24 NOTE — TELEPHONE ENCOUNTER
Refill Decision Note   Laron Kothari  is requesting a refill authorization.  Brief Assessment and Rationale for Refill:  Approve     Medication Therapy Plan:         Comments:     Note composed:1:54 PM 02/24/2025

## 2025-03-04 DIAGNOSIS — R80.9 CONTROLLED TYPE 2 DIABETES MELLITUS WITH MICROALBUMINURIA, WITH LONG-TERM CURRENT USE OF INSULIN: ICD-10-CM

## 2025-03-04 DIAGNOSIS — E11.29 CONTROLLED TYPE 2 DIABETES MELLITUS WITH MICROALBUMINURIA, WITH LONG-TERM CURRENT USE OF INSULIN: ICD-10-CM

## 2025-03-04 DIAGNOSIS — I10 HYPERTENSION GOAL BP (BLOOD PRESSURE) < 130/80: Chronic | ICD-10-CM

## 2025-03-04 DIAGNOSIS — Z79.4 CONTROLLED TYPE 2 DIABETES MELLITUS WITH MICROALBUMINURIA, WITH LONG-TERM CURRENT USE OF INSULIN: ICD-10-CM

## 2025-03-04 NOTE — TELEPHONE ENCOUNTER
No care due was identified.  [facility] Embedded Care Due Messages. Reference number: [id number].   3/04/2025 6:45:10 AM CST

## 2025-03-05 RX ORDER — LISINOPRIL 40 MG/1
40 TABLET ORAL
Qty: 90 TABLET | Refills: 3 | Status: SHIPPED | OUTPATIENT
Start: 2025-03-05

## 2025-03-05 NOTE — TELEPHONE ENCOUNTER
Refill Decision Note   Laron Kothari  is requesting a refill authorization.  Brief Assessment and Rationale for Refill:  Approve     Medication Therapy Plan:         Comments:     Note composed:11:27 AM 03/05/2025

## 2025-03-13 ENCOUNTER — HOSPITAL ENCOUNTER (OUTPATIENT)
Dept: ENDOSCOPY | Facility: HOSPITAL | Age: 55
Discharge: HOME | End: 2025-03-13
Attending: INTERNAL MEDICINE | Admitting: INTERNAL MEDICINE
Payer: COMMERCIAL

## 2025-03-13 ENCOUNTER — ANESTHESIA EVENT (OUTPATIENT)
Dept: ENDOSCOPY | Facility: HOSPITAL | Age: 55
End: 2025-03-13
Payer: COMMERCIAL

## 2025-03-13 ENCOUNTER — ANESTHESIA (OUTPATIENT)
Dept: ENDOSCOPY | Facility: HOSPITAL | Age: 55
End: 2025-03-13
Payer: COMMERCIAL

## 2025-03-13 VITALS
BODY MASS INDEX: 29.52 KG/M2 | RESPIRATION RATE: 16 BRPM | HEIGHT: 74 IN | SYSTOLIC BLOOD PRESSURE: 110 MMHG | OXYGEN SATURATION: 98 % | TEMPERATURE: 99 F | WEIGHT: 230 LBS | HEART RATE: 68 BPM | DIASTOLIC BLOOD PRESSURE: 68 MMHG

## 2025-03-13 DIAGNOSIS — K57.30 DIVERTICULOSIS LARGE INTESTINE W/O PERFORATION OR ABSCESS W/O BLEEDING: Primary | ICD-10-CM

## 2025-03-13 DIAGNOSIS — D12.3 ADENOMATOUS POLYP OF TRANSVERSE COLON: ICD-10-CM

## 2025-03-13 DIAGNOSIS — K64.8 INTERNAL HEMORRHOIDS WITHOUT COMPLICATION: ICD-10-CM

## 2025-03-13 DIAGNOSIS — Z86.0100 HISTORY OF COLONIC POLYPS: ICD-10-CM

## 2025-03-13 DIAGNOSIS — Z12.11 COLON CANCER SCREENING: ICD-10-CM

## 2025-03-13 LAB — POCT GLUCOSE: 158 MG/DL (ref 70–110)

## 2025-03-13 PROCEDURE — 25000003 PHARM REV CODE 250: Performed by: INTERNAL MEDICINE

## 2025-03-13 PROCEDURE — 37000009 HC ANESTHESIA EA ADD 15 MINS

## 2025-03-13 PROCEDURE — 63600175 PHARM REV CODE 636 W HCPCS: Performed by: NURSE ANESTHETIST, CERTIFIED REGISTERED

## 2025-03-13 PROCEDURE — 27201089 HC SNARE, DISP (ANY)

## 2025-03-13 PROCEDURE — 27201012 HC FORCEPS, HOT/COLD, DISP

## 2025-03-13 PROCEDURE — 37000008 HC ANESTHESIA 1ST 15 MINUTES

## 2025-03-13 RX ORDER — LIDOCAINE HYDROCHLORIDE 10 MG/ML
INJECTION, SOLUTION EPIDURAL; INFILTRATION; INTRACAUDAL; PERINEURAL
Status: DISCONTINUED | OUTPATIENT
Start: 2025-03-13 | End: 2025-03-13

## 2025-03-13 RX ORDER — PROPOFOL 10 MG/ML
VIAL (ML) INTRAVENOUS
Status: DISCONTINUED | OUTPATIENT
Start: 2025-03-13 | End: 2025-03-13

## 2025-03-13 RX ORDER — DEXTROMETHORPHAN/PSEUDOEPHED 2.5-7.5/.8
DROPS ORAL
Status: COMPLETED | OUTPATIENT
Start: 2025-03-13 | End: 2025-03-13

## 2025-03-13 RX ADMIN — PROPOFOL 20 MG: 10 INJECTION, EMULSION INTRAVENOUS at 08:03

## 2025-03-13 RX ADMIN — LIDOCAINE HYDROCHLORIDE 50 MG: 10 SOLUTION INTRAVENOUS at 08:03

## 2025-03-13 RX ADMIN — PROPOFOL 100 MG: 10 INJECTION, EMULSION INTRAVENOUS at 08:03

## 2025-03-13 RX ADMIN — SIMETHICONE 200 MG: 20 SUSPENSION/ DROPS ORAL at 08:03

## 2025-03-13 NOTE — ANESTHESIA PREPROCEDURE EVALUATION
03/13/2025  Laron Kothari Jr. is a 54 y.o., male.      Pre-op Assessment    I have reviewed the Patient Summary Reports.     I have reviewed the Nursing Notes. I have reviewed the NPO Status.   I have reviewed the Medications.     Review of Systems  Anesthesia Hx:  No problems with previous Anesthesia                Social:  Smoker, No Alcohol Use       Hematology/Oncology:  Hematology Normal   Oncology Normal                                   EENT/Dental:  EENT/Dental Normal           Cardiovascular:     Hypertension, well controlled   CAD  asymptomatic CABG/stent   Angina, with exertion     hyperlipidemia                               Pulmonary:        Sleep Apnea, CPAP                Renal/:  Renal/ Normal                 Hepatic/GI:  Bowel Prep.                   Musculoskeletal:  Musculoskeletal Normal                Neurological:  Neurology Normal                                      Endocrine:  Diabetes, well controlled, type 2           Dermatological:  Skin Normal    Psych:  Psychiatric Normal                    Physical Exam  General: Well nourished    Airway:  Mallampati: II   Mouth Opening: Normal  TM Distance: Normal  Tongue: Normal  Neck ROM: Normal ROM    Dental:  Intact    Chest/Lungs:  Clear to auscultation    Heart:  Rate: Normal        Anesthesia Plan  Type of Anesthesia, risks & benefits discussed:    Anesthesia Type: MAC  Intra-op Monitoring Plan: Standard ASA Monitors  Induction:  IV  Informed Consent: Informed consent signed with the Patient and all parties understand the risks and agree with anesthesia plan.  All questions answered. Patient consented to blood products? Yes  ASA Score: 3    Ready For Surgery From Anesthesia Perspective.     .

## 2025-03-13 NOTE — ANESTHESIA POSTPROCEDURE EVALUATION
Anesthesia Post Evaluation    Patient: Laron Kothari Jr.    Procedure(s) Performed: * No procedures listed *    Final Anesthesia Type: MAC      Patient location during evaluation: PACU  Patient participation: Yes- Able to Participate  Level of consciousness: awake and alert and awake  Post-procedure vital signs: reviewed and stable  Pain management: adequate  Airway patency: patent  MARIYA mitigation strategies: Multimodal analgesia  PONV status at discharge: No PONV  Anesthetic complications: no      Cardiovascular status: blood pressure returned to baseline  Respiratory status: spontaneous ventilation and room air  Hydration status: euvolemic  Follow-up not needed.              Vitals Value Taken Time   /66 03/13/25 08:46   Temp 98 03/13/25 08:46   Pulse 66 03/13/25 08:46   Resp 12 03/13/25 08:46   SpO2 98 03/13/25 08:46         No case tracking events are documented in the log.      Pain/Ely Score: No data recorded

## 2025-03-13 NOTE — TRANSFER OF CARE
"Anesthesia Transfer of Care Note    Patient: Laron Kothari Jr.    Procedure(s) Performed: * No procedures listed *    Patient location: PACU    Anesthesia Type: MAC    Transport from OR: Transported from OR on room air with adequate spontaneous ventilation    Post pain: adequate analgesia    Post assessment: no apparent anesthetic complications    Post vital signs: stable    Level of consciousness: awake and alert    Nausea/Vomiting: no nausea/vomiting    Complications: none    Transfer of care protocol was followed      Last vitals: Visit Vitals  /63   Pulse 74   Temp (!) 7.3 °C (45.1 °F)   Resp 18   Ht 6' 2" (1.88 m)   Wt 104.3 kg (230 lb)   BMI 29.53 kg/m²     "

## 2025-03-13 NOTE — H&P
PRE PROCEDURE H&P    Patient Name: Laron Kothari Jr.  MRN: [id number]  : 1970  Date of Procedure:  3/13/2025  Referring Physician: Kristen Adler DO  Primary Physician: Kristen Adler DO  Procedure Physician: Kristen Armando MD       Planned Procedure: Colonoscopy  Diagnosis: previous adenomatous polyp     Chief Complaint: Same as above    HPI: Patient is an 54 y.o. male is here for the above. Last colonoscopy . Multiple TA and a SSA removed in transverse, descending and sigmoid. Also noted to have IH. Presents for surveillance. No Fhx of CRC. Stopped Effient, 8 days ago. Wife at bedside.     Last colonoscopy:   Family history: none  Anticoagulation: Effient    Past Medical History:   Past Medical History:   Diagnosis Date    Blood in stool 2018    Deep vein thrombosis (DVT) 2017    Left leg    Diabetes mellitus, type 2     Gout     Hyperlipidemia     Hypertension     Neuropathy     Smoker         Past Surgical History:  Past Surgical History:   Procedure Laterality Date    APPENDECTOMY      ARTHROSCOPIC CHONDROPLASTY OF KNEE JOINT Right 2020    Procedure: ARTHROSCOPY, KNEE, WITH CHONDROPLASTY;  Surgeon: Nahum Rose MD;  Location: [facility];  Service: Orthopedics;  Laterality: Right;  chondroplasty medial condyle and anteriorly    COLONOSCOPY N/A 2018    Procedure: COLONOSCOPY;  Surgeon: Ten Valentine MD;  Location: [facility] ENDO;  Service: Endoscopy;  Laterality: N/A;    COLONOSCOPY N/A 2022    Procedure: COLONOSCOPY;  Surgeon: Octavio Craig MD;  Location: [facility] ENDO;  Service: Endoscopy;  Laterality: N/A;    KNEE ARTHROSCOPY W/ MENISCECTOMY Right 2020    Procedure: ARTHROSCOPY, KNEE, WITH MENISCECTOMY;  Surgeon: Nahum Rose MD;  Location: [facility] OR;  Service: Orthopedics;  Laterality: Right;  Partial medial and lateral meniscectomy    LEFT HEART CATHETERIZATION Left 2023    Procedure: Left heart cath;  Surgeon: Sue Lara MD;  Location: [facility]  CATH LAB;  Service: Cardiology;  Laterality: Left;    PERCUTANEOUS TRANSLUMINAL BALLOON ANGIOPLASTY OF CORONARY ARTERY  4/11/2023    Procedure: Angioplasty-coronary;  Surgeon: Sue Lara MD;  Location: [facility] CATH LAB;  Service: Cardiology;;    ROBOT-ASSISTED CHOLECYSTECTOMY USING DA TRIPP XI N/A 1/21/2020    Procedure: XI ROBOTIC CHOLECYSTECTOMY;  Surgeon: Sebastien Rivera MD;  Location: [facility] OR;  Service: General;  Laterality: N/A;    STENT, DRUG ELUTING, SINGLE VESSEL, CORONARY  4/11/2023    Procedure: Stent, Drug Eluting, Single Vessel, Coronary;  Surgeon: Sue Lara MD;  Location: [facility] CATH LAB;  Service: Cardiology;;    TONSILLECTOMY, ADENOIDECTOMY          Home Medications:  Prior to Admission medications    Medication Sig Start Date End Date Taking? Authorizing Provider   aspirin (ECOTRIN) 81 MG EC tablet Take 1 tablet (81 mg total) by mouth once daily. 9/15/23 3/13/25 Yes Ave Hawkins NP   empagliflozin (JARDIANCE) 10 mg tablet Take 1 tablet (10 mg total) by mouth once daily. 9/13/23  Yes Kristen Adler DO   glipiZIDE (GLUCOTROL) 5 MG tablet Take 1 tablet (5 mg total) by mouth daily with breakfast. 5/10/24  Yes Kristen Adler DO   levothyroxine (SYNTHROID) 25 MCG tablet Take 1 tablet (25 mcg total) by mouth before breakfast. 2/5/25  Yes Kristen Adler DO   lisinopriL (PRINIVIL,ZESTRIL) 40 MG tablet Take 1 tablet by mouth once daily 3/5/25  Yes Kristen Adler DO   metFORMIN (GLUCOPHAGE) 1000 MG tablet Take 1 tablet (1,000 mg total) by mouth 2 (two) times daily with meals. 5/10/24  Yes Kristen Adler DO   metoprolol succinate (TOPROL-XL) 50 MG 24 hr tablet TAKE 2 TABLETS BY MOUTH TWICE DAILY 2/13/25  Yes Sue Lara MD   pravastatin (PRAVACHOL) 80 MG tablet Take 1 tablet (80 mg total) by mouth every evening. 5/12/24  Yes Sue Lara MD   ranolazine (RANEXA) 500 MG Tb12 Take 1 tablet (500 mg total) by mouth 2 (two) times daily. 4/11/24 4/12/25 Yes Sue Lara MD  "  tadalafiL (CIALIS) 10 MG tablet Take 1 tablet (10 mg total) by mouth daily as needed for Erectile Dysfunction. 1/30/25  Yes Krsiten Adler DO   traMADoL (ULTRAM) 50 mg tablet Take 1 tablet (50 mg total) by mouth every 6 (six) hours as needed. 5/26/24  Yes Elva Maria MD   traZODone (DESYREL) 50 MG tablet Take 1 tablet by mouth in the evening 2/24/25  Yes Kristen Adler DO   BLOOD PRESSURE CUFF Misc 1 Device by Misc.(Non-Drug; Combo Route) route once daily. 5/12/23   Ave Hawkins, NP   blood sugar diagnostic Strp Once daily glucose testing. 3/11/21   Kristen Adler DO   blood-glucose meter Misc Use as directed. 1/21/22   Kristen Adler DO   fluticasone propionate (FLONASE) 50 mcg/actuation nasal spray 2 sprays (100 mcg total) by Each Nostril route once daily. 12/4/23   Kristen Adler DO   gabapentin (NEURONTIN) 300 MG capsule Take 1 capsule (300 mg total) by mouth 3 (three) times daily. 2/24/22 1/30/25  Nahum Rose MD   lancets (LANCETS,THIN) Misc Once daily glucose testing. 3/11/21   Kristen Adler DO   nicotine (NICODERM CQ) 21 mg/24 hr Place 1 patch onto the skin once daily. 5/31/23   Dwayne Matthews MD   pen needle, diabetic (PEN NEEDLE) 31 gauge x 5/16" Ndle Use once daily with insulin injection. 3/11/21   Kristen Adler DO   prasugreL (EFFIENT) 10 mg Tab Take 1 tablet (10 mg total) by mouth once daily. 5/12/24   Sue Lara MD        Allergies:  Review of patient's allergies indicates:  No Known Allergies     Social History:   Social History[1]    Family History:  Family History   Problem Relation Name Age of Onset    Diabetes Father      Prostate cancer Father      Cataracts Father      Hypertension Mother      Hypertension Brother         ROS: No acute cardiac events, no acute respiratory complaints.     Physical Exam (all patients):    /63   Pulse 74   Temp (!) 45.1 °F (7.3 °C)   Resp 18   Ht 6' 2" (1.88 m)   Wt 104.3 kg (230 lb)   BMI 29.53 kg/m² "   Lungs: Clear to auscultation bilaterally, respirations unlabored  Heart: Regular rate and rhythm, S1 and S2 normal, no obvious murmurs  Abdomen:         Soft, non-tender, bowel sounds normal, no masses, no organomegaly    Lab Results   Component Value Date    WBC 8.94 01/30/2025    MCV 72 (L) 01/30/2025    RDW 21.7 (H) 01/30/2025     01/30/2025    INR 1.0 04/20/2023     (H) 01/30/2025    HGBA1C 6.3 (H) 01/30/2025    BUN 12 01/30/2025     01/30/2025    K 4.2 01/30/2025     01/30/2025        SEDATION PLAN: per anesthesia      History reviewed, vital signs satisfactory, cardiopulmonary status satisfactory, sedation options, risks and plans have been discussed with the patient  All their questions were answered and the patient agrees to the sedation procedures as planned and the patient is deemed an appropriate candidate for the sedation as planned.    The risks, benefits and alternatives of the procedure were discussed with the patient in detail. This discussion was had in the presence of wife and endoscopy staff. The risks include, risks of adverse reaction to sedation requiring the use of reversal agents, bleeding requiring blood transfusion, perforation requiring surgical intervention and technical failure. Other risks include aspiration leading to respiratory distress and respiratory failure resulting in endotracheal intubation and mechanical ventilation including death. If anesthesia is being utilized for this procedure, it is up to the anesthesiologist to determine airway safety including elective endotracheal intubation. Questions were answered, they agree to proceed. There was no language barriers.      Procedure explained to patient, informed consent obtained and placed in chart.    Kristen Armando  3/13/2025  8:08 AM          [1]   Social History  Socioeconomic History    Marital status:    Tobacco Use    Smoking status: Every Day     Current packs/day: 0.50     Average  packs/day: 0.5 packs/day for 39.2 years (19.6 ttl pk-yrs)     Types: Cigarettes     Start date: 1986    Smokeless tobacco: Never    Tobacco comments:     Smoked 1.5-2 packs per day until April 2023, now smoking 10 cpd   Substance and Sexual Activity    Alcohol use: Not Currently     Alcohol/week: 26.0 standard drinks of alcohol     Types: 26 Cans of beer per week     Comment: daily    Drug use: No    Sexual activity: Yes     Partners: Female     Social Drivers of Health     Stress: No Stress Concern Present (9/9/2020)    [employer] [place] [employer] - Occupational Stress Questionnaire     Feeling of Stress : Not at all

## 2025-03-13 NOTE — BRIEF OP NOTE
Outpatient Endoscopy Brief Operative Note      Admit Date: 3/13/2025    Discharge Date and Time:  3/13/2025 8:43 AM    Attending Physician: Kristen Adler DO     Discharge Physician: Kristen Adler DO     Principal Admitting Diagnoses: Colon polyps         Discharge Diagnosis: The primary encounter diagnosis was Diverticulosis large intestine w/o perforation or abscess w/o bleeding. Diagnoses of Colon cancer screening, Internal hemorrhoids without complication, Adenomatous polyp of transverse colon, and History of colonic polyps were also pertinent to this visit.     Discharged Condition: Good    Indication for Admission: Colon polyps     Hospital Course: Patient was admitted for an outppatient procedure and tolerated the procedure well with no complications.    Significant Diagnostic Studies: Colonoscopy with polypectomy    See [facility] report    Pathology (if any):  Specimen (24h ago, onward)       Start     Ordered    03/13/25 0819  Specimen to Pathology, Surgery Gastrointestinal tract  Once        Comments: Pre-op Diagnosis: Colon cancer screeningProcedures: COLONOSCOPY1. Descending colon polypectomy2.  scar at 46, r/o residual polyp     References:    Click here for ordering Quick Tip   Question Answer Comment   Procedure Type: Gastrointestinal tract    Release to patient Immediate        03/13/25 0841                    Estimated Blood Loss: 1 ml.    Discussed with: patient and family.    Disposition: Home.    Follow Up/Patient Instructions:   Patient's Medications   New Prescriptions    No medications on file   Previous Medications    ASPIRIN (ECOTRIN) 81 MG EC TABLET    Take 1 tablet (81 mg total) by mouth once daily.    BLOOD PRESSURE CUFF MISC    1 Device by Misc.(Non-Drug; Combo Route) route once daily.    BLOOD SUGAR DIAGNOSTIC STRP    Once daily glucose testing.    BLOOD-GLUCOSE METER MISC    Use as directed.    EMPAGLIFLOZIN (JARDIANCE) 10 MG TABLET    Take 1 tablet (10 mg total) by mouth once daily.  "   FLUTICASONE PROPIONATE (FLONASE) 50 MCG/ACTUATION NASAL SPRAY    2 sprays (100 mcg total) by Each Nostril route once daily.    GABAPENTIN (NEURONTIN) 300 MG CAPSULE    Take 1 capsule (300 mg total) by mouth 3 (three) times daily.    GLIPIZIDE (GLUCOTROL) 5 MG TABLET    Take 1 tablet (5 mg total) by mouth daily with breakfast.    LANCETS (LANCETS,THIN) MISC    Once daily glucose testing.    LEVOTHYROXINE (SYNTHROID) 25 MCG TABLET    Take 1 tablet (25 mcg total) by mouth before breakfast.    LISINOPRIL (PRINIVIL,ZESTRIL) 40 MG TABLET    Take 1 tablet by mouth once daily    METFORMIN (GLUCOPHAGE) 1000 MG TABLET    Take 1 tablet (1,000 mg total) by mouth 2 (two) times daily with meals.    METOPROLOL SUCCINATE (TOPROL-XL) 50 MG 24 HR TABLET    TAKE 2 TABLETS BY MOUTH TWICE DAILY    PEN NEEDLE, DIABETIC (PEN NEEDLE) 31 GAUGE X 5/16" NDLE    Use once daily with insulin injection.    PRASUGREL (EFFIENT) 10 MG TAB    Take 1 tablet (10 mg total) by mouth once daily.    PRAVASTATIN (PRAVACHOL) 80 MG TABLET    Take 1 tablet (80 mg total) by mouth every evening.    RANOLAZINE (RANEXA) 500 MG TB12    Take 1 tablet (500 mg total) by mouth 2 (two) times daily.    TADALAFIL (CIALIS) 10 MG TABLET    Take 1 tablet (10 mg total) by mouth daily as needed for Erectile Dysfunction.    TRAMADOL (ULTRAM) 50 MG TABLET    Take 1 tablet (50 mg total) by mouth every 6 (six) hours as needed.    TRAZODONE (DESYREL) 50 MG TABLET    Take 1 tablet by mouth in the evening   Modified Medications    No medications on file   Discontinued Medications    NICOTINE (NICODERM CQ) 21 MG/24 HR    Place 1 patch onto the skin once daily.       Discharge Procedure Orders   Diet general     Call MD for:  temperature >100.4     Call MD for:  persistent nausea and vomiting     Call MD for:  severe uncontrolled pain     Call MD for:  difficulty breathing, headache or visual disturbances     Activity as tolerated        Follow-up Information       Kristen Adler, " DO Follow up.    Specialty: Internal Medicine  Contact information:  [address] [place] [address] [address] [address]  [phone]                                 Kristen Armando MD  Inpatient Gastroenterology  [facility]

## 2025-03-16 DIAGNOSIS — R80.9 CONTROLLED TYPE 2 DIABETES MELLITUS WITH MICROALBUMINURIA, WITH LONG-TERM CURRENT USE OF INSULIN: ICD-10-CM

## 2025-03-16 DIAGNOSIS — E11.29 CONTROLLED TYPE 2 DIABETES MELLITUS WITH MICROALBUMINURIA, WITH LONG-TERM CURRENT USE OF INSULIN: ICD-10-CM

## 2025-03-16 DIAGNOSIS — Z79.4 CONTROLLED TYPE 2 DIABETES MELLITUS WITH MICROALBUMINURIA, WITH LONG-TERM CURRENT USE OF INSULIN: ICD-10-CM

## 2025-03-16 NOTE — TELEPHONE ENCOUNTER
No care due was identified.  [facility] Embedded Care Due Messages. Reference number: [id number].   3/16/2025 12:51:46 PM CDT

## 2025-03-17 RX ORDER — GLIPIZIDE 5 MG/1
5 TABLET ORAL
Qty: 90 TABLET | Refills: 1 | Status: SHIPPED | OUTPATIENT
Start: 2025-03-17

## 2025-03-17 NOTE — TELEPHONE ENCOUNTER
Refill Decision Note   Laron Kothari  is requesting a refill authorization.  Brief Assessment and Rationale for Refill:  Approve     Medication Therapy Plan:         Comments:     Note composed:12:47 PM 03/17/2025

## 2025-03-20 DIAGNOSIS — N52.9 ERECTILE DYSFUNCTION, UNSPECIFIED ERECTILE DYSFUNCTION TYPE: ICD-10-CM

## 2025-03-20 NOTE — TELEPHONE ENCOUNTER
No care due was identified.  [facility] Embedded Care Due Messages. Reference number: [id number].   3/20/2025 10:48:50 AM CDT

## 2025-03-20 NOTE — TELEPHONE ENCOUNTER
Refill Routing Note   Medication(s) are not appropriate for processing by [facility] for the following reason(s):        New or recently adjusted medication    [facility] action(s):  Defer             Appointments  past 12m or future 3m with PCP    Date Provider   Last Visit   1/30/2025 Kristen Adler DO   Next Visit   Visit date not found Kristen Adler DO   ED visits in past 90 days: 0        Note composed:2:07 PM 03/20/2025

## 2025-03-24 ENCOUNTER — RESULTS FOLLOW-UP (OUTPATIENT)
Dept: GASTROENTEROLOGY | Facility: HOSPITAL | Age: 55
End: 2025-03-24

## 2025-03-24 RX ORDER — TADALAFIL 10 MG/1
10 TABLET ORAL DAILY PRN
Qty: 10 TABLET | Refills: 3 | Status: SHIPPED | OUTPATIENT
Start: 2025-03-24

## 2025-03-24 NOTE — PROGRESS NOTES
The polyps removed were benign and have no cancerous potential.They were completely removed. Guidelines recommend a repeat colonoscopy in 5 years. We will send you a reminder as the time nears.      Thank you for allowing me to be part of your care.    Sincerely,    Kristen Armando M.D.

## 2025-03-25 ENCOUNTER — PATIENT MESSAGE (OUTPATIENT)
Dept: GASTROENTEROLOGY | Facility: CLINIC | Age: 55
End: 2025-03-25
Payer: COMMERCIAL

## 2025-03-27 DIAGNOSIS — R80.9 CONTROLLED TYPE 2 DIABETES MELLITUS WITH MICROALBUMINURIA, WITH LONG-TERM CURRENT USE OF INSULIN: ICD-10-CM

## 2025-03-27 DIAGNOSIS — E11.29 CONTROLLED TYPE 2 DIABETES MELLITUS WITH MICROALBUMINURIA, WITH LONG-TERM CURRENT USE OF INSULIN: ICD-10-CM

## 2025-03-27 DIAGNOSIS — Z79.4 CONTROLLED TYPE 2 DIABETES MELLITUS WITH MICROALBUMINURIA, WITH LONG-TERM CURRENT USE OF INSULIN: ICD-10-CM

## 2025-03-27 RX ORDER — METFORMIN HYDROCHLORIDE 1000 MG/1
1000 TABLET ORAL 2 TIMES DAILY WITH MEALS
Qty: 180 TABLET | Refills: 1 | Status: SHIPPED | OUTPATIENT
Start: 2025-03-27

## 2025-03-27 NOTE — TELEPHONE ENCOUNTER
No care due was identified.  [facility] Embedded Care Due Messages. Reference number: [id number].   3/27/2025 6:45:13 AM CDT

## 2025-03-27 NOTE — TELEPHONE ENCOUNTER
Refill Routing Note   Medication(s) are not appropriate for processing by [facility] for the following reason(s):        Drug-disease interaction    [facility] action(s):  Defer        Medication Therapy Plan: metFORMIN and Alcohol abuse    Alert overridden per protocol: Yes     Appointments  past 12m or future 3m with PCP    Date Provider   Last Visit   1/30/2025 Kristen Adler DO   Next Visit   Visit date not found Kristen Adler DO   ED visits in past 90 days: 0        Note composed:2:25 PM 03/27/2025

## 2025-03-27 NOTE — TELEPHONE ENCOUNTER
Refill Decision Note   Laron Kothari  is requesting a refill authorization.  Brief Assessment and Rationale for Refill:  Approve     Medication Therapy Plan:         Alert overridden per protocol: Yes   Extended chart review required: Yes   Comments:     Note composed:2:57 PM 03/27/2025

## 2025-03-31 DIAGNOSIS — I25.10 CORONARY ARTERY DISEASE, UNSPECIFIED VESSEL OR LESION TYPE, UNSPECIFIED WHETHER ANGINA PRESENT, UNSPECIFIED WHETHER NATIVE OR TRANSPLANTED HEART: ICD-10-CM

## 2025-03-31 RX ORDER — RANOLAZINE 500 MG/1
500 TABLET, EXTENDED RELEASE ORAL 2 TIMES DAILY
Qty: 180 TABLET | Refills: 3 | Status: SHIPPED | OUTPATIENT
Start: 2025-03-31

## 2025-04-11 DIAGNOSIS — E03.9 HYPOTHYROIDISM (ACQUIRED): ICD-10-CM

## 2025-04-11 NOTE — TELEPHONE ENCOUNTER
No care due was identified.  [facility] Embedded Care Due Messages. Reference number: [id number].   4/11/2025 5:56:30 PM CDT

## 2025-04-11 NOTE — TELEPHONE ENCOUNTER
Refill Routing Note   Medication(s) are not appropriate for processing by [facility] for the following reason(s):        New or recently adjusted medication    [facility] action(s):  Defer             Appointments  past 12m or future 3m with PCP    Date Provider   Last Visit   1/30/2025 Kristen Adler DO   Next Visit   Visit date not found Kristen Adler DO   ED visits in past 90 days: 0        Note composed:6:28 PM 04/11/2025

## 2025-04-15 RX ORDER — LEVOTHYROXINE SODIUM 25 UG/1
25 TABLET ORAL
Qty: 30 TABLET | Refills: 0 | Status: SHIPPED | OUTPATIENT
Start: 2025-04-15

## 2025-05-01 ENCOUNTER — OFFICE VISIT (OUTPATIENT)
Dept: OPHTHALMOLOGY | Facility: CLINIC | Age: 55
End: 2025-05-01
Payer: COMMERCIAL

## 2025-05-01 DIAGNOSIS — I10 HYPERTENSION GOAL BP (BLOOD PRESSURE) < 130/80: ICD-10-CM

## 2025-05-01 DIAGNOSIS — H52.7 REFRACTIVE ERRORS: ICD-10-CM

## 2025-05-01 DIAGNOSIS — E11.9 DIABETES MELLITUS TYPE 2 WITHOUT RETINOPATHY: Primary | ICD-10-CM

## 2025-05-01 PROCEDURE — 99999 PR PBB SHADOW E&M-EST. PATIENT-LVL III: CPT | Mod: PBBFAC,,, | Performed by: OPTOMETRIST

## 2025-05-01 NOTE — PROGRESS NOTES
SUBJECTIVE  Laron Kothari Jr. is 54 y.o. male  Uncorrected distance visual acuity was 20/25 in the right eye and 20/30 in the left eye. Uncorrected near visual acuity was J5 in the right eye and J5 in the left eye.   Chief Complaint   Patient presents with    Diabetic Eye Exam     Pt states VA is getting a bit more blurry when he reads. Pt states there are no other reports of eye irritation, pain, discomfort, flashes of light or floaters.    Lab Results       Component                Value               Date                       HGBA1C                   6.3 (H)             01/30/2025                      HPI     Diabetic Eye Exam     Additional comments: Pt states VA is getting a bit more blurry when he   reads. Pt states there are no other reports of eye irritation, pain,   discomfort, flashes of light or floaters.    Lab Results       Component                Value               Date                       HGBA1C                   6.3 (H)             01/30/2025                      Last edited by Uzma Dunn on 5/1/2025  8:10 AM.         Assessment /Plan :  1. Diabetes mellitus type 2 without retinopathy  No Background Diabetic Retinopathy  Strict BG control, f/u w/ PCP, and annual DFE  Stressed importance of DM control to preserve vision     2. Hypertension goal BP (blood pressure) < 130/80  No HTN Retinopathy, monitor annually.     3. Refractive errors  Dispense Final Rx for glasses.  RTC 1 year  Discussed above and answered questions.

## 2025-05-02 ENCOUNTER — RESULTS FOLLOW-UP (OUTPATIENT)
Dept: INTERNAL MEDICINE | Facility: CLINIC | Age: 55
End: 2025-05-02

## 2025-05-02 ENCOUNTER — HOSPITAL ENCOUNTER (OUTPATIENT)
Dept: RADIOLOGY | Facility: HOSPITAL | Age: 55
Discharge: HOME | End: 2025-05-02
Attending: INTERNAL MEDICINE
Payer: COMMERCIAL

## 2025-05-02 ENCOUNTER — OFFICE VISIT (OUTPATIENT)
Dept: INTERNAL MEDICINE | Facility: CLINIC | Age: 55
End: 2025-05-02
Payer: COMMERCIAL

## 2025-05-02 VITALS
BODY MASS INDEX: 30.25 KG/M2 | HEART RATE: 76 BPM | RESPIRATION RATE: 20 BRPM | HEIGHT: 74 IN | OXYGEN SATURATION: 100 % | WEIGHT: 235.69 LBS | SYSTOLIC BLOOD PRESSURE: 132 MMHG | DIASTOLIC BLOOD PRESSURE: 72 MMHG | TEMPERATURE: 97 F

## 2025-05-02 DIAGNOSIS — R10.31 RLQ ABDOMINAL PAIN: ICD-10-CM

## 2025-05-02 DIAGNOSIS — R05.9 COUGH, UNSPECIFIED TYPE: ICD-10-CM

## 2025-05-02 DIAGNOSIS — F17.210 CIGARETTE NICOTINE DEPENDENCE WITHOUT COMPLICATION: ICD-10-CM

## 2025-05-02 DIAGNOSIS — R42 VERTIGO: Primary | ICD-10-CM

## 2025-05-02 DIAGNOSIS — E11.9 CONTROLLED TYPE 2 DIABETES MELLITUS WITHOUT COMPLICATION, WITHOUT LONG-TERM CURRENT USE OF INSULIN: ICD-10-CM

## 2025-05-02 DIAGNOSIS — R09.81 SINUS CONGESTION: ICD-10-CM

## 2025-05-02 DIAGNOSIS — R06.2 WHEEZING: ICD-10-CM

## 2025-05-02 PROCEDURE — 71046 X-RAY EXAM CHEST 2 VIEWS: CPT | Mod: TC

## 2025-05-02 PROCEDURE — 1159F MED LIST DOCD IN RCRD: CPT | Mod: CPTII,S$GLB,, | Performed by: INTERNAL MEDICINE

## 2025-05-02 PROCEDURE — 99999 PR PBB SHADOW E&M-EST. PATIENT-LVL V: CPT | Mod: PBBFAC,,, | Performed by: INTERNAL MEDICINE

## 2025-05-02 PROCEDURE — 74018 RADEX ABDOMEN 1 VIEW: CPT | Mod: TC

## 2025-05-02 PROCEDURE — 4010F ACE/ARB THERAPY RXD/TAKEN: CPT | Mod: CPTII,S$GLB,, | Performed by: INTERNAL MEDICINE

## 2025-05-02 PROCEDURE — 3078F DIAST BP <80 MM HG: CPT | Mod: CPTII,S$GLB,, | Performed by: INTERNAL MEDICINE

## 2025-05-02 PROCEDURE — 3075F SYST BP GE 130 - 139MM HG: CPT | Mod: CPTII,S$GLB,, | Performed by: INTERNAL MEDICINE

## 2025-05-02 PROCEDURE — 74018 RADEX ABDOMEN 1 VIEW: CPT | Mod: 26,,, | Performed by: STUDENT IN AN ORGANIZED HEALTH CARE EDUCATION/TRAINING PROGRAM

## 2025-05-02 PROCEDURE — 71046 X-RAY EXAM CHEST 2 VIEWS: CPT | Mod: 26,,, | Performed by: STUDENT IN AN ORGANIZED HEALTH CARE EDUCATION/TRAINING PROGRAM

## 2025-05-02 PROCEDURE — 3044F HG A1C LEVEL LT 7.0%: CPT | Mod: CPTII,S$GLB,, | Performed by: INTERNAL MEDICINE

## 2025-05-02 PROCEDURE — 3008F BODY MASS INDEX DOCD: CPT | Mod: CPTII,S$GLB,, | Performed by: INTERNAL MEDICINE

## 2025-05-02 PROCEDURE — G2211 COMPLEX E/M VISIT ADD ON: HCPCS | Mod: S$GLB,,, | Performed by: INTERNAL MEDICINE

## 2025-05-02 PROCEDURE — 99214 OFFICE O/P EST MOD 30 MIN: CPT | Mod: S$GLB,,, | Performed by: INTERNAL MEDICINE

## 2025-05-02 PROCEDURE — 1160F RVW MEDS BY RX/DR IN RCRD: CPT | Mod: CPTII,S$GLB,, | Performed by: INTERNAL MEDICINE

## 2025-05-02 RX ORDER — PROMETHAZINE HYDROCHLORIDE AND DEXTROMETHORPHAN HYDROBROMIDE 6.25; 15 MG/5ML; MG/5ML
5 SYRUP ORAL EVERY 8 HOURS PRN
Qty: 118 ML | Refills: 0 | Status: SHIPPED | OUTPATIENT
Start: 2025-05-02 | End: 2025-05-12

## 2025-05-02 RX ORDER — METHYLPREDNISOLONE 4 MG/1
TABLET ORAL
Qty: 1 EACH | Refills: 0 | Status: SHIPPED | OUTPATIENT
Start: 2025-05-02

## 2025-05-02 RX ORDER — MECLIZINE HYDROCHLORIDE 25 MG/1
25 TABLET ORAL 3 TIMES DAILY PRN
Qty: 30 TABLET | Refills: 0 | Status: SHIPPED | OUTPATIENT
Start: 2025-05-02

## 2025-05-02 RX ORDER — FLUTICASONE PROPIONATE 50 MCG
1 SPRAY, SUSPENSION (ML) NASAL DAILY
Qty: 16 G | Refills: 0 | Status: SHIPPED | OUTPATIENT
Start: 2025-05-02

## 2025-05-02 RX ORDER — ALBUTEROL SULFATE 90 UG/1
2 INHALANT RESPIRATORY (INHALATION) EVERY 6 HOURS PRN
Qty: 18 G | Refills: 0 | Status: SHIPPED | OUTPATIENT
Start: 2025-05-02 | End: 2026-05-02

## 2025-05-05 NOTE — PROGRESS NOTES
Laron Kothari Jr.  54 y.o. Black or  male  [id number]    Chief Complaint:  Chief Complaint   Patient presents with    Dizziness       History of Present Illness:  History of Present Illness    CHIEF COMPLAINT:  Mr. Kothari presents today with dizziness    DIZZINESS:  He reports recent episode of blacking out while sitting at work. He could not remember what he was doing during the episode and believes he passed out but did not fall. He denies chest discomfort or heart racing during the episode. This is a new occurrence for him.    NEUROLOGICAL SYMPTOMS:  He reports new onset frontal headaches. He also experiences vertigo with sensation of things moving and spinning, particularly with certain movements. The dizziness is not associated with pre-syncope. He denies chest pain in relation to these symptoms.    RESPIRATORY:  He reports non-productive cough persisting for 3-4 months.    ABDOMINAL:  He reports intermittent RLQ pain for 3 weeks that radiates to his back, sometimes persisting for 1 week at a time. He denies associated fever. He denies changes in bowel habits, melena or hematochezia.    MEDICAL HISTORY:  He has history of diabetes. Recent colonoscopy revealed two polyps.    SOCIAL HISTORY:  He smokes 5-6 cigarettes daily.         Review of Systems   Constitutional:  Negative for fever.   HENT:  Positive for congestion.    Respiratory:  Positive for cough and wheezing.    Cardiovascular:  Negative for chest pain and leg swelling.   Gastrointestinal:  Positive for abdominal pain. Negative for blood in stool, constipation, diarrhea, melena, nausea and vomiting.   Neurological:  Positive for dizziness, loss of consciousness and headaches.       Laboratory:  Lab Results   Component Value Date    WBC 8.94 01/30/2025    HGB 8.0 (L) 01/30/2025    HCT 30.5 (L) 01/30/2025     01/30/2025    CHOL 115 (L) 01/30/2025    TRIG 70 01/30/2025    HDL 34 (L) 01/30/2025    ALT 18 01/30/2025    AST 23 01/30/2025      01/30/2025    K 4.2 01/30/2025     01/30/2025    CREATININE 1.0 01/30/2025    BUN 12 01/30/2025    CO2 19 (L) 01/30/2025    TSH 12.522 (H) 01/30/2025    PSA 0.74 07/20/2011    INR 1.0 04/20/2023    HGBA1C 6.3 (H) 01/30/2025     Lab Results   Component Value Date    LDLCALC 67.0 01/30/2025       History:  Past Medical History:   Diagnosis Date    Blood in stool 08/07/2018    Deep vein thrombosis (DVT) 2017    Left leg    Diabetes mellitus, type 2 2013    Gout     Hyperlipidemia     Hypertension     Neuropathy     Smoker        Medications:  Medications Ordered Prior to Encounter[1]    Allergies:  Review of patient's allergies indicates:  No Known Allergies    Exam:  Vitals:    05/02/25 1138   BP: 132/72   Pulse: 76   Resp: 20   Temp: 96.6 °F (35.9 °C)     Weight: 106.9 kg (235 lb 10.8 oz)   Body mass index is 30.26 kg/m².      Physical Exam    Vitals: Reviewed.  Constitutional: No acute distress. Well-developed. Not ill-appearing.  Eyes: No scleral icterus.  Cardiovascular: Normal rate and regular rhythm. Normal heart sounds.  Pulmonary: Pulmonary effort is normal. No respiratory distress. Wheezing present.  Abdomen: Soft. Tenderness on palpation of the RLQ. Nondistended. Normoactive bowel sounds.  Extremities: No edema.  Skin: Warm. Dry.  Neurological: Alert and oriented to person, place, and time.  Psychiatric: Behavior normal.     Physical Exam  Cardiovascular:      Pulses:           Dorsalis pedis pulses are 2+ on the right side and 2+ on the left side.   Feet:      Right foot:      Skin integrity: No ulcer.      Left foot:      Skin integrity: No ulcer.           Assessment:  The primary encounter diagnosis was Vertigo. Diagnoses of Sinus congestion, Cough, unspecified type, Wheezing, Cigarette nicotine dependence without complication, RLQ abdominal pain, and Controlled type 2 diabetes mellitus without complication, without long-term current use of insulin were also pertinent to this  visit.    Assessment & Plan    DIZZINESS:  - Mr. Kothari experienced a blackout at work while sitting, possibly syncope.  - No chest discomfort or heart racing during episodes.  - Suspect vertigo due to congested sinuses based on description of dizziness and spinning sensation.  - Prescribed as-needed medication for dizziness.    SINUS CONGESTION/HEADACHE:  - Mr. Kothari reports headaches, sometimes feeling like sinus headaches with congestion.  - Currently experiencing headache.  - Prescribed nasal spray for sinus congestion.    COUGH AND WHEEZING:  - Mr. Kothari reports 3-4 month history of cough (both productive and non-productive) with significant wheezing noted on exam.  - Ordered chest XR to investigate wheezing and potential fluid accumulation.  - Initiated steroid pack for wheezing and congestion.  - Prescribed cough medicine and inhaler for lung symptoms.  - Ordered pulmonary function test in 1 month to evaluate for possible lung disease after current symptoms resolve.    NICOTINE DEPENDENCE:  - Mr. Kothari currently smokes 5-6 cigarettes per day.  - Advised to reduce smoking.    RIGHT LOWER QUADRANT PAIN:  - Mr. Kothari reports 3-week history of intermittent left lower quadrant pain that radiates to the back, sometimes persisting for a day or week before resolving.  - Ordered abdominal x-ray to rule out kidney stones.    HISTORY OF COLON POLYPS:  - Recent colonoscopy revealed 2 polyps.    TYPE 2 DIABETES MELLITUS:  - Diabetes is well-controlled.    FOLLOW-UP:  - Return in 1 month for pulmonary function test after current symptoms clear.                        [1]   Current Outpatient Medications on File Prior to Visit   Medication Sig Dispense Refill    aspirin (ECOTRIN) 81 MG EC tablet Take 1 tablet (81 mg total) by mouth once daily. 30 tablet 11    BLOOD PRESSURE CUFF Misc 1 Device by Misc.(Non-Drug; Combo Route) route once daily.  0    blood sugar diagnostic Strp Once daily glucose testing. 50 strip 6     "blood-glucose meter Misc Use as directed. 1 each 0    empagliflozin (JARDIANCE) 10 mg tablet Take 1 tablet (10 mg total) by mouth once daily. 30 tablet 5    gabapentin (NEURONTIN) 300 MG capsule Take 1 capsule (300 mg total) by mouth 3 (three) times daily. 90 capsule 11    glipiZIDE (GLUCOTROL) 5 MG tablet Take 1 tablet by mouth once daily with breakfast 90 tablet 1    lancets (LANCETS,THIN) Misc Once daily glucose testing. 50 each 6    levothyroxine (SYNTHROID) 25 MCG tablet TAKE 1 TABLET BY MOUTH ONCE DAILY BEFORE BREAKFAST 30 tablet 0    lisinopriL (PRINIVIL,ZESTRIL) 40 MG tablet Take 1 tablet by mouth once daily 90 tablet 3    metFORMIN (GLUCOPHAGE) 1000 MG tablet TAKE 1 TABLET BY MOUTH TWICE DAILY WITH MEALS 180 tablet 1    metoprolol succinate (TOPROL-XL) 50 MG 24 hr tablet TAKE 2 TABLETS BY MOUTH TWICE DAILY 120 tablet 3    pen needle, diabetic (PEN NEEDLE) 31 gauge x 5/16" Ndle Use once daily with insulin injection. 50 each 3    prasugreL (EFFIENT) 10 mg Tab Take 1 tablet (10 mg total) by mouth once daily. 90 tablet 3    pravastatin (PRAVACHOL) 80 MG tablet Take 1 tablet (80 mg total) by mouth every evening. 90 tablet 3    ranolazine (RANEXA) 500 MG Tb12 Take 1 tablet by mouth twice daily 180 tablet 3    tadalafiL (CIALIS) 10 MG tablet Take 1 tablet (10 mg total) by mouth daily as needed for Erectile Dysfunction. 10 tablet 3    traMADoL (ULTRAM) 50 mg tablet Take 1 tablet (50 mg total) by mouth every 6 (six) hours as needed. 12 tablet 0    traZODone (DESYREL) 50 MG tablet Take 1 tablet by mouth in the evening 90 tablet 3     No current facility-administered medications on file prior to visit.     "

## 2025-05-06 DIAGNOSIS — E78.5 HYPERLIPIDEMIA LDL GOAL <70: Chronic | ICD-10-CM

## 2025-05-06 RX ORDER — PRAVASTATIN SODIUM 80 MG/1
80 TABLET ORAL NIGHTLY
Qty: 90 TABLET | Refills: 3 | Status: SHIPPED | OUTPATIENT
Start: 2025-05-06

## 2025-05-11 DIAGNOSIS — E03.9 HYPOTHYROIDISM (ACQUIRED): ICD-10-CM

## 2025-05-11 NOTE — TELEPHONE ENCOUNTER
Care Due:                  Date            Visit Type   Department     Provider  --------------------------------------------------------------------------------                                EP -                              PRIMARY      [facility] INTERNAL  Last Visit: 05-      [facility] ([facility])   HEMA Adler                              EP -                              PRIMARY      [facility] INTERNAL  Next Visit: 01-      [facility] ([facility])   HEMA Adler                                                            Last  Test          Frequency    Reason                     Performed    Due Date  --------------------------------------------------------------------------------    HBA1C.......  6 months...  empagliflozin, glipiZIDE,   01- 07-                             metFORMIN................    [employer] Embedded Care Due Messages. Reference number: [id number].   5/11/2025 12:51:28 PM CDT

## 2025-05-12 RX ORDER — LEVOTHYROXINE SODIUM 25 UG/1
25 TABLET ORAL
Qty: 90 TABLET | Refills: 2 | Status: SHIPPED | OUTPATIENT
Start: 2025-05-12

## 2025-05-12 NOTE — TELEPHONE ENCOUNTER
Provider Staff:  Action required for this patient    Requires labs      Please see care gap opportunities below in Care Due Message.    Thanks!  [facility]     Appointments      Date Provider   Last Visit   5/2/2025 Kristen Adler DO   Next Visit   Visit date not found Kristen Adler DO     Refill Decision Note   Laron Kothari  is requesting a refill authorization.  Brief Assessment and Rationale for Refill:  Approve     Medication Therapy Plan:         Comments:     Note composed:7:51 AM 05/12/2025

## 2025-05-15 ENCOUNTER — E-VISIT (OUTPATIENT)
Dept: INTERNAL MEDICINE | Facility: CLINIC | Age: 55
End: 2025-05-15
Payer: COMMERCIAL

## 2025-05-15 DIAGNOSIS — M79.89 LEG SWELLING: Primary | ICD-10-CM

## 2025-05-16 NOTE — PROGRESS NOTES
Patient ID: Laron Kothari Jr. is a 54 y.o. male.    Chief Complaint: General Illness (Entered automatically based on patient selection in [employer].)    The patient initiated a request through [employer] on 5/15/2025 for evaluation and management with a chief complaint of General Illness (Entered automatically based on patient selection in [employer].)     I evaluated the questionnaire submission on 5/16/2025.    [facility]-Chronic Conditions    5/15/2025 12:12 PM CDT - Filed by Patient   What do you need help with? Other Concern   Do you agree to participate in an E-Visit? Yes   If you have any of the following symptoms, please go to the nearest emergency room or call 911: I acknowledge   What is the main issue you would like addressed today? Clementina been having problems with swollen legs its getting to the point where i can barely make it thru a work day!   Please describe your symptoms. Swollen legs extreme fatigue!   Where is your problem located? Legs   On a scale of 1-10, where 10 is the worst you can imagine, how severe are your symptoms? (range: 1 - 10) 10   Have you had these symptoms before? Yes   How long have you had these symptoms? More than a month   What helps with your symptoms? Nothing   What makes your symptoms feel worse? Walking climbing stairs   Are your symptoms related to a condition you currently have? Not sure   Please describe any probable cause for your symptoms. Dont know   Provide any additional information you feel is important.    Please attach any relevant images or files    Are you able to take your vital signs? No         Encounter Diagnosis   Name Primary?    Leg swelling Yes        Orders Placed This Encounter   Procedures    BNP     Standing Status:   Future     Expected Date:   5/16/2025     Expiration Date:   8/14/2025     Send normal result to authorizing provider's In Basket if patient is active on [employer]::   Yes            No follow-ups on file.      E-Visit Time  Tracking:    Day 1 Time (in minutes): 5    Total Time (in minutes): 5

## 2025-05-20 ENCOUNTER — LAB VISIT (OUTPATIENT)
Dept: LAB | Facility: HOSPITAL | Age: 55
End: 2025-05-20
Attending: INTERNAL MEDICINE
Payer: COMMERCIAL

## 2025-05-20 DIAGNOSIS — M79.89 LEG SWELLING: ICD-10-CM

## 2025-05-20 DIAGNOSIS — I25.10 CORONARY ARTERY DISEASE, UNSPECIFIED VESSEL OR LESION TYPE, UNSPECIFIED WHETHER ANGINA PRESENT, UNSPECIFIED WHETHER NATIVE OR TRANSPLANTED HEART: ICD-10-CM

## 2025-05-20 DIAGNOSIS — I25.118 CORONARY ARTERY DISEASE OF NATIVE ARTERY OF NATIVE HEART WITH STABLE ANGINA PECTORIS: ICD-10-CM

## 2025-05-20 DIAGNOSIS — R42 VERTIGO: ICD-10-CM

## 2025-05-20 DIAGNOSIS — E03.9 HYPOTHYROIDISM (ACQUIRED): ICD-10-CM

## 2025-05-20 LAB — BNP SERPL-MCNC: 84 PG/ML (ref 0–99)

## 2025-05-20 PROCEDURE — 36415 COLL VENOUS BLD VENIPUNCTURE: CPT

## 2025-05-20 PROCEDURE — 83880 ASSAY OF NATRIURETIC PEPTIDE: CPT

## 2025-05-21 ENCOUNTER — RESULTS FOLLOW-UP (OUTPATIENT)
Dept: INTERNAL MEDICINE | Facility: CLINIC | Age: 55
End: 2025-05-21

## 2025-05-21 ENCOUNTER — E-VISIT (OUTPATIENT)
Dept: INTERNAL MEDICINE | Facility: CLINIC | Age: 55
End: 2025-05-21
Payer: COMMERCIAL

## 2025-05-21 DIAGNOSIS — R42 DIZZINESS: Primary | ICD-10-CM

## 2025-05-21 RX ORDER — RANOLAZINE 500 MG/1
500 TABLET, EXTENDED RELEASE ORAL 2 TIMES DAILY
Qty: 180 TABLET | Refills: 3 | Status: SHIPPED | OUTPATIENT
Start: 2025-05-21

## 2025-05-21 RX ORDER — LEVOTHYROXINE SODIUM 25 UG/1
25 TABLET ORAL
Qty: 90 TABLET | Refills: 2 | Status: SHIPPED | OUTPATIENT
Start: 2025-05-21

## 2025-05-21 RX ORDER — PRASUGREL 10 MG/1
10 TABLET, FILM COATED ORAL DAILY
Qty: 90 TABLET | Refills: 3 | Status: SHIPPED | OUTPATIENT
Start: 2025-05-21

## 2025-05-21 NOTE — TELEPHONE ENCOUNTER
Refill Routing Note   Medication(s) are not appropriate for processing by [facility] for the following reason(s):        Outside of protocol    [facility] action(s):  Route  Approve      Medication Therapy Plan: FREE T4-WNL      Appointments  past 12m or future 3m with PCP    Date Provider   Last Visit   5/15/2025 Kristen Adler DO   Next Visit   Visit date not found Kristen Adler DO   ED visits in past 90 days: 0        Note composed:8:35 AM 05/21/2025

## 2025-05-21 NOTE — TELEPHONE ENCOUNTER
No care due was identified.  [facility] Embedded Care Due Messages. Reference number: [id number].   5/20/2025 7:16:59 PM CDT

## 2025-05-26 RX ORDER — MECLIZINE HYDROCHLORIDE 25 MG/1
25 TABLET ORAL 3 TIMES DAILY PRN
Qty: 30 TABLET | Refills: 0 | Status: SHIPPED | OUTPATIENT
Start: 2025-05-26

## 2025-05-29 NOTE — PROGRESS NOTES
Patient ID: Laron Kothari Jr. is a 54 y.o. male.        E-Visit Time Tracking:   Day 1 Time (in minutes): 5  Total Time (in minutes): 5      Chief Complaint: General Illness (Entered automatically based on patient selection in [employer].)      The patient initiated a request through [employer] on 5/21/2025 for evaluation and management with a chief complaint of General Illness (Entered automatically based on patient selection in [employer].)     I evaluated the questionnaire submission on 05/29/2025.    [facility]-Medication    5/21/2025 11:26 AM CDT - Filed by Patient   What do you need help with? Medication Request   Do you agree to participate in an E-Visit? Yes   If you have any of the following symptoms, please present to your local emergency room or call 911:  I acknowledge   Medication requests for narcotics will not be addressed via an E-Visit.  Please schedule an appointment. I acknowledge   Do you want to address a new or existing medication? I would like to address a medication I currently take   What is the main issue you would like addressed today? I need refills on the medications I submitted on yesterday!im completely out!   Would you like to change or continue your medication? Continue medication   What medication would you like to continue?  The new medication   Are you taking it as prescribed? Yes    What medical condition is the  medication intended to treat? Dizzyness   Is the medication helping your condition? Yes   Are you having any side effects from the medication? No   Provide any additional information you feel is important.    Please attach any relevant images or files    Are you able to take your vital signs? No         Encounter Diagnosis   Name Primary?    Dizziness Yes        No orders of the defined types were placed in this encounter.           No follow-ups on file.

## 2025-06-09 DIAGNOSIS — I25.10 CORONARY ARTERY DISEASE, UNSPECIFIED VESSEL OR LESION TYPE, UNSPECIFIED WHETHER ANGINA PRESENT, UNSPECIFIED WHETHER NATIVE OR TRANSPLANTED HEART: ICD-10-CM

## 2025-06-09 RX ORDER — METOPROLOL SUCCINATE 50 MG/1
100 TABLET, EXTENDED RELEASE ORAL 2 TIMES DAILY
Qty: 120 TABLET | Refills: 6 | Status: SHIPPED | OUTPATIENT
Start: 2025-06-09

## 2025-06-13 ENCOUNTER — HOSPITAL ENCOUNTER (OUTPATIENT)
Dept: RADIOLOGY | Facility: HOSPITAL | Age: 55
Discharge: HOME | End: 2025-06-13
Attending: PHYSICIAN ASSISTANT
Payer: COMMERCIAL

## 2025-06-13 ENCOUNTER — OFFICE VISIT (OUTPATIENT)
Dept: INTERNAL MEDICINE | Facility: CLINIC | Age: 55
End: 2025-06-13
Payer: COMMERCIAL

## 2025-06-13 VITALS
SYSTOLIC BLOOD PRESSURE: 152 MMHG | HEIGHT: 74 IN | TEMPERATURE: 96 F | DIASTOLIC BLOOD PRESSURE: 88 MMHG | OXYGEN SATURATION: 98 % | WEIGHT: 231.06 LBS | RESPIRATION RATE: 21 BRPM | HEART RATE: 79 BPM | BODY MASS INDEX: 29.65 KG/M2

## 2025-06-13 DIAGNOSIS — J22 LOWER RESP. TRACT INFECTION: ICD-10-CM

## 2025-06-13 DIAGNOSIS — E78.5 HYPERLIPIDEMIA LDL GOAL <70: Chronic | ICD-10-CM

## 2025-06-13 DIAGNOSIS — F17.200 TOBACCO USE DISORDER: ICD-10-CM

## 2025-06-13 DIAGNOSIS — E03.9 HYPOTHYROIDISM (ACQUIRED): ICD-10-CM

## 2025-06-13 DIAGNOSIS — B36.0 TINEA VERSICOLOR: ICD-10-CM

## 2025-06-13 DIAGNOSIS — R05.3 CHRONIC COUGH: ICD-10-CM

## 2025-06-13 DIAGNOSIS — J22 LOWER RESP. TRACT INFECTION: Primary | ICD-10-CM

## 2025-06-13 DIAGNOSIS — I10 HYPERTENSION GOAL BP (BLOOD PRESSURE) < 130/80: Chronic | ICD-10-CM

## 2025-06-13 DIAGNOSIS — R80.9 CONTROLLED TYPE 2 DIABETES MELLITUS WITH MICROALBUMINURIA, WITHOUT LONG-TERM CURRENT USE OF INSULIN: ICD-10-CM

## 2025-06-13 DIAGNOSIS — E11.29 CONTROLLED TYPE 2 DIABETES MELLITUS WITH MICROALBUMINURIA, WITHOUT LONG-TERM CURRENT USE OF INSULIN: ICD-10-CM

## 2025-06-13 DIAGNOSIS — D64.9 ANEMIA, UNSPECIFIED TYPE: ICD-10-CM

## 2025-06-13 PROCEDURE — 71046 X-RAY EXAM CHEST 2 VIEWS: CPT | Mod: TC

## 2025-06-13 PROCEDURE — 71046 X-RAY EXAM CHEST 2 VIEWS: CPT | Mod: 26,,, | Performed by: RADIOLOGY

## 2025-06-13 PROCEDURE — 99999 PR PBB SHADOW E&M-EST. PATIENT-LVL V: CPT | Mod: PBBFAC,,, | Performed by: PHYSICIAN ASSISTANT

## 2025-06-13 RX ORDER — BENZONATATE 200 MG/1
200 CAPSULE ORAL 3 TIMES DAILY PRN
COMMUNITY
Start: 2025-06-07 | End: 2025-06-14

## 2025-06-13 RX ORDER — GUAIFENESIN 100 MG/5ML
300 LIQUID ORAL EVERY 4 HOURS PRN
COMMUNITY
Start: 2025-06-07 | End: 2025-06-17

## 2025-06-13 RX ORDER — PRENATAL VIT 91/IRON/FOLIC/DHA 28-975-200
COMBINATION PACKAGE (EA) ORAL 2 TIMES DAILY
Qty: 28 G | Refills: 0 | Status: SHIPPED | OUTPATIENT
Start: 2025-06-13 | End: 2025-06-27

## 2025-06-13 RX ORDER — DOXYCYCLINE HYCLATE 100 MG
100 TABLET ORAL EVERY 12 HOURS
Qty: 14 TABLET | Refills: 0 | Status: SHIPPED | OUTPATIENT
Start: 2025-06-13 | End: 2025-06-20

## 2025-06-13 RX ORDER — LORATADINE 10 MG/1
10 TABLET ORAL DAILY
COMMUNITY
Start: 2025-06-07 | End: 2025-06-21

## 2025-06-13 NOTE — PROGRESS NOTES
"Subjective:      Patient ID: Laron Kothari Jr. is a 55 y.o. male.    Chief Complaint: Cough (He is here due to cough, states he is having back pain, trouble sleeping at night due to cough. Coughing up mucus. States nothing has helped him feel better. Has been going on for approximately 6-7 months now. )    Patient is known to me, being seen today for chronic cough.   Ongoing 6-7mths   Current regimen includes Claritin, tessalon, robitussin, albuterol  Previous treatment includes steroids, flonase promethazine DM without relief    W/u includes BNP and CXR (last @ Urgent Care June 7)- unremarkable     Previously seen by Pulm for MARIYA, LV Dec 2023     Skin lightening around chin, no new products, only on face  Denies itching or pain     Last visit May 2025 w PCP.       Review of Systems   Constitutional:  Positive for chills. Negative for diaphoresis and fever.   HENT:  Positive for congestion, rhinorrhea, sinus pressure and sore throat. Negative for ear pain.    Respiratory:  Positive for cough (productive, green phlegm), shortness of breath (worse at night and in the sun) and wheezing.         +smoker, <.5pk/day, 30+yr smoking history   Gastrointestinal:  Negative for abdominal pain, constipation, diarrhea, nausea and vomiting.   Skin:  Negative for rash.   Neurological:  Positive for dizziness (position changes, meclizine helps) and headaches (intermittent). Negative for light-headedness.       Objective:   BP (!) 152/88 (BP Location: Right arm, Patient Position: Sitting)   Pulse 79   Temp (!) 95.9 °F (35.5 °C) (Tympanic)   Resp (!) 21   Ht 6' 2" (1.88 m)   Wt 104.8 kg (231 lb 0.7 oz)   SpO2 98%   BMI 29.66 kg/m²   Physical Exam  Constitutional:       General: He is not in acute distress.     Appearance: Normal appearance. He is well-developed. He is not ill-appearing.   HENT:      Head: Normocephalic and atraumatic.        Right Ear: Hearing and external ear normal.      Left Ear: Hearing and external ear " normal.      Nose: Nose normal.      Mouth/Throat:      Mouth: Mucous membranes are moist.      Pharynx: Oropharynx is clear.   Cardiovascular:      Rate and Rhythm: Normal rate and regular rhythm.      Heart sounds: Normal heart sounds. No murmur heard.  Pulmonary:      Effort: Pulmonary effort is normal. No respiratory distress.      Breath sounds: Examination of the right-upper field reveals rhonchi. Examination of the left-upper field reveals rhonchi. Rhonchi present. No decreased breath sounds.   Musculoskeletal:      Right lower leg: No edema.      Left lower leg: No edema.   Skin:     General: Skin is warm and dry.      Findings: No rash.   Psychiatric:         Speech: Speech normal.         Behavior: Behavior normal.         Thought Content: Thought content normal.       Assessment:      1. Lower resp. tract infection    2. Chronic cough    3. Hypertension goal BP (blood pressure) < 130/80    4. Tobacco use disorder    5. Hyperlipidemia LDL goal <70    6. Controlled type 2 diabetes mellitus with microalbuminuria, without long-term current use of insulin    7. Hypothyroidism (acquired)    8. Anemia, unspecified type    9. Tinea versicolor       Plan:   Lower resp. tract infection  -     doxycycline (VIBRA-TABS) 100 MG tablet; Take 1 tablet (100 mg total) by mouth every 12 (twelve) hours. for 7 days  Dispense: 14 tablet; Refill: 0  -     X-Ray Chest PA And Lateral; Future; Expected date: 06/13/2025    Chronic cough    Hypertension goal BP (blood pressure) < 130/80  -     CBC Auto Differential; Future; Expected date: 06/13/2025  -     Comprehensive Metabolic Panel; Future; Expected date: 06/13/2025  -     Lipid Panel; Future; Expected date: 06/13/2025    Tobacco use disorder    Hyperlipidemia LDL goal <70    Controlled type 2 diabetes mellitus with microalbuminuria, without long-term current use of insulin  -     Hemoglobin A1C; Future; Expected date: 06/13/2025    Hypothyroidism (acquired)  -     TSH; Future;  Expected date: 06/13/2025  -     T4, Free; Future; Expected date: 06/13/2025    Anemia, unspecified type  -     CBC Auto Differential; Future; Expected date: 06/13/2025  -     Iron and TIBC; Future; Expected date: 06/13/2025  -     Ferritin; Future; Expected date: 06/13/2025    Tinea versicolor  -     terbinafine HCL (LAMISIL) 1 % cream; Apply topically 2 (two) times daily. for 14 days  Dispense: 28 g; Refill: 0      Dizziness possibly vertigo but H/H fairly low in Jan without recheck, TSH also high at that time without repeat evaluation  R/o other cause and then refer to ENT      F/u Derm if rash persists     Begin antibiotics, close f/u Pulm    Fasting labs and routine f/u w me next month     Discussed worsening signs/symptoms and when to return to clinic or go to ED.   Patient expresses understanding and agrees with treatment plan.

## 2025-06-16 ENCOUNTER — RESULTS FOLLOW-UP (OUTPATIENT)
Dept: INTERNAL MEDICINE | Facility: CLINIC | Age: 55
End: 2025-06-16

## 2025-06-16 DIAGNOSIS — D50.9 IRON DEFICIENCY ANEMIA, UNSPECIFIED IRON DEFICIENCY ANEMIA TYPE: Primary | ICD-10-CM

## 2025-06-17 ENCOUNTER — OFFICE VISIT (OUTPATIENT)
Dept: PULMONOLOGY | Facility: CLINIC | Age: 55
End: 2025-06-17
Payer: COMMERCIAL

## 2025-06-17 VITALS
HEIGHT: 74 IN | BODY MASS INDEX: 29.97 KG/M2 | SYSTOLIC BLOOD PRESSURE: 140 MMHG | RESPIRATION RATE: 18 BRPM | OXYGEN SATURATION: 98 % | TEMPERATURE: 98 F | WEIGHT: 233.5 LBS | DIASTOLIC BLOOD PRESSURE: 62 MMHG | HEART RATE: 92 BPM

## 2025-06-17 DIAGNOSIS — J40 BRONCHITIS: Primary | ICD-10-CM

## 2025-06-17 PROCEDURE — 1159F MED LIST DOCD IN RCRD: CPT | Mod: CPTII,S$GLB,, | Performed by: PHYSICIAN ASSISTANT

## 2025-06-17 PROCEDURE — 99999 PR PBB SHADOW E&M-EST. PATIENT-LVL V: CPT | Mod: PBBFAC,,, | Performed by: PHYSICIAN ASSISTANT

## 2025-06-17 PROCEDURE — 3078F DIAST BP <80 MM HG: CPT | Mod: CPTII,S$GLB,, | Performed by: PHYSICIAN ASSISTANT

## 2025-06-17 PROCEDURE — 3077F SYST BP >= 140 MM HG: CPT | Mod: CPTII,S$GLB,, | Performed by: PHYSICIAN ASSISTANT

## 2025-06-17 PROCEDURE — 99214 OFFICE O/P EST MOD 30 MIN: CPT | Mod: S$GLB,,, | Performed by: PHYSICIAN ASSISTANT

## 2025-06-17 PROCEDURE — 3008F BODY MASS INDEX DOCD: CPT | Mod: CPTII,S$GLB,, | Performed by: PHYSICIAN ASSISTANT

## 2025-06-17 PROCEDURE — 1160F RVW MEDS BY RX/DR IN RCRD: CPT | Mod: CPTII,S$GLB,, | Performed by: PHYSICIAN ASSISTANT

## 2025-06-17 PROCEDURE — 4010F ACE/ARB THERAPY RXD/TAKEN: CPT | Mod: CPTII,S$GLB,, | Performed by: PHYSICIAN ASSISTANT

## 2025-06-17 PROCEDURE — 3044F HG A1C LEVEL LT 7.0%: CPT | Mod: CPTII,S$GLB,, | Performed by: PHYSICIAN ASSISTANT

## 2025-06-17 RX ORDER — PREDNISONE 20 MG/1
20 TABLET ORAL 2 TIMES DAILY
Qty: 10 TABLET | Refills: 0 | Status: SHIPPED | OUTPATIENT
Start: 2025-06-17

## 2025-06-17 RX ORDER — FLUTICASONE FUROATE, UMECLIDINIUM BROMIDE AND VILANTEROL TRIFENATATE 100; 62.5; 25 UG/1; UG/1; UG/1
1 POWDER RESPIRATORY (INHALATION) DAILY
Qty: 60 EACH | Refills: 2 | Status: SHIPPED | OUTPATIENT
Start: 2025-06-17

## 2025-06-17 RX ORDER — IPRATROPIUM BROMIDE AND ALBUTEROL SULFATE 2.5; .5 MG/3ML; MG/3ML
3 SOLUTION RESPIRATORY (INHALATION) EVERY 6 HOURS PRN
Qty: 75 ML | Refills: 2 | Status: SHIPPED | OUTPATIENT
Start: 2025-06-17 | End: 2026-06-17

## 2025-06-18 NOTE — PROGRESS NOTES
Subjective:       Patient ID: Laron Kothari Jr. is a 55 y.o. male.    Chief Complaint: Shortness of Breath, Chest Pain, and Cough      History of Present Illness    CHIEF COMPLAINT:  Laron presents with a persistent cough and flu-like symptoms that have been ongoing for several months, with recent worsening.    HPI:  Laron reports cold symptoms and cough for approximately 4-5 months. The current episode is the most severe, with an intense cough disrupting sleep, resulting in only about 5 hours of sleep in the past week. He has body aches, particularly in the chest, arms, and neck, resembling flu-like symptoms. The cough is productive with mucus. He also reports sinus congestion and pain.    The symptoms have been fluctuating, typically lasting for 2-3 weeks, then subsiding for about 2 weeks before recurring. This time the symptoms have persisted longer and are more severe. He reports extreme fatigue, noting that walking from the car to the clinic was exhausting.    He previously sought medical care for these symptoms at urgent care where he was tested for COVID, pneumonia, and flu, all of which were negative. He was prescribed antibiotics, which he reports exacerbated his condition. He took steroids about 3 weeks ago and used inhalers, which he reports as ineffective.    He denies having had any breathing treatments.    MEDICATIONS:  Laron started antibiotics a few days ago. He took steroid pills about 3 weeks ago. He is on inhalers and cough syrup. Laron recently started Doxycycline and was prescribed iron pills.    MEDICAL HISTORY:  Laron has a history of smoking.    TEST RESULTS:  Laron underwent COVID, pneumonia, and flu tests at urgent care, all of which were negative.    IMAGING:  A CT of the lungs was scheduled, but the patient missed the appointment.    SOCIAL HISTORY:  Smoking: Current smoker Occupation: Employed, works Saturday through Wednesday      ROS:  General: -fever, -chills, +fatigue, -weight  gain, -weight loss, +burning sensation  Eyes: -vision changes, -redness, -discharge  ENT: -ear pain, +nasal congestion, -sore throat, +blockage or obstruction, +sinus pressure  Cardiovascular: +chest pain, -palpitations, -lower extremity edema  Respiratory: +cough, -shortness of breath, +productive cough  Gastrointestinal: +abdominal pain, -nausea, -vomiting, -diarrhea, -constipation, -blood in stool  Genitourinary: -dysuria, -hematuria, -frequency  Musculoskeletal: -joint pain, -muscle pain, +body aches  Skin: -rash, -lesion  Neurological: -headache, -dizziness, -numbness, -tingling, +sleep disturbances  Psychiatric: -anxiety, -depression, +sleep difficulty  Neck: +neck pain          Immunization History   Administered Date(s) Administered    COVID-19, MRNA, LN-S, PF (Pfizer) (Gray Cap) 01/25/2022    COVID-19, MRNA, LN-S, PF (Pfizer) (Purple Cap) 12/30/2021    Influenza - Quadrivalent 11/09/2015    Influenza - Quadrivalent - PF *Preferred* (6 months and older) 11/09/2015, 12/08/2017, 12/08/2017, 11/29/2019, 01/18/2022, 10/06/2022    Influenza - Trivalent - Afluria, Fluzone MDV 10/16/2012    Influenza - Trivalent - Fluarix, Flulaval, Fluzone, Afluria - PF 01/30/2025    Pneumococcal 07/17/2011    Pneumococcal Conjugate - 20 Valent 10/06/2022    Pneumococcal Polysaccharide - 23 Valent 01/29/2015    Tdap 08/09/2016    Zoster Recombinant 10/06/2022, 04/19/2023      Tobacco Use: High Risk (6/18/2025)    Patient History     Smoking Tobacco Use: Every Day     Smokeless Tobacco Use: Never     Passive Exposure: Not on file      Past Medical History:   Diagnosis Date    Blood in stool 08/07/2018    Deep vein thrombosis (DVT) 2017    Left leg    Diabetes mellitus, type 2 2013    Gout     Hyperlipidemia     Hypertension     Neuropathy     Smoker       Medications Ordered Prior to Encounter[1]     Review of Systems    Objective:       Vitals:    06/17/25 1557   BP: (!) 140/62   Patient Position: Sitting   Pulse: 92   Resp: 18  "  Temp: 98 °F (36.7 °C)   SpO2: 98%   Weight: 105.9 kg (233 lb 7.8 oz)   Height: 6' 2" (1.88 m)       Physical Exam   Constitutional: He is oriented to person, place, and time. He appears well-developed and well-nourished. No distress.   HENT:   Head: Normocephalic.   Mouth/Throat: Oropharynx is clear and moist.   Cardiovascular: Normal rate and regular rhythm.   Pulmonary/Chest: Effort normal. No respiratory distress. He has wheezes. He has rhonchi. He has no rales.   Musculoskeletal:         General: No edema.      Cervical back: Normal range of motion and neck supple.   Neurological: He is alert and oriented to person, place, and time. Gait normal.   Skin: Skin is warm and dry.   Psychiatric: He has a normal mood and affect.   Vitals reviewed.    Personal Diagnostic Review    X-Ray Chest PA And Lateral  Narrative: EXAM: XR CHEST PA AND LATERAL    CLINICAL HISTORY: Acute lower respiratory infection    FINDINGS:  Comparison made to prior chest x-ray dated 5-25.  Clear lungs.  No pleural effusion or pneumothorax or pulmonary edema.  Normal heart size.  Intact thoracic osseous structures.  Impression:  No acute cardiopulmonary process.    Finalized on: 6/14/2025 12:19 AM By:  Maximo Adler MD  [facility]# [id number]      2025-06-14 00:21:35.344     [facility]            Assessment/Plan:       1. Bronchitis  -     predniSONE (DELTASONE) 20 MG tablet; Take 1 tablet (20 mg total) by mouth 2 (two) times daily.  Dispense: 10 tablet; Refill: 0  -     fluticasone-umeclidin-vilanter (TRELEGY ELLIPTA) 100-62.5-25 mcg DsDv; Inhale 1 puff into the lungs once daily.  Dispense: 60 each; Refill: 2  -     NEBULIZER KIT (SUPPLIES) FOR HOME USE  -     NEBULIZER FOR HOME USE  -     albuterol-ipratropium (DUO-NEB) 2.5 mg-0.5 mg/3 mL nebulizer solution; Take 3 mLs by nebulization every 6 (six) hours as needed for Wheezing or Shortness of Breath. Rescue  Dispense: 75 mL; Refill: 2      Assessment & Plan    IMPRESSION:  - Suspect recurrent viral upper " respiratory infections  - Considered COPD management approach due to smoking history and persistent symptoms.  - Discussed that current symptoms may be due to inflamed and constricted airways.    BRONCHITIS:  - Explained use of nebulizer machine, describing it as a device that delivers medication through a mist to help open airways.  - Started nebulizer treatments with liquid medication and ordered nebulizer machine for home use.  - Started Trelegy inhaler (COPD inhaler).  - Continued doxycycline.  - Started new round of prednisone   - Laron to take ibuprofen for body aches and pain.     Follow up 2 weeks.    Discussed diagnosis, its evaluation, treatment and usual course. All questions answered.    Patient verbalized understanding of plan and left in no acute distress    Thank you for the courtesy of participating in the care of this patient    Elizabeth G Blough, PA-C [facility]    This note was generated with the assistance of ambient listening technology. Verbal consent was obtained by the patient and accompanying visitor(s) for the recording of patient appointment to facilitate this note. I attest to having reviewed and edited the generated note for accuracy, though some syntax or spelling errors may persist. Please contact the author of this note for any clarification.            [1]   Current Outpatient Medications on File Prior to Visit   Medication Sig Dispense Refill    albuterol (VENTOLIN HFA) 90 mcg/actuation inhaler Inhale 2 puffs into the lungs every 6 (six) hours as needed for Wheezing. Rescue 18 g 0    BLOOD PRESSURE CUFF Misc 1 Device by Misc.(Non-Drug; Combo Route) route once daily.  0    blood sugar diagnostic Strp Once daily glucose testing. 50 strip 6    blood-glucose meter Misc Use as directed. 1 each 0    doxycycline (VIBRA-TABS) 100 MG tablet Take 1 tablet (100 mg total) by mouth every 12 (twelve) hours. for 7 days 14 tablet 0    fluticasone propionate (FLONASE) 50 mcg/actuation  "nasal spray 1 spray (50 mcg total) by Each Nostril route once daily. 16 g 0    glipiZIDE (GLUCOTROL) 5 MG tablet Take 1 tablet by mouth once daily with breakfast 90 tablet 1    [] guaiFENesin 100 mg/5 ml (ROBITUSSIN) 100 mg/5 mL syrup Take 300 mg by mouth every 4 (four) hours as needed.      lancets (LANCETS,THIN) Misc Once daily glucose testing. 50 each 6    levothyroxine (SYNTHROID) 25 MCG tablet Take 1 tablet (25 mcg total) by mouth before breakfast. 90 tablet 2    lisinopriL (PRINIVIL,ZESTRIL) 40 MG tablet Take 1 tablet by mouth once daily 90 tablet 3    loratadine (CLARITIN) 10 mg tablet Take 10 mg by mouth once daily.      meclizine (ANTIVERT) 25 mg tablet Take 1 tablet (25 mg total) by mouth 3 (three) times daily as needed for Dizziness. 30 tablet 0    metFORMIN (GLUCOPHAGE) 1000 MG tablet TAKE 1 TABLET BY MOUTH TWICE DAILY WITH MEALS 180 tablet 1    metoprolol succinate (TOPROL-XL) 50 MG 24 hr tablet Take 2 tablets by mouth twice daily 120 tablet 6    pen needle, diabetic (PEN NEEDLE) 31 gauge x 5/16" Ndle Use once daily with insulin injection. 50 each 3    prasugreL HCl (EFFIENT) 10 mg Tab Take 1 tablet (10 mg total) by mouth once daily. 90 tablet 3    pravastatin (PRAVACHOL) 80 MG tablet Take 1 tablet by mouth in the evening 90 tablet 3    ranolazine (RANEXA) 500 MG Tb12 Take 1 tablet (500 mg total) by mouth 2 (two) times daily. 180 tablet 3    tadalafiL (CIALIS) 10 MG tablet Take 1 tablet (10 mg total) by mouth daily as needed for Erectile Dysfunction. 10 tablet 3    terbinafine HCL (LAMISIL) 1 % cream Apply topically 2 (two) times daily. for 14 days 28 g 0    traMADoL (ULTRAM) 50 mg tablet Take 1 tablet (50 mg total) by mouth every 6 (six) hours as needed. 12 tablet 0    traZODone (DESYREL) 50 MG tablet Take 1 tablet by mouth in the evening 90 tablet 3    aspirin (ECOTRIN) 81 MG EC tablet Take 1 tablet (81 mg total) by mouth once daily. 30 tablet 11    empagliflozin (JARDIANCE) 10 mg tablet Take 1 " tablet (10 mg total) by mouth once daily. (Patient not taking: Reported on 6/17/2025) 30 tablet 5    gabapentin (NEURONTIN) 300 MG capsule Take 1 capsule (300 mg total) by mouth 3 (three) times daily. 90 capsule 11     No current facility-administered medications on file prior to visit.

## 2025-06-30 ENCOUNTER — OFFICE VISIT (OUTPATIENT)
Dept: PULMONOLOGY | Facility: CLINIC | Age: 55
End: 2025-06-30
Payer: COMMERCIAL

## 2025-06-30 ENCOUNTER — TELEPHONE (OUTPATIENT)
Dept: PULMONOLOGY | Facility: CLINIC | Age: 55
End: 2025-06-30

## 2025-06-30 DIAGNOSIS — J40 BRONCHITIS: ICD-10-CM

## 2025-06-30 RX ORDER — MONTELUKAST SODIUM 10 MG/1
10 TABLET ORAL NIGHTLY
Qty: 30 TABLET | Refills: 11 | Status: SHIPPED | OUTPATIENT
Start: 2025-06-30 | End: 2026-06-25

## 2025-06-30 RX ORDER — PROMETHAZINE HYDROCHLORIDE AND DEXTROMETHORPHAN HYDROBROMIDE 6.25; 15 MG/5ML; MG/5ML
5 SYRUP ORAL EVERY 4 HOURS PRN
Qty: 240 ML | Refills: 0 | Status: SHIPPED | OUTPATIENT
Start: 2025-06-30 | End: 2025-07-10

## 2025-07-01 NOTE — PROGRESS NOTES
Subjective:       Patient ID: Laron Kothari Jr. is a 55 y.o. male.    The patient location is: work in [place]  The chief complaint leading to consultation is: cough, congestion    Visit type: audiovisual    Face to Face time with patient: 20 minutes  30 minutes of total time spent on the encounter, which includes face to face time and non-face to face time preparing to see the patient (eg, review of tests), Obtaining and/or reviewing separately obtained history, Documenting clinical information in the electronic or other health record, Independently interpreting results (not separately reported) and communicating results to the patient/family/caregiver, or Care coordination (not separately reported).         Each patient to whom he or she provides medical services by telemedicine is:  (1) informed of the relationship between the physician and patient and the respective role of any other health care provider with respect to management of the patient; and (2) notified that he or she may decline to receive medical services by telemedicine and may withdraw from such care at any time.    Notes:       6/30/25  Here for bronchitis follow up  Last visit given doxycyline, prednisone, albuterol neb  He says no improvement  Coughing, sputum production, SOB, body aches, nasal/sinus congestion  These symptoms have been on and off for 6 months    6/17/25  Laron reports cold symptoms and cough for approximately 4-5 months. The current episode is the most severe, with an intense cough disrupting sleep, resulting in only about 5 hours of sleep in the past week. He has body aches, particularly in the chest, arms, and neck, resembling flu-like symptoms. The cough is productive with mucus. He also reports sinus congestion and pain.     The symptoms have been fluctuating, typically lasting for 2-3 weeks, then subsiding for about 2 weeks before recurring. This time the symptoms have persisted longer and are more severe. He reports  extreme fatigue, noting that walking from the car to the clinic was exhausting.     He previously sought medical care for these symptoms at urgent care where he was tested for COVID, pneumonia, and flu, all of which were negative. He was prescribed antibiotics, which he reports exacerbated his condition. He took steroids about 3 weeks ago and used inhalers, which he reports as ineffective.    Immunization History   Administered Date(s) Administered    COVID-19, MRNA, LN-S, PF (Pfizer) (Gray Cap) 01/25/2022    COVID-19, MRNA, LN-S, PF (Pfizer) (Purple Cap) 12/30/2021    Influenza - Quadrivalent 11/09/2015    Influenza - Quadrivalent - PF *Preferred* (6 months and older) 11/09/2015, 12/08/2017, 12/08/2017, 11/29/2019, 01/18/2022, 10/06/2022    Influenza - Trivalent - Afluria, Fluzone MDV 10/16/2012    Influenza - Trivalent - Fluarix, Flulaval, Fluzone, Afluria - PF 01/30/2025    Pneumococcal 07/17/2011    Pneumococcal Conjugate - 20 Valent 10/06/2022    Pneumococcal Polysaccharide - 23 Valent 01/29/2015    Tdap 08/09/2016    Zoster Recombinant 10/06/2022, 04/19/2023      Tobacco Use: High Risk (7/2/2025)    Patient History     Smoking Tobacco Use: Every Day     Smokeless Tobacco Use: Never     Passive Exposure: Not on file      Past Medical History:   Diagnosis Date    Blood in stool 08/07/2018    Deep vein thrombosis (DVT) 2017    Left leg    Diabetes mellitus, type 2 2013    Gout     Hyperlipidemia     Hypertension     Neuropathy     Smoker       Medications Ordered Prior to Encounter[1]     Review of Systems   Constitutional:  Positive for appetite change, fatigue and weakness. Negative for weight loss and weight gain.   HENT:  Positive for sinus pressure and congestion.    Respiratory:  Positive for cough, sputum production, shortness of breath, wheezing and use of rescue inhaler.    Cardiovascular:  Negative for leg swelling.   Gastrointestinal:  Negative for nausea and vomiting.   Neurological:  Positive for  headaches.   Psychiatric/Behavioral:  Positive for sleep disturbance.        Objective:       There were no vitals filed for this visit.      Physical Exam   Constitutional: He is oriented to person, place, and time. He appears well-developed and well-nourished. No distress.   HENT:   Mouth/Throat: Oropharynx is clear and moist.   Pulmonary/Chest: Effort normal. No respiratory distress.   Coughing during visit   Musculoskeletal:         General: No edema.      Cervical back: Normal range of motion.   Neurological: He is alert and oriented to person, place, and time.   Skin: No rash noted.   Psychiatric: He has a normal mood and affect.   Vitals reviewed.    Personal Diagnostic Review    X-Ray Chest PA And Lateral  Narrative: EXAM: XR CHEST PA AND LATERAL    CLINICAL HISTORY: Acute lower respiratory infection    FINDINGS:  Comparison made to prior chest x-ray dated .  Clear lungs.  No pleural effusion or pneumothorax or pulmonary edema.  Normal heart size.  Intact thoracic osseous structures.  Impression:  No acute cardiopulmonary process.    Finalized on: 2025 12:19 AM By:  Maximo Adler MD  [facility]# [id number]      2025 00:21:35.344     [facility]            Assessment/Plan:       1. Bronchitis  -     X-Ray Sinuses Min 3 Views; Future; Expected date: 2025  -     montelukast (SINGULAIR) 10 mg tablet; Take 1 tablet (10 mg total) by mouth every evening.  Dispense: 30 tablet; Refill: 11  -     Quantiferon Gold TB; Future; Expected date: 2025  -     CBC auto differential; Future; Expected date: 2025  -     promethazine-dextromethorphan (PROMETHAZINE-DM) 6.25-15 mg/5 mL Syrp; Take 5 mLs by mouth every 4 (four) hours as needed (cough).  Dispense: 240 mL; Refill: 0  -     Fraction of  Nitric Oxide; Future  -     SYMONE Screen w/Reflex; Future; Expected date: 2025  -     Angiotensin Converting Enzyme; Future; Expected date: 2025  -     Sedimentation rate; Future; Expected date:  06/30/2025  -     C-Reactive Protein; Future; Expected date: 06/30/2025  -     Anti-Neutrophilic Cytoplasmic Antibody; Future; Expected date: 06/30/2025      Assessment & Plan    Persistent bronchitis and constitutional symptoms  Inflammatory and autoimmune labs, CBC; sinus xray, Chest CT  PFT, FeNO  Start Singulair  Follow up after ordered testing  ER for any worsening symptoms     Discussed diagnosis, its evaluation, treatment and usual course. All questions answered.    Patient verbalized understanding of plan and left in no acute distress    Thank you for the courtesy of participating in the care of this patient    DON[facility]DO [facility]ey[facility]    This note was generated with the assistance of ambient listening technology. Verbal consent was obtained by the patient and accompanying visitor(s) for the recording of patient appointment to facilitate this note. I attest to having reviewed and edited the generated note for accuracy, though some syntax or spelling errors may persist. Please contact the author of this note for any clarification.              [1]   Current Outpatient Medications on File Prior to Visit   Medication Sig Dispense Refill    albuterol (VENTOLIN HFA) 90 mcg/actuation inhaler Inhale 2 puffs into the lungs every 6 (six) hours as needed for Wheezing. Rescue 18 g 0    albuterol-ipratropium (DUO-NEB) 2.5 mg-0.5 mg/3 mL nebulizer solution Take 3 mLs by nebulization every 6 (six) hours as needed for Wheezing or Shortness of Breath. Rescue 75 mL 2    aspirin (ECOTRIN) 81 MG EC tablet Take 1 tablet (81 mg total) by mouth once daily. 30 tablet 11    BLOOD PRESSURE CUFF Misc 1 Device by Misc.(Non-Drug; Combo Route) route once daily.  0    blood sugar diagnostic Strp Once daily glucose testing. 50 strip 6    blood-glucose meter Misc Use as directed. 1 each 0    empagliflozin (JARDIANCE) 10 mg tablet Take 1 tablet (10 mg total) by mouth once daily. (Patient not taking: Reported on  "6/17/2025) 30 tablet 5    fluticasone propionate (FLONASE) 50 mcg/actuation nasal spray 1 spray (50 mcg total) by Each Nostril route once daily. 16 g 0    fluticasone-umeclidin-vilanter (TRELEGY ELLIPTA) 100-62.5-25 mcg DsDv Inhale 1 puff into the lungs once daily. 60 each 2    gabapentin (NEURONTIN) 300 MG capsule Take 1 capsule (300 mg total) by mouth 3 (three) times daily. 90 capsule 11    glipiZIDE (GLUCOTROL) 5 MG tablet Take 1 tablet by mouth once daily with breakfast 90 tablet 1    lancets (LANCETS,THIN) Misc Once daily glucose testing. 50 each 6    levothyroxine (SYNTHROID) 25 MCG tablet Take 1 tablet (25 mcg total) by mouth before breakfast. 90 tablet 2    lisinopriL (PRINIVIL,ZESTRIL) 40 MG tablet Take 1 tablet by mouth once daily 90 tablet 3    loratadine (CLARITIN) 10 mg tablet Take 10 mg by mouth once daily.      meclizine (ANTIVERT) 25 mg tablet Take 1 tablet (25 mg total) by mouth 3 (three) times daily as needed for Dizziness. 30 tablet 0    metFORMIN (GLUCOPHAGE) 1000 MG tablet TAKE 1 TABLET BY MOUTH TWICE DAILY WITH MEALS 180 tablet 1    metoprolol succinate (TOPROL-XL) 50 MG 24 hr tablet Take 2 tablets by mouth twice daily 120 tablet 6    nebulizer and compressor Valeria Use as directed 1 each 0    pen needle, diabetic (PEN NEEDLE) 31 gauge x 5/16" Ndle Use once daily with insulin injection. 50 each 3    prasugreL HCl (EFFIENT) 10 mg Tab Take 1 tablet (10 mg total) by mouth once daily. 90 tablet 3    pravastatin (PRAVACHOL) 80 MG tablet Take 1 tablet by mouth in the evening 90 tablet 3    predniSONE (DELTASONE) 20 MG tablet Take 1 tablet (20 mg total) by mouth 2 (two) times daily. 10 tablet 0    ranolazine (RANEXA) 500 MG Tb12 Take 1 tablet (500 mg total) by mouth 2 (two) times daily. 180 tablet 3    tadalafiL (CIALIS) 10 MG tablet Take 1 tablet (10 mg total) by mouth daily as needed for Erectile Dysfunction. 10 tablet 3    terbinafine HCL (LAMISIL) 1 % cream Apply topically 2 (two) times daily. for " 14 days 28 g 0    traMADoL (ULTRAM) 50 mg tablet Take 1 tablet (50 mg total) by mouth every 6 (six) hours as needed. 12 tablet 0    traZODone (DESYREL) 50 MG tablet Take 1 tablet by mouth in the evening 90 tablet 3     No current facility-administered medications on file prior to visit.

## 2025-07-02 ENCOUNTER — TELEPHONE (OUTPATIENT)
Dept: PULMONOLOGY | Facility: CLINIC | Age: 55
End: 2025-07-02
Payer: COMMERCIAL

## 2025-07-10 ENCOUNTER — PATIENT MESSAGE (OUTPATIENT)
Dept: PULMONOLOGY | Facility: CLINIC | Age: 55
End: 2025-07-10
Payer: COMMERCIAL

## 2025-07-10 ENCOUNTER — HOSPITAL ENCOUNTER (OUTPATIENT)
Dept: RADIOLOGY | Facility: HOSPITAL | Age: 55
Discharge: HOME | End: 2025-07-10
Attending: PHYSICIAN ASSISTANT
Payer: COMMERCIAL

## 2025-07-10 DIAGNOSIS — J40 BRONCHITIS: ICD-10-CM

## 2025-07-10 DIAGNOSIS — J32.9 SINUSITIS, UNSPECIFIED CHRONICITY, UNSPECIFIED LOCATION: Primary | ICD-10-CM

## 2025-07-10 PROCEDURE — 70220 X-RAY EXAM OF SINUSES: CPT | Mod: 26,,, | Performed by: RADIOLOGY

## 2025-07-10 PROCEDURE — 70220 X-RAY EXAM OF SINUSES: CPT | Mod: TC

## 2025-07-10 NOTE — TELEPHONE ENCOUNTER
Called patient regarding appts. Patient stated he needs the paperwork filled out for the dates he was off from work. Message sent to provider regarding this

## 2025-07-11 ENCOUNTER — CLINICAL SUPPORT (OUTPATIENT)
Dept: PULMONOLOGY | Facility: CLINIC | Age: 55
End: 2025-07-11
Payer: COMMERCIAL

## 2025-07-11 ENCOUNTER — LAB VISIT (OUTPATIENT)
Dept: LAB | Facility: HOSPITAL | Age: 55
End: 2025-07-11
Attending: PHYSICIAN ASSISTANT
Payer: COMMERCIAL

## 2025-07-11 DIAGNOSIS — J40 BRONCHITIS: ICD-10-CM

## 2025-07-11 DIAGNOSIS — R05.9 COUGH, UNSPECIFIED TYPE: ICD-10-CM

## 2025-07-11 DIAGNOSIS — J40 BRONCHITIS: Primary | ICD-10-CM

## 2025-07-11 DIAGNOSIS — R06.2 WHEEZING: ICD-10-CM

## 2025-07-11 LAB
ABSOLUTE EOSINOPHIL (OHS): 0.5 K/UL
ABSOLUTE MONOCYTE (OHS): 0.52 K/UL (ref 0.3–1)
ABSOLUTE NEUTROPHIL COUNT (OHS): 4.31 K/UL (ref 1.8–7.7)
ANISOCYTOSIS BLD QL SMEAR: SLIGHT
BASOPHILS # BLD AUTO: 0.08 K/UL
BASOPHILS NFR BLD AUTO: 1.1 %
BRPFT: ABNORMAL
CRP SERPL-MCNC: 1.4 MG/L
DLCO ADJ PRE: 24.05 ML/(MIN*MMHG) (ref 26.78–40.63)
DLCO SINGLE BREATH LLN: 26.78
DLCO SINGLE BREATH PRE REF: 52.5 %
DLCO SINGLE BREATH REF: 33.71
DLCOC SBVA LLN: 3.18
DLCOC SBVA PRE REF: 101.3 %
DLCOC SBVA REF: 4.25
DLCOC SINGLE BREATH LLN: 26.78
DLCOC SINGLE BREATH PRE REF: 71.3 %
DLCOC SINGLE BREATH REF: 33.71
DLCOVA LLN: 3.18
DLCOVA PRE REF: 74.5 %
DLCOVA PRE: 3.16 ML/(MIN*MMHG*L) (ref 3.18–5.32)
DLCOVA REF: 4.25
DLVAADJ PRE: 4.3 ML/(MIN*MMHG*L) (ref 3.18–5.32)
ERV LLN: -16448.63
ERV PRE REF: 35.4 %
ERV REF: 1.37
ERYTHROCYTE [DISTWIDTH] IN BLOOD BY AUTOMATED COUNT: ABNORMAL %
ERYTHROCYTE [SEDIMENTATION RATE] IN BLOOD: 9 MM/HR
FEF 25 75 CHG: -1.1 %
FEF 25 75 LLN: 1.45
FEF 25 75 POST REF: 91.8 %
FEF 25 75 PRE REF: 92.9 %
FEF 25 75 REF: 3.2
FET100 CHG: 25.6 %
FEV1 CHG: 4.9 %
FEV1 FVC CHG: -3.9 %
FEV1 FVC LLN: 68
FEV1 FVC POST REF: 104.3 %
FEV1 FVC PRE REF: 108.6 %
FEV1 FVC REF: 78
FEV1 LLN: 2.73
FEV1 POST REF: 77.6 %
FEV1 PRE REF: 74 %
FEV1 REF: 3.67
FRCPLETH LLN: 2.81
FRCPLETH PREREF: 96.7 %
FRCPLETH REF: 3.79
FVC CHG: 9.2 %
FVC LLN: 3.55
FVC POST REF: 74.2 %
FVC PRE REF: 67.9 %
FVC REF: 4.69
HCT VFR BLD AUTO: 38.3 % (ref 40–54)
HGB BLD-MCNC: 10.2 GM/DL (ref 14–18)
HYPOCHROMIA BLD QL SMEAR: NORMAL
IMM GRANULOCYTES # BLD AUTO: 0.07 K/UL (ref 0–0.04)
IMM GRANULOCYTES NFR BLD AUTO: 0.9 % (ref 0–0.5)
IVC PRE: 3.57 L (ref 3.55–5.86)
IVC SINGLE BREATH LLN: 3.55
IVC SINGLE BREATH PRE REF: 76.1 %
IVC SINGLE BREATH REF: 4.69
LYMPHOCYTES # BLD AUTO: 2.01 K/UL (ref 1–4.8)
MCH RBC QN AUTO: 21.3 PG (ref 27–31)
MCHC RBC AUTO-ENTMCNC: 26.6 G/DL (ref 32–36)
MCV RBC AUTO: 80 FL (ref 82–98)
MVV LLN: 137
MVV PRE REF: 72.9 %
MVV REF: 161
NUCLEATED RBC (/100WBC) (OHS): 0 /100 WBC
PEF CHG: 11.3 %
PEF LLN: 6.71
PEF POST REF: 90.3 %
PEF PRE REF: 81.1 %
PEF REF: 9.68
PLATELET # BLD AUTO: 380 K/UL (ref 150–450)
PMV BLD AUTO: 10.1 FL (ref 9.2–12.9)
POIKILOCYTOSIS BLD QL SMEAR: SLIGHT
POST FEF 25 75: 2.94 L/S (ref 1.45–5.63)
POST FET 100: 4.14 SEC
POST FEV1 FVC: 81.76 % (ref 67.54–87.76)
POST FEV1: 2.85 L (ref 2.73–4.56)
POST FVC: 3.48 L (ref 3.55–5.86)
POST PEF: 8.74 L/S (ref 6.71–12.64)
PRE DLCO: 17.7 ML/(MIN*MMHG) (ref 26.78–40.63)
PRE ERV: 0.49 L (ref -16448.63–16451.37)
PRE FEF 25 75: 2.97 L/S (ref 1.45–5.63)
PRE FET 100: 3.29 SEC
PRE FEV1 FVC: 85.08 % (ref 67.54–87.76)
PRE FEV1: 2.71 L (ref 2.73–4.56)
PRE FRC PL: 3.67 L (ref 2.81–4.78)
PRE FVC: 3.19 L (ref 3.55–5.86)
PRE MVV: 117.39 L/MIN (ref 136.89–185.21)
PRE PEF: 7.85 L/S (ref 6.71–12.64)
PRE RV: 3.18 L (ref 1.75–3.09)
PRE TLC: 6.46 L (ref 6.79–9.09)
RAW LLN: 3.06
RAW PRE REF: 106.8 %
RAW PRE: 3.27 CMH2O*S/L (ref 3.06–3.06)
RAW REF: 3.06
RBC # BLD AUTO: 4.78 M/UL (ref 4.6–6.2)
RELATIVE EOSINOPHIL (OHS): 6.7 %
RELATIVE LYMPHOCYTE (OHS): 26.8 % (ref 18–48)
RELATIVE MONOCYTE (OHS): 6.9 % (ref 4–15)
RELATIVE NEUTROPHIL (OHS): 57.6 % (ref 38–73)
RV LLN: 1.75
RV PRE REF: 131.4 %
RV REF: 2.42
RVTLC LLN: 26
RVTLC PRE REF: 140.6 %
RVTLC PRE: 49.25 % (ref 26.04–44)
RVTLC REF: 35
SCHISTOCYTES BLD QL SMEAR: PRESENT
TARGETS BLD QL SMEAR: NORMAL
TLC LLN: 6.79
TLC PRE REF: 81.4 %
TLC REF: 7.94
VA PRE: 5.59 L (ref 7.79–7.79)
VA SINGLE BREATH LLN: 7.79
VA SINGLE BREATH PRE REF: 71.8 %
VA SINGLE BREATH REF: 7.79
VC LLN: 3.55
VC PRE REF: 69.8 %
VC PRE: 3.28 L (ref 3.55–5.86)
VC REF: 4.69
WBC # BLD AUTO: 7.49 K/UL (ref 3.9–12.7)

## 2025-07-11 PROCEDURE — 86235 NUCLEAR ANTIGEN ANTIBODY: CPT | Mod: 59

## 2025-07-11 PROCEDURE — 86036 ANCA SCREEN EACH ANTIBODY: CPT

## 2025-07-11 PROCEDURE — 86235 NUCLEAR ANTIGEN ANTIBODY: CPT

## 2025-07-11 PROCEDURE — 86039 ANTINUCLEAR ANTIBODIES (ANA): CPT

## 2025-07-11 PROCEDURE — 86480 TB TEST CELL IMMUN MEASURE: CPT

## 2025-07-11 PROCEDURE — 85025 COMPLETE CBC W/AUTO DIFF WBC: CPT

## 2025-07-11 PROCEDURE — 85651 RBC SED RATE NONAUTOMATED: CPT

## 2025-07-11 PROCEDURE — 86140 C-REACTIVE PROTEIN: CPT

## 2025-07-11 PROCEDURE — 82164 ANGIOTENSIN I ENZYME TEST: CPT

## 2025-07-11 PROCEDURE — 36415 COLL VENOUS BLD VENIPUNCTURE: CPT

## 2025-07-11 PROCEDURE — 86225 DNA ANTIBODY NATIVE: CPT

## 2025-07-11 NOTE — PROCEDURES
Clinical Guide to Interpretation or FeNO Levels :    FeNO  (ppb) LOW INTERMEDIATE HIGH   ADULT VALUES < 25 25-50          > 50   Th2-driven Inflammation Unlikely Likely Significant     Patients FeNO level at this visit : _14___ (ppb)    Interpretation of FeNO measurement in adults:  [x] FENO is less than 25 ppb implies non eosinophilic airway inflammation or the absence of airway inflammation.    Comment: Low FENO (<25 ppb) in adult asthmatics with persistent symptoms suggests other etiologies for these symptoms and a lower likelihood of benefit from adding or increasing inhaled glucocorticoids.    [] FENO between 25 and 50 ppb in adults should be interpreted cautiously with reference to the clinical situation (eg, symptomatic, on or off therapy, current smoking).    [] FENO greater than 50 ppb in adults  suggests eosinophilic airway inflammation   Comment: High FENO (>50 ppb) in adult asthmatics even with atypical symptoms suggests glucocorticoid responsiveness. High FENO (>50 ppb) can help identify poor adherence or uncontrolled inflammation in asthma patients with otherwise seemingly controlled asthma.    Discussion:  A FENO less than 25 ppb in adults and less than 20 ppb in children younger than 12 years of age implies noneosinophilic airway inflammation or the absence of airway inflammation.  A FENO greater than 50 ppb in adults or greater than 35 ppb in children suggests eosinophilic airway inflammation.   Values of FENO between 25 and 50 ppb in adults (20 to 35 ppb in children) should be interpreted cautiously with reference to the clinical situation (eg, symptomatic, on or off therapy, current smoking).  A rising FENO with a greater than 20 percent change and more than 25 ppb (20 ppb in children) from a previously stable level suggests increasing eosinophilic airway inflammation, but there are wide inter-individual differences.  A decrease in FENO greater than 20 percent for values over 50 ppb or more than  10 ppb for values less than 50 ppb may be clinically important.  ?FENO in other respiratory diseases - Several other diseases are associated with altered levels of exhaled NO: low levels of FENO have been noted in cystic fibrosis, current smoking, pulmonary hypertension, hypothermia, primary ciliary dyskinesia, and bronchopulmonary dysplasia. Elevated FENO has been noted in atopy, nonasthmatic eosinophilic bronchitis, COPD exacerbations, noncystic fibrosis bronchiectasis, and viral upper respiratory infections.    REFERENCE:  ATS Board of Directors, December 2004, and by the ERS Executive Committee, June 2004. ATS/ERS Recommendations for Standardized Procedures for the Online and Offline Measurement of Exhaled Lower Respiratory Nitric Oxide and Nasal Nitric Oxide. Guideline 2005

## 2025-07-12 LAB
MITOGEN MINUS NIL (OHS): 9.99
NIL TB SYNCED (OHS): 0.01
QUANTIFERON GOLD INTERP (OHS): NEGATIVE
TB1 AG MINUS NIL (OHS): 0
TB2 AG MINUS NIL (OHS): 0

## 2025-07-14 ENCOUNTER — PATIENT MESSAGE (OUTPATIENT)
Dept: PULMONOLOGY | Facility: CLINIC | Age: 55
End: 2025-07-14
Payer: COMMERCIAL

## 2025-07-14 LAB
ACE SERPL-CCNC: 7 U/L (ref 16–85)
ANA (OHS): POSITIVE
ANA PATTERN 1 (OHS): ABNORMAL
ANA TITER 1 (OHS): ABNORMAL
W C-ANCA: NORMAL TITER
W P-ANCA: NORMAL TITER

## 2025-07-15 LAB
DSDNA ANTIBODY (OHS): NORMAL
DSDNA ANTIBODY TITER (OHS): NORMAL
SM  ANTIBODY (OHS): 0.2 RATIO
SM INTERPRETATION (OHS): NEGATIVE
SM/RNP ANTIBODY (OHS): 0.16 RATIO
SM/RNP INTERPRETATION (OHS): NEGATIVE
SSA  ANTIBODY (OHS): 0.08 RATIO (ref 0–0.99)
SSA INTERPRETATION (OHS): NEGATIVE
SSB  ANTIBODY (OHS): 0.24 RATIO
SSB INTERPRETATION (OHS): NEGATIVE

## 2025-07-17 ENCOUNTER — OFFICE VISIT (OUTPATIENT)
Dept: OTOLARYNGOLOGY | Facility: CLINIC | Age: 55
End: 2025-07-17
Payer: COMMERCIAL

## 2025-07-17 VITALS — HEIGHT: 74 IN | WEIGHT: 233.44 LBS | BODY MASS INDEX: 29.96 KG/M2

## 2025-07-17 DIAGNOSIS — J32.4 CHRONIC PANSINUSITIS: Primary | ICD-10-CM

## 2025-07-17 DIAGNOSIS — N52.9 ERECTILE DYSFUNCTION, UNSPECIFIED ERECTILE DYSFUNCTION TYPE: ICD-10-CM

## 2025-07-17 DIAGNOSIS — R05.3 CHRONIC COUGH: ICD-10-CM

## 2025-07-17 DIAGNOSIS — J32.9 SINUSITIS, UNSPECIFIED CHRONICITY, UNSPECIFIED LOCATION: ICD-10-CM

## 2025-07-17 DIAGNOSIS — J34.3 HYPERTROPHY OF INFERIOR NASAL TURBINATE: ICD-10-CM

## 2025-07-17 DIAGNOSIS — J30.89 NON-SEASONAL ALLERGIC RHINITIS, UNSPECIFIED TRIGGER: ICD-10-CM

## 2025-07-17 PROCEDURE — 1159F MED LIST DOCD IN RCRD: CPT | Mod: CPTII,S$GLB,, | Performed by: OTOLARYNGOLOGY

## 2025-07-17 PROCEDURE — 1160F RVW MEDS BY RX/DR IN RCRD: CPT | Mod: CPTII,S$GLB,, | Performed by: OTOLARYNGOLOGY

## 2025-07-17 PROCEDURE — 99204 OFFICE O/P NEW MOD 45 MIN: CPT | Mod: 25,S$GLB,, | Performed by: OTOLARYNGOLOGY

## 2025-07-17 PROCEDURE — 4010F ACE/ARB THERAPY RXD/TAKEN: CPT | Mod: CPTII,S$GLB,, | Performed by: OTOLARYNGOLOGY

## 2025-07-17 PROCEDURE — 3008F BODY MASS INDEX DOCD: CPT | Mod: CPTII,S$GLB,, | Performed by: OTOLARYNGOLOGY

## 2025-07-17 PROCEDURE — 3044F HG A1C LEVEL LT 7.0%: CPT | Mod: CPTII,S$GLB,, | Performed by: OTOLARYNGOLOGY

## 2025-07-17 PROCEDURE — 99999 PR PBB SHADOW E&M-EST. PATIENT-LVL III: CPT | Mod: PBBFAC,,, | Performed by: OTOLARYNGOLOGY

## 2025-07-17 PROCEDURE — 31231 NASAL ENDOSCOPY DX: CPT | Mod: S$GLB,,, | Performed by: OTOLARYNGOLOGY

## 2025-07-17 RX ORDER — TADALAFIL 10 MG/1
10 TABLET ORAL DAILY PRN
Qty: 10 TABLET | Refills: 3 | Status: SHIPPED | OUTPATIENT
Start: 2025-07-17

## 2025-07-17 NOTE — TELEPHONE ENCOUNTER
No care due was identified.  [facility] Embedded Care Due Messages. Reference number: [id number].   7/17/2025 11:28:21 AM CDT

## 2025-07-17 NOTE — TELEPHONE ENCOUNTER
Refill Decision Note   Laron Kothari  is requesting a refill authorization.  Brief Assessment and Rationale for Refill:  Approve     Medication Therapy Plan:        Comments:     Note composed:12:36 PM 07/17/2025

## 2025-07-17 NOTE — PROGRESS NOTES
"Referring Provider:    Self, Aaareferral  No address on file  Subjective:   Patient: Laron Kothari Jr. [id number], :1970   Visit date:2025 10:28 AM    Chief Complaint:  Referral and Sinusitis (Pt is coming in today for chronic sinusitis/ referral by PCP)    HPI:    Prior notes reviewed by myself.  Clinical documentation obtained by nursing staff reviewed.     History of Present Illness    CHIEF COMPLAINT:  Patient presents today for evaluation of possible sinus infection.    HISTORY OF PRESENT ILLNESS:  He reports persistent frontal headache extending from forehead backwards and cough, both lasting over 5 months. The cough pattern is intermittent, with periods of improvement lasting approximately one week followed by recurrence of more intense symptoms. He has completed three courses of antibiotics, specific names documented in medical chart.    IMAGING:  Sinus X-ray on  revealed completely blocked sinus. Chest XR was normal.    ALLERGIES:  He denies any history of allergies, including itchy eyes, watery eyes, sneezing, or runny nose.      ROS:  General: -fever, -chills, -fatigue, -weight gain, -weight loss  Eyes: -vision changes, -redness, -discharge  ENT: -ear pain, -nasal congestion, -sore throat  Cardiovascular: -chest pain, -palpitations, -lower extremity edema  Respiratory: +cough, -shortness of breath  Gastrointestinal: -abdominal pain, -nausea, -vomiting, -diarrhea, -constipation, -blood in stool  Genitourinary: -dysuria, -hematuria, -frequency  Musculoskeletal: -joint pain, -muscle pain  Skin: -rash, -lesion  Neurological: +headache, -dizziness, -numbness, -tingling  Psychiatric: -anxiety, -depression, -sleep difficulty  Head: +head pain         Objective:     Physical Exam:  Vitals:  Ht 6' 2" (1.88 m)   Wt 105.9 kg (233 lb 7.5 oz)   BMI 29.98 kg/m²   General appearance:  Well developed, well nourished    Ears:  Otoscopy of external auditory canals and tympanic membranes was normal, " clinical speech reception thresholds grossly intact, no mass/lesion of auricle.    Nose:  No masses/lesions of external nose, nasal mucosa, septum, and turbinates were hypertrophied 3+.    Mouth:  No mass/lesion of lips, teeth, gums, hard/soft palate, tongue, tonsils, or oropharynx.    Neck & Lymphatics:  No cervical lymphadenopathy, no neck mass/crepitus/ asymmetry, trachea is midline, no thyroid enlargement/tenderness/mass.    PROCEDURE NOTE:  Diagnostic nasal endoscopy  Preprocedure diagnosis:  Chronic sinusitis  Postprocedure diangosis:  Same  Complications:  None  Blood Loss:  None    Procedure in detail:  After verbal consent was obtained, the patient's nasal cavity was anesthesized using topical Tetracaine and Neosynepherine.  A rigid 30 degree endoscope was placed in first the right, then the left nasal cavity.  The inferior and middle turbinates were examined, and found to be normal bilaterally.  The middle meatus and maxillary antrum was also examined, and found to be normal bilaterally.  No purulent drainage or masses seen. The superior meatus as well as the sphenoethmoid recess were also inspected and noted to be free of purulent drainage, masses or other pathology. The patient tolerated the procedure well and there were no complications.      [x]  Data Reviewed:    Lab Results   Component Value Date    WBC 7.49 07/11/2025    HGB 10.2 (L) 07/11/2025    HCT 38.3 (L) 07/11/2025    MCV 80 (L) 07/11/2025    EOSINOPHIL 5.9 01/30/2025         [x]  Independent interpretation of test: opacified left max sinus  X-Ray Sinuses Min 3 Views  Order: [id number]   Status: Final result       Next appt: 07/18/2025 at 08:30 AM in Pulmonology (Barbara Sher MD)       Dx: Bronchitis    Test Result Released: Yes (seen)       Messages: Seen    0 Result Notes       1 Patient Communication       View Follow-Up Encounter  Details    Reading Physician Reading Date Result Priority   Jameson Garcia MD  [phone]  7/10/2025       Narrative & Impression  EXAM: XR SINUSES MIN 3 VIEWS     CLINICAL HISTORY: Sinusitis     TECHNIQUE: 3 view paranasal sinus series.     FINDINGS:  Near complete opacification left maxillary sinus.  The remaining sinuses appear clear.        Impression:   See above.     Finalized on: 7/10/2025 3:32 PM By:  Jameson Gacria MD  [facility]# [id number]      2025-07-10 15:[id number]     [facility]        Exam Ended: 07/10/25 09:11 CDT Last Resulted: 07/10/25 15:32 CDT       X-Ray Chest PA And Lateral  Order: [id number]   Status: Final result       Next appt: 07/18/2025 at 08:30 AM in Pulmonology (Barbara Sher MD)       Dx: Lower resp. tract infection    Test Result Released: Yes (seen)       Messages: Seen    0 Result Notes       1 Patient Communication       View Follow-Up Encounter  Details    Reading Physician Reading Date Result Priority   Maximo Adler MD  [phone]  6/14/2025      Narrative & Impression  EXAM: XR CHEST PA AND LATERAL     CLINICAL HISTORY: Acute lower respiratory infection     FINDINGS:  Comparison made to prior chest x-ray dated 5-25.  Clear lungs.  No pleural effusion or pneumothorax or pulmonary edema.  Normal heart size.  Intact thoracic osseous structures.        Impression:   No acute cardiopulmonary process.     Finalized on: 6/14/2025 12:19 AM By:  Maximo Adler MD  [facility]# [id number]      2025-06-14 00:21:[id number]     [facility]        Exam Ended: 06/13/25 12:20 CDT Last Resulted: 06/14/25 00:19 CDT    Teto as an Unsuccessful Attempt     Reviewed notes from DOTTIE Gutierrez    Assessment & Plan:   Chronic pansinusitis  -     CT [employer] Sinuses without; Future; Expected date: 07/17/2025    Sinusitis, unspecified chronicity, unspecified location  -     Ambulatory referral/consult to ENT    Hypertrophy of inferior nasal turbinate    Non-seasonal allergic rhinitis, unspecified trigger    Chronic cough        Assessment & Plan      IMPRESSION:  - Considered sinus infection as potential cause of persistent cough and  headache.  - Reviewed recent sinus XR showing total blockage of one sinus, potentially explaining headache.  - Noted normal chest XR results.  - Explained that cough could be originating from sinus infection.  - Considered previous antibiotic treatments (approximately 3 courses) in decision-making process.    SINUSITIS, UNSPECIFIED CHRONICITY, UNSPECIFIED LOCATION:  - Described nasal endoscopy procedure: use of numbing spray and small camera insertion, emphasizing minimal discomfort.  - Ordered nasal endoscopy to be performed in office.  - Ordered CT Sinuses for further evaluation.            Bernardino Shore M.D.  Department of Otolaryngology - Head & Neck Surgery  [id number] [place] [address].  [place] Soliz [id number]  P: [phone]  F: [phone]        DISCLAIMER: This note was prepared with [employer] voice recognition transcription software. Garbled syntax, mangled pronouns, and other bizarre constructions may be attributed to that software system. While efforts were made to correct any mistakes made by this voice recognition program, some errors and/or omissions may remain in the note that were missed when the note was originally created.     This note was generated with the assistance of ambient listening technology. Verbal consent was obtained by the patient and accompanying visitor(s) for the recording of patient appointment to facilitate this note. I attest to having reviewed and edited the generated note for accuracy, though some syntax or spelling errors may persist. Please contact the author of this note for any clarification.

## 2025-07-18 ENCOUNTER — OFFICE VISIT (OUTPATIENT)
Dept: PULMONOLOGY | Facility: CLINIC | Age: 55
End: 2025-07-18
Payer: COMMERCIAL

## 2025-07-18 ENCOUNTER — TELEPHONE (OUTPATIENT)
Dept: HEMATOLOGY/ONCOLOGY | Facility: CLINIC | Age: 55
End: 2025-07-18
Payer: COMMERCIAL

## 2025-07-18 VITALS
BODY MASS INDEX: 29.61 KG/M2 | RESPIRATION RATE: 20 BRPM | OXYGEN SATURATION: 97 % | WEIGHT: 230.69 LBS | SYSTOLIC BLOOD PRESSURE: 138 MMHG | HEART RATE: 60 BPM | HEIGHT: 74 IN | DIASTOLIC BLOOD PRESSURE: 82 MMHG

## 2025-07-18 DIAGNOSIS — D50.9 IDA (IRON DEFICIENCY ANEMIA): Primary | ICD-10-CM

## 2025-07-18 DIAGNOSIS — Z78.9 DIFFICULTY WITH CPAP FULL FACE MASK USE: ICD-10-CM

## 2025-07-18 DIAGNOSIS — J44.1 COPD EXACERBATION: Primary | ICD-10-CM

## 2025-07-18 DIAGNOSIS — J44.9 CHRONIC OBSTRUCTIVE PULMONARY DISEASE, UNSPECIFIED COPD TYPE: ICD-10-CM

## 2025-07-18 DIAGNOSIS — G47.33 OSA (OBSTRUCTIVE SLEEP APNEA): ICD-10-CM

## 2025-07-18 DIAGNOSIS — F17.210 SMOKING GREATER THAN 30 PACK YEARS: ICD-10-CM

## 2025-07-18 DIAGNOSIS — Z95.5 STENTED CORONARY ARTERY: ICD-10-CM

## 2025-07-18 PROCEDURE — 99999 PR PBB SHADOW E&M-EST. PATIENT-LVL IV: CPT | Mod: PBBFAC,,, | Performed by: INTERNAL MEDICINE

## 2025-07-18 RX ORDER — TRIAMCINOLONE ACETONIDE 40 MG/ML
40 INJECTION, SUSPENSION INTRA-ARTICULAR; INTRAMUSCULAR
Status: COMPLETED | OUTPATIENT
Start: 2025-07-18 | End: 2025-07-18

## 2025-07-18 RX ORDER — AZITHROMYCIN 250 MG/1
TABLET, FILM COATED ORAL
Qty: 6 TABLET | Refills: 0 | Status: SHIPPED | OUTPATIENT
Start: 2025-07-18

## 2025-07-18 RX ORDER — VARENICLINE TARTRATE 1 MG/1
1 TABLET, FILM COATED ORAL 2 TIMES DAILY
Qty: 30 TABLET | Refills: 1 | Status: SHIPPED | OUTPATIENT
Start: 2025-08-17 | End: 2025-10-17

## 2025-07-18 RX ORDER — VARENICLINE TARTRATE 0.5 (11)-1
KIT ORAL
Qty: 42 TABLET | Refills: 0 | Status: SHIPPED | OUTPATIENT
Start: 2025-07-18

## 2025-07-18 RX ORDER — IBUPROFEN 200 MG
1 TABLET ORAL DAILY
Qty: 28 PATCH | Refills: 2 | Status: SHIPPED | OUTPATIENT
Start: 2025-07-18

## 2025-07-18 RX ADMIN — TRIAMCINOLONE ACETONIDE 40 MG: 40 INJECTION, SUSPENSION INTRA-ARTICULAR; INTRAMUSCULAR at 09:07

## 2025-07-18 NOTE — TELEPHONE ENCOUNTER
Spoke to patient in reference to Hematology referral from Lillian Elizabeth PA-C. Appointment scheduled per patient's request next available on [facility]. Appointment notice via pt portal.

## 2025-07-18 NOTE — PROGRESS NOTES
"                                         Pulmonary Outpatient Follow Up Visit     Subjective:       Patient ID: Laron Kothari Jr. is a 55 y.o. male.    Chief Complaint: Bronchitis and Sleep Apnea        History of Present Illness    CHIEF COMPLAINT:  Patient presents today with persistent cough    RECENT MEDICAL TREATMENT:  He recently completed a course of Doxycycline antibiotic and Prednisone (10 tablets) prescribed approximately one month ago.    CURRENT MEDICATIONS:  He uses Trelegy Discus inhaler daily with mouth rinse after use and takes oral diabetes medication. He recently completed courses of Prednisone and antibiotics.    CARDIAC HISTORY:  He has a history of myocardial infarction with successful coronary stent placement (PCI) in 2023 at [facility]. He has an ongoing diagnosis of hypertension. He denies any open heart surgery or current cardiac symptoms.    SLEEP APNEA:  He has a previous diagnosis of mild sleep apnea with reported snoring per his wife. He was unsuccessful with CPAP therapy using a full face mask but has not attempted a nasal mask. He expresses willingness to try alternative treatment options.    SOCIAL HISTORY:  He reports smoking 7-8 cigarettes daily for 30 years. Previous cessation attempts with Chantix resulted in significant side effects including suicidal thoughts and frequent nightmares. He expresses openness to attempting smoking cessation again.         PFT showed air trapping          Review of Systems   Constitutional:  Negative for fever.   HENT:  Negative for nosebleeds.    Respiratory:  Positive for apnea, snoring, cough, sputum production, shortness of breath, wheezing, dyspnea on extertion and somnolence.    Psychiatric/Behavioral:  Positive for sleep disturbance. Negative for confusion.        Encounter Medications[1]    Objective:     Vital Signs (Most Recent)  Vital Signs  Pulse: 60  Resp: 20  SpO2: 97 %  BP: 138/82  Pain Score: 0-No pain  Height and Weight  Height: 6' 2" " (188 cm)  Weight: 104.6 kg (230 lb 11.4 oz)  BSA (Calculated - sq m): 2.34 sq meters  BMI (Calculated): 29.6  Weight in (lb) to have BMI = 25: 194.3]  Wt Readings from Last 2 Encounters:   07/18/25 104.6 kg (230 lb 11.4 oz)   07/17/25 105.9 kg (233 lb 7.5 oz)       Physical Exam   Constitutional: He is oriented to person, place, and time. He appears well-developed. No distress.   Cardiovascular: Normal rate and regular rhythm.   Pulmonary/Chest: Normal expansion and effort normal. No stridor. No respiratory distress. He has rhonchi.   Neurological: He is alert and oriented to person, place, and time.   Psychiatric: He has a normal mood and affect. His behavior is normal. Judgment and thought content normal.   Nursing note and vitals reviewed.      Laboratory  Lab Results   Component Value Date    WBC 7.49 07/11/2025    RBC 4.78 07/11/2025    HGB 10.2 (L) 07/11/2025    HCT 38.3 (L) 07/11/2025    MCV 80 (L) 07/11/2025    MCH 21.3 (L) 07/11/2025    MCHC 26.6 (L) 07/11/2025    RDW  07/11/2025      Comment:      Result Not Available     07/11/2025    MPV 10.1 07/11/2025    GRAN 5.7 01/30/2025    GRAN 63.8 01/30/2025    LYMPH 26.8 07/11/2025    LYMPH 2.01 07/11/2025    MONO 6.9 07/11/2025    MONO 0.52 07/11/2025    EOS 6.7 07/11/2025    EOS 0.50 07/11/2025    BASO 0.08 01/30/2025    EOSINOPHIL 5.9 01/30/2025    BASOPHIL 1.1 07/11/2025    BASOPHIL 0.08 07/11/2025       BMP  Lab Results   Component Value Date     06/13/2025    K 4.3 06/13/2025     06/13/2025    CO2 24 06/13/2025    BUN 8 06/13/2025    CREATININE 1.0 06/13/2025    CALCIUM 9.2 06/13/2025    ANIONGAP 8 06/13/2025    ESTGFRAFRICA >60 06/21/2022    EGFRNONAA >60 06/21/2022    AST 21 06/13/2025    ALT 20 06/13/2025    PROT 8.2 06/13/2025       Lab Results   Component Value Date    BNP 84 05/20/2025    BNP 99 05/26/2024    BNP 36 09/13/2023    BNP 41 04/11/2023    BNP <10 06/21/2022       Lab Results   Component Value Date    TSH 6.269 (H)  "06/13/2025       Lab Results   Component Value Date    SEDRATE 9 07/11/2025       Lab Results   Component Value Date    CRP 1.4 07/11/2025     No results found for: "IGE"     No results found for: "ASPERGILLUS"  No results found for: "AFUMIGATUSCL"     No results found for: "ACE"     Diagnostic Results:  I have personally reviewed today the following studies:  As above    Assessment/Plan:   COPD exacerbation  -     triamcinolone acetonide injection 40 mg  -     azithromycin (Z-EH) 250 MG tablet; Take 2 tablets by mouth on day 1; Take 1 tablet by mouth on days 2-5  Dispense: 6 tablet; Refill: 0    Chronic obstructive pulmonary disease, unspecified COPD type  -     Ambulatory referral/consult to Smoking Cessation Program; Future; Expected date: 07/25/2025  -     Ambulatory referral/consult to Pulmonary Disease Management w/ Respiratory Therapist; Future; Expected date: 07/25/2025    Smoking greater than 30 pack years  -     varenicline tartrate (CHANTIX STARTING MONTH BOX) 0.5 mg (11)- 1 mg (42) tablet; Take one 0.5mg tab by mouth once daily X3 days,then increase to one 0.5mg tab twice daily X4 days,then increase to one 1mg tab twice daily  Dispense: 42 tablet; Refill: 0  -     varenicline tartrate (CHANTIX) 1 mg Tab; Take 1 tablet (1 mg total) by mouth 2 (two) times daily.  Dispense: 30 tablet; Refill: 1  -     nicotine (NICODERM CQ) 21 mg/24 hr; Place 1 patch onto the skin once daily.  Dispense: 28 patch; Refill: 2  -     Ambulatory referral/consult to Smoking Cessation Program; Future; Expected date: 07/25/2025  -     Ambulatory referral/consult to Pulmonary Disease Management w/ Respiratory Therapist; Future; Expected date: 07/25/2025    Stented coronary artery  -     varenicline tartrate (CHANTIX STARTING MONTH BOX) 0.5 mg (11)- 1 mg (42) tablet; Take one 0.5mg tab by mouth once daily X3 days,then increase to one 0.5mg tab twice daily X4 days,then increase to one 1mg tab twice daily  Dispense: 42 tablet; Refill: " 0  -     varenicline tartrate (CHANTIX) 1 mg Tab; Take 1 tablet (1 mg total) by mouth 2 (two) times daily.  Dispense: 30 tablet; Refill: 1  -     nicotine (NICODERM CQ) 21 mg/24 hr; Place 1 patch onto the skin once daily.  Dispense: 28 patch; Refill: 2    MARIYA (obstructive sleep apnea)  -     Cancel: Polysomnogram (CPAP will be added if patient meets diagnostic criteria.); Future  -     Polysomnogram (CPAP will be added if patient meets diagnostic criteria.); Future    Difficulty with CPAP full face mask use    Polysomnography nasal mask if split, has returned machine due to intolerance to fullface mask never tried nasal mask  Assessment & Plan    J44.9 Chronic obstructive pulmonary disease, unspecified COPD type  F17.210 Smoking greater than 30 pack years  Z95.5 Stented coronary artery  G47.33 MARIYA (obstructive sleep apnea)    IMPRESSION:  - 30-year smoking history, currently smoking 7-8 cigarettes per day.  - Previous attempts to quit smoking with Chantix and nicotine patches were unsuccessful.  - COPD with current flare-up.  - Recent use of antibiotics (Doxycycline) and oral steroids (Prednisone) for COPD management.  - History of mild sleep apnea; considering alternative treatments due to CPAP intolerance.  - Cardiovascular history includes stent placement in 2023.  - Administered steroid injection and started Z-[place] (azithromycin) for 5 days for current COPD exacerbation.  - Discussed potential alternative treatments for sleep apnea, including implantable device.  - Explained the need for in-center sleep study to qualify for CPAP after previous return.    CHRONIC OBSTRUCTIVE PULMONARY DISEASE, UNSPECIFIED COPD TYPE:  - Educated on the importance of rinsing mouth after using Trelegy Discus inhaler.  - Patient to continue using Trelegy Discus inhaler daily and rinse mouth after use.    SMOKING GREATER THAN 30 PACK YEARS:  - Patient to quit smoking.  - Started Chantix (starting pack, then pack two and three for  second and third months) and nicotine patches.  - Referred to smoking cessation program.    MARIYA (OBSTRUCTIVE SLEEP APNEA):  - Ordered sleep study to be conducted at a sleep center.         Follow up in about 3 months (around 10/18/2025).    This note was prepared using voice recognition system and is likely to have sound alike errors that may have been overlooked even after proof reading.  Please call me with any questions    Discussed diagnosis, its evaluation, treatment and usual course. All questions answered.      Barbara Sher MD           [1]   Outpatient Encounter Medications as of 7/18/2025   Medication Sig Dispense Refill    albuterol (VENTOLIN HFA) 90 mcg/actuation inhaler Inhale 2 puffs into the lungs every 6 (six) hours as needed for Wheezing. Rescue 18 g 0    albuterol-ipratropium (DUO-NEB) 2.5 mg-0.5 mg/3 mL nebulizer solution Take 3 mLs by nebulization every 6 (six) hours as needed for Wheezing or Shortness of Breath. Rescue 75 mL 2    BLOOD PRESSURE CUFF Misc 1 Device by Misc.(Non-Drug; Combo Route) route once daily.  0    blood sugar diagnostic Strp Once daily glucose testing. 50 strip 6    blood-glucose meter Misc Use as directed. 1 each 0    empagliflozin (JARDIANCE) 10 mg tablet Take 1 tablet (10 mg total) by mouth once daily. 30 tablet 5    fluticasone propionate (FLONASE) 50 mcg/actuation nasal spray 1 spray (50 mcg total) by Each Nostril route once daily. 16 g 0    fluticasone-umeclidin-vilanter (TRELEGY ELLIPTA) 100-62.5-25 mcg DsDv Inhale 1 puff into the lungs once daily. 60 each 2    glipiZIDE (GLUCOTROL) 5 MG tablet Take 1 tablet by mouth once daily with breakfast 90 tablet 1    lancets (LANCETS,THIN) Misc Once daily glucose testing. 50 each 6    levothyroxine (SYNTHROID) 25 MCG tablet Take 1 tablet (25 mcg total) by mouth before breakfast. 90 tablet 2    lisinopriL (PRINIVIL,ZESTRIL) 40 MG tablet Take 1 tablet by mouth once daily 90 tablet 3    meclizine (ANTIVERT) 25 mg tablet Take 1  "tablet (25 mg total) by mouth 3 (three) times daily as needed for Dizziness. 30 tablet 0    metFORMIN (GLUCOPHAGE) 1000 MG tablet TAKE 1 TABLET BY MOUTH TWICE DAILY WITH MEALS 180 tablet 1    metoprolol succinate (TOPROL-XL) 50 MG 24 hr tablet Take 2 tablets by mouth twice daily 120 tablet 6    montelukast (SINGULAIR) 10 mg tablet Take 1 tablet (10 mg total) by mouth every evening. 30 tablet 11    nebulizer and compressor Valeria Use as directed 1 each 0    pen needle, diabetic (PEN NEEDLE) 31 gauge x 5/16" Ndle Use once daily with insulin injection. 50 each 3    prasugreL HCl (EFFIENT) 10 mg Tab Take 1 tablet (10 mg total) by mouth once daily. 90 tablet 3    pravastatin (PRAVACHOL) 80 MG tablet Take 1 tablet by mouth in the evening 90 tablet 3    ranolazine (RANEXA) 500 MG Tb12 Take 1 tablet (500 mg total) by mouth 2 (two) times daily. 180 tablet 3    tadalafiL (CIALIS) 10 MG tablet Take 1 tablet (10 mg total) by mouth daily as needed for Erectile Dysfunction. 10 tablet 3    traMADoL (ULTRAM) 50 mg tablet Take 1 tablet (50 mg total) by mouth every 6 (six) hours as needed. 12 tablet 0    traZODone (DESYREL) 50 MG tablet Take 1 tablet by mouth in the evening 90 tablet 3    [DISCONTINUED] predniSONE (DELTASONE) 20 MG tablet Take 1 tablet (20 mg total) by mouth 2 (two) times daily. 10 tablet 0    aspirin (ECOTRIN) 81 MG EC tablet Take 1 tablet (81 mg total) by mouth once daily. 30 tablet 11    azithromycin (Z-EH) 250 MG tablet Take 2 tablets by mouth on day 1; Take 1 tablet by mouth on days 2-5 6 tablet 0    gabapentin (NEURONTIN) 300 MG capsule Take 1 capsule (300 mg total) by mouth 3 (three) times daily. 90 capsule 11    loratadine (CLARITIN) 10 mg tablet Take 10 mg by mouth once daily.      nicotine (NICODERM CQ) 21 mg/24 hr Place 1 patch onto the skin once daily. 28 patch 2    terbinafine HCL (LAMISIL) 1 % cream Apply topically 2 (two) times daily. for 14 days 28 g 0    varenicline tartrate (CHANTIX STARTING MONTH " BOX) 0.5 mg (11)- 1 mg (42) tablet Take one 0.5mg tab by mouth once daily X3 days,then increase to one 0.5mg tab twice daily X4 days,then increase to one 1mg tab twice daily 42 tablet 0    [START ON 8/17/2025] varenicline tartrate (CHANTIX) 1 mg Tab Take 1 tablet (1 mg total) by mouth 2 (two) times daily. 30 tablet 1    [DISCONTINUED] tadalafiL (CIALIS) 10 MG tablet Take 1 tablet (10 mg total) by mouth daily as needed for Erectile Dysfunction. 10 tablet 3     Facility-Administered Encounter Medications as of 7/18/2025   Medication Dose Route Frequency Provider Last Rate Last Admin    [COMPLETED] triamcinolone acetonide injection 40 mg  40 mg Intramuscular 1 time in Clinic/HOD    40 mg at 07/18/25 0908

## 2025-07-23 ENCOUNTER — TELEPHONE (OUTPATIENT)
Dept: INTERNAL MEDICINE | Facility: CLINIC | Age: 55
End: 2025-07-23
Payer: COMMERCIAL

## 2025-07-24 ENCOUNTER — TELEPHONE (OUTPATIENT)
Dept: OTOLARYNGOLOGY | Facility: CLINIC | Age: 55
End: 2025-07-24

## 2025-07-24 ENCOUNTER — HOSPITAL ENCOUNTER (OUTPATIENT)
Dept: RADIOLOGY | Facility: HOSPITAL | Age: 55
Discharge: HOME | End: 2025-07-24
Attending: OTOLARYNGOLOGY
Payer: COMMERCIAL

## 2025-07-24 ENCOUNTER — OFFICE VISIT (OUTPATIENT)
Dept: OTOLARYNGOLOGY | Facility: CLINIC | Age: 55
End: 2025-07-24
Payer: COMMERCIAL

## 2025-07-24 ENCOUNTER — HOSPITAL ENCOUNTER (OUTPATIENT)
Dept: RADIOLOGY | Facility: HOSPITAL | Age: 55
Discharge: HOME | End: 2025-07-24
Attending: PHYSICIAN ASSISTANT
Payer: COMMERCIAL

## 2025-07-24 VITALS — BODY MASS INDEX: 29.62 KG/M2 | HEIGHT: 74 IN

## 2025-07-24 DIAGNOSIS — J32.4 CHRONIC PANSINUSITIS: Primary | ICD-10-CM

## 2025-07-24 DIAGNOSIS — J34.3 HYPERTROPHY OF INFERIOR NASAL TURBINATE: ICD-10-CM

## 2025-07-24 DIAGNOSIS — J32.4 CHRONIC PANSINUSITIS: ICD-10-CM

## 2025-07-24 DIAGNOSIS — J40 BRONCHITIS: ICD-10-CM

## 2025-07-24 DIAGNOSIS — J30.89 NON-SEASONAL ALLERGIC RHINITIS, UNSPECIFIED TRIGGER: ICD-10-CM

## 2025-07-24 DIAGNOSIS — J34.89 CONCHA BULLOSA: ICD-10-CM

## 2025-07-24 DIAGNOSIS — R05.3 CHRONIC COUGH: ICD-10-CM

## 2025-07-24 PROCEDURE — 3044F HG A1C LEVEL LT 7.0%: CPT | Mod: CPTII,S$GLB,, | Performed by: OTOLARYNGOLOGY

## 2025-07-24 PROCEDURE — 70486 CT MAXILLOFACIAL W/O DYE: CPT | Mod: 26,,, | Performed by: RADIOLOGY

## 2025-07-24 PROCEDURE — 1159F MED LIST DOCD IN RCRD: CPT | Mod: CPTII,S$GLB,, | Performed by: OTOLARYNGOLOGY

## 2025-07-24 PROCEDURE — 71250 CT THORAX DX C-: CPT | Mod: 26,,, | Performed by: RADIOLOGY

## 2025-07-24 PROCEDURE — 99999 PR PBB SHADOW E&M-EST. PATIENT-LVL V: CPT | Mod: PBBFAC,,, | Performed by: OTOLARYNGOLOGY

## 2025-07-24 PROCEDURE — 4010F ACE/ARB THERAPY RXD/TAKEN: CPT | Mod: CPTII,S$GLB,, | Performed by: OTOLARYNGOLOGY

## 2025-07-24 PROCEDURE — 1160F RVW MEDS BY RX/DR IN RCRD: CPT | Mod: CPTII,S$GLB,, | Performed by: OTOLARYNGOLOGY

## 2025-07-24 PROCEDURE — 70486 CT MAXILLOFACIAL W/O DYE: CPT | Mod: TC

## 2025-07-24 PROCEDURE — 99214 OFFICE O/P EST MOD 30 MIN: CPT | Mod: S$GLB,,, | Performed by: OTOLARYNGOLOGY

## 2025-07-24 PROCEDURE — 71250 CT THORAX DX C-: CPT | Mod: TC

## 2025-07-24 PROCEDURE — 3008F BODY MASS INDEX DOCD: CPT | Mod: CPTII,S$GLB,, | Performed by: OTOLARYNGOLOGY

## 2025-07-24 NOTE — Clinical Note
Dr. Lara,  This patient scheduled to have endoscopic sinus surgery with me on 8/15, but he will need a cardiac clearance and instructions on his anticoagulation.  Can you help out with this?  Thanks ahead of time.  Bernardino

## 2025-07-24 NOTE — PROGRESS NOTES
"Referring Provider:    No referring provider defined for this encounter.  Subjective:   Patient: Laron Kothari Jr. [id number], :1970   Visit date:2025 10:28 AM    Chief Complaint:  Follow-up (Follow up on chronic pansinusitis, CT results.)    HPI:    Prior notes reviewed by myself.  Clinical documentation obtained by nursing staff reviewed.     History of Present Illness         History of Present Illness    CHIEF COMPLAINT:  Patient presents today for evaluation of possible sinus infection.     HISTORY OF PRESENT ILLNESS:  He reports persistent frontal headache extending from forehead backwards and cough, both lasting over 5 months. The cough pattern is intermittent, with periods of improvement lasting approximately one week followed by recurrence of more intense symptoms. He has completed three courses of antibiotics, specific names documented in medical chart.     IMAGING:  Sinus X-ray on  revealed completely blocked sinus. Chest XR was normal.     ALLERGIES:  He denies any history of allergies, including itchy eyes, watery eyes, sneezing, or runny nose.      25 update:  Here today to discuss CT results.   He has tried saline nasal spray and flonase for up to 12 weeks at a time in the past.  His sinus symptoms are chronic but worse over the last 5-6 months, accompanied by chronic productive cough with at least 3-4 rounds of oral antibiotics.      Objective:     Physical Exam:  Vitals:  Ht 6' 2" (1.88 m)   BMI 29.62 kg/m²   General appearance:  Well developed, well nourished    Ears:  Otoscopy of external auditory canals and tympanic membranes was normal, clinical speech reception thresholds grossly intact, no mass/lesion of auricle.    Nose:  No masses/lesions of external nose, nasal mucosa, septum, and turbinates were hypertrophied 3+.    Mouth:  No mass/lesion of lips, teeth, gums, hard/soft palate, tongue, tonsils, or oropharynx.    Neck & Lymphatics:  No cervical lymphadenopathy, no " neck mass/crepitus/ asymmetry, trachea is midline, no thyroid enlargement/tenderness/mass.    PROCEDURE NOTE:  Diagnostic nasal endoscopy  Preprocedure diagnosis:  Chronic sinusitis  Postprocedure diangosis:  Same  Complications:  None  Blood Loss:  None    Procedure in detail:  After verbal consent was obtained, the patient's nasal cavity was anesthesized using topical Tetracaine and Neosynepherine.  A rigid 30 degree endoscope was placed in first the right, then the left nasal cavity.  The inferior and middle turbinates were examined, and found to be hypertrophied (inferior) and renée bullosa (middle).  The middle meatus and maxillary antrum was also examined, and found to be edematous with mucoid drainage bilaterally, left greater than right.  No purulent drainage or masses seen. The superior meatus as well as the sphenoethmoid recess were also inspected and noted to be free of purulent drainage, masses or other pathology. The patient tolerated the procedure well and there were no complications.      [x]  Data Reviewed:    Lab Results   Component Value Date    WBC 7.49 07/11/2025    HGB 10.2 (L) 07/11/2025    HCT 38.3 (L) 07/11/2025    MCV 80 (L) 07/11/2025    EOSINOPHIL 5.9 01/30/2025         [x]  Independent interpretation of test: opacified left max sinus.  CT demonstrates left maxillary and left anterior ethmoid sinusitis with sclerotic changes of maxillary bone and mucoperiosteal thickening, inferior turbinate hypertrophy and bilateral renée bullosa  X-Ray Sinuses Min 3 Views  Order: [id number]   Status: Final result       Next appt: 07/18/2025 at 08:30 AM in Pulmonology (Barbara Sher MD)       Dx: Bronchitis    Test Result Released: Yes (seen)       Messages: Seen    0 Result Notes       1 Patient Communication       View Follow-Up Encounter  Details    Reading Physician Reading Date Result Priority   Jameson Garcia MD  [phone]  7/10/2025      Narrative & Impression  EXAM: XR SINUSES MIN 3  VIEWS     CLINICAL HISTORY: Sinusitis     TECHNIQUE: 3 view paranasal sinus series.     FINDINGS:  Near complete opacification left maxillary sinus.  The remaining sinuses appear clear.        Impression:   See above.     Finalized on: 7/10/2025 3:32 PM By:  Jameson Garcia MD  [facility]# [id number]      2025-07-10 15:34:[id number]     [facility]        Exam Ended: 07/10/25 09:11 CDT Last Resulted: 07/10/25 15:32 CDT       X-Ray Chest PA And Lateral  Order: [id number]   Status: Final result       Next appt: 07/18/2025 at 08:30 AM in Pulmonology (Barbara Sher MD)       Dx: Lower resp. tract infection    Test Result Released: Yes (seen)       Messages: Seen    0 Result Notes       1 Patient Communication       View Follow-Up Encounter  Details    Reading Physician Reading Date Result Priority   Maximo Adler MD  [phone]  6/14/2025      Narrative & Impression  EXAM: XR CHEST PA AND LATERAL     CLINICAL HISTORY: Acute lower respiratory infection     FINDINGS:  Comparison made to prior chest x-ray dated 5-25.  Clear lungs.  No pleural effusion or pneumothorax or pulmonary edema.  Normal heart size.  Intact thoracic osseous structures.        Impression:   No acute cardiopulmonary process.     Finalized on: 6/14/2025 12:19 AM By:  Maximo Adler MD  [facility]# [id number]      2025-06-14 00:21:35.344     [facility]        Exam Ended: 06/13/25 12:20 CDT Last Resulted: 06/14/25 00:19 CDT    Teto as an Unsuccessful Attempt   CT Med[employer] Sinuses without  Order: [id number]   Status: Final result       Next appt: 07/31/2025 at 09:40 AM in Internal Medicine (CHERI Lujan-NADEEN)       Dx: Chronic pansinusitis    Test Result Released: Yes (not seen)    0 Result Notes  Details    Reading Physician Reading Date Result Priority   Doron Frye MD  [phone]  7/24/2025 ASAP     Narrative & Impression  EXAMINATION:  CT MEDTR[employer]IC SINUSES WITHOUT     CLINICAL HISTORY:  Chronic pansinusitis, <Reason For Exam>     TECHNIQUE:  CT  of the sinuses with navigational [employer] protocol.  All CT scans at this facility are performed  using dose modulation techniques as appropriate to performed exam including the following:  automated exposure control; adjustment of mA and/or kV according to the patients size (this includes techniques or standardized protocols for targeted exams where dose is matched to indication/reason for exam: i.e. extremities or head);  iterative reconstruction technique.     COMPARISON:  None     FINDINGS:  Nasal septum is minimally deviated to the left.  Small left and moderate-sized right middle turbinate renée bullosa noted.     Small left maxillary sinus with sclerotic thickened walls and mild peripheral mucosal thickening noted.  Small component of fluid is present.     The right maxillary ostia is narrowed secondary to an infraorbital air cell.  The left maxillary ostia and infundibulum is narrowed due to mucosal thickening and a bony projection.     The ethmoid sinuses, frontal sinuses and sphenoid sinuses are clear.     The mastoids are clear.     Impression:     Chronic left maxillary sinusitis as above.        Electronically signed by:Doron Frye MD  Date:                                            07/24/2025  Time:                                           10:02        Exam Ended: 07/24/25 09:53 CDT Last Resulted: 07/24/25 10:02 CDT             Reviewed notes from DOTTIE Gutierrez    Assessment & Plan:   Chronic pansinusitis  -     Case Request Operating Room: FESS, USING COMPUTER-ASSISTED NAVIGATION, WITH NASAL TURBINATE REDUCTION    Hypertrophy of inferior nasal turbinate    Renée bullosa    Chronic cough    Non-seasonal allergic rhinitis, unspecified trigger          Assessment & Plan      IMPRESSION:  - Considered sinus infection as potential cause of persistent cough and headache.  - Reviewed recent sinus XR showing total blockage of one sinus, potentially explaining headache.  - Noted normal chest XR  results.  - Explained that cough could be originating from sinus infection.  - Considered previous antibiotic treatments (approximately 3 courses) in decision-making process.    SINUSITIS, UNSPECIFIED CHRONICITY, UNSPECIFIED LOCATION:  - Described nasal endoscopy procedure: use of numbing spray and small camera insertion, emphasizing minimal discomfort.  - Ordered nasal endoscopy to be performed in office.  - Ordered CT Sinuses for further evaluation.      7/24/25 update:       We had a long discussion about his chronic sinus symptoms and chronic cough.  He has evidence of longstanding chronic sinusitis involving his left maxillary and left anterior ethmoid sinuses as well as turbinate hypertrophy and renée bullosa which contribute to his sinus issues.  We discussed endoscopic sinus surgery and turbinate reduction including specific risks as noted below.  He would like to move forward.  He has an upcoming follow-up appointment with Pulmonary as well    We have reviewed the patient's history, physical exam, endoscopy findings and CT sinus images in detail.  The patient is noted to have evidence of chronic sinusitis as well as turbinate hypertrophy.  We reviewed the risks, benefits and alternatives to the procedure of functional endoscopic sinus surgery including but not limited to:  Pain, bleeding, scarring, need for further procedures, empty nose syndrome, injury to the tear duct possibly requiring additional surgery, injury to the eye or eye socket resulting in changes in vision or blindness, changes in sense of smell and/or taste, cerebrospinal fluid leak, persistent nasal obstruction, nasal septal perforation possibly requiring additional surgery.  They would like to proceed with surgery.            Bernardino Shore M.D.  Department of Otolaryngology - Head & Neck Surgery  [id number] [place] [address].  [place] Sol[place] [id number]  P: [phone]  F: [phone]        DISCLAIMER: This note was prepared with MModal voice  recognition transcription software. Garbled syntax, mangled pronouns, and other bizarre constructions may be attributed to that software system. While efforts were made to correct any mistakes made by this voice recognition program, some errors and/or omissions may remain in the note that were missed when the note was originally created.     This note was generated with the assistance of ambient listening technology. Verbal consent was obtained by the patient and accompanying visitor(s) for the recording of patient appointment to facilitate this note. I attest to having reviewed and edited the generated note for accuracy, though some syntax or spelling errors may persist. Please contact the author of this note for any clarification.

## 2025-07-24 NOTE — TELEPHONE ENCOUNTER
Spoke to pt ad instructed per Dr. Lara to stop ASA and Efiient 7d prior to surgery. Voiced understanding.

## 2025-07-29 ENCOUNTER — PATIENT OUTREACH (OUTPATIENT)
Dept: ADMINISTRATIVE | Facility: HOSPITAL | Age: 55
End: 2025-07-29
Payer: COMMERCIAL

## 2025-07-31 ENCOUNTER — LAB VISIT (OUTPATIENT)
Dept: LAB | Facility: HOSPITAL | Age: 55
End: 2025-07-31
Attending: INTERNAL MEDICINE
Payer: COMMERCIAL

## 2025-07-31 ENCOUNTER — OFFICE VISIT (OUTPATIENT)
Dept: INTERNAL MEDICINE | Facility: CLINIC | Age: 55
End: 2025-07-31
Payer: COMMERCIAL

## 2025-07-31 ENCOUNTER — OFFICE VISIT (OUTPATIENT)
Dept: DERMATOLOGY | Facility: CLINIC | Age: 55
End: 2025-07-31
Payer: COMMERCIAL

## 2025-07-31 VITALS
HEART RATE: 67 BPM | TEMPERATURE: 95 F | SYSTOLIC BLOOD PRESSURE: 138 MMHG | BODY MASS INDEX: 29.12 KG/M2 | DIASTOLIC BLOOD PRESSURE: 84 MMHG | RESPIRATION RATE: 21 BRPM | OXYGEN SATURATION: 98 % | WEIGHT: 226.88 LBS | HEIGHT: 74 IN

## 2025-07-31 DIAGNOSIS — D50.9 IDA (IRON DEFICIENCY ANEMIA): ICD-10-CM

## 2025-07-31 DIAGNOSIS — N52.9 ERECTILE DYSFUNCTION, UNSPECIFIED ERECTILE DYSFUNCTION TYPE: ICD-10-CM

## 2025-07-31 DIAGNOSIS — L81.9 HYPOPIGMENTED SKIN LESION: ICD-10-CM

## 2025-07-31 DIAGNOSIS — R80.9 CONTROLLED TYPE 2 DIABETES MELLITUS WITH MICROALBUMINURIA, WITHOUT LONG-TERM CURRENT USE OF INSULIN: ICD-10-CM

## 2025-07-31 DIAGNOSIS — B36.0 TINEA VERSICOLOR: ICD-10-CM

## 2025-07-31 DIAGNOSIS — R80.9 MICROALBUMINURIA: ICD-10-CM

## 2025-07-31 DIAGNOSIS — L80 VITILIGO: Primary | ICD-10-CM

## 2025-07-31 DIAGNOSIS — E11.29 CONTROLLED TYPE 2 DIABETES MELLITUS WITH MICROALBUMINURIA, WITHOUT LONG-TERM CURRENT USE OF INSULIN: ICD-10-CM

## 2025-07-31 DIAGNOSIS — I10 HYPERTENSION GOAL BP (BLOOD PRESSURE) < 130/80: Chronic | ICD-10-CM

## 2025-07-31 DIAGNOSIS — E78.5 HYPERLIPIDEMIA LDL GOAL <70: Primary | Chronic | ICD-10-CM

## 2025-07-31 LAB
ABSOLUTE EOSINOPHIL (OHS): 0.46 K/UL
ABSOLUTE MONOCYTE (OHS): 0.64 K/UL (ref 0.3–1)
ABSOLUTE NEUTROPHIL COUNT (OHS): 6.01 K/UL (ref 1.8–7.7)
ALBUMIN/CREAT UR: 165.8 UG/MG
BASOPHILS # BLD AUTO: 0.07 K/UL
BASOPHILS NFR BLD AUTO: 0.7 %
CREAT UR-MCNC: 79 MG/DL (ref 23–375)
ERYTHROCYTE [DISTWIDTH] IN BLOOD BY AUTOMATED COUNT: ABNORMAL %
FERRITIN SERPL-MCNC: 50 NG/ML (ref 20–300)
HCT VFR BLD AUTO: 45.6 % (ref 40–54)
HGB BLD-MCNC: 13.3 GM/DL (ref 14–18)
IMM GRANULOCYTES # BLD AUTO: 0.02 K/UL (ref 0–0.04)
IMM GRANULOCYTES NFR BLD AUTO: 0.2 % (ref 0–0.5)
IRON SATN MFR SERPL: 9 % (ref 20–50)
IRON SERPL-MCNC: 38 UG/DL (ref 45–160)
LYMPHOCYTES # BLD AUTO: 2.7 K/UL (ref 1–4.8)
MCH RBC QN AUTO: 25 PG (ref 27–31)
MCHC RBC AUTO-ENTMCNC: 29.2 G/DL (ref 32–36)
MCV RBC AUTO: 86 FL (ref 82–98)
MICROALBUMIN UR-MCNC: 131 UG/ML (ref ?–5000)
NUCLEATED RBC (/100WBC) (OHS): 0 /100 WBC
PLATELET # BLD AUTO: 243 K/UL (ref 150–450)
PLATELET BLD QL SMEAR: NORMAL
PMV BLD AUTO: 10.1 FL (ref 9.2–12.9)
RBC # BLD AUTO: 5.33 M/UL (ref 4.6–6.2)
RELATIVE EOSINOPHIL (OHS): 4.6 %
RELATIVE LYMPHOCYTE (OHS): 27.3 % (ref 18–48)
RELATIVE MONOCYTE (OHS): 6.5 % (ref 4–15)
RELATIVE NEUTROPHIL (OHS): 60.7 % (ref 38–73)
TIBC SERPL-MCNC: 420 UG/DL (ref 250–450)
TRANSFERRIN SERPL-MCNC: 284 MG/DL (ref 200–375)
WBC # BLD AUTO: 9.9 K/UL (ref 3.9–12.7)

## 2025-07-31 PROCEDURE — 82043 UR ALBUMIN QUANTITATIVE: CPT

## 2025-07-31 PROCEDURE — 82728 ASSAY OF FERRITIN: CPT

## 2025-07-31 PROCEDURE — 4010F ACE/ARB THERAPY RXD/TAKEN: CPT | Mod: CPTII,S$GLB,, | Performed by: PHYSICIAN ASSISTANT

## 2025-07-31 PROCEDURE — 1160F RVW MEDS BY RX/DR IN RCRD: CPT | Mod: CPTII,S$GLB,, | Performed by: PHYSICIAN ASSISTANT

## 2025-07-31 PROCEDURE — 3075F SYST BP GE 130 - 139MM HG: CPT | Mod: CPTII,S$GLB,, | Performed by: PHYSICIAN ASSISTANT

## 2025-07-31 PROCEDURE — 84466 ASSAY OF TRANSFERRIN: CPT

## 2025-07-31 PROCEDURE — 3008F BODY MASS INDEX DOCD: CPT | Mod: CPTII,S$GLB,, | Performed by: PHYSICIAN ASSISTANT

## 2025-07-31 PROCEDURE — 3044F HG A1C LEVEL LT 7.0%: CPT | Mod: CPTII,S$GLB,, | Performed by: PHYSICIAN ASSISTANT

## 2025-07-31 PROCEDURE — 99999 PR PBB SHADOW E&M-EST. PATIENT-LVL V: CPT | Mod: PBBFAC,,, | Performed by: PHYSICIAN ASSISTANT

## 2025-07-31 PROCEDURE — 99999 PR PBB SHADOW E&M-EST. PATIENT-LVL IV: CPT | Mod: PBBFAC,,, | Performed by: PHYSICIAN ASSISTANT

## 2025-07-31 PROCEDURE — 85025 COMPLETE CBC W/AUTO DIFF WBC: CPT

## 2025-07-31 PROCEDURE — 1159F MED LIST DOCD IN RCRD: CPT | Mod: CPTII,S$GLB,, | Performed by: PHYSICIAN ASSISTANT

## 2025-07-31 PROCEDURE — G2211 COMPLEX E/M VISIT ADD ON: HCPCS | Mod: S$GLB,,, | Performed by: PHYSICIAN ASSISTANT

## 2025-07-31 PROCEDURE — 3079F DIAST BP 80-89 MM HG: CPT | Mod: CPTII,S$GLB,, | Performed by: PHYSICIAN ASSISTANT

## 2025-07-31 PROCEDURE — 36415 COLL VENOUS BLD VENIPUNCTURE: CPT

## 2025-07-31 PROCEDURE — 99214 OFFICE O/P EST MOD 30 MIN: CPT | Mod: S$GLB,,, | Performed by: PHYSICIAN ASSISTANT

## 2025-07-31 PROCEDURE — 99204 OFFICE O/P NEW MOD 45 MIN: CPT | Mod: S$GLB,,, | Performed by: PHYSICIAN ASSISTANT

## 2025-07-31 RX ORDER — SILDENAFIL 50 MG/1
50 TABLET, FILM COATED ORAL DAILY PRN
Qty: 10 TABLET | Refills: 1 | Status: SHIPPED | OUTPATIENT
Start: 2025-07-31 | End: 2026-07-31

## 2025-07-31 RX ORDER — TRIAMCINOLONE ACETONIDE 0.25 MG/G
CREAM TOPICAL
Qty: 30 G | Refills: 0 | Status: SHIPPED | OUTPATIENT
Start: 2025-07-31

## 2025-07-31 NOTE — PATIENT INSTRUCTIONS
Start broad spectrum sunscreen with SPF 30 and zinc oxide and/or titanium dioxide.  Use sunscreen daily.      Sunscreens for the Face  Neutrogena ultra sheer   Cetaphil Sheer Mineral   Elta MD UV Clear

## 2025-07-31 NOTE — PROGRESS NOTES
Subjective:      Patient ID:  Laron Kothari Jr. is a 55 y.o. male who presents for   Chief Complaint   Patient presents with    Hyperpigmentation     On chin     HPI    Review of Systems    Objective:   Physical Exam   Constitutional: He appears well-developed and well-nourished. No distress.   Neurological: He is alert and oriented to person, place, and time. He is not disoriented.   Psychiatric: He has a normal mood and affect.   Skin:   Areas Examined (abnormalities noted in diagram):   Scalp / Hair Palpated and Inspected  Head / Face Inspection Performed  Neck Inspection Performed  Chest / Axilla Inspection Performed  Abdomen Inspection Performed  Back Inspection Performed  RUE Inspected  LUE Inspection Performed  RLE Inspected  LLE Inspection Performed  Nails and Digits Inspection Performed                 Diagram Legend     Erythematous scaling macule/papule c/w actinic keratosis       Vascular papule c/w angioma      Pigmented verrucoid papule/plaque c/w seborrheic keratosis      Yellow umbilicated papule c/w sebaceous hyperplasia      Irregularly shaped tan macule c/w lentigo     1-2 mm smooth white papules consistent with Milia      Movable subcutaneous cyst with punctum c/w epidermal inclusion cyst      Subcutaneous movable cyst c/w pilar cyst      Firm pink to brown papule c/w dermatofibroma      Pedunculated fleshy papule(s) c/w skin tag(s)      Evenly pigmented macule c/w junctional nevus     Mildly variegated pigmented, slightly irregular-bordered macule c/w mildly atypical nevus      Flesh colored to evenly pigmented papule c/w intradermal nevus       Pink pearly papule/plaque c/w basal cell carcinoma      Erythematous hyperkeratotic cursted plaque c/w SCC      Surgical scar with no sign of skin cancer recurrence      Open and closed comedones      Inflammatory papules and pustules      Verrucoid papule consistent consistent with wart     Erythematous eczematous patches and plaques     Dystrophic  onycholytic nail with subungual debris c/w onychomycosis     Umbilicated papule    Erythematous-base heme-crusted tan verrucoid plaque consistent with inflamed seborrheic keratosis     Erythematous Silvery Scaling Plaque c/w Psoriasis     See annotation      Assessment / Plan:        Vitiligo  -     THYROGLOBULIN AB SCREEN; Future; Expected date: 07/31/2025  -     THYROID PEROXIDASE ANTIBODY; Future; Expected date: 07/31/2025  -     triamcinolone acetonide 0.025% (KENALOG) 0.025 % cream; For vitiligo spots. Steroid- limit to 6 weeks.  Dispense: 30 g; Refill: 0  -     Vitamin D; Future; Expected date: 07/31/2025  Discussed dx and tx options and potential association w/thyroid disease. Check above labs. Encouraged trial of above and ambient daily sun exposure. Would reconsider Opzelura trial in future if not improved. Discussed risk of progression and the potential risk of sunburn and skin cancers.     Hypopigmented skin lesion  -     Ambulatory referral/consult to Dermatology    Tinea versicolor  -     Ambulatory referral/consult to Dermatology             No follow-ups on file.

## 2025-07-31 NOTE — PROGRESS NOTES
"Subjective:      Patient ID: Laron Kothari Jr. is a 55 y.o. male.    Chief Complaint: Follow-up (He is here for a 6 month follow up. States sinus surgery scheduled for 8/15. )    Patient is known to me, being seen today for 6mth f/u.     HTN- lisinopril 40mg, toprol 100mg bid   HLD- on statin therapy   DM- A1c 6.4%, glipizide 5mg, metformin 1000mg bid   Thyroid- synthroid 25mcg     Recent labs show stable diabetes, significant anemia w iron deficiency, good cholesterol low, TSH slightly elevated but T4 normal   Started iron twice daily   Patient has Hem appt next week (repeating anemia labs today)    Sinus surgery scheduled for 8/15     Cialis not working for him, viagra seemed to work better but admits even with viagra has trouble w ED  Has not seen Uro in the past for w/u     Lightening around chin, previously prescribed lamisil for tinea versicolor without improvement     Last visit June 2025 w myself.       Review of Systems   Constitutional:  Negative for chills and fever.   HENT:  Positive for congestion, sinus pressure and sinus pain. Negative for rhinorrhea and sore throat.    Respiratory:  Positive for cough (chronic, followed by Pulm). Negative for shortness of breath and wheezing.         +smoker, recently prescribed chantix and nicotine patches by Pulm   Gastrointestinal:  Negative for abdominal pain, blood in stool, constipation, diarrhea, nausea and vomiting.   Skin:  Negative for rash.   Neurological:  Positive for dizziness. Negative for light-headedness and headaches.       Objective:   /84 (BP Location: Right arm, Patient Position: Sitting)   Pulse 67   Temp (!) 95.1 °F (35.1 °C) (Tympanic)   Resp (!) 21   Ht 6' 2" (1.88 m)   Wt 102.9 kg (226 lb 13.7 oz)   SpO2 98%   BMI 29.13 kg/m²   Physical Exam  Constitutional:       General: He is not in acute distress.     Appearance: Normal appearance. He is well-developed. He is not ill-appearing.   HENT:      Head: Normocephalic and atraumatic. "     Cardiovascular:      Rate and Rhythm: Normal rate and regular rhythm.      Pulses:           Dorsalis pedis pulses are 2+ on the right side and 2+ on the left side.      Heart sounds: Normal heart sounds. No murmur heard.  Pulmonary:      Effort: Pulmonary effort is normal. No respiratory distress.      Breath sounds: Normal breath sounds. No decreased breath sounds.   Musculoskeletal:      Right lower leg: No edema.      Left lower leg: No edema.   Feet:      Right foot:      Protective Sensation: 5 sites tested.  5 sites sensed.      Skin integrity: Skin integrity normal.      Toenail Condition: Right toenails are long.      Left foot:      Protective Sensation: 5 sites tested.  5 sites sensed.      Skin integrity: Skin integrity normal.      Toenail Condition: Left toenails are long.   Skin:     General: Skin is warm and dry.      Findings: No rash.   Psychiatric:         Speech: Speech normal.         Behavior: Behavior normal.         Thought Content: Thought content normal.       Assessment:      1. Hyperlipidemia LDL goal <70    2. Hypertension goal BP (blood pressure) < 130/80    3. Controlled type 2 diabetes mellitus with microalbuminuria, without long-term current use of insulin    4. Erectile dysfunction, unspecified erectile dysfunction type    5. Hypopigmented skin lesion    6. Tinea versicolor       Plan:   Hyperlipidemia LDL goal <70    Hypertension goal BP (blood pressure) < 130/80    Controlled type 2 diabetes mellitus with microalbuminuria, without long-term current use of insulin    Erectile dysfunction, unspecified erectile dysfunction type  -     Ambulatory referral/consult to Urology; Future; Expected date: 08/07/2025  -     sildenafiL (VIAGRA) 50 MG tablet; Take 1 tablet (50 mg total) by mouth daily as needed for Erectile Dysfunction.  Dispense: 10 tablet; Refill: 1    Hypopigmented skin lesion  -     Ambulatory referral/consult to Dermatology; Future; Expected date: 08/07/2025    Tinea  versicolor  -     Ambulatory referral/consult to Dermatology; Future; Expected date: 08/07/2025      Continue current BP, diabetes, thyroid meds     6mth annual PCP

## 2025-08-01 ENCOUNTER — PATIENT MESSAGE (OUTPATIENT)
Dept: OTOLARYNGOLOGY | Facility: CLINIC | Age: 55
End: 2025-08-01
Payer: COMMERCIAL

## 2025-08-04 ENCOUNTER — PATIENT MESSAGE (OUTPATIENT)
Dept: PREADMISSION TESTING | Facility: HOSPITAL | Age: 55
End: 2025-08-04
Payer: COMMERCIAL

## 2025-08-05 ENCOUNTER — OFFICE VISIT (OUTPATIENT)
Dept: HEMATOLOGY/ONCOLOGY | Facility: CLINIC | Age: 55
End: 2025-08-05
Payer: COMMERCIAL

## 2025-08-05 ENCOUNTER — LAB VISIT (OUTPATIENT)
Dept: LAB | Facility: HOSPITAL | Age: 55
End: 2025-08-05
Attending: INTERNAL MEDICINE
Payer: COMMERCIAL

## 2025-08-05 VITALS
TEMPERATURE: 97 F | OXYGEN SATURATION: 97 % | WEIGHT: 231.56 LBS | HEIGHT: 74 IN | DIASTOLIC BLOOD PRESSURE: 92 MMHG | HEART RATE: 71 BPM | BODY MASS INDEX: 29.72 KG/M2 | SYSTOLIC BLOOD PRESSURE: 159 MMHG

## 2025-08-05 DIAGNOSIS — I25.118 CORONARY ARTERY DISEASE OF NATIVE ARTERY OF NATIVE HEART WITH STABLE ANGINA PECTORIS: ICD-10-CM

## 2025-08-05 DIAGNOSIS — D50.9 IRON DEFICIENCY ANEMIA, UNSPECIFIED IRON DEFICIENCY ANEMIA TYPE: ICD-10-CM

## 2025-08-05 DIAGNOSIS — D50.0 IRON DEFICIENCY ANEMIA DUE TO CHRONIC BLOOD LOSS: Primary | ICD-10-CM

## 2025-08-05 DIAGNOSIS — E11.40 TYPE 2 DIABETES MELLITUS WITH DIABETIC NEUROPATHY, WITH LONG-TERM CURRENT USE OF INSULIN: Chronic | ICD-10-CM

## 2025-08-05 DIAGNOSIS — Z79.4 TYPE 2 DIABETES MELLITUS WITH DIABETIC NEUROPATHY, WITH LONG-TERM CURRENT USE OF INSULIN: Chronic | ICD-10-CM

## 2025-08-05 LAB
ABSOLUTE EOSINOPHIL (OHS): 0.49 K/UL
ABSOLUTE MONOCYTE (OHS): 0.53 K/UL (ref 0.3–1)
ABSOLUTE NEUTROPHIL COUNT (OHS): 5.8 K/UL (ref 1.8–7.7)
ALBUMIN SERPL BCP-MCNC: 3.8 G/DL (ref 3.5–5.2)
ALP SERPL-CCNC: 55 UNIT/L (ref 40–150)
ALT SERPL W/O P-5'-P-CCNC: 24 UNIT/L (ref 10–44)
ANION GAP (OHS): 10 MMOL/L (ref 8–16)
ANISOCYTOSIS BLD QL SMEAR: SLIGHT
AST SERPL-CCNC: 25 UNIT/L (ref 11–45)
BASOPHILS # BLD AUTO: 0.07 K/UL
BASOPHILS NFR BLD AUTO: 0.7 %
BILIRUB SERPL-MCNC: 0.3 MG/DL (ref 0.1–1)
BUN SERPL-MCNC: 10 MG/DL (ref 6–20)
CALCIUM SERPL-MCNC: 9.3 MG/DL (ref 8.7–10.5)
CHLORIDE SERPL-SCNC: 103 MMOL/L (ref 95–110)
CO2 SERPL-SCNC: 25 MMOL/L (ref 23–29)
CREAT SERPL-MCNC: 1 MG/DL (ref 0.5–1.4)
ERYTHROCYTE [DISTWIDTH] IN BLOOD BY AUTOMATED COUNT: ABNORMAL %
GFR SERPLBLD CREATININE-BSD FMLA CKD-EPI: >60 ML/MIN/1.73/M2
GLUCOSE SERPL-MCNC: 152 MG/DL (ref 70–110)
HCT VFR BLD AUTO: 44.2 % (ref 40–54)
HGB BLD-MCNC: 13 GM/DL (ref 14–18)
HYPOCHROMIA BLD QL SMEAR: NORMAL
IMM GRANULOCYTES # BLD AUTO: 0.04 K/UL (ref 0–0.04)
IMM GRANULOCYTES NFR BLD AUTO: 0.4 % (ref 0–0.5)
IRON SATN MFR SERPL: 23 % (ref 20–50)
IRON SERPL-MCNC: 94 UG/DL (ref 45–160)
LYMPHOCYTES # BLD AUTO: 3.04 K/UL (ref 1–4.8)
MCH RBC QN AUTO: 25 PG (ref 27–31)
MCHC RBC AUTO-ENTMCNC: 29.4 G/DL (ref 32–36)
MCV RBC AUTO: 85 FL (ref 82–98)
NUCLEATED RBC (/100WBC) (OHS): 0 /100 WBC
PLATELET # BLD AUTO: 240 K/UL (ref 150–450)
PLATELET BLD QL SMEAR: NORMAL
PMV BLD AUTO: 9.6 FL (ref 9.2–12.9)
POLYCHROMASIA BLD QL SMEAR: NORMAL
POTASSIUM SERPL-SCNC: 4 MMOL/L (ref 3.5–5.1)
PROT SERPL-MCNC: 7.4 GM/DL (ref 6–8.4)
RBC # BLD AUTO: 5.21 M/UL (ref 4.6–6.2)
RELATIVE EOSINOPHIL (OHS): 4.9 %
RELATIVE LYMPHOCYTE (OHS): 30.5 % (ref 18–48)
RELATIVE MONOCYTE (OHS): 5.3 % (ref 4–15)
RELATIVE NEUTROPHIL (OHS): 58.2 % (ref 38–73)
SCHISTOCYTES BLD QL SMEAR: NORMAL
SODIUM SERPL-SCNC: 138 MMOL/L (ref 136–145)
SPHEROCYTES BLD QL SMEAR: NORMAL
STOMATOCYTES BLD QL SMEAR: PRESENT
TIBC SERPL-MCNC: 407 UG/DL (ref 250–450)
TRANSFERRIN SERPL-MCNC: 275 MG/DL (ref 200–375)
WBC # BLD AUTO: 9.97 K/UL (ref 3.9–12.7)

## 2025-08-05 PROCEDURE — 85025 COMPLETE CBC W/AUTO DIFF WBC: CPT

## 2025-08-05 PROCEDURE — 36415 COLL VENOUS BLD VENIPUNCTURE: CPT

## 2025-08-05 PROCEDURE — 82728 ASSAY OF FERRITIN: CPT

## 2025-08-05 PROCEDURE — 84466 ASSAY OF TRANSFERRIN: CPT

## 2025-08-05 PROCEDURE — 99999 PR PBB SHADOW E&M-EST. PATIENT-LVL V: CPT | Mod: PBBFAC,,, | Performed by: INTERNAL MEDICINE

## 2025-08-05 PROCEDURE — 80053 COMPREHEN METABOLIC PANEL: CPT

## 2025-08-05 RX ORDER — HEPARIN 100 UNIT/ML
500 SYRINGE INTRAVENOUS
OUTPATIENT
Start: 2025-08-05

## 2025-08-05 RX ORDER — SODIUM CHLORIDE 0.9 % (FLUSH) 0.9 %
10 SYRINGE (ML) INJECTION
OUTPATIENT
Start: 2025-08-05

## 2025-08-05 RX ORDER — SODIUM FERRIC GLUCONATE COMPLEX IN SUCROSE 12.5 MG/ML
125 INJECTION INTRAVENOUS
OUTPATIENT
Start: 2025-08-05

## 2025-08-05 RX ORDER — EPINEPHRINE 0.3 MG/.3ML
0.3 INJECTION SUBCUTANEOUS ONCE AS NEEDED
OUTPATIENT
Start: 2025-08-05

## 2025-08-05 NOTE — PROGRESS NOTES
Subjective:       Patient ID: Laron Kothari Jr. is a 55 y.o. male.    Chief Complaint: Results and Anemia    HPI:  55-year-old male documented iron deficiency recent colonoscopies completed.  Patient has a marked improvement but still has severe iron deficiency intolerant and nonresponsive to oral iron    Past Medical History:   Diagnosis Date    Blood in stool 08/07/2018    Deep vein thrombosis (DVT) 2017    Left leg    Diabetes mellitus, type 2 2013    Gout     Hyperlipidemia     Hypertension     Neuropathy     Smoker      Family History   Problem Relation Name Age of Onset    Diabetes Father      Prostate cancer Father      Cataracts Father      Hypertension Mother      Hypertension Brother       Social History[1]  Past Surgical History:   Procedure Laterality Date    APPENDECTOMY      ARTHROSCOPIC CHONDROPLASTY OF KNEE JOINT Right 11/24/2020    Procedure: ARTHROSCOPY, KNEE, WITH CHONDROPLASTY;  Surgeon: Nahum Rose MD;  Location: [facility] OR;  Service: Orthopedics;  Laterality: Right;  chondroplasty medial condyle and anteriorly    COLONOSCOPY N/A 8/7/2018    Procedure: COLONOSCOPY;  Surgeon: Ten Valentine MD;  Location: [facility] ENDO;  Service: Endoscopy;  Laterality: N/A;    COLONOSCOPY N/A 2/23/2022    Procedure: COLONOSCOPY;  Surgeon: Octavio Craig MD;  Location: [facility] ENDO;  Service: Endoscopy;  Laterality: N/A;    KNEE ARTHROSCOPY W/ MENISCECTOMY Right 11/24/2020    Procedure: ARTHROSCOPY, KNEE, WITH MENISCECTOMY;  Surgeon: Nahum Rose MD;  Location: [facility] OR;  Service: Orthopedics;  Laterality: Right;  Partial medial and lateral meniscectomy    LEFT HEART CATHETERIZATION Left 4/11/2023    Procedure: Left heart cath;  Surgeon: Sue Lara MD;  Location: [facility] CATH LAB;  Service: Cardiology;  Laterality: Left;    PERCUTANEOUS TRANSLUMINAL BALLOON ANGIOPLASTY OF CORONARY ARTERY  4/11/2023    Procedure: Angioplasty-coronary;  Surgeon: Sue Lara MD;  Location: [facility] CATH LAB;  Service:  "Cardiology;;    ROBOT-ASSISTED CHOLECYSTECTOMY USING DA TRIPP XI N/A 1/21/2020    Procedure: XI ROBOTIC CHOLECYSTECTOMY;  Surgeon: Sebastien Rivera MD;  Location: [facility] OR;  Service: General;  Laterality: N/A;    STENT, DRUG ELUTING, SINGLE VESSEL, CORONARY  4/11/2023    Procedure: Stent, Drug Eluting, Single Vessel, Coronary;  Surgeon: Sue Lara MD;  Location: [facility] CATH LAB;  Service: Cardiology;;    TONSILLECTOMY, ADENOIDECTOMY         Labs:  Lab Results   Component Value Date    WBC 9.90 07/31/2025    HGB 13.3 (L) 07/31/2025    HCT 45.6 07/31/2025    MCV 86 07/31/2025     07/31/2025     BMP  Lab Results   Component Value Date     06/13/2025    K 4.3 06/13/2025     06/13/2025    CO2 24 06/13/2025    BUN 8 06/13/2025    CREATININE 1.0 06/13/2025    CALCIUM 9.2 06/13/2025    ANIONGAP 8 06/13/2025    ESTGFRAFRICA >60 06/21/2022    EGFRNONAA >60 06/21/2022     Lab Results   Component Value Date    ALT 20 06/13/2025    AST 21 06/13/2025    ALKPHOS 70 06/13/2025    BILITOT 0.5 06/13/2025       Lab Results   Component Value Date    IRON 38 (L) 07/31/2025    TIBC 420 07/31/2025    FERRITIN 50.0 07/31/2025     No results found for: "WHHEJXDS30"  No results found for: "FOLATE"  Lab Results   Component Value Date    TSH 6.269 (H) 06/13/2025         Review of Systems   Constitutional:  Positive for fatigue. Negative for activity change, appetite change, chills, diaphoresis, fever and unexpected weight change.   HENT:  Negative for congestion, dental problem, drooling, ear discharge, ear pain, facial swelling, hearing loss, mouth sores, nosebleeds, postnasal drip, rhinorrhea, sinus pressure, sneezing, sore throat, tinnitus, trouble swallowing and voice change.    Eyes:  Negative for photophobia, pain, discharge, redness, itching and visual disturbance.   Respiratory:  Negative for apnea, cough, choking, chest tightness, shortness of breath, wheezing and stridor.    Cardiovascular:  Negative for chest " pain, palpitations and leg swelling.   Gastrointestinal:  Negative for abdominal distention, abdominal pain, anal bleeding, blood in stool, constipation, diarrhea, nausea, rectal pain and vomiting.   Endocrine: Negative for cold intolerance, heat intolerance, polydipsia, polyphagia and polyuria.   Genitourinary:  Negative for decreased urine volume, difficulty urinating, dysuria, enuresis, flank pain, frequency, genital sores, hematuria, penile discharge, penile pain, penile swelling, scrotal swelling, testicular pain and urgency.   Musculoskeletal:  Negative for arthralgias, back pain, gait problem, joint swelling, myalgias, neck pain and neck stiffness.   Skin:  Negative for color change, pallor, rash and wound.   Allergic/Immunologic: Negative for environmental allergies, food allergies and immunocompromised state.   Neurological:  Positive for weakness. Negative for dizziness, seizures, syncope, facial asymmetry, speech difficulty, light-headedness, numbness and headaches.   Hematological:  Negative for adenopathy. Does not bruise/bleed easily.   Psychiatric/Behavioral:  Negative for agitation, behavioral problems, confusion, decreased concentration, dysphoric mood, hallucinations, self-injury, sleep disturbance and suicidal ideas. The patient is not nervous/anxious and is not hyperactive.        Objective:      Physical Exam  Vitals reviewed.   Constitutional:       General: He is not in acute distress.     Appearance: He is well-developed. He is not diaphoretic.   HENT:      Head: Normocephalic.      Right Ear: External ear normal.      Left Ear: External ear normal.      Nose: Nose normal.      Right Sinus: No maxillary sinus tenderness or frontal sinus tenderness.      Left Sinus: No maxillary sinus tenderness or frontal sinus tenderness.      Mouth/Throat:      Pharynx: No oropharyngeal exudate.   Eyes:      General: Lids are normal. No scleral icterus.        Right eye: No discharge.         Left eye: No  discharge.      Extraocular Movements:      Right eye: Normal extraocular motion.      Left eye: Normal extraocular motion.      Conjunctiva/sclera:      Right eye: Right conjunctiva is not injected. No hemorrhage.     Left eye: Left conjunctiva is not injected. No hemorrhage.     Pupils: Pupils are equal, round, and reactive to light.   Neck:      Thyroid: No thyromegaly.      Vascular: No JVD.      Trachea: No tracheal deviation.   Cardiovascular:      Rate and Rhythm: Normal rate and regular rhythm.   Pulmonary:      Effort: Pulmonary effort is normal. No respiratory distress.      Breath sounds: No stridor.   Abdominal:      General: Bowel sounds are normal.      Palpations: Abdomen is soft. There is no hepatomegaly, splenomegaly or mass.      Tenderness: There is no abdominal tenderness.   Musculoskeletal:         General: No tenderness. Normal range of motion.      Cervical back: Normal range of motion and neck supple.   Lymphadenopathy:      Head:      Right side of head: No posterior auricular or occipital adenopathy.      Left side of head: No posterior auricular or occipital adenopathy.      Cervical: No cervical adenopathy.      Right cervical: No superficial, deep or posterior cervical adenopathy.     Left cervical: No superficial, deep or posterior cervical adenopathy.      Upper Body:      Right upper body: No supraclavicular adenopathy.      Left upper body: No supraclavicular adenopathy.   Skin:     General: Skin is dry.      Findings: No erythema or rash.      Nails: There is no clubbing.   Neurological:      Mental Status: He is alert and oriented to person, place, and time.      Cranial Nerves: No cranial nerve deficit.      Coordination: Coordination normal.   Psychiatric:         Behavior: Behavior normal.         Thought Content: Thought content normal.         Judgment: Judgment normal.             Assessment:      1. Iron deficiency anemia due to chronic blood loss    2. Iron deficiency  anemia, unspecified iron deficiency anemia type    3. Type 2 diabetes mellitus with diabetic neuropathy, with long-term current use of insulin    4. Coronary artery disease of native artery of native heart with stable angina pectoris           Med Onc Chart Routing      Follow up with physician . Return 2-3 months after completion with CBC serum iron TIBC ferritin prior   Follow up with [facility]    Infusion scheduling note    Injection scheduling note IV iron x4   Labs    Imaging    Pharmacy appointment    Other referrals           Needs EGD only          Plan:     Documented iron deficiency recent colonoscopies no findings noted.  Will recommend IV iron intolerant and nonresponsive to oral iron.  Orders written reviewed upper endoscopies ordered        Vin Marx Jr, MD FACP         [1]   Social History  Socioeconomic History    Marital status:    Tobacco Use    Smoking status: Every Day     Current packs/day: 0.50     Average packs/day: 0.5 packs/day for 39.6 years (19.8 ttl pk-yrs)     Types: Cigarettes     Start date: 1986    Smokeless tobacco: Never    Tobacco comments:     Smoked 1.5-2 packs per day until April 2023, now smoking 10 cpd   Substance and Sexual Activity    Alcohol use: Not Currently     Alcohol/week: 26.0 standard drinks of alcohol     Types: 26 Cans of beer per week     Comment: daily    Drug use: No    Sexual activity: Yes     Partners: Female     Social Drivers of Health     Stress: No Stress Concern Present (9/9/2020)    [employer] [place] [employer] - Occupational Stress Questionnaire     Feeling of Stress : Not at all

## 2025-08-06 ENCOUNTER — PATIENT MESSAGE (OUTPATIENT)
Dept: PREADMISSION TESTING | Facility: HOSPITAL | Age: 55
End: 2025-08-06
Payer: COMMERCIAL

## 2025-08-06 ENCOUNTER — PATIENT MESSAGE (OUTPATIENT)
Dept: HEMATOLOGY/ONCOLOGY | Facility: CLINIC | Age: 55
End: 2025-08-06
Payer: COMMERCIAL

## 2025-08-06 ENCOUNTER — TELEPHONE (OUTPATIENT)
Dept: HEMATOLOGY/ONCOLOGY | Facility: CLINIC | Age: 55
End: 2025-08-06
Payer: COMMERCIAL

## 2025-08-06 ENCOUNTER — TELEPHONE (OUTPATIENT)
Dept: PULMONOLOGY | Facility: CLINIC | Age: 55
End: 2025-08-06
Payer: COMMERCIAL

## 2025-08-06 LAB — FERRITIN SERPL-MCNC: 41 NG/ML (ref 20–300)

## 2025-08-06 NOTE — TELEPHONE ENCOUNTER
Chronic Disease Management:   Called patient to confirm Pulmonary Disease Management appointment.

## 2025-08-06 NOTE — TELEPHONE ENCOUNTER
Called to address referral from Dr. Marx and recent distress screening.    Phone call went straight to voicemail; will send message over the patient portal.        8/5/2025     2:54 PM   DISTRESS SCREENING   Distress Score 5   Physical Concerns Sleep

## 2025-08-07 ENCOUNTER — CLINICAL SUPPORT (OUTPATIENT)
Dept: PULMONOLOGY | Facility: CLINIC | Age: 55
End: 2025-08-07
Payer: COMMERCIAL

## 2025-08-07 ENCOUNTER — HOSPITAL ENCOUNTER (OUTPATIENT)
Dept: PREADMISSION TESTING | Facility: HOSPITAL | Age: 55
Discharge: HOME | End: 2025-08-07
Attending: INTERNAL MEDICINE
Payer: COMMERCIAL

## 2025-08-07 DIAGNOSIS — D50.9 IRON DEFICIENCY ANEMIA, UNSPECIFIED IRON DEFICIENCY ANEMIA TYPE: Primary | ICD-10-CM

## 2025-08-07 DIAGNOSIS — F17.210 SMOKING GREATER THAN 30 PACK YEARS: ICD-10-CM

## 2025-08-07 DIAGNOSIS — J44.9 CHRONIC OBSTRUCTIVE PULMONARY DISEASE, UNSPECIFIED COPD TYPE: ICD-10-CM

## 2025-08-07 DIAGNOSIS — J40 BRONCHITIS: Primary | ICD-10-CM

## 2025-08-07 PROCEDURE — 99999 PR PBB SHADOW E&M-EST. PATIENT-LVL II: CPT | Mod: PBBFAC,,,

## 2025-08-07 NOTE — PROGRESS NOTES
Pulmonary Disease Management  [facility]  Chronic Care Management  Initial Visit       Diagnosis: copd  Last Hospital Admission: na  Last provider visit: 7/18/25      Current Outpatient Medications:     albuterol (VENTOLIN HFA) 90 mcg/actuation inhaler, Inhale 2 puffs into the lungs every 6 (six) hours as needed for Wheezing. Rescue, Disp: 18 g, Rfl: 0    albuterol-ipratropium (DUO-NEB) 2.5 mg-0.5 mg/3 mL nebulizer solution, Take 3 mLs by nebulization every 6 (six) hours as needed for Wheezing or Shortness of Breath. Rescue, Disp: 75 mL, Rfl: 2    aspirin (ECOTRIN) 81 MG EC tablet, Take 1 tablet (81 mg total) by mouth once daily., Disp: 30 tablet, Rfl: 11    BLOOD PRESSURE CUFF Misc, 1 Device by Misc.(Non-Drug; Combo Route) route once daily., Disp: , Rfl: 0    blood sugar diagnostic Strp, Once daily glucose testing., Disp: 50 strip, Rfl: 6    blood-glucose meter Misc, Use as directed., Disp: 1 each, Rfl: 0    fluticasone propionate (FLONASE) 50 mcg/actuation nasal spray, 1 spray (50 mcg total) by Each Nostril route once daily., Disp: 16 g, Rfl: 0    fluticasone-umeclidin-vilanter (TRELEGY ELLIPTA) 100-62.5-25 mcg DsDv, Inhale 1 puff into the lungs once daily., Disp: 60 each, Rfl: 2    gabapentin (NEURONTIN) 300 MG capsule, Take 1 capsule (300 mg total) by mouth 3 (three) times daily., Disp: 90 capsule, Rfl: 11    glipiZIDE (GLUCOTROL) 5 MG tablet, Take 1 tablet by mouth once daily with breakfast, Disp: 90 tablet, Rfl: 1    lancets (LANCETS,THIN) Misc, Once daily glucose testing., Disp: 50 each, Rfl: 6    levothyroxine (SYNTHROID) 25 MCG tablet, Take 1 tablet (25 mcg total) by mouth before breakfast., Disp: 90 tablet, Rfl: 2    lisinopriL (PRINIVIL,ZESTRIL) 40 MG tablet, Take 1 tablet by mouth once daily, Disp: 90 tablet, Rfl: 3    loratadine (CLARITIN) 10 mg tablet, Take 10 mg by mouth once daily., Disp: , Rfl:     meclizine (ANTIVERT) 25 mg tablet, Take 1 tablet (25 mg total) by mouth 3 (three) times  "daily as needed for Dizziness., Disp: 30 tablet, Rfl: 0    metFORMIN (GLUCOPHAGE) 1000 MG tablet, TAKE 1 TABLET BY MOUTH TWICE DAILY WITH MEALS, Disp: 180 tablet, Rfl: 1    metoprolol succinate (TOPROL-XL) 50 MG 24 hr tablet, Take 2 tablets by mouth twice daily, Disp: 120 tablet, Rfl: 6    montelukast (SINGULAIR) 10 mg tablet, Take 1 tablet (10 mg total) by mouth every evening., Disp: 30 tablet, Rfl: 11    nebulizer and compressor Valeria, Use as directed, Disp: 1 each, Rfl: 0    nicotine (NICODERM CQ) 21 mg/24 hr, Place 1 patch onto the skin once daily., Disp: 28 patch, Rfl: 2    pen needle, diabetic (PEN NEEDLE) 31 gauge x 5/16" Ndle, Use once daily with insulin injection., Disp: 50 each, Rfl: 3    prasugreL HCl (EFFIENT) 10 mg Tab, Take 1 tablet (10 mg total) by mouth once daily., Disp: 90 tablet, Rfl: 3    pravastatin (PRAVACHOL) 80 MG tablet, Take 1 tablet by mouth in the evening, Disp: 90 tablet, Rfl: 3    ranolazine (RANEXA) 500 MG Tb12, Take 1 tablet (500 mg total) by mouth 2 (two) times daily., Disp: 180 tablet, Rfl: 3    sildenafiL (VIAGRA) 50 MG tablet, Take 1 tablet (50 mg total) by mouth daily as needed for Erectile Dysfunction., Disp: 10 tablet, Rfl: 1    traMADoL (ULTRAM) 50 mg tablet, Take 1 tablet (50 mg total) by mouth every 6 (six) hours as needed., Disp: 12 tablet, Rfl: 0    traZODone (DESYREL) 50 MG tablet, Take 1 tablet by mouth in the evening, Disp: 90 tablet, Rfl: 3    triamcinolone acetonide 0.025% (KENALOG) 0.025 % cream, For vitiligo spots. Steroid- limit to 6 weeks., Disp: 30 g, Rfl: 0    varenicline tartrate (CHANTIX STARTING MONTH BOX) 0.5 mg (11)- 1 mg (42) tablet, Take one 0.5mg tab by mouth once daily X3 days,then increase to one 0.5mg tab twice daily X4 days,then increase to one 1mg tab twice daily, Disp: 42 tablet, Rfl: 0    [START ON 8/17/2025] varenicline tartrate (CHANTIX) 1 mg Tab, Take 1 tablet (1 mg total) by mouth 2 (two) times daily., Disp: 30 tablet, Rfl: 1    Review of " patient's allergies indicates:  No Known Allergies    Smoking history:   Social History     Tobacco Use   Smoking Status Every Day    Current packs/day: 0.50    Average packs/day: 0.5 packs/day for 39.6 years (19.8 ttl pk-yrs)    Types: Cigarettes    Start date: 1986   Smokeless Tobacco Never   Tobacco Comments    Smoked 1.5-2 packs per day until April 2023, now smoking 10 cpd                                                                                                                              PFT Results:  Pre FVC   Date/Time Value Ref Range Status   07/11/2025 11:12 AM 3.19 (L) 3.55 - 5.86 L Final     Pre FEV1   Date/Time Value Ref Range Status   07/11/2025 11:12 AM 2.71 (L) 2.73 - 4.56 L Final     Pre FEV1 FVC   Date/Time Value Ref Range Status   07/11/2025 11:12 AM 85.08 67.54 - 87.76 % Final     Pre TLC   Date/Time Value Ref Range Status   07/11/2025 11:12 AM 6.46 (L) 6.79 - 9.09 L Final     Pre DLCO   Date/Time Value Ref Range Status   07/11/2025 11:12 AM 17.70 (L) 26.78 - 40.63 ml/(min*mmHg) Final            Patient Concerns or Expectations:   na  Therapist Comments:   Laron Kothari Jr.  was seen 8/7/2025 11:00 AM in the Pulmonary Disease management clinic for evaluation, education, reinforcement of self management techniques and exacerbation action plan.    Melissa Alberts    Past Medical History:   Diagnosis Date    Blood in stool 08/07/2018    Deep vein thrombosis (DVT) 2017    Left leg    Diabetes mellitus, type 2 2013    Gout     Hyperlipidemia     Hypertension     Neuropathy     Smoker                  Educational assessment:   [x]            Good  []            Fair  []            Poor    Readiness to learn:   [x]            Good  []            Fair  []            Poor    Vision Status:   [x]            Good  []            Fair  []            Poor    Reading Ability:  [x]            Good  []            Fair  []            Poor    Knowledge of condition:   [x]            Good  []             Fair  []            Poor    Language Barriers:   []            Good  []            Fair  []            Poor  [x]            None    Cognitive/ Physical Barriers:   []            Good  []            Fair  []            Poor  [x]            None    Learning best by:                       [x]            Seeing  [x]            Hearing  [x]            Reading                         [x]            Doing    Describe any barrier /Limitation or financial implications of care choices identified     []            Financial  []            Emotional  []            Education  []            Vision/Hearing  []            Physical  [x]            None  []                TOPIC /CONTENT FOR TODAY:    [x]            MDI with or without spacer  []            Dry powder inhaler  []            Acapella   []           Peak Flow meter  [x]            COPD action plan  []            Nebulizer use  [x]            Oxygen use safety  []            Breathing and cough techniques  [x]            Energy conservation  []            Infection prevention  [x]            Asthma trigger checklist        Learner:    [x]            Patient   []            Caregiver    Method:    [x]            Verbal explanation  []            Audio visual    [x]            Literature  [x]            Teach back      Evaluation:    [x]            Teach back  [x]            Demonstrate  [x]            Follow up phone call    []            2 weeks     []            4 weeks   []            PRN    Additional comments:   Patient was seen today to review respiratory medication purpose and proper technique for use of inhalers. Patient practiced proper technique using MDI with valved holding chamber (spacer) and DPI inhalers. Patient demonstrated understanding. Literature was given to patient.    CONTROLLER: TRELEGY   Frequency: 1 PUFF ONCE DAILY    RESCUE:ALBUTEROL   Frequency: 2 PUFFS AS NEEDED EVERY FOUR HOURS    Asthma trigger checklist was verbally reviewed and literature  given to patient.     Infection prevention was discussed. Patient was reminded to get influenza vaccine. Hand hygiene and cleaning of respiratory equipment was also discussed. Patient verbalized understanding.      COPD action plan was reviewed and literature was given to patient. Patient verbalized understanding.     Plan:  Monthly Pulmonary Disease Management Questionnaire  Follow-up PDM appointment scheduled for 2/5/25   Reinforce education  Meds: Trelegy, Albuterol  DME Needs: na  Action Plan: copd  Immunization: Pneumococcal- current, Flu-current  Next Provider Visit: 10/17/25  Next Spirometry/CPFT: not scheduled  Approximate time spent with patient: 45 minutes

## 2025-08-07 NOTE — PATIENT INSTRUCTIONS
ACTION PLAN    GREEN ZONE  My sputum is clear/white/usual color and easily cleared.  My breathing is no harder than usual.  I can do my usual activities.  I can think clearly.   Take your usual medicines, including oxygen, as you are told to do so by your health care provider.   YELLOW ZONE  My sputum has change (color, thickness, amount).  I am more short of breath than usual.  I cough or wheeze more.  I weigh more and my legs/feet swell.  I cannot do my usual activities without resting.   Call your health care provider. You will probably be told to begin taking an antibiotic and prednisone. Have your pharmacy phone number available.   RED ZONE  I cannot cough out my sputum.  I am much more short of breath than normal.  I need to sit up to breathe  I cannot do my usual activities.  I am unable to speak more than one or two words at a time.  I am confused.   Call your health care provider. You may be asked to come in to be seen, told to go to the emergency room, or told to call 9-1-1.     Asthma Trigger Checklist  Allergens, irritants, and other things may trigger your asthma. Check the box next to each of your triggers. After each trigger is a list of ways to avoid it.   Dust mites. Dust mites live in mattresses, bedding, carpets, curtains, and indoor dust.  To kill dust mites, wash bedding in hot water (130°F) each week.  Cover mattress and pillows with special dust-mite-proof cases.  Don't use upholstered furniture like sofas or chairs in the bedroom.  Use allergy-proof filters for air conditioners and furnaces. Replace or clean them as instructed.  If you can, replace carpeting with wood or tile davian, especially in the bedroom.  Animals. Animals with fur or feathers shed dander (allergens).  It's best to choose a pet that doesn't have fur or feathers, such as a fish or a reptile.  If you have pets, keep them off your bed and out of your bedroom.  Wash your hands and clothes after handling pets.    Mold. Mold  grows in damp places, such as bathrooms, basements, and closets.  Ask someone to clean damp areas in your home every week. Or try wearing a face mask while you clean.  Run an exhaust fan while bathing. Or leave a window open in the bathroom.  Repair water leaks in or around your home.  Have someone else cut grass or rake leaves, if possible.  Don't use vaporizers or humidifiers. They encourage mold growth.    Pollen. Pollen from trees, grasses, and weeds is a common allergen. (Flower pollens are generally not a problem).  Try to learn what types of pollen affect you most. Pollen levels vary depending on the plant, the season, and the time of day.  If possible, use air conditioning instead of opening the windows in your home or car.  Have someone else do yard work, if possible.    Cockroaches. Roaches are found in many homes and produce allergens.  Keep your kitchen clean and dry. A leaky faucet or drain can attract roaches.  Remove garbage from your home daily.  Store food in tightly sealed containers. Wash dishes as soon as they are used.  Use bait stations or traps to control roaches. Avoid using chemical sprays.    Smoke. Smoke may be from cigarettes, cigars, pipes, incense or candles, barbecues or grills, and fireplaces.  Don't smoke. And don't let people smoke in your home or car.  When you travel, ask for nonsmoking rental cars and hotel rooms.  Avoid fireplaces and wood stoves. If you can't, sit away from them. Make sure the smoke is directed outside.  Don't burn incense or use candles.  Move away from smoky outdoor cooking grills.    Smog.  Smog is from car exhaust and other pollution.  Read or listen to local air-quality reports. These let you know when air quality is poor.  Stay indoors as much as you can on smoggy days. If possible, use air conditioning instead of opening the windows.  In your car, set air conditioning to recirculate air, so less pollution gets in.    Strong odors. These include air  fresheners, deodorizers, and cleaning products; perfume, deodorant, and other beauty products; incense and candles; and insect sprays and other sprays.  Use scent-free products like deodorant or body lotion.  Avoid using cleaning products with bleach and ammonia. Make your own cleaning solution with white vinegar, baking soda, or mild dish soap.  Use exhaust fans while cooking. Or open a window, if possible.   Avoid perfumes, air fresheners, potpourri, and other scented products.          Other irritants. These include dust, aerosol sprays, and powders.  Wear a face mask while doing tasks like sanding, dusting, sweeping, and yard work. Open doors and windows if working indoors.  Use pump spray bottles instead of aerosols.  Pour liquid  onto a rag or cloth instead of spraying them.    Weather. Weather conditions can trigger symptoms or make them worse.  Watch for very high or low temperatures, very humid conditions, or a lot of wind, as these conditions can make symptoms worse.  Limit outdoor activity during the type of weather that affects you.  Wear a scarf over your mouth and nose in cold weather.    Colds, flu, and sinus infections. Upper respiratory infections can trigger asthma.  Wash your hands often with soap and warm water or use a hand  containing alcohol.  Get a yearly flu shot. And ask if you should get a pneumonia vaccine.  Take care of your general health. Get plenty of sleep. And eat a variety of healthy foods.    Food additives. Food additives can trigger asthma flare-ups in some people.  Check food labels for sulfites or other similar ingredients. These are often found in foods such as wine, beer, and dried fruits.  Avoid foods that contain these additives.    Medicine. Aspirin, NSAIDS like ibuprofen and naproxen, and heart medicines like beta-blockers may be triggers.  Tell your health care provider if you think certain medicines trigger symptoms.   Be sure to read the labels on  over-the-counter medicines. They may have ingredients that cause symptoms for you.   , Emotions. Laughing, crying, or feeling excited are triggers for some people.   To help you stay calm: Try breathing in slowly through your nose for a count of 2 seconds. Close your lips and breathe out for 4 seconds. Repeat.  Try to focus on a soothing image in your mind. This will help relax you and calm your breathing.  Remember to take your daily controller medicines. When you're upset or under stress, it's easy to forget.    Exercise. For some people, exercise can trigger symptoms. Don't let this keep you from being active.   If you have not been exercising regularly, start slow and work up gradually.  Take all of your medicines as prescribed.  If you use quick-relief medicine, make sure you have it with you when you exercise.  Stop if you have any symptoms. Make sure you talk with your provider about these symptoms.  © 9350-4554 [employer]. [address] [place] [address], [place] [address]. All rights reserved. This information is not intended as a substitute for professional medical care. Always follow your healthcare professional's instructions.          Reviewed breathing techniques such as pursed-lip breathing, mena-cough technique, and diaphragmatic breathing. Patient practiced proper technique and verbalized understanding. Literature given to patient. Reviewed breathing techniques such as pursed-lip breathing, mena-cough technique, and diaphragmatic breathing. Patient practiced proper technique and verbalized understanding. Literature given to patient.

## 2025-08-08 ENCOUNTER — OFFICE VISIT (OUTPATIENT)
Dept: UROLOGY | Facility: CLINIC | Age: 55
End: 2025-08-08
Payer: COMMERCIAL

## 2025-08-08 ENCOUNTER — OFFICE VISIT (OUTPATIENT)
Dept: INTERNAL MEDICINE | Facility: CLINIC | Age: 55
End: 2025-08-08
Payer: COMMERCIAL

## 2025-08-08 ENCOUNTER — PATIENT MESSAGE (OUTPATIENT)
Dept: PREADMISSION TESTING | Facility: HOSPITAL | Age: 55
End: 2025-08-08
Payer: COMMERCIAL

## 2025-08-08 VITALS
RESPIRATION RATE: 20 BRPM | TEMPERATURE: 97 F | HEART RATE: 69 BPM | OXYGEN SATURATION: 97 % | SYSTOLIC BLOOD PRESSURE: 148 MMHG | DIASTOLIC BLOOD PRESSURE: 78 MMHG

## 2025-08-08 VITALS
RESPIRATION RATE: 14 BRPM | DIASTOLIC BLOOD PRESSURE: 94 MMHG | TEMPERATURE: 98 F | HEART RATE: 62 BPM | BODY MASS INDEX: 29.73 KG/M2 | WEIGHT: 231.69 LBS | HEIGHT: 74 IN | SYSTOLIC BLOOD PRESSURE: 153 MMHG

## 2025-08-08 DIAGNOSIS — Z01.818 PRE-OP EXAM: ICD-10-CM

## 2025-08-08 DIAGNOSIS — E11.29 CONTROLLED TYPE 2 DIABETES MELLITUS WITH MICROALBUMINURIA, WITHOUT LONG-TERM CURRENT USE OF INSULIN: ICD-10-CM

## 2025-08-08 DIAGNOSIS — G47.33 OSA (OBSTRUCTIVE SLEEP APNEA): ICD-10-CM

## 2025-08-08 DIAGNOSIS — I10 HYPERTENSION GOAL BP (BLOOD PRESSURE) < 130/80: Chronic | ICD-10-CM

## 2025-08-08 DIAGNOSIS — I25.118 CORONARY ARTERY DISEASE OF NATIVE ARTERY OF NATIVE HEART WITH STABLE ANGINA PECTORIS: Primary | ICD-10-CM

## 2025-08-08 DIAGNOSIS — N52.9 ERECTILE DYSFUNCTION, UNSPECIFIED ERECTILE DYSFUNCTION TYPE: ICD-10-CM

## 2025-08-08 DIAGNOSIS — J34.3 HYPERTROPHY OF INFERIOR NASAL TURBINATE: ICD-10-CM

## 2025-08-08 DIAGNOSIS — D50.0 IRON DEFICIENCY ANEMIA DUE TO CHRONIC BLOOD LOSS: ICD-10-CM

## 2025-08-08 DIAGNOSIS — J32.4 CHRONIC PANSINUSITIS: ICD-10-CM

## 2025-08-08 DIAGNOSIS — I82.402 LEG DVT (DEEP VENOUS THROMBOEMBOLISM), ACUTE, LEFT: ICD-10-CM

## 2025-08-08 DIAGNOSIS — R80.9 CONTROLLED TYPE 2 DIABETES MELLITUS WITH MICROALBUMINURIA, WITHOUT LONG-TERM CURRENT USE OF INSULIN: ICD-10-CM

## 2025-08-08 PROBLEM — J42 CHRONIC BRONCHITIS: Status: ACTIVE | Noted: 2025-08-08

## 2025-08-08 LAB
OHS QRS DURATION: 104 MS
OHS QTC CALCULATION: 434 MS

## 2025-08-08 PROCEDURE — 99999 PR PBB SHADOW E&M-EST. PATIENT-LVL III: CPT | Mod: PBBFAC,,, | Performed by: UROLOGY

## 2025-08-08 PROCEDURE — 93010 ELECTROCARDIOGRAM REPORT: CPT | Mod: ,,, | Performed by: INTERNAL MEDICINE

## 2025-08-08 PROCEDURE — 99999 PR PBB SHADOW E&M-EST. PATIENT-LVL V: CPT | Mod: PBBFAC,,,

## 2025-08-08 PROCEDURE — 93005 ELECTROCARDIOGRAM TRACING: CPT

## 2025-08-08 RX ORDER — SILDENAFIL 100 MG/1
100 TABLET, FILM COATED ORAL DAILY PRN
Qty: 30 TABLET | Refills: 3 | Status: SHIPPED | OUTPATIENT
Start: 2025-08-08

## 2025-08-08 NOTE — PROGRESS NOTES
Chief Complaint:   Encounter Diagnosis   Name Primary?    Erectile dysfunction, unspecified erectile dysfunction type        HPI:  HPI Laron Kothari Jr. luis enrique 55 y.o. male who presents with history of erectile dysfunction for many years.  He has known history of diabetes and coronary artery disease.  As well as hypertension.  He smokes.  He he has had erectile dysfunction that responded to Cialis and Viagra in the past he states more recently the Viagra is not as successful occasionally he will get good erections but does not keep them.  He has good desire for sex.    History:  Social History[1]  Past Medical History:   Diagnosis Date    Blood in stool 08/07/2018    Deep vein thrombosis (DVT) 2017    Left leg    Diabetes mellitus, type 2 2013    Gout     Hyperlipidemia     Hypertension     Neuropathy     Smoker      Past Surgical History:   Procedure Laterality Date    APPENDECTOMY      ARTHROSCOPIC CHONDROPLASTY OF KNEE JOINT Right 11/24/2020    Procedure: ARTHROSCOPY, KNEE, WITH CHONDROPLASTY;  Surgeon: Nahum Rose MD;  Location: [facility] OR;  Service: Orthopedics;  Laterality: Right;  chondroplasty medial condyle and anteriorly    COLONOSCOPY N/A 8/7/2018    Procedure: COLONOSCOPY;  Surgeon: Ten Valentine MD;  Location: [facility] ENDO;  Service: Endoscopy;  Laterality: N/A;    COLONOSCOPY N/A 2/23/2022    Procedure: COLONOSCOPY;  Surgeon: Octavio Craig MD;  Location: [facility] ENDO;  Service: Endoscopy;  Laterality: N/A;    KNEE ARTHROSCOPY W/ MENISCECTOMY Right 11/24/2020    Procedure: ARTHROSCOPY, KNEE, WITH MENISCECTOMY;  Surgeon: Nahum Rose MD;  Location: [facility] OR;  Service: Orthopedics;  Laterality: Right;  Partial medial and lateral meniscectomy    LEFT HEART CATHETERIZATION Left 4/11/2023    Procedure: Left heart cath;  Surgeon: Sue Lara MD;  Location: [facility] CATH LAB;  Service: Cardiology;  Laterality: Left;    PERCUTANEOUS TRANSLUMINAL BALLOON ANGIOPLASTY OF CORONARY ARTERY  4/11/2023     "Procedure: Angioplasty-coronary;  Surgeon: Sue Lara MD;  Location: [facility] CATH LAB;  Service: Cardiology;;    ROBOT-ASSISTED CHOLECYSTECTOMY USING DA TRIPP XI N/A 1/21/2020    Procedure: XI ROBOTIC CHOLECYSTECTOMY;  Surgeon: Sebastien Rivera MD;  Location: [facility] OR;  Service: General;  Laterality: N/A;    STENT, DRUG ELUTING, SINGLE VESSEL, CORONARY  4/11/2023    Procedure: Stent, Drug Eluting, Single Vessel, Coronary;  Surgeon: Sue Lara MD;  Location: [facility] CATH LAB;  Service: Cardiology;;    TONSILLECTOMY, ADENOIDECTOMY       Family History   Problem Relation Name Age of Onset    Diabetes Father      Prostate cancer Father      Cataracts Father      Hypertension Mother      Hypertension Brother         Medications Ordered Prior to Encounter[2]     Objective:     Vitals:    08/08/25 1115   BP: (!) 153/94   BP Location: Left arm   Patient Position: Sitting   Pulse: 62   Resp: 14   Temp: 97.5 °F (36.4 °C)   TempSrc: Oral   Weight: 105.1 kg (231 lb 11.3 oz)   Height: 6' 2" (1.88 m)      BMI Readings from Last 1 Encounters:   08/08/25 29.75 kg/m²          Physical Exam  No acute distress alert and oriented   Respirations even unlabored   Abdomen is soft nontender  Lab Results   Component Value Date    PSA 0.74 07/20/2011        Lab Results   Component Value Date    CREATININE 1.0 08/05/2025      Assessment:       1. Erectile dysfunction, unspecified erectile dysfunction type        Plan:     1. Erectile dysfunction, unspecified erectile dysfunction type       Orders Placed This Encounter    sildenafiL (VIAGRA) 100 MG tablet      Erectile dysfunction not responding to 50 mg of Viagra.  I discussed other options with him including intracavernosal injection vacuum erection device and penile prosthesis.  He is not interested in any further treatment beyond medications.  I will increase his dose of Viagra 100 mg .  This should be effective for him.       [1]   Social History  Tobacco Use    Smoking status: Every " Day     Current packs/day: 0.50     Average packs/day: 0.5 packs/day for 39.6 years (19.8 ttl pk-yrs)     Types: Cigarettes     Start date: 1986    Smokeless tobacco: Never    Tobacco comments:     Smoked 1.5-2 packs per day until April 2023, now smoking 10 cpd   Substance Use Topics    Alcohol use: Not Currently     Alcohol/week: 26.0 standard drinks of alcohol     Types: 26 Cans of beer per week     Comment: daily    Drug use: No   [2]   Current Outpatient Medications on File Prior to Visit   Medication Sig Dispense Refill    albuterol (VENTOLIN HFA) 90 mcg/actuation inhaler Inhale 2 puffs into the lungs every 6 (six) hours as needed for Wheezing. Rescue 18 g 0    albuterol-ipratropium (DUO-NEB) 2.5 mg-0.5 mg/3 mL nebulizer solution Take 3 mLs by nebulization every 6 (six) hours as needed for Wheezing or Shortness of Breath. Rescue 75 mL 2    BLOOD PRESSURE CUFF Misc 1 Device by Misc.(Non-Drug; Combo Route) route once daily.  0    blood sugar diagnostic Strp Once daily glucose testing. 50 strip 6    blood-glucose meter Misc Use as directed. 1 each 0    fluticasone propionate (FLONASE) 50 mcg/actuation nasal spray 1 spray (50 mcg total) by Each Nostril route once daily. 16 g 0    fluticasone-umeclidin-vilanter (TRELEGY ELLIPTA) 100-62.5-25 mcg DsDv Inhale 1 puff into the lungs once daily. 60 each 2    glipiZIDE (GLUCOTROL) 5 MG tablet Take 1 tablet by mouth once daily with breakfast 90 tablet 1    lancets (LANCETS,THIN) Misc Once daily glucose testing. 50 each 6    levothyroxine (SYNTHROID) 25 MCG tablet Take 1 tablet (25 mcg total) by mouth before breakfast. 90 tablet 2    lisinopriL (PRINIVIL,ZESTRIL) 40 MG tablet Take 1 tablet by mouth once daily 90 tablet 3    meclizine (ANTIVERT) 25 mg tablet Take 1 tablet (25 mg total) by mouth 3 (three) times daily as needed for Dizziness. 30 tablet 0    metFORMIN (GLUCOPHAGE) 1000 MG tablet TAKE 1 TABLET BY MOUTH TWICE DAILY WITH MEALS 180 tablet 1    metoprolol succinate  "(TOPROL-XL) 50 MG 24 hr tablet Take 2 tablets by mouth twice daily 120 tablet 6    montelukast (SINGULAIR) 10 mg tablet Take 1 tablet (10 mg total) by mouth every evening. 30 tablet 11    nebulizer and compressor Valeria Use as directed 1 each 0    nicotine (NICODERM CQ) 21 mg/24 hr Place 1 patch onto the skin once daily. 28 patch 2    pen needle, diabetic (PEN NEEDLE) 31 gauge x 5/16" Ndle Use once daily with insulin injection. 50 each 3    prasugreL HCl (EFFIENT) 10 mg Tab Take 1 tablet (10 mg total) by mouth once daily. 90 tablet 3    pravastatin (PRAVACHOL) 80 MG tablet Take 1 tablet by mouth in the evening 90 tablet 3    ranolazine (RANEXA) 500 MG Tb12 Take 1 tablet (500 mg total) by mouth 2 (two) times daily. 180 tablet 3    traMADoL (ULTRAM) 50 mg tablet Take 1 tablet (50 mg total) by mouth every 6 (six) hours as needed. 12 tablet 0    traZODone (DESYREL) 50 MG tablet Take 1 tablet by mouth in the evening 90 tablet 3    triamcinolone acetonide 0.025% (KENALOG) 0.025 % cream For vitiligo spots. Steroid- limit to 6 weeks. 30 g 0    varenicline tartrate (CHANTIX STARTING MONTH BOX) 0.5 mg (11)- 1 mg (42) tablet Take one 0.5mg tab by mouth once daily X3 days,then increase to one 0.5mg tab twice daily X4 days,then increase to one 1mg tab twice daily 42 tablet 0    [START ON 8/17/2025] varenicline tartrate (CHANTIX) 1 mg Tab Take 1 tablet (1 mg total) by mouth 2 (two) times daily. 30 tablet 1    [DISCONTINUED] sildenafiL (VIAGRA) 50 MG tablet Take 1 tablet (50 mg total) by mouth daily as needed for Erectile Dysfunction. 10 tablet 1    aspirin (ECOTRIN) 81 MG EC tablet Take 1 tablet (81 mg total) by mouth once daily. 30 tablet 11    gabapentin (NEURONTIN) 300 MG capsule Take 1 capsule (300 mg total) by mouth 3 (three) times daily. 90 capsule 11    loratadine (CLARITIN) 10 mg tablet Take 10 mg by mouth once daily.       No current facility-administered medications on file prior to visit.     "

## 2025-08-10 ENCOUNTER — E-CONSULT (OUTPATIENT)
Dept: CARDIOLOGY | Facility: HOSPITAL | Age: 55
End: 2025-08-10
Payer: COMMERCIAL

## 2025-08-10 DIAGNOSIS — I25.118 CORONARY ARTERY DISEASE OF NATIVE ARTERY OF NATIVE HEART WITH STABLE ANGINA PECTORIS: Primary | ICD-10-CM

## 2025-08-10 DIAGNOSIS — Z01.810 PREOP CARDIOVASCULAR EXAM: ICD-10-CM

## 2025-08-11 ENCOUNTER — TELEPHONE (OUTPATIENT)
Dept: DERMATOLOGY | Facility: CLINIC | Age: 55
End: 2025-08-11
Payer: COMMERCIAL

## 2025-08-13 ENCOUNTER — E-CONSULT (OUTPATIENT)
Dept: CARDIOLOGY | Facility: CLINIC | Age: 55
End: 2025-08-13
Payer: COMMERCIAL

## 2025-08-13 DIAGNOSIS — Z01.810 PREOP CARDIOVASCULAR EXAM: Primary | ICD-10-CM

## 2025-08-14 ENCOUNTER — PATIENT MESSAGE (OUTPATIENT)
Dept: RESPIRATORY THERAPY | Facility: HOSPITAL | Age: 55
End: 2025-08-14
Payer: COMMERCIAL

## 2025-08-15 ENCOUNTER — HOSPITAL ENCOUNTER (OUTPATIENT)
Facility: HOSPITAL | Age: 55
Discharge: HOME | End: 2025-08-15
Attending: OTOLARYNGOLOGY | Admitting: OTOLARYNGOLOGY
Payer: COMMERCIAL

## 2025-08-15 ENCOUNTER — OFFICE VISIT (OUTPATIENT)
Dept: INTERNAL MEDICINE | Facility: CLINIC | Age: 55
End: 2025-08-15
Payer: COMMERCIAL

## 2025-08-15 VITALS
DIASTOLIC BLOOD PRESSURE: 93 MMHG | WEIGHT: 228.5 LBS | HEART RATE: 65 BPM | HEIGHT: 74 IN | RESPIRATION RATE: 16 BRPM | SYSTOLIC BLOOD PRESSURE: 175 MMHG | OXYGEN SATURATION: 98 % | BODY MASS INDEX: 29.33 KG/M2 | TEMPERATURE: 98 F

## 2025-08-15 VITALS
RESPIRATION RATE: 20 BRPM | DIASTOLIC BLOOD PRESSURE: 86 MMHG | HEART RATE: 65 BPM | SYSTOLIC BLOOD PRESSURE: 164 MMHG | HEIGHT: 74 IN | BODY MASS INDEX: 29.9 KG/M2 | WEIGHT: 233 LBS | TEMPERATURE: 96 F | OXYGEN SATURATION: 98 %

## 2025-08-15 DIAGNOSIS — R80.9 CONTROLLED TYPE 2 DIABETES MELLITUS WITH MICROALBUMINURIA, WITHOUT LONG-TERM CURRENT USE OF INSULIN: ICD-10-CM

## 2025-08-15 DIAGNOSIS — I10 HYPERTENSION GOAL BP (BLOOD PRESSURE) < 130/80: Primary | Chronic | ICD-10-CM

## 2025-08-15 DIAGNOSIS — E11.29 CONTROLLED TYPE 2 DIABETES MELLITUS WITH MICROALBUMINURIA, WITHOUT LONG-TERM CURRENT USE OF INSULIN: ICD-10-CM

## 2025-08-15 LAB
GLUCOSE SERPL-MCNC: 180 MG/DL (ref 70–110)
POCT GLUCOSE: 334 MG/DL (ref 70–110)
POCT GLUCOSE: 352 MG/DL (ref 70–110)

## 2025-08-15 PROCEDURE — 99999 PR PBB SHADOW E&M-EST. PATIENT-LVL III: CPT | Mod: PBBFAC,,, | Performed by: PHYSICIAN ASSISTANT

## 2025-08-15 PROCEDURE — 82962 GLUCOSE BLOOD TEST: CPT | Performed by: OTOLARYNGOLOGY

## 2025-08-15 RX ORDER — GLIPIZIDE 5 MG/1
5 TABLET ORAL
Qty: 90 TABLET | Refills: 1 | Status: SHIPPED | OUTPATIENT
Start: 2025-08-15

## 2025-08-15 RX ORDER — AMLODIPINE BESYLATE 2.5 MG/1
2.5 TABLET ORAL DAILY
Qty: 30 TABLET | Refills: 3 | Status: SHIPPED | OUTPATIENT
Start: 2025-08-15 | End: 2026-08-15

## 2025-08-18 ENCOUNTER — PATIENT MESSAGE (OUTPATIENT)
Dept: DERMATOLOGY | Facility: CLINIC | Age: 55
End: 2025-08-18
Payer: COMMERCIAL

## 2025-08-19 ENCOUNTER — TELEPHONE (OUTPATIENT)
Dept: INFUSION THERAPY | Facility: HOSPITAL | Age: 55
End: 2025-08-19
Payer: COMMERCIAL

## 2025-08-19 ENCOUNTER — PATIENT MESSAGE (OUTPATIENT)
Dept: HEMATOLOGY/ONCOLOGY | Facility: CLINIC | Age: 55
End: 2025-08-19
Payer: COMMERCIAL

## 2025-08-21 ENCOUNTER — PATIENT MESSAGE (OUTPATIENT)
Dept: DERMATOLOGY | Facility: CLINIC | Age: 55
End: 2025-08-21
Payer: COMMERCIAL

## 2025-08-25 ENCOUNTER — HOSPITAL ENCOUNTER (OUTPATIENT)
Dept: ENDOSCOPY | Facility: HOSPITAL | Age: 55
Discharge: HOME | End: 2025-08-25
Attending: INTERNAL MEDICINE

## 2025-08-28 ENCOUNTER — TELEPHONE (OUTPATIENT)
Dept: OTOLARYNGOLOGY | Facility: CLINIC | Age: 55
End: 2025-08-28
Payer: COMMERCIAL

## 2025-09-02 ENCOUNTER — TELEPHONE (OUTPATIENT)
Dept: OTOLARYNGOLOGY | Facility: CLINIC | Age: 55
End: 2025-09-02
Payer: COMMERCIAL

## 2025-09-02 DIAGNOSIS — J32.4 CHRONIC PANSINUSITIS: Primary | ICD-10-CM

## 2025-09-03 ENCOUNTER — TELEPHONE (OUTPATIENT)
Dept: OTOLARYNGOLOGY | Facility: CLINIC | Age: 55
End: 2025-09-03
Payer: COMMERCIAL

## 2025-09-03 DIAGNOSIS — J32.4 CHRONIC PANSINUSITIS: Primary | ICD-10-CM

## 2025-09-04 ENCOUNTER — TELEPHONE (OUTPATIENT)
Dept: OTOLARYNGOLOGY | Facility: CLINIC | Age: 55
End: 2025-09-04
Payer: COMMERCIAL

## (undated) DEVICE — SYR B-D DISP CONTROL 10CC100/C

## (undated) DEVICE — SEE MEDLINE ITEM 152739

## (undated) DEVICE — DRAPE COLUMN DAVINCI XI

## (undated) DEVICE — MANIFOLD 4 PORT

## (undated) DEVICE — ALCOHOL 70% ISOP RUBBING 4OZ

## (undated) DEVICE — SEE MEDLINE ITEM 152523

## (undated) DEVICE — GUIDEWIRE EMERALD .035IN 260CM

## (undated) DEVICE — Device

## (undated) DEVICE — DECANTER VIAL ASEPTIC TRANSFER

## (undated) DEVICE — APPLICATOR CHLORAPREP ORN 26ML

## (undated) DEVICE — DRAPE ABDOMINAL TIBURON 14X11

## (undated) DEVICE — PACK ANGIOPLASTY ACCESS PLUS

## (undated) DEVICE — GOWN SMARTGOWN LVL4 X-LONG XL

## (undated) DEVICE — SEE MEDLINE ITEM 157027

## (undated) DEVICE — SOL NS 1000CC

## (undated) DEVICE — SOL STRL WATER INJ 1000ML BG

## (undated) DEVICE — HANDSWITCH SUCTION COAG

## (undated) DEVICE — GUIDEWIRE WHOLEY HI TORQ 175CM

## (undated) DEVICE — SUT VICRYL 3-0 27 SH

## (undated) DEVICE — CATH EMERGE MR 20 X 2.50

## (undated) DEVICE — IRRIGATOR ENDOWRIST XI SUCTION

## (undated) DEVICE — SEE MEDLINE ITEM 146231

## (undated) DEVICE — SUT ETHILON 3-0 PS2 18 BLK

## (undated) DEVICE — SEE MEDLINE ITEM 146292

## (undated) DEVICE — ELECTRODE REM PLYHSV RETURN 9

## (undated) DEVICE — SEE MEDLINE ITEM 157117

## (undated) DEVICE — TOURNIQUET SB QC SP 34X4IN

## (undated) DEVICE — DRAPE ARM DAVINCI XI

## (undated) DEVICE — PAD DEFIB CADENCE ADULT R2

## (undated) DEVICE — CATH PIG145 INFINITI 5X110CM

## (undated) DEVICE — TUBING ARTHRO IRR 4-LEAD

## (undated) DEVICE — TOWEL OR DISP STRL BLUE 4/PK

## (undated) DEVICE — CLIP HEMO-LOK ML

## (undated) DEVICE — BAND TR WITH INFLATOR

## (undated) DEVICE — KIT TURNOVER

## (undated) DEVICE — SEE MEDLINE ITEM 157216

## (undated) DEVICE — SUT VICRYL+ 27 UR-6 VIOL

## (undated) DEVICE — OBTURATOR BLADELESS 8MM XI CLR

## (undated) DEVICE — DRAPE ORTH SPLIT 77X108IN

## (undated) DEVICE — BAG TISSUE RETRIEVAL 5MM

## (undated) DEVICE — DEVICE CLOSURE DISP 14G

## (undated) DEVICE — DRESSING XEROFORM FOIL PK 1X8

## (undated) DEVICE — CATH NC EMERGE MR 3X12MM

## (undated) DEVICE — SUPPORT ULNA NERVE PROTECTOR

## (undated) DEVICE — JELLY LUBRICATING STERILE 5 GR

## (undated) DEVICE — COVER TIP CURVED SCISSORS XI

## (undated) DEVICE — SEE MEDLINE ITEM 152622

## (undated) DEVICE — NDL PNEUMO INSUFFLATI 120MM

## (undated) DEVICE — CLIPPER BLADE MOD 4406 (CAREF)

## (undated) DEVICE — SYR 10CC LUER LOCK

## (undated) DEVICE — COVER CAMERA OPERATING ROOM

## (undated) DEVICE — SEAL UNIVERSAL 5MM-8MM XI

## (undated) DEVICE — TROCAR ENDOPATH XCEL 12MM 10CM

## (undated) DEVICE — BLADE QUADCUT STRAIGHT 4.3MM

## (undated) DEVICE — SHEATH CLN ENDOSCRB 4MM

## (undated) DEVICE — CATH JR4 5FR

## (undated) DEVICE — SUT CTD VICRYL 0 UND BR SUT

## (undated) DEVICE — SUT VICRYL 4-0 RB1 27IN UD

## (undated) DEVICE — CATH EMERGE MR 30 X 3.00

## (undated) DEVICE — GUIDEWIRE PILOT 50X190

## (undated) DEVICE — KIT MANIFOLD LOW PRESS TUBING

## (undated) DEVICE — KIT WATCHDOG HEMSTAS VALVE 8FR

## (undated) DEVICE — BAG INZII TISS RETRV 12/15MM

## (undated) DEVICE — TUBING HEATED INSUFFLATOR

## (undated) DEVICE — SEE MEDLINE ITEM 152529

## (undated) DEVICE — DRAPE PLASTIC U 60X72

## (undated) DEVICE — SPONGE PATTY SURGICAL .5X3IN

## (undated) DEVICE — SUT PDSII 4-0 PS-2 CLEAR MO

## (undated) DEVICE — OMNIPAQUE 300MG 150ML VIAL

## (undated) DEVICE — KIT ANTIFOG

## (undated) DEVICE — INFLATOR ENCORE 26 BLLN INFL

## (undated) DEVICE — SYR 30CC LUER LOCK

## (undated) DEVICE — ANGIOTOUCH KIT

## (undated) DEVICE — KIT SYR REUSABLE

## (undated) DEVICE — CATH INFINITI MULTIPAK JR4 5FR

## (undated) DEVICE — NDL HYPO 27G X 1 1/2

## (undated) DEVICE — GAUZE SPONGE 4X4 12PLY

## (undated) DEVICE — GLOVE SURG BIOGEL LATEX SZ 7.5

## (undated) DEVICE — CATH JL4 5FR

## (undated) DEVICE — GUIDE LAUNCHER 6FR JR 4.0

## (undated) DEVICE — KIT GLIDESHEATH SLEND 6FR 10CM

## (undated) DEVICE — SOL IRR NACL .9% 3000ML

## (undated) DEVICE — COVER OVERHEAD SURG LT BLUE

## (undated) DEVICE — POSITIONER HEAD DONUT 9IN FOAM

## (undated) DEVICE — ADHESIVE DERMABOND ADVANCED

## (undated) DEVICE — NDL SAFETY 25G X 1.5 ECLIPSE

## (undated) DEVICE — EVACUATOR KIT SMOKE PLUME AWAY

## (undated) DEVICE — NDL SPINAL SPINOCAN 22GX3.5

## (undated) DEVICE — PAD CAST SPECIALIST STRL 6

## (undated) DEVICE — PACK BASIC SETUP SC BR

## (undated) DEVICE — PAD ABD 8X10 STERILE

## (undated) DEVICE — CUTTER MENISCUS AGGRESSIVE 4.0

## (undated) DEVICE — SUT MONOCRYL 4.0 PS2 CP496G

## (undated) DEVICE — COVER LIGHT HANDLE 80/CA

## (undated) DEVICE — DRAPE EMERALD 87X114.75X113

## (undated) DEVICE — NDL SPINAL 18GX3.5 SPINOCAN

## (undated) DEVICE — SEE MEDLINE ITEM 157131